# Patient Record
Sex: MALE | Race: WHITE | NOT HISPANIC OR LATINO
[De-identification: names, ages, dates, MRNs, and addresses within clinical notes are randomized per-mention and may not be internally consistent; named-entity substitution may affect disease eponyms.]

---

## 2017-04-04 PROBLEM — Z00.00 ENCOUNTER FOR PREVENTIVE HEALTH EXAMINATION: Status: ACTIVE | Noted: 2017-04-04

## 2017-04-05 ENCOUNTER — APPOINTMENT (OUTPATIENT)
Dept: OTOLARYNGOLOGY | Facility: CLINIC | Age: 62
End: 2017-04-05

## 2017-04-05 VITALS
SYSTOLIC BLOOD PRESSURE: 128 MMHG | BODY MASS INDEX: 24.5 KG/M2 | HEART RATE: 90 BPM | DIASTOLIC BLOOD PRESSURE: 83 MMHG | WEIGHT: 175 LBS | HEIGHT: 71 IN | TEMPERATURE: 99 F

## 2017-04-09 ENCOUNTER — FORM ENCOUNTER (OUTPATIENT)
Age: 62
End: 2017-04-09

## 2017-04-10 ENCOUNTER — APPOINTMENT (OUTPATIENT)
Dept: SURGERY | Facility: CLINIC | Age: 62
End: 2017-04-10

## 2017-04-10 ENCOUNTER — OUTPATIENT (OUTPATIENT)
Dept: OUTPATIENT SERVICES | Facility: HOSPITAL | Age: 62
LOS: 1 days | End: 2017-04-10
Payer: COMMERCIAL

## 2017-04-10 DIAGNOSIS — J38.7 OTHER DISEASES OF LARYNX: ICD-10-CM

## 2017-04-10 DIAGNOSIS — Z98.890 OTHER SPECIFIED POSTPROCEDURAL STATES: Chronic | ICD-10-CM

## 2017-04-10 DIAGNOSIS — Z01.818 ENCOUNTER FOR OTHER PREPROCEDURAL EXAMINATION: ICD-10-CM

## 2017-04-10 LAB
ALBUMIN SERPL ELPH-MCNC: 4.3 G/DL — SIGNIFICANT CHANGE UP (ref 3.4–5)
ALP SERPL-CCNC: 52 U/L — SIGNIFICANT CHANGE UP (ref 40–120)
ALT FLD-CCNC: 36 U/L — SIGNIFICANT CHANGE UP (ref 12–42)
ANION GAP SERPL CALC-SCNC: 7 MMOL/L — LOW (ref 9–16)
APTT BLD: 46.7 SEC — HIGH (ref 27.5–37.4)
AST SERPL-CCNC: 26 U/L — SIGNIFICANT CHANGE UP (ref 15–37)
BILIRUB SERPL-MCNC: 0.6 MG/DL — SIGNIFICANT CHANGE UP (ref 0.2–1.2)
BUN SERPL-MCNC: 19 MG/DL — SIGNIFICANT CHANGE UP (ref 7–23)
CALCIUM SERPL-MCNC: 8.9 MG/DL — SIGNIFICANT CHANGE UP (ref 8.5–10.5)
CHLORIDE SERPL-SCNC: 107 MMOL/L — SIGNIFICANT CHANGE UP (ref 96–108)
CO2 SERPL-SCNC: 28 MMOL/L — SIGNIFICANT CHANGE UP (ref 22–31)
CREAT SERPL-MCNC: 0.74 MG/DL — SIGNIFICANT CHANGE UP (ref 0.5–1.3)
GLUCOSE SERPL-MCNC: 82 MG/DL — SIGNIFICANT CHANGE UP (ref 70–99)
HCT VFR BLD CALC: 41.5 % — SIGNIFICANT CHANGE UP (ref 39–50)
HGB BLD-MCNC: 14.5 G/DL — SIGNIFICANT CHANGE UP (ref 13–17)
INR BLD: 0.94 — SIGNIFICANT CHANGE UP (ref 0.88–1.16)
MCHC RBC-ENTMCNC: 31.9 PG — SIGNIFICANT CHANGE UP (ref 27–34)
MCHC RBC-ENTMCNC: 34.9 G/DL — SIGNIFICANT CHANGE UP (ref 32–36)
MCV RBC AUTO: 91.2 FL — SIGNIFICANT CHANGE UP (ref 80–100)
PLATELET # BLD AUTO: 269 K/UL — SIGNIFICANT CHANGE UP (ref 150–400)
POTASSIUM SERPL-MCNC: 3.8 MMOL/L — SIGNIFICANT CHANGE UP (ref 3.5–5.3)
POTASSIUM SERPL-SCNC: 3.8 MMOL/L — SIGNIFICANT CHANGE UP (ref 3.5–5.3)
PROT SERPL-MCNC: 7.6 G/DL — SIGNIFICANT CHANGE UP (ref 6.4–8.2)
PROTHROM AB SERPL-ACNC: 10.4 SEC — SIGNIFICANT CHANGE UP (ref 9.8–12.7)
RBC # BLD: 4.55 M/UL — SIGNIFICANT CHANGE UP (ref 4.2–5.8)
RBC # FLD: 14.3 % — SIGNIFICANT CHANGE UP (ref 10.3–16.9)
SODIUM SERPL-SCNC: 142 MMOL/L — SIGNIFICANT CHANGE UP (ref 135–145)
WBC # BLD: 7.9 K/UL — SIGNIFICANT CHANGE UP (ref 3.8–10.5)
WBC # FLD AUTO: 7.9 K/UL — SIGNIFICANT CHANGE UP (ref 3.8–10.5)

## 2017-04-10 PROCEDURE — 93010 ELECTROCARDIOGRAM REPORT: CPT | Mod: NC

## 2017-04-10 PROCEDURE — 71020: CPT | Mod: 26

## 2017-05-10 ENCOUNTER — APPOINTMENT (OUTPATIENT)
Dept: OTOLARYNGOLOGY | Facility: CLINIC | Age: 62
End: 2017-05-10

## 2017-05-10 VITALS
BODY MASS INDEX: 23.8 KG/M2 | HEART RATE: 80 BPM | DIASTOLIC BLOOD PRESSURE: 73 MMHG | HEIGHT: 71 IN | WEIGHT: 170 LBS | SYSTOLIC BLOOD PRESSURE: 129 MMHG

## 2017-06-06 ENCOUNTER — APPOINTMENT (OUTPATIENT)
Dept: SURGERY | Facility: CLINIC | Age: 62
End: 2017-06-06

## 2017-06-06 ENCOUNTER — OUTPATIENT (OUTPATIENT)
Dept: OUTPATIENT SERVICES | Facility: HOSPITAL | Age: 62
LOS: 1 days | End: 2017-06-06

## 2017-06-06 VITALS
TEMPERATURE: 99 F | HEART RATE: 80 BPM | RESPIRATION RATE: 16 BRPM | DIASTOLIC BLOOD PRESSURE: 67 MMHG | HEIGHT: 71 IN | SYSTOLIC BLOOD PRESSURE: 113 MMHG | WEIGHT: 160.94 LBS

## 2017-06-06 DIAGNOSIS — J38.7 OTHER DISEASES OF LARYNX: ICD-10-CM

## 2017-06-06 DIAGNOSIS — F41.1 GENERALIZED ANXIETY DISORDER: ICD-10-CM

## 2017-06-06 DIAGNOSIS — Z98.890 OTHER SPECIFIED POSTPROCEDURAL STATES: Chronic | ICD-10-CM

## 2017-06-06 DIAGNOSIS — J30.2 OTHER SEASONAL ALLERGIC RHINITIS: ICD-10-CM

## 2017-06-06 DIAGNOSIS — Z01.818 ENCOUNTER FOR OTHER PREPROCEDURAL EXAMINATION: ICD-10-CM

## 2017-06-06 LAB
ALBUMIN SERPL ELPH-MCNC: 4.5 G/DL — SIGNIFICANT CHANGE UP (ref 3.3–5)
ALP SERPL-CCNC: 50 U/L — SIGNIFICANT CHANGE UP (ref 40–120)
ALT FLD-CCNC: 22 U/L — SIGNIFICANT CHANGE UP (ref 10–45)
ANION GAP SERPL CALC-SCNC: 15 MMOL/L — SIGNIFICANT CHANGE UP (ref 5–17)
APTT BLD: 43.1 SEC — HIGH (ref 27.5–37.4)
AST SERPL-CCNC: 25 U/L — SIGNIFICANT CHANGE UP (ref 10–40)
BILIRUB SERPL-MCNC: 0.2 MG/DL — SIGNIFICANT CHANGE UP (ref 0.2–1.2)
BUN SERPL-MCNC: 16 MG/DL — SIGNIFICANT CHANGE UP (ref 7–23)
CALCIUM SERPL-MCNC: 9.2 MG/DL — SIGNIFICANT CHANGE UP (ref 8.4–10.5)
CHLORIDE SERPL-SCNC: 101 MMOL/L — SIGNIFICANT CHANGE UP (ref 96–108)
CO2 SERPL-SCNC: 25 MMOL/L — SIGNIFICANT CHANGE UP (ref 22–31)
CREAT SERPL-MCNC: 1 MG/DL — SIGNIFICANT CHANGE UP (ref 0.5–1.3)
GLUCOSE SERPL-MCNC: 85 MG/DL — SIGNIFICANT CHANGE UP (ref 70–99)
HCT VFR BLD CALC: 40.5 % — SIGNIFICANT CHANGE UP (ref 39–50)
HGB BLD-MCNC: 14.2 G/DL — SIGNIFICANT CHANGE UP (ref 13–17)
INR BLD: 0.9 — SIGNIFICANT CHANGE UP (ref 0.88–1.16)
MCHC RBC-ENTMCNC: 32.1 PG — SIGNIFICANT CHANGE UP (ref 27–34)
MCHC RBC-ENTMCNC: 35.1 G/DL — SIGNIFICANT CHANGE UP (ref 32–36)
MCV RBC AUTO: 91.4 FL — SIGNIFICANT CHANGE UP (ref 80–100)
PLATELET # BLD AUTO: 264 K/UL — SIGNIFICANT CHANGE UP (ref 150–400)
POTASSIUM SERPL-MCNC: 4.5 MMOL/L — SIGNIFICANT CHANGE UP (ref 3.5–5.3)
POTASSIUM SERPL-SCNC: 4.5 MMOL/L — SIGNIFICANT CHANGE UP (ref 3.5–5.3)
PROT SERPL-MCNC: 7.3 G/DL — SIGNIFICANT CHANGE UP (ref 6–8.3)
PROTHROM AB SERPL-ACNC: 10 SEC — SIGNIFICANT CHANGE UP (ref 9.8–12.7)
RBC # BLD: 4.43 M/UL — SIGNIFICANT CHANGE UP (ref 4.2–5.8)
RBC # FLD: 14 % — SIGNIFICANT CHANGE UP (ref 10.3–16.9)
SODIUM SERPL-SCNC: 141 MMOL/L — SIGNIFICANT CHANGE UP (ref 135–145)
WBC # BLD: 7.7 K/UL — SIGNIFICANT CHANGE UP (ref 3.8–10.5)
WBC # FLD AUTO: 7.7 K/UL — SIGNIFICANT CHANGE UP (ref 3.8–10.5)

## 2017-06-06 NOTE — H&P PST ADULT - REASON FOR ADMISSION
pre op medical evaluation direct laryngoscopy with biopsy laryynx pre op medical evaluation direct laryngoscopy with biopsy larynx

## 2017-06-06 NOTE — H&P PST ADULT - NSANTHOSAYNRD_GEN_A_CORE
No. JAEL screening performed.  STOP BANG Legend: 0-2 = LOW Risk; 3-4 = INTERMEDIATE Risk; 5-8 = HIGH Risk

## 2017-06-06 NOTE — H&P PST ADULT - HISTORY OF PRESENT ILLNESS
61 year old white male with hoarse voice for 8 months.  Patient ex smoker <1ppd for 45 years.  No ETOH since 2000.  Patient with dry cough and had scope in the past and direct laryngoscopy with biopsy of larynx.  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 61 year old white male with hoarse voice for 8 months.  Patient ex smoker <1ppd for 45 years.  No ETOH since 2000.  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  Patient still smokes about 1/2 to 1 ppd.

## 2017-06-19 VITALS
SYSTOLIC BLOOD PRESSURE: 110 MMHG | HEART RATE: 78 BPM | HEIGHT: 71 IN | TEMPERATURE: 99 F | WEIGHT: 153 LBS | RESPIRATION RATE: 16 BRPM | OXYGEN SATURATION: 96 % | DIASTOLIC BLOOD PRESSURE: 70 MMHG

## 2017-06-20 ENCOUNTER — APPOINTMENT (OUTPATIENT)
Dept: OTOLARYNGOLOGY | Facility: HOSPITAL | Age: 62
End: 2017-06-20

## 2017-06-20 ENCOUNTER — RESULT REVIEW (OUTPATIENT)
Age: 62
End: 2017-06-20

## 2017-06-20 ENCOUNTER — OUTPATIENT (OUTPATIENT)
Dept: OUTPATIENT SERVICES | Facility: HOSPITAL | Age: 62
LOS: 1 days | Discharge: HOME | End: 2017-06-20
Payer: COMMERCIAL

## 2017-06-20 VITALS
RESPIRATION RATE: 18 BRPM | DIASTOLIC BLOOD PRESSURE: 68 MMHG | SYSTOLIC BLOOD PRESSURE: 129 MMHG | OXYGEN SATURATION: 96 % | HEART RATE: 66 BPM | TEMPERATURE: 98 F

## 2017-06-20 DIAGNOSIS — Z98.890 OTHER SPECIFIED POSTPROCEDURAL STATES: Chronic | ICD-10-CM

## 2017-06-20 PROCEDURE — 31536 LARYNGOSCOPY W/BX & OP SCOPE: CPT

## 2017-06-20 PROCEDURE — 31541 LARYNSCOP W/TUMR EXC + SCOPE: CPT | Mod: 50

## 2017-06-20 PROCEDURE — 88360 TUMOR IMMUNOHISTOCHEM/MANUAL: CPT

## 2017-06-20 PROCEDURE — 88305 TISSUE EXAM BY PATHOLOGIST: CPT

## 2017-06-20 PROCEDURE — 88341 IMHCHEM/IMCYTCHM EA ADD ANTB: CPT

## 2017-06-20 RX ORDER — MORPHINE SULFATE 50 MG/1
4 CAPSULE, EXTENDED RELEASE ORAL
Qty: 0 | Refills: 0 | Status: DISCONTINUED | OUTPATIENT
Start: 2017-06-20 | End: 2017-06-20

## 2017-06-20 RX ORDER — ACETAMINOPHEN WITH CODEINE 300MG-30MG
1 TABLET ORAL
Qty: 15 | Refills: 0 | OUTPATIENT
Start: 2017-06-20

## 2017-06-20 RX ORDER — SODIUM CHLORIDE 9 MG/ML
1000 INJECTION, SOLUTION INTRAVENOUS
Qty: 0 | Refills: 0 | Status: DISCONTINUED | OUTPATIENT
Start: 2017-06-20 | End: 2017-06-20

## 2017-06-20 RX ORDER — ONDANSETRON 8 MG/1
4 TABLET, FILM COATED ORAL EVERY 6 HOURS
Qty: 0 | Refills: 0 | Status: DISCONTINUED | OUTPATIENT
Start: 2017-06-20 | End: 2017-06-20

## 2017-06-20 NOTE — BRIEF OPERATIVE NOTE - OPERATION/FINDINGS
right anterior vocal fold lesion involving false fold extending to anterior commissure, left vocal fold with irregular appearance as well

## 2017-06-20 NOTE — PACU DISCHARGE NOTE - COMMENTS
Patient no difficulty swallowing, no shortness of breath. Able to tolerate po fluids without difficulty. Left unit per ambulatory with good steady gait, accompanied by Any ( escort ). No distress noted.

## 2017-06-20 NOTE — BRIEF OPERATIVE NOTE - SPECIMENS
right anterior vocal fold, right supraglottis, anterior commissure, left vocal process, right vocal process, right superior supraglottis, left mid cord

## 2017-06-27 LAB — SURGICAL PATHOLOGY STUDY: SIGNIFICANT CHANGE UP

## 2017-06-28 DIAGNOSIS — C32.0 MALIGNANT NEOPLASM OF GLOTTIS: ICD-10-CM

## 2017-07-05 ENCOUNTER — APPOINTMENT (OUTPATIENT)
Dept: OTOLARYNGOLOGY | Facility: CLINIC | Age: 62
End: 2017-07-05

## 2017-07-11 ENCOUNTER — APPOINTMENT (OUTPATIENT)
Dept: RADIATION ONCOLOGY | Facility: CLINIC | Age: 62
End: 2017-07-11

## 2017-07-11 VITALS
WEIGHT: 159.44 LBS | SYSTOLIC BLOOD PRESSURE: 127 MMHG | OXYGEN SATURATION: 97 % | BODY MASS INDEX: 22.24 KG/M2 | DIASTOLIC BLOOD PRESSURE: 81 MMHG | HEART RATE: 75 BPM

## 2017-07-11 VITALS — OXYGEN SATURATION: 98 % | DIASTOLIC BLOOD PRESSURE: 75 MMHG | SYSTOLIC BLOOD PRESSURE: 112 MMHG | HEART RATE: 76 BPM

## 2017-07-13 ENCOUNTER — APPOINTMENT (OUTPATIENT)
Dept: OTOLARYNGOLOGY | Facility: CLINIC | Age: 62
End: 2017-07-13

## 2017-08-23 VITALS — HEIGHT: 71 IN | WEIGHT: 157 LBS | BODY MASS INDEX: 21.98 KG/M2

## 2017-08-28 VITALS
WEIGHT: 156.4 LBS | SYSTOLIC BLOOD PRESSURE: 110 MMHG | RESPIRATION RATE: 18 BRPM | OXYGEN SATURATION: 99 % | BODY MASS INDEX: 21.81 KG/M2 | DIASTOLIC BLOOD PRESSURE: 71 MMHG | HEART RATE: 61 BPM

## 2017-09-06 VITALS
WEIGHT: 156.25 LBS | OXYGEN SATURATION: 97 % | DIASTOLIC BLOOD PRESSURE: 81 MMHG | HEART RATE: 67 BPM | SYSTOLIC BLOOD PRESSURE: 120 MMHG | BODY MASS INDEX: 21.79 KG/M2

## 2017-09-13 VITALS
HEART RATE: 67 BPM | OXYGEN SATURATION: 97 % | WEIGHT: 155.13 LBS | DIASTOLIC BLOOD PRESSURE: 73 MMHG | BODY MASS INDEX: 21.64 KG/M2 | SYSTOLIC BLOOD PRESSURE: 118 MMHG

## 2017-09-15 ENCOUNTER — APPOINTMENT (OUTPATIENT)
Dept: OTOLARYNGOLOGY | Facility: CLINIC | Age: 62
End: 2017-09-15

## 2017-09-18 ENCOUNTER — OUTPATIENT (OUTPATIENT)
Dept: OUTPATIENT SERVICES | Facility: HOSPITAL | Age: 62
LOS: 1 days | End: 2017-09-18
Payer: COMMERCIAL

## 2017-09-18 DIAGNOSIS — C32.9 MALIGNANT NEOPLASM OF LARYNX, UNSPECIFIED: ICD-10-CM

## 2017-09-18 DIAGNOSIS — Z98.890 OTHER SPECIFIED POSTPROCEDURAL STATES: Chronic | ICD-10-CM

## 2017-09-18 PROCEDURE — 74230 X-RAY XM SWLNG FUNCJ C+: CPT | Mod: 26

## 2017-09-18 PROCEDURE — G8996: CPT | Mod: CI

## 2017-09-18 PROCEDURE — G8997: CPT | Mod: CI

## 2017-09-18 PROCEDURE — 92611 MOTION FLUOROSCOPY/SWALLOW: CPT | Mod: GN

## 2017-09-18 PROCEDURE — 74230 X-RAY XM SWLNG FUNCJ C+: CPT

## 2017-09-18 PROCEDURE — G8998: CPT | Mod: CI

## 2017-09-19 VITALS
OXYGEN SATURATION: 98 % | WEIGHT: 156.44 LBS | BODY MASS INDEX: 21.82 KG/M2 | DIASTOLIC BLOOD PRESSURE: 69 MMHG | SYSTOLIC BLOOD PRESSURE: 127 MMHG | HEART RATE: 71 BPM

## 2017-09-26 VITALS — SYSTOLIC BLOOD PRESSURE: 106 MMHG | RESPIRATION RATE: 18 BRPM | HEART RATE: 72 BPM | DIASTOLIC BLOOD PRESSURE: 66 MMHG

## 2017-09-26 VITALS
SYSTOLIC BLOOD PRESSURE: 121 MMHG | OXYGEN SATURATION: 98 % | WEIGHT: 156.1 LBS | RESPIRATION RATE: 18 BRPM | DIASTOLIC BLOOD PRESSURE: 75 MMHG | HEART RATE: 67 BPM | BODY MASS INDEX: 21.77 KG/M2

## 2017-10-03 VITALS
BODY MASS INDEX: 21.52 KG/M2 | HEART RATE: 61 BPM | WEIGHT: 154.31 LBS | SYSTOLIC BLOOD PRESSURE: 113 MMHG | OXYGEN SATURATION: 98 % | DIASTOLIC BLOOD PRESSURE: 71 MMHG

## 2017-10-10 VITALS
HEART RATE: 72 BPM | SYSTOLIC BLOOD PRESSURE: 110 MMHG | WEIGHT: 154.9 LBS | RESPIRATION RATE: 18 BRPM | OXYGEN SATURATION: 97 % | DIASTOLIC BLOOD PRESSURE: 54 MMHG | BODY MASS INDEX: 21.6 KG/M2

## 2017-10-10 VITALS — SYSTOLIC BLOOD PRESSURE: 115 MMHG | DIASTOLIC BLOOD PRESSURE: 80 MMHG

## 2017-11-17 ENCOUNTER — RX RENEWAL (OUTPATIENT)
Age: 62
End: 2017-11-17

## 2017-11-24 ENCOUNTER — APPOINTMENT (OUTPATIENT)
Dept: RADIATION ONCOLOGY | Facility: CLINIC | Age: 62
End: 2017-11-24
Payer: COMMERCIAL

## 2017-11-24 VITALS — HEART RATE: 84 BPM | DIASTOLIC BLOOD PRESSURE: 72 MMHG | SYSTOLIC BLOOD PRESSURE: 111 MMHG

## 2017-11-24 VITALS
RESPIRATION RATE: 18 BRPM | WEIGHT: 153.3 LBS | OXYGEN SATURATION: 98 % | HEART RATE: 74 BPM | DIASTOLIC BLOOD PRESSURE: 74 MMHG | BODY MASS INDEX: 21.38 KG/M2 | SYSTOLIC BLOOD PRESSURE: 113 MMHG

## 2017-11-24 PROCEDURE — 31575 DIAGNOSTIC LARYNGOSCOPY: CPT

## 2017-11-24 PROCEDURE — 99024 POSTOP FOLLOW-UP VISIT: CPT

## 2017-11-24 RX ORDER — ASCORBIC ACID 500 MG
TABLET,CHEWABLE ORAL
Refills: 0 | Status: DISCONTINUED | COMMUNITY
Start: 2017-07-11 | End: 2017-11-24

## 2017-11-24 RX ORDER — LIDOCAINE HYDROCHLORIDE 20 MG/ML
2 SOLUTION OROPHARYNGEAL
Qty: 1 | Refills: 2 | Status: DISCONTINUED | COMMUNITY
Start: 2017-09-06 | End: 2017-11-24

## 2018-01-21 ENCOUNTER — FORM ENCOUNTER (OUTPATIENT)
Age: 63
End: 2018-01-21

## 2018-01-22 ENCOUNTER — OUTPATIENT (OUTPATIENT)
Dept: OUTPATIENT SERVICES | Facility: HOSPITAL | Age: 63
LOS: 1 days | End: 2018-01-22
Payer: COMMERCIAL

## 2018-01-22 DIAGNOSIS — Z98.890 OTHER SPECIFIED POSTPROCEDURAL STATES: Chronic | ICD-10-CM

## 2018-01-22 LAB — GLUCOSE BLDC GLUCOMTR-MCNC: 96 MG/DL — SIGNIFICANT CHANGE UP (ref 70–99)

## 2018-01-22 PROCEDURE — 78815 PET IMAGE W/CT SKULL-THIGH: CPT | Mod: 26

## 2018-01-22 PROCEDURE — 78815 PET IMAGE W/CT SKULL-THIGH: CPT

## 2018-01-22 PROCEDURE — A9552: CPT

## 2018-01-22 PROCEDURE — 82962 GLUCOSE BLOOD TEST: CPT

## 2018-01-23 ENCOUNTER — APPOINTMENT (OUTPATIENT)
Dept: RADIATION ONCOLOGY | Facility: CLINIC | Age: 63
End: 2018-01-23
Payer: COMMERCIAL

## 2018-01-23 VITALS — HEART RATE: 70 BPM | DIASTOLIC BLOOD PRESSURE: 79 MMHG | SYSTOLIC BLOOD PRESSURE: 131 MMHG | OXYGEN SATURATION: 97 %

## 2018-01-23 VITALS
WEIGHT: 152.38 LBS | OXYGEN SATURATION: 98 % | SYSTOLIC BLOOD PRESSURE: 131 MMHG | HEART RATE: 78 BPM | BODY MASS INDEX: 21.25 KG/M2 | DIASTOLIC BLOOD PRESSURE: 79 MMHG

## 2018-01-23 PROCEDURE — 99214 OFFICE O/P EST MOD 30 MIN: CPT | Mod: 25

## 2018-01-23 PROCEDURE — 31575 DIAGNOSTIC LARYNGOSCOPY: CPT

## 2018-01-23 RX ORDER — GABAPENTIN 300 MG/1
300 CAPSULE ORAL
Qty: 120 | Refills: 1 | Status: DISCONTINUED | COMMUNITY
Start: 2017-11-24 | End: 2018-01-23

## 2018-01-25 ENCOUNTER — RESULT REVIEW (OUTPATIENT)
Age: 63
End: 2018-01-25

## 2018-01-29 ENCOUNTER — APPOINTMENT (OUTPATIENT)
Dept: OTOLARYNGOLOGY | Facility: CLINIC | Age: 63
End: 2018-01-29
Payer: COMMERCIAL

## 2018-01-29 VITALS
TEMPERATURE: 98.9 F | HEART RATE: 80 BPM | BODY MASS INDEX: 21.28 KG/M2 | SYSTOLIC BLOOD PRESSURE: 130 MMHG | HEIGHT: 71 IN | DIASTOLIC BLOOD PRESSURE: 82 MMHG | WEIGHT: 152 LBS

## 2018-01-29 PROCEDURE — 99214 OFFICE O/P EST MOD 30 MIN: CPT | Mod: 25

## 2018-01-29 PROCEDURE — 31575 DIAGNOSTIC LARYNGOSCOPY: CPT

## 2018-02-21 ENCOUNTER — APPOINTMENT (OUTPATIENT)
Dept: SURGERY | Facility: CLINIC | Age: 63
End: 2018-02-21

## 2018-02-21 ENCOUNTER — OUTPATIENT (OUTPATIENT)
Dept: OUTPATIENT SERVICES | Facility: HOSPITAL | Age: 63
LOS: 1 days | End: 2018-02-21
Payer: COMMERCIAL

## 2018-02-21 VITALS
SYSTOLIC BLOOD PRESSURE: 131 MMHG | HEART RATE: 80 BPM | DIASTOLIC BLOOD PRESSURE: 79 MMHG | TEMPERATURE: 98 F | HEIGHT: 71 IN | OXYGEN SATURATION: 94 % | WEIGHT: 145.51 LBS

## 2018-02-21 DIAGNOSIS — Z98.890 OTHER SPECIFIED POSTPROCEDURAL STATES: Chronic | ICD-10-CM

## 2018-02-21 DIAGNOSIS — R49.0 DYSPHONIA: ICD-10-CM

## 2018-02-21 DIAGNOSIS — Z01.818 ENCOUNTER FOR OTHER PREPROCEDURAL EXAMINATION: ICD-10-CM

## 2018-02-21 LAB
ALBUMIN SERPL ELPH-MCNC: 4.2 G/DL — SIGNIFICANT CHANGE UP (ref 3.3–5)
ALP SERPL-CCNC: 50 U/L — SIGNIFICANT CHANGE UP (ref 40–120)
ALT FLD-CCNC: 16 U/L — SIGNIFICANT CHANGE UP (ref 10–45)
ANION GAP SERPL CALC-SCNC: 14 MMOL/L — SIGNIFICANT CHANGE UP (ref 5–17)
APTT BLD: 46.3 SEC — HIGH (ref 27.5–37.4)
AST SERPL-CCNC: 23 U/L — SIGNIFICANT CHANGE UP (ref 10–40)
BASOPHILS NFR BLD AUTO: 0.6 % — SIGNIFICANT CHANGE UP (ref 0–2)
BILIRUB SERPL-MCNC: 0.4 MG/DL — SIGNIFICANT CHANGE UP (ref 0.2–1.2)
BUN SERPL-MCNC: 10 MG/DL — SIGNIFICANT CHANGE UP (ref 7–23)
CALCIUM SERPL-MCNC: 9.3 MG/DL — SIGNIFICANT CHANGE UP (ref 8.4–10.5)
CHLORIDE SERPL-SCNC: 96 MMOL/L — SIGNIFICANT CHANGE UP (ref 96–108)
CO2 SERPL-SCNC: 29 MMOL/L — SIGNIFICANT CHANGE UP (ref 22–31)
CREAT SERPL-MCNC: 0.91 MG/DL — SIGNIFICANT CHANGE UP (ref 0.5–1.3)
EOSINOPHIL NFR BLD AUTO: 0.7 % — SIGNIFICANT CHANGE UP (ref 0–6)
GLUCOSE SERPL-MCNC: 78 MG/DL — SIGNIFICANT CHANGE UP (ref 70–99)
HCT VFR BLD CALC: 41 % — SIGNIFICANT CHANGE UP (ref 39–50)
HGB BLD-MCNC: 13.8 G/DL — SIGNIFICANT CHANGE UP (ref 13–17)
INR BLD: 0.99 — SIGNIFICANT CHANGE UP (ref 0.88–1.16)
LYMPHOCYTES # BLD AUTO: 12.5 % — LOW (ref 13–44)
MCHC RBC-ENTMCNC: 30.6 PG — SIGNIFICANT CHANGE UP (ref 27–34)
MCHC RBC-ENTMCNC: 33.7 G/DL — SIGNIFICANT CHANGE UP (ref 32–36)
MCV RBC AUTO: 90.9 FL — SIGNIFICANT CHANGE UP (ref 80–100)
MONOCYTES NFR BLD AUTO: 8.5 % — SIGNIFICANT CHANGE UP (ref 2–14)
NEUTROPHILS NFR BLD AUTO: 77.7 % — HIGH (ref 43–77)
PLATELET # BLD AUTO: 276 K/UL — SIGNIFICANT CHANGE UP (ref 150–400)
POTASSIUM SERPL-MCNC: 4.1 MMOL/L — SIGNIFICANT CHANGE UP (ref 3.5–5.3)
POTASSIUM SERPL-SCNC: 4.1 MMOL/L — SIGNIFICANT CHANGE UP (ref 3.5–5.3)
PROT SERPL-MCNC: 7.2 G/DL — SIGNIFICANT CHANGE UP (ref 6–8.3)
PROTHROM AB SERPL-ACNC: 11 SEC — SIGNIFICANT CHANGE UP (ref 9.8–12.7)
RBC # BLD: 4.51 M/UL — SIGNIFICANT CHANGE UP (ref 4.2–5.8)
RBC # FLD: 13.6 % — SIGNIFICANT CHANGE UP (ref 10.3–16.9)
SODIUM SERPL-SCNC: 139 MMOL/L — SIGNIFICANT CHANGE UP (ref 135–145)
WBC # BLD: 6.9 K/UL — SIGNIFICANT CHANGE UP (ref 3.8–10.5)
WBC # FLD AUTO: 6.9 K/UL — SIGNIFICANT CHANGE UP (ref 3.8–10.5)

## 2018-02-21 PROCEDURE — 93010 ELECTROCARDIOGRAM REPORT: CPT

## 2018-02-21 NOTE — ASU PATIENT PROFILE, ADULT - PMH
Fracture  left ankle  Jaundice  1965  Laryngeal mass Fracture  left ankle  Jaundice  1965  Laryngeal mass  s/ p radiation  SOB (shortness of breath)    Throat cancer

## 2018-02-21 NOTE — ASU PATIENT PROFILE, ADULT - VISION (WITH CORRECTIVE LENSES IF THE PATIENT USUALLY WEARS THEM):
Partially impaired: cannot see medication labels or newsprint, but can see obstacles in path, and the surrounding layout; can count fingers at arm's length/eyeglass for reading and distance

## 2018-02-21 NOTE — H&P PST ADULT - VISION (WITH CORRECTIVE LENSES IF THE PATIENT USUALLY WEARS THEM):
eyeglass for reading and distance/Partially impaired: cannot see medication labels or newsprint, but can see obstacles in path, and the surrounding layout; can count fingers at arm's length

## 2018-02-21 NOTE — H&P PST ADULT - HISTORY OF PRESENT ILLNESS
Pt. reports having had a throat biopsy last summer. Had radiation to larynx thereafter. Complaining of loss of voice.

## 2018-02-21 NOTE — ASU PATIENT PROFILE, ADULT - PSH
Status post surgery  inguinal hernia  Status post surgery  Left ankle surgical repair  x2 (1965)  Surgery, elective  direct laryngoscopy with biopsy- 4/2017

## 2018-02-27 ENCOUNTER — RESULT REVIEW (OUTPATIENT)
Age: 63
End: 2018-02-27

## 2018-02-27 ENCOUNTER — APPOINTMENT (OUTPATIENT)
Dept: OTOLARYNGOLOGY | Facility: HOSPITAL | Age: 63
End: 2018-02-27

## 2018-02-27 ENCOUNTER — INPATIENT (INPATIENT)
Facility: HOSPITAL | Age: 63
LOS: 8 days | Discharge: NURSING, REHAB OR LONG-TERM CARE | DRG: 11 | End: 2018-03-08
Attending: OTOLARYNGOLOGY | Admitting: OTOLARYNGOLOGY
Payer: COMMERCIAL

## 2018-02-27 VITALS
RESPIRATION RATE: 18 BRPM | TEMPERATURE: 98 F | OXYGEN SATURATION: 92 % | DIASTOLIC BLOOD PRESSURE: 82 MMHG | HEIGHT: 71 IN | HEART RATE: 79 BPM | SYSTOLIC BLOOD PRESSURE: 131 MMHG

## 2018-02-27 DIAGNOSIS — Z98.890 OTHER SPECIFIED POSTPROCEDURAL STATES: Chronic | ICD-10-CM

## 2018-02-27 DIAGNOSIS — Z41.9 ENCOUNTER FOR PROCEDURE FOR PURPOSES OTHER THAN REMEDYING HEALTH STATE, UNSPECIFIED: Chronic | ICD-10-CM

## 2018-02-27 LAB
ALBUMIN SERPL ELPH-MCNC: 3.7 G/DL — SIGNIFICANT CHANGE UP (ref 3.3–5)
ALP SERPL-CCNC: 44 U/L — SIGNIFICANT CHANGE UP (ref 40–120)
ALT FLD-CCNC: 20 U/L — SIGNIFICANT CHANGE UP (ref 10–45)
ANION GAP SERPL CALC-SCNC: 7 MMOL/L — SIGNIFICANT CHANGE UP (ref 5–17)
APTT BLD: 33.6 SEC — SIGNIFICANT CHANGE UP (ref 27.5–37.4)
AST SERPL-CCNC: 35 U/L — SIGNIFICANT CHANGE UP (ref 10–40)
BASE EXCESS BLDA CALC-SCNC: 5.4 MMOL/L — HIGH (ref -2–3)
BILIRUB DIRECT SERPL-MCNC: <0.2 MG/DL — SIGNIFICANT CHANGE UP (ref 0–0.2)
BILIRUB INDIRECT FLD-MCNC: >0.2 MG/DL — SIGNIFICANT CHANGE UP (ref 0.2–1)
BILIRUB SERPL-MCNC: 0.4 MG/DL — SIGNIFICANT CHANGE UP (ref 0.2–1.2)
BUN SERPL-MCNC: 11 MG/DL — SIGNIFICANT CHANGE UP (ref 7–23)
CALCIUM SERPL-MCNC: 8.8 MG/DL — SIGNIFICANT CHANGE UP (ref 8.4–10.5)
CHLORIDE SERPL-SCNC: 93 MMOL/L — LOW (ref 96–108)
CO2 SERPL-SCNC: 35 MMOL/L — HIGH (ref 22–31)
CREAT SERPL-MCNC: 1.16 MG/DL — SIGNIFICANT CHANGE UP (ref 0.5–1.3)
GAS PNL BLDA: SIGNIFICANT CHANGE UP
GLUCOSE BLDC GLUCOMTR-MCNC: 92 MG/DL — SIGNIFICANT CHANGE UP (ref 70–99)
GLUCOSE BLDC GLUCOMTR-MCNC: 99 MG/DL — SIGNIFICANT CHANGE UP (ref 70–99)
GLUCOSE SERPL-MCNC: 123 MG/DL — HIGH (ref 70–99)
HCO3 BLDA-SCNC: 32 MMOL/L — HIGH (ref 21–28)
HCT VFR BLD CALC: 37.1 % — LOW (ref 39–50)
HGB BLD-MCNC: 12.7 G/DL — LOW (ref 13–17)
INR BLD: 1.04 — SIGNIFICANT CHANGE UP (ref 0.88–1.16)
MAGNESIUM SERPL-MCNC: 2 MG/DL — SIGNIFICANT CHANGE UP (ref 1.6–2.6)
MCHC RBC-ENTMCNC: 30.6 PG — SIGNIFICANT CHANGE UP (ref 27–34)
MCHC RBC-ENTMCNC: 34.2 G/DL — SIGNIFICANT CHANGE UP (ref 32–36)
MCV RBC AUTO: 89.4 FL — SIGNIFICANT CHANGE UP (ref 80–100)
PCO2 BLDA: 55 MMHG — HIGH (ref 35–48)
PH BLDA: 7.38 — SIGNIFICANT CHANGE UP (ref 7.35–7.45)
PHOSPHATE SERPL-MCNC: 4.9 MG/DL — HIGH (ref 2.5–4.5)
PLATELET # BLD AUTO: 211 K/UL — SIGNIFICANT CHANGE UP (ref 150–400)
PO2 BLDA: 230 MMHG — HIGH (ref 83–108)
POTASSIUM SERPL-MCNC: 4.8 MMOL/L — SIGNIFICANT CHANGE UP (ref 3.5–5.3)
POTASSIUM SERPL-SCNC: 4.8 MMOL/L — SIGNIFICANT CHANGE UP (ref 3.5–5.3)
PROT SERPL-MCNC: 6.3 G/DL — SIGNIFICANT CHANGE UP (ref 6–8.3)
PROTHROM AB SERPL-ACNC: 11.5 SEC — SIGNIFICANT CHANGE UP (ref 9.8–12.7)
RBC # BLD: 4.15 M/UL — LOW (ref 4.2–5.8)
RBC # FLD: 12.8 % — SIGNIFICANT CHANGE UP (ref 10.3–16.9)
SAO2 % BLDA: 100 % — SIGNIFICANT CHANGE UP (ref 95–100)
SODIUM SERPL-SCNC: 135 MMOL/L — SIGNIFICANT CHANGE UP (ref 135–145)
TROPONIN T SERPL-MCNC: <0.01 NG/ML — SIGNIFICANT CHANGE UP (ref 0–0.01)
TROPONIN T SERPL-MCNC: <0.01 NG/ML — SIGNIFICANT CHANGE UP (ref 0–0.01)
WBC # BLD: 12.2 K/UL — HIGH (ref 3.8–10.5)
WBC # FLD AUTO: 12.2 K/UL — HIGH (ref 3.8–10.5)

## 2018-02-27 PROCEDURE — 71045 X-RAY EXAM CHEST 1 VIEW: CPT | Mod: 26,59

## 2018-02-27 PROCEDURE — 31600 PLANNED TRACHEOSTOMY: CPT

## 2018-02-27 PROCEDURE — 93010 ELECTROCARDIOGRAM REPORT: CPT

## 2018-02-27 PROCEDURE — 31536 LARYNGOSCOPY W/BX & OP SCOPE: CPT

## 2018-02-27 PROCEDURE — 99291 CRITICAL CARE FIRST HOUR: CPT

## 2018-02-27 PROCEDURE — 70450 CT HEAD/BRAIN W/O DYE: CPT | Mod: 26

## 2018-02-27 PROCEDURE — 95951: CPT | Mod: 26

## 2018-02-27 RX ORDER — ACETAMINOPHEN 500 MG
1000 TABLET ORAL ONCE
Qty: 0 | Refills: 0 | Status: COMPLETED | OUTPATIENT
Start: 2018-02-27 | End: 2018-03-02

## 2018-02-27 RX ORDER — MORPHINE SULFATE 50 MG/1
2 CAPSULE, EXTENDED RELEASE ORAL
Qty: 0 | Refills: 0 | Status: DISCONTINUED | OUTPATIENT
Start: 2018-02-27 | End: 2018-02-27

## 2018-02-27 RX ORDER — NALOXONE HYDROCHLORIDE 4 MG/.1ML
0.4 SPRAY NASAL ONCE
Qty: 0 | Refills: 0 | Status: COMPLETED | OUTPATIENT
Start: 2018-02-27 | End: 2018-02-27

## 2018-02-27 RX ORDER — INSULIN LISPRO 100/ML
VIAL (ML) SUBCUTANEOUS
Qty: 0 | Refills: 0 | Status: DISCONTINUED | OUTPATIENT
Start: 2018-02-27 | End: 2018-03-04

## 2018-02-27 RX ORDER — MORPHINE SULFATE 50 MG/1
2 CAPSULE, EXTENDED RELEASE ORAL EVERY 4 HOURS
Qty: 0 | Refills: 0 | Status: DISCONTINUED | OUTPATIENT
Start: 2018-02-27 | End: 2018-02-27

## 2018-02-27 RX ORDER — ONDANSETRON 8 MG/1
4 TABLET, FILM COATED ORAL EVERY 6 HOURS
Qty: 0 | Refills: 0 | Status: DISCONTINUED | OUTPATIENT
Start: 2018-02-27 | End: 2018-03-08

## 2018-02-27 RX ORDER — MORPHINE SULFATE 50 MG/1
4 CAPSULE, EXTENDED RELEASE ORAL EVERY 4 HOURS
Qty: 0 | Refills: 0 | Status: DISCONTINUED | OUTPATIENT
Start: 2018-02-27 | End: 2018-02-27

## 2018-02-27 RX ORDER — SODIUM CHLORIDE 9 MG/ML
1000 INJECTION, SOLUTION INTRAVENOUS
Qty: 0 | Refills: 0 | Status: DISCONTINUED | OUTPATIENT
Start: 2018-02-27 | End: 2018-03-01

## 2018-02-27 RX ORDER — ACETAMINOPHEN 500 MG
1000 TABLET ORAL ONCE
Qty: 0 | Refills: 0 | Status: COMPLETED | OUTPATIENT
Start: 2018-02-27 | End: 2018-02-27

## 2018-02-27 RX ORDER — NICOTINE POLACRILEX 2 MG
1 GUM BUCCAL DAILY
Qty: 0 | Refills: 0 | Status: DISCONTINUED | OUTPATIENT
Start: 2018-02-27 | End: 2018-03-08

## 2018-02-27 RX ORDER — FLUMAZENIL 0.1 MG/ML
0.1 VIAL (ML) INTRAVENOUS ONCE
Qty: 0 | Refills: 0 | Status: COMPLETED | OUTPATIENT
Start: 2018-02-27 | End: 2018-02-27

## 2018-02-27 RX ADMIN — Medication 400 MILLIGRAM(S): at 16:23

## 2018-02-27 RX ADMIN — Medication 100 MILLIGRAM(S): at 19:00

## 2018-02-27 RX ADMIN — Medication 1000 MILLIGRAM(S): at 17:02

## 2018-02-27 RX ADMIN — Medication 0.1 MILLIGRAM(S): at 14:49

## 2018-02-27 RX ADMIN — NALOXONE HYDROCHLORIDE 0.4 MILLIGRAM(S): 4 SPRAY NASAL at 14:43

## 2018-02-27 RX ADMIN — SODIUM CHLORIDE 100 MILLILITER(S): 9 INJECTION, SOLUTION INTRAVENOUS at 16:42

## 2018-02-27 RX ADMIN — NALOXONE HYDROCHLORIDE 0.4 MILLIGRAM(S): 4 SPRAY NASAL at 14:36

## 2018-02-27 RX ADMIN — Medication 1 PATCH: at 13:54

## 2018-02-27 NOTE — CONSULT NOTE ADULT - ASSESSMENT
62M current smoker w/ R TVC SCCa s/p XRT with airway obstruction s/p tracheostomy, direct laryngoscopy, biopsy 2/27  Neuro: tylenol prn. f/u CTH  CV: normotensive goals. f/u cardiac panel  Pulm: /40/12/5 via trach. wean to trach collar. suction with red rubber catheter only  FENGI: NPO, bedside SS eval in AM. protonix.   : Voids  Endo: ISS  ID: clinda (2/27--)  Ppx: SCDs, SQH  Lines: PIVs  Wounds: new trach 62M current smoker w/ R TVC SCCa s/p XRT with airway obstruction s/p tracheostomy, direct laryngoscopy, biopsy 2/27, acute respiratory failure, toxic metaboilic encephalopathy  Neuro: tylenol prn. f/u CTH  CV: normotensive goals. f/u cardiac panel  Pulm: /40/12/5 via trach. wean to trach collar. suction with red rubber catheter only  FENGI: NPO, bedside SS eval in AM. protonix.   : Voids  Endo: ISS  ID: clinda (2/27--)  Ppx: SCDs, SQH  Lines: PIVs  Wounds: new trach

## 2018-02-27 NOTE — BRIEF OPERATIVE NOTE - OPERATION/FINDINGS
See dictated operative report for full details. 6.0 cuffed tracheostomy placed with good end tidal C02 and oxygenation. Intraop frozen section left glottic lesion necrotic tissue. Right glottis nondiagnostic. Additional specimen taken and sent for permanent section at pathologist request.

## 2018-02-27 NOTE — CONSULT NOTE ADULT - SUBJECTIVE AND OBJECTIVE BOX
HPI: 62M current every day smoker hx T2N0M0 R TVC SCCa s/p XRT with 4 months of worsening hoarseness. On exam patient noted to have fullness to supraglottic tissues as well as necrotic glottic tissue with significantly narrowed airway. Patient now s/p awake tracheostomy, direct laryngoscopy and biopsy 2/27.     SICU addendum: Direct laryngoscopy and biopsy was complicated by airway obstruction. Unable to pass glidescope for intubation so awake tracheostomy placement was performed. Pt received versed, propofol, and fentanyl during procedure; EBL <20, IVF 2100. At time of exam the patient was unarousable with pinpoint pupils. He was given narcan 0.4mg x 2 and flumazenil without improvement so decision was made to obtain CTH. Upon arrival to CT the pt was more arousable and following commands but sluggish. He denies pain, headache, CP, SOB.     PMH: above  Meds: tylenol prn  All: PCN (unknown reaction)    POD0: Immediately postop, patient noted to be lethargic and somnolent, not following commands. STAT labs showing some C02 retention but otherwise wnl. Trop neg. STAT CXR and EKG performed. NCHCT without intracranial abnormality. After CT performed, patient mental status returned to baseline. Now alert and oriented, following commands and interactive. Pain well controlled. No trach issues, remains on vent.     PHYSICAL EXAM:   NAD, A&Ox3, communicates via writing  following commands in all extremities  NC/AT, face symmetric, TML  6.0 cuffed shiley trach sutured in place, velcro Glenn collar secured around neck  blood tinged secretions, minimal peristomal oozing expected postop  strength 5/5 grossly throughout     Labs:                        12.7   12.2  )-----------( 211      ( 27 Feb 2018 14:00 )             37.1   02-27    135  |  93<L>  |  11  ----------------------------<  123<H>  4.8   |  35<H>  |  1.16    Ca    8.8      27 Feb 2018 14:00  Phos  4.9     02-27  Mg     2.0     02-27    TPro  6.3  /  Alb  3.7  /  TBili  0.4  /  DBili  <0.2  /  AST  35  /  ALT  20  /  AlkPhos  44  02-27      Troponin T, Serum (02.27.18 @ 14:00)    Troponin T, Serum: <0.01: Reference interval for troponin T is </= 0.01 ng/mL which includes the  99th percentile of a healthy population. Troponin T results are not  interchangeable with troponin I results. ng/mL        Assessment/Plan:   62M current smoker w/ T2N0M0 R TVC SCCa s/p XRT with airway obstruction s/p tracheostomy, direct laryngoscopy, biopsy 2/27    -prn pain  -prn nausea  -NPO/IVF overnight  -bedside SLP evaluation in AM (tolerating regular diet prior to OR)  -HOB@30  -Clinda for abx ppx   -wean to TC as tolerated   -routine trach care - spare 6.0 cuffed shiley at bedside, obturator at HOB, suction with red rubber catheters only, HOB@30  -humidified trach collar as tolerated  -repeat trop in 6 hrs  -DVT ppx: SCDs  -OOBTC    Dispo: SICU    seen with chief and d/w fellow and attending (27 Feb 2018 12:16)      PAST MEDICAL & SURGICAL HISTORY:  SOB (shortness of breath)  Throat cancer  Jaundice: 1965  Fracture: left ankle  Laryngeal mass: s/ p radiation  Surgery, elective: direct laryngoscopy with biopsy- 4/2017  Status post surgery: inguinal hernia  Status post surgery: Left ankle surgical repair  x2 (1965)      ROS: See HPI    MEDICATIONS  (STANDING):  clindamycin IVPB 600 milliGRAM(s) IV Intermittent every 8 hours  insulin lispro (HumaLOG) corrective regimen sliding scale   SubCutaneous Before meals and at bedtime  lactated ringers. 1000 milliLiter(s) (100 mL/Hr) IV Continuous <Continuous>  nicotine -   7 mG/24Hr(s) Patch 1 patch Transdermal daily    MEDICATIONS  (PRN):  morphine  - Injectable 2 milliGRAM(s) IV Push every 15 minutes PRN Severe Pain (7 - 10)  morphine  - Injectable 2 milliGRAM(s) IV Push every 4 hours PRN Moderate Pain (4 - 6)  morphine  - Injectable 4 milliGRAM(s) IV Push every 4 hours PRN Severe Pain (7 - 10)  ondansetron Injectable 4 milliGRAM(s) IV Push every 6 hours PRN Nausea and/or Vomiting      Allergies    penicillin (Rash)    Intolerances        SOCIAL HISTORY:  Smoke: Never Smoker  EtOH: occasional    FAMILY HISTORY:  No pertinent family history in first degree relatives      Vital Signs Last 24 Hrs  T(C): 36.7 (27 Feb 2018 16:02), Max: 37.7 (27 Feb 2018 13:24)  T(F): 98 (27 Feb 2018 16:02), Max: 99.8 (27 Feb 2018 13:24)  HR: 74 (27 Feb 2018 16:24) (66 - 96)  BP: 129/66 (27 Feb 2018 16:02) (106/65 - 131/82)  BP(mean): 91 (27 Feb 2018 16:02) (77 - 95)  RR: 12 (27 Feb 2018 16:24) (5 - 24)  SpO2: 100% (27 Feb 2018 16:24) (92% - 100%)    PHYSICAL EXAM    Gen: NAD   CV: RRR  Pulm: CTAB  Abd: Soft, NT/ND  Ext:     LABS:                        12.7   12.2  )-----------( 211      ( 27 Feb 2018 14:00 )             37.1     02-27    135  |  93<L>  |  11  ----------------------------<  123<H>  4.8   |  35<H>  |  1.16    Ca    8.8      27 Feb 2018 14:00  Phos  4.9     02-27  Mg     2.0     02-27    TPro  6.3  /  Alb  3.7  /  TBili  0.4  /  DBili  <0.2  /  AST  35  /  ALT  20  /  AlkPhos  44  02-27    PT/INR - ( 27 Feb 2018 14:00 )   PT: 11.5 sec;   INR: 1.04          PTT - ( 27 Feb 2018 14:00 )  PTT:33.6 sec      RADIOLOGY & ADDITIONAL STUDIES:    Assessment and Plan:  62yMale HPI: 62M current every day smoker hx T2N0M0 R TVC SCCa s/p XRT with 4 months of worsening hoarseness. On exam patient noted to have fullness to supraglottic tissues as well as necrotic glottic tissue with significantly narrowed airway. Patient now s/p awake tracheostomy, direct laryngoscopy and biopsy 2/27.     SICU addendum: Direct laryngoscopy and biopsy was complicated by airway obstruction. Unable to pass glidescope for intubation so awake tracheostomy placement was performed. Pt had transient episode of SVT intra-op that resolved without intervention. During case he received versed, propofol, and fentanyl during procedure; EBL <20, IVF 2100. He was transferred to PACU post-op on ventilator and off sedation. At time of exam the patient was unarousable with pinpoint pupils. He was given narcan 0.4mg x 2 and flumazenil without improvement so decision was made to obtain CTH. Upon arrival to CT the pt was more arousable and following commands but sluggish. He denies pain, headache, CP, SOB.     PMH: above  Meds: tylenol prn  All: PCN (unknown reaction)      PHYSICAL EXAM:   Gen: somnolent, thin appearing male  Neuro: not following commands. not opening eyes, Pupils pinpoint, sluggish, +dolls eyes. reflexive to pain on all extremities.  HEENT: NC/AT, face symmetric, MMM. 6.0 cuffed shiley trach sutured in place, velcro collar around neck. minimal blood around site.  Pulm: breathing comfortably on vent. CTA x 2  CV: RRR, no murmur  Abd: soft, NTND, +BS.   Ext: WWP, no edema  Skin: no rash  Psyc: unable to assess    Labs:                        12.7   12.2  )-----------( 211      ( 27 Feb 2018 14:00 )             37.1   02-27    135  |  93<L>  |  11  ----------------------------<  123<H>  4.8   |  35<H>  |  1.16    Ca    8.8      27 Feb 2018 14:00  Phos  4.9     02-27  Mg     2.0     02-27    TPro  6.3  /  Alb  3.7  /  TBili  0.4  /  DBili  <0.2  /  AST  35  /  ALT  20  /  AlkPhos  44  02-27      Troponin T, Serum (02.27.18 @ 14:00)    Troponin T, Serum: <0.01: Reference interval for troponin T is </= 0.01 ng/mL which includes the  99th percentile of a healthy population. Troponin T results are not  interchangeable with troponin I results. ng/mL        Assessment/Plan:   62M current smoker w/ T2N0M0 R TVC SCCa s/p XRT with airway obstruction s/p tracheostomy, direct laryngoscopy, biopsy 2/27    -prn pain  -prn nausea  -NPO/IVF overnight  -bedside SLP evaluation in AM (tolerating regular diet prior to OR)  -HOB@30  -Clinda for abx ppx   -wean to TC as tolerated   -routine trach care - spare 6.0 cuffed shiley at bedside, obturator at HOB, suction with red rubber catheters only, HOB@30  -humidified trach collar as tolerated  -repeat trop in 6 hrs  -DVT ppx: SCDs  -OOBTC    Dispo: SICU    seen with chief and d/w fellow and attending (27 Feb 2018 12:16)      PAST MEDICAL & SURGICAL HISTORY:  SOB (shortness of breath)  Throat cancer  Jaundice: 1965  Fracture: left ankle  Laryngeal mass: s/ p radiation  Surgery, elective: direct laryngoscopy with biopsy- 4/2017  Status post surgery: inguinal hernia  Status post surgery: Left ankle surgical repair  x2 (1965)      ROS: See HPI    MEDICATIONS  (STANDING):  clindamycin IVPB 600 milliGRAM(s) IV Intermittent every 8 hours  insulin lispro (HumaLOG) corrective regimen sliding scale   SubCutaneous Before meals and at bedtime  lactated ringers. 1000 milliLiter(s) (100 mL/Hr) IV Continuous <Continuous>  nicotine -   7 mG/24Hr(s) Patch 1 patch Transdermal daily    MEDICATIONS  (PRN):  morphine  - Injectable 2 milliGRAM(s) IV Push every 15 minutes PRN Severe Pain (7 - 10)  morphine  - Injectable 2 milliGRAM(s) IV Push every 4 hours PRN Moderate Pain (4 - 6)  morphine  - Injectable 4 milliGRAM(s) IV Push every 4 hours PRN Severe Pain (7 - 10)  ondansetron Injectable 4 milliGRAM(s) IV Push every 6 hours PRN Nausea and/or Vomiting      Allergies    penicillin (Rash)    Intolerances        SOCIAL HISTORY:  Smoke: Never Smoker  EtOH: occasional    FAMILY HISTORY:  No pertinent family history in first degree relatives      Vital Signs Last 24 Hrs  T(C): 36.7 (27 Feb 2018 16:02), Max: 37.7 (27 Feb 2018 13:24)  T(F): 98 (27 Feb 2018 16:02), Max: 99.8 (27 Feb 2018 13:24)  HR: 74 (27 Feb 2018 16:24) (66 - 96)  BP: 129/66 (27 Feb 2018 16:02) (106/65 - 131/82)  BP(mean): 91 (27 Feb 2018 16:02) (77 - 95)  RR: 12 (27 Feb 2018 16:24) (5 - 24)  SpO2: 100% (27 Feb 2018 16:24) (92% - 100%)    PHYSICAL EXAM    Gen: NAD   CV: RRR  Pulm: CTAB  Abd: Soft, NT/ND  Ext:     LABS:                        12.7   12.2  )-----------( 211      ( 27 Feb 2018 14:00 )             37.1     02-27    135  |  93<L>  |  11  ----------------------------<  123<H>  4.8   |  35<H>  |  1.16    Ca    8.8      27 Feb 2018 14:00  Phos  4.9     02-27  Mg     2.0     02-27    TPro  6.3  /  Alb  3.7  /  TBili  0.4  /  DBili  <0.2  /  AST  35  /  ALT  20  /  AlkPhos  44  02-27    PT/INR - ( 27 Feb 2018 14:00 )   PT: 11.5 sec;   INR: 1.04          PTT - ( 27 Feb 2018 14:00 )  PTT:33.6 sec      RADIOLOGY & ADDITIONAL STUDIES:    Assessment and Plan:  62yMale HPI: 62M current every day smoker hx T2N0M0 R TVC SCCa s/p XRT with 4 months of worsening hoarseness. On exam patient noted to have fullness to supraglottic tissues as well as necrotic glottic tissue with significantly narrowed airway. Patient now s/p awake tracheostomy, direct laryngoscopy and biopsy 2/27.     SICU addendum: Direct laryngoscopy and biopsy was complicated by airway obstruction. Unable to pass glidescope for intubation so awake tracheostomy placement was performed. Pt had transient episode of SVT intra-op that resolved without intervention. During case he received versed, propofol, and fentanyl during procedure; EBL <20, IVF 2100. He was transferred to PACU post-op on ventilator and off sedation. At time of exam the patient was unarousable with pinpoint pupils. He was given narcan 0.4mg x 2 and flumazenil without improvement so decision was made to obtain CTH. Upon arrival to CT the pt was more arousable and following commands but sluggish. He denies pain, headache, CP, SOB.     PMH: above  Meds: tylenol prn  All: PCN (unknown reaction)      PHYSICAL EXAM:   Gen: somnolent, thin appearing male  Neuro: not following commands. not opening eyes, Pupils pinpoint, sluggish, +dolls eyes. reflexive to pain on all extremities.  HEENT: NC/AT, face symmetric, MMM. 6.0 cuffed shiley trach sutured in place, velcro collar around neck. minimal blood around site.  Pulm: breathing comfortably on vent. CTA x 2  CV: RRR, no murmur  Abd: soft, NTND, +BS.   Ext: WWP, no edema  Skin: no rash  Psyc: when awake affect appropriate  Vasc: 2+ pulses  Skin: no rash  MSK: no joint swelling    Labs:                        12.7   12.2  )-----------( 211      ( 27 Feb 2018 14:00 )             37.1   02-27    135  |  93<L>  |  11  ----------------------------<  123<H>  4.8   |  35<H>  |  1.16    Ca    8.8      27 Feb 2018 14:00  Phos  4.9     02-27  Mg     2.0     02-27    TPro  6.3  /  Alb  3.7  /  TBili  0.4  /  DBili  <0.2  /  AST  35  /  ALT  20  /  AlkPhos  44  02-27      Troponin T, Serum (02.27.18 @ 14:00)    Troponin T, Serum: <0.01: Reference interval for troponin T is </= 0.01 ng/mL which includes the  99th percentile of a healthy population. Troponin T results are not  interchangeable with troponin I results. ng/mL        Assessment/Plan:   62M current smoker w/ T2N0M0 R TVC SCCa s/p XRT with airway obstruction s/p tracheostomy, direct laryngoscopy, biopsy 2/27 with intermittent AMS possible toxic metabolic encephalopathy vs. seizure after perioperative respiratory failure and SVT    -prn pain  -prn nausea  -NPO/IVF overnight  -bedside SLP evaluation in AM (tolerating regular diet prior to OR)  -HOB@30  -Clinda for abx ppx   -wean to TC as tolerated   -routine trach care - spare 6.0 cuffed shiley at bedside, obturator at HOB, suction with red rubber catheters only, HOB@30  -humidified trach collar as tolerated  -repeat trop in 6 hrs  -DVT ppx: SCDs  -OOBTC    Dispo: SICU    seen with chief and d/w fellow and attending (27 Feb 2018 12:16)      PAST MEDICAL & SURGICAL HISTORY:  SOB (shortness of breath)  Throat cancer  Jaundice: 1965  Fracture: left ankle  Laryngeal mass: s/ p radiation  Surgery, elective: direct laryngoscopy with biopsy- 4/2017  Status post surgery: inguinal hernia  Status post surgery: Left ankle surgical repair  x2 (1965)      ROS: See HPI    MEDICATIONS  (STANDING):  clindamycin IVPB 600 milliGRAM(s) IV Intermittent every 8 hours  insulin lispro (HumaLOG) corrective regimen sliding scale   SubCutaneous Before meals and at bedtime  lactated ringers. 1000 milliLiter(s) (100 mL/Hr) IV Continuous <Continuous>  nicotine -   7 mG/24Hr(s) Patch 1 patch Transdermal daily    MEDICATIONS  (PRN):  morphine  - Injectable 2 milliGRAM(s) IV Push every 15 minutes PRN Severe Pain (7 - 10)  morphine  - Injectable 2 milliGRAM(s) IV Push every 4 hours PRN Moderate Pain (4 - 6)  morphine  - Injectable 4 milliGRAM(s) IV Push every 4 hours PRN Severe Pain (7 - 10)  ondansetron Injectable 4 milliGRAM(s) IV Push every 6 hours PRN Nausea and/or Vomiting      Allergies    penicillin (Rash)    Intolerances        SOCIAL HISTORY:  Smoke: Never Smoker  EtOH: occasional    FAMILY HISTORY:  No pertinent family history in first degree relatives      Vital Signs Last 24 Hrs  T(C): 36.7 (27 Feb 2018 16:02), Max: 37.7 (27 Feb 2018 13:24)  T(F): 98 (27 Feb 2018 16:02), Max: 99.8 (27 Feb 2018 13:24)  HR: 74 (27 Feb 2018 16:24) (66 - 96)  BP: 129/66 (27 Feb 2018 16:02) (106/65 - 131/82)  BP(mean): 91 (27 Feb 2018 16:02) (77 - 95)  RR: 12 (27 Feb 2018 16:24) (5 - 24)  SpO2: 100% (27 Feb 2018 16:24) (92% - 100%)                          12.7   12.2  )-----------( 211      ( 27 Feb 2018 14:00 )             37.1     02-27    135  |  93<L>  |  11  ----------------------------<  123<H>  4.8   |  35<H>  |  1.16    Ca    8.8      27 Feb 2018 14:00  Phos  4.9     02-27  Mg     2.0     02-27    TPro  6.3  /  Alb  3.7  /  TBili  0.4  /  DBili  <0.2  /  AST  35  /  ALT  20  /  AlkPhos  44  02-27    PT/INR - ( 27 Feb 2018 14:00 )   PT: 11.5 sec;   INR: 1.04          PTT - ( 27 Feb 2018 14:00 )  PTT:33.6 sec      RADIOLOGY & ADDITIONAL STUDIES:

## 2018-02-27 NOTE — CONSULT NOTE ADULT - ATTENDING COMMENTS
Patient had another episode of unresponsiveness transiently. Will order EEG, continue MV. CT of head without acute pathology.  Critical care time rendered 75 minutes  Reviewed with SHANA Delacruz acting as my scribe

## 2018-02-27 NOTE — H&P PST ADULT - HISTORY OF PRESENT ILLNESS
[facility] H&P    HPI: 62M current every day smoker hx T2N0M0 R TVC SCCa s/p XRT with 4 months of worsening hoarseness. On exam patient noted to have fullness to supraglottic tissues as well as necrotic glottic tissue with significantly narrowed airway. Patient now s/p awake tracheostomy, direct laryngoscopy and biopsy 2/27.     PMH: above  Meds: tylenol prn  All: PCN (unknown reaction)    POD0:    PHYSICAL EXAM:   NAD  6.0 cuffed shiley trach sutured in place, velcro Glenn collar secured around neck  blood tinged secretions suctioned    Assessment/Plan:   62M current smoker w/ T2N0M0 R TVC SCCa s/p XRT with airway obstruction s/p tracheostomy, direct laryngoscopy, biopsy 2/27    -prn pain  -prn nausea  -NPO/IVF overnight  -bedside SLP evaluation in AM (tolerating regular diet prior to OR)  -HOB@30  -routine trach care - spare 6.0 cuffed shiley at bedside, obturator at HOB, suction with red rubber catheters only, HOB@30  -humidified trach collar as tolerated  -postop CXR and EKG  -postop labs, troponins  -DVT ppx: SCDs  -OOBTC    Dispo: S[facility] H&P    HPI: 62M current every day smoker hx T2N0M0 R TVC SCCa s/p XRT with 4 months of worsening hoarseness. On exam patient noted to have fullness to supraglottic tissues as well as necrotic glottic tissue with significantly narrowed airway. Patient now s/p awake tracheostomy, direct laryngoscopy and biopsy 2/27.     PMH: above  Meds: tylenol prn  All: PCN (unknown reaction)    POD0:    PHYSICAL EXAM:   NAD  6.0 cuffed shiley trach sutured in place, velcro Glenn collar secured around neck  blood tinged secretions suctioned    Assessment/Plan:   62M current smoker w/ T2N0M0 R TVC SCCa s/p XRT with airway obstruction s/p tracheostomy, direct laryngoscopy, biopsy 2/27    -prn pain  -prn nausea  -NPO/IVF overnight  -bedside SLP evaluation in AM (tolerating regular diet prior to OR)  -HOB@30  -Clinda for abx ppx   -routine trach care - spare 6.0 cuffed shiley at bedside, obturator at HOB, suction with red rubber catheters only, HOB@30  -humidified trach collar as tolerated  -postop CXR and EKG  -postop labs, troponins  -DVT ppx: SCDs  -OOBTC    Dispo: SICU [facility]    HPI: 62M current every day smoker hx T2N0M0 R TVC SCCa s/p XRT with 4 months of worsening hoarseness. On exam patient noted to have fullness to supraglottic tissues as well as necrotic glottic tissue with significantly narrowed airway. Patient now s/p awake tracheostomy, direct laryngoscopy and biopsy 2/27.     PMH: above  Meds: tylenol prn  All: PCN (unknown reaction)    POD0: Immediately postop, patient noted to be lethargic and somnolent, not following commands. STAT labs showing some C02 retention but otherwise wnl. Trop neg. STAT CXR and EKG performed. NCHCT without intracranial abnormality. After CT performed, patient mental status returned to baseline. Now alert and oriented, following commands and interactive. Pain well controlled. No trach issues, remains on vent.     PHYSICAL EXAM:   NAD, A&Ox3, communicates via writing  following commands in all extremities  NC/AT, face symmetric, TML  6.0 cuffed shiley trach sutured in place, velcro Glenn collar secured around neck  blood tinged secretions, minimal peristomal oozing expected postop  strength 5/5 grossly throughout     Labs:                        12.7   12.2  )-----------( 211      ( 27 Feb 2018 14:00 )             37.1   02-27    135  |  93<L>  |  11  ----------------------------<  123<H>  4.8   |  35<H>  |  1.16    Ca    8.8      27 Feb 2018 14:00  Phos  4.9     02-27  Mg     2.0     02-27    TPro  6.3  /  Alb  3.7  /  TBili  0.4  /  DBili  <0.2  /  AST  35  /  ALT  20  /  AlkPhos  44  02-27      Troponin T, Serum (02.27.18 @ 14:00)    Troponin T, Serum: <0.01: Reference interval for troponin T is </= 0.01 ng/mL which includes the  99th percentile of a healthy population. Troponin T results are not  interchangeable with troponin I results. ng/mL        Assessment/Plan:   62M current smoker w/ T2N0M0 R TVC SCCa s/p XRT with airway obstruction s/p tracheostomy, direct laryngoscopy, biopsy 2/27    -prn pain  -prn nausea  -NPO/IVF overnight  -bedside SLP evaluation in AM (tolerating regular diet prior to OR)  -HOB@30  -Clinda for abx ppx   -wean to TC as tolerated   -routine trach care - spare 6.0 cuffed shiley at bedside, obturator at HOB, suction with red rubber catheters only, HOB@30  -humidified trach collar as tolerated  -repeat trop in 6 hrs  -DVT ppx: SCDs  -OOBTC    Dispo: SICU    seen with chief and d/w fellow and attending

## 2018-02-27 NOTE — BRIEF OPERATIVE NOTE - PROCEDURE
<<-----Click on this checkbox to enter Procedure Direct laryngoscopy with biopsy  02/27/2018    Active  NIKOS  Tracheostomy  02/27/2018    Active  NIKOS

## 2018-02-28 LAB
ANION GAP SERPL CALC-SCNC: 15 MMOL/L — SIGNIFICANT CHANGE UP (ref 5–17)
BUN SERPL-MCNC: 12 MG/DL — SIGNIFICANT CHANGE UP (ref 7–23)
CALCIUM SERPL-MCNC: 8.8 MG/DL — SIGNIFICANT CHANGE UP (ref 8.4–10.5)
CHLORIDE SERPL-SCNC: 93 MMOL/L — LOW (ref 96–108)
CO2 SERPL-SCNC: 26 MMOL/L — SIGNIFICANT CHANGE UP (ref 22–31)
CREAT SERPL-MCNC: 0.87 MG/DL — SIGNIFICANT CHANGE UP (ref 0.5–1.3)
GLUCOSE BLDC GLUCOMTR-MCNC: 81 MG/DL — SIGNIFICANT CHANGE UP (ref 70–99)
GLUCOSE BLDC GLUCOMTR-MCNC: 87 MG/DL — SIGNIFICANT CHANGE UP (ref 70–99)
GLUCOSE BLDC GLUCOMTR-MCNC: 87 MG/DL — SIGNIFICANT CHANGE UP (ref 70–99)
GLUCOSE BLDC GLUCOMTR-MCNC: 91 MG/DL — SIGNIFICANT CHANGE UP (ref 70–99)
GLUCOSE SERPL-MCNC: 93 MG/DL — SIGNIFICANT CHANGE UP (ref 70–99)
HCT VFR BLD CALC: 37 % — LOW (ref 39–50)
HGB BLD-MCNC: 12.6 G/DL — LOW (ref 13–17)
MAGNESIUM SERPL-MCNC: 1.9 MG/DL — SIGNIFICANT CHANGE UP (ref 1.6–2.6)
MCHC RBC-ENTMCNC: 30.7 PG — SIGNIFICANT CHANGE UP (ref 27–34)
MCHC RBC-ENTMCNC: 34.1 G/DL — SIGNIFICANT CHANGE UP (ref 32–36)
MCV RBC AUTO: 90 FL — SIGNIFICANT CHANGE UP (ref 80–100)
PHOSPHATE SERPL-MCNC: 3.3 MG/DL — SIGNIFICANT CHANGE UP (ref 2.5–4.5)
PLATELET # BLD AUTO: 200 K/UL — SIGNIFICANT CHANGE UP (ref 150–400)
POTASSIUM SERPL-MCNC: 4.1 MMOL/L — SIGNIFICANT CHANGE UP (ref 3.5–5.3)
POTASSIUM SERPL-SCNC: 4.1 MMOL/L — SIGNIFICANT CHANGE UP (ref 3.5–5.3)
RBC # BLD: 4.11 M/UL — LOW (ref 4.2–5.8)
RBC # FLD: 13.4 % — SIGNIFICANT CHANGE UP (ref 10.3–16.9)
SODIUM SERPL-SCNC: 134 MMOL/L — LOW (ref 135–145)
SURGICAL PATHOLOGY STUDY: SIGNIFICANT CHANGE UP
WBC # BLD: 12.9 K/UL — HIGH (ref 3.8–10.5)
WBC # FLD AUTO: 12.9 K/UL — HIGH (ref 3.8–10.5)

## 2018-02-28 PROCEDURE — 71045 X-RAY EXAM CHEST 1 VIEW: CPT | Mod: 26,76

## 2018-02-28 PROCEDURE — 95951: CPT | Mod: 26,52

## 2018-02-28 PROCEDURE — 99221 1ST HOSP IP/OBS SF/LOW 40: CPT

## 2018-02-28 PROCEDURE — 99232 SBSQ HOSP IP/OBS MODERATE 35: CPT | Mod: GC

## 2018-02-28 RX ORDER — HEPARIN SODIUM 5000 [USP'U]/ML
5000 INJECTION INTRAVENOUS; SUBCUTANEOUS EVERY 8 HOURS
Qty: 0 | Refills: 0 | Status: DISCONTINUED | OUTPATIENT
Start: 2018-02-28 | End: 2018-02-28

## 2018-02-28 RX ORDER — INFLUENZA VIRUS VACCINE 15; 15; 15; 15 UG/.5ML; UG/.5ML; UG/.5ML; UG/.5ML
0.5 SUSPENSION INTRAMUSCULAR ONCE
Qty: 0 | Refills: 0 | Status: COMPLETED | OUTPATIENT
Start: 2018-02-28 | End: 2018-02-28

## 2018-02-28 RX ORDER — MAGNESIUM SULFATE 500 MG/ML
1 VIAL (ML) INJECTION ONCE
Qty: 0 | Refills: 0 | Status: COMPLETED | OUTPATIENT
Start: 2018-02-28 | End: 2018-02-28

## 2018-02-28 RX ADMIN — Medication 100 MILLIGRAM(S): at 19:48

## 2018-02-28 RX ADMIN — Medication 100 GRAM(S): at 07:50

## 2018-02-28 RX ADMIN — Medication 1 PATCH: at 12:32

## 2018-02-28 RX ADMIN — Medication 1 PATCH: at 12:37

## 2018-02-28 RX ADMIN — Medication 100 MILLIGRAM(S): at 02:25

## 2018-02-28 RX ADMIN — Medication 100 MILLIGRAM(S): at 12:31

## 2018-02-28 NOTE — PATIENT PROFILE ADULT. - PMH
Fracture  left ankle  Jaundice  1966  Laryngeal mass  s/ p radiation  SOB (shortness of breath)    Throat cancer

## 2018-02-28 NOTE — SWALLOW BEDSIDE ASSESSMENT ADULT - SLP GENERAL OBSERVATIONS
Pt was received awake in bed. #6 Shiley; cuff deflated; on trach collar. Pt mouthed some words during this evaluation to communicate.

## 2018-02-28 NOTE — SWALLOW BEDSIDE ASSESSMENT ADULT - PHARYNGEAL PHASE
As PO trials progressed, pt began to cough w green-tinged secretions spilling from trach hub. No further PO trials were administered. RN at bedside who provided tracheal suctioning via red rubber catheter.

## 2018-02-28 NOTE — PROGRESS NOTE ADULT - SUBJECTIVE AND OBJECTIVE BOX
ENT [facility] DAILY PROGRESS NOTE    Overnight events/Interval HPI:    62M current every day smoker hx T2N0M0 R TVC SCCa s/p XRT with 4 months of worsening hoarseness. On exam patient noted to have fullness to supraglottic tissues as well as necrotic glottic tissue with significantly narrowed airway. Patient now s/p awake tracheostomy, direct laryngoscopy and biopsy 2/27.     POD0: Immediately postop, patient noted to be lethargic and somnolent, not following commands. STAT labs showing some C02 retention but otherwise wnl. Trop neg. STAT CXR and EKG performed. NCHCT without intracranial abnormality. After CT performed, patient mental status returned to baseline. Now alert and oriented, following commands and interactive. Pain well controlled. No trach issues, remains on vent.   2/27: fluctuating mental status, EEG monitoring initiated, cardiac work-up negative, on ventilator overnight        Allergies    penicillin (Rash)    Intolerances      Vital Signs Last 24 Hrs  T(C): 36.9 (28 Feb 2018 06:32), Max: 37.7 (27 Feb 2018 13:24)  T(F): 98.5 (28 Feb 2018 06:32), Max: 99.8 (27 Feb 2018 13:24)  HR: 96 (28 Feb 2018 07:00) (66 - 109)  BP: 151/75 (28 Feb 2018 07:00) (102/67 - 152/84)  BP(mean): 99 (28 Feb 2018 07:00) (76 - 101)  RR: 13 (28 Feb 2018 07:00) (5 - 24)  SpO2: 92% (28 Feb 2018 07:00) (91% - 100%)            PHYSICAL EXAM:  NAD, alert at responsive at time of exam  following commands in all extremities  face symmetric  6.0 cuffed shiley trach secured in place via sutures x4 and velcro soft Glenn collar  blood tinged secretions, minimal peristomal oozing, neck soft and flat, healthy appearing stoma  strength 5/5 grossly throughout     LABS:  CBC-                        12.6   12.9  )-----------( 200      ( 28 Feb 2018 06:34 )             37.0     BMP/CMP-  28 Feb 2018 06:34    134    |  93     |  12     ----------------------------<  93     4.1     |  26     |  0.87     Ca    8.8        28 Feb 2018 06:34  Phos  3.3       28 Feb 2018 06:34  Mg     1.9       28 Feb 2018 06:34    TPro  6.3    /  Alb  3.7    /  TBili  0.4    /  DBili  <0.2   /  AST  35     /  ALT  20     /  AlkPhos  44     27 Feb 2018 14:00    Coagulation Studies-  PT/INR - ( 27 Feb 2018 14:00 )   PT: 11.5 sec;   INR: 1.04          PTT - ( 27 Feb 2018 14:00 )  PTT:33.6 sec  Endocrine Panel-  Calcium, Total Serum: 8.8 mg/dL (02-28 @ 06:34)  Calcium, Total Serum: 8.8 mg/dL (02-27 @ 14:00)      Assessment/Plan:    62M current smoker w/ T2N0M0 R TVC SCCa s/p XRT with airway obstruction s/p tracheostomy, direct laryngoscopy, biopsy 2/27  -prn pain  -prn nausea  -NPO/IVF overnight  - wean to trach collar  - f/u EEG  -bedside SLP evaluation once weaned off vent  -Clinda for abx ppx   -routine trach care - spare 6.0 cuffed shiley at bedside, obturator at HOB, suction with red rubber catheters only, HOB@30  -humidified trach collar as tolerated  -repeat trop in 6 hrs x3  -DVT ppx: SCDs  -OOBTC    Dispo: SICU    seen with chief and d/w fellow and attending

## 2018-02-28 NOTE — SWALLOW BEDSIDE ASSESSMENT ADULT - SWALLOW EVAL: DIAGNOSIS
Pt p/w pharyngeal stage dysphagia characterized by positive green dye return, indicating aspiration. PO diet is premature at this time.

## 2018-02-28 NOTE — PATIENT PROFILE ADULT. - PRO ARRIVE FROM
Patient will attempt to further lower her cholesterol in his diet with LDL goal of 100 less.  Rheumatologic labs will be performed today.  Further follow-up and/or treatment based on the above.   home

## 2018-02-28 NOTE — PROGRESS NOTE ADULT - ASSESSMENT
62M current smoker w/ R TVC SCCa s/p XRT with airway obstruction s/p tracheostomy, direct laryngoscopy, biopsy 2/27    Neuro: Tylenol PRN, Zofran PRN, CTH negative, Absence seizures?- vEEG, Nicotine patch  CV: Normotensive goals. LR@100  Pulm: /40/12/5 via trach. wean to trach collar. suction with red rubber catheter only  FENGI: NPO, LR@100, bedside SS eval in AM, Protonix.   : Voids  Endo: ISS  ID: Clinda [id number] (2/27--)  Ppx: SCDs, hold SQH  Lines: PIVs  Wounds: new trach 62M current smoker w/ R TVC SCCa s/p XRT with airway obstruction s/p tracheostomy, direct laryngoscopy, biopsy 2/27, toxic metabolic encephalopathy    Neuro: Tylenol PRN, Zofran PRN, CTH negative, Absence seizures?- vEEG, Nicotine patch  CV: Normotensive goals. LR@100  Pulm: /40/12/5 via trach. wean to trach collar. suction with red rubber catheter only  FENGI: NPO, LR@100, bedside SS eval in AM, Protonix.   : Voids  Endo: ISS  ID: Clinda [id number] (2/27--)  Ppx: SCDs, hold SQH  Lines: PIVs  Wounds: new trach

## 2018-02-28 NOTE — CONSULT NOTE ADULT - SUBJECTIVE AND OBJECTIVE BOX
62M current every day smoker hx T2N0M0 R TVC SCCa s/p XRT with 4 months of worsening hoarseness. Yesterday pt had laryngoscopy and  biopsy which was complicated by air way obstruction and pt had tracheostomy. Patient now s/p awake tracheostomy, direct laryngoscopy and biopsy yesterday. During the initial laryngoscopy pt was initially given Versed 4mg and then later for the tracheostomy was briefly on Propofol and fentanyl. Pt after the procedure was transferred to PACU and was found to have pinpoint pupils minimally responsive and unarousable to stimuli. Pt was given Narcan x2 and a dose of flumazenil with no response. Also noted labs at the time showed Co2 retention. Neuro team was consulted at around 4pm yesterday. Pt was evaluated on the CT table, on evaluation Pt had started to wake up and was following commands but sluggish. CT head did not show any acute pathology.  As per the nurse who was caring for the pt at around 8pm found that the pt was again unarousable to noxious stimuli. After calling the Neuro attending on call the pt was hooked up to the EEG. As per the nurse, the pt was reevaluated in the night at around 11pm again and this time the pt woke up and was alert and oriented and following commands and since has not had another episode of being unarousable.       PAST MEDICAL & SURGICAL HISTORY:  SOB (shortness of breath)  Throat cancer  Jaundice: 1965  Fracture: left ankle  Laryngeal mass: s/ p radiation  Surgery, elective: direct laryngoscopy with biopsy- 4/2017  Status post surgery: inguinal hernia  Status post surgery: Left ankle surgical repair  x2 (1965)      REVIEW OF SYSTEMS:  As per HPI, otherwise negative for Constitutional, Eyes, Ears/Nose/Mouth/Throat, Neck, Cardiovascular, Respiratory, Gastrointestinal, Genitourinary, Skin, Endocrine, Musculoskeletal, Psychiatric, and Hematologic/Lymphatic.    MEDICATIONS  (STANDING):  acetaminophen  IVPB. 1000 milliGRAM(s) IV Intermittent once  clindamycin IVPB 600 milliGRAM(s) IV Intermittent every 8 hours  influenza   Vaccine 0.5 milliLiter(s) IntraMuscular once  insulin lispro (HumaLOG) corrective regimen sliding scale   SubCutaneous Before meals and at bedtime  lactated ringers. 1000 milliLiter(s) (100 mL/Hr) IV Continuous <Continuous>  nicotine -   7 mG/24Hr(s) Patch 1 patch Transdermal daily    MEDICATIONS  (PRN):  ondansetron Injectable 4 milliGRAM(s) IV Push every 6 hours PRN Nausea and/or Vomiting      Allergies    penicillin (Rash)    Intolerances        FAMILY HISTORY:  No pertinent family history in first degree relatives      VITAL SIGNS:  Vital Signs Last 24 Hrs  T(C): 37.1 (28 Feb 2018 08:55), Max: 37.7 (27 Feb 2018 13:24)  T(F): 98.8 (28 Feb 2018 08:55), Max: 99.8 (27 Feb 2018 13:24)  HR: 80 (28 Feb 2018 08:00) (66 - 109)  BP: 141/72 (28 Feb 2018 08:00) (102/67 - 152/84)  BP(mean): 91 (28 Feb 2018 08:00) (76 - 101)  RR: 13 (28 Feb 2018 08:00) (5 - 24)  SpO2: 97% (28 Feb 2018 08:00) (91% - 100%)    PHYSICAL EXAMINATION:  Constitutional: WDWN; NAD  Eyes: Conjunctiva and sclera clear.  Cardiovascular: Regular rate and rhythm; S1 and S2 Normal; No murmurs, gallops or rubs.  Neurologic:  - Mental Status:  Pt is alert and awake and follows commands. Unable to assess speech 2/2 soreness from trach.   - Cranial Nerves II-XII:    II:  Visual acuity is 20/20 bilaterally; Visual fields are full to confrontation; Pupils are equal, round, and reactive to light.  III, IV, VI:  Extraocular movements are intact without nystagmus.  V:  Facial sensation is intact in the V1-V3 distribution bilaterally.  VII:  Face is symmetric with normal eye closure and smile  VIII:  Hearing is intact to finger rub.  IX, X:  Uvula is midline and soft palate rises symmetrically  XI:  Head turning and shoulder shrug are intact.  XII:  Tongue protrudes in the midline.  - Motor:  Strength is 5/5 throughout.  There is no pronator drift.  Normal muscle bulk and tone throughout.  - Reflexes:  2+ and symmetric at the biceps, triceps, brachioradialis, knees, and ankles.  Plantar responses flexor.  - Sensory:  Intact to light touch, pin prick, vibration, and joint-position sense throughout.  - Coordination:  Finger-nose-finger and heel-knee-shin intact without dysmetria.  Rapid alternating hand movements intact.  - Gait:  Deferred    LABS:                        12.6   12.9  )-----------( 200      ( 28 Feb 2018 06:34 )             37.0     02-28    134<L>  |  93<L>  |  12  ----------------------------<  93  4.1   |  26  |  0.87    Ca    8.8      28 Feb 2018 06:34  Phos  3.3     02-28  Mg     1.9     02-28    TPro  6.3  /  Alb  3.7  /  TBili  0.4  /  DBili  <0.2  /  AST  35  /  ALT  20  /  AlkPhos  44  02-27    PT/INR - ( 27 Feb 2018 14:00 )   PT: 11.5 sec;   INR: 1.04          PTT - ( 27 Feb 2018 14:00 )  PTT:33.6 sec      RADIOLOGY & ADDITIONAL STUDIES:    CT Head No Cont (02.27.18 @ 15:47) >  IMPRESSION:     No acute intracranial abnormality. Specifically, no acute intracranial   hemorrhage, mass effect or recent transcortical or territorial infarction. 62M current every day smoker hx T2N0M0 R TVC SCCa s/p XRT with 4 months of worsening hoarseness. Yesterday pt had laryngoscopy and  biopsy which was complicated by air way obstruction and pt had tracheostomy. Patient now s/p awake tracheostomy, direct laryngoscopy and biopsy yesterday. During the initial laryngoscopy pt was initially given Versed 4mg and then later for the tracheostomy was briefly on Propofol and fentanyl. Pt after the procedure was transferred to PACU and was found to have pinpoint pupils minimally responsive and unarousable to stimuli. Pt was given Narcan x2 and a dose of flumazenil with no response. Also noted labs at the time showed Co2 retention. Neuro team was consulted at around 4pm yesterday. Pt was evaluated on the CT table, on evaluation Pt had started to wake up and was following commands but sluggish. CT head did not show any acute pathology.  As per the nurse who was caring for the pt at around 8pm found that the pt was again unarousable to noxious stimuli. After calling the Neuro attending on call the pt was hooked up to the EEG. As per the nurse, the pt was reevaluated in the night at around 11pm again and this time the pt woke up and was alert and oriented and following commands and since has not had another episode of being unarousable.       PAST MEDICAL & SURGICAL HISTORY:  SOB (shortness of breath)  Throat cancer  Jaundice: 1965  Fracture: left ankle  Laryngeal mass: s/ p radiation  Surgery, elective: direct laryngoscopy with biopsy- 4/2017  Status post surgery: inguinal hernia  Status post surgery: Left ankle surgical repair  x2 (1965)      REVIEW OF SYSTEMS:  As per HPI, otherwise negative for Constitutional, Eyes, Ears/Nose/Mouth/Throat, Neck, Cardiovascular, Respiratory, Gastrointestinal, Genitourinary, Skin, Endocrine, Musculoskeletal, Psychiatric, and Hematologic/Lymphatic.    MEDICATIONS  (STANDING):  acetaminophen  IVPB. 1000 milliGRAM(s) IV Intermittent once  clindamycin IVPB 600 milliGRAM(s) IV Intermittent every 8 hours  influenza   Vaccine 0.5 milliLiter(s) IntraMuscular once  insulin lispro (HumaLOG) corrective regimen sliding scale   SubCutaneous Before meals and at bedtime  lactated ringers. 1000 milliLiter(s) (100 mL/Hr) IV Continuous <Continuous>  nicotine -   7 mG/24Hr(s) Patch 1 patch Transdermal daily    MEDICATIONS  (PRN):  ondansetron Injectable 4 milliGRAM(s) IV Push every 6 hours PRN Nausea and/or Vomiting      Allergies    penicillin (Rash)    Intolerances        FAMILY HISTORY:  No pertinent family history in first degree relatives      VITAL SIGNS:  Vital Signs Last 24 Hrs  T(C): 37.1 (28 Feb 2018 08:55), Max: 37.7 (27 Feb 2018 13:24)  T(F): 98.8 (28 Feb 2018 08:55), Max: 99.8 (27 Feb 2018 13:24)  HR: 80 (28 Feb 2018 08:00) (66 - 109)  BP: 141/72 (28 Feb 2018 08:00) (102/67 - 152/84)  BP(mean): 91 (28 Feb 2018 08:00) (76 - 101)  RR: 13 (28 Feb 2018 08:00) (5 - 24)  SpO2: 97% (28 Feb 2018 08:00) (91% - 100%)    PHYSICAL EXAMINATION:  Constitutional: WDWN; NAD  Eyes: Conjunctiva and sclera clear.  Cardiovascular: Regular rate and rhythm; S1 and S2 Normal; No murmurs, gallops or rubs.  Neurologic:  - Mental Status:  Pt is alert and awake and follows commands. Unable to assess speech 2/2 soreness from trach.   - Cranial Nerves II-XII:    II:  Visual acuity is 20/20 bilaterally; Visual fields are full to confrontation; Pupils are equal, round, and reactive to light.  III, IV, VI:  Extraocular movements are intact without nystagmus.  V:  Facial sensation is intact in the V1-V3 distribution bilaterally.  VII:  Face is symmetric with normal eye closure and smile  VIII:  Hearing is intact to finger rub.  IX, X:  Uvula is midline and soft palate rises symmetrically  XI:  Head turning and shoulder shrug are intact.  XII:  Tongue protrudes in the midline.  - Motor:  Strength is 5/5 throughout.  There is no pronator drift.  Normal muscle bulk and tone throughout.  - Reflexes:  2+ and symmetric at the biceps, triceps, brachioradialis, knees, and ankles.  Plantar responses flexor.  - Sensory:  Intact to light touch, pin prick, vibration, and joint-position sense throughout.  - Coordination:  Finger-nose-finger intact without dysmetria.  Rapid alternating hand movements intact.  - Gait:  Deferred    LABS:                        12.6   12.9  )-----------( 200      ( 28 Feb 2018 06:34 )             37.0     02-28    134<L>  |  93<L>  |  12  ----------------------------<  93  4.1   |  26  |  0.87    Ca    8.8      28 Feb 2018 06:34  Phos  3.3     02-28  Mg     1.9     02-28    TPro  6.3  /  Alb  3.7  /  TBili  0.4  /  DBili  <0.2  /  AST  35  /  ALT  20  /  AlkPhos  44  02-27    PT/INR - ( 27 Feb 2018 14:00 )   PT: 11.5 sec;   INR: 1.04          PTT - ( 27 Feb 2018 14:00 )  PTT:33.6 sec      RADIOLOGY & ADDITIONAL STUDIES:    CT Head No Cont (02.27.18 @ 15:47) >  IMPRESSION:     No acute intracranial abnormality. Specifically, no acute intracranial   hemorrhage, mass effect or recent transcortical or territorial infarction. 62M current every day smoker hx T2N0M0 R TVC SCCa s/p XRT with 4 months of worsening hoarseness. Yesterday pt had laryngoscopy and  biopsy which was complicated by air way obstruction and pt had tracheostomy. Patient now s/p awake tracheostomy, direct laryngoscopy and biopsy yesterday. During the initial laryngoscopy pt was initially given Versed 4mg and then later for the tracheostomy was briefly on Propofol and fentanyl. Pt after the procedure was transferred to PACU and was found to have pinpoint pupils minimally responsive and unarousable to stimuli. Pt was given Narcan x2 and a dose of flumazenil with no response. Also noted labs at the time showed Co2 retention. Neuro team was consulted at around 4pm yesterday. Pt was evaluated on the CT table, on evaluation Pt had started to wake up and was following commands but sluggish. CT head did not show any acute pathology.  As per the nurse who was caring for the pt at around 8pm found that the pt was again unarousable to noxious stimuli. After calling the Neuro attending on call the pt was hooked up to the EEG. As per the nurse, the pt was reevaluated in the night at around 11pm again and this time the pt woke up and was alert and oriented and following commands and since has not had another episode of being unarousable. Pt on interview endorses not being able to sleep for the last 2 weeks.       PAST MEDICAL & SURGICAL HISTORY:  SOB (shortness of breath)  Throat cancer  Jaundice: 1965  Fracture: left ankle  Laryngeal mass: s/ p radiation  Surgery, elective: direct laryngoscopy with biopsy- 4/2017  Status post surgery: inguinal hernia  Status post surgery: Left ankle surgical repair  x2 (1965)      REVIEW OF SYSTEMS:  As per HPI, otherwise negative for Constitutional, Eyes, Ears/Nose/Mouth/Throat, Neck, Cardiovascular, Respiratory, Gastrointestinal, Genitourinary, Skin, Endocrine, Musculoskeletal, Psychiatric, and Hematologic/Lymphatic.    MEDICATIONS  (STANDING):  acetaminophen  IVPB. 1000 milliGRAM(s) IV Intermittent once  clindamycin IVPB 600 milliGRAM(s) IV Intermittent every 8 hours  influenza   Vaccine 0.5 milliLiter(s) IntraMuscular once  insulin lispro (HumaLOG) corrective regimen sliding scale   SubCutaneous Before meals and at bedtime  lactated ringers. 1000 milliLiter(s) (100 mL/Hr) IV Continuous <Continuous>  nicotine -   7 mG/24Hr(s) Patch 1 patch Transdermal daily    MEDICATIONS  (PRN):  ondansetron Injectable 4 milliGRAM(s) IV Push every 6 hours PRN Nausea and/or Vomiting      Allergies    penicillin (Rash)    Intolerances        FAMILY HISTORY:  No pertinent family history in first degree relatives      VITAL SIGNS:  Vital Signs Last 24 Hrs  T(C): 37.1 (28 Feb 2018 08:55), Max: 37.7 (27 Feb 2018 13:24)  T(F): 98.8 (28 Feb 2018 08:55), Max: 99.8 (27 Feb 2018 13:24)  HR: 80 (28 Feb 2018 08:00) (66 - 109)  BP: 141/72 (28 Feb 2018 08:00) (102/67 - 152/84)  BP(mean): 91 (28 Feb 2018 08:00) (76 - 101)  RR: 13 (28 Feb 2018 08:00) (5 - 24)  SpO2: 97% (28 Feb 2018 08:00) (91% - 100%)    PHYSICAL EXAMINATION:  Constitutional: WDWN; NAD  Eyes: Conjunctiva and sclera clear.  Cardiovascular: Regular rate and rhythm; S1 and S2 Normal; No murmurs, gallops or rubs.  Neurologic:  - Mental Status:  Pt is alert and awake and follows commands. Unable to assess speech 2/2 soreness from trach.   - Cranial Nerves II-XII:    II:  Visual acuity is 20/20 bilaterally; Visual fields are full to confrontation; Pupils are equal, round, and reactive to light.  III, IV, VI:  Extraocular movements are intact without nystagmus.  V:  Facial sensation is intact in the V1-V3 distribution bilaterally.  VII:  Face is symmetric with normal eye closure and smile  VIII:  Hearing is intact to finger rub.  IX, X:  Uvula is midline and soft palate rises symmetrically  XI:  Head turning and shoulder shrug are intact.  XII:  Tongue protrudes in the midline.  - Motor:  Strength is 5/5 throughout.  There is no pronator drift.  Normal muscle bulk and tone throughout.  - Reflexes:  2+ and symmetric at the biceps, triceps, brachioradialis, knees, and ankles.  Plantar responses flexor.  - Sensory:  Intact to light touch, pin prick, vibration, and joint-position sense throughout.  - Coordination:  Finger-nose-finger intact without dysmetria.  Rapid alternating hand movements intact.  - Gait:  Deferred    LABS:                        12.6   12.9  )-----------( 200      ( 28 Feb 2018 06:34 )             37.0     02-28    134<L>  |  93<L>  |  12  ----------------------------<  93  4.1   |  26  |  0.87    Ca    8.8      28 Feb 2018 06:34  Phos  3.3     02-28  Mg     1.9     02-28    TPro  6.3  /  Alb  3.7  /  TBili  0.4  /  DBili  <0.2  /  AST  35  /  ALT  20  /  AlkPhos  44  02-27    PT/INR - ( 27 Feb 2018 14:00 )   PT: 11.5 sec;   INR: 1.04          PTT - ( 27 Feb 2018 14:00 )  PTT:33.6 sec      RADIOLOGY & ADDITIONAL STUDIES:    CT Head No Cont (02.27.18 @ 15:47) >  IMPRESSION:     No acute intracranial abnormality. Specifically, no acute intracranial   hemorrhage, mass effect or recent transcortical or territorial infarction.      Impression:  62M current every day smoker hx T2N0M0 R TVC SCCa s/p XRT with 4 months of worsening hoarseness. Yesterday pt had laryngoscopy and  biopsy which was complicated by air way obstruction and pt had tracheostomy. Post procedure, team was unable to arouse the pt even to noxious stimuli. On initial neuro evaluation on the CT table, pt started waking up and was following commands and consult was cancelled. Later yesterday evening with recurring of symptoms. Pt had EEG placed. On today mornings evaluation the pt was awake and did not have any more episodes. Pt remembers events from last night. Of note Co2 on ABG was elevated. It is possible the pt's excessive drowsiness, sleepiness and unresponsiveness  is from Toxic metabolic encephalopathy which has since resolved. 62M current every day smoker hx T2N0M0 R TVC SCCa s/p XRT with 4 months of worsening hoarseness. Yesterday pt had laryngoscopy and  biopsy which was complicated by air way obstruction and pt had tracheostomy. Patient now s/p awake tracheostomy, direct laryngoscopy and biopsy yesterday. During the initial laryngoscopy pt was initially given Versed 4mg and then later for the tracheostomy was briefly on Propofol and fentanyl. Pt after the procedure was transferred to PACU and was found to have pinpoint pupils minimally responsive and unarousable to stimuli. Pt was given Narcan x2 and a dose of flumazenil with no response. Also noted labs at the time showed Co2 retention. Neuro team was consulted at around 4pm yesterday. Pt was evaluated on the CT table, on evaluation Pt had started to wake up and was following commands but sluggish. CT head did not show any acute pathology.  As per the nurse who was caring for the pt at around 8pm found that the pt was again unarousable to noxious stimuli. After calling the Neuro attending on call the pt was hooked up to the EEG. As per the nurse, the pt was reevaluated in the night at around 11pm again and this time the pt woke up and was alert and oriented and following commands and since has not had another episode of being unarousable. Pt on interview endorses not being able to sleep for the last 2 weeks.       PAST MEDICAL & SURGICAL HISTORY:  SOB (shortness of breath)  Throat cancer  Jaundice: 1965  Fracture: left ankle  Laryngeal mass: s/ p radiation  Surgery, elective: direct laryngoscopy with biopsy- 4/2017  Status post surgery: inguinal hernia  Status post surgery: Left ankle surgical repair  x2 (1965)      REVIEW OF SYSTEMS:  As per HPI, otherwise negative for Constitutional, Eyes, Ears/Nose/Mouth/Throat, Neck, Cardiovascular, Respiratory, Gastrointestinal, Genitourinary, Skin, Endocrine, Musculoskeletal, Psychiatric, and Hematologic/Lymphatic.    MEDICATIONS  (STANDING):  acetaminophen  IVPB. 1000 milliGRAM(s) IV Intermittent once  clindamycin IVPB 600 milliGRAM(s) IV Intermittent every 8 hours  influenza   Vaccine 0.5 milliLiter(s) IntraMuscular once  insulin lispro (HumaLOG) corrective regimen sliding scale   SubCutaneous Before meals and at bedtime  lactated ringers. 1000 milliLiter(s) (100 mL/Hr) IV Continuous <Continuous>  nicotine -   7 mG/24Hr(s) Patch 1 patch Transdermal daily    MEDICATIONS  (PRN):  ondansetron Injectable 4 milliGRAM(s) IV Push every 6 hours PRN Nausea and/or Vomiting      Allergies    penicillin (Rash)    Intolerances        FAMILY HISTORY:  No pertinent family history in first degree relatives      VITAL SIGNS:  Vital Signs Last 24 Hrs  T(C): 37.1 (28 Feb 2018 08:55), Max: 37.7 (27 Feb 2018 13:24)  T(F): 98.8 (28 Feb 2018 08:55), Max: 99.8 (27 Feb 2018 13:24)  HR: 80 (28 Feb 2018 08:00) (66 - 109)  BP: 141/72 (28 Feb 2018 08:00) (102/67 - 152/84)  BP(mean): 91 (28 Feb 2018 08:00) (76 - 101)  RR: 13 (28 Feb 2018 08:00) (5 - 24)  SpO2: 97% (28 Feb 2018 08:00) (91% - 100%)    PHYSICAL EXAMINATION:  Constitutional: WDWN; NAD  Eyes: Conjunctiva and sclera clear.  Cardiovascular: Regular rate and rhythm; S1 and S2 Normal; No murmurs, gallops or rubs.  Neurologic:  - Mental Status:  Pt is alert and awake and follows commands. Unable to assess speech 2/2 soreness from trach.   - Cranial Nerves II-XII:    II:  Visual acuity is 20/20 bilaterally; Visual fields are full to confrontation; Pupils are equal, round, and reactive to light.  III, IV, VI:  Extraocular movements are intact without nystagmus.  V:  Facial sensation is intact in the V1-V3 distribution bilaterally.  VII:  Face is symmetric with normal eye closure and smile  VIII:  Hearing is intact to finger rub.  IX, X:  Uvula is midline and soft palate rises symmetrically  XI:  Head turning and shoulder shrug are intact.  XII:  Tongue protrudes in the midline.  - Motor:  Strength is 5/5 throughout.  There is no pronator drift.  Normal muscle bulk and tone throughout.  - Reflexes:  2+ and symmetric at the biceps, triceps, brachioradialis, knees, and ankles.  Plantar responses flexor.  - Sensory:  Intact to light touch, pin prick, vibration, and joint-position sense throughout.  - Coordination:  Finger-nose-finger intact without dysmetria.  Rapid alternating hand movements intact.  - Gait:  Deferred    LABS:                        12.6   12.9  )-----------( 200      ( 28 Feb 2018 06:34 )             37.0     02-28    134<L>  |  93<L>  |  12  ----------------------------<  93  4.1   |  26  |  0.87    Ca    8.8      28 Feb 2018 06:34  Phos  3.3     02-28  Mg     1.9     02-28    TPro  6.3  /  Alb  3.7  /  TBili  0.4  /  DBili  <0.2  /  AST  35  /  ALT  20  /  AlkPhos  44  02-27    PT/INR - ( 27 Feb 2018 14:00 )   PT: 11.5 sec;   INR: 1.04          PTT - ( 27 Feb 2018 14:00 )  PTT:33.6 sec      RADIOLOGY & ADDITIONAL STUDIES:    CT Head No Cont (02.27.18 @ 15:47) >  IMPRESSION:     No acute intracranial abnormality. Specifically, no acute intracranial   hemorrhage, mass effect or recent transcortical or territorial infarction.      Impression:  62M current every day smoker hx T2N0M0 R TVC SCCa s/p XRT with 4 months of worsening hoarseness. Yesterday pt had laryngoscopy and  biopsy which was complicated by air way obstruction and pt had tracheostomy. Post procedure, team was unable to arouse the pt even to noxious stimuli. On initial neuro evaluation on the CT table, pt started waking up and was following commands and consult was cancelled. Later yesterday evening there was recurring of symptoms and Neuro attending was called and advised to have an EEG placed. On today mornings evaluation the pt was awake and did not have any more episodes. Pt remembers events from last night. Of note Co2 on ABG was elevated. It is possible the pt's excessive drowsiness, sleepiness and unresponsiveness  is from Toxic metabolic encephalopathy which has since resolved. NEUROLOGY INITIAL CONSULT NOTE    CHIEF COMPLAINT:  Unresponsive episodes x 2    HPI:  Mr. Alfonso is a 62-year-old left-handed man, current every day smoker hx T2N0M0 R TVC SCCa s/p XRT with 4 months of worsening hoarseness. Yesterday pt had laryngoscopy and  biopsy which was complicated by air way obstruction and pt had tracheostomy. Patient now s/p awake tracheostomy, direct laryngoscopy and biopsy yesterday. During the initial laryngoscopy pt was initially given Versed 4mg and then later for the tracheostomy was briefly on Propofol and fentanyl. Pt after the procedure was transferred to PACU and was found to have pinpoint pupils minimally responsive and unarousable to stimuli. Pt was given Narcan x2 and a dose of flumazenil with no response. Also noted labs at the time showed Co2 retention. Neuro team was consulted at around 4pm yesterday. Pt was evaluated on the CT table, on evaluation Pt had started to wake up and was following commands but sluggish. CT head did not show any acute pathology.    As per the nurse who was caring for the pt at around 8pm found that the pt was again unarousable to noxious stimuli. After calling the Neuro attending on call the pt was hooked up to the EEG. As per the nurse, the pt was reevaluated in the night at around 11pm again and this time the pt woke up and was alert and oriented and following commands and since has not had another episode of being unarousable. Pt on interview endorses not being able to sleep for the last 2 weeks.     REVIEW OF SYSTEMS:  As per HPI, otherwise negative for Constitutional, Eyes, Ears/Nose/Mouth/Throat, Neck, Cardiovascular, Respiratory, Gastrointestinal, Genitourinary, Skin, Endocrine, Musculoskeletal, Psychiatric, and Hematologic/Lymphatic.    PAST MEDICAL & SURGICAL HISTORY:  Throat cancer s/ p radiation, direct laryngoscopy with biopsy- 4/2017  Jaundice 1965  Inguinal hernia repair  Left ankle fracture s/p surgical repair  x2 (1965)    MEDICATIONS  (STANDING):  acetaminophen  IVPB. 1000 milliGRAM(s) IV Intermittent once  clindamycin IVPB 600 milliGRAM(s) IV Intermittent every 8 hours  influenza   Vaccine 0.5 milliLiter(s) IntraMuscular once  insulin lispro (HumaLOG) corrective regimen sliding scale   SubCutaneous Before meals and at bedtime  lactated ringers. 1000 milliLiter(s) (100 mL/Hr) IV Continuous <Continuous>  nicotine -   7 mG/24Hr(s) Patch 1 patch Transdermal daily    MEDICATIONS  (PRN):  ondansetron Injectable 4 milliGRAM(s) IV Push every 6 hours PRN Nausea and/or Vomiting    ALLERGIES:  Penicillin (Rash)    FAMILY HISTORY:  No pertinent family history in first degree relatives.    SOCIAL HISTORY:  Occupation: Works at [employer] in [employer].  Tobacco: Smokes 1 ppd.  Alcohol: Quit in 2000.  Drug use: Denies.    VITAL SIGNS:  Vital Signs Last 24 Hrs  T(C): 36.8 (28 Feb 2018 14:37), Max: 37.3 (27 Feb 2018 22:22)  T(F): 98.2 (28 Feb 2018 14:37), Max: 99.2 (27 Feb 2018 22:22)  HR: 80 (28 Feb 2018 16:00) (68 - 109)  BP: 121/69 (28 Feb 2018 16:00) (102/67 - 152/84)  BP(mean): 87 (28 Feb 2018 16:00) (76 - 109)  RR: 14 (28 Feb 2018 13:00) (10 - 17)  SpO2: 94% (28 Feb 2018 16:00) (91% - 100%)    PHYSICAL EXAMINATION:  Constitutional: WDWN; NAD  Eyes: Conjunctiva and sclera clear.  Cardiovascular: Regular rate and rhythm; S1 and S2 Normal; No murmurs, gallops or rubs.  Neurologic:  - Mental Status:  Pt is alert and awake and follows commands. Unable to assess speak 2/2 soreness from trach.   - Cranial Nerves II-XII:  Pupils are equal, round, and reactive to light.  Extraocular movements are intact without nystagmus.  Facial sensation is intact in the V1-V3 distribution bilaterally.  Face is symmetric with normal eye closure and smile.  Hearing is intact to finger rub.  Uvula is midline.  Tongue protrudes in the midline.  - Motor:  Strength is 5/5 throughout.  There is no pronator drift.  Normal muscle bulk and tone throughout.  - Reflexes:  2+ and symmetric at the biceps, triceps, brachioradialis, knees, and ankles.  Plantar responses flexor.  - Sensory:  Intact to light touch throughout.  - Coordination:  Finger-nose-finger intact without dysmetria.  Rapid alternating hand movements intact.  - Gait:  Deferred.    LABORATORY STUDIES:                        12.6  12.9  )-----------( 200      ( 28 Feb 2018 06:34 )             37.0     134<L>  |  93<L>  |  12  --------------------------------<  93  4.1          |     26     |  0.87    Ca    8.8      28 Feb 2018 06:34  Phos  3.3     02-28  Mg     1.9     02-28    TPro  6.3  /  Alb  3.7  /  TBili  0.4  /  DBili  <0.2  /  AST  35  /  ALT  20  /  AlkPhos  44  02-27    RADIOLOGY & ADDITIONAL STUDIES:      02/27/2018 CT Head without contrast:  No acute intracranial abnormality. Specifically, no acute intracranial hemorrhage, mass effect or recent transcortical or territorial infarction.    02/27 - 02/28/2018 VEEG: prelim normal    IMPRESSION & PLAN:  Mr. Alfonso is a 62-year-old left-handed man current every day smoker with a history of T2N0M0 R TVC SCCa s/p XRT with 4 months of worsening hoarseness.  Yesterday, pt had laryngoscopy and biopsy which was complicated by airway obstruction requiring tracheostomy.  Post-procedure, pt was unarousable (even to noxious stimuli). S/P Flumazenil and Narcan. On initial neuro evaluation, on the CT table, pt started waking up and was following commands and consult was cancelled. Later yesterday evening, pt reportedly had another episode of unresponsiveness lasting a few minutes.  VEEG was placed and overnight read is normal.  Pt is currently awake and did not have any more episodes.  Pt remembers events from last night.  Of note CO2 on ABG was elevated.  Altogether, first episode likely due to excessive sedation and second episode etiology unclear, but pt states he has not slept in 2 weeks and was exhausted.  No need for further neurodiagnostic work-up at this time.  Ok to discontinue VEEG.

## 2018-02-28 NOTE — SWALLOW BEDSIDE ASSESSMENT ADULT - COMMENTS
62M current every day smoker hx T2N0M0 R TVC SCCa s/p XRT with 4 months of worsening hoarseness. On exam patient noted to have fullness to supraglottic tissues as well as necrotic glottic tissue with significantly narrowed airway. Patient now s/p awake tracheostomy, direct laryngoscopy and biopsy 2/27.

## 2018-02-28 NOTE — PROGRESS NOTE ADULT - SUBJECTIVE AND OBJECTIVE BOX
INTERVAL HPI/OVERNIGHT EVENTS: ON: [facility]: Dr Hooker- will attempt to place video EEG tonight trop negative  FYI SQH not ordered  2/27: SICU post-op for new trach care. unresponsive in PACU, no improvement after narcan and flumazenil so taken for CTH. More arousable once in CT. trops negative.     PRESSORS: [ ] YES [X ] NO    Pt seen and examined. c/o chest congestion, nurse at bedside suctioning with red rubber catheter. Pain controlled. Pt denies CP, SOB, nausea, visual changes, headache.      MEDICATIONS  (STANDING):  acetaminophen  IVPB. 1000 milliGRAM(s) IV Intermittent once  clindamycin IVPB 600 milliGRAM(s) IV Intermittent every 8 hours  insulin lispro (HumaLOG) corrective regimen sliding scale   SubCutaneous Before meals and at bedtime  lactated ringers. 1000 milliLiter(s) (100 mL/Hr) IV Continuous <Continuous>  magnesium sulfate  IVPB 1 Gram(s) IV Intermittent once  nicotine -   7 mG/24Hr(s) Patch 1 patch Transdermal daily    MEDICATIONS  (PRN):  ondansetron Injectable 4 milliGRAM(s) IV Push every 6 hours PRN Nausea and/or Vomiting      Drug Dosing Weight  Height (cm): 180.34 (27 Feb 2018 07:16)  Weight (kg): 62.142 (27 Feb 2018 13:45)  BMI (kg/m2): 19.1 (27 Feb 2018 13:45)  BSA (m2): 1.8 (27 Feb 2018 13:45)      PAST MEDICAL & SURGICAL HISTORY:  SOB (shortness of breath)  Throat cancer  Jaundice: 1965  Fracture: left ankle  Laryngeal mass: s/ p radiation  Surgery, elective: direct laryngoscopy with biopsy- 4/2017  Status post surgery: inguinal hernia  Status post surgery: Left ankle surgical repair  x2 (1965)      ICU Vital Signs Last 24 Hrs  T(C): 36.9 (28 Feb 2018 06:32), Max: 37.7 (27 Feb 2018 13:24)  T(F): 98.5 (28 Feb 2018 06:32), Max: 99.8 (27 Feb 2018 13:24)  HR: 96 (28 Feb 2018 07:00) (66 - 109)  BP: 151/75 (28 Feb 2018 07:00) (102/67 - 152/84)  BP(mean): 99 (28 Feb 2018 07:00) (76 - 101)  ABP: --  ABP(mean): --  RR: 13 (28 Feb 2018 07:00) (5 - 24)  SpO2: 92% (28 Feb 2018 07:00) (91% - 100%)      ABG - ( 27 Feb 2018 14:23 )  pH: 7.38  /  pCO2: 55    /  pO2: 230   / HCO3: 32    / Base Excess: 5.4   /  SaO2: 100                   27 Feb 2018 07:01  -  28 Feb 2018 07:00  --------------------------------------------------------  IN:    lactated ringers.: 1700 mL  Total IN: 1700 mL    OUT:    Voided: 1075 mL  Total OUT: 1075 mL    Total NET: 625 mL          Mode: CPAP with PS  PEEP: 5  PS: 10  MAP: 8.1  PIP: 15      PHYSICAL EXAM:  Gen: NAD  Neuro: A&Ox3, no focal deficits  HEENT: MMM, trach in place with minimal dry old blood, as expected.   CV: RRR, no murmur  Pulm: b/l rhonchi, copious upper airway secretions  Abd: soft, NTND, +BS  Ext: WWP, no edema  Vasc: b/l 2+ DP/PT  Psyc: pleasant, appropriate affect    LABS:  CBC Full  -  ( 28 Feb 2018 06:34 )  WBC Count : 12.9 K/uL  Hemoglobin : 12.6 g/dL  Hematocrit : 37.0 %  Platelet Count - Automated : 200 K/uL  Mean Cell Volume : 90.0 fL  Mean Cell Hemoglobin : 30.7 pg  Mean Cell Hemoglobin Concentration : 34.1 g/dL      02-28    134<L>  |  93<L>  |  12  ----------------------------<  93  4.1   |  26  |  0.87    Ca    8.8      28 Feb 2018 06:34  Phos  3.3     02-28  Mg     1.9     02-28    TPro  6.3  /  Alb  3.7  /  TBili  0.4  /  DBili  <0.2  /  AST  35  /  ALT  20  /  AlkPhos  44  02-27    PT/INR - ( 27 Feb 2018 14:00 )   PT: 11.5 sec;   INR: 1.04          PTT - ( 27 Feb 2018 14:00 )  PTT:33.6 sec INTERVAL HPI/OVERNIGHT EVENTS: ON: [facility]: Dr Hooker- will attempt to place video EEG tonight trop negative  FYI SQH not ordered  2/27: SICU post-op for new trach care. unresponsive in PACU, no improvement after narcan and flumazenil so taken for CTH. More arousable once in CT. trops negative.     PRESSORS: [ ] YES [X ] NO    Pt seen and examined. c/o chest congestion, nurse at bedside suctioning with red rubber catheter. Pain controlled. Pt denies CP, SOB, nausea, visual changes, headache.      MEDICATIONS  (STANDING):  acetaminophen  IVPB. 1000 milliGRAM(s) IV Intermittent once  clindamycin IVPB 600 milliGRAM(s) IV Intermittent every 8 hours  insulin lispro (HumaLOG) corrective regimen sliding scale   SubCutaneous Before meals and at bedtime  lactated ringers. 1000 milliLiter(s) (100 mL/Hr) IV Continuous <Continuous>  magnesium sulfate  IVPB 1 Gram(s) IV Intermittent once  nicotine -   7 mG/24Hr(s) Patch 1 patch Transdermal daily    MEDICATIONS  (PRN):  ondansetron Injectable 4 milliGRAM(s) IV Push every 6 hours PRN Nausea and/or Vomiting      Drug Dosing Weight  Height (cm): 180.34 (27 Feb 2018 07:16)  Weight (kg): 62.142 (27 Feb 2018 13:45)  BMI (kg/m2): 19.1 (27 Feb 2018 13:45)  BSA (m2): 1.8 (27 Feb 2018 13:45)      PAST MEDICAL & SURGICAL HISTORY:  SOB (shortness of breath)  Throat cancer  Jaundice: 1965  Fracture: left ankle  Laryngeal mass: s/ p radiation  Surgery, elective: direct laryngoscopy with biopsy- 4/2017  Status post surgery: inguinal hernia  Status post surgery: Left ankle surgical repair  x2 (1965)      ICU Vital Signs Last 24 Hrs  T(C): 36.9 (28 Feb 2018 06:32), Max: 37.7 (27 Feb 2018 13:24)  T(F): 98.5 (28 Feb 2018 06:32), Max: 99.8 (27 Feb 2018 13:24)  HR: 96 (28 Feb 2018 07:00) (66 - 109)  BP: 151/75 (28 Feb 2018 07:00) (102/67 - 152/84)  BP(mean): 99 (28 Feb 2018 07:00) (76 - 101)  ABP: --  ABP(mean): --  RR: 13 (28 Feb 2018 07:00) (5 - 24)  SpO2: 92% (28 Feb 2018 07:00) (91% - 100%)      ABG - ( 27 Feb 2018 14:23 )  pH: 7.38  /  pCO2: 55    /  pO2: 230   / HCO3: 32    / Base Excess: 5.4   /  SaO2: 100                   27 Feb 2018 07:01  -  28 Feb 2018 07:00  --------------------------------------------------------  IN:    lactated ringers.: 1700 mL  Total IN: 1700 mL    OUT:    Voided: 1075 mL  Total OUT: 1075 mL    Total NET: 625 mL          Mode: CPAP with PS  PEEP: 5  PS: 10  MAP: 8.1  PIP: 15      PHYSICAL EXAM:  Gen: NAD  Neuro: A&Ox3, no focal deficits  HEENT: MMM, trach in place with minimal dry old blood, as expected.   CV: RRR, no murmur  Pulm: b/l rhonchi, copious upper airway secretions  Abd: soft, NTND, +BS  : normal male  Ext: WWP, no edema  MSK: no joint swelling  Vasc: b/l 2+ DP/PT  Psyc: pleasant, appropriate affect    LABS:  CBC Full  -  ( 28 Feb 2018 06:34 )  WBC Count : 12.9 K/uL  Hemoglobin : 12.6 g/dL  Hematocrit : 37.0 %  Platelet Count - Automated : 200 K/uL  Mean Cell Volume : 90.0 fL  Mean Cell Hemoglobin : 30.7 pg  Mean Cell Hemoglobin Concentration : 34.1 g/dL      02-28    134<L>  |  93<L>  |  12  ----------------------------<  93  4.1   |  26  |  0.87    Ca    8.8      28 Feb 2018 06:34  Phos  3.3     02-28  Mg     1.9     02-28    TPro  6.3  /  Alb  3.7  /  TBili  0.4  /  DBili  <0.2  /  AST  35  /  ALT  20  /  AlkPhos  44  02-27    PT/INR - ( 27 Feb 2018 14:00 )   PT: 11.5 sec;   INR: 1.04          PTT - ( 27 Feb 2018 14:00 )  PTT:33.6 sec

## 2018-03-01 LAB
ANION GAP SERPL CALC-SCNC: 13 MMOL/L — SIGNIFICANT CHANGE UP (ref 5–17)
BUN SERPL-MCNC: 9 MG/DL — SIGNIFICANT CHANGE UP (ref 7–23)
CALCIUM SERPL-MCNC: 8.7 MG/DL — SIGNIFICANT CHANGE UP (ref 8.4–10.5)
CHLORIDE SERPL-SCNC: 96 MMOL/L — SIGNIFICANT CHANGE UP (ref 96–108)
CO2 SERPL-SCNC: 29 MMOL/L — SIGNIFICANT CHANGE UP (ref 22–31)
CREAT SERPL-MCNC: 0.77 MG/DL — SIGNIFICANT CHANGE UP (ref 0.5–1.3)
GLUCOSE BLDC GLUCOMTR-MCNC: 71 MG/DL — SIGNIFICANT CHANGE UP (ref 70–99)
GLUCOSE BLDC GLUCOMTR-MCNC: 77 MG/DL — SIGNIFICANT CHANGE UP (ref 70–99)
GLUCOSE BLDC GLUCOMTR-MCNC: 78 MG/DL — SIGNIFICANT CHANGE UP (ref 70–99)
GLUCOSE BLDC GLUCOMTR-MCNC: 87 MG/DL — SIGNIFICANT CHANGE UP (ref 70–99)
GLUCOSE SERPL-MCNC: 87 MG/DL — SIGNIFICANT CHANGE UP (ref 70–99)
HCT VFR BLD CALC: 40.2 % — SIGNIFICANT CHANGE UP (ref 39–50)
HGB BLD-MCNC: 13.4 G/DL — SIGNIFICANT CHANGE UP (ref 13–17)
MAGNESIUM SERPL-MCNC: 2.3 MG/DL — SIGNIFICANT CHANGE UP (ref 1.6–2.6)
MCHC RBC-ENTMCNC: 30.7 PG — SIGNIFICANT CHANGE UP (ref 27–34)
MCHC RBC-ENTMCNC: 33.3 G/DL — SIGNIFICANT CHANGE UP (ref 32–36)
MCV RBC AUTO: 92.2 FL — SIGNIFICANT CHANGE UP (ref 80–100)
PHOSPHATE SERPL-MCNC: 3.1 MG/DL — SIGNIFICANT CHANGE UP (ref 2.5–4.5)
PLATELET # BLD AUTO: 207 K/UL — SIGNIFICANT CHANGE UP (ref 150–400)
POTASSIUM SERPL-MCNC: 4 MMOL/L — SIGNIFICANT CHANGE UP (ref 3.5–5.3)
POTASSIUM SERPL-SCNC: 4 MMOL/L — SIGNIFICANT CHANGE UP (ref 3.5–5.3)
RBC # BLD: 4.36 M/UL — SIGNIFICANT CHANGE UP (ref 4.2–5.8)
RBC # FLD: 14 % — SIGNIFICANT CHANGE UP (ref 10.3–16.9)
SODIUM SERPL-SCNC: 138 MMOL/L — SIGNIFICANT CHANGE UP (ref 135–145)
WBC # BLD: 9.8 K/UL — SIGNIFICANT CHANGE UP (ref 3.8–10.5)
WBC # FLD AUTO: 9.8 K/UL — SIGNIFICANT CHANGE UP (ref 3.8–10.5)

## 2018-03-01 PROCEDURE — 99232 SBSQ HOSP IP/OBS MODERATE 35: CPT | Mod: GC

## 2018-03-01 PROCEDURE — 71045 X-RAY EXAM CHEST 1 VIEW: CPT | Mod: 26

## 2018-03-01 RX ORDER — HEPARIN SODIUM 5000 [USP'U]/ML
5000 INJECTION INTRAVENOUS; SUBCUTANEOUS EVERY 8 HOURS
Qty: 0 | Refills: 0 | Status: DISCONTINUED | OUTPATIENT
Start: 2018-03-01 | End: 2018-03-08

## 2018-03-01 RX ADMIN — Medication 100 MILLIGRAM(S): at 19:43

## 2018-03-01 RX ADMIN — Medication 1 PATCH: at 16:00

## 2018-03-01 RX ADMIN — Medication 100 MILLIGRAM(S): at 12:14

## 2018-03-01 RX ADMIN — Medication 100 MILLIGRAM(S): at 02:54

## 2018-03-01 RX ADMIN — HEPARIN SODIUM 5000 UNIT(S): 5000 INJECTION INTRAVENOUS; SUBCUTANEOUS at 15:59

## 2018-03-01 RX ADMIN — Medication 1 PATCH: at 15:30

## 2018-03-01 RX ADMIN — HEPARIN SODIUM 5000 UNIT(S): 5000 INJECTION INTRAVENOUS; SUBCUTANEOUS at 21:24

## 2018-03-01 NOTE — DIETITIAN INITIAL EVALUATION ADULT. - NS AS NUTRI INTERV ENTERAL NUTRITION
Jevity 1.2 at 70 mL x 24 hr via NGT to provide 1680 mL TV, 2016 kcal, 93 g protein (1.2 g/kg IBW protein), & 1356 mL fluid. Start at 30 mL, increase by 10 mL q4 to goal rate. Monitor for signs of intolerance & maintain aspiration precautions.

## 2018-03-01 NOTE — PROGRESS NOTE ADULT - SUBJECTIVE AND OBJECTIVE BOX
ENT [facility] DAILY PROGRESS NOTE    HPI: 62M current every day smoker hx T2N0M0 R TVC SCCa s/p XRT with 4 months of worsening hoarseness. On exam patient noted to have fullness to supraglottic tissues as well as necrotic glottic tissue with significantly narrowed airway. Patient now s/p awake tracheostomy, direct laryngoscopy and biopsy 2/27.     POD0: Immediately postop, patient noted to be lethargic and somnolent, not following commands. STAT labs showing some C02 retention but otherwise wnl. Trop neg. STAT CXR and EKG performed. NCHCT without intracranial abnormality. After CT performed, patient mental status returned to baseline. Now alert and oriented, following commands and interactive. Pain well controlled. No trach issues, remains on vent.   2/27: fluctuating mental status, EEG monitoring initiated, cardiac work-up negative, on ventilator overnight  2/28: JOSÉ MIGUEL. AFVSS. Mental status at baseline. EEG negative and discontinued per neurology. Weaned to TC. Failed bedside SLP evaluation with e/o gross aspiration. NGT placed. Repeat trop neg x3. EKG neg for ST elevation x3  3/1: NGT sitting in mid-esophagus on CXR. Advanced to 60cm and repeat CXR pending. Otherwise JOSÉ MIGUEL. AFVSS. Breathing comfortably on TC. Pain well controlled.     Allergies  penicillin (Rash)  Intolerances    Vital Signs Last 24 Hrs  T(C): 36.4 (01 Mar 2018 05:01), Max: 37.8 (28 Feb 2018 21:59)  T(F): 97.5 (01 Mar 2018 05:01), Max: 100.1 (28 Feb 2018 21:59)  HR: 74 (01 Mar 2018 08:00) (70 - 102)  BP: 131/66 (01 Mar 2018 08:00) (108/68 - 151/79)  BP(mean): 93 (01 Mar 2018 08:00) (84 - 109)  RR: 14 (01 Mar 2018 08:00) (12 - 17)  SpO2: 97% (01 Mar 2018 08:00) (91% - 100%)    I&O's Summary    28 Feb 2018 07:01  -  01 Mar 2018 07:00  --------------------------------------------------------  IN: 2350 mL / OUT: 2950 mL / NET: -600 mL      PHYSICAL EXAM:  NAD, A&Ox3  following commands in all extremities  NC/AT, EOMI  NGT L naris advanced to 60cm and secured to nasal dorsum  face grossly symmetric  6.0 cuffed shiley trach secured in place via sutures x4 and velcro soft Glenn collar, cuff down   nonlabored respirations on TC  moderate clear secretions suctioned, suction catheter passes easily without resistance  no peristomal oozing or surrounding skin breakdown     LABS:                        13.4   9.8   )-----------( 207      ( 01 Mar 2018 05:40 )             40.2     03-01    138  |  96  |  9   ----------------------------<  87  4.0   |  29  |  0.77    Ca    8.7      01 Mar 2018 05:40  Phos  3.1     03-01  Mg     2.3     03-01    TPro  6.3  /  Alb  3.7  /  TBili  0.4  /  DBili  <0.2  /  AST  35  /  ALT  20  /  AlkPhos  44  02-27      Assessment/Plan:    62M current smoker w/ T2N0M0 R TVC SCCa s/p XRT with airway obstruction s/p tracheostomy, direct laryngoscopy, biopsy 2/27    -prn pain  -prn nausea  -nicotine patch daily   -NGT@60cm, repeat CXR to confirm placement  -Can start TF slowly and give meds via NGT once appropriate placement confirmed   -Free water flushes   - plan for repeat SLP evaluation in several days after trach change to cuffless  - Neurology consulted - vEEG negative, transient AMS likely related to metabolic encephalopathy in setting of hypercarbia, now resolved. No further w/u or intervention at this time   -Clinda for abx ppx   -routine trach care - spare 6.0 cuffed shiley at bedside, obturator at HOB, suction with red rubber catheters only, HOB@30  -humidified trach collar as tolerated  -daily labs, replete lytes prn   -DVT ppx: SCDs, start SQH   -OOBTC    Dispo: SICU    seen with chief and d/w fellow and attending

## 2018-03-01 NOTE — PROGRESS NOTE ADULT - SUBJECTIVE AND OBJECTIVE BOX
INTERVAL HPI/OVERNIGHT EVENTS: 2/28: wean to trach collar today. trach cuff deflated. dobhoff placed by primary team and confirmed on xray    Pt seen and examined. No complaints. Denies SOB/CP/headache/dizziness/n      MEDICATIONS  (STANDING):  acetaminophen  IVPB. 1000 milliGRAM(s) IV Intermittent once  clindamycin IVPB 600 milliGRAM(s) IV Intermittent every 8 hours  heparin  Injectable 5000 Unit(s) SubCutaneous every 8 hours  influenza   Vaccine 0.5 milliLiter(s) IntraMuscular once  insulin lispro (HumaLOG) corrective regimen sliding scale   SubCutaneous Before meals and at bedtime  lactated ringers. 1000 milliLiter(s) (100 mL/Hr) IV Continuous <Continuous>  nicotine -   7 mG/24Hr(s) Patch 1 patch Transdermal daily    MEDICATIONS  (PRN):  ondansetron Injectable 4 milliGRAM(s) IV Push every 6 hours PRN Nausea and/or Vomiting      Drug Dosing Weight  Height (cm): 180.34 (27 Feb 2018 07:16)  Weight (kg): 62.142 (27 Feb 2018 13:45)  BMI (kg/m2): 19.1 (27 Feb 2018 13:45)  BSA (m2): 1.8 (27 Feb 2018 13:45)      PAST MEDICAL & SURGICAL HISTORY:  SOB (shortness of breath)  Throat cancer  Jaundice: 1965  Fracture: left ankle  Laryngeal mass: s/ p radiation  Surgery, elective: direct laryngoscopy with biopsy- 4/2017  Status post surgery: inguinal hernia  Status post surgery: Left ankle surgical repair  x2 (1965)      ICU Vital Signs Last 24 Hrs  T(C): 37.3 (01 Mar 2018 14:46), Max: 38 (01 Mar 2018 08:29)  T(F): 99.1 (01 Mar 2018 14:46), Max: 100.4 (01 Mar 2018 08:29)  HR: 80 (01 Mar 2018 15:00) (70 - 102)  BP: 126/69 (01 Mar 2018 15:00) (108/68 - 151/79)  BP(mean): 85 (01 Mar 2018 15:00) (82 - 103)  ABP: --  ABP(mean): --  RR: 16 (01 Mar 2018 15:00) (12 - 20)  SpO2: 99% (01 Mar 2018 15:00) (91% - 100%)            28 Feb 2018 07:01  -  01 Mar 2018 07:00  --------------------------------------------------------  IN:    IV PiggyBack: 50 mL    lactated ringers.: 2300 mL  Total IN: 2350 mL    OUT:    Voided: 2950 mL  Total OUT: 2950 mL    Total NET: -600 mL      01 Mar 2018 07:01  -  01 Mar 2018 15:47  --------------------------------------------------------  IN:    IV PiggyBack: 50 mL    lactated ringers.: 850 mL  Total IN: 900 mL    OUT:    Voided: 500 mL  Total OUT: 500 mL    Total NET: 400 mL          Mode: standby      PHYSICAL EXAM:  Gen: NAD  Neuro: A&Ox3, no focal deficits  HEENT: MMM, trach in place, CDI. dobhoff in place  CV: RRR, no murmur  Pulm: CTA x 2  Abd: soft, NTND, +BS  : normal male  Ext: WWP, no edema  MSK: no joint swelling  Vasc: b/l 2+ DP/PT  Psyc: pleasant, appropriate affect      LABS:  CBC Full  -  ( 01 Mar 2018 05:40 )  WBC Count : 9.8 K/uL  Hemoglobin : 13.4 g/dL  Hematocrit : 40.2 %  Platelet Count - Automated : 207 K/uL  Mean Cell Volume : 92.2 fL  Mean Cell Hemoglobin : 30.7 pg  Mean Cell Hemoglobin Concentration : 33.3 g/dL      03-01    138  |  96  |  9   ----------------------------<  87  4.0   |  29  |  0.77    Ca    8.7      01 Mar 2018 05:40  Phos  3.1     03-01  Mg     2.3     03-01

## 2018-03-01 NOTE — PROGRESS NOTE ADULT - ASSESSMENT
62M current smoker w/ R TVC SCCa s/p XRT with airway obstruction s/p tracheostomy, direct laryngoscopy, biopsy 2/27    Neuro: Tylenol PRN, Zofran PRN, f/u CTH, Absence seizures?- vEEG, Nicotine patch  CV: Normotensive goals  Pulm: trach collar. suction with red rubber catheter only  FENGI: NPO, with Jevity 1.2 trickle feeds. Protonix. SLP eval once switched to cuffless trach  : Voids  Endo: ISS  ID: Clinda [id number] (2/27--)  Ppx: SCDs, SQH  Lines: PIVs  Wounds: new trach  Dispo: SDU

## 2018-03-01 NOTE — PROGRESS NOTE ADULT - ATTENDING COMMENTS
Above reviewed with SHANA Delacruz acting as my scribe.
Reviewed with SHANA Delcaruz acting as my scribe.

## 2018-03-01 NOTE — DIETITIAN INITIAL EVALUATION ADULT. - ENERGY NEEDS
78 kg IBW; 62 kg ABW; BMI 19; 180 cm; 79% of IBW.  IBW used due to pt < 80% of IBW.  Protein needs increased post op

## 2018-03-01 NOTE — DIETITIAN INITIAL EVALUATION ADULT. - OTHER INFO
62M current smoker w/ T2N0M0 R TVC SCCa s/p XRT with airway obstruction s/p tracheostomy, direct laryngoscopy, biopsy 2/27. Pt sleeping x3 attempts to interview.  SLP assessed pt 2/28 & recommends to remain NPO. Pt resting on trach collar with NGT in place.

## 2018-03-02 LAB
ANION GAP SERPL CALC-SCNC: 18 MMOL/L — HIGH (ref 5–17)
BUN SERPL-MCNC: 10 MG/DL — SIGNIFICANT CHANGE UP (ref 7–23)
CALCIUM SERPL-MCNC: 8.6 MG/DL — SIGNIFICANT CHANGE UP (ref 8.4–10.5)
CHLORIDE SERPL-SCNC: 94 MMOL/L — LOW (ref 96–108)
CO2 SERPL-SCNC: 22 MMOL/L — SIGNIFICANT CHANGE UP (ref 22–31)
CREAT SERPL-MCNC: 0.62 MG/DL — SIGNIFICANT CHANGE UP (ref 0.5–1.3)
GLUCOSE BLDC GLUCOMTR-MCNC: 116 MG/DL — HIGH (ref 70–99)
GLUCOSE BLDC GLUCOMTR-MCNC: 118 MG/DL — HIGH (ref 70–99)
GLUCOSE BLDC GLUCOMTR-MCNC: 146 MG/DL — HIGH (ref 70–99)
GLUCOSE BLDC GLUCOMTR-MCNC: 146 MG/DL — HIGH (ref 70–99)
GLUCOSE SERPL-MCNC: 131 MG/DL — HIGH (ref 70–99)
HCT VFR BLD CALC: 39.8 % — SIGNIFICANT CHANGE UP (ref 39–50)
HGB BLD-MCNC: 13.8 G/DL — SIGNIFICANT CHANGE UP (ref 13–17)
MAGNESIUM SERPL-MCNC: 2.4 MG/DL — SIGNIFICANT CHANGE UP (ref 1.6–2.6)
MCHC RBC-ENTMCNC: 31.2 PG — SIGNIFICANT CHANGE UP (ref 27–34)
MCHC RBC-ENTMCNC: 34.7 G/DL — SIGNIFICANT CHANGE UP (ref 32–36)
MCV RBC AUTO: 89.8 FL — SIGNIFICANT CHANGE UP (ref 80–100)
PHOSPHATE SERPL-MCNC: 2.9 MG/DL — SIGNIFICANT CHANGE UP (ref 2.5–4.5)
PLATELET # BLD AUTO: 218 K/UL — SIGNIFICANT CHANGE UP (ref 150–400)
POTASSIUM SERPL-MCNC: 4.5 MMOL/L — SIGNIFICANT CHANGE UP (ref 3.5–5.3)
POTASSIUM SERPL-SCNC: 4.5 MMOL/L — SIGNIFICANT CHANGE UP (ref 3.5–5.3)
RBC # BLD: 4.43 M/UL — SIGNIFICANT CHANGE UP (ref 4.2–5.8)
RBC # FLD: 13.7 % — SIGNIFICANT CHANGE UP (ref 10.3–16.9)
SODIUM SERPL-SCNC: 134 MMOL/L — LOW (ref 135–145)
WBC # BLD: 9.6 K/UL — SIGNIFICANT CHANGE UP (ref 3.8–10.5)
WBC # FLD AUTO: 9.6 K/UL — SIGNIFICANT CHANGE UP (ref 3.8–10.5)

## 2018-03-02 RX ORDER — SODIUM,POTASSIUM PHOSPHATES 278-250MG
2 POWDER IN PACKET (EA) ORAL
Qty: 0 | Refills: 0 | Status: COMPLETED | OUTPATIENT
Start: 2018-03-02 | End: 2018-03-02

## 2018-03-02 RX ORDER — SENNA PLUS 8.6 MG/1
1 TABLET ORAL
Qty: 0 | Refills: 0 | Status: DISCONTINUED | OUTPATIENT
Start: 2018-03-02 | End: 2018-03-06

## 2018-03-02 RX ORDER — OXYCODONE HYDROCHLORIDE 5 MG/1
5 TABLET ORAL EVERY 4 HOURS
Qty: 0 | Refills: 0 | Status: DISCONTINUED | OUTPATIENT
Start: 2018-03-02 | End: 2018-03-04

## 2018-03-02 RX ADMIN — HEPARIN SODIUM 5000 UNIT(S): 5000 INJECTION INTRAVENOUS; SUBCUTANEOUS at 13:45

## 2018-03-02 RX ADMIN — Medication 1000 MILLIGRAM(S): at 20:07

## 2018-03-02 RX ADMIN — Medication 2 TABLET(S): at 12:10

## 2018-03-02 RX ADMIN — Medication 1 PATCH: at 16:24

## 2018-03-02 RX ADMIN — HEPARIN SODIUM 5000 UNIT(S): 5000 INJECTION INTRAVENOUS; SUBCUTANEOUS at 05:29

## 2018-03-02 RX ADMIN — Medication 100 MILLIGRAM(S): at 04:40

## 2018-03-02 RX ADMIN — Medication 400 MILLIGRAM(S): at 19:33

## 2018-03-02 RX ADMIN — Medication 1 PATCH: at 12:10

## 2018-03-02 RX ADMIN — Medication 100 MILLIGRAM(S): at 12:10

## 2018-03-02 RX ADMIN — Medication 2 TABLET(S): at 12:11

## 2018-03-02 RX ADMIN — HEPARIN SODIUM 5000 UNIT(S): 5000 INJECTION INTRAVENOUS; SUBCUTANEOUS at 22:16

## 2018-03-02 NOTE — PROGRESS NOTE ADULT - ASSESSMENT
62M current smoker w/ R TVC SCCa s/p XRT with airway obstruction s/p tracheostomy, direct laryngoscopy, biopsy 2/27    Neuro: Tylenol PRN, Zofran PRN, Nicotine patch  CV: Normotensive goals  Pulm: trach collar. suction with red rubber catheter only  FENGI: NPO, with Jevity 1.2 trickle feeds. Protonix. SLP eval once switched to cuffless trach  : Voids  Endo: ISS  ID: Clinda [id number] (2/27--)  Ppx: SCDs, SQH  Lines: PIVs  Wounds: new trach

## 2018-03-02 NOTE — PROGRESS NOTE ADULT - SUBJECTIVE AND OBJECTIVE BOX
HPI: 62M current every day smoker hx T2N0M0 R TVC SCCa s/p XRT with 4 months of worsening hoarseness. On exam patient noted to have fullness to supraglottic tissues as well as necrotic glottic tissue with significantly narrowed airway. Patient now s/p awake tracheostomy, direct laryngoscopy and biopsy 2/27.     POD0: Immediately postop, patient noted to be lethargic and somnolent, not following commands. STAT labs showing some C02 retention but otherwise wnl. Trop neg. STAT CXR and EKG performed. NCHCT without intracranial abnormality. After CT performed, patient mental status returned to baseline. Now alert and oriented, following commands and interactive. Pain well controlled. No trach issues, remains on vent.   2/27: fluctuating mental status, EEG monitoring initiated, cardiac work-up negative, on ventilator overnight  2/28: JOSÉ MIGUEL. AFVSS. Mental status at baseline. EEG negative and discontinued per neurology. Weaned to TC. Failed bedside SLP evaluation with e/o gross aspiration. NGT placed. Repeat trop neg x3. EKG neg for ST elevation x3  3/1: NGT sitting in mid-esophagus on CXR. Advanced to 60cm and repeat CXR pending. Otherwise JOSÉ MIGUEL. AFVSS. Breathing comfortably on TC. Pain well controlled.   3/2: JOSÉ MIGUEL overnight. tolerating feeds. no issues with breathing.     Vital Signs Last 24 Hrs  T(C): 36.8 (02 Mar 2018 07:32), Max: 38 (01 Mar 2018 08:29)  T(F): 98.2 (02 Mar 2018 07:32), Max: 100.4 (01 Mar 2018 08:29)  HR: 82 (02 Mar 2018 08:00) (68 - 101)  BP: 116/71 (02 Mar 2018 07:00) (112/74 - 146/81)  BP(mean): 84 (02 Mar 2018 07:00) (81 - 101)  RR: 18 (02 Mar 2018 08:00) (9 - 26)  SpO2: 94% (02 Mar 2018 08:00) (88% - 99%)    PHYSICAL EXAM:  NAD, A&Ox3  following commands in all extremities  NC/AT, EOMI  NGT L naris advanced to 60cm and secured to nasal dorsum  face grossly symmetric  6.0 cuffed shiley trach secured in place via sutures x4 and velcro soft Glenn collar, cuff down   nonlabored respirations on TC  moderate clear secretions suctioned, suction catheter passes easily without resistance  no peristomal oozing or surrounding skin breakdown                           13.8   9.6   )-----------( 218      ( 02 Mar 2018 05:47 )             39.8   03-02    134<L>  |  94<L>  |  10  ----------------------------<  131<H>  4.5   |  22  |  0.62    Ca    8.6      02 Mar 2018 05:51  Phos  2.9     03-02  Mg     2.4     03-02    Assessment/Plan:    62M current smoker w/ T2N0M0 R TVC SCCa s/p XRT with airway obstruction s/p tracheostomy, direct laryngoscopy, biopsy 2/27    -prn pain  -prn nausea  -nicotine patch daily   -NGT@60cm, repeat CXR to confirm placement  -Can start TF slowly and give meds via NGT once appropriate placement confirmed   -Free water flushes   - plan for repeat SLP evaluation in several days after trach change to cuffless  - Neurology consulted - vEEG negative, transient AMS likely related to metabolic encephalopathy in setting of hypercarbia, now resolved. No further w/u or intervention at this time   -Clinda for abx ppx   -routine trach care - spare 6.0 cuffed shiley at bedside, obturator at HOB, suction with red rubber catheters only, HOB@30  - please request for Proximal XLT trach for trach change  -humidified trach collar as tolerated  -daily labs, replete lytes prn   -DVT ppx: SCDs, start SQH   -OOBTC    Dispo: SICU    seen with chief and d/w fellow and attending

## 2018-03-02 NOTE — PROGRESS NOTE ADULT - SUBJECTIVE AND OBJECTIVE BOX
SUBJECTIVE: No acute events over night. This morning per nurse, patient required less frequent suctioning. Patient denies CP, SOB, abdominal pain, N/V/D/C, flatus or passing BMs    MEDICATIONS  (STANDING):  acetaminophen  IVPB. 1000 milliGRAM(s) IV Intermittent once  clindamycin IVPB 600 milliGRAM(s) IV Intermittent every 8 hours  heparin  Injectable 5000 Unit(s) SubCutaneous every 8 hours  influenza   Vaccine 0.5 milliLiter(s) IntraMuscular once  insulin lispro (HumaLOG) corrective regimen sliding scale   SubCutaneous Before meals and at bedtime  nicotine -   7 mG/24Hr(s) Patch 1 patch Transdermal daily  potassium acid phosphate/sodium acid phosphate tablet (K-PHOS No. 2) 2 Tablet(s) Oral every 3 hours    MEDICATIONS  (PRN):  ondansetron Injectable 4 milliGRAM(s) IV Push every 6 hours PRN Nausea and/or Vomiting    ICU Vital Signs Last 24 Hrs  T(C): 37.6 (02 Mar 2018 09:09), Max: 37.7 (01 Mar 2018 21:40)  T(F): 99.6 (02 Mar 2018 09:09), Max: 99.8 (01 Mar 2018 21:40)  HR: 72 (02 Mar 2018 11:00) (70 - 101)  BP: 111/72 (02 Mar 2018 11:00) (111/72 - 146/81)  BP(mean): 87 (02 Mar 2018 11:00) (81 - 101)  ABP: --  ABP(mean): --  RR: 16 (02 Mar 2018 11:00) (9 - 26)  SpO2: 96% (02 Mar 2018 11:00) (88% - 99%)      Physical Exam:  General: NAD  HEENT: NC/AT, EOMI, PERRLA, trach secured  Pulmonary: Nonlabored breathing, no respiratory distress, CTA-B  Cardiovascular: NSR, no murmurs  Abdominal: soft, NT/ND, +BS, no organomegaly  Extremities: WWP, 5/5 strength x 4, no clubbing/cyanosis/edema  Neuro: A/O x3, CNs II-XII grossly intact, normal motor/sensation, no focal deficits  Pulses: palpable distal pulses      I&O's Summary    01 Mar 2018 07:01  -  02 Mar 2018 07:00  --------------------------------------------------------  IN: 1810 mL / OUT: 1900 mL / NET: -90 mL    02 Mar 2018 07:01  -  02 Mar 2018 11:16  --------------------------------------------------------  IN: 350 mL / OUT: 0 mL / NET: 350 mL        LABS:                        13.8   9.6   )-----------( 218      ( 02 Mar 2018 05:47 )             39.8     03-02    134<L>  |  94<L>  |  10  ----------------------------<  131<H>  4.5   |  22  |  0.62    Ca    8.6      02 Mar 2018 05:51  Phos  2.9     03-02  Mg     2.4     03-02          CAPILLARY BLOOD GLUCOSE      POCT Blood Glucose.: 116 mg/dL (02 Mar 2018 05:39)  POCT Blood Glucose.: 78 mg/dL (01 Mar 2018 21:22)  POCT Blood Glucose.: 77 mg/dL (01 Mar 2018 17:03)  POCT Blood Glucose.: 71 mg/dL (01 Mar 2018 12:04)

## 2018-03-03 LAB
GLUCOSE BLDC GLUCOMTR-MCNC: 118 MG/DL — HIGH (ref 70–99)
GLUCOSE BLDC GLUCOMTR-MCNC: 122 MG/DL — HIGH (ref 70–99)
GLUCOSE BLDC GLUCOMTR-MCNC: 138 MG/DL — HIGH (ref 70–99)
GLUCOSE BLDC GLUCOMTR-MCNC: 91 MG/DL — SIGNIFICANT CHANGE UP (ref 70–99)

## 2018-03-03 PROCEDURE — 71045 X-RAY EXAM CHEST 1 VIEW: CPT | Mod: 26

## 2018-03-03 RX ORDER — LIDOCAINE 4 G/100G
1 CREAM TOPICAL DAILY
Qty: 0 | Refills: 0 | Status: DISCONTINUED | OUTPATIENT
Start: 2018-03-03 | End: 2018-03-08

## 2018-03-03 RX ADMIN — HEPARIN SODIUM 5000 UNIT(S): 5000 INJECTION INTRAVENOUS; SUBCUTANEOUS at 05:02

## 2018-03-03 RX ADMIN — LIDOCAINE 1 PATCH: 4 CREAM TOPICAL at 22:38

## 2018-03-03 RX ADMIN — HEPARIN SODIUM 5000 UNIT(S): 5000 INJECTION INTRAVENOUS; SUBCUTANEOUS at 15:00

## 2018-03-03 RX ADMIN — Medication 1 PATCH: at 11:37

## 2018-03-03 RX ADMIN — HEPARIN SODIUM 5000 UNIT(S): 5000 INJECTION INTRAVENOUS; SUBCUTANEOUS at 22:17

## 2018-03-04 LAB
ANION GAP SERPL CALC-SCNC: 11 MMOL/L — SIGNIFICANT CHANGE UP (ref 5–17)
BUN SERPL-MCNC: 15 MG/DL — SIGNIFICANT CHANGE UP (ref 7–23)
CALCIUM SERPL-MCNC: 9.8 MG/DL — SIGNIFICANT CHANGE UP (ref 8.4–10.5)
CHLORIDE SERPL-SCNC: 100 MMOL/L — SIGNIFICANT CHANGE UP (ref 96–108)
CO2 SERPL-SCNC: 33 MMOL/L — HIGH (ref 22–31)
CREAT SERPL-MCNC: 0.8 MG/DL — SIGNIFICANT CHANGE UP (ref 0.5–1.3)
GLUCOSE BLDC GLUCOMTR-MCNC: 111 MG/DL — HIGH (ref 70–99)
GLUCOSE BLDC GLUCOMTR-MCNC: 134 MG/DL — HIGH (ref 70–99)
GLUCOSE SERPL-MCNC: 135 MG/DL — HIGH (ref 70–99)
HCT VFR BLD CALC: 43.4 % — SIGNIFICANT CHANGE UP (ref 39–50)
HGB BLD-MCNC: 14.7 G/DL — SIGNIFICANT CHANGE UP (ref 13–17)
MAGNESIUM SERPL-MCNC: 2.5 MG/DL — SIGNIFICANT CHANGE UP (ref 1.6–2.6)
MCHC RBC-ENTMCNC: 31.2 PG — SIGNIFICANT CHANGE UP (ref 27–34)
MCHC RBC-ENTMCNC: 33.9 G/DL — SIGNIFICANT CHANGE UP (ref 32–36)
MCV RBC AUTO: 92.1 FL — SIGNIFICANT CHANGE UP (ref 80–100)
PHOSPHATE SERPL-MCNC: 4.8 MG/DL — HIGH (ref 2.5–4.5)
PLATELET # BLD AUTO: 252 K/UL — SIGNIFICANT CHANGE UP (ref 150–400)
POTASSIUM SERPL-MCNC: 5 MMOL/L — SIGNIFICANT CHANGE UP (ref 3.5–5.3)
POTASSIUM SERPL-SCNC: 5 MMOL/L — SIGNIFICANT CHANGE UP (ref 3.5–5.3)
RBC # BLD: 4.71 M/UL — SIGNIFICANT CHANGE UP (ref 4.2–5.8)
RBC # FLD: 13.9 % — SIGNIFICANT CHANGE UP (ref 10.3–16.9)
SODIUM SERPL-SCNC: 144 MMOL/L — SIGNIFICANT CHANGE UP (ref 135–145)
WBC # BLD: 6.7 K/UL — SIGNIFICANT CHANGE UP (ref 3.8–10.5)
WBC # FLD AUTO: 6.7 K/UL — SIGNIFICANT CHANGE UP (ref 3.8–10.5)

## 2018-03-04 RX ORDER — ACETAMINOPHEN 500 MG
1000 TABLET ORAL ONCE
Qty: 0 | Refills: 0 | Status: COMPLETED | OUTPATIENT
Start: 2018-03-04 | End: 2018-03-05

## 2018-03-04 RX ORDER — SODIUM CHLORIDE 9 MG/ML
1000 INJECTION, SOLUTION INTRAVENOUS
Qty: 0 | Refills: 0 | Status: DISCONTINUED | OUTPATIENT
Start: 2018-03-04 | End: 2018-03-05

## 2018-03-04 RX ORDER — OXYCODONE HYDROCHLORIDE 5 MG/1
5 TABLET ORAL EVERY 4 HOURS
Qty: 0 | Refills: 0 | Status: DISCONTINUED | OUTPATIENT
Start: 2018-03-04 | End: 2018-03-04

## 2018-03-04 RX ORDER — ACETAMINOPHEN 500 MG
650 TABLET ORAL EVERY 6 HOURS
Qty: 0 | Refills: 0 | Status: DISCONTINUED | OUTPATIENT
Start: 2018-03-04 | End: 2018-03-04

## 2018-03-04 RX ORDER — MORPHINE SULFATE 50 MG/1
2 CAPSULE, EXTENDED RELEASE ORAL EVERY 4 HOURS
Qty: 0 | Refills: 0 | Status: DISCONTINUED | OUTPATIENT
Start: 2018-03-04 | End: 2018-03-05

## 2018-03-04 RX ADMIN — LIDOCAINE 1 PATCH: 4 CREAM TOPICAL at 10:23

## 2018-03-04 RX ADMIN — Medication 1 PATCH: at 12:09

## 2018-03-04 RX ADMIN — HEPARIN SODIUM 5000 UNIT(S): 5000 INJECTION INTRAVENOUS; SUBCUTANEOUS at 22:10

## 2018-03-04 RX ADMIN — HEPARIN SODIUM 5000 UNIT(S): 5000 INJECTION INTRAVENOUS; SUBCUTANEOUS at 13:18

## 2018-03-04 RX ADMIN — Medication 1 PATCH: at 11:03

## 2018-03-04 RX ADMIN — HEPARIN SODIUM 5000 UNIT(S): 5000 INJECTION INTRAVENOUS; SUBCUTANEOUS at 06:09

## 2018-03-04 NOTE — PROGRESS NOTE ADULT - SUBJECTIVE AND OBJECTIVE BOX
HPI: 62M current every day smoker hx T2N0M0 R TVC SCCa s/p XRT with 4 months of worsening hoarseness. On exam patient noted to have fullness to supraglottic tissues as well as necrotic glottic tissue with significantly narrowed airway. Patient now s/p awake tracheostomy, direct laryngoscopy and biopsy 2/27.     POD0: Immediately postop, patient noted to be lethargic and somnolent, not following commands. STAT labs showing some C02 retention but otherwise wnl. Trop neg. STAT CXR and EKG performed. NCHCT without intracranial abnormality. After CT performed, patient mental status returned to baseline. Now alert and oriented, following commands and interactive. Pain well controlled. No trach issues, remains on vent.   2/27: fluctuating mental status, EEG monitoring initiated, cardiac work-up negative, on ventilator overnight  2/28: JOSÉ MIGUEL. AFVSS. Mental status at baseline. EEG negative and discontinued per neurology. Weaned to TC. Failed bedside SLP evaluation with e/o gross aspiration. NGT placed. Repeat trop neg x3. EKG neg for ST elevation x3  3/1: NGT sitting in mid-esophagus on CXR. Advanced to 60cm and repeat CXR pending. Otherwise JOSÉ MIGUEL. AFVSS. Breathing comfortably on TC. Pain well controlled.   3/2: JOSÉ MIGUEL overnight. tolerating feeds. no issues with breathing. Transferred to SDU. Clinda d/c'ed.   3/3: Trach changed to 6.0 distal XLT.    3/4: JOSÉ MIGUEL. AFVSS. Tolerating tube feeds at goal. No n/v. Ambulating. Breathing comfortably. Still with high suction requirement.       Vital Signs Last 24 Hrs  T(C): 37 (04 Mar 2018 09:56), Max: 37.6 (03 Mar 2018 18:32)  T(F): 98.6 (04 Mar 2018 09:56), Max: 99.7 (03 Mar 2018 18:32)  HR: 86 (04 Mar 2018 08:34) (78 - 89)  BP: 117/71 (04 Mar 2018 08:34) (112/75 - 118/69)  BP(mean): 90 (04 Mar 2018 08:34) (84 - 90)  RR: 18 (04 Mar 2018 10:45) (18 - 21)  SpO2: 95% (04 Mar 2018 10:45) (93% - 96%)      I&O's Summary    03 Mar 2018 07:01  -  04 Mar 2018 07:00  --------------------------------------------------------  IN: 840 mL / OUT: 650 mL / NET: 190 mL    04 Mar 2018 07:01  -  04 Mar 2018 13:03  --------------------------------------------------------  IN: 0 mL / OUT: 300 mL / NET: -300 mL      PHYSICAL EXAM:  NAD, A&Ox3  NC/AT, face grossly symmetric   NGT L naris secured to nasal dorsum  6.0 distal XLT cuffed shiley trach secured in place with elcro soft Glenn collar, cuff down   nonlabored respirations on TC  moderate clear secretions suctioned, suction catheter passes easily without resistance  no peristomal oozing or surrounding skin breakdown                                      14.7   6.7   )-----------( 252      ( 04 Mar 2018 06:15 )             43.4   03-04    144  |  100  |  15  ----------------------------<  135<H>  5.0   |  33<H>  |  0.80    Ca    9.8      04 Mar 2018 06:17  Phos  4.8     03-04  Mg     2.5     03-04      Assessment/Plan:    62M current smoker w/ T2N0M0 R TVC SCCa s/p XRT with airway obstruction s/p tracheostomy, direct laryngoscopy, biopsy 2/27    -prn pain  -prn nausea  -nicotine patch daily   -NGT@60cm, confirmed in place by CXR   -Continue tube feeds and FWF as tolerated   - plan for repeat SLP evaluation early next week   - Neurology consulted - vEEG negative, transient AMS likely related to metabolic encephalopathy in setting of hypercarbia, now resolved. No further w/u or intervention at this time   -s/p Clinda for abx ppx   -routine trach care - spare 6.0 cuffed shiley at bedside, obturator at HOB, suction with red rubber catheters only, HOB@30  - please request for Proximal XLT trach for trach change  -humidified trach collar as tolerated  -qOD labs while on tube feeds   -DVT ppx: SCDs, SQH   -OOBTC    Dispo: SDU    seen with chief and d/w fellow and attending HPI: 62M current every day smoker hx T2N0M0 R TVC SCCa s/p XRT with 4 months of worsening hoarseness. On exam patient noted to have fullness to supraglottic tissues as well as necrotic glottic tissue with significantly narrowed airway. Patient now s/p awake tracheostomy, direct laryngoscopy and biopsy 2/27.     POD0: Immediately postop, patient noted to be lethargic and somnolent, not following commands. STAT labs showing some C02 retention but otherwise wnl. Trop neg. STAT CXR and EKG performed. NCHCT without intracranial abnormality. After CT performed, patient mental status returned to baseline. Now alert and oriented, following commands and interactive. Pain well controlled. No trach issues, remains on vent.   2/27: fluctuating mental status, EEG monitoring initiated, cardiac work-up negative, on ventilator overnight  2/28: JOSÉ MIGUEL. AFVSS. Mental status at baseline. EEG negative and discontinued per neurology. Weaned to TC. Failed bedside SLP evaluation with e/o gross aspiration. NGT placed. Repeat trop neg x3. EKG neg for ST elevation x3  3/1: NGT sitting in mid-esophagus on CXR. Advanced to 60cm and repeat CXR pending. Otherwise JOSÉ MIGUEL. AFVSS. Breathing comfortably on TC. Pain well controlled.   3/2: JOSÉ MIGUEL overnight. tolerating feeds. no issues with breathing. Transferred to SDU. Clinda d/c'ed.   3/3: Trach changed to7.0 proximal XLT.    3/4: JOSÉ MIGUEL. AFVSS. Tolerating tube feeds at goal. No n/v. Ambulating. Breathing comfortably. Still with high suction requirement.       Vital Signs Last 24 Hrs  T(C): 37 (04 Mar 2018 09:56), Max: 37.6 (03 Mar 2018 18:32)  T(F): 98.6 (04 Mar 2018 09:56), Max: 99.7 (03 Mar 2018 18:32)  HR: 86 (04 Mar 2018 08:34) (78 - 89)  BP: 117/71 (04 Mar 2018 08:34) (112/75 - 118/69)  BP(mean): 90 (04 Mar 2018 08:34) (84 - 90)  RR: 18 (04 Mar 2018 10:45) (18 - 21)  SpO2: 95% (04 Mar 2018 10:45) (93% - 96%)      I&O's Summary    03 Mar 2018 07:01  -  04 Mar 2018 07:00  --------------------------------------------------------  IN: 840 mL / OUT: 650 mL / NET: 190 mL    04 Mar 2018 07:01  -  04 Mar 2018 13:03  --------------------------------------------------------  IN: 0 mL / OUT: 300 mL / NET: -300 mL      PHYSICAL EXAM:  NAD, A&Ox3  NC/AT, face grossly symmetric   NGT L naris secured to nasal dorsum  7.0 proximal XLT cuffed shiley trach secured in place with elcro soft Glenn collar, cuff down   nonlabored respirations on TC  moderate clear secretions suctioned, suction catheter passes easily without resistance  no peristomal oozing or surrounding skin breakdown                                      14.7   6.7   )-----------( 252      ( 04 Mar 2018 06:15 )             43.4   03-04    144  |  100  |  15  ----------------------------<  135<H>  5.0   |  33<H>  |  0.80    Ca    9.8      04 Mar 2018 06:17  Phos  4.8     03-04  Mg     2.5     03-04      Assessment/Plan:    62M current smoker w/ T2N0M0 R TVC SCCa s/p XRT with airway obstruction s/p tracheostomy, direct laryngoscopy, biopsy 2/27    -prn pain  -prn nausea  -nicotine patch daily   -NGT@60cm, confirmed in place by CXR   -Continue tube feeds and FWF as tolerated   - plan for repeat SLP evaluation early next week   - Neurology consulted - vEEG negative, transient AMS likely related to metabolic encephalopathy in setting of hypercarbia, now resolved. No further w/u or intervention at this time   -s/p Clinda for abx ppx   -routine trach care - spare 6.0 cuffed shiley at bedside, obturator at HOB, suction with red rubber catheters only, HOB@30  - please request for Proximal XLT trach for trach change  -humidified trach collar as tolerated  -qOD labs while on tube feeds   -DVT ppx: SCDs, SQH   -OOBTC    Dispo: SDU    seen with chief and d/w fellow and attending

## 2018-03-05 PROCEDURE — 74230 X-RAY XM SWLNG FUNCJ C+: CPT | Mod: 26

## 2018-03-05 RX ORDER — ACETAMINOPHEN 500 MG
650 TABLET ORAL EVERY 6 HOURS
Qty: 0 | Refills: 0 | Status: DISCONTINUED | OUTPATIENT
Start: 2018-03-05 | End: 2018-03-08

## 2018-03-05 RX ORDER — MORPHINE SULFATE 50 MG/1
2 CAPSULE, EXTENDED RELEASE ORAL EVERY 4 HOURS
Qty: 0 | Refills: 0 | Status: DISCONTINUED | OUTPATIENT
Start: 2018-03-05 | End: 2018-03-08

## 2018-03-05 RX ORDER — OXYCODONE AND ACETAMINOPHEN 5; 325 MG/1; MG/1
1 TABLET ORAL EVERY 4 HOURS
Qty: 0 | Refills: 0 | Status: DISCONTINUED | OUTPATIENT
Start: 2018-03-05 | End: 2018-03-08

## 2018-03-05 RX ADMIN — SODIUM CHLORIDE 100 MILLILITER(S): 9 INJECTION, SOLUTION INTRAVENOUS at 00:46

## 2018-03-05 RX ADMIN — HEPARIN SODIUM 5000 UNIT(S): 5000 INJECTION INTRAVENOUS; SUBCUTANEOUS at 05:16

## 2018-03-05 RX ADMIN — Medication 1 PATCH: at 13:40

## 2018-03-05 RX ADMIN — SODIUM CHLORIDE 100 MILLILITER(S): 9 INJECTION, SOLUTION INTRAVENOUS at 13:40

## 2018-03-05 RX ADMIN — Medication 1000 MILLIGRAM(S): at 09:12

## 2018-03-05 RX ADMIN — HEPARIN SODIUM 5000 UNIT(S): 5000 INJECTION INTRAVENOUS; SUBCUTANEOUS at 13:40

## 2018-03-05 RX ADMIN — Medication 1 PATCH: at 13:41

## 2018-03-05 RX ADMIN — HEPARIN SODIUM 5000 UNIT(S): 5000 INJECTION INTRAVENOUS; SUBCUTANEOUS at 21:17

## 2018-03-05 RX ADMIN — Medication 400 MILLIGRAM(S): at 08:57

## 2018-03-05 NOTE — SWALLOW VFSS/MBS ASSESSMENT ADULT - DIAGNOSTIC IMPRESSIONS
Moderate pharyngeal dysphagia as evidenced by silent aspiration of thin liquids and trace-mild pharyngeal residue after the swallow.

## 2018-03-05 NOTE — SWALLOW VFSS/MBS ASSESSMENT ADULT - SLP PERTINENT HISTORY OF CURRENT PROBLEM
Current smoker, +trach, DL and biopsy on 2/27 for fullness of supraglottic tissue in setting of known T2N0M0 R TVC SCCa s/p XRT.

## 2018-03-05 NOTE — PROGRESS NOTE ADULT - SUBJECTIVE AND OBJECTIVE BOX
HPI: 62M current every day smoker hx T2N0M0 R TVC SCCa s/p XRT with 4 months of worsening hoarseness. On exam patient noted to have fullness to supraglottic tissues as well as necrotic glottic tissue with significantly narrowed airway. Patient now s/p awake tracheostomy, direct laryngoscopy and biopsy 2/27.     POD0: Immediately postop, patient noted to be lethargic and somnolent, not following commands. STAT labs showing some C02 retention but otherwise wnl. Trop neg. STAT CXR and EKG performed. NCHCT without intracranial abnormality. After CT performed, patient mental status returned to baseline. Now alert and oriented, following commands and interactive. Pain well controlled. No trach issues, remains on vent.   2/27: fluctuating mental status, EEG monitoring initiated, cardiac work-up negative, on ventilator overnight  2/28: JOSÉ MIGUEL. AFVSS. Mental status at baseline. EEG negative and discontinued per neurology. Weaned to TC. Failed bedside SLP evaluation with e/o gross aspiration. NGT placed. Repeat trop neg x3. EKG neg for ST elevation x3  3/1: NGT sitting in mid-esophagus on CXR. Advanced to 60cm and repeat CXR pending. Otherwise JOSÉ MIGUEL. AFVSS. Breathing comfortably on TC. Pain well controlled.   3/2: JOSÉ MIGUEL overnight. tolerating feeds. no issues with breathing. Transferred to SDU. Clinda d/c'ed.   3/3: Trach changed to7.0 proximal XLT.    3/4: JOSÉ MIGUEL. AFVSS. Tolerating tube feeds at goal. No n/v. Ambulating. Breathing comfortably. Still with high suction requirement.   3/5: JOSÉ MIGUEL overnight. Accidental disldging of NGT overnight. No plans to replace tube for now. Continues to have high suction requirement. secretions clear.     Vital Signs Last 24 Hrs  T(C): 36.4 (05 Mar 2018 05:34), Max: 37.6 (04 Mar 2018 22:09)  T(F): 97.6 (05 Mar 2018 05:34), Max: 99.7 (04 Mar 2018 22:09)  HR: 84 (05 Mar 2018 05:02) (76 - 88)  BP: 104/62 (05 Mar 2018 05:02) (104/62 - 120/73)  BP(mean): 78 (05 Mar 2018 05:02) (78 - 90)  RR: 17 (05 Mar 2018 05:02) (17 - 18)  SpO2: 97% (05 Mar 2018 05:02) (93% - 100%)    PHYSICAL EXAM:  NAD, A&Ox3  NC/AT, face grossly symmetric   NGT L naris secured to nasal dorsum  7.0 proximal XLT cuffed shiley trach secured in place with elcro soft Glenn collar, cuff down   nonlabored respirations on TC  moderate clear secretions suctioned, suction catheter passes easily without resistance  no peristomal oozing or surrounding skin breakdown     Assessment/Plan:    62M current smoker w/ T2N0M0 R TVC SCCa s/p XRT with airway obstruction s/p tracheostomy, direct laryngoscopy, biopsy 2/27    -prn pain  -prn nausea  -nicotine patch daily   - NPOE  - plan for repeat SLP evaluation today  - Neurology consulted - vEEG negative, transient AMS likely related to metabolic encephalopathy in setting of hypercarbia, now resolved. No further w/u or intervention at this time   -s/p Clinda for abx ppx   -please change 7.0 XLT inner cannula daily  -humidified trach collar as tolerated  -qOD labs while on tube feeds   -DVT ppx: SCDs, SQH   -OOBTC    Dispo: SDU    seen with chief and d/w fellow and attending HPI: 62M current every day smoker hx T2N0M0 R TVC SCCa s/p XRT with 4 months of worsening hoarseness. On exam patient noted to have fullness to supraglottic tissues as well as necrotic glottic tissue with significantly narrowed airway. Patient now s/p awake tracheostomy, direct laryngoscopy and biopsy 2/27.     POD0: Immediately postop, patient noted to be lethargic and somnolent, not following commands. STAT labs showing some C02 retention but otherwise wnl. Trop neg. STAT CXR and EKG performed. NCHCT without intracranial abnormality. After CT performed, patient mental status returned to baseline. Now alert and oriented, following commands and interactive. Pain well controlled. No trach issues, remains on vent.   2/27: fluctuating mental status, EEG monitoring initiated, cardiac work-up negative, on ventilator overnight  2/28: JOSÉ MIGUEL. AFVSS. Mental status at baseline. EEG negative and discontinued per neurology. Weaned to TC. Failed bedside SLP evaluation with e/o gross aspiration. NGT placed. Repeat trop neg x3. EKG neg for ST elevation x3  3/1: NGT sitting in mid-esophagus on CXR. Advanced to 60cm and repeat CXR pending. Otherwise JOSÉ MIGUEL. AFVSS. Breathing comfortably on TC. Pain well controlled.   3/2: JOSÉ MIGUEL overnight. tolerating feeds. no issues with breathing. Transferred to SDU. Clinda d/c'ed.   3/3: Trach changed to7.0 proximal XLT.    3/4: JOSÉ MIGUEL. AFVSS. Tolerating tube feeds at goal. No n/v. Ambulating. Breathing comfortably. Still with high suction requirement.   3/5: JOSÉ MIGUEL overnight. Accidental disldging of NGT overnight. No plans to replace tube for now. Continues to have high suction requirement. secretions clear.     Vital Signs Last 24 Hrs  T(C): 36.4 (05 Mar 2018 05:34), Max: 37.6 (04 Mar 2018 22:09)  T(F): 97.6 (05 Mar 2018 05:34), Max: 99.7 (04 Mar 2018 22:09)  HR: 84 (05 Mar 2018 05:02) (76 - 88)  BP: 104/62 (05 Mar 2018 05:02) (104/62 - 120/73)  BP(mean): 78 (05 Mar 2018 05:02) (78 - 90)  RR: 17 (05 Mar 2018 05:02) (17 - 18)  SpO2: 97% (05 Mar 2018 05:02) (93% - 100%)    PHYSICAL EXAM:  NAD, A&Ox3  NC/AT, face grossly symmetric   7.0 proximal XLT cuffed shiley trach secured in place with velcro soft Glenn collar, cuff down   nonlabored respirations on TC  moderate clear secretions suctioned, suction catheter passes easily without resistance  no peristomal oozing or surrounding skin breakdown     Assessment/Plan:    62M current smoker w/ T2N0M0 R TVC SCCa s/p XRT with airway obstruction s/p tracheostomy, direct laryngoscopy, biopsy 2/27    -prn pain  -prn nausea  -nicotine patch daily   - NPOE  - plan for repeat SLP evaluation today  - Neurology consulted - vEEG negative, transient AMS likely related to metabolic encephalopathy in setting of hypercarbia, now resolved. No further w/u or intervention at this time   -s/p Clinda for abx ppx   -please change 7.0 XLT inner cannula daily  -humidified trach collar as tolerated  -qOD labs while on tube feeds   -DVT ppx: SCDs, SQH   -OOBTC    Dispo: SDU    seen with chief and d/w fellow and attending

## 2018-03-05 NOTE — SWALLOW VFSS/MBS ASSESSMENT ADULT - PHARYNGEAL PHASE COMMENTS
Thin liquid bolus spills to the arytenoids before the swallow trigger and decreased hyolaryngeal excursion during the swallow. There is inconsistent penetration during and after the swallow with thin liquids and silent aspiration during the swallow x1 and after the swallow on 3/4 trials. There is trace-mild residue in the valleculae, along the aryepiglottic folds, in the pyriform sinuses and in the laryngeal vestibule after the swallow. A cued secondary swallow reduces but does not eliminate residue. There is no penetration or aspiration with NTL or puree consistency.   Patient was suctioned at the conclusion of the study and a mild amount of barium was retrieved.

## 2018-03-06 ENCOUNTER — TRANSCRIPTION ENCOUNTER (OUTPATIENT)
Age: 63
End: 2018-03-06

## 2018-03-06 LAB
ANION GAP SERPL CALC-SCNC: 9 MMOL/L — SIGNIFICANT CHANGE UP (ref 5–17)
BUN SERPL-MCNC: 17 MG/DL — SIGNIFICANT CHANGE UP (ref 7–23)
CALCIUM SERPL-MCNC: 9 MG/DL — SIGNIFICANT CHANGE UP (ref 8.4–10.5)
CHLORIDE SERPL-SCNC: 95 MMOL/L — LOW (ref 96–108)
CO2 SERPL-SCNC: 30 MMOL/L — SIGNIFICANT CHANGE UP (ref 22–31)
CREAT SERPL-MCNC: 0.74 MG/DL — SIGNIFICANT CHANGE UP (ref 0.5–1.3)
GLUCOSE SERPL-MCNC: 96 MG/DL — SIGNIFICANT CHANGE UP (ref 70–99)
MAGNESIUM SERPL-MCNC: 2.2 MG/DL — SIGNIFICANT CHANGE UP (ref 1.6–2.6)
PHOSPHATE SERPL-MCNC: 3.2 MG/DL — SIGNIFICANT CHANGE UP (ref 2.5–4.5)
POTASSIUM SERPL-MCNC: 4.4 MMOL/L — SIGNIFICANT CHANGE UP (ref 3.5–5.3)
POTASSIUM SERPL-SCNC: 4.4 MMOL/L — SIGNIFICANT CHANGE UP (ref 3.5–5.3)
SODIUM SERPL-SCNC: 134 MMOL/L — LOW (ref 135–145)

## 2018-03-06 RX ORDER — SENNA PLUS 8.6 MG/1
2 TABLET ORAL AT BEDTIME
Qty: 0 | Refills: 0 | Status: DISCONTINUED | OUTPATIENT
Start: 2018-03-06 | End: 2018-03-08

## 2018-03-06 RX ADMIN — HEPARIN SODIUM 5000 UNIT(S): 5000 INJECTION INTRAVENOUS; SUBCUTANEOUS at 15:14

## 2018-03-06 RX ADMIN — SENNA PLUS 2 TABLET(S): 8.6 TABLET ORAL at 22:58

## 2018-03-06 RX ADMIN — Medication 1 PATCH: at 12:01

## 2018-03-06 RX ADMIN — HEPARIN SODIUM 5000 UNIT(S): 5000 INJECTION INTRAVENOUS; SUBCUTANEOUS at 06:21

## 2018-03-06 RX ADMIN — Medication 1 PATCH: at 11:55

## 2018-03-06 RX ADMIN — HEPARIN SODIUM 5000 UNIT(S): 5000 INJECTION INTRAVENOUS; SUBCUTANEOUS at 22:58

## 2018-03-06 NOTE — DISCHARGE NOTE ADULT - PATIENT PORTAL LINK FT
You can access the [facility] Patient Portal, offered by [facility], by registering with the following website: [email or web address]

## 2018-03-06 NOTE — PROGRESS NOTE ADULT - SUBJECTIVE AND OBJECTIVE BOX
HPI: 62M current every day smoker hx T2N0M0 R TVC SCCa s/p XRT with 4 months of worsening hoarseness. On exam patient noted to have fullness to supraglottic tissues as well as necrotic glottic tissue with significantly narrowed airway. Patient now s/p awake tracheostomy, direct laryngoscopy and biopsy 2/27.     POD0: Immediately postop, patient noted to be lethargic and somnolent, not following commands. STAT labs showing some C02 retention but otherwise wnl. Trop neg. STAT CXR and EKG performed. NCHCT without intracranial abnormality. After CT performed, patient mental status returned to baseline. Now alert and oriented, following commands and interactive. Pain well controlled. No trach issues, remains on vent.   2/27: fluctuating mental status, EEG monitoring initiated, cardiac work-up negative, on ventilator overnight  2/28: JOSÉ MIGUEL. AFVSS. Mental status at baseline. EEG negative and discontinued per neurology. Weaned to TC. Failed bedside SLP evaluation with e/o gross aspiration. NGT placed. Repeat trop neg x3. EKG neg for ST elevation x3  3/1: NGT sitting in mid-esophagus on CXR. Advanced to 60cm and repeat CXR pending. Otherwise JOSÉ MIGUEL. AFVSS. Breathing comfortably on TC. Pain well controlled.   3/2: JOSÉ MIGUEL overnight. tolerating feeds. no issues with breathing. Transferred to SDU. Clinda d/c'ed.   3/3: Trach changed to7.0 proximal XLT.    3/4: JOSÉ MIGUEL. AFVSS. Tolerating tube feeds at goal. No n/v. Ambulating. Breathing comfortably. Still with high suction requirement.   3/5: JOSÉ MIGUEL overnight. Accidental disldging of NGT overnight. No plans to replace tube for now. Continues to have high suction requirement. secretions clear.   3/6: tolerating PO diet. secretion clear, suction requirement remains high. Lives at home with his brother. Able to suction and change inner cannula without assistance this am with directions    Vital Signs Last 24 Hrs  T(C): 36.8 (06 Mar 2018 05:33), Max: 37.5 (05 Mar 2018 21:30)  T(F): 98.2 (06 Mar 2018 05:33), Max: 99.5 (05 Mar 2018 21:30)  HR: 70 (06 Mar 2018 05:50) (66 - 86)  BP: 116/59 (06 Mar 2018 04:54) (102/69 - 118/69)  BP(mean): 81 (06 Mar 2018 04:54) (80 - 88)  RR: 19 (06 Mar 2018 05:50) (17 - 21)  SpO2: 95% (06 Mar 2018 05:50) (93% - 98%)      PHYSICAL EXAM:  NAD, A&Ox3  NC/AT, face grossly symmetric   7.0 proximal XLT cuffed shiley trach secured in place with velcro soft Glenn collar, cuff down   nonlabored respirations on TC  moderate clear secretions suctioned, suction catheter passes easily without resistance  no peristomal oozing or surrounding skin breakdown     Assessment/Plan:    62M current smoker w/ T2N0M0 R TVC SCCa s/p XRT with airway obstruction s/p tracheostomy, direct laryngoscopy, biopsy 2/27    -prn pain  -prn nausea  -nicotine patch daily   - PO diet per SLP  - ambulate  - pt to continue suctioning his own trach with assistance and directions  -humidified trach collar as tolerated  -qOD labs while on tube feeds   -DVT ppx: SCDs, SQH   -OOBTC    Dispo: SDU

## 2018-03-06 NOTE — DISCHARGE NOTE ADULT - HOSPITAL COURSE
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 On exam patient noted to have fullness to supraglottic tissues as well as necrotic glottic tissue with significantly narrowed airway. Patient now s/p awake tracheostomy, direct laryngoscopy and biopsy 2/27.     POD0: Immediately postop, patient noted to be lethargic and somnolent, not following commands. STAT labs showing some C02 retention but otherwise wnl. Trop neg. STAT CXR and EKG performed. NCHCT without intracranial abnormality. After CT performed, patient mental status returned to baseline. Now alert and oriented, following commands and interactive. Pain well controlled. No trach issues, remains on vent.   2/27: fluctuating mental status, EEG monitoring initiated, cardiac work-up negative, on ventilator overnight  2/28: JOSÉ MIGUEL. AFVSS. Mental status at baseline. EEG negative and discontinued per neurology. Weaned to TC. Failed bedside SLP evaluation with e/o gross aspiration. NGT placed. Repeat trop neg x3. EKG neg for ST elevation x3  3/1: NGT sitting in mid-esophagus on CXR. Advanced to 60cm and repeat CXR pending. Otherwise JOSÉ MIGUEL. AFVSS. Breathing comfortably on TC. Pain well controlled.   3/2: JOSÉ MIGUEL overnight. tolerating feeds. no issues with breathing. Transferred to SDU. Clinda d/c'ed.   3/3: Trach changed to7.0 proximal XLT.    3/4: JOSÉ MIGUEL. AFVSS. Tolerating tube feeds at goal. No n/v. Ambulating. Breathing comfortably. Still with high suction requirement.   3/5: JOSÉ MIGUEL overnight. Accidental disldging of NGT overnight. No plans to replace tube for now. Continues to have high suction requirement. secretions clear.   3/6: tolerating PO diet. secretion clear, suction requirement remains high. Lives at home with his brother. Able to suction and change inner cannula without assistance this am with directions. Trach changed to 7.0 proximal XLT shiley cuffless.    DISCHARGE EXAM:  NAD, A&Ox3, NC/AT, face grossly symmetric   7.0 proximal XLT cuffless shiley trach secured in place with velcro soft Glenn collar  nonlabored respirations on TC  moderate clear secretions suctioned, suction catheter passes easily without resistance  no peristomal oozing or surrounding skin breakdown      Discharge Instructions:  -Continue mechanical soft/nectar thick liquid diet as tolerated. Can supplement with ensure enlive BID. Do NOT drink thin liquids like water or juice. Ensure that they are thickened.   -Take tylenol and percocet as needed for pain    Tracheostomy care   Patient has a 7.0 proximal XLT cuffless shiley tracheostomy tube in place  -Remove and clean cannula with trach brush at least once a day and as needed. Replace inner cannula and ensure it locks into position.   -Replace the soft trach collar as needed when soiled or stained  -Use the humidifier to keep the air you breathe moisturized  -You may gently clean around your stoma with 1/2 saline 1/2 peroxide solution- DO NOT scrub and DO NOT pick at any scabs/crusts around the site as this may cause bleeding that could impact your stoma/airway. Keep surrounding skin clean with drain sponges.   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 On exam patient noted to have fullness to supraglottic tissues as well as necrotic glottic tissue with significantly narrowed airway. Patient now s/p awake tracheostomy, direct laryngoscopy and biopsy 2/27.     POD0: Immediately postop, patient noted to be lethargic and somnolent, not following commands. STAT labs showing some C02 retention but otherwise wnl. Trop neg. STAT CXR and EKG performed. NCHCT without intracranial abnormality. After CT performed, patient mental status returned to baseline. Now alert and oriented, following commands and interactive. Pain well controlled. No trach issues, remains on vent.   2/27: fluctuating mental status, EEG monitoring initiated, cardiac work-up negative, on ventilator overnight  2/28: JOSÉ MIGUEL. AFVSS. Mental status at baseline. EEG negative and discontinued per neurology. Weaned to TC. Failed bedside SLP evaluation with e/o gross aspiration. NGT placed. Repeat trop neg x3. EKG neg for ST elevation x3  3/1: NGT sitting in mid-esophagus on CXR. Advanced to 60cm and repeat CXR pending. Otherwise JOSÉ MIGUEL. AFVSS. Breathing comfortably on TC. Pain well controlled.   3/2: JOSÉ MIGUEL overnight. tolerating feeds. no issues with breathing. Transferred to SDU. Clinda d/c'ed.   3/3: Trach changed to7.0 proximal XLT.    3/4: JOSÉ MIGUEL. AFVSS. Tolerating tube feeds at goal. No n/v. Ambulating. Breathing comfortably. Still with high suction requirement.   3/5: JOSÉ MIGUEL overnight. Accidental disldging of NGT overnight. No plans to replace tube for now. Continues to have high suction requirement. secretions clear.   3/6: tolerating PO diet. secretion clear, suction requirement remains high. Lives at home with his brother. Able to suction and change inner cannula without assistance this am with directions. Trach changed to 7.0 proximal XLT shiley cuffless.    DISCHARGE EXAM:  NAD, A&Ox3, NC/AT, face grossly symmetric   7.0 proximal XLT cuffless shiley trach secured in place with velcro soft Glenn collar  nonlabored respirations on TC  moderate clear secretions suctioned, suction catheter passes easily without resistance  no peristomal oozing or surrounding skin breakdown      Discharge Instructions:  -Continue mechanical soft/nectar thick liquid diet as tolerated. Can supplement with ensure enlive BID. Do NOT drink thin liquids like water or juice. Ensure that they are thickened.   -Take tylenol and percocet as needed for pain    Tracheostomy care   Patient has a 7.0 proximal XLT cuffless shiley tracheostomy tube in place  -Remove and clean cannula with trach brush at least once a day and as needed. Replace inner cannula and ensure it locks into position.   -Replace the soft trach collar as needed when soiled or stained  -Use the humidifier to keep the air you breathe moisturized  -You may gently clean around your stoma with 1/2 saline 1/2 peroxide solution- DO NOT scrub and DO NOT pick at any scabs/crusts around the site as this may cause bleeding that could impact your stoma/airway. Keep surrounding skin clean with drain sponges.   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 On exam patient noted to have fullness to supraglottic tissues as well as necrotic glottic tissue with significantly narrowed airway. Patient now s/p awake tracheostomy, direct laryngoscopy and biopsy 2/27.     POD0: Immediately postop, patient noted to be lethargic and somnolent, not following commands. STAT labs showing some C02 retention but otherwise wnl. Trop neg. STAT CXR and EKG performed. NCHCT without intracranial abnormality. After CT performed, patient mental status returned to baseline. Now alert and oriented, following commands and interactive. Pain well controlled. No trach issues, remains on vent.   2/27: fluctuating mental status, EEG monitoring initiated, cardiac work-up negative, on ventilator overnight  2/28: JOSÉ MIGUEL. AFVSS. Mental status at baseline. EEG negative and discontinued per neurology. Weaned to TC. Failed bedside SLP evaluation with e/o gross aspiration. NGT placed. Repeat trop neg x3. EKG neg for ST elevation x3  3/1: NGT sitting in mid-esophagus on CXR. Advanced to 60cm and repeat CXR pending. Otherwise JOSÉ MIGUEL. AFVSS. Breathing comfortably on TC. Pain well controlled.   3/2: JOSÉ MIGUEL overnight. tolerating feeds. no issues with breathing. Transferred to SDU. Clinda d/c'ed.   3/3: Trach changed to7.0 proximal XLT.    3/4: JOSÉ MIGUEL. AFVSS. Tolerating tube feeds at goal. No n/v. Ambulating. Breathing comfortably. Still with high suction requirement.   3/5: JOSÉ MIGUEL overnight. Accidental disldging of NGT overnight. No plans to replace tube for now. Continues to have high suction requirement. secretions clear.   3/6: tolerating PO diet. secretion clear, suction requirement remains high. Lives at home with his brother. Able to suction and change inner cannula without assistance this am with directions. Trach changed to 7.0 proximal XLT shiley cuffless.  3/7: JOSÉ MIGUEL. AFVSS. Still with copious secretions and at least q4h suction requirements. Improved phonation with finger occlusion. No new complaints. Advanced to [facility] soft/nectar thick liquid diet per SLP    DISCHARGE EXAM:  NAD, A&Ox3, NC/AT, face grossly symmetric   7.0 proximal XLT cuffless shiley trach secured in place with velcro soft Glenn collar  nonlabored respirations on TC  moderate thick, clear secretions suctioned, suction catheter passes easily without resistance  no peristomal oozing or surrounding skin breakdown      Discharge Instructions:  -Continue mechanical soft/nectar thick liquid diet as tolerated. Can supplement with ensure enlive BID. Do NOT drink thin liquids like water or juice. Ensure that they are thickened.   -Take tylenol and percocet as needed for pain    Tracheostomy care   Patient has a 7.0 proximal XLT cuffless shiley tracheostomy tube in place  -Remove and clean cannula with trach brush at least once a day and as needed. Replace inner cannula and ensure it locks into position.   -Replace the soft trach collar as needed when soiled or stained  -Use the humidifier to keep the air you breathe moisturized  -You may gently clean around your stoma with 1/2 saline 1/2 peroxide solution- DO NOT scrub and DO NOT pick at any scabs/crusts around the site as this may cause bleeding that could impact your stoma/airway. Keep surrounding skin clean with drain sponges.   -Suction yourself if needed only using soft red rubber catheters   -Report any difficulty breathing or change in your stoma such as concerning drainage or bleeding to your doctor’s office immediately and report to your nearest emergency room    Discharge Medications:   -Tylenol 650mg PO q6h prn mild pain  -Percocet 5-325mg PO q4h prn moderate to severe pain  -Nicotine patch 7mg/24hr patch, change daily    Home care: When patient stable for discharge FROM [facility] [place]/SNF will need following supplies delivered to home prior to discharge: spare 7.0 proximal XLT cuffless shiley trach (70XLTUP), disposable inner cannulas, suction machine with 14Fr suction catheter kits, yankauer suctions, 18in/6ft suction tubing, 50PSI compressor (humidifier) with corrugated tubing, aerosol bottles, and aeorosol drain bags and adult trach mask. She should also have a supply of adult large trach holders, trach care kits, adult ambu bag, heat & moisture exchanger (w/out 02 port) sterile water, saline and saline bullets. 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 On exam patient noted to have fullness to supraglottic tissues as well as necrotic glottic tissue with significantly narrowed airway. Patient now s/p awake tracheostomy, direct laryngoscopy and biopsy 2/27.     POD0: Immediately postop, patient noted to be lethargic and somnolent, not following commands. STAT labs showing some C02 retention but otherwise wnl. Trop neg. STAT CXR and EKG performed. NCHCT without intracranial abnormality. After CT performed, patient mental status returned to baseline. Now alert and oriented, following commands and interactive. Pain well controlled. No trach issues, remains on vent.   2/27: fluctuating mental status, EEG monitoring initiated, cardiac work-up negative, on ventilator overnight  2/28: JOSÉ MIGUEL. AFVSS. Mental status at baseline. EEG negative and discontinued per neurology. Weaned to TC. Failed bedside SLP evaluation with e/o gross aspiration. NGT placed. Repeat trop neg x3. EKG neg for ST elevation x3  3/1: NGT sitting in mid-esophagus on CXR. Advanced to 60cm and repeat CXR pending. Otherwise JOSÉ MIGUEL. AFVSS. Breathing comfortably on TC. Pain well controlled.   3/2: JOSÉ MIGUEL overnight. tolerating feeds. no issues with breathing. Transferred to SDU. Clinda d/c'ed.   3/3: Trach changed to7.0 proximal XLT.    3/4: JOSÉ MIGUEL. AFVSS. Tolerating tube feeds at goal. No n/v. Ambulating. Breathing comfortably. Still with high suction requirement.   3/5: JOSÉ MIGUEL overnight. Accidental disldging of NGT overnight. No plans to replace tube for now. Continues to have high suction requirement. secretions clear.   3/6: tolerating PO diet. secretion clear, suction requirement remains high. Lives at home with his brother. Able to suction and change inner cannula without assistance this am with directions. Trach changed to 7.0 proximal XLT shiley cuffless.  3/7: JOSÉ MIGUEL. AFVSS. Still with copious secretions and at least q4h suction requirements. Improved phonation with finger occlusion. No new complaints. Advanced to [facility] soft/nectar thick liquid diet per SLP.   3/8: well overnight. ambulating. secretion and suction requirements reducing. tolerating PO diet    DISCHARGE EXAM:  NAD, A&Ox3, NC/AT, face grossly symmetric   7.0 proximal XLT cuffless shiley trach secured in place with velcro soft Glenn collar  nonlabored respirations on TC  moderate thick, clear secretions suctioned, suction catheter passes easily without resistance  no peristomal oozing or surrounding skin breakdown      Discharge Instructions:  -Continue mechanical soft/nectar thick liquid diet as tolerated. Can supplement with ensure enlive BID. Do NOT drink thin liquids like water or juice. Ensure that they are thickened.   -Take tylenol and percocet as needed for pain  -Call the office to schedule a follow-up with Dr. pabon for 1-2 weeks     Tracheostomy care   Patient has a 7.0 proximal XLT cuffless shiley tracheostomy tube in place  -Remove and clean cannula with trach brush at least once a day and as needed. Replace inner cannula and ensure it locks into position.   -Replace the soft trach collar as needed when soiled or stained  -Use the humidifier to keep the air you breathe moisturized  -You may gently clean around your stoma with 1/2 saline 1/2 peroxide solution- DO NOT scrub and DO NOT pick at any scabs/crusts around the site as this may cause bleeding that could impact your stoma/airway. Keep surrounding skin clean with drain sponges.   -Suction yourself if needed only using soft red rubber catheters   -Report any difficulty breathing or change in your stoma such as concerning drainage or bleeding to your doctor’s office immediately and report to your nearest emergency room    Discharge Medications:   -Tylenol 650mg PO q6h prn mild pain  -Percocet 5-325mg PO q4h prn moderate to severe pain  -Nicotine patch 7mg/24hr patch, change daily    Home care: When patient stable for discharge FROM [facility]/SNF will need following supplies delivered to home prior to discharge: spare 7.0 proximal XLT cuffless shiley trach (70XLTUP), disposable inner cannulas, suction machine with 14Fr suction catheter kits, yankauer suctions, 18in/6ft suction tubing, 50PSI compressor (humidifier) with corrugated tubing, aerosol bottles, and aeorosol drain bags and adult trach mask. She should also have a supply of adult large trach holders, trach care kits, adult ambu bag, heat & moisture exchanger (w/out 02 port) sterile water, saline and saline bullets

## 2018-03-06 NOTE — DISCHARGE NOTE ADULT - CARE PROVIDER_API CALL
Lee Branch), Otolaryngology  [address] [place] [address]  Phone: [phone]  Fax: [phone]

## 2018-03-06 NOTE — DISCHARGE NOTE ADULT - CARE PLAN
Principal Discharge DX:	Laryngeal mass  Goal:	secure the airway  Assessment and plan of treatment:	s/p tracheostomy

## 2018-03-06 NOTE — DISCHARGE NOTE ADULT - ADDITIONAL INSTRUCTIONS
-Continue mechanical soft/nectar thick liquid diet as tolerated. Can supplement with ensure enlive BID. Do NOT drink thin liquids like water or juice. Ensure that they are thickened.   -Take tylenol and percocet as needed for pain  -Call the office to schedule a follow-up with Dr. pabon for 1-2 weeks     Tracheostomy care   Patient has a 7.0 proximal XLT cuffless shiley tracheostomy tube in place  -Remove and clean cannula with trach brush at least once a day and as needed. Replace inner cannula and ensure it locks into position.   -Replace the soft trach collar as needed when soiled or stained  -Use the humidifier to keep the air you breathe moisturized  -You may gently clean around your stoma with 1/2 saline 1/2 peroxide solution- DO NOT scrub and DO NOT pick at any scabs/crusts around the site as this may cause bleeding that could impact your stoma/airway. Keep surrounding skin clean with drain sponges.   -Suction yourself if needed only using soft red rubber catheters   -Report any difficulty breathing or change in your stoma such as concerning drainage or bleeding to your doctor’s office immediately and report to your nearest emergency room

## 2018-03-06 NOTE — CHART NOTE - NSCHARTNOTEFT_GEN_A_CORE
Admitting Diagnosis:   Patient is a 62y old  Male who presents with a chief complaint of s/p tracheostomy (27 Feb 2018 12:16)      PAST MEDICAL & SURGICAL HISTORY:  SOB (shortness of breath)  Throat cancer  Jaundice: 1965  Fracture: left ankle  Laryngeal mass: s/ p radiation  Surgery, elective: direct laryngoscopy with biopsy- 4/2017  Status post surgery: inguinal hernia  Status post surgery: Left ankle surgical repair  x2 (1965)      Current Nutrition Order:  dysphagia 1 pureed diet/nectar thickened liquids    PO Intake: Good (%) [   ]  Fair (50-75%) [X] Poor (<25%) [   ]    GI Issues:   Denies N/V/D/C    Pain:  Denies     Skin Integrity:  trach site     Labs:   03-06    134<L>  |  95<L>  |  17  ----------------------------<  96  4.4   |  30  |  0.74    Ca    9.0      06 Mar 2018 05:40  Phos  3.2     03-06  Mg     2.2     03-06      CAPILLARY BLOOD GLUCOSE          Medications:  MEDICATIONS  (STANDING):  heparin  Injectable 5000 Unit(s) SubCutaneous every 8 hours  influenza   Vaccine 0.5 milliLiter(s) IntraMuscular once  nicotine -   7 mG/24Hr(s) Patch 1 patch Transdermal daily  senna 2 Tablet(s) Oral at bedtime    MEDICATIONS  (PRN):  acetaminophen   Tablet. 650 milliGRAM(s) Oral every 6 hours PRN Mild Pain (1 - 3)  lidocaine   Patch 1 Patch Transdermal daily PRN to affected area  morphine  - Injectable 2 milliGRAM(s) IV Push every 4 hours PRN Severe Pain (7 - 10)  ondansetron Injectable 4 milliGRAM(s) IV Push every 6 hours PRN Nausea and/or Vomiting  oxyCODONE    5 mG/acetaminophen 325 mG 1 Tablet(s) Oral every 4 hours PRN Moderate Pain (4 - 6)      Weight:  No new wt     Weight Change:   Kindly take current to assess  Pt denies wt changes PTA    Estimated energy needs:   IBW 78kg used for EER and adjusted for post-op/catabolic illness  25-30kcal/kg (1950-2340kcal), 1.2-1.4g/kg (94-109g), 30-35ml/kg (2340-2730ml)    Subjective:   62M current smoker w/ T2N0M0 R TVC SCCa s/p XRT with airway obstruction s/p tracheostomy, direct laryngoscopy, biopsy 2/27. NGT dislodged yesterday and swallow performed. Per SLP, pt cleared for pureed diet/nectar thick liquids. Pt seen resting in bed. He reports a fair appetite and intake at present. Reports not liking the pureed food; discussed options on pureed diet and discussed diet advancement pending improvements in swallow function per SLP. Encouraged adequate intake and pt agreeable to taking supplements; recommend ensure enlive/ensure pudding d/w ENT and order entered for verification. Will follow to monitor meal intake and tolerance.     Previous Nutrition Diagnosis:  Less than optimal enteral nutrition composition or modality RT trickle feeds AEB meeting <75% of nutrition needs    Active [   ]  Resolved [X]    If resolved, new PES:   Increased nutrient needs RT increased demand AEB post-op/catabolic state     Goal:  Pt to meet at least 75% of EER     Recommendations:  1. Continue current diet.   2. Add ensure enlive BID and ensure pudding 1x/day.   3. Monitor lytes and replete prn.     Education:   Diet advancement pending improvements in swallow; adequate intake and supplementation    Risk Level: High [X] Moderate [   ] Low [   ]

## 2018-03-07 RX ADMIN — Medication 1 PATCH: at 11:38

## 2018-03-07 RX ADMIN — HEPARIN SODIUM 5000 UNIT(S): 5000 INJECTION INTRAVENOUS; SUBCUTANEOUS at 06:12

## 2018-03-07 RX ADMIN — HEPARIN SODIUM 5000 UNIT(S): 5000 INJECTION INTRAVENOUS; SUBCUTANEOUS at 22:20

## 2018-03-07 RX ADMIN — HEPARIN SODIUM 5000 UNIT(S): 5000 INJECTION INTRAVENOUS; SUBCUTANEOUS at 15:26

## 2018-03-07 NOTE — PROGRESS NOTE ADULT - SUBJECTIVE AND OBJECTIVE BOX
DAVID-HNS DAILY PROGRESS NOTE    HPI/Hospital Course: 62M current every day smoker hx T2N0M0 R TVC SCCa s/p XRT with 4 months of worsening hoarseness. On exam patient noted to have fullness to supraglottic tissues as well as necrotic glottic tissue with significantly narrowed airway. Patient now s/p awake tracheostomy, direct laryngoscopy and biopsy 2/27.     POD0: Immediately postop, patient noted to be lethargic and somnolent, not following commands. STAT labs showing some C02 retention but otherwise wnl. Trop neg. STAT CXR and EKG performed. NCHCT without intracranial abnormality. After CT performed, patient mental status returned to baseline. Now alert and oriented, following commands and interactive. Pain well controlled. No trach issues, remains on vent.   2/27: fluctuating mental status, EEG monitoring initiated, cardiac work-up negative, on ventilator overnight  2/28: JOSÉ MIGUEL. AFVSS. Mental status at baseline. EEG negative and discontinued per neurology. Weaned to TC. Failed bedside SLP evaluation with e/o gross aspiration. NGT placed. Repeat trop neg x3. EKG neg for ST elevation x3  3/1: NGT sitting in mid-esophagus on CXR. Advanced to 60cm and repeat CXR pending. Otherwise JOSÉ MIGUEL. AFVSS. Breathing comfortably on TC. Pain well controlled.   3/2: JOSÉ MIGUEL overnight. tolerating feeds. no issues with breathing. Transferred to SDU. Clinda d/c'ed.   3/3: Trach changed to7.0 proximal XLT.    3/4: JOSÉ MIGUEL. AFVSS. Tolerating tube feeds at goal. No n/v. Ambulating. Breathing comfortably. Still with high suction requirement.   3/5: JOSÉ MIGUEL overnight. Accidental disldging of NGT overnight. No plans to replace tube for now. Continues to have high suction requirement. secretions clear.   3/6: tolerating PO diet. secretion clear, suction requirement remains high. Lives at home with his brother. Able to suction and change inner cannula without assistance this am with directions. Trach changed to 7.0 proximal XLT shiley cuffless.  3/7: JOSÉ MIGUEL. AFVSS. Still with copious secretions and at least q4h suction requirements. Improved phonation with finger occlusion. No new complaints. Advanced to [facility] soft/nectar thick liquid diet per SLP.     Vital Signs Last 24 Hrs  T(C): 37.1 (07 Mar 2018 09:00), Max: 37.9 (06 Mar 2018 21:59)  T(F): 98.7 (07 Mar 2018 09:00), Max: 100.2 (06 Mar 2018 21:59)  HR: 88 (07 Mar 2018 08:41) (66 - 88)  BP: 117/67 (07 Mar 2018 08:30) (104/67 - 129/72)  BP(mean): 86 (07 Mar 2018 08:30) (81 - 90)  RR: 17 (07 Mar 2018 08:41) (16 - 18)  SpO2: 95% (07 Mar 2018 08:41) (95% - 100%)      I&O's Summary    06 Mar 2018 07:01  -  07 Mar 2018 07:00  --------------------------------------------------------  IN: 0 mL / OUT: 300 mL / NET: -300 mL    	  PHYSICAL EXAM:  NAD, A&Ox3, NC/AT, face grossly symmetric   7.0 proximal XLT cuffless shiley trach secured in place with velcro soft Glenn collar  nonlabored respirations on TC  moderate thick, clear secretions around trach and suctioned, suction catheter passes easily without resistance  Inner cannula removed and cleaned   no peristomal oozing or surrounding skin breakdown      Labs: lab holiday    Assessment/Plan:    62M current smoker w/ T2N0M0 R TVC SCCa s/p XRT with airway obstruction s/p tracheostomy, direct laryngoscopy, biopsy 2/27    -prn pain  -prn nausea  -nicotine patch daily   -routine trach care: suction w/ red rubbers only, HOB@30, obturator at HOB, suction qshift and prn, clean inner cannula qshift and prn  -[facility] soft/nectar thick liquid diet per SLP  - ambulate  - pt to continue suctioning his own trach with assistance and directions - nursing suction w/ red rubber catheters prn  -humidified trach collar as tolerated  -qOD labs  -DVT ppx: SCDs, SQH     Dispo: SDU  SNF/FAUSTO given suction requirements and new trach    seen with chief and d/w attending

## 2018-03-08 VITALS — TEMPERATURE: 100 F

## 2018-03-08 PROCEDURE — 95951: CPT

## 2018-03-08 PROCEDURE — 92526 ORAL FUNCTION THERAPY: CPT | Mod: GN

## 2018-03-08 PROCEDURE — 88305 TISSUE EXAM BY PATHOLOGIST: CPT

## 2018-03-08 PROCEDURE — 82962 GLUCOSE BLOOD TEST: CPT

## 2018-03-08 PROCEDURE — 80048 BASIC METABOLIC PNL TOTAL CA: CPT

## 2018-03-08 PROCEDURE — 74230 X-RAY XM SWLNG FUNCJ C+: CPT

## 2018-03-08 PROCEDURE — 84100 ASSAY OF PHOSPHORUS: CPT

## 2018-03-08 PROCEDURE — 36415 COLL VENOUS BLD VENIPUNCTURE: CPT

## 2018-03-08 PROCEDURE — 82803 BLOOD GASES ANY COMBINATION: CPT

## 2018-03-08 PROCEDURE — 93005 ELECTROCARDIOGRAM TRACING: CPT

## 2018-03-08 PROCEDURE — 88331 PATH CONSLTJ SURG 1 BLK 1SPC: CPT

## 2018-03-08 PROCEDURE — 71045 X-RAY EXAM CHEST 1 VIEW: CPT

## 2018-03-08 PROCEDURE — 70450 CT HEAD/BRAIN W/O DYE: CPT

## 2018-03-08 PROCEDURE — 83735 ASSAY OF MAGNESIUM: CPT

## 2018-03-08 PROCEDURE — 85027 COMPLETE CBC AUTOMATED: CPT

## 2018-03-08 PROCEDURE — 85730 THROMBOPLASTIN TIME PARTIAL: CPT

## 2018-03-08 PROCEDURE — 94002 VENT MGMT INPAT INIT DAY: CPT

## 2018-03-08 PROCEDURE — 84484 ASSAY OF TROPONIN QUANT: CPT

## 2018-03-08 PROCEDURE — 80076 HEPATIC FUNCTION PANEL: CPT

## 2018-03-08 PROCEDURE — 85610 PROTHROMBIN TIME: CPT

## 2018-03-08 PROCEDURE — 92611 MOTION FLUOROSCOPY/SWALLOW: CPT | Mod: GN

## 2018-03-08 RX ORDER — NICOTINE POLACRILEX 2 MG
1 GUM BUCCAL
Qty: 0 | Refills: 0 | COMMUNITY
Start: 2018-03-08

## 2018-03-08 RX ADMIN — Medication 1 PATCH: at 11:14

## 2018-03-08 RX ADMIN — HEPARIN SODIUM 5000 UNIT(S): 5000 INJECTION INTRAVENOUS; SUBCUTANEOUS at 06:25

## 2018-03-08 RX ADMIN — HEPARIN SODIUM 5000 UNIT(S): 5000 INJECTION INTRAVENOUS; SUBCUTANEOUS at 13:46

## 2018-03-08 RX ADMIN — Medication 1 PATCH: at 12:35

## 2018-03-08 NOTE — CHART NOTE - NSCHARTNOTEFT_GEN_A_CORE
Admitting Diagnosis:   Patient is a 62y old  Male who presents with a chief complaint of s/p tracheostomy (06 Mar 2018 20:26)      PAST MEDICAL & SURGICAL HISTORY:  SOB (shortness of breath)  Throat cancer  Jaundice: 1965  Fracture: left ankle  Laryngeal mass: s/ p radiation  Surgery, elective: direct laryngoscopy with biopsy- 4/2017  Status post surgery: inguinal hernia  Status post surgery: Left ankle surgical repair  x2 (1965)      Current Nutrition Order:  dysphagia 2 mechanical soft diet/nectar thickened liquids    PO Intake: Good (%) [   ]  Fair (50-75%) [X] Poor (<25%) [   ]    GI Issues:   Denies N/V/D/C  Last BM yesterday    Pain:  Denies     Skin Integrity:  trach site     Labs:   Take CMP per MD    CAPILLARY BLOOD GLUCOSE          Medications:  MEDICATIONS  (STANDING):  heparin  Injectable 5000 Unit(s) SubCutaneous every 8 hours  influenza   Vaccine 0.5 milliLiter(s) IntraMuscular once  nicotine -   7 mG/24Hr(s) Patch 1 patch Transdermal daily  senna 2 Tablet(s) Oral at bedtime    MEDICATIONS  (PRN):  acetaminophen   Tablet. 650 milliGRAM(s) Oral every 6 hours PRN Mild Pain (1 - 3)  lidocaine   Patch 1 Patch Transdermal daily PRN to affected area  morphine  - Injectable 2 milliGRAM(s) IV Push every 4 hours PRN Severe Pain (7 - 10)  ondansetron Injectable 4 milliGRAM(s) IV Push every 6 hours PRN Nausea and/or Vomiting  oxyCODONE    5 mG/acetaminophen 325 mG 1 Tablet(s) Oral every 4 hours PRN Moderate Pain (4 - 6)      Weight:  No new wt     Weight Change:   Kindly take current to assess  Pt denies wt changes PTA    Estimated energy needs:   IBW 78kg used for EER and adjusted for post-op/catabolic illness  25-30kcal/kg (1950-2340kcal), 1.2-1.4g/kg (94-109g), 30-35ml/kg (2340-2730ml)    Subjective:   62M current smoker w/ T2N0M0 R TVC SCCa s/p XRT with airway obstruction s/p tracheostomy, direct laryngoscopy, biopsy 2/27. SLP (3/7) cleared pt for advancement to mechanical soft diet and continue with nectar thick liquids. Pt seen resting in chair. He reports a improvement in intake with diet advancement and taking ONS.     Previous Nutrition Diagnosis:  Increased nutrient needs RT increased demand AEB post-op/catabolic state     Active [X]  Resolved [   ]    If resolved, new PES:     Goal:  Pt to meet at least 75% of EER     Recommendations:  1. Continue current diet + ONS.   2. Monitor lytes and replete prn.     Education:   Diet advancement pending improvements in swallow; adequate intake and supplementation    Risk Level: High [   ] Moderate [X] Low [   ].

## 2018-03-13 DIAGNOSIS — G93.41 METABOLIC ENCEPHALOPATHY: ICD-10-CM

## 2018-03-13 DIAGNOSIS — C32.9 MALIGNANT NEOPLASM OF LARYNX, UNSPECIFIED: ICD-10-CM

## 2018-03-13 DIAGNOSIS — I47.1 SUPRAVENTRICULAR TACHYCARDIA: ICD-10-CM

## 2018-03-13 DIAGNOSIS — J98.8 OTHER SPECIFIED RESPIRATORY DISORDERS: ICD-10-CM

## 2018-03-13 DIAGNOSIS — F17.210 NICOTINE DEPENDENCE, CIGARETTES, UNCOMPLICATED: ICD-10-CM

## 2018-03-13 DIAGNOSIS — J96.02 ACUTE RESPIRATORY FAILURE WITH HYPERCAPNIA: ICD-10-CM

## 2018-03-13 DIAGNOSIS — Z88.0 ALLERGY STATUS TO PENICILLIN: ICD-10-CM

## 2018-03-15 ENCOUNTER — RX RENEWAL (OUTPATIENT)
Age: 63
End: 2018-03-15

## 2018-03-26 ENCOUNTER — APPOINTMENT (OUTPATIENT)
Dept: OTOLARYNGOLOGY | Facility: CLINIC | Age: 63
End: 2018-03-26
Payer: COMMERCIAL

## 2018-03-26 VITALS
WEIGHT: 152 LBS | TEMPERATURE: 98.6 F | SYSTOLIC BLOOD PRESSURE: 126 MMHG | BODY MASS INDEX: 21.28 KG/M2 | HEART RATE: 65 BPM | DIASTOLIC BLOOD PRESSURE: 74 MMHG | HEIGHT: 71 IN

## 2018-03-26 PROCEDURE — 31575 DIAGNOSTIC LARYNGOSCOPY: CPT

## 2018-03-26 PROCEDURE — 99214 OFFICE O/P EST MOD 30 MIN: CPT | Mod: 25

## 2018-04-09 ENCOUNTER — APPOINTMENT (OUTPATIENT)
Dept: OTOLARYNGOLOGY | Facility: CLINIC | Age: 63
End: 2018-04-09
Payer: COMMERCIAL

## 2018-04-09 PROCEDURE — 99213 OFFICE O/P EST LOW 20 MIN: CPT | Mod: 25

## 2018-04-09 PROCEDURE — 31575 DIAGNOSTIC LARYNGOSCOPY: CPT

## 2018-04-23 ENCOUNTER — APPOINTMENT (OUTPATIENT)
Dept: OTOLARYNGOLOGY | Facility: CLINIC | Age: 63
End: 2018-04-23
Payer: COMMERCIAL

## 2018-04-23 PROCEDURE — 99213 OFFICE O/P EST LOW 20 MIN: CPT | Mod: 25

## 2018-04-24 ENCOUNTER — APPOINTMENT (OUTPATIENT)
Dept: RADIATION ONCOLOGY | Facility: CLINIC | Age: 63
End: 2018-04-24
Payer: COMMERCIAL

## 2018-04-24 VITALS
HEART RATE: 82 BPM | WEIGHT: 135.1 LBS | DIASTOLIC BLOOD PRESSURE: 71 MMHG | RESPIRATION RATE: 18 BRPM | OXYGEN SATURATION: 98 % | BODY MASS INDEX: 18.84 KG/M2 | SYSTOLIC BLOOD PRESSURE: 123 MMHG

## 2018-04-24 PROCEDURE — 99214 OFFICE O/P EST MOD 30 MIN: CPT | Mod: 25

## 2018-04-24 PROCEDURE — 31575 DIAGNOSTIC LARYNGOSCOPY: CPT

## 2018-04-24 RX ORDER — ACETAMINOPHEN AND CODEINE 300; 30 MG/1; MG/1
300-30 TABLET ORAL
Qty: 20 | Refills: 0 | Status: DISCONTINUED | COMMUNITY
Start: 2018-04-09 | End: 2018-04-24

## 2018-04-24 RX ORDER — GABAPENTIN 300 MG/1
300 CAPSULE ORAL TWICE DAILY
Qty: 120 | Refills: 1 | Status: DISCONTINUED | COMMUNITY
Start: 2017-08-28 | End: 2018-04-24

## 2018-05-14 ENCOUNTER — APPOINTMENT (OUTPATIENT)
Dept: OTOLARYNGOLOGY | Facility: CLINIC | Age: 63
End: 2018-05-14
Payer: COMMERCIAL

## 2018-05-14 PROCEDURE — 31575 DIAGNOSTIC LARYNGOSCOPY: CPT

## 2018-05-14 PROCEDURE — 99213 OFFICE O/P EST LOW 20 MIN: CPT | Mod: 25

## 2018-05-30 ENCOUNTER — APPOINTMENT (OUTPATIENT)
Dept: OTOLARYNGOLOGY | Facility: CLINIC | Age: 63
End: 2018-05-30
Payer: COMMERCIAL

## 2018-05-30 VITALS
OXYGEN SATURATION: 100 % | HEART RATE: 71 BPM | SYSTOLIC BLOOD PRESSURE: 116 MMHG | TEMPERATURE: 98.8 F | DIASTOLIC BLOOD PRESSURE: 75 MMHG | HEIGHT: 71 IN

## 2018-05-30 VITALS — BODY MASS INDEX: 19.81 KG/M2 | WEIGHT: 142 LBS

## 2018-05-30 PROCEDURE — 99213 OFFICE O/P EST LOW 20 MIN: CPT | Mod: 25

## 2018-05-30 PROCEDURE — 31575 DIAGNOSTIC LARYNGOSCOPY: CPT

## 2018-06-13 ENCOUNTER — INPATIENT (INPATIENT)
Facility: HOSPITAL | Age: 63
LOS: 0 days | Discharge: HOME | End: 2018-06-14
Attending: OTOLARYNGOLOGY | Admitting: OTOLARYNGOLOGY

## 2018-06-13 VITALS — HEART RATE: 74 BPM | DIASTOLIC BLOOD PRESSURE: 72 MMHG | SYSTOLIC BLOOD PRESSURE: 108 MMHG | OXYGEN SATURATION: 95 %

## 2018-06-13 DIAGNOSIS — Z98.890 OTHER SPECIFIED POSTPROCEDURAL STATES: Chronic | ICD-10-CM

## 2018-06-13 DIAGNOSIS — Z41.9 ENCOUNTER FOR PROCEDURE FOR PURPOSES OTHER THAN REMEDYING HEALTH STATE, UNSPECIFIED: Chronic | ICD-10-CM

## 2018-06-13 RX ORDER — IBUPROFEN 200 MG
400 TABLET ORAL EVERY 6 HOURS
Qty: 0 | Refills: 0 | Status: DISCONTINUED | OUTPATIENT
Start: 2018-06-13 | End: 2018-06-14

## 2018-06-13 RX ORDER — ACETAMINOPHEN 500 MG
650 TABLET ORAL EVERY 6 HOURS
Qty: 0 | Refills: 0 | Status: DISCONTINUED | OUTPATIENT
Start: 2018-06-13 | End: 2018-06-14

## 2018-06-13 NOTE — H&P ADULT - ASSESSMENT
62M h/o T2N0 SCC R TVC s/p XRT with subsequent supraglottic edema and airway narrowing s/p awake trach 2/27, now s/p decannulation.   - Observe overnight on tele/continuous pulse ox  - Regular diet  - Tylenol/ibuprofen prn  - SDU

## 2018-06-13 NOTE — H&P ADULT - HISTORY OF PRESENT ILLNESS
62M h/o T2N0 R TVC SCC s/p XRT presented in 2/2018 with progressive hoarseness, on exam was noted to have fullness of the supraglottic tissues as well as necrotic glottic tissue with a significantly narrowed airway, he underwent awake trach, DL, and biopsy in the OR on 2/26/2018. Discharged to [facility] on 3/7 on mechanical soft/nectar thick liquid diet. Has been doing well over the past few months, tolerating a regular diet, returns today for decannulation. Patient states he has had his trach capped since 6/3 and has not had to remove the cap at any point since then. Denies any difficulty breathing.

## 2018-06-14 ENCOUNTER — TRANSCRIPTION ENCOUNTER (OUTPATIENT)
Age: 63
End: 2018-06-14

## 2018-06-14 VITALS
RESPIRATION RATE: 19 BRPM | HEART RATE: 74 BPM | OXYGEN SATURATION: 95 % | SYSTOLIC BLOOD PRESSURE: 130 MMHG | DIASTOLIC BLOOD PRESSURE: 68 MMHG

## 2018-06-14 NOTE — DISCHARGE NOTE ADULT - MEDICATION SUMMARY - MEDICATIONS TO TAKE
I will START or STAY ON the medications listed below when I get home from the hospital:    Tylenol 500 mg oral tablet  -- 2 tab(s) by mouth every 6 hours  -- Indication: For DECANNULATION

## 2018-06-14 NOTE — DISCHARGE NOTE ADULT - CARE PROVIDER_API CALL
Lee Branch), Otolaryngology  [address] [place] [address]  Phone: [phone]  Fax: [phone]

## 2018-06-14 NOTE — DISCHARGE NOTE ADULT - PLAN OF CARE
Return to baseline 62M with history of larynx cancer s/p radiation c/b supraglottic edema and airway narrowing s/p trach in Feb 2017 now improved and decannulated.

## 2018-06-14 NOTE — DISCHARGE NOTE ADULT - CARE PLAN
Principal Discharge DX:	Status post surgery  Goal:	Return to baseline  Assessment and plan of treatment:	62M with history of larynx cancer s/p radiation c/b supraglottic edema and airway narrowing s/p trach in Feb 2017 now improved and decannulated.

## 2018-06-14 NOTE — DISCHARGE NOTE ADULT - PATIENT PORTAL LINK FT
You can access the [facility], offered by [facility], by registering with the following website: [email or web address]

## 2018-06-14 NOTE — DISCHARGE NOTE ADULT - HOSPITAL COURSE
62M h/o T2N0 R TVC SCC s/p XRT presented in 2/2018 with progressive hoarseness, on exam was noted to have fullness of the supraglottic tissues as well as necrotic glottic tissue with a significantly narrowed airway, he underwent awake trach, DL, and biopsy in the OR on 2/26/2018. Discharged to [facility] on 3/7 on mechanical soft/nectar thick liquid diet. Has been doing well over the past few months, tolerating a regular diet, returns for decannulation. Patient states he has had his trach capped since 6/3 and has not had to remove the cap at any point since then. Denies any difficulty breathing.     Decannulated on 6/13. Observed overnight in the stepdown unit and did very well, no issues with breathing, tolerating a regular diet. Stoma appears healthy.

## 2018-06-14 NOTE — DISCHARGE NOTE ADULT - ADDITIONAL INSTRUCTIONS
Diet: regular diet  Activity: regular activity  Wound care: place gauze and tape over the trach stoma daily, hold pressure when talking or coughing for the next week or until there is no longer air escaping through the neck  Bathing: you may shower normally   Follow up: you should see Dr. Branch in the office next week, call to schedule a follow up appointment   Returning to work: You can return to work in 3-4 weeks once your stoma has healed.

## 2018-06-18 DIAGNOSIS — Z92.3 PERSONAL HISTORY OF IRRADIATION: ICD-10-CM

## 2018-06-18 DIAGNOSIS — Z85.21 PERSONAL HISTORY OF MALIGNANT NEOPLASM OF LARYNX: ICD-10-CM

## 2018-06-18 DIAGNOSIS — Z43.0 ENCOUNTER FOR ATTENTION TO TRACHEOSTOMY: ICD-10-CM

## 2018-06-18 DIAGNOSIS — Z88.0 ALLERGY STATUS TO PENICILLIN: ICD-10-CM

## 2018-06-22 ENCOUNTER — APPOINTMENT (OUTPATIENT)
Dept: CT IMAGING | Facility: HOSPITAL | Age: 63
End: 2018-06-22

## 2018-06-25 ENCOUNTER — APPOINTMENT (OUTPATIENT)
Dept: OTOLARYNGOLOGY | Facility: CLINIC | Age: 63
End: 2018-06-25
Payer: MEDICAID

## 2018-06-25 VITALS
HEIGHT: 71 IN | HEART RATE: 65 BPM | DIASTOLIC BLOOD PRESSURE: 80 MMHG | WEIGHT: 150 LBS | BODY MASS INDEX: 21 KG/M2 | SYSTOLIC BLOOD PRESSURE: 119 MMHG | OXYGEN SATURATION: 98 %

## 2018-06-25 PROCEDURE — 31575 DIAGNOSTIC LARYNGOSCOPY: CPT

## 2018-06-25 PROCEDURE — 99213 OFFICE O/P EST LOW 20 MIN: CPT | Mod: 25

## 2018-06-26 ENCOUNTER — APPOINTMENT (OUTPATIENT)
Dept: RADIATION ONCOLOGY | Facility: CLINIC | Age: 63
End: 2018-06-26

## 2018-06-28 ENCOUNTER — FORM ENCOUNTER (OUTPATIENT)
Age: 63
End: 2018-06-28

## 2018-06-29 ENCOUNTER — APPOINTMENT (OUTPATIENT)
Dept: CT IMAGING | Facility: HOSPITAL | Age: 63
End: 2018-06-29
Payer: MEDICAID

## 2018-06-29 ENCOUNTER — OUTPATIENT (OUTPATIENT)
Dept: OUTPATIENT SERVICES | Facility: HOSPITAL | Age: 63
LOS: 1 days | End: 2018-06-29
Payer: MEDICAID

## 2018-06-29 DIAGNOSIS — Z98.890 OTHER SPECIFIED POSTPROCEDURAL STATES: Chronic | ICD-10-CM

## 2018-06-29 DIAGNOSIS — Z41.9 ENCOUNTER FOR PROCEDURE FOR PURPOSES OTHER THAN REMEDYING HEALTH STATE, UNSPECIFIED: Chronic | ICD-10-CM

## 2018-06-29 PROCEDURE — 70491 CT SOFT TISSUE NECK W/DYE: CPT | Mod: 26

## 2018-06-29 PROCEDURE — 70491 CT SOFT TISSUE NECK W/DYE: CPT

## 2018-07-02 ENCOUNTER — APPOINTMENT (OUTPATIENT)
Dept: RADIATION ONCOLOGY | Facility: CLINIC | Age: 63
End: 2018-07-02
Payer: MEDICAID

## 2018-07-02 VITALS
SYSTOLIC BLOOD PRESSURE: 125 MMHG | DIASTOLIC BLOOD PRESSURE: 75 MMHG | BODY MASS INDEX: 21.27 KG/M2 | WEIGHT: 152.5 LBS | HEART RATE: 78 BPM

## 2018-07-02 VITALS
RESPIRATION RATE: 18 BRPM | HEART RATE: 72 BPM | DIASTOLIC BLOOD PRESSURE: 72 MMHG | SYSTOLIC BLOOD PRESSURE: 112 MMHG | OXYGEN SATURATION: 97 %

## 2018-07-02 PROCEDURE — 31575 DIAGNOSTIC LARYNGOSCOPY: CPT

## 2018-07-02 PROCEDURE — 99214 OFFICE O/P EST MOD 30 MIN: CPT | Mod: 25

## 2018-07-02 RX ORDER — IBUPROFEN 400 MG/1
400 TABLET ORAL
Qty: 30 | Refills: 0 | Status: DISCONTINUED | COMMUNITY
Start: 2018-05-30 | End: 2018-07-02

## 2018-07-02 RX ORDER — IBUPROFEN 800 MG/1
800 TABLET, FILM COATED ORAL 3 TIMES DAILY
Qty: 45 | Refills: 0 | Status: DISCONTINUED | COMMUNITY
Start: 2018-04-23 | End: 2018-07-02

## 2018-07-02 RX ORDER — FLUCONAZOLE 100 MG/1
100 TABLET ORAL
Qty: 15 | Refills: 0 | Status: DISCONTINUED | COMMUNITY
Start: 2018-04-24 | End: 2018-07-02

## 2018-08-27 ENCOUNTER — APPOINTMENT (OUTPATIENT)
Dept: OTOLARYNGOLOGY | Facility: CLINIC | Age: 63
End: 2018-08-27
Payer: MEDICAID

## 2018-08-27 VITALS
HEART RATE: 67 BPM | BODY MASS INDEX: 21.28 KG/M2 | WEIGHT: 152 LBS | TEMPERATURE: 98.2 F | OXYGEN SATURATION: 96 % | HEIGHT: 71 IN | DIASTOLIC BLOOD PRESSURE: 76 MMHG | SYSTOLIC BLOOD PRESSURE: 125 MMHG

## 2018-08-27 PROCEDURE — 31575 DIAGNOSTIC LARYNGOSCOPY: CPT

## 2018-08-27 PROCEDURE — 99213 OFFICE O/P EST LOW 20 MIN: CPT | Mod: 25

## 2018-10-04 NOTE — REASON FOR VISIT
[Routine Follow-Up] : routine follow-up visit for [Clinical -- TNM Staging] : TNM Stage: c [T: ___] : T[unfilled] [N: ___] : N[unfilled] [M: ___] : M[unfilled] [AJCC Stage: ____] : AJCC Stage: [unfilled]

## 2018-10-04 NOTE — VITALS
[Maximal Pain Intensity: 0/10] : 0/10 [Least Pain Intensity: 0/10] : 0/10 [90: Able to carry normal activity; minor signs or symptoms of disease.] : 90: Able to carry normal activity; minor signs or symptoms of disease.  [90: Minor restrictions in physically strenous activity.] : 90: Minor restrictions in physically strenuous activity. [ECOG Performance Status: 1 - Restricted in physically strenuous activity but ambulatory and able to carry out work of a light or sedentary nature] : Performance Status: 1 - Restricted in physically strenuous activity but ambulatory and able to carry out work of a light or sedentary nature, e.g., light house work, office work [Date: ____________] : Patient's last distress assessment performed on [unfilled]. [5 - Distress Level] : Distress Level: 5

## 2018-10-04 NOTE — ASSESSMENT
[No evidence of disease] : No evidence of disease [Work-up necessary to assess local, regional or metastatic recurrence] : Work-up necessary to assess local, regional or metastatic recurrence

## 2018-10-05 NOTE — H&P ADULT - PMH
Awake/Alert/Cooperative
Fracture  left ankle  Jaundice  1965  Laryngeal mass  s/ p radiation  SOB (shortness of breath)    Throat cancer

## 2018-10-06 ENCOUNTER — FORM ENCOUNTER (OUTPATIENT)
Age: 63
End: 2018-10-06

## 2018-10-07 ENCOUNTER — APPOINTMENT (OUTPATIENT)
Dept: CT IMAGING | Facility: HOSPITAL | Age: 63
End: 2018-10-07

## 2018-10-07 ENCOUNTER — OUTPATIENT (OUTPATIENT)
Dept: OUTPATIENT SERVICES | Facility: HOSPITAL | Age: 63
LOS: 1 days | End: 2018-10-07
Payer: COMMERCIAL

## 2018-10-07 DIAGNOSIS — Z98.890 OTHER SPECIFIED POSTPROCEDURAL STATES: Chronic | ICD-10-CM

## 2018-10-07 DIAGNOSIS — Z41.9 ENCOUNTER FOR PROCEDURE FOR PURPOSES OTHER THAN REMEDYING HEALTH STATE, UNSPECIFIED: Chronic | ICD-10-CM

## 2018-10-07 LAB — GLUCOSE BLDC GLUCOMTR-MCNC: 85 MG/DL — SIGNIFICANT CHANGE UP (ref 70–99)

## 2018-10-07 PROCEDURE — 78815 PET IMAGE W/CT SKULL-THIGH: CPT

## 2018-10-07 PROCEDURE — 70491 CT SOFT TISSUE NECK W/DYE: CPT

## 2018-10-07 PROCEDURE — 78815 PET IMAGE W/CT SKULL-THIGH: CPT | Mod: 26

## 2018-10-07 PROCEDURE — 70491 CT SOFT TISSUE NECK W/DYE: CPT | Mod: 26

## 2018-10-07 PROCEDURE — 82962 GLUCOSE BLOOD TEST: CPT

## 2018-10-07 PROCEDURE — A9552: CPT

## 2018-10-09 ENCOUNTER — APPOINTMENT (OUTPATIENT)
Dept: RADIATION ONCOLOGY | Facility: CLINIC | Age: 63
End: 2018-10-09
Payer: MEDICAID

## 2018-10-09 VITALS — SYSTOLIC BLOOD PRESSURE: 121 MMHG | DIASTOLIC BLOOD PRESSURE: 84 MMHG | HEART RATE: 66 BPM

## 2018-10-09 VITALS
SYSTOLIC BLOOD PRESSURE: 148 MMHG | OXYGEN SATURATION: 98 % | TEMPERATURE: 97.9 F | RESPIRATION RATE: 15 BRPM | DIASTOLIC BLOOD PRESSURE: 89 MMHG | HEART RATE: 66 BPM | BODY MASS INDEX: 21.8 KG/M2 | WEIGHT: 156.31 LBS

## 2018-10-09 PROCEDURE — 31575 DIAGNOSTIC LARYNGOSCOPY: CPT

## 2018-10-09 PROCEDURE — 99214 OFFICE O/P EST MOD 30 MIN: CPT | Mod: 25

## 2018-10-09 RX ORDER — PANTOPRAZOLE 40 MG/1
40 TABLET, DELAYED RELEASE ORAL DAILY
Qty: 30 | Refills: 0 | Status: DISCONTINUED | COMMUNITY
Start: 2018-04-23 | End: 2018-10-09

## 2018-10-09 NOTE — DATA REVIEWED
[FreeTextEntry1] : I have personally reviewed all relevant imaging studies (PET, CT) and I have discussed the case with the referring physician Dr. Branch.\par

## 2018-10-09 NOTE — PHYSICAL EXAM
[General Appearance - Well Developed] : well developed [General Appearance - Alert] : alert [General Appearance - In No Acute Distress] : in no acute distress [Thin] : thin [PERRL With Normal Accommodation] : pupils were equal in size, round, reactive to light [Cervical Lymph Nodes Enlarged Posterior Bilaterally] : posterior cervical [Cervical Lymph Nodes Enlarged Anterior Bilaterally] : anterior cervical [Supraclavicular Lymph Nodes Enlarged Bilaterally] : supraclavicular [Skin Color & Pigmentation] : normal skin color and pigmentation [Mood] : the mood was normal [Normal] : oriented to person, place and time, the affect was normal, the mood was normal and not anxious [de-identified] : hoarse voice. Patient has only two teeth. throat clear. Oral mucous membranes slightly dry. [de-identified] : No palpable nodes

## 2018-10-09 NOTE — PROCEDURE
[Hoarseness] : hoarseness not clearly evaluated by indirect laryngoscopy [Lesion] : lesion identified by mirror examination needing further evaluation [Dysphagia] : dysphagia not clearly evaluated by indirect laryngoscopy [Topical Lidocaine] : topical lidocaine [Oxymetazoline HCl] : oxymetazoline HCl [Flexible Endoscope] : examined with the flexible endoscope [Photographs Taken] : photographs taken [Normal] : normal [de-identified] : Less edema of bilat arytenoids and WE folds. scar tissue bilat false cords. diminished movement of the Rt TVC and Rt false cord, partially obscured.  Lt TVC and FC normal.

## 2018-10-09 NOTE — REVIEW OF SYSTEMS
[Hoarseness] : hoarseness [Cough] : cough [Negative] : Allergic/Immunologic [Dysphagia: Grade 1 - Symptomatic, able to eat regular diet] : Dysphagia: Grade 1 - Symptomatic, able to eat regular diet [Nausea: Grade 0] : Nausea: Grade 0 [Vomiting: Grade 0] : Vomiting: Grade 0 [Fatigue: Grade 1 - Fatigue relieved by rest] : Fatigue: Grade 1 - Fatigue relieved by rest [Localized Edema: Grade 0] : Localized Edema: Grade 0  [Neck Edema: Grade 0] : Neck Edema: Grade 0 [Mucositis Oral: Grade 0] : Mucositis Oral: Grade 0  [Xerostomia: Grade 1 - Symptomatic (e.g., dry or thick saliva) without significant dietary alteration; unstimulated saliva flow >0.2 ml/min] : Xerostomia: Grade 1 - Symptomatic (e.g., dry or thick saliva) without significant dietary alteration; unstimulated saliva flow >0.2 ml/min [Oral Pain: Grade 0] : Oral Pain: Grade 0 [Salivary duct inflammation: Grade 0] : Salivary duct inflammation: Grade 0 [Dysgeusia: Grade 0] : Dysgeusia: Grade 0 [Skin Induration: Grade 0] : Skin Induration: Grade 0 [Dermatitis Radiation: Grade 0] : Dermatitis Radiation: Grade 0 [Dysphagia] : no dysphagia [Odynophagia] : no odynophagia [Mucosal Pain] : no mucosal pain [FreeTextEntry2] : fatigue improving [FreeTextEntry4] : mouth is dry, but improved. Taste intact [FreeTextEntry6] : cough is stable.

## 2018-10-29 NOTE — BRIEF OPERATIVE NOTE - PROCEDURE
Pavel Field, a 36 y.o. male presents to the ED via EMS with CC weakness, hypotension      Patient identifiers verified verbally with patient and correct for Pavel Field.    LOC/ APPEARANCE: The patient is awake, alert and oriented x 4. Pt is speaking appropriately, no slurred speech. Patient resting comfortably and in no acute distress. Pt is clean and well groomed. No JVD visible. Pt reports pain level of 0. Pt updated on POC. Bed low and locked with side rails up x2, call bell in pt reach.  SKIN: Skin is warm dry and intact, and color is consistent with ethnicity. Capillary refill <3 seconds. No breakdown or brusing visible and mucus membranes moist and acyanotic.  MUSCULOSKELETAL: Full range of motion present in all extremities. Hand  equal and leg strength strong +5 bilaterally.  RESPIRATORY: Airway is open and patent. Respirations-unlabored, regular rate, equal bilaterally on inspiration and expiration. No accessory muscle use noted. Lungs clear to auscultation in all fields bilaterally anterior and posterior.   CARDIAC: Patient has regular heart rate.  No peripheral edema noted, and patient has no c/o chest pain. Peripheral pulses present equal and strong throughout.  ABDOMEN: Soft and non-tender to palpation with no distention noted. Normoactive bowel sounds x4 quadrants. Pt has no complaints of abnormal bowel movements, denies blood in stool. Pt reports normal appetite.   NEUROLOGIC: Eyes open spontaneously and facial expression symmetrical. Pt behavior appropriate to situation, and pt follows commands.  Pt reports sensation present in all extremities when touched with a finger. PERRLA. Visitor reports mild cognitive deficit.   : No complaints of frequency, burning, urgency or blood in the urine. No complaints of incontinence.   Direct laryngoscopy with biopsy  06/20/2017    Active  [facility]

## 2018-12-03 ENCOUNTER — APPOINTMENT (OUTPATIENT)
Dept: OTOLARYNGOLOGY | Facility: CLINIC | Age: 63
End: 2018-12-03
Payer: MEDICAID

## 2018-12-03 VITALS
HEIGHT: 71 IN | BODY MASS INDEX: 21.84 KG/M2 | HEART RATE: 70 BPM | DIASTOLIC BLOOD PRESSURE: 72 MMHG | WEIGHT: 156 LBS | OXYGEN SATURATION: 98 % | SYSTOLIC BLOOD PRESSURE: 121 MMHG

## 2018-12-03 PROCEDURE — 31575 DIAGNOSTIC LARYNGOSCOPY: CPT

## 2018-12-03 PROCEDURE — 99213 OFFICE O/P EST LOW 20 MIN: CPT | Mod: 25

## 2019-02-11 ENCOUNTER — FORM ENCOUNTER (OUTPATIENT)
Age: 64
End: 2019-02-11

## 2019-02-12 ENCOUNTER — OUTPATIENT (OUTPATIENT)
Dept: OUTPATIENT SERVICES | Facility: HOSPITAL | Age: 64
LOS: 1 days | End: 2019-02-12
Payer: MEDICAID

## 2019-02-12 ENCOUNTER — APPOINTMENT (OUTPATIENT)
Dept: CT IMAGING | Facility: HOSPITAL | Age: 64
End: 2019-02-12
Payer: MEDICAID

## 2019-02-12 DIAGNOSIS — Z98.890 OTHER SPECIFIED POSTPROCEDURAL STATES: Chronic | ICD-10-CM

## 2019-02-12 DIAGNOSIS — Z41.9 ENCOUNTER FOR PROCEDURE FOR PURPOSES OTHER THAN REMEDYING HEALTH STATE, UNSPECIFIED: Chronic | ICD-10-CM

## 2019-02-12 PROCEDURE — 70491 CT SOFT TISSUE NECK W/DYE: CPT | Mod: 26

## 2019-02-12 PROCEDURE — 70491 CT SOFT TISSUE NECK W/DYE: CPT

## 2019-02-15 NOTE — VITALS
[Maximal Pain Intensity: 0/10] : 0/10 [Least Pain Intensity: 0/10] : 0/10 [90: Able to carry normal activity; minor signs or symptoms of disease.] : 90: Able to carry normal activity; minor signs or symptoms of disease.  [90: Minor restrictions in physically strenous activity.] : 90: Minor restrictions in physically strenuous activity. [ECOG Performance Status: 1 - Restricted in physically strenuous activity but ambulatory and able to carry out work of a light or sedentary nature] : Performance Status: 1 - Restricted in physically strenuous activity but ambulatory and able to carry out work of a light or sedentary nature, e.g., light house work, office work

## 2019-02-19 ENCOUNTER — APPOINTMENT (OUTPATIENT)
Dept: RADIATION ONCOLOGY | Facility: CLINIC | Age: 64
End: 2019-02-19
Payer: MEDICAID

## 2019-02-19 VITALS
SYSTOLIC BLOOD PRESSURE: 135 MMHG | DIASTOLIC BLOOD PRESSURE: 89 MMHG | OXYGEN SATURATION: 96 % | RESPIRATION RATE: 18 BRPM | HEART RATE: 69 BPM | BODY MASS INDEX: 23.43 KG/M2 | WEIGHT: 168 LBS

## 2019-02-19 PROCEDURE — 31575 DIAGNOSTIC LARYNGOSCOPY: CPT

## 2019-02-19 PROCEDURE — 99214 OFFICE O/P EST MOD 30 MIN: CPT | Mod: 25

## 2019-02-19 RX ORDER — ACETAMINOPHEN 500 MG/1
500 TABLET ORAL
Qty: 30 | Refills: 3 | Status: DISCONTINUED | COMMUNITY
Start: 2018-04-23 | End: 2019-02-19

## 2019-02-19 NOTE — PROCEDURE
[Hoarseness] : hoarseness not clearly evaluated by indirect laryngoscopy [Lesion] : lesion identified by mirror examination needing further evaluation [Topical Lidocaine] : topical lidocaine [Flexible Endoscope] : examined with the flexible endoscope [Photographs Taken] : photographs taken [Normal] : normal base of the tongue [FreeTextEntry3] : s/p adenoidectomy [de-identified] : Right TVC is paretic and diminished in volume. Left moves well. No lesions. Edema persists th/o supraglottis

## 2019-02-19 NOTE — PHYSICAL EXAM
[General Appearance - Well Developed] : well developed [General Appearance - Alert] : alert [General Appearance - In No Acute Distress] : in no acute distress [Thin] : thin [Sclera] : the sclera and conjunctiva were normal [PERRL With Normal Accommodation] : pupils were equal in size, round, reactive to light [Extraocular Movements] : extraocular movements were intact [Cervical Lymph Nodes Enlarged Posterior Bilaterally] : posterior cervical [Cervical Lymph Nodes Enlarged Anterior Bilaterally] : anterior cervical [Supraclavicular Lymph Nodes Enlarged Bilaterally] : supraclavicular [Skin Color & Pigmentation] : normal skin color and pigmentation [Normal] : oriented to person, place and time, the affect was normal, the mood was normal and not anxious [Mood] : the mood was normal [de-identified] : hoarse voice. Patient has only two teeth. throat clear. Oral mucous membranes slightly dry. [de-identified] : No palpable nodes

## 2019-02-19 NOTE — DATA REVIEWED
[FreeTextEntry1] : I have personally reviewed all relevant imaging studies (CT) and I have discussed the case with the referring physician.\par

## 2019-02-19 NOTE — REVIEW OF SYSTEMS
[Hoarseness] : hoarseness [Negative] : Allergic/Immunologic [Nausea: Grade 0] : Nausea: Grade 0 [Vomiting: Grade 0] : Vomiting: Grade 0 [Localized Edema: Grade 0] : Localized Edema: Grade 0  [Neck Edema: Grade 0] : Neck Edema: Grade 0 [Mucositis Oral: Grade 0] : Mucositis Oral: Grade 0  [Oral Pain: Grade 0] : Oral Pain: Grade 0 [Salivary duct inflammation: Grade 0] : Salivary duct inflammation: Grade 0 [Dysgeusia: Grade 0] : Dysgeusia: Grade 0 [Skin Induration: Grade 0] : Skin Induration: Grade 0 [Dermatitis Radiation: Grade 0] : Dermatitis Radiation: Grade 0 [Dysphagia: Grade 0] : Dysphagia: Grade 0 [Fatigue: Grade 0] : Fatigue: Grade 0 [Fatigue] : no fatigue [Dysphagia] : no dysphagia [Odynophagia] : no odynophagia [Mucosal Pain] : no mucosal pain [Cough] : no cough [FreeTextEntry4] : Mouth is dry, but improved. Taste intact.

## 2019-02-19 NOTE — HISTORY OF PRESENT ILLNESS
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 He tolerated treatment well. His post-RT course was complicated by laryngeal edema requiring a tracheostomy in Feb 2018, which since has been removed. \par \par 2/19/19- FOLLOW UP \par When he was last seen on 10/9/18, he was doing well, LILIANA. Plan was to continue follow up with Dr. Branch and do CT neck in 4 months. Patient saw Dr. Branch on 12/3/18. He reported voice and swallowing were stable, no neck masses, no weight loss. Plan was to RTC 4 months (appointment scheduled for April). \par \par CT scan done 2/12/19 revealed the following: \par Since 10/7/18, there is no significant interval change in CT appearance of the treated larynx. Stable fullness/nodularity of the right true vocal cord. No further chondromalacia is evident. \par \par Today he reports he is feeling pretty well. Only complaint is more shortness of breath when he climbs the stairs of his 4-5 floor walk up at home. He says no dyspnea at rest. Denies neck, throat or mouth pain. Denies dysphagia; eating a regular diet and has gained 12 pounds since December. Reports taste is normal. Continues to do neck exercises daily. Denies any other c/o to include fever, chills, CP. He reports that he continues to abstain from smoking or drinking (quit smoking Feb 2018).  Voice is overall improved from last year.\par \par ______________________________\par 10/9/18- Follow up\par At his last visit he was eating and drinking normally. He was advised to f/u with Dr Branch and return in 3 months with imaging PET/CT and CT neck. He f/u with Dr. Branch 8/2018. \par \par PET/CT done 10/7/18- No evidence of residual, recurrent, or metastatic disease identified. Increased uptake in the left vocal cord is in compensation for the fixed right vocal cord. \par \par CT neck done 10/7/18 showed the following: There is again evident nodularity of the right true vocal fold, without appreciable change since the prior CT study. Again evident is absent mineralization of the right arytenoid cartilage, which had been calcified prior to treatment. There is now a greater degree of fragmentation of the superior cricoid lamina. Given absence of suspicious FDG uptake in this region, findings within the laryngeal cartilages are felt more compatible with chondronecrosis than tumor recurrence. There is persistent though diminished edema in the supraglottic larynx compatible with radiation changes.\par *  Lymph Nodes: No cervical lymphadenopathy.\par *  Salivary Glands: Generalized increased density and decreased size, compatible with radiation effects.\par \par Today, he reports he is feeling well. Denies neck pain, mouth pain. Reports sore throat at times. Continues to have hoarseness. Mouth dryness has improved. Taste is normal, able to eat and drink and swallow all textures. He has gained 4 pounds in the past two months. Continues to do neck exercises. Reports breathing is good, denies SOB. Denies smoking or drinking (quit smoking Feb 2018).\par \par 7/2/18- Follow up\par CT neck on 6/29/18 showed now absence of the previously mineralized right arytenoid process, as well as subtle erosion of the superior right lateral cricoid cartilage, present in association with nodularity of the right true vocal fold. Differential includes both recurrent disease and chondronecrosis. Consider repeat PET/CT and/or/follow up neck CT in approximately 3 months. There was also a hypodensity in the right inferior frontal lone, possibly posttraumatic. This was present in 7/2017. Consider brain imaging. \par \par Today Mr. Alfonso is feeling well. He was decannulated in the interim since we saw him last. Breathing is good, walked about 10 blocks today without increased shortness of breath. Had mild SOB yesterday. Trach site closed. Denies pain to neck, denies lumps or bumps to neck. Mouth is sometimes dry, taste is normal. Able to eat and drink all textures. Has gained 17 pounds since april. His only concern is cough/phlegm and a hoarse voice.\par \par 7/2/18- \par Mr. Alfonso presents today for routine follow up. He was decannulated in the interim since we last saw him. \par \par Oncology History:\par Mr. 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 Branch on 4/5/17 after bring referred by Dr. King following investigation for hoarseness showing mass on flexible laryngoscopy.  He presented with 8 months- 1 year of increasing hoarseness on background of ~60 pack year smoking history. He denied pain, discomfort, otalgia, dysphagia, odynophagia, loss of weight, hemoptysis.  He was scheduled for surgery after this, however there were issues with his insurance which resulted in surgery cancellation. \par \par He eventually underwent DL and biopsy on 6/20/17. Pathology shows infiltrating squamous cell carcinoma of the right anterior vocal cord, the right supraglottis, the anterior commissure, and right vocal cord process, and right superior supraglottis. Left vocal cord process shows SCC, predominantly in situ, with focal superficial invasion. \par \par He has not had any imaging studies. His case was reviewed at multidisciplinary head and neck oncology tumor Board on July 10 and the recommendation was for definitive radiation therapy since  surgery would involve a total laryngectomy.\par \par He notes persistent hoarseness at this time. He denies coughing, trouble swallowing, bleeding, or pain. He has lost some weight that he attributes to anxiety. He works about 5-6 hours per day 5 days a week at [employer]. He has not seen a dentist in years. He continues to smoke 1ppd and has never tried quitting. \par \par \par 11/24/17 - Mr. Alfonso presents today for post treatment evaluation. Today reports feeling well - appetite good, swallowing better with soft textured food but able to eat most foods. Plans on resuming work at [employer] in 5 days. \par Reports:\par - intermittent cough for white secretions-smoking half pack/day-smoking cessation advised\par - dysgeusia, dry mouth-advised bicarb mouth rinses, increase fluid intake\par - minimal pain (only when swallowing), fever, night sweats, mucositis - using gabapentin 2 tabs bid\par - not using prevident toothpaste-never did\par \par 1/23/18- Mr. Alfonso presents today for routine follow up. He is feeling well. PO intake is good, weight stable. He is able to eat all textures of food when he is mindful of chewing. He is coughing a lot, and has a frequent tickle in his throat. His throat becomes sore when he talks frequently. He has reduced the amount that he is smoking, and is currently smoking less than a pack per day. he plans to quit over the next 10 days. \par He is doing head and neck exercises daily. He is not using prevident. He underwent PET scan on 1/22/18, report from which is not yet available. I called radiology and radiologist was not available. \par \par 4/24/18- Mr. Alfonso presents today for follow up. Since he saw us last in January he underwent a PET scan which showed LILIANA. \par In February Mr. Alfonso became persistently hoarse with worsening airway. he was taken to OR for awake tracheostomy and biopsy. Biopsy was negative on 2/27/18.  He was discharged to rehab. He saw Dr. Branch on 4/23, and he is planning for possible decannulation in 3 weeks. \par Today pt. is getting over URI.  He notes pain with swallowing. Lost 17 lbs since last visit. No other new complaints. Taking tylenol only.

## 2019-03-22 ENCOUNTER — INPATIENT (INPATIENT)
Facility: HOSPITAL | Age: 64
LOS: 6 days | Discharge: HOME | DRG: 156 | End: 2019-03-29
Attending: OTOLARYNGOLOGY | Admitting: OTOLARYNGOLOGY
Payer: MEDICAID

## 2019-03-22 VITALS
OXYGEN SATURATION: 97 % | RESPIRATION RATE: 18 BRPM | TEMPERATURE: 99 F | HEART RATE: 96 BPM | SYSTOLIC BLOOD PRESSURE: 142 MMHG | WEIGHT: 160.72 LBS | DIASTOLIC BLOOD PRESSURE: 80 MMHG

## 2019-03-22 DIAGNOSIS — Z98.890 OTHER SPECIFIED POSTPROCEDURAL STATES: Chronic | ICD-10-CM

## 2019-03-22 DIAGNOSIS — Z41.9 ENCOUNTER FOR PROCEDURE FOR PURPOSES OTHER THAN REMEDYING HEALTH STATE, UNSPECIFIED: Chronic | ICD-10-CM

## 2019-03-22 LAB
ALBUMIN SERPL ELPH-MCNC: 5.1 G/DL — HIGH (ref 3.3–5)
ALP SERPL-CCNC: 68 U/L — SIGNIFICANT CHANGE UP (ref 40–120)
ALT FLD-CCNC: 15 U/L — SIGNIFICANT CHANGE UP (ref 10–45)
ANION GAP SERPL CALC-SCNC: 12 MMOL/L — SIGNIFICANT CHANGE UP (ref 5–17)
AST SERPL-CCNC: 22 U/L — SIGNIFICANT CHANGE UP (ref 10–40)
BASOPHILS # BLD AUTO: 0.06 K/UL — SIGNIFICANT CHANGE UP (ref 0–0.2)
BASOPHILS NFR BLD AUTO: 0.6 % — SIGNIFICANT CHANGE UP (ref 0–2)
BILIRUB SERPL-MCNC: 0.7 MG/DL — SIGNIFICANT CHANGE UP (ref 0.2–1.2)
BUN SERPL-MCNC: 11 MG/DL — SIGNIFICANT CHANGE UP (ref 7–23)
CALCIUM SERPL-MCNC: 9.8 MG/DL — SIGNIFICANT CHANGE UP (ref 8.4–10.5)
CHLORIDE SERPL-SCNC: 99 MMOL/L — SIGNIFICANT CHANGE UP (ref 96–108)
CO2 SERPL-SCNC: 28 MMOL/L — SIGNIFICANT CHANGE UP (ref 22–31)
CREAT SERPL-MCNC: 0.94 MG/DL — SIGNIFICANT CHANGE UP (ref 0.5–1.3)
EOSINOPHIL # BLD AUTO: 0.02 K/UL — SIGNIFICANT CHANGE UP (ref 0–0.5)
EOSINOPHIL NFR BLD AUTO: 0.2 % — SIGNIFICANT CHANGE UP (ref 0–6)
EXTRA BLUE TOP TUBE: SIGNIFICANT CHANGE UP
FLU A RESULT: SIGNIFICANT CHANGE UP
FLU A RESULT: SIGNIFICANT CHANGE UP
FLUAV AG NPH QL: SIGNIFICANT CHANGE UP
FLUBV AG NPH QL: SIGNIFICANT CHANGE UP
GAS PNL BLDV: SIGNIFICANT CHANGE UP
GLUCOSE BLDC GLUCOMTR-MCNC: 116 MG/DL — HIGH (ref 70–99)
GLUCOSE BLDC GLUCOMTR-MCNC: 119 MG/DL — HIGH (ref 70–99)
GLUCOSE SERPL-MCNC: 119 MG/DL — HIGH (ref 70–99)
HCT VFR BLD CALC: 42.8 % — SIGNIFICANT CHANGE UP (ref 39–50)
HGB BLD-MCNC: 14.1 G/DL — SIGNIFICANT CHANGE UP (ref 13–17)
IMM GRANULOCYTES NFR BLD AUTO: 0.2 % — SIGNIFICANT CHANGE UP (ref 0–1.5)
LYMPHOCYTES # BLD AUTO: 0.76 K/UL — LOW (ref 1–3.3)
LYMPHOCYTES # BLD AUTO: 7.3 % — LOW (ref 13–44)
MCHC RBC-ENTMCNC: 30.2 PG — SIGNIFICANT CHANGE UP (ref 27–34)
MCHC RBC-ENTMCNC: 32.9 GM/DL — SIGNIFICANT CHANGE UP (ref 32–36)
MCV RBC AUTO: 91.6 FL — SIGNIFICANT CHANGE UP (ref 80–100)
MONOCYTES # BLD AUTO: 0.81 K/UL — SIGNIFICANT CHANGE UP (ref 0–0.9)
MONOCYTES NFR BLD AUTO: 7.8 % — SIGNIFICANT CHANGE UP (ref 2–14)
NEUTROPHILS # BLD AUTO: 8.76 K/UL — HIGH (ref 1.8–7.4)
NEUTROPHILS NFR BLD AUTO: 83.9 % — HIGH (ref 43–77)
NRBC # BLD: 0 /100 WBCS — SIGNIFICANT CHANGE UP (ref 0–0)
PLATELET # BLD AUTO: 301 K/UL — SIGNIFICANT CHANGE UP (ref 150–400)
POTASSIUM SERPL-MCNC: 4.2 MMOL/L — SIGNIFICANT CHANGE UP (ref 3.5–5.3)
POTASSIUM SERPL-SCNC: 4.2 MMOL/L — SIGNIFICANT CHANGE UP (ref 3.5–5.3)
PROT SERPL-MCNC: 8.4 G/DL — HIGH (ref 6–8.3)
RBC # BLD: 4.67 M/UL — SIGNIFICANT CHANGE UP (ref 4.2–5.8)
RBC # FLD: 13.2 % — SIGNIFICANT CHANGE UP (ref 10.3–14.5)
RSV RESULT: SIGNIFICANT CHANGE UP
RSV RNA RESP QL NAA+PROBE: SIGNIFICANT CHANGE UP
SODIUM SERPL-SCNC: 139 MMOL/L — SIGNIFICANT CHANGE UP (ref 135–145)
WBC # BLD: 10.43 K/UL — SIGNIFICANT CHANGE UP (ref 3.8–10.5)
WBC # FLD AUTO: 10.43 K/UL — SIGNIFICANT CHANGE UP (ref 3.8–10.5)

## 2019-03-22 PROCEDURE — 70491 CT SOFT TISSUE NECK W/DYE: CPT | Mod: 26

## 2019-03-22 PROCEDURE — 71045 X-RAY EXAM CHEST 1 VIEW: CPT | Mod: 26

## 2019-03-22 PROCEDURE — 99291 CRITICAL CARE FIRST HOUR: CPT

## 2019-03-22 PROCEDURE — 99222 1ST HOSP IP/OBS MODERATE 55: CPT | Mod: GC

## 2019-03-22 RX ORDER — METRONIDAZOLE 500 MG
500 TABLET ORAL EVERY 8 HOURS
Qty: 0 | Refills: 0 | Status: DISCONTINUED | OUTPATIENT
Start: 2019-03-22 | End: 2019-03-28

## 2019-03-22 RX ORDER — ACETAMINOPHEN 500 MG
1000 TABLET ORAL ONCE
Qty: 0 | Refills: 0 | Status: COMPLETED | OUTPATIENT
Start: 2019-03-22 | End: 2019-03-22

## 2019-03-22 RX ORDER — IPRATROPIUM/ALBUTEROL SULFATE 18-103MCG
3 AEROSOL WITH ADAPTER (GRAM) INHALATION ONCE
Qty: 0 | Refills: 0 | Status: COMPLETED | OUTPATIENT
Start: 2019-03-22 | End: 2019-03-22

## 2019-03-22 RX ORDER — INSULIN LISPRO 100/ML
VIAL (ML) SUBCUTANEOUS
Qty: 0 | Refills: 0 | Status: DISCONTINUED | OUTPATIENT
Start: 2019-03-22 | End: 2019-03-29

## 2019-03-22 RX ORDER — DEXAMETHASONE 0.5 MG/5ML
10 ELIXIR ORAL EVERY 8 HOURS
Qty: 0 | Refills: 0 | Status: DISCONTINUED | OUTPATIENT
Start: 2019-03-22 | End: 2019-03-27

## 2019-03-22 RX ORDER — DEXAMETHASONE 0.5 MG/5ML
10 ELIXIR ORAL ONCE
Qty: 0 | Refills: 0 | Status: COMPLETED | OUTPATIENT
Start: 2019-03-22 | End: 2019-03-22

## 2019-03-22 RX ORDER — SODIUM CHLORIDE 9 MG/ML
1000 INJECTION, SOLUTION INTRAVENOUS
Qty: 0 | Refills: 0 | Status: DISCONTINUED | OUTPATIENT
Start: 2019-03-22 | End: 2019-03-24

## 2019-03-22 RX ORDER — VANCOMYCIN HCL 1 G
1000 VIAL (EA) INTRAVENOUS EVERY 12 HOURS
Qty: 0 | Refills: 0 | Status: DISCONTINUED | OUTPATIENT
Start: 2019-03-22 | End: 2019-03-28

## 2019-03-22 RX ADMIN — Medication 600 MILLIGRAM(S): at 15:12

## 2019-03-22 RX ADMIN — Medication 3 MILLILITER(S): at 09:25

## 2019-03-22 RX ADMIN — Medication 100 MILLIGRAM(S): at 14:38

## 2019-03-22 RX ADMIN — Medication 1000 MILLIGRAM(S): at 20:17

## 2019-03-22 RX ADMIN — Medication 102 MILLIGRAM(S): at 09:25

## 2019-03-22 RX ADMIN — SODIUM CHLORIDE 75 MILLILITER(S): 9 INJECTION, SOLUTION INTRAVENOUS at 15:56

## 2019-03-22 RX ADMIN — Medication 10 MILLIGRAM(S): at 09:56

## 2019-03-22 RX ADMIN — Medication 250 MILLIGRAM(S): at 18:42

## 2019-03-22 RX ADMIN — Medication 400 MILLIGRAM(S): at 19:50

## 2019-03-22 RX ADMIN — Medication 102 MILLIGRAM(S): at 22:40

## 2019-03-22 RX ADMIN — Medication 100 MILLIGRAM(S): at 22:37

## 2019-03-22 NOTE — H&P ADULT - ATTENDING COMMENTS
I saw Mr Alfonso, he presents with some inspiratory stridor and some SOB while speaking but otherwise stable, not using accessory respiratory muscle or hypoxic appearing, SPO2 around 92, pt not in distress, we scoped him and there is increased fullness of the left false vocal fold, less movement of the left TVC as compared to prior exams which we compared to prior visit laryngoscopy pictures.  I talked to him about medical treatment  vs the need for tracheotomy which could be permanent and not able to be reversed.  He elects for medical treatment and will plan for antibiotics, steroids and ICU admission for close observation, we are prepared to do tracheostomy if necessary.  I have discussed this with the inpatient resident team and the head and neck on call attending for the weekend.

## 2019-03-22 NOTE — ED ADULT NURSE NOTE - OBJECTIVE STATEMENT
pt reports difficulty breathing, clear/white sputum, difficulty clearing throat, and difficulty sleeping for the past 1 week.  pt reports subjective fevers, did not measure a temperature.  pt denies pain.  pt has a hx of a trach (reversed now) and "throat cancer".   pt denies current medication use other than PRN Tylenol.    pulse ox 92% on RA.  placed on continuous pulse ox and 2 liters o2 via NC, pulse ox improved to 96%.

## 2019-03-22 NOTE — H&P ADULT - HISTORY OF PRESENT ILLNESS
[facility] Admission Note    62M h/o T2N0 R TVC SCC s/p XRT completed 10/2017.   He represented in 2/2018 with progressive hoarseness, on exam was noted to have fullness of the supraglottic tissues as well as necrotic glottic tissue with a significantly narrowed airway, he underwent awake trach, DL, and biopsy in the OR on 2/26/2018, decannulated on 6/13 with no issues.  Was last seen by Dr. Branch in 12/2018 and told to follow up in April. Was seen by Dr. Reddy on 2/19/19. He had some worsening shortness of breath at that visit but his repeat CT was read as stable.  Today (3/22/19) he is presenting with worsening shortness of breath and increased hoarseness from his baseline. No changes in swallowing but reports some coughing with liquids. Has felt hot recently but no measured fevers. No weight loss. Some mild pain in central throat.     In ED, received repeated CT revealing progressive chondronecrosis of the right arytenoid. Although this was seen on prior scans, there is now an associated abscess.    Exam:  Requiring supplemental O2 to maintain sat above 92  Inspiratory stridor, hoarse voice  No significant neck masses, trachea midline  Left nasal cavity open, NP clear, BoT without fullness, supraglottis with diffuse radiation changes and edema, epiglottis open with slight thickening, right false cord with significant swelling stable to slightly increased from prior, right arytenoid/AE fold paretic, left true vocal cord/arytenoid/AE fold mobile but appears tethered to paramedian position, limited view of left true cord with right true vocal cord not visualized, airway of at least 2mm with air passage observed, mild pooling of secretions with some aspiration    CT neck 3/22: Findings are most consistent with progressive right cricoarytenoid   chondronecrosis with likely infection given recent development of diffuse   edema/enhancement, and presence of 1.1 cm abscess/necrotic collection at the   expected location of the cricoarytenoid joint. As result, there is severe   laryngeal airway narrowing and tracheostomy may be imminently necessary.   Tumoral recurrence is felt less likely given such dramatic change since last   month, but clinical inspection with laryngoscopy and possible tissue   sampling is advised.         A/P: 62M h/o T2N0 R TVC SCC s/p XRT c/b supraglottic swelling requiring trach 2/2018 decannulated 6/2018 presenting for worsening SOB and hoarseness, found to have progressive chondronecrosis of the right arytenoid with associated abscess.  - Admit to ICU for airway monitoring  - Supplemental O2 via humidifed face tent as needed. If continued difficulty breathing, place patient on heliox. If sudden decompensation, patient would be intubated most easily via nasal fiberoptic  - decadron 10q8  - Vanc/Flagyl pending antibiotics recommendation  - Preop labs/workup for potential tracheostomy   - NPO until evaluated by SLP  - SCDs, hold [facility]    Discussed with Dr. Branch, who has seen and examined the patient and agrees with above plan

## 2019-03-22 NOTE — CONSULT NOTE ADULT - SUBJECTIVE AND OBJECTIVE BOX
HPI:  [facility] Admission Note    62M h/o T2N0 R TVC SCC s/p XRT completed 10/2017.   He represented in 2/2018 with progressive hoarseness, on exam was noted to have fullness of the supraglottic tissues as well as necrotic glottic tissue with a significantly narrowed airway, he underwent awake trach, DL, and biopsy in the OR on 2/26/2018, decannulated on 6/13 with no issues.  Was last seen by Dr. Branch in 12/2018 and told to follow up in April. Was seen by Dr. Reddy on 2/19/19. He had some worsening shortness of breath at that visit but his repeat CT was read as stable.  Today (3/22/19) he is presenting with worsening shortness of breath and increased hoarseness from his baseline. No changes in swallowing but reports some coughing with liquids. Has felt hot recently but no measured fevers. No weight loss. Some mild pain in central throat.     In ED, received repeated CT revealing progressive chondronecrosis of the right arytenoid. Although this was seen on prior scans, there is now an associated abscess.    Exam:  Requiring supplemental O2 to maintain sat above 92  Inspiratory stridor, hoarse voice  No significant neck masses, trachea midline  Left nasal cavity open, NP clear, BoT without fullness, supraglottis with diffuse radiation changes and edema, epiglottis open with slight thickening, right false cord with significant swelling stable to slightly increased from prior, right arytenoid/AE fold paretic, left true vocal cord/arytenoid/AE fold mobile but appears tethered to paramedian position, limited view of left true cord with right true vocal cord not visualized, airway of at least 2mm with air passage observed, mild pooling of secretions with some aspiration    CT neck 3/22: Findings are most consistent with progressive right cricoarytenoid   chondronecrosis with likely infection given recent development of diffuse   edema/enhancement, and presence of 1.1 cm abscess/necrotic collection at the   expected location of the cricoarytenoid joint. As result, there is severe   laryngeal airway narrowing and tracheostomy may be imminently necessary.   Tumoral recurrence is felt less likely given such dramatic change since last   month, but clinical inspection with laryngoscopy and possible tissue   sampling is advised.     SICU ADDENDUM: Above history noted. Seen and evaluated by SICU team for airway monitoring. Pt currently c/o hoarseness and dysphagia. Denies SOB/CP/N/V.        PAST MEDICAL & SURGICAL HISTORY:  SOB (shortness of breath)  Throat cancer  Jaundice: 1965  Fracture: left ankle  Laryngeal mass: s/ p radiation  Surgery, elective: direct laryngoscopy with biopsy- 4/2017  Status post surgery: inguinal hernia  Status post surgery: Left ankle surgical repair  x2 (1965)      ROS: See HPI    MEDICATIONS  (STANDING):  lactated ringers. 1000 milliLiter(s) (75 mL/Hr) IV Continuous <Continuous>    MEDICATIONS  (PRN):      Allergies    penicillin (Rash)    Intolerances        SOCIAL HISTORY:  Smoke: Never Smoker  EtOH: occasional    FAMILY HISTORY:  No pertinent family history in first degree relatives      Vital Signs Last 24 Hrs  T(C): 37.1 (22 Mar 2019 14:13), Max: 37.6 (22 Mar 2019 09:26)  T(F): 98.7 (22 Mar 2019 14:13), Max: 99.6 (22 Mar 2019 09:26)  HR: 77 (22 Mar 2019 14:13) (77 - 96)  BP: 124/81 (22 Mar 2019 14:13) (124/81 - 150/82)  BP(mean): --  RR: 18 (22 Mar 2019 14:13) (18 - 18)  SpO2: 95% (22 Mar 2019 14:13) (95% - 97%)    PHYSICAL EXAM  Gen: NAD, sitting up in stretcher  Neuro: A&Ox3, no focal deficits  HEENT: MMM  CV: RRR  Pulm: CTAx2, no stridor  Abd: Soft, NT/ND  Ext: no edema, WWP  Skin: no rash    LABS:                        14.1   10.43 )-----------( 301      ( 22 Mar 2019 09:18 )             42.8     03-22    139  |  99  |  11  ----------------------------<  119<H>  4.2   |  28  |  0.94    Ca    9.8      22 Mar 2019 09:18    TPro  8.4<H>  /  Alb  5.1<H>  /  TBili  0.7  /  DBili  x   /  AST  22  /  ALT  15  /  AlkPhos  68  03-22

## 2019-03-22 NOTE — CONSULT NOTE ADULT - SUBJECTIVE AND OBJECTIVE BOX
[facility] Consult Note    62M h/o T2N0 R TVC SCC s/p XRT completed 10/2017.   He represented in 2/2018 with progressive hoarseness, on exam was noted to have fullness of the supraglottic tissues as well as necrotic glottic tissue with a significantly narrowed airway, he underwent awake trach, DL, and biopsy in the OR on 2/26/2018, decannulated on 6/13 with no issues.  Was last seen by Dr. Branch in 12/2018 and told to follow up in April. Was seen by Dr. Reddy on 2/19/19. He had some worsening shortness of breath at that visit but his repeat CT was read as stable.  Today (3/22/19) he is presenting with worsening shortness of breath and increased hoarseness from his baseline. No changes in swallowing but reports some coughing with liquids. Has felt hot recently but no measured fevers. No weight loss. Some mild pain in central throat.    Exam:  Requiring supplemental O2 to maintain sat above 92  Inspiratory stridor, hoarse voice  No significant neck masses, trachea midline  Left nasal cavity open, NP clear, BoT without fullness, supraglottis with diffuse radiation changes and edema, epiglottis open with slight thickening, right false cord with significant swelling stable to slightly increased from prior, right arytenoid/AE fold paretic, left true vocal cord/arytenoid/AE fold mobile but appears tethered to paramedian position, limited view of left true cord with right true vocal cord not visualized, airway of at least 2mm with air passage observed, mild pooling of secretions with some aspiration    A/P: 62M h/o T2N0 R TVC SCC s/p XRT c/b supraglottic swelling requiring trach 2/2018 decannulated 6/2018 presenting for worsening SOB and hoarseness. Baseline poor airway with known right vocal cord paresis. Slight worsening of exam likely 2/2 viral infection but cannot r/o recurrence or new scar tissue formation.  -	CT neck with contrast  -	decadron 10q8  -	nebulizer, humidified air for supplemental O2  -	respiratory workup: viral panel, CBC, CXR, ABG to determine medical management  -	Final recommendations pending results of above studies    Seen with Dr. Hardy and discussed with Dr. Branch

## 2019-03-22 NOTE — ED PROVIDER NOTE - PROGRESS NOTE DETAILS
discussed w ENT, will obtain CT to eval for worsening CA, requesting VBG, pt comfortable on exam, no resp distress, will give clindamycin given penicillin allergy for concern for throat abscess, disc w pharmacy.

## 2019-03-22 NOTE — ED PROVIDER NOTE - OBJECTIVE STATEMENT
63M h/o T2N0 R TVC SCC w complaints of hoarseness of voice and shortness of breath for 2 weeks, cough. sx worse w exertion, s/p decannulation. no chest pain, f/c, abd pain, n/v/d. Pt has not tried anything for symptoms, no other aggravating or relieving factors. 63M h/o T2N0 R TVC SCC w complaints of hoarseness of voice and shortness of breath for 2 weeks, cough. sx worse w exertion, s/p decannulation. no chest pain, f/c, abd pain, n/v/d. Pt has not tried anything for symptoms, no other aggravating or relieving factors. non smoker currently, stopped one year ago.

## 2019-03-22 NOTE — PATIENT PROFILE ADULT - FALL HARM RISK
Phone call returned  Will try to get this scheduled asap , best next week on Thursday or friday   other

## 2019-03-22 NOTE — ED PROVIDER NOTE - CLINICAL SUMMARY MEDICAL DECISION MAKING FREE TEXT BOX
63M h/o T2N0 R TVC SCC w complaints of hoarseness of voice and shortness of breath for 2 weeks, cough. sx worse w exertion, s/p decannulation. no chest pain, f/c, abd pain, n/v/d. Pt has not tried anything for symptoms, no other aggravating or relieving factors. non smoker currently, stopped one year ago. Consider viral/flu, ACS, pna, worsening of throat CA. Labs, XR, decadron, ENT consult

## 2019-03-23 LAB
ANION GAP SERPL CALC-SCNC: 13 MMOL/L — SIGNIFICANT CHANGE UP (ref 5–17)
BUN SERPL-MCNC: 14 MG/DL — SIGNIFICANT CHANGE UP (ref 7–23)
CALCIUM SERPL-MCNC: 9.3 MG/DL — SIGNIFICANT CHANGE UP (ref 8.4–10.5)
CHLORIDE SERPL-SCNC: 101 MMOL/L — SIGNIFICANT CHANGE UP (ref 96–108)
CO2 SERPL-SCNC: 26 MMOL/L — SIGNIFICANT CHANGE UP (ref 22–31)
CREAT SERPL-MCNC: 0.73 MG/DL — SIGNIFICANT CHANGE UP (ref 0.5–1.3)
GLUCOSE BLDC GLUCOMTR-MCNC: 112 MG/DL — HIGH (ref 70–99)
GLUCOSE BLDC GLUCOMTR-MCNC: 114 MG/DL — HIGH (ref 70–99)
GLUCOSE BLDC GLUCOMTR-MCNC: 115 MG/DL — HIGH (ref 70–99)
GLUCOSE BLDC GLUCOMTR-MCNC: 137 MG/DL — HIGH (ref 70–99)
GLUCOSE SERPL-MCNC: 130 MG/DL — HIGH (ref 70–99)
HCT VFR BLD CALC: 39.4 % — SIGNIFICANT CHANGE UP (ref 39–50)
HGB BLD-MCNC: 13 G/DL — SIGNIFICANT CHANGE UP (ref 13–17)
MAGNESIUM SERPL-MCNC: 2.4 MG/DL — SIGNIFICANT CHANGE UP (ref 1.6–2.6)
MCHC RBC-ENTMCNC: 30.2 PG — SIGNIFICANT CHANGE UP (ref 27–34)
MCHC RBC-ENTMCNC: 33 GM/DL — SIGNIFICANT CHANGE UP (ref 32–36)
MCV RBC AUTO: 91.6 FL — SIGNIFICANT CHANGE UP (ref 80–100)
NRBC # BLD: 0 /100 WBCS — SIGNIFICANT CHANGE UP (ref 0–0)
PHOSPHATE SERPL-MCNC: 3.5 MG/DL — SIGNIFICANT CHANGE UP (ref 2.5–4.5)
PLATELET # BLD AUTO: 290 K/UL — SIGNIFICANT CHANGE UP (ref 150–400)
POTASSIUM SERPL-MCNC: 4.6 MMOL/L — SIGNIFICANT CHANGE UP (ref 3.5–5.3)
POTASSIUM SERPL-SCNC: 4.6 MMOL/L — SIGNIFICANT CHANGE UP (ref 3.5–5.3)
RBC # BLD: 4.3 M/UL — SIGNIFICANT CHANGE UP (ref 4.2–5.8)
RBC # FLD: 13.2 % — SIGNIFICANT CHANGE UP (ref 10.3–14.5)
SODIUM SERPL-SCNC: 140 MMOL/L — SIGNIFICANT CHANGE UP (ref 135–145)
WBC # BLD: 9.97 K/UL — SIGNIFICANT CHANGE UP (ref 3.8–10.5)
WBC # FLD AUTO: 9.97 K/UL — SIGNIFICANT CHANGE UP (ref 3.8–10.5)

## 2019-03-23 PROCEDURE — 99232 SBSQ HOSP IP/OBS MODERATE 35: CPT

## 2019-03-23 PROCEDURE — 71045 X-RAY EXAM CHEST 1 VIEW: CPT | Mod: 26

## 2019-03-23 RX ORDER — ACETAMINOPHEN 500 MG
1000 TABLET ORAL ONCE
Qty: 0 | Refills: 0 | Status: COMPLETED | OUTPATIENT
Start: 2019-03-23 | End: 2019-03-23

## 2019-03-23 RX ADMIN — Medication 102 MILLIGRAM(S): at 13:44

## 2019-03-23 RX ADMIN — Medication 250 MILLIGRAM(S): at 18:16

## 2019-03-23 RX ADMIN — Medication 102 MILLIGRAM(S): at 22:43

## 2019-03-23 RX ADMIN — Medication 102 MILLIGRAM(S): at 07:35

## 2019-03-23 RX ADMIN — Medication 100 MILLIGRAM(S): at 14:32

## 2019-03-23 RX ADMIN — Medication 100 MILLIGRAM(S): at 06:44

## 2019-03-23 RX ADMIN — Medication 400 MILLIGRAM(S): at 14:09

## 2019-03-23 RX ADMIN — Medication 250 MILLIGRAM(S): at 05:08

## 2019-03-23 RX ADMIN — Medication 1000 MILLIGRAM(S): at 15:00

## 2019-03-23 RX ADMIN — Medication 100 MILLIGRAM(S): at 23:26

## 2019-03-23 NOTE — PROGRESS NOTE ADULT - SUBJECTIVE AND OBJECTIVE BOX
INTERVAL HPI/OVERNIGHT EVENTS: No issues overnight    Pt seen and examined at beside. He offers no complaints. Asking for something to each or drink. He denies SOB.    MEDICATIONS  (STANDING):  dexamethasone  IVPB 10 milliGRAM(s) IV Intermittent every 8 hours  insulin lispro (HumaLOG) corrective regimen sliding scale   SubCutaneous Before meals and at bedtime  lactated ringers. 1000 milliLiter(s) (75 mL/Hr) IV Continuous <Continuous>  metroNIDAZOLE  IVPB 500 milliGRAM(s) IV Intermittent every 8 hours  vancomycin  IVPB 1000 milliGRAM(s) IV Intermittent every 12 hours    MEDICATIONS  (PRN):      Drug Dosing Weight  Height (cm): 180.34 (22 Mar 2019 17:45)  Weight (kg): 72.9 (22 Mar 2019 08:46)  BMI (kg/m2): 22.4 (22 Mar 2019 17:45)  BSA (m2): 1.92 (22 Mar 2019 17:45)      PAST MEDICAL & SURGICAL HISTORY:  SOB (shortness of breath)  Throat cancer  Jaundice: 1965  Fracture: left ankle  Laryngeal mass: s/ p radiation  Surgery, elective: direct laryngoscopy with biopsy- 4/2017  Status post surgery: inguinal hernia  Status post surgery: Left ankle surgical repair  x2 (1965)      ICU Vital Signs Last 24 Hrs  T(C): 36.2 (23 Mar 2019 06:47), Max: 37.6 (22 Mar 2019 09:26)  T(F): 97.1 (23 Mar 2019 06:47), Max: 99.6 (22 Mar 2019 09:26)  HR: 64 (23 Mar 2019 08:00) (62 - 97)  BP: 104/63 (23 Mar 2019 08:00) (101/52 - 154/66)  BP(mean): 75 (23 Mar 2019 08:00) (67 - 100)  ABP: --  ABP(mean): --  RR: 25 (23 Mar 2019 08:00) (13 - 29)  SpO2: 96% (23 Mar 2019 08:00) (94% - 98%)            22 Mar 2019 07:01  -  23 Mar 2019 07:00  --------------------------------------------------------  IN:    IV PiggyBack: 950 mL    lactated ringers.: 1050 mL  Total IN: 2000 mL    OUT:    Voided: 1000 mL  Total OUT: 1000 mL    Total NET: 1000 mL      23 Mar 2019 07:01  -  23 Mar 2019 09:11  --------------------------------------------------------  IN:    lactated ringers.: 150 mL  Total IN: 150 mL    OUT:    Voided: 300 mL  Total OUT: 300 mL    Total NET: -150 mL      PHYSICAL EXAM:  Gen: NAD, sitting up in stretcher  Neuro: A&Ox3, no focal deficits  HEENT: MMM  CV: RRR  Pulm: CTAx2, no stridor or respiratory distress  Abd: Soft, NT/ND  Ext: no edema, WWP  Skin: no rash        LABS:  CBC Full  -  ( 23 Mar 2019 06:22 )  WBC Count : 9.97 K/uL  Hemoglobin : 13.0 g/dL  Hematocrit : 39.4 %  Platelet Count - Automated : 290 K/uL  Mean Cell Volume : 91.6 fl  Mean Cell Hemoglobin : 30.2 pg  Mean Cell Hemoglobin Concentration : 33.0 gm/dL      03-23    140  |  101  |  14  ----------------------------<  130<H>  4.6   |  26  |  0.73    Ca    9.3      23 Mar 2019 06:22  Phos  3.5     03-23  Mg     2.4     03-23    TPro  8.4<H>  /  Alb  5.1<H>  /  TBili  0.7  /  DBili  x   /  AST  22  /  ALT  15  /  AlkPhos  68  03-22 INTERVAL HPI/OVERNIGHT EVENTS: No issues overnight    Pt seen and examined at beside. He offers no complaints. Asking for something to each or drink. He denies SOB. ROS negative except as noted.    MEDICATIONS  (STANDING):  dexamethasone  IVPB 10 milliGRAM(s) IV Intermittent every 8 hours  insulin lispro (HumaLOG) corrective regimen sliding scale   SubCutaneous Before meals and at bedtime  lactated ringers. 1000 milliLiter(s) (75 mL/Hr) IV Continuous <Continuous>  metroNIDAZOLE  IVPB 500 milliGRAM(s) IV Intermittent every 8 hours  vancomycin  IVPB 1000 milliGRAM(s) IV Intermittent every 12 hours    MEDICATIONS  (PRN):      Drug Dosing Weight  Height (cm): 180.34 (22 Mar 2019 17:45)  Weight (kg): 72.9 (22 Mar 2019 08:46)  BMI (kg/m2): 22.4 (22 Mar 2019 17:45)  BSA (m2): 1.92 (22 Mar 2019 17:45)      PAST MEDICAL & SURGICAL HISTORY:  SOB (shortness of breath)  Throat cancer  Jaundice: 1965  Fracture: left ankle  Laryngeal mass: s/ p radiation  Surgery, elective: direct laryngoscopy with biopsy- 4/2017  Status post surgery: inguinal hernia  Status post surgery: Left ankle surgical repair  x2 (1965)      ICU Vital Signs Last 24 Hrs  T(C): 36.2 (23 Mar 2019 06:47), Max: 37.6 (22 Mar 2019 09:26)  T(F): 97.1 (23 Mar 2019 06:47), Max: 99.6 (22 Mar 2019 09:26)  HR: 64 (23 Mar 2019 08:00) (62 - 97)  BP: 104/63 (23 Mar 2019 08:00) (101/52 - 154/66)  BP(mean): 75 (23 Mar 2019 08:00) (67 - 100)  ABP: --  ABP(mean): --  RR: 25 (23 Mar 2019 08:00) (13 - 29)  SpO2: 96% (23 Mar 2019 08:00) (94% - 98%)            22 Mar 2019 07:01  -  23 Mar 2019 07:00  --------------------------------------------------------  IN:    IV PiggyBack: 950 mL    lactated ringers.: 1050 mL  Total IN: 2000 mL    OUT:    Voided: 1000 mL  Total OUT: 1000 mL    Total NET: 1000 mL      23 Mar 2019 07:01  -  23 Mar 2019 09:11  --------------------------------------------------------  IN:    lactated ringers.: 150 mL  Total IN: 150 mL    OUT:    Voided: 300 mL  Total OUT: 300 mL    Total NET: -150 mL      PHYSICAL EXAM:  Gen: NAD, sitting up in stretcher  Neuro: A&Ox3, no focal deficits  HEENT: MMM  CV: RRR  Pulm: CTAx2, no stridor or respiratory distress  Abd: Soft, NT/ND  Ext: no edema, WWP  Skin: no rash          LABS:  CBC Full  -  ( 23 Mar 2019 06:22 )  WBC Count : 9.97 K/uL  Hemoglobin : 13.0 g/dL  Hematocrit : 39.4 %  Platelet Count - Automated : 290 K/uL  Mean Cell Volume : 91.6 fl  Mean Cell Hemoglobin : 30.2 pg  Mean Cell Hemoglobin Concentration : 33.0 gm/dL      03-23    140  |  101  |  14  ----------------------------<  130<H>  4.6   |  26  |  0.73    Ca    9.3      23 Mar 2019 06:22  Phos  3.5     03-23  Mg     2.4     03-23    TPro  8.4<H>  /  Alb  5.1<H>  /  TBili  0.7  /  DBili  x   /  AST  22  /  ALT  15  /  AlkPhos  68  03-22

## 2019-03-23 NOTE — SWALLOW BEDSIDE ASSESSMENT ADULT - NS SPL SWALLOW CLINIC TRIAL FT
PO trials consisted of ice chip x2, thin liquid x4, NTL x3, tsp puree x2. Pt with an immediate cough/throat clear + wet vocal quality following 1 of 2 ice chips 4/4 thin liquid trials, 2 of 3 NTL trials, and 2 of 2 puree trials suggestive of airway protection deficits. A head turn R did not appear to be effective at eliminating aspiration. Pt able to clear vocal quality upon request. Laryngeal elevation was observed and suspected to be reduced upon palpation. Pt with multiple reflexive swallows (3) suggestive of pharyngeal retention. At the end of the assessment, pt reported, "I don't think I am ready to eat or drink yet."

## 2019-03-23 NOTE — PROGRESS NOTE ADULT - ASSESSMENT
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 If requires intubation will need nasal airway  FENGI: NPO pending SLP  : Voids   Endo: ISS  ID: throat abscess vanc (3/22--), flagyl (3/22--) // s/p clinda x 1 in ED  Ppx: SCDs, hold SQH  Lines: PIVs  Wounds: none

## 2019-03-23 NOTE — SWALLOW BEDSIDE ASSESSMENT ADULT - SLP PERTINENT HISTORY OF CURRENT PROBLEM
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

## 2019-03-23 NOTE — SWALLOW BEDSIDE ASSESSMENT ADULT - MUCOSAL QUALITY
Wet vocal quality at baseline with intermittent throat clearing suggestive of difficulties managing own secretions as noted by ENT; able to clear with throat clear

## 2019-03-23 NOTE — SWALLOW BEDSIDE ASSESSMENT ADULT - COMMENTS
ENT noted pooling and aspiration of own secretions at baseline.   Pt known to this service from prior admission. FEES completed on 3/5/19 with recommendations for puree solids and NTL (silent aspiration of thin liquids). Pt reports he was complying with this diet for ~1 week after discharge but has been eating and drinking regular foods and liquids since. He described increased coughing with liquids/solids over the last couple of days. Reports of xerostomia and mild pain in his throat.

## 2019-03-23 NOTE — PROGRESS NOTE ADULT - SUBJECTIVE AND OBJECTIVE BOX
[facility] PROGRESS NOTE    HPI:62M h/o T2N0 R TVC SCC s/p XRT completed 10/2017. He presented in 2/2018 with progressive hoarseness, on exam was noted to have fullness of the supraglottic tissues as well as necrotic glottic tissue with a significantly narrowed airway, he underwent awake trach, DL, and biopsy in the OR on 2/26/2018, decannulated on 6/13 with no issues.  Was last seen by Dr. Branch in 12/2018 and told to follow up in April. Was seen by Dr. Reddy on 2/19/19. He had some worsening shortness of breath at that visit but his repeat CT was read as stable.  Today (3/22/19) he is presenting with worsening shortness of breath and increased hoarseness from his baseline. No changes in swallowing but reports some coughing with liquids. Has felt hot recently but no measured fevers. No weight loss. Some mild pain in central throat. CT revealing progressive chondronecrosis of the right arytenoid. Although this was seen on prior scans, there is now an associated abscess.    3/23: NAEON. Voice improved but still hoarse. Reports improvement in dyspnea. Remains on IV steroids and abx. Evaluated by SLP and noted to have clinical s/s aspiration so remains NPO.     Vital Signs Last 24 Hrs  T(C): 36.3 (23 Mar 2019 14:13), Max: 36.9 (22 Mar 2019 21:35)  T(F): 97.3 (23 Mar 2019 14:13), Max: 98.4 (22 Mar 2019 21:35)  HR: 72 (23 Mar 2019 15:00) (62 - 97)  BP: 116/59 (23 Mar 2019 15:00) (101/52 - 154/66)  BP(mean): 76 (23 Mar 2019 15:00) (67 - 100)  RR: 20 (23 Mar 2019 15:00) (13 - 29)  SpO2: 97% (23 Mar 2019 15:00) (94% - 100%)    I&O's Summary    22 Mar 2019 07:01  -  23 Mar 2019 07:00  --------------------------------------------------------  IN: 2000 mL / OUT: 1000 mL / NET: 1000 mL    23 Mar 2019 07:01  -  23 Mar 2019 16:06  --------------------------------------------------------  IN: 775 mL / OUT: 700 mL / NET: 75 mL        Exam:  NAD, A&Ox3  Inspiratory stridor, hoarse voice although improved from prior  No significant neck masses, trachea midline  FFL: Left nasal cavity open, NP clear, BoT without fullness, supraglottis with diffuse radiation changes, slightly thickened epiglottis but no edema, vallecula clear, b/l false cord with swelling slightly improved from prior, right arytenoid/AE fold paretic with edema/fullness but improved from prior and less hooding over glottis, left arytneoid edematous improved from prior, left true vocal cord/arytenoid/AE fold mobile but appears tethered to paramedian position, limited view of left true cord with right true vocal cord not visualized, airway of at least 2mm with air passage observed, mild pooling of secretions b/l piriforms with some aspiration    A/P: 62M h/o T2N0 R TVC SCC s/p XRT c/b supraglottic swelling requiring trach 2/2018 decannulated 6/2018 presenting for worsening SOB and hoarseness, found to have progressive chondronecrosis of the right arytenoid with associated abscess.    - Supplemental O2 via humidifed face tent as needed. If continued difficulty breathing, place patient on heliox. If sudden decompensation, patient would be intubated most easily via nasal fiberoptic  - decadron 10q8  - Vanc/Flagyl pending antibiotics recommendation  - Daily labs, replete lytes prn  - SLP eval - clinical aspiration on bedside swallow 3/23 - rec NPO for now  - IVF while NPO  - pain control prn  - anti-emetics prn  - SCDs, SQH  - will trend exam with serial scope exams    Dispo: SICU for airway monitoring     Discussed with Dr. Branch who agrees with plan above

## 2019-03-23 NOTE — SWALLOW BEDSIDE ASSESSMENT ADULT - SWALLOW EVAL: DIAGNOSIS
Suspect pharyngeal dysphagia as evidenced by wet vocal quality, coughing (occasionally productive), and throat clearing across limited trials. Overt s/s of aspiration persisted despite the use of a head turn right. Recommend continued NPO with ongoing reassessment to determine readiness for PO diet/instrumental testing.

## 2019-03-24 LAB
ANION GAP SERPL CALC-SCNC: 7 MMOL/L — SIGNIFICANT CHANGE UP (ref 5–17)
BUN SERPL-MCNC: 16 MG/DL — SIGNIFICANT CHANGE UP (ref 7–23)
CALCIUM SERPL-MCNC: 9.1 MG/DL — SIGNIFICANT CHANGE UP (ref 8.4–10.5)
CHLORIDE SERPL-SCNC: 99 MMOL/L — SIGNIFICANT CHANGE UP (ref 96–108)
CO2 SERPL-SCNC: 30 MMOL/L — SIGNIFICANT CHANGE UP (ref 22–31)
CREAT SERPL-MCNC: 0.74 MG/DL — SIGNIFICANT CHANGE UP (ref 0.5–1.3)
GLUCOSE BLDC GLUCOMTR-MCNC: 102 MG/DL — HIGH (ref 70–99)
GLUCOSE BLDC GLUCOMTR-MCNC: 111 MG/DL — HIGH (ref 70–99)
GLUCOSE BLDC GLUCOMTR-MCNC: 114 MG/DL — HIGH (ref 70–99)
GLUCOSE BLDC GLUCOMTR-MCNC: 118 MG/DL — HIGH (ref 70–99)
GLUCOSE SERPL-MCNC: 133 MG/DL — HIGH (ref 70–99)
HCT VFR BLD CALC: 37.8 % — LOW (ref 39–50)
HCV AB S/CO SERPL IA: 0.06 S/CO — SIGNIFICANT CHANGE UP
HCV AB SERPL-IMP: SIGNIFICANT CHANGE UP
HGB BLD-MCNC: 12.2 G/DL — LOW (ref 13–17)
MAGNESIUM SERPL-MCNC: 2.4 MG/DL — SIGNIFICANT CHANGE UP (ref 1.6–2.6)
MCHC RBC-ENTMCNC: 29.7 PG — SIGNIFICANT CHANGE UP (ref 27–34)
MCHC RBC-ENTMCNC: 32.3 GM/DL — SIGNIFICANT CHANGE UP (ref 32–36)
MCV RBC AUTO: 92 FL — SIGNIFICANT CHANGE UP (ref 80–100)
NRBC # BLD: 0 /100 WBCS — SIGNIFICANT CHANGE UP (ref 0–0)
PHOSPHATE SERPL-MCNC: 3.1 MG/DL — SIGNIFICANT CHANGE UP (ref 2.5–4.5)
PLATELET # BLD AUTO: 293 K/UL — SIGNIFICANT CHANGE UP (ref 150–400)
POTASSIUM SERPL-MCNC: 4.3 MMOL/L — SIGNIFICANT CHANGE UP (ref 3.5–5.3)
POTASSIUM SERPL-SCNC: 4.3 MMOL/L — SIGNIFICANT CHANGE UP (ref 3.5–5.3)
RBC # BLD: 4.11 M/UL — LOW (ref 4.2–5.8)
RBC # FLD: 13.2 % — SIGNIFICANT CHANGE UP (ref 10.3–14.5)
SODIUM SERPL-SCNC: 136 MMOL/L — SIGNIFICANT CHANGE UP (ref 135–145)
VANCOMYCIN TROUGH SERPL-MCNC: 10.4 UG/ML — SIGNIFICANT CHANGE UP (ref 10–20)
WBC # BLD: 15.18 K/UL — HIGH (ref 3.8–10.5)
WBC # FLD AUTO: 15.18 K/UL — HIGH (ref 3.8–10.5)

## 2019-03-24 PROCEDURE — 99232 SBSQ HOSP IP/OBS MODERATE 35: CPT

## 2019-03-24 RX ORDER — SODIUM CHLORIDE 9 MG/ML
500 INJECTION, SOLUTION INTRAVENOUS ONCE
Qty: 0 | Refills: 0 | Status: COMPLETED | OUTPATIENT
Start: 2019-03-24 | End: 2019-03-24

## 2019-03-24 RX ADMIN — Medication 100 MILLIGRAM(S): at 05:48

## 2019-03-24 RX ADMIN — Medication 102 MILLIGRAM(S): at 05:11

## 2019-03-24 RX ADMIN — Medication 100 MILLIGRAM(S): at 22:34

## 2019-03-24 RX ADMIN — Medication 102 MILLIGRAM(S): at 13:54

## 2019-03-24 RX ADMIN — Medication 250 MILLIGRAM(S): at 18:07

## 2019-03-24 RX ADMIN — Medication 250 MILLIGRAM(S): at 05:58

## 2019-03-24 RX ADMIN — Medication 100 MILLIGRAM(S): at 13:40

## 2019-03-24 RX ADMIN — Medication 102 MILLIGRAM(S): at 21:58

## 2019-03-24 RX ADMIN — SODIUM CHLORIDE 1000 MILLILITER(S): 9 INJECTION, SOLUTION INTRAVENOUS at 03:27

## 2019-03-24 NOTE — DIETITIAN INITIAL EVALUATION ADULT. - MD RECOMMEND
consider addition of ensure enlive BID(700kcal and 40gmprotein) with clarification of consistency/po supplement

## 2019-03-24 NOTE — PROGRESS NOTE ADULT - SUBJECTIVE AND OBJECTIVE BOX
[facility] PROGRESS NOTE    HPI:62M h/o T2N0 R TVC SCC s/p XRT completed 10/2017. He presented in 2/2018 with progressive hoarseness, on exam was noted to have fullness of the supraglottic tissues as well as necrotic glottic tissue with a significantly narrowed airway, he underwent awake trach, DL, and biopsy in the OR on 2/26/2018, decannulated on 6/13 with no issues.  Was last seen by Dr. Branch in 12/2018 and told to follow up in April. Was seen by Dr. Reddy on 2/19/19. He had some worsening shortness of breath at that visit but his repeat CT was read as stable.  Today (3/22/19) he is presenting with worsening shortness of breath and increased hoarseness from his baseline. No changes in swallowing but reports some coughing with liquids. Has felt hot recently but no measured fevers. No weight loss. Some mild pain in central throat. CT revealing progressive chondronecrosis of the right arytenoid. Although this was seen on prior scans, there is now an associated abscess.    3/23: NAEON. Voice improved but still hoarse. Reports improvement in dyspnea. Remains on IV steroids and abx. Evaluated by SLP and noted to have clinical s/s aspiration so remains NPO.   3/24: NAEON. AFVSS. Sating well on 2L NC. Evaluated by SLP yesterday and with clinical s/s aspiration so remains NPO/IVF. Exam is improving however still with supraglottic edema and significantly narrowed airway. Denies dyspnea, CP. Reports voice almost at baseline.     Vital Signs Last 24 Hrs  T(C): 36.3 (24 Mar 2019 09:39), Max: 36.4 (23 Mar 2019 18:00)  T(F): 97.4 (24 Mar 2019 09:39), Max: 97.6 (23 Mar 2019 18:00)  HR: 62 (24 Mar 2019 10:00) (60 - 80)  BP: 102/65 (24 Mar 2019 10:00) (96/62 - 132/77)  BP(mean): 78 (24 Mar 2019 10:00) (67 - 93)  RR: 13 (24 Mar 2019 10:00) (12 - 31)  SpO2: 96% (24 Mar 2019 10:00) (93% - 100%)    I&O's Summary    23 Mar 2019 07:01  -  24 Mar 2019 07:00  --------------------------------------------------------  IN: 3200 mL / OUT: 1700 mL / NET: 1500 mL      Exam:  NAD, A&Ox3  Inspiratory stridor, hoarse voice although improved from prior  No significant neck masses, trachea midline  FFL: Left nasal cavity open, NP clear, BoT without fullness, supraglottis with diffuse radiation changes, slightly thickened epiglottis but no edema, vallecula clear, b/l false cord with swelling slightly improved from prior, able to view anterior aspect of L TVF, right arytenoid/AE fold paretic with edema/fullness slightly improve from prior but with some moderate hooding over glottis, left arytneoid edematous improved from prior, left true vocal cord/arytenoid/AE fold minimally mobile but appears tethered to paramedian position, airway of at least 2mm with air passage observed, mild pooling of secretions b/l piriforms with some aspiration    A/P: 62M h/o T2N0 R TVC SCC s/p XRT c/b supraglottic swelling requiring trach 2/2018 decannulated 6/2018 presenting for worsening SOB and hoarseness, found to have progressive chondronecrosis of the right arytenoid with associated abscess.    - Supplemental O2 via humidifed face tent as needed. If continued difficulty breathing, place patient on heliox. If sudden decompensation, patient would be intubated most easily via nasal fiberoptic  - continue decadron 10q8  - Continue Vanc/Flagyl for abscess   - Daily labs, replete bar prn  - SLP eval - clinical aspiration on bedside swallow 3/23 - rec NPO for now, reassess Mon  - IVF while NPO  - pain control prn  - anti-emetics prn  - SCDs, SQH  - will trend exam with serial scope exams     Dispo: SICU for airway monitoring, while clinical exam is slowly improving patient remains with significantly narrowed airway     Discussed with Dr. Branch who agrees with plan above

## 2019-03-24 NOTE — DIETITIAN INITIAL EVALUATION ADULT. - OTHER INFO
64 y/o male admitted with SOB/and hoarseness.Noted history of T2no right TVC SCC S/P RT /trach.Noted SLP consult and pending diet advancement.Patient complaining of hunger.Denied N/V/D or pain.Skin intact.Claims he was eating "regular food" at NH due to staff not providing pureed nectar foods.He has only 3 teeth with which he says he can chew soft foods.

## 2019-03-24 NOTE — PROGRESS NOTE ADULT - SUBJECTIVE AND OBJECTIVE BOX
INTERVAL HPI/OVERNIGHT EVENTS: 3/23: SLP recs c/w NPO    Pt seen and examined on AM rounds. He offers no complaints. States he feels more comfortable with the idea of trying a diet today. Denies SOB.      MEDICATIONS  (STANDING):  dexamethasone  IVPB 10 milliGRAM(s) IV Intermittent every 8 hours  insulin lispro (HumaLOG) corrective regimen sliding scale   SubCutaneous Before meals and at bedtime  metroNIDAZOLE  IVPB 500 milliGRAM(s) IV Intermittent every 8 hours  vancomycin  IVPB 1000 milliGRAM(s) IV Intermittent every 12 hours    MEDICATIONS  (PRN):      Drug Dosing Weight  Height (cm): 180.34 (22 Mar 2019 17:45)  Weight (kg): 72.9 (22 Mar 2019 08:46)  BMI (kg/m2): 22.4 (22 Mar 2019 17:45)  BSA (m2): 1.92 (22 Mar 2019 17:45)      PAST MEDICAL & SURGICAL HISTORY:  SOB (shortness of breath)  Throat cancer  Jaundice: 1965  Fracture: left ankle  Laryngeal mass: s/ p radiation  Surgery, elective: direct laryngoscopy with biopsy- 4/2017  Status post surgery: inguinal hernia  Status post surgery: Left ankle surgical repair  x2 (1965)      ICU Vital Signs Last 24 Hrs  T(C): 36.2 (24 Mar 2019 05:42), Max: 36.4 (23 Mar 2019 18:00)  T(F): 97.1 (24 Mar 2019 05:42), Max: 97.6 (23 Mar 2019 18:00)  HR: 80 (24 Mar 2019 07:00) (60 - 80)  BP: 109/64 (24 Mar 2019 08:00) (96/62 - 132/77)  BP(mean): 76 (24 Mar 2019 08:00) (67 - 93)  ABP: --  ABP(mean): --  RR: 16 (24 Mar 2019 07:00) (12 - 31)  SpO2: 93% (24 Mar 2019 07:00) (93% - 100%)            23 Mar 2019 07:01  -  24 Mar 2019 07:00  --------------------------------------------------------  IN:    IV PiggyBack: 1550 mL    lactated ringers.: 1650 mL  Total IN: 3200 mL    OUT:    Voided: 1700 mL  Total OUT: 1700 mL    Total NET: 1500 mL        PHYSICAL EXAM:  Gen: NAD, sitting up in stretcher  Neuro: A&Ox3, hoarse voice but otherwise no focal deficits  HEENT: MMM, poor dentition  CV: RRR  Pulm: CTAx2, no stridor or respiratory distress  Abd: Soft, NT/ND  Ext: no edema, WWP  Skin: no rash      LABS:  CBC Full  -  ( 24 Mar 2019 03:49 )  WBC Count : 15.18 K/uL  Hemoglobin : 12.2 g/dL  Hematocrit : 37.8 %  Platelet Count - Automated : 293 K/uL  Mean Cell Volume : 92.0 fl  Mean Cell Hemoglobin : 29.7 pg  Mean Cell Hemoglobin Concentration : 32.3 gm/dL  Auto Neutrophil # : x  Auto Lymphocyte # : x  Auto Monocyte # : x  Auto Eosinophil # : x  Auto Basophil # : x  Auto Neutrophil % : x  Auto Lymphocyte % : x  Auto Monocyte % : x  Auto Eosinophil % : x  Auto Basophil % : x    03-24    136  |  99  |  16  ----------------------------<  133<H>  4.3   |  30  |  0.74    Ca    9.1      24 Mar 2019 03:49  Phos  3.1     03-24  Mg     2.4     03-24

## 2019-03-24 NOTE — DIETITIAN INITIAL EVALUATION ADULT. - PHYSICAL APPEARANCE
not malnourised.Weight stable as per patient.Poor dentition evident.NPO pending SLP clearance/debilitated

## 2019-03-25 LAB
ANION GAP SERPL CALC-SCNC: 7 MMOL/L — SIGNIFICANT CHANGE UP (ref 5–17)
BUN SERPL-MCNC: 18 MG/DL — SIGNIFICANT CHANGE UP (ref 7–23)
CALCIUM SERPL-MCNC: 9 MG/DL — SIGNIFICANT CHANGE UP (ref 8.4–10.5)
CHLORIDE SERPL-SCNC: 101 MMOL/L — SIGNIFICANT CHANGE UP (ref 96–108)
CO2 SERPL-SCNC: 32 MMOL/L — HIGH (ref 22–31)
CREAT SERPL-MCNC: 0.81 MG/DL — SIGNIFICANT CHANGE UP (ref 0.5–1.3)
GLUCOSE BLDC GLUCOMTR-MCNC: 111 MG/DL — HIGH (ref 70–99)
GLUCOSE BLDC GLUCOMTR-MCNC: 115 MG/DL — HIGH (ref 70–99)
GLUCOSE BLDC GLUCOMTR-MCNC: 118 MG/DL — HIGH (ref 70–99)
GLUCOSE BLDC GLUCOMTR-MCNC: 124 MG/DL — HIGH (ref 70–99)
GLUCOSE SERPL-MCNC: 128 MG/DL — HIGH (ref 70–99)
HCT VFR BLD CALC: 37.5 % — LOW (ref 39–50)
HGB BLD-MCNC: 12.4 G/DL — LOW (ref 13–17)
MAGNESIUM SERPL-MCNC: 2.5 MG/DL — SIGNIFICANT CHANGE UP (ref 1.6–2.6)
MCHC RBC-ENTMCNC: 30.5 PG — SIGNIFICANT CHANGE UP (ref 27–34)
MCHC RBC-ENTMCNC: 33.1 GM/DL — SIGNIFICANT CHANGE UP (ref 32–36)
MCV RBC AUTO: 92.4 FL — SIGNIFICANT CHANGE UP (ref 80–100)
NRBC # BLD: 0 /100 WBCS — SIGNIFICANT CHANGE UP (ref 0–0)
PHOSPHATE SERPL-MCNC: 3.7 MG/DL — SIGNIFICANT CHANGE UP (ref 2.5–4.5)
PLATELET # BLD AUTO: 308 K/UL — SIGNIFICANT CHANGE UP (ref 150–400)
POTASSIUM SERPL-MCNC: 4.7 MMOL/L — SIGNIFICANT CHANGE UP (ref 3.5–5.3)
POTASSIUM SERPL-SCNC: 4.7 MMOL/L — SIGNIFICANT CHANGE UP (ref 3.5–5.3)
RBC # BLD: 4.06 M/UL — LOW (ref 4.2–5.8)
RBC # FLD: 13.2 % — SIGNIFICANT CHANGE UP (ref 10.3–14.5)
SODIUM SERPL-SCNC: 140 MMOL/L — SIGNIFICANT CHANGE UP (ref 135–145)
WBC # BLD: 13.63 K/UL — HIGH (ref 3.8–10.5)
WBC # FLD AUTO: 13.63 K/UL — HIGH (ref 3.8–10.5)

## 2019-03-25 PROCEDURE — 99233 SBSQ HOSP IP/OBS HIGH 50: CPT | Mod: GC

## 2019-03-25 RX ORDER — SENNA PLUS 8.6 MG/1
2 TABLET ORAL AT BEDTIME
Qty: 0 | Refills: 0 | Status: DISCONTINUED | OUTPATIENT
Start: 2019-03-25 | End: 2019-03-29

## 2019-03-25 RX ORDER — DOCUSATE SODIUM 100 MG
100 CAPSULE ORAL THREE TIMES A DAY
Qty: 0 | Refills: 0 | Status: DISCONTINUED | OUTPATIENT
Start: 2019-03-25 | End: 2019-03-29

## 2019-03-25 RX ADMIN — Medication 100 MILLIGRAM(S): at 05:31

## 2019-03-25 RX ADMIN — Medication 250 MILLIGRAM(S): at 17:44

## 2019-03-25 RX ADMIN — SENNA PLUS 2 TABLET(S): 8.6 TABLET ORAL at 21:01

## 2019-03-25 RX ADMIN — Medication 100 MILLIGRAM(S): at 21:01

## 2019-03-25 RX ADMIN — Medication 100 MILLIGRAM(S): at 16:13

## 2019-03-25 RX ADMIN — Medication 102 MILLIGRAM(S): at 16:13

## 2019-03-25 RX ADMIN — Medication 102 MILLIGRAM(S): at 21:01

## 2019-03-25 RX ADMIN — Medication 250 MILLIGRAM(S): at 06:30

## 2019-03-25 RX ADMIN — Medication 102 MILLIGRAM(S): at 05:30

## 2019-03-25 NOTE — SWALLOW FEES ASSESSMENT ADULT - NS SWALLOW FEES REC ASPIR MON
change of breathing pattern/cough/pneumonia/upper respiratory infection/oral hygiene/fever/position upright (90Y)/gurgly voice/throat clearing

## 2019-03-25 NOTE — PROGRESS NOTE ADULT - SUBJECTIVE AND OBJECTIVE BOX
INTERVAL HPI/OVERNIGHT EVENTS:     3/23: SLP recs c/w NPO. To re-evaluate today    Pt seen and examined on AM rounds. Denies SOB, no complaints.      MEDICATIONS  (STANDING):  dexamethasone  IVPB 10 milliGRAM(s) IV Intermittent every 8 hours  insulin lispro (HumaLOG) corrective regimen sliding scale   SubCutaneous Before meals and at bedtime  metroNIDAZOLE  IVPB 500 milliGRAM(s) IV Intermittent every 8 hours  vancomycin  IVPB 1000 milliGRAM(s) IV Intermittent every 12 hours    MEDICATIONS  (PRN):      Drug Dosing Weight  Height (cm): 180.34 (22 Mar 2019 17:45)  Weight (kg): 72.9 (22 Mar 2019 08:46)  BMI (kg/m2): 22.4 (22 Mar 2019 17:45)  BSA (m2): 1.92 (22 Mar 2019 17:45)      PAST MEDICAL & SURGICAL HISTORY:  SOB (shortness of breath)  Throat cancer  Jaundice: 1965  Fracture: left ankle  Laryngeal mass: s/ p radiation  Surgery, elective: direct laryngoscopy with biopsy- 4/2017  Status post surgery: inguinal hernia  Status post surgery: Left ankle surgical repair  x2 (1965)    ICU Vital Signs Last 24 Hrs  T(C): 36.3 (25 Mar 2019 05:44), Max: 36.6 (24 Mar 2019 17:28)  T(F): 97.4 (25 Mar 2019 05:44), Max: 97.8 (24 Mar 2019 17:28)  HR: 56 (25 Mar 2019 05:00) (56 - 80)  BP: 109/64 (25 Mar 2019 05:00) (96/63 - 129/78)  BP(mean): 78 (25 Mar 2019 05:00) (71 - 95)  ABP: --  ABP(mean): --  RR: 12 (25 Mar 2019 05:00) (11 - 35)  SpO2: 97% (25 Mar 2019 05:00) (91% - 98%)    ICU Vital Signs Last 24 Hrs  T(C): 36.3 (25 Mar 2019 05:44), Max: 36.6 (24 Mar 2019 17:28)  T(F): 97.4 (25 Mar 2019 05:44), Max: 97.8 (24 Mar 2019 17:28)  HR: 56 (25 Mar 2019 05:00) (56 - 80)  BP: 109/64 (25 Mar 2019 05:00) (96/63 - 129/78)  BP(mean): 78 (25 Mar 2019 05:00) (71 - 95)  ABP: --  ABP(mean): --  RR: 12 (25 Mar 2019 05:00) (11 - 35)  SpO2: 97% (25 Mar 2019 05:00) (91% - 98%)  	    PHYSICAL EXAM:  Gen: NAD, sitting up in bed   Neuro: A&Ox3, hoarse voice but otherwise no focal deficits  HEENT: MMM, poor dentition  CV: RRR  Pulm: CTAx2, no stridor or respiratory distress  Abd: Soft, NT/ND  Ext: no edema, WWP  Skin: no rash                          12.4   13.63 )-----------( 308      ( 25 Mar 2019 05:01 )             37.5     03-25    140  |  101  |  18  ----------------------------<  128<H>  4.7   |  32<H>  |  0.81    Ca    9.0      25 Mar 2019 05:01  Phos  3.7     03-25  Mg     2.5     03-25

## 2019-03-25 NOTE — SWALLOW FEES ASSESSMENT ADULT - DIAGNOSTIC IMPRESSIONS
Pt presents w/ mild pharyngeal dysphagia, characterized by inconsistent penetration of thin liquids, puree, and solid consistencies, and mild pharyngeal residue. Penetrated material was effectively cleared w/ subsequent swallows, and mild residue was effectively cleared with use of liquid wash. General aspiration precautions in place. Recs for use of the following swallow safety strategies to minimize/avoid penetration and build-up of residue: small bites/sips, one bite/sip at a time, alternate food w/ drink, eat/drink slowly. Education provided to pt re strategies; pt demonstrated understanding and agreement w/ recs. This SVC will f/u to assess tolerance of diet.

## 2019-03-25 NOTE — SWALLOW FEES ASSESSMENT ADULT - SPECIFY REASON(S)
To assess swallow anatomy and physiology in setting of progressive chondonecrosis of R arytenoid w/ associated abscess.

## 2019-03-25 NOTE — PROGRESS NOTE ADULT - ASSESSMENT
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 If requires intubation will need nasal airway  FENGI: NPO pending SLP today  : Voids   Endo: ISS  ID: throat abscess vanc (3/22--), flagyl (3/22--) // s/p clinda x 1 in ED  Ppx: SCDs, hold SQH  Lines: PIVs  Wounds: none

## 2019-03-25 NOTE — PROGRESS NOTE ADULT - ASSESSMENT
ENT Progress Note    HPI:62M h/o T2N0 R TVC SCC s/p XRT completed 10/2017. He presented in 2/2018 with progressive hoarseness, on exam was noted to have fullness of the supraglottic tissues as well as necrotic glottic tissue with a significantly narrowed airway, he underwent awake trach, DL, and biopsy in the OR on 2/26/2018, decannulated on 6/13 with no issues.  Was last seen by Dr. Branch in 12/2018 and told to follow up in April. Was seen by Dr. Reddy on 2/19/19. He had some worsening shortness of breath at that visit but his repeat CT was read as stable.  Today (3/22/19) he is presenting with worsening shortness of breath and increased hoarseness from his baseline. No changes in swallowing but reports some coughing with liquids. Has felt hot recently but no measured fevers. No weight loss. Some mild pain in central throat. CT revealing progressive chondronecrosis of the right arytenoid. Although this was seen on prior scans, there is now an associated abscess.    3/23: PAUL. Voice improved but still hoarse. Reports improvement in dyspnea. Remains on IV steroids and abx. Evaluated by SLP and noted to have clinical s/s aspiration so remains NPO.   3/24: PAUL. LUHS. Sating well on 2L NC. Evaluated by SLP yesterday and with clinical s/s aspiration so remains NPO/IVF. Exam is improving however still with supraglottic edema and significantly narrowed airway. Denies dyspnea, CP. Reports voice almost at baseline.   3/25: MARCO MILLER, patient continuing to improve. S/p FEES today, cleared for [facility] soft and thins. Will continue to monitor in ICU    PAST MEDICAL & SURGICAL HISTORY:  SOB (shortness of breath)  Throat cancer  Jaundice: 1965  Fracture: left ankle  Laryngeal mass: s/ p radiation  Surgery, elective: direct laryngoscopy with biopsy- 4/2017  Status post surgery: inguinal hernia  Status post surgery: Left ankle surgical repair  x2 (1965)    Allergies    penicillin (Rash)    Intolerances      MEDICATIONS  (STANDING):  dexamethasone  IVPB 10 milliGRAM(s) IV Intermittent every 8 hours  insulin lispro (HumaLOG) corrective regimen sliding scale   SubCutaneous Before meals and at bedtime  metroNIDAZOLE  IVPB 500 milliGRAM(s) IV Intermittent every 8 hours  vancomycin  IVPB 1000 milliGRAM(s) IV Intermittent every 12 hours    MEDICATIONS  (PRN):        Vital Signs Last 24 Hrs  T(C): 36.5 (25 Mar 2019 08:51), Max: 36.6 (24 Mar 2019 17:28)  T(F): 97.7 (25 Mar 2019 08:51), Max: 97.8 (24 Mar 2019 17:28)  HR: 60 (25 Mar 2019 09:00) (56 - 70)  BP: 109/68 (25 Mar 2019 09:00) (96/63 - 129/78)  BP(mean): 85 (25 Mar 2019 09:00) (71 - 95)  RR: 12 (25 Mar 2019 09:00) (10 - 42)  SpO2: 97% (25 Mar 2019 09:00) (91% - 98%)    PE:  NAD, A&Ox3  Inspiratory stridor, hoarse voice although improved from prior  No significant neck masses, trachea midline  FFL: Left nasal cavity open, NP clear, BoT without fullness, supraglottis with diffuse radiation changes, slightly thickened epiglottis but no edema, vallecula clear, b/l false cord with swelling slightly improved from prior, able to view anterior aspect of L TVF, right arytenoid/AE fold paretic with edema/fullness slightly improve from prior but with some moderate hooding over glottis, left arytneoid edematous improved from prior, left true vocal cord/arytenoid/AE fold minimally mobile but appears tethered to paramedian position, airway of at least 2mm with air passage observed, mild pooling of secretions b/l piriforms with some aspiration      03-24-19 @ 07:01  -  03-25-19 @ 07:00  --------------------------------------------------------  IN: 400 mL / OUT: 1585 mL / NET: -1185 mL                              12.4   13.63 )-----------( 308      ( 25 Mar 2019 05:01 )             37.5    03-25    140  |  101  |  18  ----------------------------<  128<H>  4.7   |  32<H>  |  0.81    Ca    9.0      25 Mar 2019 05:01  Phos  3.7     03-25  Mg     2.5     03-25           A/P: 62M h/o T2N0 R TVC SCC s/p XRT c/b supraglottic swelling requiring trach 2/2018 decannulated 6/2018 presenting for worsening SOB and hoarseness, found to have progressive chondronecrosis of the right arytenoid with associated abscess.    - Supplemental O2 via humidifed face tent as needed. If continued difficulty breathing, place patient on heliox. If sudden decompensation, patient would be intubated most easily via nasal fiberoptic  - continue decadron 10q8  - Continue Vanc/Flagyl for abscess   - Daily labs, replete lytes prn  - SLP eval - mech soft/thins, will monitor  - IVF while NPO  - pain control prn  - anti-emetics prn  - SCDs, SQH  - will trend exam with serial scope exams

## 2019-03-25 NOTE — SWALLOW FEES ASSESSMENT ADULT - SLP GENERAL OBSERVATIONS
Pt received awake and alert. Education provided to pt re purpose and benefits of FEES. Pt consented to the study and tolerated the passing of the scope.

## 2019-03-25 NOTE — SWALLOW FEES ASSESSMENT ADULT - PHARYNGEAL PHASE COMMENTS
Mild penetration w/ thin liquids x1 noted after the swallow due to spillage of residue from the pyriforms, over the arytenoids. This was noted only once, during the first PO trial, and was likely due to the presence of pooled secretions in the pyriforms at rest. Inconsistent penetration of puree solids observed to laryngeal vestibule, effectively cleared with automatic subsequent swallow. Mild diffuse residue noted with puree and solid consistencies d/t reduced pharyngeal squeeze, at the valleculae, posterior pharyngeal wall, lateral channels, and pyriforms. Residue was effectively cleared w/ subsequent swallow and liquid wash.

## 2019-03-25 NOTE — SWALLOW FEES ASSESSMENT ADULT - COMMENTS
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    Exam:  Requiring supplemental O2 to maintain sat above 92  Inspiratory stridor, hoarse voice  No significant neck masses, trachea midline  Left nasal cavity open, NP clear, BoT without fullness, supraglottis with diffuse radiation changes and edema, epiglottis open with slight thickening, right false cord with significant swelling stable to slightly increased from prior, right arytenoid/AE fold paretic, left true vocal cord/arytenoid/AE fold mobile but appears tethered to paramedian position, limited view of left true cord with right true vocal cord not visualized, airway of at least 2mm with air passage observed, mild pooling of secretions with some aspiration.    CT neck 3/22: Findings are most consistent with progressive right cricoarytenoid chondronecrosis with likely infection given recent development of diffuse edema/enhancement, and presence of 1.1 cm abscess/necrotic collection at the expected location of the cricoarytenoid joint. As result, there is severe laryngeal airway narrowing and tracheostomy may be imminently necessary. Tumoral recurrence is felt less likely given such dramatic change since last month, but clinical inspection with laryngoscopy and possible tissue sampling is advised.     Last FEES conducted 05/2018 at [facility], w/ recs for [facility] soft diet and nectar thick liquids. Reduced airway patency and glottic opening d/t VFs in paramedian position, w/ R vocal fold immobility and L VF w/ reduced mobility for abduction. VFs were erythematous. Significant swelling of b/l arytenoids, R > L and false VFs. Pharyngeal squeeze mildly reduced. Pooling of secretions noted in b/l pyriforms at rest.

## 2019-03-25 NOTE — SWALLOW FEES ASSESSMENT ADULT - RECOMMENDED FEEDING/EATING TECHNIQUES
oral hygiene/allow for swallow between intakes/maintain upright posture during/after eating for 30 mins/no straws/position upright (90 degrees)/alternate food with liquid/small sips/bites

## 2019-03-26 LAB
ANION GAP SERPL CALC-SCNC: 9 MMOL/L — SIGNIFICANT CHANGE UP (ref 5–17)
BUN SERPL-MCNC: 18 MG/DL — SIGNIFICANT CHANGE UP (ref 7–23)
CALCIUM SERPL-MCNC: 8.7 MG/DL — SIGNIFICANT CHANGE UP (ref 8.4–10.5)
CHLORIDE SERPL-SCNC: 100 MMOL/L — SIGNIFICANT CHANGE UP (ref 96–108)
CO2 SERPL-SCNC: 30 MMOL/L — SIGNIFICANT CHANGE UP (ref 22–31)
CREAT SERPL-MCNC: 0.79 MG/DL — SIGNIFICANT CHANGE UP (ref 0.5–1.3)
GLUCOSE BLDC GLUCOMTR-MCNC: 104 MG/DL — HIGH (ref 70–99)
GLUCOSE BLDC GLUCOMTR-MCNC: 119 MG/DL — HIGH (ref 70–99)
GLUCOSE BLDC GLUCOMTR-MCNC: 124 MG/DL — HIGH (ref 70–99)
GLUCOSE BLDC GLUCOMTR-MCNC: 125 MG/DL — HIGH (ref 70–99)
GLUCOSE SERPL-MCNC: 142 MG/DL — HIGH (ref 70–99)
HCT VFR BLD CALC: 38.1 % — LOW (ref 39–50)
HGB BLD-MCNC: 12.9 G/DL — LOW (ref 13–17)
MAGNESIUM SERPL-MCNC: 2.5 MG/DL — SIGNIFICANT CHANGE UP (ref 1.6–2.6)
MCHC RBC-ENTMCNC: 30.7 PG — SIGNIFICANT CHANGE UP (ref 27–34)
MCHC RBC-ENTMCNC: 33.9 GM/DL — SIGNIFICANT CHANGE UP (ref 32–36)
MCV RBC AUTO: 90.7 FL — SIGNIFICANT CHANGE UP (ref 80–100)
NRBC # BLD: 0 /100 WBCS — SIGNIFICANT CHANGE UP (ref 0–0)
PHOSPHATE SERPL-MCNC: 3.6 MG/DL — SIGNIFICANT CHANGE UP (ref 2.5–4.5)
PLATELET # BLD AUTO: 310 K/UL — SIGNIFICANT CHANGE UP (ref 150–400)
POTASSIUM SERPL-MCNC: 4.4 MMOL/L — SIGNIFICANT CHANGE UP (ref 3.5–5.3)
POTASSIUM SERPL-SCNC: 4.4 MMOL/L — SIGNIFICANT CHANGE UP (ref 3.5–5.3)
RBC # BLD: 4.2 M/UL — SIGNIFICANT CHANGE UP (ref 4.2–5.8)
RBC # FLD: 13.1 % — SIGNIFICANT CHANGE UP (ref 10.3–14.5)
SODIUM SERPL-SCNC: 139 MMOL/L — SIGNIFICANT CHANGE UP (ref 135–145)
WBC # BLD: 10.18 K/UL — SIGNIFICANT CHANGE UP (ref 3.8–10.5)
WBC # FLD AUTO: 10.18 K/UL — SIGNIFICANT CHANGE UP (ref 3.8–10.5)

## 2019-03-26 PROCEDURE — 70491 CT SOFT TISSUE NECK W/DYE: CPT | Mod: 26

## 2019-03-26 PROCEDURE — 99232 SBSQ HOSP IP/OBS MODERATE 35: CPT | Mod: GC

## 2019-03-26 RX ORDER — HEPARIN SODIUM 5000 [USP'U]/ML
5000 INJECTION INTRAVENOUS; SUBCUTANEOUS EVERY 8 HOURS
Qty: 0 | Refills: 0 | Status: DISCONTINUED | OUTPATIENT
Start: 2019-03-26 | End: 2019-03-29

## 2019-03-26 RX ORDER — PANTOPRAZOLE SODIUM 20 MG/1
40 TABLET, DELAYED RELEASE ORAL DAILY
Qty: 0 | Refills: 0 | Status: DISCONTINUED | OUTPATIENT
Start: 2019-03-26 | End: 2019-03-29

## 2019-03-26 RX ADMIN — HEPARIN SODIUM 5000 UNIT(S): 5000 INJECTION INTRAVENOUS; SUBCUTANEOUS at 22:19

## 2019-03-26 RX ADMIN — Medication 102 MILLIGRAM(S): at 15:05

## 2019-03-26 RX ADMIN — PANTOPRAZOLE SODIUM 40 MILLIGRAM(S): 20 TABLET, DELAYED RELEASE ORAL at 13:04

## 2019-03-26 RX ADMIN — Medication 250 MILLIGRAM(S): at 07:00

## 2019-03-26 RX ADMIN — Medication 100 MILLIGRAM(S): at 05:50

## 2019-03-26 RX ADMIN — HEPARIN SODIUM 5000 UNIT(S): 5000 INJECTION INTRAVENOUS; SUBCUTANEOUS at 13:04

## 2019-03-26 RX ADMIN — Medication 100 MILLIGRAM(S): at 22:49

## 2019-03-26 RX ADMIN — Medication 102 MILLIGRAM(S): at 05:49

## 2019-03-26 RX ADMIN — Medication 100 MILLIGRAM(S): at 13:04

## 2019-03-26 RX ADMIN — Medication 100 MILLIGRAM(S): at 22:18

## 2019-03-26 RX ADMIN — Medication 250 MILLIGRAM(S): at 17:24

## 2019-03-26 RX ADMIN — Medication 102 MILLIGRAM(S): at 22:18

## 2019-03-26 RX ADMIN — SENNA PLUS 2 TABLET(S): 8.6 TABLET ORAL at 22:18

## 2019-03-26 NOTE — PHYSICAL THERAPY INITIAL EVALUATION ADULT - ADDITIONAL COMMENTS
Pt lives with his brother in a 5th floor walkup. Ambulates independently at baseline with no DME. Pt works at [employer] and walks 10 blocks to and from work every day.

## 2019-03-26 NOTE — PROGRESS NOTE ADULT - ASSESSMENT
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 If requires intubation will need nasal airway  FENGI: mechanical soft with thin liquids  : Voids   Endo: ISS  ID: throat abscess vanc (3/22--), flagyl (3/22--) // s/p clinda x 1 in ED  Ppx: SCDs, hold SQH  Lines: PIVs 62M h/o R TVC SCC s/p XRT c/b supraglottic swelling requiring trach 2/2018 decannulated 6/2018 presenting for worsening SOB found to have cricoarytenoid chondronecrosis with abscess in SICU for airway monitoring   Neuro: tylenol prn  HEENT: decadron 10q8  CV: HD stable, MAP > 65  Pulm: RA. If requires intubation will need nasal airway  FENGI: mechanical soft with thin liquids  : Voids   Endo: ISS  ID: throat abscess vanc (3/22--), flagyl (3/22--) // s/p clinda x 1 in ED  Ppx: SCDs, hold SQH  Lines: PIVs

## 2019-03-26 NOTE — PROGRESS NOTE ADULT - SUBJECTIVE AND OBJECTIVE BOX
INTERVAL HPI/OVERNIGHT EVENTS:     FEES yesterday, claered for soft mechanical diet wit liquids. For stepdown today    Pt seen and examined on AM rounds. Denies SOB, no complaints.      MEDICATIONS  (STANDING):  dexamethasone  IVPB 10 milliGRAM(s) IV Intermittent every 8 hours  insulin lispro (HumaLOG) corrective regimen sliding scale   SubCutaneous Before meals and at bedtime  metroNIDAZOLE  IVPB 500 milliGRAM(s) IV Intermittent every 8 hours  vancomycin  IVPB 1000 milliGRAM(s) IV Intermittent every 12 hours    MEDICATIONS  (PRN):      Drug Dosing Weight  Height (cm): 180.34 (22 Mar 2019 17:45)  Weight (kg): 72.9 (22 Mar 2019 08:46)  BMI (kg/m2): 22.4 (22 Mar 2019 17:45)  BSA (m2): 1.92 (22 Mar 2019 17:45)      PAST MEDICAL & SURGICAL HISTORY:  SOB (shortness of breath)  Throat cancer  Jaundice: 1965  Fracture: left ankle  Laryngeal mass: s/ p radiation  Surgery, elective: direct laryngoscopy with biopsy- 4/2017  Status post surgery: inguinal hernia  Status post surgery: Left ankle surgical repair  x2 (1965)    ICU Vital Signs Last 24 Hrs  T(C): 36.2 (26 Mar 2019 01:47), Max: 36.7 (25 Mar 2019 14:46)  T(F): 97.2 (26 Mar 2019 01:47), Max: 98 (25 Mar 2019 14:46)  HR: 58 (26 Mar 2019 06:00) (54 - 82)  BP: 100/84 (26 Mar 2019 06:00) (90/57 - 127/79)  BP(mean): 90 (26 Mar 2019 06:00) (67 - 96)  ABP: --  ABP(mean): --  RR: 16 (26 Mar 2019 06:00) (10 - 42)  SpO2: 93% (26 Mar 2019 06:00) (93% - 97%)    	    PHYSICAL EXAM:  Gen: NAD, sitting up in bed   Neuro: A&Ox3, hoarse voice but otherwise no focal deficits  HEENT: MMM, poor dentition  CV: RRR  Pulm: CTAx2, no stridor or respiratory distress  Abd: Soft, NT/ND  Ext: no edema, WWP  Skin: no rash                               12.9   10.18 )-----------( 310      ( 26 Mar 2019 06:03 )             38.1     03-25    140  |  101  |  18  ----------------------------<  128<H>  4.7   |  32<H>  |  0.81    Ca    9.0      25 Mar 2019 05:01  Phos  3.7     03-25  Mg     2.5     03-25 INTERVAL HPI/OVERNIGHT EVENTS:     FEES yesterday, claered for soft mechanical diet wit liquids. For stepdown today    Pt seen and examined on AM rounds. Denies SOB,CP no complaints.      MEDICATIONS  (STANDING):  dexamethasone  IVPB 10 milliGRAM(s) IV Intermittent every 8 hours  insulin lispro (HumaLOG) corrective regimen sliding scale   SubCutaneous Before meals and at bedtime  metroNIDAZOLE  IVPB 500 milliGRAM(s) IV Intermittent every 8 hours  vancomycin  IVPB 1000 milliGRAM(s) IV Intermittent every 12 hours    MEDICATIONS  (PRN):      Drug Dosing Weight  Height (cm): 180.34 (22 Mar 2019 17:45)  Weight (kg): 72.9 (22 Mar 2019 08:46)  BMI (kg/m2): 22.4 (22 Mar 2019 17:45)  BSA (m2): 1.92 (22 Mar 2019 17:45)      PAST MEDICAL & SURGICAL HISTORY:  SOB (shortness of breath)  Throat cancer  Jaundice: 1965  Fracture: left ankle  Laryngeal mass: s/ p radiation  Surgery, elective: direct laryngoscopy with biopsy- 4/2017  Status post surgery: inguinal hernia  Status post surgery: Left ankle surgical repair  x2 (1965)    ICU Vital Signs Last 24 Hrs  T(C): 36.2 (26 Mar 2019 01:47), Max: 36.7 (25 Mar 2019 14:46)  T(F): 97.2 (26 Mar 2019 01:47), Max: 98 (25 Mar 2019 14:46)  HR: 58 (26 Mar 2019 06:00) (54 - 82)  BP: 100/84 (26 Mar 2019 06:00) (90/57 - 127/79)  BP(mean): 90 (26 Mar 2019 06:00) (67 - 96)  ABP: --  ABP(mean): --  RR: 16 (26 Mar 2019 06:00) (10 - 42)  SpO2: 93% (26 Mar 2019 06:00) (93% - 97%)    	    PHYSICAL EXAM:  Gen: NAD, sitting up in bed   Neuro: A&Ox3, hoarse voice but otherwise no focal deficits  HEENT: PERRL, EOMI, MMM, poor dentition  CV: RRR  Pulm: CTAx2, no stridor or respiratory distress  Abd: Soft, NT/ND  Ext: no edema, WWP  Vasc: 2+ peripheral pulses  Skin: no rash                               12.9   10.18 )-----------( 310      ( 26 Mar 2019 06:03 )             38.1     03-25    140  |  101  |  18  ----------------------------<  128<H>  4.7   |  32<H>  |  0.81    Ca    9.0      25 Mar 2019 05:01  Phos  3.7     03-25  Mg     2.5     03-25

## 2019-03-26 NOTE — PHYSICAL THERAPY INITIAL EVALUATION ADULT - PERTINENT HX OF CURRENT PROBLEM, REHAB EVAL
62M h/o T2N0 R TVC SCC s/p XRT completed 10/2017. He presented in 2/2018, on exam was noted to have fullness of the supraglottic tissues as well as necrotic glottic tissue with a significantly narrowed airway, he underwent awake trach, DL, and biopsy in the OR on 2/26/2018, decannulated on 6/13/18 with no issues. 3/22/19 p/w SOB and and increased hoarseness. CT revealing progressive chondronecrosis of the right arytenoid. Although this was seen on prior scans, there is now an associated abscess.

## 2019-03-26 NOTE — PROGRESS NOTE ADULT - ASSESSMENT
ENT Progress Note    ENT Progress Note    HPI:62M h/o T2N0 R TVC SCC s/p XRT completed 10/2017. He presented in 2/2018 with progressive hoarseness, on exam was noted to have fullness of the supraglottic tissues as well as necrotic glottic tissue with a significantly narrowed airway, he underwent awake trach, DL, and biopsy in the OR on 2/26/2018, decannulated on 6/13 with no issues.  Was last seen by Dr. Branch in 12/2018 and told to follow up in April. Was seen by Dr. Reddy on 2/19/19. He had some worsening shortness of breath at that visit but his repeat CT was read as stable.  Today (3/22/19) he is presenting with worsening shortness of breath and increased hoarseness from his baseline. No changes in swallowing but reports some coughing with liquids. Has felt hot recently but no measured fevers. No weight loss. Some mild pain in central throat. CT revealing progressive chondronecrosis of the right arytenoid. Although this was seen on prior scans, there is now an associated abscess.    3/23: PAUL. Voice improved but still hoarse. Reports improvement in dyspnea. Remains on IV steroids and abx. Evaluated by SLP and noted to have clinical s/s aspiration so remains NPO.   3/24: PAUL. BRADYVSS. Sating well on 2L NC. Evaluated by SLP yesterday and with clinical s/s aspiration so remains NPO/IVF. Exam is improving however still with supraglottic edema and significantly narrowed airway. Denies dyspnea, CP. Reports voice almost at baseline.   3/25: MARCO MILLER, patient continuing to improve. S/p FEES today, cleared for [facility] soft and thins. Will continue to monitor in ICU  3/26: MARCO MILLER. Patient did well on [facility] soft/thins, downgraded to SDU yesterday. CT neck with IV con today      PAST MEDICAL & SURGICAL HISTORY:  SOB (shortness of breath)  Throat cancer  Jaundice: 1965  Fracture: left ankle  Laryngeal mass: s/ p radiation  Surgery, elective: direct laryngoscopy with biopsy- 4/2017  Status post surgery: inguinal hernia  Status post surgery: Left ankle surgical repair  x2 (1965)    Allergies    penicillin (Rash)    Intolerances      MEDICATIONS  (STANDING):  dexamethasone  IVPB 10 milliGRAM(s) IV Intermittent every 8 hours  docusate sodium 100 milliGRAM(s) Oral three times a day  insulin lispro (HumaLOG) corrective regimen sliding scale   SubCutaneous Before meals and at bedtime  metroNIDAZOLE  IVPB 500 milliGRAM(s) IV Intermittent every 8 hours  senna 2 Tablet(s) Oral at bedtime  vancomycin  IVPB 1000 milliGRAM(s) IV Intermittent every 12 hours    MEDICATIONS  (PRN):        Vital Signs Last 24 Hrs  T(C): 36.3 (26 Mar 2019 06:34), Max: 36.7 (25 Mar 2019 14:46)  T(F): 97.3 (26 Mar 2019 06:34), Max: 98 (25 Mar 2019 14:46)  HR: 56 (26 Mar 2019 07:00) (54 - 82)  BP: 113/67 (26 Mar 2019 07:00) (90/57 - 127/79)  BP(mean): 79 (26 Mar 2019 07:00) (67 - 96)  RR: 17 (26 Mar 2019 07:00) (10 - 41)  SpO2: 96% (26 Mar 2019 07:00) (93% - 97%)    PE:  NAD, A&Ox3  No remaining stridor, hoarse voice although improved from prior  No significant neck masses, trachea midline  FFL 3/24: Left nasal cavity open, NP clear, BoT without fullness, supraglottis with diffuse radiation changes, slightly thickened epiglottis but no edema, vallecula clear, b/l false cord with swelling slightly improved from prior, able to view anterior aspect of L TVF, right arytenoid/AE fold paretic with edema/fullness slightly improve from prior but with some moderate hooding over glottis, left arytneoid edematous improved from prior, left true vocal cord/arytenoid/AE fold minimally mobile but appears tethered to paramedian position, airway of at least 2mm with air passage observed, mild pooling of secretions b/l piriforms with some aspiration    03-25-19 @ 07:01  -  03-26-19 @ 07:00  --------------------------------------------------------  IN: 300 mL / OUT: 1925 mL / NET: -1625 mL                              12.9   10.18 )-----------( 310      ( 26 Mar 2019 06:03 )             38.1    03-26    139  |  100  |  18  ----------------------------<  142<H>  4.4   |  30  |  0.79    Ca    8.7      26 Mar 2019 06:03  Phos  3.6     03-26  Mg     2.5     03-26           A/P: 62M h/o T2N0 R TVC SCC s/p XRT c/b supraglottic swelling requiring trach 2/2018 decannulated 6/2018 presenting for worsening SOB and hoarseness, found to have progressive chondronecrosis of the right arytenoid with associated abscess.    - Supplemental O2 via humidifed face tent as needed. If continued difficulty breathing, place patient on heliox. If sudden decompensation, patient would be intubated most easily via nasal fiberoptic  - continue decadron 10q8  - Continue Vanc/Flagyl for abscess   - Daily labs, replete lytes prn  - SLP eval - mech soft/thins, will monitor  - pain control prn  - anti-emetics prn  - SCDs, SQH

## 2019-03-27 LAB
ANION GAP SERPL CALC-SCNC: 10 MMOL/L — SIGNIFICANT CHANGE UP (ref 5–17)
BUN SERPL-MCNC: 19 MG/DL — SIGNIFICANT CHANGE UP (ref 7–23)
CALCIUM SERPL-MCNC: 8.5 MG/DL — SIGNIFICANT CHANGE UP (ref 8.4–10.5)
CHLORIDE SERPL-SCNC: 100 MMOL/L — SIGNIFICANT CHANGE UP (ref 96–108)
CO2 SERPL-SCNC: 28 MMOL/L — SIGNIFICANT CHANGE UP (ref 22–31)
CREAT SERPL-MCNC: 0.72 MG/DL — SIGNIFICANT CHANGE UP (ref 0.5–1.3)
GLUCOSE BLDC GLUCOMTR-MCNC: 108 MG/DL — HIGH (ref 70–99)
GLUCOSE BLDC GLUCOMTR-MCNC: 140 MG/DL — HIGH (ref 70–99)
GLUCOSE BLDC GLUCOMTR-MCNC: 80 MG/DL — SIGNIFICANT CHANGE UP (ref 70–99)
GLUCOSE BLDC GLUCOMTR-MCNC: 95 MG/DL — SIGNIFICANT CHANGE UP (ref 70–99)
GLUCOSE SERPL-MCNC: 132 MG/DL — HIGH (ref 70–99)
HCT VFR BLD CALC: 38.4 % — LOW (ref 39–50)
HGB BLD-MCNC: 13 G/DL — SIGNIFICANT CHANGE UP (ref 13–17)
MAGNESIUM SERPL-MCNC: 2.5 MG/DL — SIGNIFICANT CHANGE UP (ref 1.6–2.6)
MCHC RBC-ENTMCNC: 30.7 PG — SIGNIFICANT CHANGE UP (ref 27–34)
MCHC RBC-ENTMCNC: 33.9 GM/DL — SIGNIFICANT CHANGE UP (ref 32–36)
MCV RBC AUTO: 90.8 FL — SIGNIFICANT CHANGE UP (ref 80–100)
NRBC # BLD: 0 /100 WBCS — SIGNIFICANT CHANGE UP (ref 0–0)
PHOSPHATE SERPL-MCNC: 3.6 MG/DL — SIGNIFICANT CHANGE UP (ref 2.5–4.5)
PLATELET # BLD AUTO: 297 K/UL — SIGNIFICANT CHANGE UP (ref 150–400)
POTASSIUM SERPL-MCNC: 4.1 MMOL/L — SIGNIFICANT CHANGE UP (ref 3.5–5.3)
POTASSIUM SERPL-SCNC: 4.1 MMOL/L — SIGNIFICANT CHANGE UP (ref 3.5–5.3)
RBC # BLD: 4.23 M/UL — SIGNIFICANT CHANGE UP (ref 4.2–5.8)
RBC # FLD: 12.9 % — SIGNIFICANT CHANGE UP (ref 10.3–14.5)
SODIUM SERPL-SCNC: 138 MMOL/L — SIGNIFICANT CHANGE UP (ref 135–145)
WBC # BLD: 11.44 K/UL — HIGH (ref 3.8–10.5)
WBC # FLD AUTO: 11.44 K/UL — HIGH (ref 3.8–10.5)

## 2019-03-27 RX ORDER — DEXAMETHASONE 0.5 MG/5ML
4 ELIXIR ORAL EVERY 8 HOURS
Qty: 0 | Refills: 0 | Status: COMPLETED | OUTPATIENT
Start: 2019-03-28 | End: 2019-03-28

## 2019-03-27 RX ORDER — DEXAMETHASONE 0.5 MG/5ML
6 ELIXIR ORAL EVERY 8 HOURS
Qty: 0 | Refills: 0 | Status: COMPLETED | OUTPATIENT
Start: 2019-03-27 | End: 2019-03-27

## 2019-03-27 RX ORDER — DEXAMETHASONE 0.5 MG/5ML
8 ELIXIR ORAL EVERY 8 HOURS
Qty: 0 | Refills: 0 | Status: COMPLETED | OUTPATIENT
Start: 2019-03-27 | End: 2019-03-27

## 2019-03-27 RX ORDER — DEXAMETHASONE 0.5 MG/5ML
2 ELIXIR ORAL EVERY 8 HOURS
Qty: 0 | Refills: 0 | Status: COMPLETED | OUTPATIENT
Start: 2019-03-28 | End: 2019-03-28

## 2019-03-27 RX ORDER — DEXAMETHASONE 0.5 MG/5ML
ELIXIR ORAL
Qty: 0 | Refills: 0 | Status: COMPLETED | OUTPATIENT
Start: 2019-03-27 | End: 2019-03-28

## 2019-03-27 RX ADMIN — HEPARIN SODIUM 5000 UNIT(S): 5000 INJECTION INTRAVENOUS; SUBCUTANEOUS at 22:27

## 2019-03-27 RX ADMIN — Medication 102 MILLIGRAM(S): at 05:25

## 2019-03-27 RX ADMIN — Medication 100 MILLIGRAM(S): at 05:25

## 2019-03-27 RX ADMIN — Medication 100 MILLIGRAM(S): at 14:37

## 2019-03-27 RX ADMIN — Medication 100 MILLIGRAM(S): at 05:51

## 2019-03-27 RX ADMIN — Medication 100 MILLIGRAM(S): at 22:27

## 2019-03-27 RX ADMIN — Medication 250 MILLIGRAM(S): at 19:16

## 2019-03-27 RX ADMIN — SENNA PLUS 2 TABLET(S): 8.6 TABLET ORAL at 22:27

## 2019-03-27 RX ADMIN — Medication 101.2 MILLIGRAM(S): at 21:51

## 2019-03-27 RX ADMIN — HEPARIN SODIUM 5000 UNIT(S): 5000 INJECTION INTRAVENOUS; SUBCUTANEOUS at 14:37

## 2019-03-27 RX ADMIN — PANTOPRAZOLE SODIUM 40 MILLIGRAM(S): 20 TABLET, DELAYED RELEASE ORAL at 12:40

## 2019-03-27 RX ADMIN — Medication 250 MILLIGRAM(S): at 07:04

## 2019-03-27 RX ADMIN — HEPARIN SODIUM 5000 UNIT(S): 5000 INJECTION INTRAVENOUS; SUBCUTANEOUS at 05:26

## 2019-03-27 RX ADMIN — Medication 101.6 MILLIGRAM(S): at 14:39

## 2019-03-27 RX ADMIN — Medication 100 MILLIGRAM(S): at 14:39

## 2019-03-27 NOTE — PROGRESS NOTE ADULT - ATTENDING COMMENTS
Mr Alfonso continues to do well, he has no stridor, no SOB, not using accessory muscles of respiration.  His CT shows decreased laryngeal edema but continued chondronecrosis.  I went to radiology and reviewed the imaging with Aldair Goodman and Juancho to characterize the necrosis and discuss risk that this could also represent recurrent disease though it seems less likely based upon clinical picture.  We agreed that a PET/CT would be best management once infection clears up, will plan to do this during outpatient followup
Walt is speaking and breathing with more ease today, laryngoscopic exam has improved, tolerated mechanical soft diet without choking or aspirating, ok to t/f to SDU, plan to repeat CT in 1-2 days
Crycoarytenoid condronecrosis.  Encourage PO intake; passed bedside S&S. Stable overnight.    30 minutes critical time spent.
Patient seen and examined with house-staff during bedside rounds.  Resident note read, including vitals, physical findings, laboratory data, and radiological reports.   Revisions included below.  Direct personal management at bed side and extensive interpretation of the data.  Plan was outlined and discussed in details with the housestaff.  Decision making of high complexity  Action taken for acute disease activity to reflect the level of care provided:  - medication reconciliation  - review laboratory data  No airway disorder  hemodynamically stable  Passed swallow eval   OOB in chair  will discuss with ENT regarding transfer
Crycoarytenoid condronecrosis. Stable overnight.    30 minutes critical time spent.

## 2019-03-27 NOTE — PROGRESS NOTE ADULT - ASSESSMENT
ENT Progress Note    HPI:62M h/o T2N0 R TVC SCC s/p XRT completed 10/2017. He presented in 2/2018 with progressive hoarseness, on exam was noted to have fullness of the supraglottic tissues as well as necrotic glottic tissue with a significantly narrowed airway, he underwent awake trach, DL, and biopsy in the OR on 2/26/2018, decannulated on 6/13 with no issues.  Was last seen by Dr. Branch in 12/2018 and told to follow up in April. Was seen by Dr. Reddy on 2/19/19. He had some worsening shortness of breath at that visit but his repeat CT was read as stable.  Today (3/22/19) he is presenting with worsening shortness of breath and increased hoarseness from his baseline. No changes in swallowing but reports some coughing with liquids. Has felt hot recently but no measured fevers. No weight loss. Some mild pain in central throat. CT revealing progressive chondronecrosis of the right arytenoid. Although this was seen on prior scans, there is now an associated abscess.    3/23: PAUL. Voice improved but still hoarse. Reports improvement in dyspnea. Remains on IV steroids and abx. Evaluated by SLP and noted to have clinical s/s aspiration so remains NPO.   3/24: PAUL. AFVSS. Sating well on 2L NC. Evaluated by SLP yesterday and with clinical s/s aspiration so remains NPO/IVF. Exam is improving however still with supraglottic edema and significantly narrowed airway. Denies dyspnea, CP. Reports voice almost at baseline.   3/25: MARCO MILLER, patient continuing to improve. S/p FEES today, cleared for [facility] soft and thins. Will continue to monitor in ICU  3/26: MARCO MILLER. Patient did well on [facility] soft/thins, downgraded to SDU yesterday. CT neck with IV con today  3/27: MARCO MILLER. Beginning to taper steroids. CT neck reviewed, airway more patent, but arytenoid collection remains.      PAST MEDICAL & SURGICAL HISTORY:  SOB (shortness of breath)  Throat cancer  Jaundice: 1965  Fracture: left ankle  Laryngeal mass: s/ p radiation  Surgery, elective: direct laryngoscopy with biopsy- 4/2017  Status post surgery: inguinal hernia  Status post surgery: Left ankle surgical repair  x2 (1965)    Allergies    penicillin (Rash)    Intolerances      MEDICATIONS  (STANDING):  dexamethasone  IVPB   IV Intermittent   dexamethasone  IVPB 8 milliGRAM(s) IV Intermittent every 8 hours  dexamethasone  IVPB 6 milliGRAM(s) IV Intermittent every 8 hours  docusate sodium 100 milliGRAM(s) Oral three times a day  heparin  Injectable 5000 Unit(s) SubCutaneous every 8 hours  insulin lispro (HumaLOG) corrective regimen sliding scale   SubCutaneous Before meals and at bedtime  metroNIDAZOLE  IVPB 500 milliGRAM(s) IV Intermittent every 8 hours  pantoprazole  Injectable 40 milliGRAM(s) IV Push daily  senna 2 Tablet(s) Oral at bedtime  vancomycin  IVPB 1000 milliGRAM(s) IV Intermittent every 12 hours    MEDICATIONS  (PRN):        Vital Signs Last 24 Hrs  T(C): 36.1 (27 Mar 2019 04:59), Max: 36.4 (26 Mar 2019 09:22)  T(F): 97 (27 Mar 2019 04:59), Max: 97.5 (26 Mar 2019 09:22)  HR: 58 (27 Mar 2019 04:05) (56 - 76)  BP: 123/74 (27 Mar 2019 04:05) (91/53 - 130/75)  BP(mean): 92 (27 Mar 2019 04:05) (63 - 102)  RR: 16 (27 Mar 2019 04:05) (9 - 28)  SpO2: 98% (27 Mar 2019 04:05) (92% - 99%)    NAD, A&Ox3  No remaining stridor, hoarse voice although improved from prior  No significant neck masses, trachea midline  FFL 3/24: Left nasal cavity open, NP clear, BoT without fullness, supraglottis with diffuse radiation changes, slightly thickened epiglottis but no edema, vallecula clear, b/l false cord with swelling slightly improved from prior, able to view anterior aspect of L TVF, right arytenoid/AE fold paretic with edema/fullness slightly improve from prior but with some moderate hooding over glottis, left arytneoid edematous improved from prior, left true vocal cord/arytenoid/AE fold minimally mobile but appears tethered to paramedian position, airway of at least 2mm with air passage observed, mild pooling of secretions b/l piriforms with some aspiration      03-26-19 @ 07:01  -  03-27-19 @ 07:00  --------------------------------------------------------  IN: 1550 mL / OUT: 1700 mL / NET: -150 mL                              13.0   11.44 )-----------( 297      ( 27 Mar 2019 06:20 )             38.4    03-27    138  |  100  |  19  ----------------------------<  132<H>  4.1   |  28  |  0.72    Ca    8.5      27 Mar 2019 06:20  Phos  3.6     03-27  Mg     2.5     03-27           A/P: 62M h/o T2N0 R TVC SCC s/p XRT c/b supraglottic swelling requiring trach 2/2018 decannulated 6/2018 presenting for worsening SOB and hoarseness, found to have progressive chondronecrosis of the right arytenoid with associated abscess.    - Supplemental O2 via humidifed face tent as needed. If continued difficulty breathing, place patient on heliox. If sudden decompensation, patient would be intubated most easily via nasal fiberoptic  - taper decadron  - Continue Vanc/Flagyl for abscess   - Daily labs, replete lytes prn  - SLP eval - mech soft/thins, will monitor  - pain control prn  - anti-emetics prn  - SCDs, SQH

## 2019-03-28 LAB
GLUCOSE BLDC GLUCOMTR-MCNC: 81 MG/DL — SIGNIFICANT CHANGE UP (ref 70–99)
GLUCOSE BLDC GLUCOMTR-MCNC: 84 MG/DL — SIGNIFICANT CHANGE UP (ref 70–99)
GLUCOSE BLDC GLUCOMTR-MCNC: 92 MG/DL — SIGNIFICANT CHANGE UP (ref 70–99)
GLUCOSE BLDC GLUCOMTR-MCNC: 94 MG/DL — SIGNIFICANT CHANGE UP (ref 70–99)

## 2019-03-28 PROCEDURE — 99222 1ST HOSP IP/OBS MODERATE 55: CPT

## 2019-03-28 RX ORDER — CEFPODOXIME PROXETIL 100 MG
200 TABLET ORAL EVERY 12 HOURS
Qty: 0 | Refills: 0 | Status: DISCONTINUED | OUTPATIENT
Start: 2019-03-28 | End: 2019-03-29

## 2019-03-28 RX ORDER — METRONIDAZOLE 500 MG
500 TABLET ORAL EVERY 8 HOURS
Qty: 0 | Refills: 0 | Status: DISCONTINUED | OUTPATIENT
Start: 2019-03-28 | End: 2019-03-29

## 2019-03-28 RX ADMIN — Medication 100 MILLIGRAM(S): at 21:17

## 2019-03-28 RX ADMIN — Medication 500 MILLIGRAM(S): at 21:18

## 2019-03-28 RX ADMIN — HEPARIN SODIUM 5000 UNIT(S): 5000 INJECTION INTRAVENOUS; SUBCUTANEOUS at 22:00

## 2019-03-28 RX ADMIN — Medication 250 MILLIGRAM(S): at 06:46

## 2019-03-28 RX ADMIN — Medication 100 MILLIGRAM(S): at 14:40

## 2019-03-28 RX ADMIN — Medication 200 MILLIGRAM(S): at 17:51

## 2019-03-28 RX ADMIN — SENNA PLUS 2 TABLET(S): 8.6 TABLET ORAL at 21:19

## 2019-03-28 RX ADMIN — Medication 100 MILLIGRAM(S): at 05:42

## 2019-03-28 RX ADMIN — HEPARIN SODIUM 5000 UNIT(S): 5000 INJECTION INTRAVENOUS; SUBCUTANEOUS at 14:40

## 2019-03-28 RX ADMIN — Medication 102 MILLIGRAM(S): at 04:46

## 2019-03-28 RX ADMIN — HEPARIN SODIUM 5000 UNIT(S): 5000 INJECTION INTRAVENOUS; SUBCUTANEOUS at 05:42

## 2019-03-28 RX ADMIN — PANTOPRAZOLE SODIUM 40 MILLIGRAM(S): 20 TABLET, DELAYED RELEASE ORAL at 11:26

## 2019-03-28 RX ADMIN — Medication 101 MILLIGRAM(S): at 14:40

## 2019-03-28 NOTE — CONSULT NOTE ADULT - SUBJECTIVE AND OBJECTIVE BOX
INTERVAL HPI/OVERNIGHT EVENTS:    ANTIBIOTICS/RELEVANT:  metroNIDAZOLE  IVPB 500 milliGRAM(s) IV Intermittent every 8 hours  vancomycin  IVPB 1000 milliGRAM(s) IV Intermittent every 12 hours    Vital Signs Last 24 Hrs  T(C): 36.4 (28 Mar 2019 08:51), Max: 36.8 (27 Mar 2019 14:00)  T(F): 97.5 (28 Mar 2019 08:51), Max: 98.2 (27 Mar 2019 14:00)  HR: 68 (28 Mar 2019 08:12) (54 - 68)  BP: 126/76 (28 Mar 2019 08:12) (117/69 - 138/81)  BP(mean): 94 (28 Mar 2019 08:12) (87 - 103)  RR: 16 (28 Mar 2019 08:12) (16 - 18)  SpO2: 95% (28 Mar 2019 08:12) (90% - 96%)    PHYSICAL EXAM: Not yet evaluated.      LABS:                        13.0   11.44 )-----------( 297      ( 27 Mar 2019 06:20 )             38.4     03-27    138  |  100  |  19  ----------------------------<  132<H>  4.1   |  28  |  0.72    Ca    8.5      27 Mar 2019 06:20  Phos  3.6     03-27  Mg     2.5     03-27    MICROBIOLOGY:      RADIOLOGY & ADDITIONAL STUDIES:  CT Neck Soft Tissue w/ IV Cont (03.26.19 @ 13:38) >  IMPRESSION:    Since 3/22/2019, there is decreased diffuse swelling and improved patency   of the laryngeal airway. However, there is mild enlargement of apparent   abscess or necrotic collection that replaces the right arytenoid   cartilage and cricoarytenoid joint. Again, there are chronic and   progressive findings of chondronecrosis which now involves the majority   of the right polly-cricoid.    Persistent enhancement and nodularity of the right true vocal cord for   which direct visualization is advised. Once inflammation subsides, PET-CT   may be able to help discriminate between chondronecrosis and potential   recurrent tumor. Patient is a 62 year old male with past medical history of SCC (diagnosed in 2017) with 7 week course of radiation (10/2017),     ANTIBIOTICS/RELEVANT:  metroNIDAZOLE  IVPB 500 milliGRAM(s) IV Intermittent every 8 hours  vancomycin  IVPB 1000 milliGRAM(s) IV Intermittent every 12 hours    Vital Signs Last 24 Hrs  T(C): 36.4 (28 Mar 2019 08:51), Max: 36.8 (27 Mar 2019 14:00)  T(F): 97.5 (28 Mar 2019 08:51), Max: 98.2 (27 Mar 2019 14:00)  HR: 68 (28 Mar 2019 08:12) (54 - 68)  BP: 126/76 (28 Mar 2019 08:12) (117/69 - 138/81)  BP(mean): 94 (28 Mar 2019 08:12) (87 - 103)  RR: 16 (28 Mar 2019 08:12) (16 - 18)  SpO2: 95% (28 Mar 2019 08:12) (90% - 96%)    PHYSICAL EXAM: Not yet evaluated.      LABS:                        13.0   11.44 )-----------( 297      ( 27 Mar 2019 06:20 )             38.4     03-27    138  |  100  |  19  ----------------------------<  132<H>  4.1   |  28  |  0.72    Ca    8.5      27 Mar 2019 06:20  Phos  3.6     03-27  Mg     2.5     03-27    MICROBIOLOGY:      RADIOLOGY & ADDITIONAL STUDIES:  CT Neck Soft Tissue w/ IV Cont (03.26.19 @ 13:38) >  IMPRESSION:    Since 3/22/2019, there is decreased diffuse swelling and improved patency   of the laryngeal airway. However, there is mild enlargement of apparent   abscess or necrotic collection that replaces the right arytenoid   cartilage and cricoarytenoid joint. Again, there are chronic and   progressive findings of chondronecrosis which now involves the majority   of the right polly-cricoid.    Persistent enhancement and nodularity of the right true vocal cord for   which direct visualization is advised. Once inflammation subsides, PET-CT   may be able to help discriminate between chondronecrosis and potential   recurrent tumor. Patient is a 62 year old male with past medical history of SCC (diagnosed in 2017) with 7 week course of radiation (10/2017), trach with decannulation in 2018 who presented on 3/22 with shortness of breath and difficulty swallowing fluids. CT on admission showing right cricoarytenoid chondronecrosis with diffuse edema concerning for infection in setting of a 1.1cm collection c/f abscess v tumor. During hospital course, swelling improved with course of steroids; flagyl and vancomycin now day 7 with repeat CT scan showing mild increase in collection size. Intention to obtain PET scan in outpatient setting when inflammation resolved and, depending on results, to obtain biopsy of collection. Patient afebrile during hospital stay with leukocytosis reflective of steroid use. ID consulted to recommend outpatient oral regimen for infected cricoid tissue.        ANTIBIOTICS/RELEVANT:  metroNIDAZOLE  IVPB 500 milliGRAM(s) IV Intermittent every 8 hours  vancomycin  IVPB 1000 milliGRAM(s) IV Intermittent every 12 hours    Vital Signs Last 24 Hrs  T(C): 36.4 (28 Mar 2019 08:51), Max: 36.8 (27 Mar 2019 14:00)  T(F): 97.5 (28 Mar 2019 08:51), Max: 98.2 (27 Mar 2019 14:00)  HR: 68 (28 Mar 2019 08:12) (54 - 68)  BP: 126/76 (28 Mar 2019 08:12) (117/69 - 138/81)  BP(mean): 94 (28 Mar 2019 08:12) (87 - 103)  RR: 16 (28 Mar 2019 08:12) (16 - 18)  SpO2: 95% (28 Mar 2019 08:12) (90% - 96%)    PHYSICAL EXAM:   Constitutional: WDWN sitting in chair, eating soft diet without difficulty  Eyes: PERRL, EOMI  ENT: glossy buccal mucosa without signs of bleeding, noninflamed tonsils with normal swallow  Neck: radiation changes to skin with puckering just above the cricoid, no enlarged sublingual or cervical lymph nodes appreciated, no stridor   Respiratory: mouth breathing but no accessory muscle, CTAB  Cardiac: +S1/S2; RRR; no M/R/G  Gastrointestinal: soft, NT/ND; no rebound or guarding; +BSx4 hypoactive  Extremities: WWP, no clubbing or cyanosis; no peripheral edema  Vascular: 2+ radial, femoral, DP/PT pulses B/L  Neurologic: AAOx3; CNII-XII grossly intact; no focal deficits  Psychiatric: affect and characteristics of appearance, verbalizations, behaviors are appropriate    LABS:                        13.0   11.44 )-----------( 297      ( 27 Mar 2019 06:20 )             38.4     03-27    138  |  100  |  19  ----------------------------<  132<H>  4.1   |  28  |  0.72    Ca    8.5      27 Mar 2019 06:20  Phos  3.6     03-27  Mg     2.5     03-27    MICROBIOLOGY: None.    RADIOLOGY & ADDITIONAL STUDIES:  CT Neck Soft Tissue w/ IV Cont (03.26.19 @ 13:38) >  IMPRESSION:    Since 3/22/2019, there is decreased diffuse swelling and improved patency   of the laryngeal airway. However, there is mild enlargement of apparent   abscess or necrotic collection that replaces the right arytenoid   cartilage and cricoarytenoid joint. Again, there are chronic and   progressive findings of chondronecrosis which now involves the majority   of the right polly-cricoid.    Persistent enhancement and nodularity of the right true vocal cord for   which direct visualization is advised. Once inflammation subsides, PET-CT   may be able to help discriminate between chondronecrosis and potential   recurrent tumor.    FFL 3/24: Left nasal cavity open, NP clear, BoT without fullness, supraglottis with diffuse radiation changes, slightly thickened epiglottis but no edema, vallecula clear, b/l false cord with swelling slightly improved from prior, able to view anterior aspect of L TVF, right arytenoid/AE fold paretic with edema/fullness slightly improve from prior but with some moderate hooding over glottis, left arytneoid edematous improved from prior, left true vocal cord/arytenoid/AE fold minimally mobile but appears tethered to paramedian position, airway of at least 2mm with air passage observed, mild pooling of secretions b/l piriforms with some aspiration

## 2019-03-28 NOTE — PROGRESS NOTE ADULT - ASSESSMENT
ENT Progress Note    HPI:62M h/o T2N0 R TVC SCC s/p XRT completed 10/2017. He presented in 2/2018 with progressive hoarseness, on exam was noted to have fullness of the supraglottic tissues as well as necrotic glottic tissue with a significantly narrowed airway, he underwent awake trach, DL, and biopsy in the OR on 2/26/2018, decannulated on 6/13 with no issues.  Was last seen by Dr. Branch in 12/2018 and told to follow up in April. Was seen by Dr. Reddy on 2/19/19. He had some worsening shortness of breath at that visit but his repeat CT was read as stable.  Today (3/22/19) he is presenting with worsening shortness of breath and increased hoarseness from his baseline. No changes in swallowing but reports some coughing with liquids. Has felt hot recently but no measured fevers. No weight loss. Some mild pain in central throat. CT revealing progressive chondronecrosis of the right arytenoid. Although this was seen on prior scans, there is now an associated abscess.    3/23: PAUL. Voice improved but still hoarse. Reports improvement in dyspnea. Remains on IV steroids and abx. Evaluated by SLP and noted to have clinical s/s aspiration so remains NPO.   3/24: PAUL. BRADYVSS. Sating well on 2L NC. Evaluated by SLP yesterday and with clinical s/s aspiration so remains NPO/IVF. Exam is improving however still with supraglottic edema and significantly narrowed airway. Denies dyspnea, CP. Reports voice almost at baseline.   3/25: MARCO MILLER, patient continuing to improve. S/p FEES today, cleared for [facility] soft and thins. Will continue to monitor in ICU  3/26: MARCO MILLER. Patient did well on [facility] soft/thins, downgraded to SDU yesterday. CT neck with IV con today  3/27: MARCO MILLER. Beginning to taper steroids. CT neck reviewed, airway more patent, but arytenoid collection remains.  3/28: MARCO MILLER. Steroid taper complete, ID recs appreciated for PO abx        PAST MEDICAL & SURGICAL HISTORY:  SOB (shortness of breath)  Throat cancer  Jaundice: 1965  Fracture: left ankle  Laryngeal mass: s/ p radiation  Surgery, elective: direct laryngoscopy with biopsy- 4/2017  Status post surgery: inguinal hernia  Status post surgery: Left ankle surgical repair  x2 (1965)    Allergies    penicillin (Rash)    Intolerances      MEDICATIONS  (STANDING):  cefpodoxime 200 milliGRAM(s) Oral every 12 hours  dexamethasone  IVPB   IV Intermittent   dexamethasone  IVPB 2 milliGRAM(s) IV Intermittent every 8 hours  docusate sodium 100 milliGRAM(s) Oral three times a day  heparin  Injectable 5000 Unit(s) SubCutaneous every 8 hours  insulin lispro (HumaLOG) corrective regimen sliding scale   SubCutaneous Before meals and at bedtime  metroNIDAZOLE    Tablet 500 milliGRAM(s) Oral every 8 hours  pantoprazole  Injectable 40 milliGRAM(s) IV Push daily  senna 2 Tablet(s) Oral at bedtime    MEDICATIONS  (PRN):        Vital Signs Last 24 Hrs  T(C): 36.4 (28 Mar 2019 08:51), Max: 36.8 (27 Mar 2019 17:32)  T(F): 97.5 (28 Mar 2019 08:51), Max: 98.2 (27 Mar 2019 17:32)  HR: 72 (28 Mar 2019 11:57) (54 - 72)  BP: 135/83 (28 Mar 2019 11:57) (117/69 - 138/81)  BP(mean): 105 (28 Mar 2019 11:57) (87 - 105)  RR: 16 (28 Mar 2019 11:57) (16 - 18)  SpO2: 95% (28 Mar 2019 11:57) (94% - 96%)    Physical Exam:  NAD, A&Ox3  No remaining stridor, hoarse voice although improved from prior  No significant neck masses, trachea midline  FFL 3/24: Left nasal cavity open, NP clear, BoT without fullness, supraglottis with diffuse radiation changes, slightly thickened epiglottis but no edema, vallecula clear, b/l false cord with swelling slightly improved from prior, able to view anterior aspect of L TVF, right arytenoid/AE fold paretic with edema/fullness slightly improve from prior but with some moderate hooding over glottis, left arytneoid edematous improved from prior, left true vocal cord/arytenoid/AE fold minimally mobile but appears tethered to paramedian position, airway of at least 2mm with air passage observed, mild pooling of secretions b/l piriforms with some aspiration      03-27-19 @ 07:01  -  03-28-19 @ 07:00  --------------------------------------------------------  IN: 2310 mL / OUT: 3500 mL / NET: -1190 mL    03-28-19 @ 07:01  -  03-28-19 @ 14:49  --------------------------------------------------------  IN: 480 mL / OUT: 700 mL / NET: -220 mL                              13.0   11.44 )-----------( 297      ( 27 Mar 2019 06:20 )             38.4    03-27    138  |  100  |  19  ----------------------------<  132<H>  4.1   |  28  |  0.72    Ca    8.5      27 Mar 2019 06:20  Phos  3.6     03-27  Mg     2.5     03-27           A/P: 62M h/o T2N0 R TVC SCC s/p XRT c/b supraglottic swelling requiring trach 2/2018 decannulated 6/2018 presenting for worsening SOB and hoarseness, found to have progressive chondronecrosis of the right arytenoid with associated abscess.    - Supplemental O2 via humidifed face tent as needed. If continued difficulty breathing, place patient on heliox. If sudden decompensation, patient would be intubated most easily via nasal fiberoptic  - transitioned to PO abx per ID recs, will observe for 24 hours given possible PCN allergy  - Daily labs, replete lytes prn  - SLP eval - mech soft/thins, will monitor  - pain control prn  - anti-emetics prn  - SCDs, SQH

## 2019-03-28 NOTE — CONSULT NOTE ADULT - ATTENDING COMMENTS
Agree with above.  Patient does not have a penicillin allergy.  He reports his cousin had one as a child so he always told people he had one without ever taking a penicillin that he knows of.  It is reasonable to start him on beta-lactams.  Would recommend Cefpodoxime and metronidazole for oral vikki coverage, like bacteria have come from oral cavity.

## 2019-03-28 NOTE — CONSULT NOTE ADULT - ASSESSMENT
62M h/o R TVC SCC s/p XRT c/b supraglottic swelling requiring trach 2/2018 decannulated 6/2018 presenting for worsening SOB found to have cricoarytenoid chondronecrosis in SICU for airway monitoring   Neuro: tylenol prn  HEENT: decadron 10q8  CV: HD stable, MAP>65  Pulm: RA. If requires intubation will need nasal airway  FENGI: NPO pending SLP  : Voids  Endo: ISS  ID: throat abscess vanc (3/22--), flagyl (3/22--) // s/p clinda x 1 in ED  Ppx: SCDs, hold SQH  Lines: PIVs  Wounds: none
Patient is a 62 year old male with past medical history of SCC (diagnosed in 2017) with 7 week course of radiation (10/2017), trach with decannulation in 2018 who presented on 3/22 with CT c/f infection of chronic chondronecrosis with possible abscess.     1. Cefpodoxime 200mg q12 for 10 day course to cover strep and other oral bacteria (2/29-4/8)  2. Flagyl 500mg q8 hours for 10 day course to cover oral anaerobes (2/29-4/8)    Thank you for the opportunity to participate in this patient's care. Please reconsult with any additional questions.

## 2019-03-29 ENCOUNTER — TRANSCRIPTION ENCOUNTER (OUTPATIENT)
Age: 64
End: 2019-03-29

## 2019-03-29 VITALS — TEMPERATURE: 97 F

## 2019-03-29 LAB — GLUCOSE BLDC GLUCOMTR-MCNC: 77 MG/DL — SIGNIFICANT CHANGE UP (ref 70–99)

## 2019-03-29 PROCEDURE — 84484 ASSAY OF TROPONIN QUANT: CPT

## 2019-03-29 PROCEDURE — 96365 THER/PROPH/DIAG IV INF INIT: CPT | Mod: XU

## 2019-03-29 PROCEDURE — 85025 COMPLETE CBC W/AUTO DIFF WBC: CPT

## 2019-03-29 PROCEDURE — 82962 GLUCOSE BLOOD TEST: CPT

## 2019-03-29 PROCEDURE — 80202 ASSAY OF VANCOMYCIN: CPT

## 2019-03-29 PROCEDURE — 82550 ASSAY OF CK (CPK): CPT

## 2019-03-29 PROCEDURE — 99285 EMERGENCY DEPT VISIT HI MDM: CPT | Mod: 25

## 2019-03-29 PROCEDURE — 71045 X-RAY EXAM CHEST 1 VIEW: CPT

## 2019-03-29 PROCEDURE — 92612 ENDOSCOPY SWALLOW (FEES) VID: CPT

## 2019-03-29 PROCEDURE — 82553 CREATINE MB FRACTION: CPT

## 2019-03-29 PROCEDURE — 82803 BLOOD GASES ANY COMBINATION: CPT

## 2019-03-29 PROCEDURE — 83735 ASSAY OF MAGNESIUM: CPT

## 2019-03-29 PROCEDURE — 87631 RESP VIRUS 3-5 TARGETS: CPT

## 2019-03-29 PROCEDURE — 36415 COLL VENOUS BLD VENIPUNCTURE: CPT

## 2019-03-29 PROCEDURE — 85027 COMPLETE CBC AUTOMATED: CPT

## 2019-03-29 PROCEDURE — 86803 HEPATITIS C AB TEST: CPT

## 2019-03-29 PROCEDURE — 96375 TX/PRO/DX INJ NEW DRUG ADDON: CPT | Mod: XU

## 2019-03-29 PROCEDURE — 97161 PT EVAL LOW COMPLEX 20 MIN: CPT

## 2019-03-29 PROCEDURE — 70491 CT SOFT TISSUE NECK W/DYE: CPT

## 2019-03-29 PROCEDURE — 84132 ASSAY OF SERUM POTASSIUM: CPT

## 2019-03-29 PROCEDURE — 94640 AIRWAY INHALATION TREATMENT: CPT

## 2019-03-29 PROCEDURE — 84295 ASSAY OF SERUM SODIUM: CPT

## 2019-03-29 PROCEDURE — 92526 ORAL FUNCTION THERAPY: CPT

## 2019-03-29 PROCEDURE — 92610 EVALUATE SWALLOWING FUNCTION: CPT

## 2019-03-29 PROCEDURE — 82330 ASSAY OF CALCIUM: CPT

## 2019-03-29 PROCEDURE — 80048 BASIC METABOLIC PNL TOTAL CA: CPT

## 2019-03-29 PROCEDURE — 84100 ASSAY OF PHOSPHORUS: CPT

## 2019-03-29 PROCEDURE — 80053 COMPREHEN METABOLIC PANEL: CPT

## 2019-03-29 RX ORDER — CEFPODOXIME PROXETIL 100 MG
1 TABLET ORAL
Qty: 20 | Refills: 0
Start: 2019-03-29 | End: 2019-04-07

## 2019-03-29 RX ORDER — ACETAMINOPHEN 500 MG
650 TABLET ORAL ONCE
Qty: 0 | Refills: 0 | Status: COMPLETED | OUTPATIENT
Start: 2019-03-29 | End: 2019-03-29

## 2019-03-29 RX ORDER — METRONIDAZOLE 500 MG
1 TABLET ORAL
Qty: 30 | Refills: 0
Start: 2019-03-29 | End: 2019-04-07

## 2019-03-29 RX ADMIN — Medication 650 MILLIGRAM(S): at 11:45

## 2019-03-29 RX ADMIN — HEPARIN SODIUM 5000 UNIT(S): 5000 INJECTION INTRAVENOUS; SUBCUTANEOUS at 06:00

## 2019-03-29 RX ADMIN — Medication 200 MILLIGRAM(S): at 05:30

## 2019-03-29 RX ADMIN — Medication 500 MILLIGRAM(S): at 05:30

## 2019-03-29 NOTE — DISCHARGE NOTE PROVIDER - CARE PROVIDER_API CALL
Lee Branch)  Otolaryngology  [address] [place] [address]  Phone: [phone]  Fax: [phone]  Follow Up Time:

## 2019-03-29 NOTE — DISCHARGE NOTE PROVIDER - HOSPITAL COURSE
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 He presented in 2/2018 with progressive hoarseness, on exam was noted to have fullness of the supraglottic tissues as well as necrotic glottic tissue with a significantly narrowed airway, he underwent awake trach, DL, and biopsy in the OR on 2/26/2018, decannulated on 6/13 with no issues.    Was last seen by Dr. Branch in 12/2018 and told to follow up in April. Was seen by Dr. Reddy on 2/19/19. He had some worsening shortness of breath at that visit but his repeat CT was read as stable.    Today (3/22/19) he is presenting with worsening shortness of breath and increased hoarseness from his baseline. No changes in swallowing but reports some coughing with liquids. Has felt hot recently but no measured fevers. No weight loss. Some mild pain in central throat. CT revealing progressive chondronecrosis of the right arytenoid. Although this was seen on prior scans, there is now an associated abscess.        3/23: PAUL. Voice improved but still hoarse. Reports improvement in dyspnea. Remains on IV steroids and abx. Evaluated by SLP and noted to have clinical s/s aspiration so remains NPO.     3/24: PAUL. BRADYVSS. Sating well on 2L NC. Evaluated by SLP yesterday and with clinical s/s aspiration so remains NPO/IVF. Exam is improving however still with supraglottic edema and significantly narrowed airway. Denies dyspnea, CP. Reports voice almost at baseline.     3/25: MARCO MILLER, patient continuing to improve. S/p FEES today, cleared for [facility] soft and thins. Will continue to monitor in ICU    3/26: MARCO MILLER. Patient did well on [facility] soft/thins, downgraded to SDU yesterday. CT neck with IV con today    3/27: MARCO MILLER. Beginning to taper steroids. CT neck reviewed, airway more patent, but arytenoid collection remains.    3/28: MARCO MILLER. Steroid taper complete, ID recs appreciated for PO abx    3/29: MARCO MILLER, doing well, home on PO abx per ID

## 2019-03-29 NOTE — DISCHARGE NOTE PROVIDER - NSDCCPCAREPLAN_GEN_ALL_CORE_FT
PRINCIPAL DISCHARGE DIAGNOSIS  Diagnosis: Laryngeal chondronecrosis  Assessment and Plan of Treatment:       SECONDARY DISCHARGE DIAGNOSES  Diagnosis: Throat cancer  Assessment and Plan of Treatment:

## 2019-03-29 NOTE — DISCHARGE NOTE NURSING/CASE MANAGEMENT/SOCIAL WORK - NSDCDPATPORTLINK_GEN_ALL_CORE
You can access the [facility] Portal, offered by [facility], by registering with the following website: [email or web address]

## 2019-03-29 NOTE — DISCHARGE NOTE PROVIDER - INSTRUCTIONS
Soft/mechanical soft diet. No sharp foods. No extensive chewing of steaks, hard meats, etc. You can resume a normal diet once your throat feels back to normal.

## 2019-04-01 DIAGNOSIS — R06.02 SHORTNESS OF BREATH: ICD-10-CM

## 2019-04-01 DIAGNOSIS — Z88.0 ALLERGY STATUS TO PENICILLIN: ICD-10-CM

## 2019-04-01 DIAGNOSIS — J38.4 EDEMA OF LARYNX: ICD-10-CM

## 2019-04-01 DIAGNOSIS — C32.0 MALIGNANT NEOPLASM OF GLOTTIS: ICD-10-CM

## 2019-04-01 DIAGNOSIS — R06.1 STRIDOR: ICD-10-CM

## 2019-04-01 DIAGNOSIS — J38.7 OTHER DISEASES OF LARYNX: ICD-10-CM

## 2019-04-01 DIAGNOSIS — Z98.890 OTHER SPECIFIED POSTPROCEDURAL STATES: ICD-10-CM

## 2019-04-01 DIAGNOSIS — Z87.891 PERSONAL HISTORY OF NICOTINE DEPENDENCE: ICD-10-CM

## 2019-04-01 DIAGNOSIS — J38.3 OTHER DISEASES OF VOCAL CORDS: ICD-10-CM

## 2019-04-01 DIAGNOSIS — Z87.81 PERSONAL HISTORY OF (HEALED) TRAUMATIC FRACTURE: ICD-10-CM

## 2019-04-08 ENCOUNTER — APPOINTMENT (OUTPATIENT)
Dept: OTOLARYNGOLOGY | Facility: CLINIC | Age: 64
End: 2019-04-08
Payer: MEDICAID

## 2019-04-08 VITALS
HEIGHT: 71 IN | SYSTOLIC BLOOD PRESSURE: 124 MMHG | BODY MASS INDEX: 23.52 KG/M2 | WEIGHT: 168 LBS | HEART RATE: 80 BPM | DIASTOLIC BLOOD PRESSURE: 93 MMHG

## 2019-04-08 DIAGNOSIS — J38.7 OTHER DISEASES OF LARYNX: ICD-10-CM

## 2019-04-08 PROCEDURE — 99213 OFFICE O/P EST LOW 20 MIN: CPT | Mod: 25

## 2019-04-08 PROCEDURE — 31575 DIAGNOSTIC LARYNGOSCOPY: CPT

## 2019-04-10 NOTE — HISTORY OF PRESENT ILLNESS
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 Patient became persistently hoarse with worsening airway, Larynx appearance concerning for cancer. Taken to the OR for awake tracheostomy and biopsy, negative for cancer. \par \par Interval History: Saw Dr. Reddy 10/9/2018, reviewed CT Neck which showed subtle erosion of lateral cricoid cartilage which could be chondronecrosis, less likely concern for recurrence/persistence of disease and a 4mo interval CT Neck was recommended, will see Dr. Reddy again in February and will have the scan done at that time.\par  [FreeTextEntry1] : 64 yo M with recent exacerbation of chondronecrosis and superimposed infection which caused left false vocal fold edema and decreased mobility of left TVC, inspiratory stridor and concern for airway embarrassment now s/p course of inpatient care with IV antbx/steroids and ICU admission.  He finished course of cefpodoxime and flagyl last night.  HE feels as though he is almost to his baseline, is eating well with no restriction, can walk on flat surface indefinitely, is mildly limited on stairs, would have to rest at or about 1 flight of stairs to catch breath.

## 2019-04-10 NOTE — DATA REVIEWED
[de-identified] : CT scans from inpatient stay [de-identified] : case d/w Dr Reddy and was presented at tumor board last week with consensus for PET/CT at 2-3 mo post-discharge to ensure no residule/recurrent SCCa as part of clinical picture

## 2019-04-10 NOTE — REASON FOR VISIT
[Subsequent Evaluation] : a subsequent evaluation for [FreeTextEntry2] : recent exacerbation of chondronecrosis and superimposed infection

## 2019-04-10 NOTE — PHYSICAL EXAM
[Midline] : trachea located in midline position [Laryngoscopy Performed] : laryngoscopy was performed, see procedure section for findings [Normal] : no rashes [FreeTextEntry1] : w [de-identified] : neck is soft, there is prior neck incision scar, well healed, mild subcutaneous fibrosis, prior trach site well healed, no fistula no masses or LAD [de-identified] : right RLN paresis

## 2019-04-10 NOTE — ASSESSMENT
[FreeTextEntry1] : The  left false vocal fold edema is dramatically decreased and there is much greater glottic opening and movement of the left TVC as compared to his exam in ED prior to antbx/steroids\par -plan for PET/CT in 2 mo to help r/o out possible recurrence in setting of edema, chondronecrosis etc, though index of suspicion is low\par -d/w pt results of CT, TB discussion and findings on laryngoscopy today, plan for PET/CT in 2 mo once inflammation and infection resolve\par -pt told to RTO or come to the ED at [facility] if there are any acute issues

## 2019-04-10 NOTE — REVIEW OF SYSTEMS
[As Noted in HPI] : as noted in HPI [SOB on Exertion] : shortness of breath during exertion [Negative] : Psychiatric [Fever] : no fever [Chills] : no chills [Feeling Poorly] : not feeling poorly [Feeling Tired] : not feeling tired [Shortness Of Breath] : no shortness of breath [Chest Pain] : no chest pain [Wheezing] : no wheezing [Cough] : no cough [Confused] : no confusion [Convulsions] : no convulsions [Swollen Glands In The Neck] : no swollen glands in the neck

## 2019-04-10 NOTE — PROCEDURE
[Lesion] : lesion identified by mirror examination needing further evaluation [Complicated Symptoms] : complicated symptoms requiring more thorough examination than provided by mirror [None] : none [Flexible Endoscope] : examined with the flexible endoscope [de-identified] : flexible fiberoptic laryngoscope advanced orally, no oropharyngeal lesions identified, larynx visualized no ulcerating or fungating masses present, the left false vocal fold had minimal to no edema, the left TVC is now mobile, glottic opening is 6-7mm, right TVC till with minimal to no movemetn\par \par  no leukoplakic or erythroplakic lesions or nodules true vocal cords [de-identified] : recent inspiratory stridor

## 2019-06-03 ENCOUNTER — FORM ENCOUNTER (OUTPATIENT)
Age: 64
End: 2019-06-03

## 2019-06-04 ENCOUNTER — APPOINTMENT (OUTPATIENT)
Dept: CT IMAGING | Facility: HOSPITAL | Age: 64
End: 2019-06-04
Payer: MEDICAID

## 2019-06-04 ENCOUNTER — OUTPATIENT (OUTPATIENT)
Dept: OUTPATIENT SERVICES | Facility: HOSPITAL | Age: 64
LOS: 1 days | End: 2019-06-04
Payer: MEDICAID

## 2019-06-04 DIAGNOSIS — Z41.9 ENCOUNTER FOR PROCEDURE FOR PURPOSES OTHER THAN REMEDYING HEALTH STATE, UNSPECIFIED: Chronic | ICD-10-CM

## 2019-06-04 DIAGNOSIS — Z98.890 OTHER SPECIFIED POSTPROCEDURAL STATES: Chronic | ICD-10-CM

## 2019-06-04 PROCEDURE — 71250 CT THORAX DX C-: CPT | Mod: 26

## 2019-06-04 PROCEDURE — 70491 CT SOFT TISSUE NECK W/DYE: CPT | Mod: 26

## 2019-06-04 PROCEDURE — 70491 CT SOFT TISSUE NECK W/DYE: CPT

## 2019-06-04 PROCEDURE — 71250 CT THORAX DX C-: CPT

## 2019-06-04 NOTE — H&P ADULT - NSHPPHYSICALEXAM_GEN_ALL_CORE
No NAD, A&Ox3  Breathing comfortably on room air  Voice hoarse and raspy (baseline)  4-0 cuffless Shiley with  in place   Trach removed, stoma healthy appearing, covered with gauze dressing  Patient breathing comfortably, O2 sat 99%

## 2019-06-05 ENCOUNTER — APPOINTMENT (OUTPATIENT)
Dept: OTOLARYNGOLOGY | Facility: CLINIC | Age: 64
End: 2019-06-05
Payer: MEDICAID

## 2019-06-05 VITALS
BODY MASS INDEX: 23.1 KG/M2 | DIASTOLIC BLOOD PRESSURE: 75 MMHG | SYSTOLIC BLOOD PRESSURE: 110 MMHG | OXYGEN SATURATION: 96 % | HEIGHT: 71 IN | HEART RATE: 82 BPM | WEIGHT: 165 LBS

## 2019-06-05 PROCEDURE — 99213 OFFICE O/P EST LOW 20 MIN: CPT | Mod: 25

## 2019-06-05 PROCEDURE — 31575 DIAGNOSTIC LARYNGOSCOPY: CPT

## 2019-06-06 ENCOUNTER — FORM ENCOUNTER (OUTPATIENT)
Age: 64
End: 2019-06-06

## 2019-06-06 NOTE — VITALS
[90: Able to carry normal activity; minor signs or symptoms of disease.] : 90: Able to carry normal activity; minor signs or symptoms of disease.  [90: Minor restrictions in physically strenous activity.] : 90: Minor restrictions in physically strenuous activity. [ECOG Performance Status: 1 - Restricted in physically strenuous activity but ambulatory and able to carry out work of a light or sedentary nature] : Performance Status: 1 - Restricted in physically strenuous activity but ambulatory and able to carry out work of a light or sedentary nature, e.g., light house work, office work

## 2019-06-07 ENCOUNTER — OUTPATIENT (OUTPATIENT)
Dept: OUTPATIENT SERVICES | Facility: HOSPITAL | Age: 64
LOS: 1 days | End: 2019-06-07
Payer: MEDICAID

## 2019-06-07 DIAGNOSIS — J43.2 CENTRILOBULAR EMPHYSEMA: ICD-10-CM

## 2019-06-07 DIAGNOSIS — J38.6 STENOSIS OF LARYNX: ICD-10-CM

## 2019-06-07 DIAGNOSIS — Z41.9 ENCOUNTER FOR PROCEDURE FOR PURPOSES OTHER THAN REMEDYING HEALTH STATE, UNSPECIFIED: Chronic | ICD-10-CM

## 2019-06-07 DIAGNOSIS — Z85.21 PERSONAL HISTORY OF MALIGNANT NEOPLASM OF LARYNX: ICD-10-CM

## 2019-06-07 DIAGNOSIS — R91.1 SOLITARY PULMONARY NODULE: ICD-10-CM

## 2019-06-07 DIAGNOSIS — Z98.890 OTHER SPECIFIED POSTPROCEDURAL STATES: Chronic | ICD-10-CM

## 2019-06-07 LAB — GLUCOSE BLDC GLUCOMTR-MCNC: 93 MG/DL — SIGNIFICANT CHANGE UP (ref 70–99)

## 2019-06-07 PROCEDURE — 78815 PET IMAGE W/CT SKULL-THIGH: CPT

## 2019-06-07 PROCEDURE — A9552: CPT

## 2019-06-07 PROCEDURE — 78815 PET IMAGE W/CT SKULL-THIGH: CPT | Mod: 26

## 2019-06-07 PROCEDURE — 82962 GLUCOSE BLOOD TEST: CPT

## 2019-06-07 NOTE — HISTORY OF PRESENT ILLNESS
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 Patient became persistently hoarse with worsening airway, Larynx appearance concerning for cancer. Taken to the OR for awake tracheostomy and biopsy, negative for cancer. \par  Saw Dr. Reddy 10/9/2018, reviewed CT Neck which showed subtle erosion of lateral cricoid cartilage which could be chondronecrosis, less likely concern for recurrence/persistence of disease and a 4mo interval CT Neck was recommended, will see Dr. Reddy again in February and will have the scan done at that time.\par  [FreeTextEntry1] : Mr Alfonso was recently admitted and placed on steroids and antibiotics for glottic edema and stridor with resolution of stridor from medical therapy.  HE reports having transient relief from the therapy but now has become more stridorous and had recent CT neck for surveillance which showed glottic edema and c/f recurrent disease vs worsening chondronecrosis

## 2019-06-07 NOTE — REVIEW OF SYSTEMS
[As Noted in HPI] : as noted in HPI [SOB on Exertion] : shortness of breath during exertion [Negative] : Psychiatric [Swelling Neck] : no neck swelling [Swelling Face] : no face swelling [Swollen Glands In The Neck] : no swollen glands in the neck [Chest Pain] : no chest pain

## 2019-06-07 NOTE — ASSESSMENT
[FreeTextEntry1] : 62 yo M with concern for recurrent disease vs chondronecrosis with superimposed infection in setting of right TVC paralysis and left sided glottic episdoic edema of false vocal fold causing stridor and partial respiratory obstruction\par -plan for PET/CT as ordered on 6/7/2019, final treatment plan pending scan review

## 2019-06-07 NOTE — PROCEDURE
[Image(s) Captured] : image(s) captured and filed [None] : none [Flexible Endoscope] : examined with the flexible endoscope [de-identified] : flexible fiberoptic endoscope passed transorally, the larynx is visualized, there is again demonstrated fullness of the left false vocal cord with paralysis of the right TVC and mild abduction and adduction of the left TVC with a small glottic opening, there are no overt fungating or ulcerative lesions, there is generalized radiation changes [de-identified] : stridor

## 2019-06-07 NOTE — PHYSICAL EXAM
[Laryngoscopy Performed] : laryngoscopy was performed, see procedure section for findings [Normal] : orientation to person, place, and time: normal [de-identified] : there is generalized fibrosis of cervical skin, no jeffery cervical masses or LAD

## 2019-06-10 ENCOUNTER — APPOINTMENT (OUTPATIENT)
Dept: RADIATION ONCOLOGY | Facility: CLINIC | Age: 64
End: 2019-06-10
Payer: MEDICAID

## 2019-06-10 ENCOUNTER — APPOINTMENT (OUTPATIENT)
Dept: OTOLARYNGOLOGY | Facility: CLINIC | Age: 64
End: 2019-06-10
Payer: MEDICAID

## 2019-06-10 VITALS
BODY MASS INDEX: 19.96 KG/M2 | RESPIRATION RATE: 16 BRPM | SYSTOLIC BLOOD PRESSURE: 122 MMHG | DIASTOLIC BLOOD PRESSURE: 80 MMHG | WEIGHT: 143.1 LBS | OXYGEN SATURATION: 94 % | HEART RATE: 78 BPM

## 2019-06-10 PROCEDURE — 99213 OFFICE O/P EST LOW 20 MIN: CPT

## 2019-06-10 PROCEDURE — 99214 OFFICE O/P EST MOD 30 MIN: CPT

## 2019-06-10 NOTE — PROCEDURE
[Lesion] : lesion identified by mirror examination needing further evaluation [Hoarseness] : hoarseness not clearly evaluated by indirect laryngoscopy [Topical Lidocaine] : topical lidocaine [Flexible Endoscope] : examined with the flexible endoscope [Photographs Taken] : photographs taken [Normal] : normal base of the tongue [de-identified] : Right TVC is paretic and diminished in volume. Left moves well. No lesions. Edema persists th/o supraglottis [FreeTextEntry3] : s/p adenoidectomy

## 2019-06-10 NOTE — DATA REVIEWED
[FreeTextEntry1] : I have personally reviewed all relevant imaging studies (CT and PET) and I have discussed the case with the referring physician Dr Branch.\par

## 2019-06-10 NOTE — REVIEW OF SYSTEMS
Last appt:  6/14/17 (DIXIE mcwilliams)    Per last office note:  Given the time course of Pt's acute onset of symptoms with history of recent URI illness, this could be consistent with PANS, since Pt had a negative strep test at the time.    Routed to provider for feedback   [Dysphagia: Grade 0] : Dysphagia: Grade 0 [Nausea: Grade 0] : Nausea: Grade 0 [Vomiting: Grade 0] : Vomiting: Grade 0 [Fatigue: Grade 0] : Fatigue: Grade 0 [Localized Edema: Grade 0] : Localized Edema: Grade 0  [Neck Edema: Grade 0] : Neck Edema: Grade 0 [Mucositis Oral: Grade 0] : Mucositis Oral: Grade 0  [Oral Pain: Grade 0] : Oral Pain: Grade 0 [Salivary duct inflammation: Grade 0] : Salivary duct inflammation: Grade 0 [Dysgeusia: Grade 0] : Dysgeusia: Grade 0 [Skin Induration: Grade 0] : Skin Induration: Grade 0 [Dermatitis Radiation: Grade 0] : Dermatitis Radiation: Grade 0 [Negative] : Psychiatric [FreeTextEntry2] : >20lb unintentional weight loss over the past 4 months [FreeTextEntry4] : see HPI [FreeTextEntry6] : see HPI

## 2019-06-10 NOTE — HISTORY OF PRESENT ILLNESS
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 He tolerated treatment well. His post-RT course was complicated by laryngeal edema requiring a tracheostomy in Feb 2018, which since has been removed. \par \par 6/10/19 - Follow-up\par Mr. Alfonso returns today for routine follow-up.  He was last seen here on 2/19/19.  Plan at that time was for CT neck with contrast and CT chest without contrast in June and follow-up with PCP, dental and Dr. Branch.  \par \par CT neck with contrast 6/4/19 - IMPRESSION: There are new sites of enhancing soft tissue, most notably along the anterior tracheal wall, suspicious for recurrence of laryngeal carcinoma. Consider correlation with PET/CT for further characterization. Airway narrowing is present at the subglottic larynx and proximal trachea.\par \par CT chest without contrast 6/4/19 - IMPRESSION: 2 mm left upper lobe pulmonary nodule. 1.4 cm left adrenal nodule, not clearly seen on the prior study. Follow-up CT recommended. \par \par He was recently admitted to the hospital at the end of March 2019, for dyspnea and placed on steroids and antibiotics for glottic edema and stridor with resolution of stridor from medical therapy.  He states dyspnea initially improved following that admission, but then worsened again. He was discharged with cefpodoxime and flagyl. He saw Dr. Branch 4/8/19 at which time he was improved. He saw Dr. Branch again on 6/5, at which time he was having worsening stridor.  Plan was for PET/CT to further evaluate for recurrence; discussed possible laryngectomy given respiratory distress from paralyzed right tVC, decreased glottic opening, left glottic edema.  \par \par PET/CT 6/7/19 - There is abnormal FDG activity (SUV 14.6) of the glottic and subglottic regions with extension inferiorly along the anterior tracheal wall.  The superiormost extent of activity is in the supraglottic larynx at the level of the superior most portion of the thyroid cartilages.  Small additional focus of activity posterolateral to the left arytenoid cartilage.  No FDG avid lymphadenopathy.  \par Impression: Abnormal FDG activity extending from the posterior aspect of the supraglottic larynx to the subglottic region and along the anterior wall of the trachea suspicious for tumor recurrence.  \par \par He has not seen the dentist since prior to RT.  He does not currently have a PMD.  Today, he reports continued dyspnea, unchanged over the past month.  He states it is mostly with exertion, but sometimes at rest.  He notes productive cough of white sputum, mild sore throat, and mild xerostomia at night.  He denies dysphagia, odynophagia.  Reports he is eating a regular diet, but has decreased appetite lately.  \par \par 2/19/19- FOLLOW UP \par When he was last seen on 10/9/18, he was doing well, LILIANA. Plan was to continue follow up with Dr. Branch and do CT neck in 4 months. Patient saw Dr. Branch on 12/3/18. He reported voice and swallowing were stable, no neck masses, no weight loss. Plan was to RTC 4 months (appointment scheduled for April). \par \par CT scan done 2/12/19 revealed the following: \par Since 10/7/18, there is no significant interval change in CT appearance of the treated larynx. Stable fullness/nodularity of the right true vocal cord. No further chondromalacia is evident. \par \par Today he reports he is feeling pretty well. Only complaint is more shortness of breath when he climbs the stairs of his 4-5 floor walk up at home. He says no dyspnea at rest. Denies neck, throat or mouth pain. Denies dysphagia; eating a regular diet and has gained 12 pounds since December. Reports taste is normal. Continues to do neck exercises daily. Denies any other c/o to include fever, chills, CP. He reports that he continues to abstain from smoking or drinking (quit smoking Feb 2018).  Voice is overall improved from last year.\par \par ______________________________\par 10/9/18- Follow up\par At his last visit he was eating and drinking normally. He was advised to f/u with Dr Branch and return in 3 months with imaging PET/CT and CT neck. He f/u with Dr. Branch 8/2018. \par \par PET/CT done 10/7/18- No evidence of residual, recurrent, or metastatic disease identified. Increased uptake in the left vocal cord is in compensation for the fixed right vocal cord. \par \par CT neck done 10/7/18 showed the following: There is again evident nodularity of the right true vocal fold, without appreciable change since the prior CT study. Again evident is absent mineralization of the right arytenoid cartilage, which had been calcified prior to treatment. There is now a greater degree of fragmentation of the superior cricoid lamina. Given absence of suspicious FDG uptake in this region, findings within the laryngeal cartilages are felt more compatible with chondronecrosis than tumor recurrence. There is persistent though diminished edema in the supraglottic larynx compatible with radiation changes.\par *  Lymph Nodes: No cervical lymphadenopathy.\par *  Salivary Glands: Generalized increased density and decreased size, compatible with radiation effects.\par \par Today, he reports he is feeling well. Denies neck pain, mouth pain. Reports sore throat at times. Continues to have hoarseness. Mouth dryness has improved. Taste is normal, able to eat and drink and swallow all textures. He has gained 4 pounds in the past two months. Continues to do neck exercises. Reports breathing is good, denies SOB. Denies smoking or drinking (quit smoking Feb 2018).\par \par 7/2/18- Follow up\par CT neck on 6/29/18 showed now absence of the previously mineralized right arytenoid process, as well as subtle erosion of the superior right lateral cricoid cartilage, present in association with nodularity of the right true vocal fold. Differential includes both recurrent disease and chondronecrosis. Consider repeat PET/CT and/or/follow up neck CT in approximately 3 months. There was also a hypodensity in the right inferior frontal lone, possibly posttraumatic. This was present in 7/2017. Consider brain imaging. \par \par Today Mr. Alfonso is feeling well. He was decannulated in the interim since we saw him last. Breathing is good, walked about 10 blocks today without increased shortness of breath. Had mild SOB yesterday. Trach site closed. Denies pain to neck, denies lumps or bumps to neck. Mouth is sometimes dry, taste is normal. Able to eat and drink all textures. Has gained 17 pounds since april. His only concern is cough/phlegm and a hoarse voice.\par \par 7/2/18- \par Mr. Alfonso presents today for routine follow up. He was decannulated in the interim since we last saw him. \par \par Oncology History:\par Mr. 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 Branch on 4/5/17 after bring referred by Dr. King following investigation for hoarseness showing mass on flexible laryngoscopy.  He presented with 8 months- 1 year of increasing hoarseness on background of ~60 pack year smoking history. He denied pain, discomfort, otalgia, dysphagia, odynophagia, loss of weight, hemoptysis.  He was scheduled for surgery after this, however there were issues with his insurance which resulted in surgery cancellation. \par \par He eventually underwent DL and biopsy on 6/20/17. Pathology shows infiltrating squamous cell carcinoma of the right anterior vocal cord, the right supraglottis, the anterior commissure, and right vocal cord process, and right superior supraglottis. Left vocal cord process shows SCC, predominantly in situ, with focal superficial invasion. \par \par He has not had any imaging studies. His case was reviewed at multidisciplinary head and neck oncology tumor Board on July 10 and the recommendation was for definitive radiation therapy since  surgery would involve a total laryngectomy.\par \par He notes persistent hoarseness at this time. He denies coughing, trouble swallowing, bleeding, or pain. He has lost some weight that he attributes to anxiety. He works about 5-6 hours per day 5 days a week at [employer]. He has not seen a dentist in years. He continues to smoke 1ppd and has never tried quitting. \par \par \par 11/24/17 - Mr. Alfonso presents today for post treatment evaluation. Today reports feeling well - appetite good, swallowing better with soft textured food but able to eat most foods. Plans on resuming work at [employer] in 5 days. \par Reports:\par - intermittent cough for white secretions-smoking half pack/day-smoking cessation advised\par - dysgeusia, dry mouth-advised bicarb mouth rinses, increase fluid intake\par - minimal pain (only when swallowing), fever, night sweats, mucositis - using gabapentin 2 tabs bid\par - not using prevident toothpaste-never did\par \par 1/23/18- Mr. Alfonso presents today for routine follow up. He is feeling well. PO intake is good, weight stable. He is able to eat all textures of food when he is mindful of chewing. He is coughing a lot, and has a frequent tickle in his throat. His throat becomes sore when he talks frequently. He has reduced the amount that he is smoking, and is currently smoking less than a pack per day. he plans to quit over the next 10 days. \par He is doing head and neck exercises daily. He is not using prevident. He underwent PET scan on 1/22/18, report from which is not yet available. I called radiology and radiologist was not available. \par \par 4/24/18- Mr. Alfonso presents today for follow up. Since he saw us last in January he underwent a PET scan which showed LILIANA. \par In February Mr. Alfonso became persistently hoarse with worsening airway. he was taken to OR for awake tracheostomy and biopsy. Biopsy was negative on 2/27/18.  He was discharged to rehab. He saw Dr. Branch on 4/23, and he is planning for possible decannulation in 3 weeks. \par Today pt. is getting over URI.  He notes pain with swallowing. Lost 17 lbs since last visit. No other new complaints. Taking tylenol only.

## 2019-06-10 NOTE — PHYSICAL EXAM
[General Appearance - In No Acute Distress] : in no acute distress [General Appearance - Alert] : alert [General Appearance - Well Developed] : well developed [Sclera] : the sclera and conjunctiva were normal [Thin] : thin [PERRL With Normal Accommodation] : pupils were equal in size, round, reactive to light [Extraocular Movements] : extraocular movements were intact [Cervical Lymph Nodes Enlarged Posterior Bilaterally] : posterior cervical [Cervical Lymph Nodes Enlarged Anterior Bilaterally] : anterior cervical [Supraclavicular Lymph Nodes Enlarged Bilaterally] : supraclavicular [Mood] : the mood was normal [Skin Color & Pigmentation] : normal skin color and pigmentation [] : no respiratory distress [Normal] : no focal deficits [de-identified] : Hoarse voice. Patient has only two teeth.  [de-identified] : No palpable nodes. there is nodularity palpated just between the clavicular heads. ?recurrent disease [de-identified] : stridor

## 2019-06-12 NOTE — HISTORY OF PRESENT ILLNESS
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 Patient became persistently hoarse with worsening airway, Larynx appearance concerning for cancer. Taken to the OR for awake tracheostomy and biopsy, negative for cancer. \par  Saw Dr. Reddy 10/9/2018, reviewed CT Neck which showed subtle erosion of lateral cricoid cartilage which could be chondronecrosis, less likely concern for recurrence/persistence of disease and a 4mo interval CT Neck was recommended, will see Dr. Reddy again in February and will have the scan done at that time. [FreeTextEntry1] : Mr Alfonso saw us last week after CT Neck was concerning for recurrence of glottic cancer and then we arranged an urgent PET/CT for him which was completed on Friday, now he is here after seeing Dr Reddy this morning. \par \par PET/CT 6/7/19 - There is abnormal FDG activity (SUV 14.6) of the glottic and subglottic regions with extension inferiorly along the anterior tracheal wall. The superiormost extent of activity is in the supraglottic larynx at the level of the superior most portion of the thyroid cartilages. Small additional focus of activity posterolateral to the left arytenoid cartilage. No FDG avid lymphadenopathy. \par Impression: Abnormal FDG activity extending from the posterior aspect of the supraglottic larynx to the subglottic region and along the anterior wall of the trachea suspicious for tumor recurrence.

## 2019-06-12 NOTE — DATA REVIEWED
[de-identified] : PET/CT 6/7/19 - There is abnormal FDG activity (SUV 14.6) of the glottic and subglottic regions with extension inferiorly along the anterior tracheal wall. The superiormost extent of activity is in the supraglottic larynx at the level of the superior most portion of the thyroid cartilages. Small additional focus of activity posterolateral to the left arytenoid cartilage. No FDG avid lymphadenopathy. \par Impression: Abnormal FDG activity extending from the posterior aspect of the supraglottic larynx to the subglottic region and along the anterior wall of the trachea suspicious for tumor recurrence.

## 2019-06-12 NOTE — ASSESSMENT
[FreeTextEntry1] : 62 yo M with concern for recurrent disease vs chondronecrosis with superimposed infection in setting of right TVC paralysis and left sided glottic episodic edema of false vocal fold causing stridor and partial respiratory obstruction\par -pt will think about DL/biopsy of glottic region + immediate laryngectomy assuming positive frozen sections +reconstruction as recommended, Dr Branch will talk with his health care proxy also regarding the recommended treatment and that if the patient elects for staged biopsy and then laryngectomy in setting of positive frozen section he risks losing his airway and need for revision tracheostomy and possibly death

## 2019-06-12 NOTE — PHYSICAL EXAM
[Normal] : orientation to person, place, and time: normal [FreeTextEntry1] : not in acute distress, there is audible inspiratory stridor [de-identified] : well healed tracheostomy scar, no masses, there is fibrosis of the cervical skin [de-identified] : not tachypneic

## 2019-06-12 NOTE — REVIEW OF SYSTEMS
[Hoarseness] : hoarseness [Negative] : Psychiatric [Swelling Neck] : no neck swelling [Throat Pain] : no throat pain [Swelling Face] : no face swelling [Fever] : no fever [Chills] : no chills [Chest Pain] : no chest pain [Swollen Glands In The Neck] : no swollen glands in the neck [de-identified] : has some upper airway tightening [FreeTextEntry6] : he feels sob and reports airway tightness and points to larynx region

## 2019-06-18 ENCOUNTER — APPOINTMENT (OUTPATIENT)
Dept: INTERNAL MEDICINE | Facility: CLINIC | Age: 64
End: 2019-06-18
Payer: MEDICAID

## 2019-06-18 VITALS
BODY MASS INDEX: 21.37 KG/M2 | HEART RATE: 80 BPM | WEIGHT: 141 LBS | HEIGHT: 68 IN | SYSTOLIC BLOOD PRESSURE: 111 MMHG | OXYGEN SATURATION: 97 % | TEMPERATURE: 97.8 F | DIASTOLIC BLOOD PRESSURE: 76 MMHG

## 2019-06-18 PROCEDURE — 93000 ELECTROCARDIOGRAM COMPLETE: CPT

## 2019-06-18 PROCEDURE — 36415 COLL VENOUS BLD VENIPUNCTURE: CPT

## 2019-06-18 PROCEDURE — 99214 OFFICE O/P EST MOD 30 MIN: CPT | Mod: GC,25

## 2019-06-24 VITALS
TEMPERATURE: 96 F | HEART RATE: 80 BPM | SYSTOLIC BLOOD PRESSURE: 118 MMHG | OXYGEN SATURATION: 96 % | RESPIRATION RATE: 16 BRPM | HEIGHT: 68 IN | WEIGHT: 141.1 LBS | DIASTOLIC BLOOD PRESSURE: 65 MMHG

## 2019-06-25 ENCOUNTER — APPOINTMENT (OUTPATIENT)
Dept: OTOLARYNGOLOGY | Facility: HOSPITAL | Age: 64
End: 2019-06-25

## 2019-06-25 ENCOUNTER — INPATIENT (INPATIENT)
Facility: HOSPITAL | Age: 64
LOS: 27 days | Discharge: NURSING, REHAB OR LONG-TERM CARE | DRG: 12 | End: 2019-07-23
Attending: OTOLARYNGOLOGY | Admitting: OTOLARYNGOLOGY
Payer: COMMERCIAL

## 2019-06-25 ENCOUNTER — RESULT REVIEW (OUTPATIENT)
Age: 64
End: 2019-06-25

## 2019-06-25 DIAGNOSIS — Z41.9 ENCOUNTER FOR PROCEDURE FOR PURPOSES OTHER THAN REMEDYING HEALTH STATE, UNSPECIFIED: Chronic | ICD-10-CM

## 2019-06-25 DIAGNOSIS — Z98.890 OTHER SPECIFIED POSTPROCEDURAL STATES: Chronic | ICD-10-CM

## 2019-06-25 LAB
ALBUMIN SERPL ELPH-MCNC: 4.5 G/DL
ALP BLD-CCNC: 63 U/L
ALT SERPL-CCNC: 14 U/L
ANION GAP SERPL CALC-SCNC: 10 MMOL/L — SIGNIFICANT CHANGE UP (ref 5–17)
ANION GAP SERPL CALC-SCNC: 11 MMOL/L
ANION GAP SERPL CALC-SCNC: 15 MMOL/L — SIGNIFICANT CHANGE UP (ref 5–17)
AST SERPL-CCNC: 18 U/L
BASOPHILS # BLD AUTO: 0.07 K/UL
BASOPHILS NFR BLD AUTO: 1.1 %
BILIRUB SERPL-MCNC: 0.4 MG/DL
BUN SERPL-MCNC: 10 MG/DL
BUN SERPL-MCNC: 7 MG/DL — SIGNIFICANT CHANGE UP (ref 7–23)
BUN SERPL-MCNC: 9 MG/DL — SIGNIFICANT CHANGE UP (ref 7–23)
CALCIUM SERPL-MCNC: 7.8 MG/DL — LOW (ref 8.4–10.5)
CALCIUM SERPL-MCNC: 8.7 MG/DL — SIGNIFICANT CHANGE UP (ref 8.4–10.5)
CALCIUM SERPL-MCNC: 9.3 MG/DL
CHLORIDE SERPL-SCNC: 101 MMOL/L — SIGNIFICANT CHANGE UP (ref 96–108)
CHLORIDE SERPL-SCNC: 96 MMOL/L — SIGNIFICANT CHANGE UP (ref 96–108)
CHLORIDE SERPL-SCNC: 98 MMOL/L
CO2 SERPL-SCNC: 20 MMOL/L — LOW (ref 22–31)
CO2 SERPL-SCNC: 31 MMOL/L
CO2 SERPL-SCNC: 31 MMOL/L — SIGNIFICANT CHANGE UP (ref 22–31)
CREAT SERPL-MCNC: 0.52 MG/DL — SIGNIFICANT CHANGE UP (ref 0.5–1.3)
CREAT SERPL-MCNC: 0.81 MG/DL — SIGNIFICANT CHANGE UP (ref 0.5–1.3)
CREAT SERPL-MCNC: 0.96 MG/DL
EOSINOPHIL # BLD AUTO: 0.05 K/UL
EOSINOPHIL NFR BLD AUTO: 0.8 %
GLUCOSE SERPL-MCNC: 175 MG/DL — HIGH (ref 70–99)
GLUCOSE SERPL-MCNC: 85 MG/DL
GLUCOSE SERPL-MCNC: 86 MG/DL — SIGNIFICANT CHANGE UP (ref 70–99)
HCT VFR BLD CALC: 30 % — LOW (ref 39–50)
HCT VFR BLD CALC: 41.8 %
HGB BLD-MCNC: 13.3 G/DL
HGB BLD-MCNC: 9.6 G/DL — LOW (ref 13–17)
IMM GRANULOCYTES NFR BLD AUTO: 0.3 %
INR PPP: 1 RATIO
LYMPHOCYTES # BLD AUTO: 0.71 K/UL
LYMPHOCYTES NFR BLD AUTO: 11.6 %
MAGNESIUM SERPL-MCNC: 1.1 MG/DL — LOW (ref 1.6–2.6)
MAGNESIUM SERPL-MCNC: 2.5 MG/DL — SIGNIFICANT CHANGE UP (ref 1.6–2.6)
MAN DIFF?: NORMAL
MCHC RBC-ENTMCNC: 29.8 PG
MCHC RBC-ENTMCNC: 30.3 PG — SIGNIFICANT CHANGE UP (ref 27–34)
MCHC RBC-ENTMCNC: 31.8 GM/DL
MCHC RBC-ENTMCNC: 32 GM/DL — SIGNIFICANT CHANGE UP (ref 32–36)
MCV RBC AUTO: 93.7 FL
MCV RBC AUTO: 94.6 FL — SIGNIFICANT CHANGE UP (ref 80–100)
MONOCYTES # BLD AUTO: 0.6 K/UL
MONOCYTES NFR BLD AUTO: 9.8 %
NEUTROPHILS # BLD AUTO: 4.68 K/UL
NEUTROPHILS NFR BLD AUTO: 76.4 %
NRBC # BLD: 0 /100 WBCS — SIGNIFICANT CHANGE UP (ref 0–0)
PHOSPHATE SERPL-MCNC: 1.3 MG/DL — LOW (ref 2.5–4.5)
PHOSPHATE SERPL-MCNC: 4.5 MG/DL — SIGNIFICANT CHANGE UP (ref 2.5–4.5)
PLATELET # BLD AUTO: 231 K/UL — SIGNIFICANT CHANGE UP (ref 150–400)
PLATELET # BLD AUTO: 357 K/UL
POTASSIUM SERPL-MCNC: 4.3 MMOL/L — SIGNIFICANT CHANGE UP (ref 3.5–5.3)
POTASSIUM SERPL-MCNC: 4.5 MMOL/L — SIGNIFICANT CHANGE UP (ref 3.5–5.3)
POTASSIUM SERPL-SCNC: 4.3 MMOL/L — SIGNIFICANT CHANGE UP (ref 3.5–5.3)
POTASSIUM SERPL-SCNC: 4.5 MMOL/L — SIGNIFICANT CHANGE UP (ref 3.5–5.3)
POTASSIUM SERPL-SCNC: 5 MMOL/L
PROT SERPL-MCNC: 6.8 G/DL
PT BLD: 11.3 SEC
RBC # BLD: 3.17 M/UL — LOW (ref 4.2–5.8)
RBC # BLD: 4.46 M/UL
RBC # FLD: 13.1 % — SIGNIFICANT CHANGE UP (ref 10.3–14.5)
RBC # FLD: 13.4 %
SODIUM SERPL-SCNC: 136 MMOL/L — SIGNIFICANT CHANGE UP (ref 135–145)
SODIUM SERPL-SCNC: 137 MMOL/L — SIGNIFICANT CHANGE UP (ref 135–145)
SODIUM SERPL-SCNC: 140 MMOL/L
WBC # BLD: 4.75 K/UL — SIGNIFICANT CHANGE UP (ref 3.8–10.5)
WBC # FLD AUTO: 4.75 K/UL — SIGNIFICANT CHANGE UP (ref 3.8–10.5)
WBC # FLD AUTO: 6.13 K/UL

## 2019-06-25 PROCEDURE — 31536 LARYNGOSCOPY W/BX & OP SCOPE: CPT

## 2019-06-25 PROCEDURE — 31600 PLANNED TRACHEOSTOMY: CPT

## 2019-06-25 PROCEDURE — 71045 X-RAY EXAM CHEST 1 VIEW: CPT | Mod: 26

## 2019-06-25 PROCEDURE — 99291 CRITICAL CARE FIRST HOUR: CPT

## 2019-06-25 RX ORDER — HYDROMORPHONE HYDROCHLORIDE 2 MG/ML
0.25 INJECTION INTRAMUSCULAR; INTRAVENOUS; SUBCUTANEOUS
Refills: 0 | Status: DISCONTINUED | OUTPATIENT
Start: 2019-06-25 | End: 2019-07-01

## 2019-06-25 RX ORDER — MAGNESIUM SULFATE 500 MG/ML
2 VIAL (ML) INJECTION ONCE
Refills: 0 | Status: COMPLETED | OUTPATIENT
Start: 2019-06-25 | End: 2019-06-25

## 2019-06-25 RX ORDER — PANTOPRAZOLE SODIUM 20 MG/1
40 TABLET, DELAYED RELEASE ORAL DAILY
Refills: 0 | Status: DISCONTINUED | OUTPATIENT
Start: 2019-06-25 | End: 2019-07-06

## 2019-06-25 RX ORDER — HEPARIN SODIUM 5000 [USP'U]/ML
5000 INJECTION INTRAVENOUS; SUBCUTANEOUS EVERY 8 HOURS
Refills: 0 | Status: DISCONTINUED | OUTPATIENT
Start: 2019-06-25 | End: 2019-06-26

## 2019-06-25 RX ORDER — CHLORHEXIDINE GLUCONATE 213 G/1000ML
1 SOLUTION TOPICAL
Refills: 0 | Status: DISCONTINUED | OUTPATIENT
Start: 2019-06-25 | End: 2019-07-15

## 2019-06-25 RX ORDER — HYDROMORPHONE HYDROCHLORIDE 2 MG/ML
0.5 INJECTION INTRAMUSCULAR; INTRAVENOUS; SUBCUTANEOUS
Refills: 0 | Status: DISCONTINUED | OUTPATIENT
Start: 2019-06-25 | End: 2019-07-01

## 2019-06-25 RX ORDER — CEFAZOLIN SODIUM 1 G
1000 VIAL (EA) INJECTION EVERY 8 HOURS
Refills: 0 | Status: COMPLETED | OUTPATIENT
Start: 2019-06-25 | End: 2019-06-26

## 2019-06-25 RX ORDER — SODIUM CHLORIDE 9 MG/ML
1000 INJECTION, SOLUTION INTRAVENOUS
Refills: 0 | Status: DISCONTINUED | OUTPATIENT
Start: 2019-06-25 | End: 2019-06-26

## 2019-06-25 RX ADMIN — HEPARIN SODIUM 5000 UNIT(S): 5000 INJECTION INTRAVENOUS; SUBCUTANEOUS at 13:45

## 2019-06-25 RX ADMIN — Medication 85 MILLIMOLE(S): at 13:45

## 2019-06-25 RX ADMIN — Medication 50 GRAM(S): at 12:17

## 2019-06-25 RX ADMIN — PANTOPRAZOLE SODIUM 40 MILLIGRAM(S): 20 TABLET, DELAYED RELEASE ORAL at 12:18

## 2019-06-25 RX ADMIN — Medication 100 MILLIGRAM(S): at 13:45

## 2019-06-25 RX ADMIN — HEPARIN SODIUM 5000 UNIT(S): 5000 INJECTION INTRAVENOUS; SUBCUTANEOUS at 22:01

## 2019-06-25 RX ADMIN — HYDROMORPHONE HYDROCHLORIDE 0.5 MILLIGRAM(S): 2 INJECTION INTRAMUSCULAR; INTRAVENOUS; SUBCUTANEOUS at 12:18

## 2019-06-25 RX ADMIN — HYDROMORPHONE HYDROCHLORIDE 0.5 MILLIGRAM(S): 2 INJECTION INTRAMUSCULAR; INTRAVENOUS; SUBCUTANEOUS at 13:42

## 2019-06-25 RX ADMIN — Medication 100 MILLIGRAM(S): at 22:02

## 2019-06-25 NOTE — H&P PST ADULT - HISTORY OF PRESENT ILLNESS
Patient history of prior laryngeal CA s/p definitive RT and history of prior trach s/p prior decannulation now is admitted following direct laryngoscopy biopsy of laryngeal lesion (frozen showing malig.) and planned revision tracheostomy due to tenuous airway

## 2019-06-25 NOTE — CONSULT NOTE ADULT - ATTENDING COMMENTS
Pt tolerated CPAP and placed on trach collar, dong well. Cuff deflation per ENT. Pt tolerated CPAP and placed on trach collar, doing well. Cuff deflation per ENT.

## 2019-06-25 NOTE — H&P PST ADULT - ADDITIONAL PE
Cachetic male  Neck RT woody changed  6 cuffed trach in place inflated, sutured and ties   Neck soft flat

## 2019-06-25 NOTE — H&P PST ADULT - ASSESSMENT
63M former smoker ho 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 today admitted s/p DL biopsy for recurrence of laryngeal CA (found on frozen) and planned revision tracheostomy due to tenuous airway  - admitted to SICU for monitoring  - 6 cuffed trach in place, sutured and trach ties in place. keep cuff on for tonight  - wean off vent as per SICU  - diet- advance as tolerated  - ancef 3 doses (pt with ?allergy to PCN but received ancef in OR without issues)  - hsq   - sicu care appreciated

## 2019-06-25 NOTE — CONSULT NOTE ADULT - SUBJECTIVE AND OBJECTIVE BOX
62 yo M w/ hx prior laryngeal CA s/p definitive RT and history of prior trach s/p prior decannulation, now with recurrence s/p biopsy of subglottis and open tracheostomy. Patient transferred to SICU for airway monitoring.     SICU CONSULT NOTE    62 yo M w/ hx prior laryngeal CA s/p definitive RT and history of prior trach s/p prior decannulation, now with recurrence s/p biopsy of subglottis and open tracheostomy. Patient transferred to SICU for airway monitoring.     PAST MEDICAL & SURGICAL HISTORY:  SOB (shortness of breath)  Throat cancer  Jaundice: 1965  Fracture: left ankle  Laryngeal mass: s/ p radiation  Surgery, elective: direct laryngoscopy with biopsy- 4/2017  Status post surgery: inguinal hernia  Status post surgery: Left ankle surgical repair  x2 (1965)      PAST SURGICAL HISTORY:     REVIEW OF SYSTEMS:   Pertinent positives/negatives noted in HPI.     HOME MEDICATIONS:    ALLERGIES:  Allergies    penicillin (Rash) - however no reaction to ancef earlier    Intolerances        SOCIAL HISTORY:    FAMILY HISTORY:  No pertinent family history in first degree relatives      Vital Signs Last 24 Hrs  T(C): 34.8 (25 Jun 2019 10:20), Max: 35.8 (24 Jun 2019 16:00)  T(F): 94.7 (25 Jun 2019 10:20), Max: 96.4 (24 Jun 2019 16:00)  HR: 76 (25 Jun 2019 10:56) (72 - 80)  BP: 148/82 (25 Jun 2019 10:56) (118/65 - 152/83)  BP(mean): 109 (25 Jun 2019 10:56) (109 - 115)  RR: 15 (25 Jun 2019 10:56) (15 - 16)  SpO2: 95% (25 Jun 2019 10:56) (95% - 100%)    PHYSICAL EXAM:  General: NAD  Neuro: AAOx3, cranial nerves intact  Neck: trach in place, scant dried blood surrounding  Pulm: normal resp effort and excursion, CAB, no wheezes rales or rhonchi  Heart: RRR normal S1S2 no MGR  Abd: soft, NT/ND  : voids  Ext: WWP, 2+ DP pulses  Psych: appropriate affect

## 2019-06-25 NOTE — CONSULT NOTE ADULT - ASSESSMENT
64 yo M w/ hx prior laryngeal CA s/p definitive RT and history of prior trach s/p prior decannulation, now with recurrence s/p biopsy of subglottis and open tracheostomy. Patient transferred to SICU for airway monitoring.     Neuro: pain/nausea control  Pulm: trach collar, cuff inflated, humidified O2  CV: monitor  GI: adv as tolerated  : voids  ID: ancef x3 doses (6/25 - )  PPx: SCDs/SQH

## 2019-06-26 LAB
ANION GAP SERPL CALC-SCNC: 9 MMOL/L — SIGNIFICANT CHANGE UP (ref 5–17)
BUN SERPL-MCNC: 8 MG/DL — SIGNIFICANT CHANGE UP (ref 7–23)
CALCIUM SERPL-MCNC: 9 MG/DL — SIGNIFICANT CHANGE UP (ref 8.4–10.5)
CHLORIDE SERPL-SCNC: 99 MMOL/L — SIGNIFICANT CHANGE UP (ref 96–108)
CO2 SERPL-SCNC: 30 MMOL/L — SIGNIFICANT CHANGE UP (ref 22–31)
CREAT SERPL-MCNC: 0.75 MG/DL — SIGNIFICANT CHANGE UP (ref 0.5–1.3)
GLUCOSE SERPL-MCNC: 100 MG/DL — HIGH (ref 70–99)
HCT VFR BLD CALC: 38 % — LOW (ref 39–50)
HGB BLD-MCNC: 12.2 G/DL — LOW (ref 13–17)
MAGNESIUM SERPL-MCNC: 2.2 MG/DL — SIGNIFICANT CHANGE UP (ref 1.6–2.6)
MCHC RBC-ENTMCNC: 29.3 PG — SIGNIFICANT CHANGE UP (ref 27–34)
MCHC RBC-ENTMCNC: 32.1 GM/DL — SIGNIFICANT CHANGE UP (ref 32–36)
MCV RBC AUTO: 91.3 FL — SIGNIFICANT CHANGE UP (ref 80–100)
NRBC # BLD: 0 /100 WBCS — SIGNIFICANT CHANGE UP (ref 0–0)
PHOSPHATE SERPL-MCNC: 3.5 MG/DL — SIGNIFICANT CHANGE UP (ref 2.5–4.5)
PLATELET # BLD AUTO: 284 K/UL — SIGNIFICANT CHANGE UP (ref 150–400)
POTASSIUM SERPL-MCNC: 5.2 MMOL/L — SIGNIFICANT CHANGE UP (ref 3.5–5.3)
POTASSIUM SERPL-SCNC: 5.2 MMOL/L — SIGNIFICANT CHANGE UP (ref 3.5–5.3)
RBC # BLD: 4.16 M/UL — LOW (ref 4.2–5.8)
RBC # FLD: 13.1 % — SIGNIFICANT CHANGE UP (ref 10.3–14.5)
SODIUM SERPL-SCNC: 138 MMOL/L — SIGNIFICANT CHANGE UP (ref 135–145)
WBC # BLD: 12.56 K/UL — HIGH (ref 3.8–10.5)
WBC # FLD AUTO: 12.56 K/UL — HIGH (ref 3.8–10.5)

## 2019-06-26 PROCEDURE — 71045 X-RAY EXAM CHEST 1 VIEW: CPT | Mod: 26

## 2019-06-26 PROCEDURE — 71045 X-RAY EXAM CHEST 1 VIEW: CPT | Mod: 26,77

## 2019-06-26 PROCEDURE — 99233 SBSQ HOSP IP/OBS HIGH 50: CPT | Mod: GC

## 2019-06-26 RX ORDER — SODIUM CHLORIDE 9 MG/ML
1000 INJECTION, SOLUTION INTRAVENOUS
Refills: 0 | Status: DISCONTINUED | OUTPATIENT
Start: 2019-06-26 | End: 2019-06-27

## 2019-06-26 RX ORDER — HYDROMORPHONE HYDROCHLORIDE 2 MG/ML
0.25 INJECTION INTRAMUSCULAR; INTRAVENOUS; SUBCUTANEOUS ONCE
Refills: 0 | Status: DISCONTINUED | OUTPATIENT
Start: 2019-06-26 | End: 2019-06-26

## 2019-06-26 RX ORDER — HEPARIN SODIUM 5000 [USP'U]/ML
5000 INJECTION INTRAVENOUS; SUBCUTANEOUS EVERY 8 HOURS
Refills: 0 | Status: DISCONTINUED | OUTPATIENT
Start: 2019-06-26 | End: 2019-07-01

## 2019-06-26 RX ADMIN — HYDROMORPHONE HYDROCHLORIDE 0.25 MILLIGRAM(S): 2 INJECTION INTRAMUSCULAR; INTRAVENOUS; SUBCUTANEOUS at 13:45

## 2019-06-26 RX ADMIN — HYDROMORPHONE HYDROCHLORIDE 0.25 MILLIGRAM(S): 2 INJECTION INTRAMUSCULAR; INTRAVENOUS; SUBCUTANEOUS at 21:32

## 2019-06-26 RX ADMIN — HEPARIN SODIUM 5000 UNIT(S): 5000 INJECTION INTRAVENOUS; SUBCUTANEOUS at 22:36

## 2019-06-26 RX ADMIN — Medication 100 MILLIGRAM(S): at 07:03

## 2019-06-26 RX ADMIN — HEPARIN SODIUM 5000 UNIT(S): 5000 INJECTION INTRAVENOUS; SUBCUTANEOUS at 07:02

## 2019-06-26 RX ADMIN — SODIUM CHLORIDE 100 MILLILITER(S): 9 INJECTION, SOLUTION INTRAVENOUS at 07:03

## 2019-06-26 RX ADMIN — CHLORHEXIDINE GLUCONATE 1 APPLICATION(S): 213 SOLUTION TOPICAL at 07:52

## 2019-06-26 RX ADMIN — SODIUM CHLORIDE 80 MILLILITER(S): 9 INJECTION, SOLUTION INTRAVENOUS at 11:07

## 2019-06-26 RX ADMIN — HEPARIN SODIUM 5000 UNIT(S): 5000 INJECTION INTRAVENOUS; SUBCUTANEOUS at 13:18

## 2019-06-26 RX ADMIN — HYDROMORPHONE HYDROCHLORIDE 0.25 MILLIGRAM(S): 2 INJECTION INTRAMUSCULAR; INTRAVENOUS; SUBCUTANEOUS at 13:22

## 2019-06-26 RX ADMIN — PANTOPRAZOLE SODIUM 40 MILLIGRAM(S): 20 TABLET, DELAYED RELEASE ORAL at 11:07

## 2019-06-26 RX ADMIN — HYDROMORPHONE HYDROCHLORIDE 0.25 MILLIGRAM(S): 2 INJECTION INTRAMUSCULAR; INTRAVENOUS; SUBCUTANEOUS at 20:56

## 2019-06-26 NOTE — PROGRESS NOTE ADULT - ATTENDING COMMENTS
I saw Mr Alfonso around 4:30 today in the ICU and discussed the results of the frozen section biopsy, and that he would need to have laryngectomy as orignally recommended if frozen section was positive.  I told him that I would speak with his cousin as well who serves to aid in decision making.

## 2019-06-26 NOTE — PHYSICAL THERAPY INITIAL EVALUATION ADULT - PLANNED THERAPY INTERVENTIONS, PT EVAL
balance training/strengthening/gait training/transfer training/bed mobility training/postural re-education

## 2019-06-26 NOTE — PHYSICAL THERAPY INITIAL EVALUATION ADULT - IMPAIRMENTS FOUND, PT EVAL
ventilation and respiration/gas exchange/gross motor/aerobic capacity/endurance/fine motor/gait, locomotion, and balance/integumentary integrity/joint integrity and mobility/muscle strength/posture

## 2019-06-26 NOTE — SWALLOW BEDSIDE ASSESSMENT ADULT - SLP PERTINENT HISTORY OF CURRENT PROBLEM
had h/o dysphagia, on restricted diet, eventually improved with return to regular diet with thin liquids

## 2019-06-26 NOTE — DIETITIAN INITIAL EVALUATION ADULT. - ENERGY NEEDS
Ht 172.72cm; Wt 64Kg  IBW 70Kg; %IBW 91%  BMI 21.5    Utilized ABW to calculate needs, pt falls within % of IBW. Adjusted for age/catabolic state/repletion.

## 2019-06-26 NOTE — SWALLOW BEDSIDE ASSESSMENT ADULT - SLP GENERAL OBSERVATIONS
Pt received awake and alert, pleasant, sitting up in chair.  (+) Shiley XLT trach in place, O2 95%, RR 19 at baseline.  RN reported pt requires frequent suctioning, every 15 min 2/2 copious secretions.

## 2019-06-26 NOTE — PHYSICAL THERAPY INITIAL EVALUATION ADULT - GAIT DEVIATIONS NOTED, PT EVAL
decreased weight-shifting ability/increased time in double stance/decreased step length/decreased sonido

## 2019-06-26 NOTE — PHYSICAL THERAPY INITIAL EVALUATION ADULT - PERTINENT HX OF CURRENT PROBLEM, REHAB EVAL
63M ho 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, s/p DL biopsy for recurrence of laryngeal CA (found on frozen) and planned revision tracheostomy due to tenuous airway. C/b return to OR last night for exchange to 6 distal XLT due to tracheomalacia and poor ventilatory status after coughing episode.

## 2019-06-26 NOTE — SWALLOW BEDSIDE ASSESSMENT ADULT - SPECIFY REASON(S)
Pt with h/o laryngeal Ca s/p RT, prior trach s/p decannulation, now with SOB/stridor, found to have recurrence extending to subglottis, s/p biopsy & open trach 6/25.

## 2019-06-26 NOTE — SWALLOW BEDSIDE ASSESSMENT ADULT - SWALLOW EVAL: ORAL MUSCULATURE
generally intact/With finger occlusion of trach hub, pt achieved voicing and was able to communicate functionally, however voice was moderately hoarse and wet-gurgly.

## 2019-06-26 NOTE — PROGRESS NOTE ADULT - SUBJECTIVE AND OBJECTIVE BOX
24 hr events: 6/26: cuff deflated, increased secretions therefore sputum cx sent. cant step down until suctioning requirements lessened. ivf changed to 1/2 NS @80  OVN: JOSÉ MIGUEL.    Subjective: Easier with breathing, Able to cough. Requiring suction  No CP or SOB    MEDICATIONS  (STANDING):  chlorhexidine 4% Liquid 1 Application(s) Topical <User Schedule>  heparin SubCutaneous Injection - Peds 5000 Unit(s) SubCutaneous every 8 hours  pantoprazole  Injectable 40 milliGRAM(s) IV Push daily  sodium chloride 0.45%. 1000 milliLiter(s) (80 mL/Hr) IV Continuous <Continuous>    MEDICATIONS  (PRN):  HYDROmorphone  Injectable 0.5 milliGRAM(s) IV Push every 3 hours PRN Severe Pain (7 - 10)  HYDROmorphone  Injectable 0.25 milliGRAM(s) IV Push every 3 hours PRN Moderate Pain (4 - 6)      ICU Vital Signs Last 24 Hrs  T(C): 37.2 (26 Jun 2019 09:36), Max: 37.2 (26 Jun 2019 09:36)  T(F): 98.9 (26 Jun 2019 09:36), Max: 98.9 (26 Jun 2019 09:36)  HR: 74 (26 Jun 2019 10:00) (58 - 100)  BP: 142/73 (26 Jun 2019 10:00) (114/72 - 156/86)  BP(mean): 95 (26 Jun 2019 10:00) (89 - 115)  ABP: --  ABP(mean): --  RR: 13 (26 Jun 2019 10:00) (6 - 32)  SpO2: 99% (26 Jun 2019 10:00) (93% - 100%)      Physical Exam:  General: NAD  HEENT: XLT trach in place, with minimal serosanguinous output. Thick secretions requiring frequent suctioning  Pulmonary: On Trach collar, normal respiratory effort  Cardiovascular: NSR, no murmurs  Abdominal: soft, NT/ND, +BS, no organomegaly  Extremities: WWP, 5/5 strength x 4, no clubbing/cyanosis/edema  Neuro: A/O x3, CNs II-XII grossly intact, normal motor/sensation, no focal deficits  Pulses: palpable distal pulses      I&O's Summary    25 Jun 2019 07:01  -  26 Jun 2019 07:00  --------------------------------------------------------  IN: 2300 mL / OUT: 2725 mL / NET: -425 mL    26 Jun 2019 07:01  -  26 Jun 2019 11:08  --------------------------------------------------------  IN: 300 mL / OUT: 750 mL / NET: -450 mL        LABS:                        12.2   12.56 )-----------( 284      ( 26 Jun 2019 05:49 )             38.0     06-26    138  |  99  |  8   ----------------------------<  100<H>  5.2   |  30  |  0.75    Ca    9.0      26 Jun 2019 05:49  Phos  3.5     06-26  Mg     2.2     06-26          CAPILLARY BLOOD GLUCOSE

## 2019-06-26 NOTE — SWALLOW BEDSIDE ASSESSMENT ADULT - SWALLOW EVAL: SECRETION MANAGEMENT
significant amount of secretions coughed out/suctioned from trach, also moderate secretions via deep Yankauer suction orally./wet upper airway/breath sounds

## 2019-06-26 NOTE — PHYSICAL THERAPY INITIAL EVALUATION ADULT - CRITERIA FOR SKILLED THERAPEUTIC INTERVENTIONS
functional limitations in following categories/risk reduction/prevention/therapy frequency/rehab potential/anticipated discharge recommendation/impairments found

## 2019-06-26 NOTE — DIETITIAN INITIAL EVALUATION ADULT. - OTHER INFO
62y/o M w/ hx prior laryngeal CA s/p definitive RT and history of prior trach s/p prior decannulation, now with recurrence s/p biopsy of subglottis and open tracheostomy. Patient transferred to SICU for airway monitoring. Pt seen resting in chair on humidified, trach collar. SLP assessed pt this morning and not cleared for po; NPO to continue. Per team, NGT to be placed for EN initiation. Discussed EN with pt and meeting EER at goal rate. Pt reports understanding. PTA he reports a decline in intake 2/2 difficulty breathing/lethargy. # which indicates ~20# loss; pt unable to identify time frame but thinks it was over months. Pt with noticeable loss of LBM. Per NFPE wasting to bitemporal region, buccal region, and clavicular region noted. Suspect moderate-severe PCM 2/2 wt loss, decreased intake, and NFPE; please see chart note. Pt denies N/V/D/C and pain at present. RD to follow per policy.

## 2019-06-26 NOTE — PROGRESS NOTE ADULT - ATTENDING COMMENTS
Pt. comfortable with 6 XLT with cuff deflated. requiring frequent suctioning and sputum culture sent. Pt able to phonate with cuff deflated and no PMV to be placed in setting of tracheomalacia. Failed speech and swallow and will need Dobhoff and possibly PEG.

## 2019-06-26 NOTE — DIETITIAN INITIAL EVALUATION ADULT. - ENTERAL
Jevity 1.2 via NGT (after placed/confirmed) with goal rate of 68ml/hr x 24hr (1632ml TV, 1958kcal, 90g, 1317ml water, 163% RDIs). Additional fluids per team. Start at 30ml/hr and advance as tolerated by 10ml q4h. Monitor for s/s of intolerance and maintain aspiration precautions.

## 2019-06-26 NOTE — SWALLOW BEDSIDE ASSESSMENT ADULT - PHARYNGEAL PHASE
Suspect timely swallow initiation.  Limited laryngeal elevation noted on swallows per palpation.  Pt suctioned for moderate amount of green-colored secretions via trach after ice chip trials.  No further po trials provided.

## 2019-06-26 NOTE — SWALLOW BEDSIDE ASSESSMENT ADULT - SWALLOW EVAL: DIAGNOSIS
Pt presents with pharyngeal dysphagia with overt signs & symptoms of airway protection deficits on limited ice chip trials.  Pt not safe for po at this time and would benefit from enteral means of nutrition ( NGT) at least temporarily.

## 2019-06-26 NOTE — PHYSICAL THERAPY INITIAL EVALUATION ADULT - ADDITIONAL COMMENTS
pt lives on 4th floor of walk up apartment building w/ family. States that he owns a straight cane but does not normally use it. Denies hx of recent falls, states that he was independent in ADLs and walking to work up until admission.

## 2019-06-26 NOTE — PROGRESS NOTE ADULT - ASSESSMENT
64 yo M w/ hx prior laryngeal CA s/p definitive RT and history of prior trach s/p prior decannulation, now with recurrence s/p biopsy of subglottis and open tracheostomy. Patient transferred to SICU for airway monitoring.     Neuro: pain/nausea control  Pulm: trach collar, cuff deflated, humidified O2 pt can not have passymuir valve  CV: HD stable 1/2 NS @80  GI: NPO poss advance in afternoon  : voids  Endo: ISS   ID: ancef x3 doses (6/25 - )  PPx: SCDs/SQH  Lines: PIV   PT: Ordered.

## 2019-06-26 NOTE — PROGRESS NOTE ADULT - SUBJECTIVE AND OBJECTIVE BOX
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 C/b return to OR last night for exchange to 6 distal XLT due to tracheomalacia and poor ventilatory status after coughing episode.    6/26: Now doing well in SICU overnight, no additional respiratory issues, cuff taken down thsi AM. Failed SLP evaluation with evidence of gross aspiration on ice chip trial    PAST MEDICAL & SURGICAL HISTORY:  SOB (shortness of breath)  Throat cancer  Jaundice: 1965  Fracture: left ankle  Laryngeal mass: s/ p radiation  Surgery, elective: direct laryngoscopy with biopsy- 4/2017  Status post surgery: inguinal hernia  Status post surgery: Left ankle surgical repair  x2 (1965)      PE:  VSS  NAD  Neck: 6 distal XLT in place, gauze supporting trach to keep in appropriate position, sutures in place  Tolerates finger occlusion and able to voice    06-26    138  |  99  |  8   ----------------------------<  100<H>  5.2   |  30  |  0.75    Ca    9.0      26 Jun 2019 05:49  Phos  3.5     06-26  Mg     2.2     06-26                          12.2   12.56 )-----------( 284      ( 26 Jun 2019 05:49 )             38.0     a/P:   63M ho 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, s/p DL biopsy for recurrence of laryngeal CA (found on frozen) and planned revision tracheostomy due to tenuous airway. C/b return to OR last night for exchange to 6 distal XLT due to tracheomalacia and poor ventilatory status after coughing episode.  -NGT for feeds given failed SLP eval  -Routine trach case (q1-2 hr suctioning, inner cannula cleaning q shift)  -Please keep gauze under inferior flange to elevate trach appropriately in airway  -Monitor for any respiratory difficulty  -Please page ENT with questions or concerns  D/w attending

## 2019-06-27 LAB
ANION GAP SERPL CALC-SCNC: 9 MMOL/L — SIGNIFICANT CHANGE UP (ref 5–17)
BUN SERPL-MCNC: 8 MG/DL — SIGNIFICANT CHANGE UP (ref 7–23)
CALCIUM SERPL-MCNC: 9 MG/DL — SIGNIFICANT CHANGE UP (ref 8.4–10.5)
CHLORIDE SERPL-SCNC: 97 MMOL/L — SIGNIFICANT CHANGE UP (ref 96–108)
CO2 SERPL-SCNC: 34 MMOL/L — HIGH (ref 22–31)
CREAT SERPL-MCNC: 0.72 MG/DL — SIGNIFICANT CHANGE UP (ref 0.5–1.3)
CULTURE RESULTS: SIGNIFICANT CHANGE UP
GAS PNL BLDV: SIGNIFICANT CHANGE UP
GLUCOSE SERPL-MCNC: 99 MG/DL — SIGNIFICANT CHANGE UP (ref 70–99)
GRAM STN FLD: SIGNIFICANT CHANGE UP
HCT VFR BLD CALC: 39.4 % — SIGNIFICANT CHANGE UP (ref 39–50)
HGB BLD-MCNC: 12.5 G/DL — LOW (ref 13–17)
MAGNESIUM SERPL-MCNC: 2.1 MG/DL — SIGNIFICANT CHANGE UP (ref 1.6–2.6)
MCHC RBC-ENTMCNC: 29 PG — SIGNIFICANT CHANGE UP (ref 27–34)
MCHC RBC-ENTMCNC: 31.7 GM/DL — LOW (ref 32–36)
MCV RBC AUTO: 91.4 FL — SIGNIFICANT CHANGE UP (ref 80–100)
NRBC # BLD: 0 /100 WBCS — SIGNIFICANT CHANGE UP (ref 0–0)
PHOSPHATE SERPL-MCNC: 3 MG/DL — SIGNIFICANT CHANGE UP (ref 2.5–4.5)
PLATELET # BLD AUTO: 314 K/UL — SIGNIFICANT CHANGE UP (ref 150–400)
POTASSIUM SERPL-MCNC: 4.2 MMOL/L — SIGNIFICANT CHANGE UP (ref 3.5–5.3)
POTASSIUM SERPL-SCNC: 4.2 MMOL/L — SIGNIFICANT CHANGE UP (ref 3.5–5.3)
RBC # BLD: 4.31 M/UL — SIGNIFICANT CHANGE UP (ref 4.2–5.8)
RBC # FLD: 13 % — SIGNIFICANT CHANGE UP (ref 10.3–14.5)
SODIUM SERPL-SCNC: 140 MMOL/L — SIGNIFICANT CHANGE UP (ref 135–145)
SPECIMEN SOURCE: SIGNIFICANT CHANGE UP
SPECIMEN SOURCE: SIGNIFICANT CHANGE UP
SURGICAL PATHOLOGY STUDY: SIGNIFICANT CHANGE UP
WBC # BLD: 9.81 K/UL — SIGNIFICANT CHANGE UP (ref 3.8–10.5)
WBC # FLD AUTO: 9.81 K/UL — SIGNIFICANT CHANGE UP (ref 3.8–10.5)

## 2019-06-27 PROCEDURE — 71045 X-RAY EXAM CHEST 1 VIEW: CPT | Mod: 26

## 2019-06-27 PROCEDURE — 99233 SBSQ HOSP IP/OBS HIGH 50: CPT | Mod: GC

## 2019-06-27 RX ORDER — CEFTRIAXONE 500 MG/1
1000 INJECTION, POWDER, FOR SOLUTION INTRAMUSCULAR; INTRAVENOUS EVERY 24 HOURS
Refills: 0 | Status: COMPLETED | OUTPATIENT
Start: 2019-06-27 | End: 2019-07-01

## 2019-06-27 RX ORDER — ACETAMINOPHEN 500 MG
1000 TABLET ORAL ONCE
Refills: 0 | Status: COMPLETED | OUTPATIENT
Start: 2019-06-27 | End: 2019-06-27

## 2019-06-27 RX ADMIN — HEPARIN SODIUM 5000 UNIT(S): 5000 INJECTION INTRAVENOUS; SUBCUTANEOUS at 13:43

## 2019-06-27 RX ADMIN — CEFTRIAXONE 100 MILLIGRAM(S): 500 INJECTION, POWDER, FOR SOLUTION INTRAMUSCULAR; INTRAVENOUS at 12:04

## 2019-06-27 RX ADMIN — HEPARIN SODIUM 5000 UNIT(S): 5000 INJECTION INTRAVENOUS; SUBCUTANEOUS at 22:07

## 2019-06-27 RX ADMIN — Medication 400 MILLIGRAM(S): at 17:38

## 2019-06-27 RX ADMIN — PANTOPRAZOLE SODIUM 40 MILLIGRAM(S): 20 TABLET, DELAYED RELEASE ORAL at 12:04

## 2019-06-27 RX ADMIN — Medication 1000 MILLIGRAM(S): at 18:34

## 2019-06-27 RX ADMIN — CHLORHEXIDINE GLUCONATE 1 APPLICATION(S): 213 SOLUTION TOPICAL at 06:30

## 2019-06-27 RX ADMIN — HEPARIN SODIUM 5000 UNIT(S): 5000 INJECTION INTRAVENOUS; SUBCUTANEOUS at 06:32

## 2019-06-27 NOTE — PROGRESS NOTE ADULT - SUBJECTIVE AND OBJECTIVE BOX
24 hr events: OVN: JOSÉ MIGUEL  6/26: cuff deflated, increased secretions therefore sputum cx sent. cant step down until suctioning requirements lessened. ivf changed to 1/2 NS @80, failed bedside  s&s eval, dobhoff placed    SUBJECTIVE: Feels better. No SOB or CP. Strong cough thick secretions.     MEDICATIONS  (STANDING):  chlorhexidine 4% Liquid 1 Application(s) Topical <User Schedule>  heparin  Injectable 5000 Unit(s) SubCutaneous every 8 hours  pantoprazole  Injectable 40 milliGRAM(s) IV Push daily    MEDICATIONS  (PRN):  HYDROmorphone  Injectable 0.5 milliGRAM(s) IV Push every 3 hours PRN Severe Pain (7 - 10)  HYDROmorphone  Injectable 0.25 milliGRAM(s) IV Push every 3 hours PRN Moderate Pain (4 - 6)        ICU Vital Signs Last 24 Hrs  T(C): 37.6 (27 Jun 2019 05:21), Max: 38 (26 Jun 2019 22:04)  T(F): 99.7 (27 Jun 2019 05:21), Max: 100.4 (26 Jun 2019 22:04)  HR: 88 (27 Jun 2019 09:00) (68 - 96)  BP: 129/65 (27 Jun 2019 09:00) (110/69 - 147/80)  BP(mean): 86 (27 Jun 2019 09:00) (77 - 111)  ABP: --  ABP(mean): --  RR: 22 (27 Jun 2019 09:00) (10 - 29)  SpO2: 93% (27 Jun 2019 09:00) (90% - 100%)      Physical Exam:  General: NAD  HEENT:Trach on TC, with minimal serosanguinous output. Thick secretions.   Pulmonary: Trach collar. Nonlabored breathing, Bilateral ronchi  Cardiovascular: NSR, no murmurs  Abdominal: soft, NT/ND, +BS, no organomegaly  Extremities: WWP, 5/5 strength x 4, no clubbing/cyanosis/edema  Neuro: A/O x3, CNs II-XII grossly intact, normal motor/sensation, no focal deficits  Pulses: palpable distal pulses      I&O's Summary    26 Jun 2019 07:01  -  27 Jun 2019 07:00  --------------------------------------------------------  IN: 2360 mL / OUT: 2175 mL / NET: 185 mL    27 Jun 2019 07:01  -  27 Jun 2019 10:04  --------------------------------------------------------  IN: 120 mL / OUT: 500 mL / NET: -380 mL        LABS:                        12.5   9.81  )-----------( 314      ( 27 Jun 2019 05:54 )             39.4     06-27    140  |  97  |  8   ----------------------------<  99  4.2   |  34<H>  |  0.72    Ca    9.0      27 Jun 2019 05:54  Phos  3.0     06-27  Mg     2.1     06-27          CAPILLARY BLOOD GLUCOSE            Cultures:Culture Results:   No growth to date. (06-26 @ 18:06)      Drips:    RADIOLOGY & ADDITIONAL STUDIES:

## 2019-06-27 NOTE — PROGRESS NOTE ADULT - SUBJECTIVE AND OBJECTIVE BOX
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 C/b return to OR last night for exchange to 6 distal XLT due to tracheomalacia and poor ventilatory status after coughing episode.    6/26: Now doing well in SICU overnight, no additional respiratory issues, cuff taken down thsi AM. Failed SLP evaluation with evidence of gross aspiration on ice chip trial  6/27: has thick purulent secretions, SICU will keep for one more night. Low grade temps to 100.4. Will need PEG    PAST MEDICAL & SURGICAL HISTORY:  SOB (shortness of breath)  Throat cancer  Jaundice: 1965  Fracture: left ankle  Laryngeal mass: s/ p radiation  Surgery, elective: direct laryngoscopy with biopsy- 4/2017  Status post surgery: inguinal hernia  Status post surgery: Left ankle surgical repair  x2 (1965)      PE:  VSS  NAD  Neck: 6 distal XLT in place, gauze supporting trach to keep in appropriate position, sutures in place  Tolerates finger occlusion and able to voice                          12.5   9.81  )-----------( 314      ( 27 Jun 2019 05:54 )             39.4   06-27    140  |  97  |  8   ----------------------------<  99  4.2   |  34<H>  |  0.72    Ca    9.0      27 Jun 2019 05:54  Phos  3.0     06-27  Mg     2.1     06-27        a/P:   63M ho 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, s/p DL biopsy for recurrence of laryngeal CA (found on frozen) and planned revision tracheostomy due to tenuous airway. C/b return to OR last night for exchange to 6 distal XLT due to tracheomalacia and poor ventilatory status after coughing episode.  -NGT for feeds given failed SLP eval- will likely need PEG   -Routine trach case (q1-2 hr suctioning, inner cannula cleaning q shift)  -Please keep gauze under inferior flange to elevate trach appropriately in airway  -Monitor for any respiratory difficulty  -Please page ENT with questions or concerns  D/w attending

## 2019-06-27 NOTE — PROGRESS NOTE ADULT - ASSESSMENT
62 yo M w/ hx prior laryngeal CA s/p definitive RT and history of prior trach s/p prior decannulation, now with recurrence s/p biopsy of subglottis and open tracheostomy. Patient transferred to SICU for airway monitoring.     Neuro: pain/nausea control  Pulm: trach collar, cuff deflated, humidified O2 pt can not have passymuir valve  CV: HD stable 1/2 NS @80  GI: NPO  with dobhoff failed Sp& sw on 6/26.   : voids  Endo: ISS   ID: ancef x3 doses (6/25 - 6/26) Ceftriaxone (6/27 - )  Heme//Onc: pathology shows recurrence of cancer  PPx: SCDs/SQH  Lines: PIV   PT: Ordered.

## 2019-06-27 NOTE — PROGRESS NOTE ADULT - ATTENDING COMMENTS
Pt continues to require frequent suctioning and sputum with Mod WBC and gram neg and Gram pos organisms. Ceftriaxone started for possibe tracheobronchitis. No infiltrate on CXR and temps increased to 100.4. comfortable on trach collar with cuff deflated  Tolerating enteral feeds.

## 2019-06-28 LAB
-  AMPICILLIN/SULBACTAM: SIGNIFICANT CHANGE UP
-  AMPICILLIN: SIGNIFICANT CHANGE UP
-  CEFAZOLIN: SIGNIFICANT CHANGE UP
-  CEFTRIAXONE: SIGNIFICANT CHANGE UP
-  GENTAMICIN: SIGNIFICANT CHANGE UP
-  PIPERACILLIN/TAZOBACTAM: SIGNIFICANT CHANGE UP
-  TOBRAMYCIN: SIGNIFICANT CHANGE UP
-  TRIMETHOPRIM/SULFAMETHOXAZOLE: SIGNIFICANT CHANGE UP
ANION GAP SERPL CALC-SCNC: 9 MMOL/L — SIGNIFICANT CHANGE UP (ref 5–17)
APTT BLD: 43.4 SEC — HIGH (ref 27.5–36.3)
BUN SERPL-MCNC: 10 MG/DL — SIGNIFICANT CHANGE UP (ref 7–23)
CALCIUM SERPL-MCNC: 9.1 MG/DL — SIGNIFICANT CHANGE UP (ref 8.4–10.5)
CHLORIDE SERPL-SCNC: 95 MMOL/L — LOW (ref 96–108)
CO2 SERPL-SCNC: 31 MMOL/L — SIGNIFICANT CHANGE UP (ref 22–31)
CREAT SERPL-MCNC: 0.64 MG/DL — SIGNIFICANT CHANGE UP (ref 0.5–1.3)
CULTURE RESULTS: SIGNIFICANT CHANGE UP
CULTURE RESULTS: SIGNIFICANT CHANGE UP
GLUCOSE SERPL-MCNC: 119 MG/DL — HIGH (ref 70–99)
HCT VFR BLD CALC: 35.9 % — LOW (ref 39–50)
HGB BLD-MCNC: 11.8 G/DL — LOW (ref 13–17)
INR BLD: 1.18 — HIGH (ref 0.88–1.16)
MAGNESIUM SERPL-MCNC: 2.2 MG/DL — SIGNIFICANT CHANGE UP (ref 1.6–2.6)
MCHC RBC-ENTMCNC: 29.9 PG — SIGNIFICANT CHANGE UP (ref 27–34)
MCHC RBC-ENTMCNC: 32.9 GM/DL — SIGNIFICANT CHANGE UP (ref 32–36)
MCV RBC AUTO: 90.9 FL — SIGNIFICANT CHANGE UP (ref 80–100)
METHOD TYPE: SIGNIFICANT CHANGE UP
NRBC # BLD: 0 /100 WBCS — SIGNIFICANT CHANGE UP (ref 0–0)
ORGANISM # SPEC MICROSCOPIC CNT: SIGNIFICANT CHANGE UP
ORGANISM # SPEC MICROSCOPIC CNT: SIGNIFICANT CHANGE UP
PHOSPHATE SERPL-MCNC: 3.2 MG/DL — SIGNIFICANT CHANGE UP (ref 2.5–4.5)
PLATELET # BLD AUTO: 269 K/UL — SIGNIFICANT CHANGE UP (ref 150–400)
POTASSIUM SERPL-MCNC: 3.9 MMOL/L — SIGNIFICANT CHANGE UP (ref 3.5–5.3)
POTASSIUM SERPL-SCNC: 3.9 MMOL/L — SIGNIFICANT CHANGE UP (ref 3.5–5.3)
PROTHROM AB SERPL-ACNC: 13.4 SEC — HIGH (ref 10–12.9)
RBC # BLD: 3.95 M/UL — LOW (ref 4.2–5.8)
RBC # FLD: 13.2 % — SIGNIFICANT CHANGE UP (ref 10.3–14.5)
SODIUM SERPL-SCNC: 135 MMOL/L — SIGNIFICANT CHANGE UP (ref 135–145)
SPECIMEN SOURCE: SIGNIFICANT CHANGE UP
WBC # BLD: 8.02 K/UL — SIGNIFICANT CHANGE UP (ref 3.8–10.5)
WBC # FLD AUTO: 8.02 K/UL — SIGNIFICANT CHANGE UP (ref 3.8–10.5)

## 2019-06-28 PROCEDURE — 99233 SBSQ HOSP IP/OBS HIGH 50: CPT | Mod: GC

## 2019-06-28 PROCEDURE — 43246 EGD PLACE GASTROSTOMY TUBE: CPT | Mod: GC

## 2019-06-28 PROCEDURE — 71045 X-RAY EXAM CHEST 1 VIEW: CPT | Mod: 26

## 2019-06-28 RX ORDER — SODIUM CHLORIDE 9 MG/ML
1000 INJECTION INTRAMUSCULAR; INTRAVENOUS; SUBCUTANEOUS
Refills: 0 | Status: DISCONTINUED | OUTPATIENT
Start: 2019-06-28 | End: 2019-06-29

## 2019-06-28 RX ORDER — MIDAZOLAM HYDROCHLORIDE 1 MG/ML
4 INJECTION, SOLUTION INTRAMUSCULAR; INTRAVENOUS ONCE
Refills: 0 | Status: DISCONTINUED | OUTPATIENT
Start: 2019-06-28 | End: 2019-06-28

## 2019-06-28 RX ORDER — FENTANYL CITRATE 50 UG/ML
100 INJECTION INTRAVENOUS ONCE
Refills: 0 | Status: DISCONTINUED | OUTPATIENT
Start: 2019-06-28 | End: 2019-06-28

## 2019-06-28 RX ORDER — SODIUM CHLORIDE 9 MG/ML
1000 INJECTION, SOLUTION INTRAVENOUS
Refills: 0 | Status: DISCONTINUED | OUTPATIENT
Start: 2019-06-28 | End: 2019-06-28

## 2019-06-28 RX ORDER — ACETAMINOPHEN 500 MG
1000 TABLET ORAL ONCE
Refills: 0 | Status: COMPLETED | OUTPATIENT
Start: 2019-06-28 | End: 2019-06-28

## 2019-06-28 RX ADMIN — FENTANYL CITRATE 100 MICROGRAM(S): 50 INJECTION INTRAVENOUS at 10:30

## 2019-06-28 RX ADMIN — CHLORHEXIDINE GLUCONATE 1 APPLICATION(S): 213 SOLUTION TOPICAL at 06:25

## 2019-06-28 RX ADMIN — MIDAZOLAM HYDROCHLORIDE 4 MILLIGRAM(S): 1 INJECTION, SOLUTION INTRAMUSCULAR; INTRAVENOUS at 09:45

## 2019-06-28 RX ADMIN — HYDROMORPHONE HYDROCHLORIDE 0.5 MILLIGRAM(S): 2 INJECTION INTRAMUSCULAR; INTRAVENOUS; SUBCUTANEOUS at 19:51

## 2019-06-28 RX ADMIN — CEFTRIAXONE 100 MILLIGRAM(S): 500 INJECTION, POWDER, FOR SOLUTION INTRAMUSCULAR; INTRAVENOUS at 11:59

## 2019-06-28 RX ADMIN — HEPARIN SODIUM 5000 UNIT(S): 5000 INJECTION INTRAVENOUS; SUBCUTANEOUS at 14:03

## 2019-06-28 RX ADMIN — HYDROMORPHONE HYDROCHLORIDE 0.5 MILLIGRAM(S): 2 INJECTION INTRAMUSCULAR; INTRAVENOUS; SUBCUTANEOUS at 20:15

## 2019-06-28 RX ADMIN — HEPARIN SODIUM 5000 UNIT(S): 5000 INJECTION INTRAVENOUS; SUBCUTANEOUS at 23:28

## 2019-06-28 RX ADMIN — HYDROMORPHONE HYDROCHLORIDE 0.5 MILLIGRAM(S): 2 INJECTION INTRAMUSCULAR; INTRAVENOUS; SUBCUTANEOUS at 14:03

## 2019-06-28 RX ADMIN — FENTANYL CITRATE 100 MICROGRAM(S): 50 INJECTION INTRAVENOUS at 09:45

## 2019-06-28 RX ADMIN — PANTOPRAZOLE SODIUM 40 MILLIGRAM(S): 20 TABLET, DELAYED RELEASE ORAL at 11:59

## 2019-06-28 RX ADMIN — HEPARIN SODIUM 5000 UNIT(S): 5000 INJECTION INTRAVENOUS; SUBCUTANEOUS at 06:25

## 2019-06-28 RX ADMIN — HYDROMORPHONE HYDROCHLORIDE 0.5 MILLIGRAM(S): 2 INJECTION INTRAMUSCULAR; INTRAVENOUS; SUBCUTANEOUS at 14:30

## 2019-06-28 RX ADMIN — Medication 400 MILLIGRAM(S): at 04:13

## 2019-06-28 RX ADMIN — Medication 1000 MILLIGRAM(S): at 04:45

## 2019-06-28 NOTE — PROGRESS NOTE ADULT - ASSESSMENT
64 yo M w/ hx prior laryngeal CA s/p definitive RT and history of prior trach s/p prior decannulation, now with recurrence s/p biopsy of subglottis and open tracheostomy. Patient transferred to SICU for airway monitoring.     Neuro: pain/nausea control  Pulm: trach collar, 6XL cuff deflated, humidified O2 pt can not have passymuir valve  CV: HD stable 1/2 NS @80  GI: NPO, failed Sp& sw on 6/26. PEG placed 6/28  : voids  Endo: ISS   ID:  Bronchitis: Proteus: ceftriaxone (6/27 - ) /// s/p Ancef x3 doses (6/25 -6/26 )  Heme//Onc: pathology shows recurrence of cancer  PPx: SCDs/SQH  Lines: PIV   PT: Ordered.

## 2019-06-28 NOTE — PROGRESS NOTE ADULT - SUBJECTIVE AND OBJECTIVE BOX
INTERVAL HPI/OVERNIGHT EVENTS:    - PEG placed, Dobhoff removed  - continuing to required frequent suctioning    PRESSORS: [ ] YES [x] NO      MEDICATIONS  (STANDING):  cefTRIAXone   IVPB 1000 milliGRAM(s) IV Intermittent every 24 hours  chlorhexidine 4% Liquid 1 Application(s) Topical <User Schedule>  heparin  Injectable 5000 Unit(s) SubCutaneous every 8 hours  pantoprazole  Injectable 40 milliGRAM(s) IV Push daily  sodium chloride 0.9%. 1000 milliLiter(s) (80 mL/Hr) IV Continuous <Continuous>    MEDICATIONS  (PRN):  HYDROmorphone  Injectable 0.5 milliGRAM(s) IV Push every 3 hours PRN Severe Pain (7 - 10)  HYDROmorphone  Injectable 0.25 milliGRAM(s) IV Push every 3 hours PRN Moderate Pain (4 - 6)      Drug Dosing Weight  Height (cm): 172.72 (24 Jun 2019 16:00)  Weight (kg): 64 (24 Jun 2019 16:00)  BMI (kg/m2): 21.5 (24 Jun 2019 16:00)  BSA (m2): 1.76 (24 Jun 2019 16:00)      PAST MEDICAL & SURGICAL HISTORY:  SOB (shortness of breath)  Throat cancer  Jaundice: 1965  Fracture: left ankle  Laryngeal mass: s/ p radiation  Surgery, elective: direct laryngoscopy with biopsy- 4/2017  Status post surgery: inguinal hernia  Status post surgery: Left ankle surgical repair  x2 (1965)      ICU Vital Signs Last 24 Hrs  T(C): 36.8 (28 Jun 2019 06:06), Max: 38.2 (27 Jun 2019 17:35)  T(F): 98.3 (28 Jun 2019 06:06), Max: 100.7 (27 Jun 2019 17:35)  HR: 68 (28 Jun 2019 13:00) (68 - 104)  BP: 110/66 (28 Jun 2019 13:00) (87/59 - 117/76)  BP(mean): 80 (28 Jun 2019 13:00) (67 - 91)  ABP: --  ABP(mean): --  RR: 14 (28 Jun 2019 13:00) (9 - 24)  SpO2: 96% (28 Jun 2019 13:00) (90% - 100%)      Physical Exam:  General: NAD  HEENT: Trach on TC, with minimal serosanguinous output. Thick secretions.   Pulmonary: Trach collar. Nonlabored breathing, Bilateral ronchi  Cardiovascular: NSR, no murmurs  Abdominal: soft, NT/ND, +BS, no organomegaly, PEG in place site CDI  Extremities: WWP, 5/5 strength x 4, no clubbing/cyanosis/edema  Neuro: A/O x3, CNs II-XII grossly intact, normal motor/sensation, no focal deficits  Pulses: palpable distal pulses      27 Jun 2019 07:01  -  28 Jun 2019 07:00  --------------------------------------------------------  IN:    IV PiggyBack: 250 mL    ns in tub fed  [id number]: 988 mL  Total IN: 1238 mL    OUT:    Voided: 1600 mL  Total OUT: 1600 mL    Total NET: -362 mL      28 Jun 2019 07:01  -  28 Jun 2019 13:26  --------------------------------------------------------  IN:    sodium chloride 0.9%.: 160 mL  Total IN: 160 mL    OUT:    Voided: 200 mL  Total OUT: 200 mL    Total NET: -40 mL                      LABS:  CBC Full  -  ( 28 Jun 2019 06:55 )  WBC Count : 8.02 K/uL  RBC Count : 3.95 M/uL  Hemoglobin : 11.8 g/dL  Hematocrit : 35.9 %  Platelet Count - Automated : 269 K/uL  Mean Cell Volume : 90.9 fl  Mean Cell Hemoglobin : 29.9 pg  Mean Cell Hemoglobin Concentration : 32.9 gm/dL  Auto Neutrophil # : x  Auto Lymphocyte # : x  Auto Monocyte # : x  Auto Eosinophil # : x  Auto Basophil # : x  Auto Neutrophil % : x  Auto Lymphocyte % : x  Auto Monocyte % : x  Auto Eosinophil % : x  Auto Basophil % : x    06-28    135  |  95<L>  |  10  ----------------------------<  119<H>  3.9   |  31  |  0.64    Ca    9.1      28 Jun 2019 06:55  Phos  3.2     06-28  Mg     2.2     06-28      PT/INR - ( 28 Jun 2019 06:55 )   PT: 13.4 sec;   INR: 1.18          PTT - ( 28 Jun 2019 06:55 )  PTT:43.4 sec

## 2019-06-28 NOTE — PROGRESS NOTE ADULT - ATTENDING COMMENTS
Secretions remain copious, will consider glycopyrrolate. Post PEG today. complete 5 days Ceftriaxone for possible proteus tracheobronchitis.

## 2019-06-28 NOTE — PROGRESS NOTE ADULT - SUBJECTIVE AND OBJECTIVE BOX
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 C/b return to OR last night for exchange to 6 distal XLT due to tracheomalacia and poor ventilatory status after coughing episode.    6/26: Now doing well in SICU overnight, no additional respiratory issues, cuff taken down thsi AM. Failed SLP evaluation with evidence of gross aspiration on ice chip trial  6/27: has thick purulent secretions, SICU will keep for one more night. Low grade temps to 100.4. Will need PEG  6/28: NPO for PEG today. To SDU, when secretions are better managed.     PAST MEDICAL & SURGICAL HISTORY:  SOB (shortness of breath)  Throat cancer  Jaundice: 1965  Fracture: left ankle  Laryngeal mass: s/ p radiation  Surgery, elective: direct laryngoscopy with biopsy- 4/2017  Status post surgery: inguinal hernia  Status post surgery: Left ankle surgical repair  x2 (1965)      PE:  VSS  NAD  Neck: 6 distal XLT in place, gauze supporting trach to keep in appropriate position, sutures in place  Tolerates finger occlusion and able to voice                            11.8   8.02  )-----------( 269      ( 28 Jun 2019 06:55 )             35.9              135  |  95<L>  |  10  ----------------------------<  119<H>  3.9   |  31  |  0.64    Ca    9.1      28 Jun 2019 06:55  Phos  3.2     06-28  Mg     2.2     06-28             A/P:   63M ho 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, s/p DL biopsy for recurrence of laryngeal CA   (found on frozen) and planned revision tracheostomy due to tenuous airway. C/b return to OR last night for exchange to 6 distal XLT due to tracheomalacia and poor ventilatory status after coughing episode.  -PEG today   -Routine trach case (q1-2 hr suctioning, inner cannula cleaning q shift)  -Please keep gauze under inferior flange to elevate trach appropriately in airway  -Monitor for any respiratory difficulty  -Please page ENT with questions or concerns  D/w attending ENT Progress Note     63M former smoker ho 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 today admitted s/p DL biopsy for recurrence of laryngeal CA (found on frozen) and planned revision tracheostomy due to tenuous airway. C/b return to OR last night for exchange to 6 distal XLT due to tracheomalacia and poor ventilatory status after coughing episode.    6/26: Now doing well in SICU overnight, no additional respiratory issues, cuff taken down thsi AM. Failed SLP evaluation with evidence of gross aspiration on ice chip trial  6/27: has thick purulent secretions, SICU will keep for one more night. Low grade temps to 100.4. Will need PEG  6/28: NPO for PEG today. To SDU, when secretions are better managed. Received PEG for inability to swallow.    PAST MEDICAL & SURGICAL HISTORY:  SOB (shortness of breath)  Throat cancer  Jaundice: 1965  Fracture: left ankle  Laryngeal mass: s/ p radiation  Surgery, elective: direct laryngoscopy with biopsy- 4/2017  Status post surgery: inguinal hernia  Status post surgery: Left ankle surgical repair  x2 (1965)      PE:  VSS  NAD  Neck: 6 distal XLT in place, gauze supporting trach to keep in appropriate position, sutures in place  Tolerates finger occlusion and able to voice                            11.8   8.02  )-----------( 269      ( 28 Jun 2019 06:55 )             35.9              135  |  95<L>  |  10  ----------------------------<  119<H>  3.9   |  31  |  0.64    Ca    9.1      28 Jun 2019 06:55  Phos  3.2     06-28  Mg     2.2     06-28             A/P:   63M ho 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, s/p DL biopsy for recurrence of laryngeal CA   (found on frozen) and planned revision tracheostomy due to tenuous airway. C/b return to OR last night for exchange to 6 distal XLT due to tracheomalacia and poor ventilatory status after coughing episode.  -PEG today   -Routine trach case (q1-2 hr suctioning, inner cannula cleaning q shift)  -Please keep gauze under inferior flange to elevate trach appropriately in airway  -Monitor for any respiratory difficulty  -Please page ENT with questions or concerns  D/w attending

## 2019-06-28 NOTE — PROCEDURE NOTE - ADDITIONAL PROCEDURE DETAILS
PEG placed under endoscopic visualization, placement confirmed with direct visualization, manual pressure and transillumination. EGD then performed, no gross abnormalities noted.

## 2019-06-29 LAB
ANION GAP SERPL CALC-SCNC: 12 MMOL/L — SIGNIFICANT CHANGE UP (ref 5–17)
BUN SERPL-MCNC: 12 MG/DL — SIGNIFICANT CHANGE UP (ref 7–23)
CALCIUM SERPL-MCNC: 9.1 MG/DL — SIGNIFICANT CHANGE UP (ref 8.4–10.5)
CHLORIDE SERPL-SCNC: 100 MMOL/L — SIGNIFICANT CHANGE UP (ref 96–108)
CO2 SERPL-SCNC: 27 MMOL/L — SIGNIFICANT CHANGE UP (ref 22–31)
CREAT SERPL-MCNC: 0.61 MG/DL — SIGNIFICANT CHANGE UP (ref 0.5–1.3)
GLUCOSE SERPL-MCNC: 72 MG/DL — SIGNIFICANT CHANGE UP (ref 70–99)
HCT VFR BLD CALC: 38.9 % — LOW (ref 39–50)
HGB BLD-MCNC: 12.3 G/DL — LOW (ref 13–17)
MAGNESIUM SERPL-MCNC: 2.1 MG/DL — SIGNIFICANT CHANGE UP (ref 1.6–2.6)
MCHC RBC-ENTMCNC: 29.7 PG — SIGNIFICANT CHANGE UP (ref 27–34)
MCHC RBC-ENTMCNC: 31.6 GM/DL — LOW (ref 32–36)
MCV RBC AUTO: 94 FL — SIGNIFICANT CHANGE UP (ref 80–100)
NRBC # BLD: 0 /100 WBCS — SIGNIFICANT CHANGE UP (ref 0–0)
PHOSPHATE SERPL-MCNC: 3.2 MG/DL — SIGNIFICANT CHANGE UP (ref 2.5–4.5)
PLATELET # BLD AUTO: 272 K/UL — SIGNIFICANT CHANGE UP (ref 150–400)
POTASSIUM SERPL-MCNC: 4.5 MMOL/L — SIGNIFICANT CHANGE UP (ref 3.5–5.3)
POTASSIUM SERPL-SCNC: 4.5 MMOL/L — SIGNIFICANT CHANGE UP (ref 3.5–5.3)
RBC # BLD: 4.14 M/UL — LOW (ref 4.2–5.8)
RBC # FLD: 13.2 % — SIGNIFICANT CHANGE UP (ref 10.3–14.5)
SODIUM SERPL-SCNC: 139 MMOL/L — SIGNIFICANT CHANGE UP (ref 135–145)
WBC # BLD: 6.09 K/UL — SIGNIFICANT CHANGE UP (ref 3.8–10.5)
WBC # FLD AUTO: 6.09 K/UL — SIGNIFICANT CHANGE UP (ref 3.8–10.5)

## 2019-06-29 PROCEDURE — 99233 SBSQ HOSP IP/OBS HIGH 50: CPT | Mod: GC

## 2019-06-29 PROCEDURE — 71045 X-RAY EXAM CHEST 1 VIEW: CPT | Mod: 26

## 2019-06-29 RX ORDER — ROBINUL 0.2 MG/ML
1 INJECTION INTRAMUSCULAR; INTRAVENOUS EVERY 12 HOURS
Refills: 0 | Status: DISCONTINUED | OUTPATIENT
Start: 2019-06-29 | End: 2019-06-30

## 2019-06-29 RX ADMIN — CEFTRIAXONE 100 MILLIGRAM(S): 500 INJECTION, POWDER, FOR SOLUTION INTRAMUSCULAR; INTRAVENOUS at 13:14

## 2019-06-29 RX ADMIN — HEPARIN SODIUM 5000 UNIT(S): 5000 INJECTION INTRAVENOUS; SUBCUTANEOUS at 21:26

## 2019-06-29 RX ADMIN — HYDROMORPHONE HYDROCHLORIDE 0.5 MILLIGRAM(S): 2 INJECTION INTRAMUSCULAR; INTRAVENOUS; SUBCUTANEOUS at 01:20

## 2019-06-29 RX ADMIN — HYDROMORPHONE HYDROCHLORIDE 0.5 MILLIGRAM(S): 2 INJECTION INTRAMUSCULAR; INTRAVENOUS; SUBCUTANEOUS at 01:50

## 2019-06-29 RX ADMIN — PANTOPRAZOLE SODIUM 40 MILLIGRAM(S): 20 TABLET, DELAYED RELEASE ORAL at 13:14

## 2019-06-29 RX ADMIN — HEPARIN SODIUM 5000 UNIT(S): 5000 INJECTION INTRAVENOUS; SUBCUTANEOUS at 07:07

## 2019-06-29 RX ADMIN — HYDROMORPHONE HYDROCHLORIDE 0.25 MILLIGRAM(S): 2 INJECTION INTRAMUSCULAR; INTRAVENOUS; SUBCUTANEOUS at 22:00

## 2019-06-29 RX ADMIN — CHLORHEXIDINE GLUCONATE 1 APPLICATION(S): 213 SOLUTION TOPICAL at 07:07

## 2019-06-29 RX ADMIN — ROBINUL 1 MILLIGRAM(S): 0.2 INJECTION INTRAMUSCULAR; INTRAVENOUS at 09:42

## 2019-06-29 RX ADMIN — ROBINUL 1 MILLIGRAM(S): 0.2 INJECTION INTRAMUSCULAR; INTRAVENOUS at 21:26

## 2019-06-29 RX ADMIN — HEPARIN SODIUM 5000 UNIT(S): 5000 INJECTION INTRAVENOUS; SUBCUTANEOUS at 13:14

## 2019-06-29 RX ADMIN — HYDROMORPHONE HYDROCHLORIDE 0.25 MILLIGRAM(S): 2 INJECTION INTRAMUSCULAR; INTRAVENOUS; SUBCUTANEOUS at 21:30

## 2019-06-29 NOTE — PROGRESS NOTE ADULT - ATTENDING COMMENTS
No acut change.  AAOx3.  Tolerating TF via PED at 10 cc/h.  Abdomen is soft, NT, ND.  Surgical site is intact.  Will follow.  S/P PEG placement, POD 1.

## 2019-06-29 NOTE — PROGRESS NOTE ADULT - SUBJECTIVE AND OBJECTIVE BOX
DAILY PROGRESS NOTE:    S: tube feeds started, tolerating tube feeds    O:    Vital Signs Last 24 Hrs  T(C): 37.5 (29 Jun 2019 21:15), Max: 37.5 (29 Jun 2019 21:15)  T(F): 99.5 (29 Jun 2019 21:15), Max: 99.5 (29 Jun 2019 21:15)  HR: 76 (29 Jun 2019 20:00) (68 - 102)  BP: 117/68 (29 Jun 2019 20:00) (96/61 - 128/65)  BP(mean): 81 (29 Jun 2019 20:00) (71 - 90)  RR: 18 (29 Jun 2019 20:00) (11 - 47)  SpO2: 94% (29 Jun 2019 20:00) (93% - 97%)    I&O's Detail    28 Jun 2019 07:01  -  29 Jun 2019 07:00  --------------------------------------------------------  IN:    IV PiggyBack: 50 mL    sodium chloride 0.9%: 1520 mL  Total IN: 1570 mL    OUT:    Voided: 850 mL  Total OUT: 850 mL    Total NET: 720 mL      29 Jun 2019 07:01  -  29 Jun 2019 21:23  --------------------------------------------------------  IN:    Enteral Tube Flush: 30 mL    ns in tub fed  [id number]: 260 mL    sodium chloride 0.9%: 80 mL  Total IN: 370 mL    OUT:    Voided: 300 mL  Total OUT: 300 mL    Total NET: 70 mL        PHYSICAL EXAM:  General: NAD  Neuro: A&O x3, no focal deficits  HEENT: Trach on TC, with minimal serosanguinous output. copious thick secretions.   Pulm: Trach collar. Nonlabored breathing, Bilateral ronchi  CV: NSR, no murmurs  Abd: soft, NT/ND, +BS, PEG in place site CDI, abd binder in place    LABS:                        12.3   6.09  )-----------( 272      ( 29 Jun 2019 05:54 )             38.9     06-29    139  |  100  |  12  ----------------------------<  72  4.5   |  27  |  0.61    Ca    9.1      29 Jun 2019 05:54  Phos  3.2     06-29  Mg     2.1     06-29      PT/INR - ( 28 Jun 2019 06:55 )   PT: 13.4 sec;   INR: 1.18          PTT - ( 28 Jun 2019 06:55 )  PTT:43.4 sec      RADIOLOGY & ADDITIONAL STUDIES:

## 2019-06-29 NOTE — PROGRESS NOTE ADULT - ASSESSMENT
64 yo M w/ hx prior laryngeal CA s/p definitive RT and history of prior trach s/p prior decannulation, now with recurrence s/p biopsy of subglottis and open tracheostomy. Patient transferred to SICU for airway monitoring.     Neuro: pain/nausea control  Pulm: trach collar, 6XL cuff deflated, humidified O2 pt can not have passymuir valve. will give glycopyrrolate.  CV: HD stable   GI: NPO.  TF Jevity 1.2@64 via PEG placed 6/28  : voids  Endo: ISS   ID:  Bronchitis: Proteus: ceftriaxone (6/27 - ) /// s/p Ancef x3 doses (6/25 -6/26 )  Heme//Onc: pathology shows recurrence of cancer  PPx: SCDs/SQH  Lines: PIV   PT: Ordered.

## 2019-06-29 NOTE — PROGRESS NOTE ADULT - SUBJECTIVE AND OBJECTIVE BOX
INTERVAL HPI/OVERNIGHT EVENTS:       MEDICATIONS  (STANDING):  cefTRIAXone   IVPB 1000 milliGRAM(s) IV Intermittent every 24 hours  chlorhexidine 4% Liquid 1 Application(s) Topical <User Schedule>  glycopyrrolate 1 milliGRAM(s) Oral every 12 hours  heparin  Injectable 5000 Unit(s) SubCutaneous every 8 hours  pantoprazole  Injectable 40 milliGRAM(s) IV Push daily    MEDICATIONS  (PRN):  HYDROmorphone  Injectable 0.5 milliGRAM(s) IV Push every 3 hours PRN Severe Pain (7 - 10)  HYDROmorphone  Injectable 0.25 milliGRAM(s) IV Push every 3 hours PRN Moderate Pain (4 - 6)      Drug Dosing Weight  Height (cm): 172.72 (24 Jun 2019 16:00)  Weight (kg): 64 (24 Jun 2019 16:00)  BMI (kg/m2): 21.5 (24 Jun 2019 16:00)  BSA (m2): 1.76 (24 Jun 2019 16:00)      PAST MEDICAL & SURGICAL HISTORY:  SOB (shortness of breath)  Throat cancer  Jaundice: 1965  Fracture: left ankle  Laryngeal mass: s/ p radiation  Surgery, elective: direct laryngoscopy with biopsy- 4/2017  Status post surgery: inguinal hernia  Status post surgery: Left ankle surgical repair  x2 (1965)      ICU Vital Signs Last 24 Hrs  T(C): 36.5 (29 Jun 2019 06:56), Max: 37.3 (28 Jun 2019 21:56)  T(F): 97.7 (29 Jun 2019 06:56), Max: 99.2 (28 Jun 2019 21:56)  HR: 70 (29 Jun 2019 07:00) (68 - 100)  BP: 112/68 (29 Jun 2019 07:00) (87/59 - 128/65)  BP(mean): 81 (29 Jun 2019 07:00) (67 - 102)  ABP: --  ABP(mean): --  RR: 11 (29 Jun 2019 07:00) (9 - 47)  SpO2: 95% (29 Jun 2019 07:00) (90% - 100%)            28 Jun 2019 07:01  -  29 Jun 2019 07:00  --------------------------------------------------------  IN:    IV PiggyBack: 50 mL    sodium chloride 0.9%: 1520 mL  Total IN: 1570 mL    OUT:    Voided: 850 mL  Total OUT: 850 mL    Total NET: 720 mL      29 Jun 2019 07:01  -  29 Jun 2019 09:12  --------------------------------------------------------  IN:    sodium chloride 0.9%: 80 mL  Total IN: 80 mL    OUT:  Total OUT: 0 mL    Total NET: 80 mL        PHYSICAL EXAM:  General: NAD  Neuro: A&O x3, no focal deficits  HEENT: Trach on TC, with minimal serosanguinous output. copious thick secretions.   Pulm: Trach collar. Nonlabored breathing, Bilateral ronchi  CV: NSR, no murmurs  Abd: soft, NT/ND, +BS, PEG in place site CDI  Ext: WWP, 5/5 strength x 4, no clubbing/cyanosis/edema  Pulses: palpable distal pulses        LABS:  CBC Full  -  ( 29 Jun 2019 05:54 )  WBC Count : 6.09 K/uL  RBC Count : 4.14 M/uL  Hemoglobin : 12.3 g/dL  Hematocrit : 38.9 %  Platelet Count - Automated : 272 K/uL  Mean Cell Volume : 94.0 fl  Mean Cell Hemoglobin : 29.7 pg  Mean Cell Hemoglobin Concentration : 31.6 gm/dL      06-29    139  |  100  |  12  ----------------------------<  72  4.5   |  27  |  0.61    Ca    9.1      29 Jun 2019 05:54  Phos  3.2     06-29  Mg     2.1     06-29      PT/INR - ( 28 Jun 2019 06:55 )   PT: 13.4 sec;   INR: 1.18          PTT - ( 28 Jun 2019 06:55 )  PTT:43.4 sec

## 2019-06-29 NOTE — PROGRESS NOTE ADULT - SUBJECTIVE AND OBJECTIVE BOX
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 C/b return to OR last night for exchange to 6 distal XLT due to tracheomalacia and poor ventilatory status after coughing episode.    6/26: Now doing well in SICU overnight, no additional respiratory issues, cuff taken down thsi AM. Failed SLP evaluation with evidence of gross aspiration on ice chip trial  6/27: has thick purulent secretions, SICU will keep for one more night. Low grade temps to 100.4. Will need PEG  6/28: NPO for PEG today. To SDU, when secretions are better managed. Received PEG for inability to swallow.  6/29: still requiring suctioning q30 minutes, small amount of glycopyrrolate given by SICU, received PEG tomorrow, will start trickle feeds    PAST MEDICAL & SURGICAL HISTORY:  SOB (shortness of breath)  Throat cancer  Jaundice: 1965  Fracture: left ankle  Laryngeal mass: s/ p radiation  Surgery, elective: direct laryngoscopy with biopsy- 4/2017  Status post surgery: inguinal hernia  Status post surgery: Left ankle surgical repair  x2 (1965)      PE:  VSS  NAD  Neck: 6 distal XLT in place, gauze supporting trach to keep in appropriate position, sutures in place  Tolerates finger occlusion and able to voice                            11.8   8.02  )-----------( 269      ( 28 Jun 2019 06:55 )             35.9              135  |  95<L>  |  10  ----------------------------<  119<H>  3.9   |  31  |  0.64    Ca    9.1      28 Jun 2019 06:55  Phos  3.2     06-28  Mg     2.2     06-28             A/P:   63M ho 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, s/p DL biopsy for recurrence of laryngeal CA   (found on frozen) and planned revision tracheostomy due to tenuous airway. C/b return to OR last night for exchange to 6 distal XLT due to tracheomalacia and poor ventilatory status after coughing episode.  -PEG today   -Routine trach case (q1-2 hr suctioning, inner cannula cleaning q shift)  -Please keep gauze under inferior flange to elevate trach appropriately in airway  -Monitor for any respiratory difficulty  -Please page ENT with questions or concerns  D/w attending

## 2019-06-29 NOTE — PROGRESS NOTE ADULT - ASSESSMENT
64 yo M w/ hx prior laryngeal CA s/p definitive RT and history of prior trach s/p prior decannulation, now with recurrence s/p biopsy of subglottis and open tracheostomy, now s/p PEG placement 6/28.    - continue tube feeds  - surgery 4c will follow

## 2019-06-30 ENCOUNTER — MOBILE ON CALL (OUTPATIENT)
Age: 64
End: 2019-06-30

## 2019-06-30 LAB
ANION GAP SERPL CALC-SCNC: 10 MMOL/L — SIGNIFICANT CHANGE UP (ref 5–17)
BUN SERPL-MCNC: 12 MG/DL — SIGNIFICANT CHANGE UP (ref 7–23)
CALCIUM SERPL-MCNC: 9 MG/DL — SIGNIFICANT CHANGE UP (ref 8.4–10.5)
CHLORIDE SERPL-SCNC: 100 MMOL/L — SIGNIFICANT CHANGE UP (ref 96–108)
CO2 SERPL-SCNC: 31 MMOL/L — SIGNIFICANT CHANGE UP (ref 22–31)
CREAT SERPL-MCNC: 0.65 MG/DL — SIGNIFICANT CHANGE UP (ref 0.5–1.3)
GLUCOSE SERPL-MCNC: 131 MG/DL — HIGH (ref 70–99)
HCT VFR BLD CALC: 34.8 % — LOW (ref 39–50)
HGB BLD-MCNC: 11.2 G/DL — LOW (ref 13–17)
MAGNESIUM SERPL-MCNC: 2.1 MG/DL — SIGNIFICANT CHANGE UP (ref 1.6–2.6)
MCHC RBC-ENTMCNC: 29.8 PG — SIGNIFICANT CHANGE UP (ref 27–34)
MCHC RBC-ENTMCNC: 32.2 GM/DL — SIGNIFICANT CHANGE UP (ref 32–36)
MCV RBC AUTO: 92.6 FL — SIGNIFICANT CHANGE UP (ref 80–100)
NRBC # BLD: 0 /100 WBCS — SIGNIFICANT CHANGE UP (ref 0–0)
PHOSPHATE SERPL-MCNC: 3.1 MG/DL — SIGNIFICANT CHANGE UP (ref 2.5–4.5)
PLATELET # BLD AUTO: 293 K/UL — SIGNIFICANT CHANGE UP (ref 150–400)
POTASSIUM SERPL-MCNC: 3.9 MMOL/L — SIGNIFICANT CHANGE UP (ref 3.5–5.3)
POTASSIUM SERPL-SCNC: 3.9 MMOL/L — SIGNIFICANT CHANGE UP (ref 3.5–5.3)
RBC # BLD: 3.76 M/UL — LOW (ref 4.2–5.8)
RBC # FLD: 13 % — SIGNIFICANT CHANGE UP (ref 10.3–14.5)
SODIUM SERPL-SCNC: 141 MMOL/L — SIGNIFICANT CHANGE UP (ref 135–145)
WBC # BLD: 5.91 K/UL — SIGNIFICANT CHANGE UP (ref 3.8–10.5)
WBC # FLD AUTO: 5.91 K/UL — SIGNIFICANT CHANGE UP (ref 3.8–10.5)

## 2019-06-30 PROCEDURE — 71045 X-RAY EXAM CHEST 1 VIEW: CPT | Mod: 26

## 2019-06-30 PROCEDURE — 99233 SBSQ HOSP IP/OBS HIGH 50: CPT | Mod: GC

## 2019-06-30 RX ORDER — SODIUM CHLORIDE 9 MG/ML
500 INJECTION INTRAMUSCULAR; INTRAVENOUS; SUBCUTANEOUS ONCE
Refills: 0 | Status: DISCONTINUED | OUTPATIENT
Start: 2019-06-30 | End: 2019-06-30

## 2019-06-30 RX ORDER — ROBINUL 0.2 MG/ML
1 INJECTION INTRAMUSCULAR; INTRAVENOUS EVERY 12 HOURS
Refills: 0 | Status: DISCONTINUED | OUTPATIENT
Start: 2019-06-30 | End: 2019-06-30

## 2019-06-30 RX ADMIN — HYDROMORPHONE HYDROCHLORIDE 0.25 MILLIGRAM(S): 2 INJECTION INTRAMUSCULAR; INTRAVENOUS; SUBCUTANEOUS at 22:12

## 2019-06-30 RX ADMIN — CEFTRIAXONE 100 MILLIGRAM(S): 500 INJECTION, POWDER, FOR SOLUTION INTRAMUSCULAR; INTRAVENOUS at 12:45

## 2019-06-30 RX ADMIN — HYDROMORPHONE HYDROCHLORIDE 0.25 MILLIGRAM(S): 2 INJECTION INTRAMUSCULAR; INTRAVENOUS; SUBCUTANEOUS at 22:36

## 2019-06-30 RX ADMIN — HEPARIN SODIUM 5000 UNIT(S): 5000 INJECTION INTRAVENOUS; SUBCUTANEOUS at 06:21

## 2019-06-30 RX ADMIN — HEPARIN SODIUM 5000 UNIT(S): 5000 INJECTION INTRAVENOUS; SUBCUTANEOUS at 21:01

## 2019-06-30 RX ADMIN — HYDROMORPHONE HYDROCHLORIDE 0.25 MILLIGRAM(S): 2 INJECTION INTRAMUSCULAR; INTRAVENOUS; SUBCUTANEOUS at 16:42

## 2019-06-30 RX ADMIN — HEPARIN SODIUM 5000 UNIT(S): 5000 INJECTION INTRAVENOUS; SUBCUTANEOUS at 13:01

## 2019-06-30 RX ADMIN — PANTOPRAZOLE SODIUM 40 MILLIGRAM(S): 20 TABLET, DELAYED RELEASE ORAL at 12:45

## 2019-06-30 RX ADMIN — HYDROMORPHONE HYDROCHLORIDE 0.25 MILLIGRAM(S): 2 INJECTION INTRAMUSCULAR; INTRAVENOUS; SUBCUTANEOUS at 17:23

## 2019-06-30 RX ADMIN — CHLORHEXIDINE GLUCONATE 1 APPLICATION(S): 213 SOLUTION TOPICAL at 12:44

## 2019-06-30 NOTE — PROGRESS NOTE ADULT - SUBJECTIVE AND OBJECTIVE BOX
INTERVAL HPI/OVERNIGHT EVENTS: 6/29: HL IVF, start TF via PEG. +glycopyrrolate for copious secretions.6/29: HL IVF, start TF via PEG. +glycopyrrolate for copious secretions.    Pt seen and examined at bedside. No complaints. secretions unchanged. Denies CP SOB      MEDICATIONS  (STANDING):  cefTRIAXone   IVPB 1000 milliGRAM(s) IV Intermittent every 24 hours  chlorhexidine 4% Liquid 1 Application(s) Topical <User Schedule>  heparin  Injectable 5000 Unit(s) SubCutaneous every 8 hours  pantoprazole  Injectable 40 milliGRAM(s) IV Push daily    MEDICATIONS  (PRN):  HYDROmorphone  Injectable 0.5 milliGRAM(s) IV Push every 3 hours PRN Severe Pain (7 - 10)  HYDROmorphone  Injectable 0.25 milliGRAM(s) IV Push every 3 hours PRN Moderate Pain (4 - 6)      Drug Dosing Weight  Height (cm): 172.72 (24 Jun 2019 16:00)  Weight (kg): 64 (24 Jun 2019 16:00)  BMI (kg/m2): 21.5 (24 Jun 2019 16:00)  BSA (m2): 1.76 (24 Jun 2019 16:00)      PAST MEDICAL & SURGICAL HISTORY:  SOB (shortness of breath)  Throat cancer  Jaundice: 1965  Fracture: left ankle  Laryngeal mass: s/ p radiation  Surgery, elective: direct laryngoscopy with biopsy- 4/2017  Status post surgery: inguinal hernia  Status post surgery: Left ankle surgical repair  x2 (1965)      ICU Vital Signs Last 24 Hrs  T(C): 35.8 (30 Jun 2019 09:01), Max: 37.5 (29 Jun 2019 21:15)  T(F): 96.5 (30 Jun 2019 09:01), Max: 99.5 (29 Jun 2019 21:15)  HR: 80 (30 Jun 2019 09:00) (62 - 102)  BP: 96/74 (30 Jun 2019 08:00) (96/74 - 131/70)  BP(mean): 86 (30 Jun 2019 08:00) (75 - 93)  ABP: --  ABP(mean): --  RR: 16 (30 Jun 2019 09:00) (11 - 113)  SpO2: 96% (30 Jun 2019 09:00) (93% - 99%)            29 Jun 2019 07:01  -  30 Jun 2019 07:00  --------------------------------------------------------  IN:    Enteral Tube Flush: 80 mL    ns in tub fed  [id number]: 1018 mL    sodium chloride 0.9%: 80 mL  Total IN: 1178 mL    OUT:    Voided: 500 mL  Total OUT: 500 mL    Total NET: 678 mL          PHYSICAL EXAM:  General: NAD  Neuro: A&O x3, no focal deficits  HEENT: Trach on TC, with minimal serosanguinous output. copious thick secretions.   Pulm: Trach collar. Nonlabored breathing, Bilateral ronchi  CV: NSR, no murmurs  Abd: soft, NT/ND, +BS, PEG in place site CDI  Ext: WWP, 5/5 strength x 4, no clubbing/cyanosis/edema        LABS:  CBC Full  -  ( 30 Jun 2019 06:02 )  WBC Count : 5.91 K/uL  RBC Count : 3.76 M/uL  Hemoglobin : 11.2 g/dL  Hematocrit : 34.8 %  Platelet Count - Automated : 293 K/uL  Mean Cell Volume : 92.6 fl  Mean Cell Hemoglobin : 29.8 pg  Mean Cell Hemoglobin Concentration : 32.2 gm/dL  Auto Neutrophil # : x  Auto Lymphocyte # : x  Auto Monocyte # : x  Auto Eosinophil # : x  Auto Basophil # : x  Auto Neutrophil % : x  Auto Lymphocyte % : x  Auto Monocyte % : x  Auto Eosinophil % : x  Auto Basophil % : x    06-30    141  |  100  |  12  ----------------------------<  131<H>  3.9   |  31  |  0.65    Ca    9.0      30 Jun 2019 06:02  Phos  3.1     06-30  Mg     2.1     06-30

## 2019-06-30 NOTE — PROGRESS NOTE ADULT - ATTENDING COMMENTS
SHANA Robison has acted as my scribe. Still with moderate secretions but good cough. completing 5 days Ceftriaxone with no real improvement. Glycopyrrolate not really improving secretions and higher dose associated with significant side effects.

## 2019-06-30 NOTE — PROGRESS NOTE ADULT - SUBJECTIVE AND OBJECTIVE BOX
ID: 62 yo M w/ hx prior laryngeal CA s/p definitive RT and history of prior trach s/p prior decannulation, now with recurrence s/p biopsy of subglottis and open tracheostomy, now s/p PEG placement 6/28.    SUBJECTIVE: Tolerating tube feeds at goal of 64cc/hr. +F/-BM      OBJECTIVE:    Vital Signs:  Vital Signs Last 24 Hrs  T(C): 35.8 (30 Jun 2019 09:01), Max: 37.5 (29 Jun 2019 21:15)  T(F): 96.5 (30 Jun 2019 09:01), Max: 99.5 (29 Jun 2019 21:15)  HR: 70 (30 Jun 2019 11:00) (62 - 98)  BP: 111/62 (30 Jun 2019 11:00) (96/74 - 131/70)  BP(mean): 78 (30 Jun 2019 11:00) (75 - 93)  RR: 15 (30 Jun 2019 11:00) (11 - 113)  SpO2: 96% (30 Jun 2019 11:00) (93% - 99%)    Physical Exam:  General: NAD  Pulmonary: Tracheostomy, nonlabored breathing, no respiratory distress  Cardiovascular: No heaves/thrills  Abdominal: Soft, nontender, slightly distended, PEG tube in place w/o surrounding erythema/edema/drainage at insertion site    Lines/Drains/Tubes:    Ins and Outs:  I&O's Summary    29 Jun 2019 07:01  -  30 Jun 2019 07:00  --------------------------------------------------------  IN: 1178 mL / OUT: 500 mL / NET: 678 mL    30 Jun 2019 07:01  -  30 Jun 2019 12:49  --------------------------------------------------------  IN: 442 mL / OUT: 0 mL / NET: 442 mL        Labs:                        11.2   5.91  )-----------( 293      ( 30 Jun 2019 06:02 )             34.8     06-30    141  |  100  |  12  ----------------------------<  131<H>  3.9   |  31  |  0.65    Ca    9.0      30 Jun 2019 06:02  Phos  3.1     06-30  Mg     2.1     06-30          CAPILLARY BLOOD GLUCOSE            Radiology & Additional Studies:

## 2019-06-30 NOTE — PROGRESS NOTE ADULT - ASSESSMENT
A/P: 64 yo M w/ hx prior laryngeal CA s/p definitive RT and history of prior trach s/p prior decannulation, now with recurrence s/p biopsy of subglottis and open tracheostomy, now s/p PEG placement 6/28.  Patient is tolerating tube feeds at goal.    Recommendations:  - Continue tube feeds at goal  - Continue care per primary team  - No general surgery intervention at this time  - General Surgery Team 4C will sign off

## 2019-06-30 NOTE — PROGRESS NOTE ADULT - SUBJECTIVE AND OBJECTIVE BOX
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 C/b return to OR last night for exchange to 6 distal XLT due to tracheomalacia and poor ventilatory status after coughing episode.    6/26: Now doing well in SICU overnight, no additional respiratory issues, cuff taken down thsi AM. Failed SLP evaluation with evidence of gross aspiration on ice chip trial  6/27: has thick purulent secretions, SICU will keep for one more night. Low grade temps to 100.4. Will need PEG  6/28: NPO for PEG today. To SDU, when secretions are better managed. Received PEG for inability to swallow.  6/29: still requiring suctioning q30 minutes, small amount of glycopyrrolate given by SICU, received PEG tomorrow, will start trickle feeds  6/30: NAEON, feeds up to goal, still requiring frequent suctioning, will stop glycopyrrolate today    PAST MEDICAL & SURGICAL HISTORY:  SOB (shortness of breath)  Throat cancer  Jaundice: 1965  Fracture: left ankle  Laryngeal mass: s/ p radiation  Surgery, elective: direct laryngoscopy with biopsy- 4/2017  Status post surgery: inguinal hernia  Status post surgery: Left ankle surgical repair  x2 (1965)      PE:  VSS  NAD  Neck: 6 distal XLT in place, gauze supporting trach to keep in appropriate position, sutures in place  Tolerates finger occlusion and able to voice                            11.2   5.91  )-----------( 293      ( 30 Jun 2019 06:02 )             34.8   06-30    141  |  100  |  12  ----------------------------<  131<H>  3.9   |  31  |  0.65    Ca    9.0      30 Jun 2019 06:02  Phos  3.1     06-30  Mg     2.1     06-30      A/P:   63M ho 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, s/p DL biopsy for recurrence of laryngeal CA   (found on frozen) and planned revision tracheostomy due to tenuous airway. C/b return to OR last night for exchange to 6 distal XLT due to tracheomalacia and poor ventilatory status after coughing episode.  -PEG today   -Routine trach case (q1-2 hr suctioning, inner cannula cleaning q shift)  -Please keep gauze under inferior flange to elevate trach appropriately in airway  -Monitor for any respiratory difficulty  -Please page ENT with questions or concerns  D/w attending

## 2019-06-30 NOTE — PROGRESS NOTE ADULT - ASSESSMENT
62 yo M w/ hx prior laryngeal CA s/p definitive RT and history of prior trach s/p prior decannulation, now with recurrence s/p biopsy of subglottis and open tracheostomy. Patient transferred to SICU for airway monitoring.     Neuro: pain/nausea control  Pulm: trach collar, 6XL cuff deflated, humidified O2 pt can not have passymuir valve. will stop glycopyrrolate.  CV: HD stable   GI: NPO.  TF Jevity 1.2@64 via PEG placed 6/28. free water 250q8  : voids  Endo: ISS   ID:  Bronchitis: Proteus: ceftriaxone (6/27 - ) /// s/p Ancef x3 doses (6/25 -6/26 )  Heme//Onc: pathology shows recurrence of cancer  PPx: SCDs/SQH  Lines: PIV   PT: Ordered.

## 2019-07-01 LAB
ANION GAP SERPL CALC-SCNC: 7 MMOL/L — SIGNIFICANT CHANGE UP (ref 5–17)
BUN SERPL-MCNC: 11 MG/DL — SIGNIFICANT CHANGE UP (ref 7–23)
CALCIUM SERPL-MCNC: 9.1 MG/DL — SIGNIFICANT CHANGE UP (ref 8.4–10.5)
CHLORIDE SERPL-SCNC: 97 MMOL/L — SIGNIFICANT CHANGE UP (ref 96–108)
CO2 SERPL-SCNC: 34 MMOL/L — HIGH (ref 22–31)
CREAT SERPL-MCNC: 0.66 MG/DL — SIGNIFICANT CHANGE UP (ref 0.5–1.3)
GLUCOSE SERPL-MCNC: 138 MG/DL — HIGH (ref 70–99)
HCT VFR BLD CALC: 35.3 % — LOW (ref 39–50)
HGB BLD-MCNC: 11.5 G/DL — LOW (ref 13–17)
MAGNESIUM SERPL-MCNC: 2.2 MG/DL — SIGNIFICANT CHANGE UP (ref 1.6–2.6)
MCHC RBC-ENTMCNC: 30.1 PG — SIGNIFICANT CHANGE UP (ref 27–34)
MCHC RBC-ENTMCNC: 32.6 GM/DL — SIGNIFICANT CHANGE UP (ref 32–36)
MCV RBC AUTO: 92.4 FL — SIGNIFICANT CHANGE UP (ref 80–100)
NRBC # BLD: 0 /100 WBCS — SIGNIFICANT CHANGE UP (ref 0–0)
PHOSPHATE SERPL-MCNC: 3.9 MG/DL — SIGNIFICANT CHANGE UP (ref 2.5–4.5)
PLATELET # BLD AUTO: 294 K/UL — SIGNIFICANT CHANGE UP (ref 150–400)
POTASSIUM SERPL-MCNC: 3.8 MMOL/L — SIGNIFICANT CHANGE UP (ref 3.5–5.3)
POTASSIUM SERPL-SCNC: 3.8 MMOL/L — SIGNIFICANT CHANGE UP (ref 3.5–5.3)
PREALB SERPL-MCNC: 9 MG/DL — LOW (ref 20–40)
RBC # BLD: 3.82 M/UL — LOW (ref 4.2–5.8)
RBC # FLD: 12.9 % — SIGNIFICANT CHANGE UP (ref 10.3–14.5)
SODIUM SERPL-SCNC: 138 MMOL/L — SIGNIFICANT CHANGE UP (ref 135–145)
WBC # BLD: 5.68 K/UL — SIGNIFICANT CHANGE UP (ref 3.8–10.5)
WBC # FLD AUTO: 5.68 K/UL — SIGNIFICANT CHANGE UP (ref 3.8–10.5)

## 2019-07-01 PROCEDURE — 99233 SBSQ HOSP IP/OBS HIGH 50: CPT | Mod: GC

## 2019-07-01 RX ORDER — DOCUSATE SODIUM 100 MG
100 CAPSULE ORAL
Refills: 0 | Status: DISCONTINUED | OUTPATIENT
Start: 2019-07-01 | End: 2019-07-23

## 2019-07-01 RX ORDER — POTASSIUM CHLORIDE 20 MEQ
20 PACKET (EA) ORAL ONCE
Refills: 0 | Status: COMPLETED | OUTPATIENT
Start: 2019-07-01 | End: 2019-07-01

## 2019-07-01 RX ORDER — ACETAMINOPHEN 500 MG
650 TABLET ORAL EVERY 6 HOURS
Refills: 0 | Status: DISCONTINUED | OUTPATIENT
Start: 2019-07-01 | End: 2019-07-05

## 2019-07-01 RX ORDER — SODIUM CHLORIDE 9 MG/ML
250 INJECTION INTRAMUSCULAR; INTRAVENOUS; SUBCUTANEOUS EVERY 6 HOURS
Refills: 0 | Status: DISCONTINUED | OUTPATIENT
Start: 2019-07-01 | End: 2019-07-01

## 2019-07-01 RX ORDER — SODIUM CHLORIDE 9 MG/ML
250 INJECTION INTRAMUSCULAR; INTRAVENOUS; SUBCUTANEOUS EVERY 6 HOURS
Refills: 0 | Status: DISCONTINUED | OUTPATIENT
Start: 2019-07-01 | End: 2019-07-02

## 2019-07-01 RX ORDER — ENOXAPARIN SODIUM 100 MG/ML
40 INJECTION SUBCUTANEOUS ONCE
Refills: 0 | Status: COMPLETED | OUTPATIENT
Start: 2019-07-01 | End: 2019-07-01

## 2019-07-01 RX ORDER — ENOXAPARIN SODIUM 100 MG/ML
40 INJECTION SUBCUTANEOUS EVERY 24 HOURS
Refills: 0 | Status: DISCONTINUED | OUTPATIENT
Start: 2019-07-02 | End: 2019-07-23

## 2019-07-01 RX ORDER — HYDROMORPHONE HYDROCHLORIDE 2 MG/ML
0.25 INJECTION INTRAMUSCULAR; INTRAVENOUS; SUBCUTANEOUS EVERY 4 HOURS
Refills: 0 | Status: DISCONTINUED | OUTPATIENT
Start: 2019-07-01 | End: 2019-07-02

## 2019-07-01 RX ORDER — SODIUM CHLORIDE 9 MG/ML
1000 INJECTION INTRAMUSCULAR; INTRAVENOUS; SUBCUTANEOUS
Refills: 0 | Status: DISCONTINUED | OUTPATIENT
Start: 2019-07-01 | End: 2019-07-01

## 2019-07-01 RX ORDER — SENNA PLUS 8.6 MG/1
10 TABLET ORAL AT BEDTIME
Refills: 0 | Status: DISCONTINUED | OUTPATIENT
Start: 2019-07-01 | End: 2019-07-23

## 2019-07-01 RX ADMIN — PANTOPRAZOLE SODIUM 40 MILLIGRAM(S): 20 TABLET, DELAYED RELEASE ORAL at 12:31

## 2019-07-01 RX ADMIN — Medication 100 MILLIGRAM(S): at 19:26

## 2019-07-01 RX ADMIN — SODIUM CHLORIDE 250 MILLILITER(S): 9 INJECTION INTRAMUSCULAR; INTRAVENOUS; SUBCUTANEOUS at 19:26

## 2019-07-01 RX ADMIN — SENNA PLUS 10 MILLILITER(S): 8.6 TABLET ORAL at 22:40

## 2019-07-01 RX ADMIN — CHLORHEXIDINE GLUCONATE 1 APPLICATION(S): 213 SOLUTION TOPICAL at 05:47

## 2019-07-01 RX ADMIN — SODIUM CHLORIDE 250 MILLILITER(S): 9 INJECTION INTRAMUSCULAR; INTRAVENOUS; SUBCUTANEOUS at 13:00

## 2019-07-01 RX ADMIN — CEFTRIAXONE 100 MILLIGRAM(S): 500 INJECTION, POWDER, FOR SOLUTION INTRAMUSCULAR; INTRAVENOUS at 12:31

## 2019-07-01 RX ADMIN — HEPARIN SODIUM 5000 UNIT(S): 5000 INJECTION INTRAVENOUS; SUBCUTANEOUS at 05:47

## 2019-07-01 RX ADMIN — HYDROMORPHONE HYDROCHLORIDE 0.25 MILLIGRAM(S): 2 INJECTION INTRAMUSCULAR; INTRAVENOUS; SUBCUTANEOUS at 02:02

## 2019-07-01 RX ADMIN — Medication 20 MILLIEQUIVALENT(S): at 07:16

## 2019-07-01 RX ADMIN — HYDROMORPHONE HYDROCHLORIDE 0.25 MILLIGRAM(S): 2 INJECTION INTRAMUSCULAR; INTRAVENOUS; SUBCUTANEOUS at 01:53

## 2019-07-01 RX ADMIN — ENOXAPARIN SODIUM 40 MILLIGRAM(S): 100 INJECTION SUBCUTANEOUS at 13:47

## 2019-07-01 RX ADMIN — HYDROMORPHONE HYDROCHLORIDE 0.25 MILLIGRAM(S): 2 INJECTION INTRAMUSCULAR; INTRAVENOUS; SUBCUTANEOUS at 21:30

## 2019-07-01 RX ADMIN — HYDROMORPHONE HYDROCHLORIDE 0.25 MILLIGRAM(S): 2 INJECTION INTRAMUSCULAR; INTRAVENOUS; SUBCUTANEOUS at 20:59

## 2019-07-01 NOTE — PROVIDER CONTACT NOTE (OTHER) - BACKGROUND
Hx laryngeal CA s/p direct laryngeal Bx and tracheostomy, SICU for airway management of copious secretions

## 2019-07-01 NOTE — PROGRESS NOTE ADULT - SUBJECTIVE AND OBJECTIVE BOX
Interval Events:  Continued increased suctioning requirements overnight however pt able to cough up most of his secretions  Patient seen and examined at bedside.      Allergies    penicillin (Rash)    Intolerances        Vital Signs Last 24 Hrs  T(C): 36.8 (01 Jul 2019 05:50), Max: 37.7 (30 Jun 2019 17:42)  T(F): 98.3 (01 Jul 2019 05:50), Max: 99.9 (30 Jun 2019 17:42)  HR: 66 (01 Jul 2019 07:00) (62 - 88)  BP: 101/60 (01 Jul 2019 07:00) (93/64 - 119/70)  BP(mean): 73 (01 Jul 2019 07:00) (73 - 89)  RR: 13 (01 Jul 2019 07:00) (10 - 113)  SpO2: 98% (01 Jul 2019 07:00) (92% - 98%)    06-30 @ 07:01 - 07-01 @ 07:00  --------------------------------------------------------  IN: 2272 mL / OUT: 800 mL / NET: 1472 mL      06-30 @ 07:01 - 07-01 @ 07:00  --------------------------------------------------------  IN: 2272 mL / OUT: 800 mL / NET: 1472 mL        Physical Exam:     General: NAD  Neuro: A&O x3, no focal deficits  HEENT: Trach on TC, with minimal serosanguinous output. copious thick secretions.   Pulm: Trach collar. Nonlabored breathing, Bilateral ronchi cleared upon coughing  CV: NSR, no murmurs  Abd: soft, NT/ND, +BS, PEG in place site CDI  Ext: WWP, 5/5 strength x 4, no clubbing/cyanosis/edema  Vasc: + DP b/l   Skin: no rashes noted  MSK: No joint swelling  Psych: No signs of anxiety or depression  LABS:      CBC Full  -  ( 01 Jul 2019 05:45 )  WBC Count : 5.68 K/uL  RBC Count : 3.82 M/uL  Hemoglobin : 11.5 g/dL  Hematocrit : 35.3 %  Platelet Count - Automated : 294 K/uL  Mean Cell Volume : 92.4 fl  Mean Cell Hemoglobin : 30.1 pg  Mean Cell Hemoglobin Concentration : 32.6 gm/dL  Auto Neutrophil # : x  Auto Lymphocyte # : x  Auto Monocyte # : x  Auto Eosinophil # : x  Auto Basophil # : x  Auto Neutrophil % : x  Auto Lymphocyte % : x  Auto Monocyte % : x  Auto Eosinophil % : x  Auto Basophil % : x    07-01    138  |  97  |  11  ----------------------------<  138<H>  3.8   |  34<H>  |  0.66    Ca    9.1      01 Jul 2019 05:45  Phos  3.9     07-01  Mg     2.2     07-01                      RADIOLOGY & ADDITIONAL STUDIES (The following images were personally reviewed):          62 yo M w/ hx prior laryngeal CA s/p definitive RT and history of prior trach s/p prior decannulation, now with recurrence s/p biopsy of subglottis and open tracheostomy. Patient transferred to SICU for airway monitoring.     Neuro: pain/nausea control  Pulm: trach collar, 6XL cuff deflated, humidified O2 pt can not have passymuir valve. will give glycopyrrolate.  CV: HD stable   GI: NPO.  TF Jevity 1.2@64 via PEG placed 6/28  : voids  Endo: ISS   ID:  Bronchitis: Proteus: ceftriaxone (6/27 - ) /// s/p Ancef x3 doses (6/25 -6/26 )  Heme//Onc: pathology shows recurrence of cancer  PPx: SCDs/SQH  Lines: PIV   PT: Ordered. Interval Events:  Continued increased suctioning requirements overnight however pt able to cough up most of his secretions  Patient seen and examined at bedside.      Allergies    penicillin (Rash)    Intolerances        Vital Signs Last 24 Hrs  T(C): 36.8 (01 Jul 2019 05:50), Max: 37.7 (30 Jun 2019 17:42)  T(F): 98.3 (01 Jul 2019 05:50), Max: 99.9 (30 Jun 2019 17:42)  HR: 66 (01 Jul 2019 07:00) (62 - 88)  BP: 101/60 (01 Jul 2019 07:00) (93/64 - 119/70)  BP(mean): 73 (01 Jul 2019 07:00) (73 - 89)  RR: 13 (01 Jul 2019 07:00) (10 - 113)  SpO2: 98% (01 Jul 2019 07:00) (92% - 98%)    06-30 @ 07:01 - 07-01 @ 07:00  --------------------------------------------------------  IN: 2272 mL / OUT: 800 mL / NET: 1472 mL      06-30 @ 07:01 - 07-01 @ 07:00  --------------------------------------------------------  IN: 2272 mL / OUT: 800 mL / NET: 1472 mL        Physical Exam:     General: NAD  Neuro: A&O x3, no focal deficits  HEENT: Trach on TC, with minimal serosanguinous output. copious thick secretions.   Pulm: Trach collar. Nonlabored breathing, Bilateral ronchi cleared upon coughing  CV: NSR, no murmurs  Abd: soft, NT/ND, +BS, PEG in place site CDI  Ext: WWP, 5/5 strength x 4, no clubbing/cyanosis/edema  Vasc: + DP b/l   Skin: no rashes noted  MSK: No joint swelling  Psych: No signs of anxiety or depression    LABS:      CBC Full  -  ( 01 Jul 2019 05:45 )  WBC Count : 5.68 K/uL  RBC Count : 3.82 M/uL  Hemoglobin : 11.5 g/dL  Hematocrit : 35.3 %  Platelet Count - Automated : 294 K/uL  Mean Cell Volume : 92.4 fl  Mean Cell Hemoglobin : 30.1 pg  Mean Cell Hemoglobin Concentration : 32.6 gm/dL  Auto Neutrophil # : x  Auto Lymphocyte # : x  Auto Monocyte # : x  Auto Eosinophil # : x  Auto Basophil # : x  Auto Neutrophil % : x  Auto Lymphocyte % : x  Auto Monocyte % : x  Auto Eosinophil % : x  Auto Basophil % : x    07-01    138  |  97  |  11  ----------------------------<  138<H>  3.8   |  34<H>  |  0.66    Ca    9.1      01 Jul 2019 05:45  Phos  3.9     07-01  Mg     2.2     07-01                      RADIOLOGY & ADDITIONAL STUDIES (The following images were personally reviewed):          64 yo M w/ hx prior laryngeal CA s/p definitive RT and history of prior trach s/p prior decannulation, now with recurrence s/p biopsy of subglottis and open tracheostomy. Patient transferred to SICU for airway monitoring.     Neuro: Pain regimen changed to tylenol prn and dilaudid for severe   Pulm: trach collar, 6XL cuff deflated, humidified O2 pt can not have passymuir valve.   CV: HD stable   GI: NPO.  TF Jevity 1.2@64 via PEG placed 6/28 Constipation - started colace senna and dulcolax  on Protonix   : voids  Endo: ISS   ID:  Bronchitis: Proteus: ceftriaxone (6/27 -7/2  ) /// s/p Ancef x3 doses (6/25 -6/26 )  Heme//Onc: pathology shows recurrence of cancer  PPx: SCDs/SQH  Lines: PIV   PT: Ordered. Interval Events:  Continued increased suctioning requirements overnight however pt able to cough up most of his secretions  Patient seen and examined at bedside.      Allergies    penicillin (Rash)    Intolerances        Vital Signs Last 24 Hrs  T(C): 36.8 (01 Jul 2019 05:50), Max: 37.7 (30 Jun 2019 17:42)  T(F): 98.3 (01 Jul 2019 05:50), Max: 99.9 (30 Jun 2019 17:42)  HR: 66 (01 Jul 2019 07:00) (62 - 88)  BP: 101/60 (01 Jul 2019 07:00) (93/64 - 119/70)  BP(mean): 73 (01 Jul 2019 07:00) (73 - 89)  RR: 13 (01 Jul 2019 07:00) (10 - 113)  SpO2: 98% (01 Jul 2019 07:00) (92% - 98%)    06-30 @ 07:01 - 07-01 @ 07:00  --------------------------------------------------------  IN: 2272 mL / OUT: 800 mL / NET: 1472 mL      06-30 @ 07:01 - 07-01 @ 07:00  --------------------------------------------------------  IN: 2272 mL / OUT: 800 mL / NET: 1472 mL        Physical Exam:     General: NAD  Neuro: A&O x3, no focal deficits  HEENT: Trach on TC, with minimal serosanguinous output. copious thick secretions.   Pulm: Trach collar. Nonlabored breathing, Bilateral ronchi cleared upon coughing  CV: NSR, no murmurs  Abd: soft, NT/ND, +BS, PEG in place site CDI  Ext: WWP, 5/5 strength x 4, no clubbing/cyanosis/edema  Vasc: + DP b/l   Skin: no rashes noted  MSK: No joint swelling  Psych: No signs of anxiety or depression    LABS:      CBC Full  -  ( 01 Jul 2019 05:45 )  WBC Count : 5.68 K/uL  RBC Count : 3.82 M/uL  Hemoglobin : 11.5 g/dL  Hematocrit : 35.3 %  Platelet Count - Automated : 294 K/uL  Mean Cell Volume : 92.4 fl  Mean Cell Hemoglobin : 30.1 pg  Mean Cell Hemoglobin Concentration : 32.6 gm/dL  Auto Neutrophil # : x  Auto Lymphocyte # : x  Auto Monocyte # : x  Auto Eosinophil # : x  Auto Basophil # : x  Auto Neutrophil % : x  Auto Lymphocyte % : x  Auto Monocyte % : x  Auto Eosinophil % : x  Auto Basophil % : x    07-01    138  |  97  |  11  ----------------------------<  138<H>  3.8   |  34<H>  |  0.66    Ca    9.1      01 Jul 2019 05:45  Phos  3.9     07-01  Mg     2.2     07-01                      RADIOLOGY & ADDITIONAL STUDIES (The following images were personally reviewed):          62 yo M w/ hx prior laryngeal CA s/p definitive RT and history of prior trach s/p prior decannulation, now with recurrence s/p biopsy of subglottis and open tracheostomy. Patient transferred to SICU for airway monitoring.     Neuro: Pain regimen changed to tylenol prn and dilaudid for severe   HEENT: Recurrence of CA may need Laryngectomy.   Pulm: trach collar, 6XL cuff deflated, humidified O2 pt can not have passymuir valve. Increased secretions will continue ICU care.   CV: HD stable   GI: NPO.  TF Jevity 1.2@64 via PEG placed 6/28 Constipation - started colace senna and dulcolax  on Protonix   : voids  Endo: ISS   ID:  Bronchitis: Proteus: ceftriaxone (6/27 -7/2  ) /// s/p Ancef x3 doses (6/25 -6/26 )  Heme//Onc: pathology shows recurrence of cancer  PPx: SCDs/SQH  Lines: PIV   PT: Ordered.

## 2019-07-01 NOTE — PROGRESS NOTE ADULT - SUBJECTIVE AND OBJECTIVE BOX
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 C/b return to OR last night for exchange to 6 distal XLT due to tracheomalacia and poor ventilatory status after coughing episode.    6/26: Now doing well in SICU overnight, no additional respiratory issues, cuff taken down thsi AM. Failed SLP evaluation with evidence of gross aspiration on ice chip trial  6/27: has thick purulent secretions, SICU will keep for one more night. Low grade temps to 100.4. Will need PEG  6/28: NPO for PEG today. To SDU, when secretions are better managed. Received PEG for inability to swallow.  6/29: still requiring suctioning q30 minutes, small amount of glycopyrrolate given by SICU, received PEG tomorrow, will start trickle feeds  6/30: NAEON, feeds up to goal, still requiring frequent suctioning, will stop glycopyrrolate today  7/01: NAEON, tube feeds at goal, still requiring q1hr suctioning of copious thin nonpurulent secretions, dc'd glycopyrrolate    PAST MEDICAL & SURGICAL HISTORY:  SOB (shortness of breath)  Throat cancer  Jaundice: 1965  Fracture: left ankle  Laryngeal mass: s/ p radiation  Surgery, elective: direct laryngoscopy with biopsy- 4/2017  Status post surgery: inguinal hernia  Status post surgery: Left ankle surgical repair  x2 (1965)      PE:  VSS  NAD  Neck: 6 distal XLT in place, gauze supporting trach to keep in appropriate position, sutures in place  Stoma c/d/i, duoderm in place infrastomal  Tolerates finger occlusion and able to voice  Transtracheal aspiration of thin copious non-purulent secretions                                       11.5   5.68  )-----------( 294      ( 01 Jul 2019 05:45 )             35.3         A/P:   63M hx 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, s/p DL biopsy for recurrence of laryngeal CA (found on frozen) and planned revision tracheostomy 6/25 due to tenuous airway 2/2 recurrence laryngeal ca. C/b return to OR for exchange to 6 distal XLT due to tracheomalacia and poor ventilatory status after coughing episode, breathing comfortably with 6.0 dXLT though remains consistently with q1hr suctioning requirements. Sp PEG, tube feeds uptitrating, well tolerated.  -tube feeds to goal, PEG care  -Routine trach case (q1-2 hr suctioning, inner cannula cleaning q shift)  -Trach is positional, please keep gauze under inferior flange to elevate trach appropriately in airway and wick peristomal secretions  - appreciate SICU care  -Monitor for any respiratory difficulty  -Please page ENT with questions or concerns  D/w attending

## 2019-07-01 NOTE — PROVIDER CONTACT NOTE (OTHER) - ACTION/TREATMENT ORDERED:
No further intervention at this time per MD Gonzalez; AM labs to be drawn shortly, RN will continue to monitor patient

## 2019-07-01 NOTE — CHART NOTE - NSCHARTNOTEFT_GEN_A_CORE
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 radiation  Surgery, elective: direct laryngoscopy with biopsy- 4/2017  Status post surgery: inguinal hernia  Status post surgery: Left ankle surgical repair  x2 (1965)    Current Nutrition Order: Jevity 1.2 @ 64 mL/hr x24h, Prostat SF 1x/day (1943 kcal, 101 gm protein, 1239 mL free water, 153% RDI vitamin/mineral)     PO Intake: Good (%) [   ]  Fair (50-75%) [   ] Poor (<25%) [   ]- N/A as pt NPO on TF    GI Issues: abdomen soft/nontender, denies N/V, no stool (pt started on bowel regimen per RN)    Pain: Pain controlled at this time    Skin Integrity: Tomi score 18     Labs:   07-01    138  |  97  |  11  ----------------------------<  138<H>  3.8   |  34<H>  |  0.66    Ca    9.1      01 Jul 2019 05:45  Phos  3.9     07-01  Mg     2.2     07-01    CAPILLARY BLOOD GLUCOSE    Medications:  MEDICATIONS  (STANDING):  cefTRIAXone   IVPB 1000 milliGRAM(s) IV Intermittent every 24 hours  chlorhexidine 4% Liquid 1 Application(s) Topical <User Schedule>  docusate sodium Liquid 100 milliGRAM(s) Oral two times a day  enoxaparin Injectable 40 milliGRAM(s) SubCutaneous once  pantoprazole  Injectable 40 milliGRAM(s) IV Push daily  senna Syrup 10 milliLiter(s) Oral at bedtime    MEDICATIONS  (PRN):  acetaminophen    Suspension .. 650 milliGRAM(s) Oral every 6 hours PRN Moderate Pain (4 - 6)  bisacodyl Suppository 10 milliGRAM(s) Rectal daily PRN Constipation  HYDROmorphone  Injectable 0.25 milliGRAM(s) IV Push every 4 hours PRN Severe Pain (7 - 10)    Weight:  No updated weights    Weight Change:   Please obtain current weight and continue to trend weekly weights for further assessment    Nutrition Focused Physical Exam: Completed [ X on 6/26  ]  Not Pertinent [   ]  Muscle Wasting- Temporal [   ]  Clavicle/Pectoral [   ]  Shoulder/Deltoid [   ]  Scapula [   ]  Interosseous [   ]  Quadriceps [   ]  Gastrocnemius [   ]  Fat Wasting- Orbital [   ]  Buccal [   ]  Triceps [   ]  Rib [   ]  Suspect [PCM] 2/2 to physical assessment, [poor intake], and [wt loss]; please see malnutrition chart note.    Estimated energy needs:   Ht 172.72cm; Wt 64Kg  IBW 70Kg; %IBW 91%  BMI 21.5    Utilized ABW to calculate needs, pt falls within % of IBW. Adjusted for age/catabolic state/repletion.    4238-6937 kcal (30-35 kcal/kg), 77-90 gm (1.2-1.4 gm/kg), 6241-2078 mL (30-35 mL/kg)    Subjective: 62y/o M w/ hx prior laryngeal CA s/p definitive RT and history of prior trach s/p prior decannulation, now with recurrence s/p biopsy of subglottis and open tracheostomy. Patient transferred to SICU for airway monitoring. Pt seen resting in chair on humidified, trach collar. SLP assessed pt this morning and not cleared for po; NPO to continue. Per team, NGT to be placed for EN initiation. Discussed EN with pt and meeting EER at goal rate. Pt reports understanding. PTA he reports a decline in intake 2/2 difficulty breathing/lethargy. # which indicates ~20# loss; pt unable to identify time frame but thinks it was over months. Pt with noticeable loss of LBM. Per NFPE wasting to bitemporal region, buccal region, and clavicular region noted. Suspect moderate-severe PCM 2/2 wt loss, decreased intake, and NFPE; please see chart note. Pt denies N/V/D/C and pain at present. RD to follow per policy.  underweight    Previous Nutrition Diagnosis:    Active [   ]  Resolved [   ]    If resolved, new PES:     Goal:    Recommendations:    Education:     Risk Level: High [   ] Moderate [   ] Low [   ] 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 radiation  Surgery, elective: direct laryngoscopy with biopsy- 4/2017  Status post surgery: inguinal hernia  Status post surgery: Left ankle surgical repair  x2 (1965)    Current Nutrition Order: Jevity 1.2 @ 64 mL/hr x24h, Prostat SF 1x/day (1943 kcal, 101 gm protein, 1239 mL free water, 153% RDI vitamin/mineral)     PO Intake: Good (%) [   ]  Fair (50-75%) [   ] Poor (<25%) [   ]- N/A as pt NPO on TF    GI Issues: abdomen soft/nontender, denies N/V, no stool (pt started on bowel regimen per RN)    Pain: Pain controlled at this time    Skin Integrity: Tomi score 18     Labs:   07-01    138  |  97  |  11  ----------------------------<  138<H>  3.8   |  34<H>  |  0.66    Ca    9.1      01 Jul 2019 05:45  Phos  3.9     07-01  Mg     2.2     07-01    CAPILLARY BLOOD GLUCOSE    Medications:  MEDICATIONS  (STANDING):  cefTRIAXone   IVPB 1000 milliGRAM(s) IV Intermittent every 24 hours  chlorhexidine 4% Liquid 1 Application(s) Topical <User Schedule>  docusate sodium Liquid 100 milliGRAM(s) Oral two times a day  enoxaparin Injectable 40 milliGRAM(s) SubCutaneous once  pantoprazole  Injectable 40 milliGRAM(s) IV Push daily  senna Syrup 10 milliLiter(s) Oral at bedtime    MEDICATIONS  (PRN):  acetaminophen    Suspension .. 650 milliGRAM(s) Oral every 6 hours PRN Moderate Pain (4 - 6)  bisacodyl Suppository 10 milliGRAM(s) Rectal daily PRN Constipation  HYDROmorphone  Injectable 0.25 milliGRAM(s) IV Push every 4 hours PRN Severe Pain (7 - 10)    Weight:  No updated weights    Weight Change:   Please obtain current weight and continue to trend weekly weights for further assessment    Nutrition Focused Physical Exam: Completed [ X on 6/26  ]  Not Pertinent [   ]  Muscle Wasting- Temporal [   ]  Clavicle/Pectoral [ X  ]  Shoulder/Deltoid [   ]  Scapula [   ]  Interosseous [   ]  Quadriceps [   ]  Gastrocnemius [   ]  Fat Wasting- Orbital [   ]  Buccal [  X ]  Triceps [   ]  Rib [   ]  Suspect moderate-severe PCM 2/2 wt loss, decreased intake, and NFPE    Estimated energy needs:   Ht 172.72cm; Wt 64Kg  IBW 70Kg; %IBW 91%  BMI 21.5    Utilized ABW to calculate needs, pt falls within % of IBW. Adjusted for age/catabolic state/repletion.    5238-4752 kcal (30-35 kcal/kg), 77-90 gm (1.2-1.4 gm/kg), 1871-5114 mL (30-35 mL/kg)    Subjective: 62y/o M w/ hx prior laryngeal CA s/p definitive RT and history of prior trach s/p prior decannulation, now with recurrence s/p biopsy of subglottis and open tracheostomy on 5/25. PEG placement on 6/28 2/2 failed SLP eval/difficulty swallowing. Pt tolerating TF at 64 mL/hr x24h with Prostat SF 1x/day per RN. Please see below for full nutritional recs- d/w team. RD to monitor and follow per policy.    Previous Nutrition Diagnosis: Inadequate Energy Intake RT NPO status AEB pt meeting 0% of EER.     Active [   ]  Resolved [ X ]- started on EN feeds    If resolved, new PES: Increased nutrient needs RT increased demand for nutrients (kcal/protein) AEB catabolic state    Goal: Will meet >75% EER via EN    Recommendations:  1. Recommend change TF regimen to Jevity 1.2 via PEG with goal rate of 68ml/hr x 24hr to best meet pt's est needs at this time (1632ml TV, 1958kcal, 90g, 1317ml water, 163% RDIs). Additional fluids per team. Order for volume based feeding protocol and monitor for s/s of intolerance/maintain aspiration precautions.  2. Monitor labs, replete electrolytes prn.  3. Obtain weekly weights.    Education: Need for EN    Risk Level: High [ X  ] Moderate [   ] Low [   ] 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 radiation  Surgery, elective: direct laryngoscopy with biopsy- 4/2017  Status post surgery: inguinal hernia  Status post surgery: Left ankle surgical repair  x2 (1965)    Current Nutrition Order: Jevity 1.2 @ 64 mL/hr x24h, Prostat SF 1x/day (1943 kcal, 101 gm protein, 1239 mL free water, 153% RDI vitamin/mineral)     PO Intake: Good (%) [   ]  Fair (50-75%) [   ] Poor (<25%) [   ]- N/A as pt NPO on TF    GI Issues: abdomen soft/nontender, denies N/V, no stool (pt started on bowel regimen per RN)    Pain: Pain controlled at this time    Skin Integrity: Tomi score 18     Labs:   07-01    138  |  97  |  11  ----------------------------<  138<H>  3.8   |  34<H>  |  0.66    Ca    9.1      01 Jul 2019 05:45  Phos  3.9     07-01  Mg     2.2     07-01    CAPILLARY BLOOD GLUCOSE    Medications:  MEDICATIONS  (STANDING):  cefTRIAXone   IVPB 1000 milliGRAM(s) IV Intermittent every 24 hours  chlorhexidine 4% Liquid 1 Application(s) Topical <User Schedule>  docusate sodium Liquid 100 milliGRAM(s) Oral two times a day  enoxaparin Injectable 40 milliGRAM(s) SubCutaneous once  pantoprazole  Injectable 40 milliGRAM(s) IV Push daily  senna Syrup 10 milliLiter(s) Oral at bedtime    MEDICATIONS  (PRN):  acetaminophen    Suspension .. 650 milliGRAM(s) Oral every 6 hours PRN Moderate Pain (4 - 6)  bisacodyl Suppository 10 milliGRAM(s) Rectal daily PRN Constipation  HYDROmorphone  Injectable 0.25 milliGRAM(s) IV Push every 4 hours PRN Severe Pain (7 - 10)    Weight:  No updated weights    Weight Change:   Please obtain current weight and continue to trend weekly weights for further assessment    Nutrition Focused Physical Exam: Completed [ X on 6/26  ]  Not Pertinent [   ]  Muscle Wasting- Temporal [   ]  Clavicle/Pectoral [ X  ]  Shoulder/Deltoid [   ]  Scapula [   ]  Interosseous [   ]  Quadriceps [   ]  Gastrocnemius [   ]  Fat Wasting- Orbital [   ]  Buccal [  X ]  Triceps [   ]  Rib [   ]  Suspect moderate-severe PCM 2/2 wt loss, decreased intake, and NFPE    Estimated energy needs:   Ht 172.72cm; Wt 64Kg  IBW 70Kg; %IBW 91%  BMI 21.5    Utilized ABW to calculate needs, pt falls within % of IBW. Adjusted for age/catabolic state/repletion.    1188-2916 kcal (30-35 kcal/kg), 77-90 gm (1.2-1.4 gm/kg), 5468-5395 mL (30-35 mL/kg)    Subjective: 64y/o M w/ hx prior laryngeal CA s/p definitive RT and history of prior trach s/p prior decannulation, now with recurrence s/p biopsy of subglottis and open tracheostomy on 5/25. PEG placement on 6/28 2/2 failed SLP eval/difficulty swallowing. Pt tolerating TF at 64 mL/hr x24h with Prostat SF 1x/day per RN. Please obtain CRP for further evaluation of prealbumin level. Please see below for full nutritional recs- d/w team. RD to monitor and follow per policy.    Previous Nutrition Diagnosis: Inadequate Energy Intake RT NPO status AEB pt meeting 0% of EER.     Active [   ]  Resolved [ X ]- started on EN feeds    If resolved, new PES: Increased nutrient needs RT increased demand for nutrients (kcal/protein) AEB catabolic state    Goal: Will meet >75% EER via EN    Recommendations:  1. Recommend change TF regimen to Jevity 1.2 via PEG with goal rate of 68ml/hr x 24hr to best meet pt's est needs at this time (1632ml TV, 1958kcal, 90g, 1317ml water, 163% RDIs). Additional fluids per team. Order for volume based feeding protocol and monitor for s/s of intolerance/maintain aspiration precautions.  2. Monitor labs, replete electrolytes prn. Obtain CRP for further assessment.  3. Obtain weekly weights.    Education: Need for EN    Risk Level: High [ X  ] Moderate [   ] Low [   ]

## 2019-07-02 ENCOUNTER — RESULT REVIEW (OUTPATIENT)
Age: 64
End: 2019-07-02

## 2019-07-02 ENCOUNTER — APPOINTMENT (OUTPATIENT)
Dept: OTOLARYNGOLOGY | Facility: HOSPITAL | Age: 64
End: 2019-07-02

## 2019-07-02 LAB
ANION GAP SERPL CALC-SCNC: 10 MMOL/L — SIGNIFICANT CHANGE UP (ref 5–17)
ANION GAP SERPL CALC-SCNC: 9 MMOL/L — SIGNIFICANT CHANGE UP (ref 5–17)
APTT BLD: 42.7 SEC — HIGH (ref 27.5–36.3)
APTT BLD: 49.8 SEC — HIGH (ref 27.5–36.3)
BLD GP AB SCN SERPL QL: NEGATIVE — SIGNIFICANT CHANGE UP
BUN SERPL-MCNC: 12 MG/DL — SIGNIFICANT CHANGE UP (ref 7–23)
BUN SERPL-MCNC: 9 MG/DL — SIGNIFICANT CHANGE UP (ref 7–23)
CALCIUM SERPL-MCNC: 8.7 MG/DL — SIGNIFICANT CHANGE UP (ref 8.4–10.5)
CALCIUM SERPL-MCNC: 9.3 MG/DL — SIGNIFICANT CHANGE UP (ref 8.4–10.5)
CHLORIDE SERPL-SCNC: 98 MMOL/L — SIGNIFICANT CHANGE UP (ref 96–108)
CHLORIDE SERPL-SCNC: 99 MMOL/L — SIGNIFICANT CHANGE UP (ref 96–108)
CO2 SERPL-SCNC: 28 MMOL/L — SIGNIFICANT CHANGE UP (ref 22–31)
CO2 SERPL-SCNC: 31 MMOL/L — SIGNIFICANT CHANGE UP (ref 22–31)
CREAT SERPL-MCNC: 0.64 MG/DL — SIGNIFICANT CHANGE UP (ref 0.5–1.3)
CREAT SERPL-MCNC: 0.68 MG/DL — SIGNIFICANT CHANGE UP (ref 0.5–1.3)
GAS PNL BLDA: SIGNIFICANT CHANGE UP
GLUCOSE BLDC GLUCOMTR-MCNC: 128 MG/DL — HIGH (ref 70–99)
GLUCOSE BLDC GLUCOMTR-MCNC: 136 MG/DL — HIGH (ref 70–99)
GLUCOSE SERPL-MCNC: 108 MG/DL — HIGH (ref 70–99)
GLUCOSE SERPL-MCNC: 170 MG/DL — HIGH (ref 70–99)
HCT VFR BLD CALC: 34.1 % — LOW (ref 39–50)
HCT VFR BLD CALC: 38.5 % — LOW (ref 39–50)
HGB BLD-MCNC: 11.2 G/DL — LOW (ref 13–17)
HGB BLD-MCNC: 12.3 G/DL — LOW (ref 13–17)
INR BLD: 1.09 — SIGNIFICANT CHANGE UP (ref 0.88–1.16)
INR BLD: 1.19 — HIGH (ref 0.88–1.16)
MAGNESIUM SERPL-MCNC: 1.8 MG/DL — SIGNIFICANT CHANGE UP (ref 1.6–2.6)
MAGNESIUM SERPL-MCNC: 2.2 MG/DL — SIGNIFICANT CHANGE UP (ref 1.6–2.6)
MCHC RBC-ENTMCNC: 29.4 PG — SIGNIFICANT CHANGE UP (ref 27–34)
MCHC RBC-ENTMCNC: 29.9 PG — SIGNIFICANT CHANGE UP (ref 27–34)
MCHC RBC-ENTMCNC: 31.9 GM/DL — LOW (ref 32–36)
MCHC RBC-ENTMCNC: 32.8 GM/DL — SIGNIFICANT CHANGE UP (ref 32–36)
MCV RBC AUTO: 91.2 FL — SIGNIFICANT CHANGE UP (ref 80–100)
MCV RBC AUTO: 92.1 FL — SIGNIFICANT CHANGE UP (ref 80–100)
NRBC # BLD: 0 /100 WBCS — SIGNIFICANT CHANGE UP (ref 0–0)
NRBC # BLD: 0 /100 WBCS — SIGNIFICANT CHANGE UP (ref 0–0)
PHOSPHATE SERPL-MCNC: 3.4 MG/DL — SIGNIFICANT CHANGE UP (ref 2.5–4.5)
PHOSPHATE SERPL-MCNC: 4.7 MG/DL — HIGH (ref 2.5–4.5)
PLATELET # BLD AUTO: 314 K/UL — SIGNIFICANT CHANGE UP (ref 150–400)
PLATELET # BLD AUTO: 326 K/UL — SIGNIFICANT CHANGE UP (ref 150–400)
POTASSIUM SERPL-MCNC: 4.6 MMOL/L — SIGNIFICANT CHANGE UP (ref 3.5–5.3)
POTASSIUM SERPL-MCNC: 5.2 MMOL/L — SIGNIFICANT CHANGE UP (ref 3.5–5.3)
POTASSIUM SERPL-SCNC: 4.6 MMOL/L — SIGNIFICANT CHANGE UP (ref 3.5–5.3)
POTASSIUM SERPL-SCNC: 5.2 MMOL/L — SIGNIFICANT CHANGE UP (ref 3.5–5.3)
PROTHROM AB SERPL-ACNC: 12.4 SEC — SIGNIFICANT CHANGE UP (ref 10–12.9)
PROTHROM AB SERPL-ACNC: 13.5 SEC — HIGH (ref 10–12.9)
PTH-INTACT IO % DIF SERPL: <4 PG/ML — LOW (ref 8.5–72.5)
RBC # BLD: 3.74 M/UL — LOW (ref 4.2–5.8)
RBC # BLD: 4.18 M/UL — LOW (ref 4.2–5.8)
RBC # FLD: 12.9 % — SIGNIFICANT CHANGE UP (ref 10.3–14.5)
RBC # FLD: 12.9 % — SIGNIFICANT CHANGE UP (ref 10.3–14.5)
RH IG SCN BLD-IMP: POSITIVE — SIGNIFICANT CHANGE UP
SODIUM SERPL-SCNC: 137 MMOL/L — SIGNIFICANT CHANGE UP (ref 135–145)
SODIUM SERPL-SCNC: 138 MMOL/L — SIGNIFICANT CHANGE UP (ref 135–145)
WBC # BLD: 11.33 K/UL — HIGH (ref 3.8–10.5)
WBC # BLD: 6.43 K/UL — SIGNIFICANT CHANGE UP (ref 3.8–10.5)
WBC # FLD AUTO: 11.33 K/UL — HIGH (ref 3.8–10.5)
WBC # FLD AUTO: 6.43 K/UL — SIGNIFICANT CHANGE UP (ref 3.8–10.5)

## 2019-07-02 PROCEDURE — 99233 SBSQ HOSP IP/OBS HIGH 50: CPT | Mod: GC

## 2019-07-02 PROCEDURE — 60252 REMOVAL OF THYROID: CPT

## 2019-07-02 PROCEDURE — 31360 REMOVAL OF LARYNX: CPT

## 2019-07-02 RX ORDER — CALCIUM GLUCONATE 100 MG/ML
2 VIAL (ML) INTRAVENOUS ONCE
Refills: 0 | Status: COMPLETED | OUTPATIENT
Start: 2019-07-02 | End: 2019-07-02

## 2019-07-02 RX ORDER — DEXTROSE 50 % IN WATER 50 %
15 SYRINGE (ML) INTRAVENOUS ONCE
Refills: 0 | Status: DISCONTINUED | OUTPATIENT
Start: 2019-07-02 | End: 2019-07-22

## 2019-07-02 RX ORDER — METRONIDAZOLE 500 MG
500 TABLET ORAL EVERY 8 HOURS
Refills: 0 | Status: DISCONTINUED | OUTPATIENT
Start: 2019-07-02 | End: 2019-07-11

## 2019-07-02 RX ORDER — HYDROMORPHONE HYDROCHLORIDE 2 MG/ML
1 INJECTION INTRAMUSCULAR; INTRAVENOUS; SUBCUTANEOUS EVERY 4 HOURS
Refills: 0 | Status: DISCONTINUED | OUTPATIENT
Start: 2019-07-02 | End: 2019-07-05

## 2019-07-02 RX ORDER — GLUCAGON INJECTION, SOLUTION 0.5 MG/.1ML
1 INJECTION, SOLUTION SUBCUTANEOUS ONCE
Refills: 0 | Status: DISCONTINUED | OUTPATIENT
Start: 2019-07-02 | End: 2019-07-23

## 2019-07-02 RX ORDER — HYDROMORPHONE HYDROCHLORIDE 2 MG/ML
0.5 INJECTION INTRAMUSCULAR; INTRAVENOUS; SUBCUTANEOUS EVERY 4 HOURS
Refills: 0 | Status: DISCONTINUED | OUTPATIENT
Start: 2019-07-02 | End: 2019-07-05

## 2019-07-02 RX ORDER — DEXTROSE 50 % IN WATER 50 %
25 SYRINGE (ML) INTRAVENOUS ONCE
Refills: 0 | Status: DISCONTINUED | OUTPATIENT
Start: 2019-07-02 | End: 2019-07-22

## 2019-07-02 RX ORDER — SODIUM CHLORIDE 9 MG/ML
1000 INJECTION, SOLUTION INTRAVENOUS
Refills: 0 | Status: DISCONTINUED | OUTPATIENT
Start: 2019-07-02 | End: 2019-07-12

## 2019-07-02 RX ORDER — INSULIN LISPRO 100/ML
VIAL (ML) SUBCUTANEOUS
Refills: 0 | Status: DISCONTINUED | OUTPATIENT
Start: 2019-07-02 | End: 2019-07-22

## 2019-07-02 RX ORDER — CALCITRIOL 0.5 UG/1
0.25 CAPSULE ORAL
Refills: 0 | Status: DISCONTINUED | OUTPATIENT
Start: 2019-07-02 | End: 2019-07-05

## 2019-07-02 RX ORDER — CEFAZOLIN SODIUM 1 G
1000 VIAL (EA) INJECTION EVERY 8 HOURS
Refills: 0 | Status: DISCONTINUED | OUTPATIENT
Start: 2019-07-02 | End: 2019-07-11

## 2019-07-02 RX ORDER — SODIUM CHLORIDE 9 MG/ML
1000 INJECTION INTRAMUSCULAR; INTRAVENOUS; SUBCUTANEOUS
Refills: 0 | Status: DISCONTINUED | OUTPATIENT
Start: 2019-07-02 | End: 2019-07-04

## 2019-07-02 RX ORDER — DEXTROSE 50 % IN WATER 50 %
12.5 SYRINGE (ML) INTRAVENOUS ONCE
Refills: 0 | Status: DISCONTINUED | OUTPATIENT
Start: 2019-07-02 | End: 2019-07-22

## 2019-07-02 RX ORDER — CALCIUM GLUCONATE 100 MG/ML
1 VIAL (ML) INTRAVENOUS
Refills: 0 | Status: DISCONTINUED | OUTPATIENT
Start: 2019-07-03 | End: 2019-07-03

## 2019-07-02 RX ORDER — CALCITRIOL 0.5 UG/1
0.25 CAPSULE ORAL
Refills: 0 | Status: DISCONTINUED | OUTPATIENT
Start: 2019-07-02 | End: 2019-07-02

## 2019-07-02 RX ADMIN — Medication 100 MILLIGRAM(S): at 21:20

## 2019-07-02 RX ADMIN — HYDROMORPHONE HYDROCHLORIDE 0.25 MILLIGRAM(S): 2 INJECTION INTRAMUSCULAR; INTRAVENOUS; SUBCUTANEOUS at 19:30

## 2019-07-02 RX ADMIN — HYDROMORPHONE HYDROCHLORIDE 0.5 MILLIGRAM(S): 2 INJECTION INTRAMUSCULAR; INTRAVENOUS; SUBCUTANEOUS at 21:05

## 2019-07-02 RX ADMIN — Medication 100 MILLIGRAM(S): at 22:08

## 2019-07-02 RX ADMIN — HYDROMORPHONE HYDROCHLORIDE 1 MILLIGRAM(S): 2 INJECTION INTRAMUSCULAR; INTRAVENOUS; SUBCUTANEOUS at 23:15

## 2019-07-02 RX ADMIN — CHLORHEXIDINE GLUCONATE 1 APPLICATION(S): 213 SOLUTION TOPICAL at 07:34

## 2019-07-02 RX ADMIN — Medication 200 GRAM(S): at 21:51

## 2019-07-02 RX ADMIN — SODIUM CHLORIDE 250 MILLILITER(S): 9 INJECTION INTRAMUSCULAR; INTRAVENOUS; SUBCUTANEOUS at 00:04

## 2019-07-02 RX ADMIN — HYDROMORPHONE HYDROCHLORIDE 0.25 MILLIGRAM(S): 2 INJECTION INTRAMUSCULAR; INTRAVENOUS; SUBCUTANEOUS at 19:12

## 2019-07-02 RX ADMIN — HYDROMORPHONE HYDROCHLORIDE 0.5 MILLIGRAM(S): 2 INJECTION INTRAMUSCULAR; INTRAVENOUS; SUBCUTANEOUS at 21:30

## 2019-07-02 RX ADMIN — CALCITRIOL 0.25 MICROGRAM(S): 0.5 CAPSULE ORAL at 22:29

## 2019-07-02 NOTE — BRIEF OPERATIVE NOTE - NSICDXBRIEFPROCEDURE_GEN_ALL_CORE_FT
PROCEDURES:  Planned tracheostomy 25-Jun-2019 09:43:48  Johnie Stanton  Direct laryngoscopy with biopsy 25-Jun-2019 09:43:35  Johnie Stanton
PROCEDURES:  Creation, flap, myocutaneous, pectoralis major 02-Jul-2019 16:19:42  Ruddy Hightower  Radical laryngectomy 02-Jul-2019 16:19:28  Ruddy Hightower

## 2019-07-02 NOTE — BRIEF OPERATIVE NOTE - NSICDXBRIEFPREOP_GEN_ALL_CORE_FT
PRE-OP DIAGNOSIS:  Laryngeal carcinoma 25-Jun-2019 09:43:58  Johnie Stanton
PRE-OP DIAGNOSIS:  Laryngeal carcinoma 25-Jun-2019 09:43:58  Johnie Stanton

## 2019-07-02 NOTE — BRIEF OPERATIVE NOTE - OPERATION/FINDINGS
see operative report. margins negative
lesions noted in subglottis, biopsied, frozen showing malignancy  open tracheostomy performed

## 2019-07-02 NOTE — PROGRESS NOTE ADULT - ASSESSMENT
64 yo M w/ hx prior laryngeal CA s/p definitive RT and history of prior trach s/p prior decannulation, now with recurrence s/p biopsy of subglottis and open tracheostomy. Patient transferred to SICU for airway monitoring.     Neuro: Pain regimen changed to tylenol prn and dilaudid for severe   HEENT: Recurrence of CA Will go to OR for Laryngectomy   Pulm: trach collar, 6XL cuff deflated, humidified O2 pt can not have passymuir valve. Increased secretions will continue ICU care with suctioning as needed  CV: HD stable   GI: NPO.  PEG placed 6/28 TF Jevity 1.2@64 via  Constipation - started colace senna and dulcolax  on Protonix, TF currently on hold for OR  : voids  Endo: ISS   ID:  Bronchitis: Proteus: ceftriaxone (6/27 -7/2  ) /// s/p Ancef x3 doses (6/25 -6/26 )  Heme//Onc: pathology shows recurrence of cancer  PPx: SCDs/SQH  Lines: PIV   PT: Ordered.

## 2019-07-02 NOTE — BRIEF OPERATIVE NOTE - NSICDXBRIEFPOSTOP_GEN_ALL_CORE_FT
POST-OP DIAGNOSIS:  Laryngeal carcinoma 25-Jun-2019 09:44:17  Johnie Stanton
POST-OP DIAGNOSIS:  Laryngeal carcinoma 25-Jun-2019 09:44:17  Johnie Stanton

## 2019-07-02 NOTE — PROGRESS NOTE ADULT - ATTENDING COMMENTS
Patient seen and examined with house-staff during bedside rounds.  Resident note read, including vitals, physical findings, laboratory data, and radiological reports.   Revisions included below.  Direct personal management at bed side and extensive interpretation of the data.  Plan was outlined and discussed in details with the housestaff.  Decision making of high complexity  Action taken for acute disease activity to reflect the level of care provided:  - medication reconciliation  - review laboratory data  Patient was evaluated preoperatively. He was cleared for surgery. Patient was seen postoperatively and he was disconnected from mechanical ventilation. Patient still requiring pulmonary toilet.

## 2019-07-02 NOTE — PROGRESS NOTE ADULT - SUBJECTIVE AND OBJECTIVE BOX
INTERVAL HPI/OVERNIGHT EVENTS:  ** INCOMPLETE    MEDICATIONS  (STANDING):  chlorhexidine 4% Liquid 1 Application(s) Topical <User Schedule>  docusate sodium Liquid 100 milliGRAM(s) Oral two times a day  enoxaparin Injectable 40 milliGRAM(s) SubCutaneous every 24 hours  pantoprazole  Injectable 40 milliGRAM(s) IV Push daily  senna Syrup 10 milliLiter(s) Oral at bedtime  sodium chloride Solution 250 milliLiter(s) Oral every 6 hours    MEDICATIONS  (PRN):  acetaminophen    Suspension .. 650 milliGRAM(s) Oral every 6 hours PRN Moderate Pain (4 - 6)  bisacodyl Suppository 10 milliGRAM(s) Rectal daily PRN Constipation  HYDROmorphone  Injectable 0.25 milliGRAM(s) IV Push every 4 hours PRN Severe Pain (7 - 10)      Drug Dosing Weight  Height (cm): 172.72 (24 Jun 2019 16:00)  Weight (kg): 64 (24 Jun 2019 16:00)  BMI (kg/m2): 21.5 (24 Jun 2019 16:00)  BSA (m2): 1.76 (24 Jun 2019 16:00)    PAST MEDICAL & SURGICAL HISTORY:  SOB (shortness of breath)  Throat cancer  Jaundice: 1965  Fracture: left ankle  Laryngeal mass: s/ p radiation  Surgery, elective: direct laryngoscopy with biopsy- 4/2017  Status post surgery: inguinal hernia  Status post surgery: Left ankle surgical repair  x2 (1965)      ICU Vital Signs Last 24 Hrs  T(C): 36.8 (02 Jul 2019 05:11), Max: 37.6 (01 Jul 2019 17:55)  T(F): 98.3 (02 Jul 2019 05:11), Max: 99.7 (01 Jul 2019 17:55)  HR: 66 (02 Jul 2019 06:00) (64 - 93)  BP: 106/63 (02 Jul 2019 06:00) (101/60 - 123/70)  BP(mean): 78 (02 Jul 2019 06:00) (73 - 91)  ABP: --  ABP(mean): --  RR: 16 (02 Jul 2019 06:00) (12 - 26)  SpO2: 95% (02 Jul 2019 06:00) (93% - 98%)            30 Jun 2019 07:01  -  01 Jul 2019 07:00  --------------------------------------------------------  IN:    Enteral Tube Flush: 750 mL    IV PiggyBack: 50 mL    ns in tub fed  [id number]: 1472 mL  Total IN: 2272 mL    OUT:    Voided: 800 mL  Total OUT: 800 mL    Total NET: 1472 mL      01 Jul 2019 07:01  -  02 Jul 2019 06:42  --------------------------------------------------------  IN:    Enteral Tube Flush: 500 mL    IV PiggyBack: 50 mL    ns in tub fed  [id number]: 1088 mL  Total IN: 1638 mL    OUT:    Voided: 1600 mL  Total OUT: 1600 mL    Total NET: 38 mL              PHYSICAL EXAM:    General: NAD, resting comfortably in bed. Well developed, well groomed  NEURO: AAOx3, CN II-XII grossly intact, EOMI, follows commands  HEENT: PERRL, MMM  CV: RRR, no MRG  PULM: trach.  ABD: soft, NT/ND. No rebound, no guarding, no tympany. PEG (TF currently being held)   : voids, normal genitalia   EXTREM: WWP, no edema, no calf tenderness  VASC: No cyanosis, pallor. Palpable radial and PT bilaterally  SKIN: No rashes noted  PSYCH: Appropriate affect, answers questions appropriately      LABS:  CBC Full  -  ( 02 Jul 2019 05:23 )  WBC Count : 6.43 K/uL  RBC Count : 4.18 M/uL  Hemoglobin : 12.3 g/dL  Hematocrit : 38.5 %  Platelet Count - Automated : 314 K/uL  Mean Cell Volume : 92.1 fl  Mean Cell Hemoglobin : 29.4 pg  Mean Cell Hemoglobin Concentration : 31.9 gm/dL  Auto Neutrophil # : x  Auto Lymphocyte # : x  Auto Monocyte # : x  Auto Eosinophil # : x  Auto Basophil # : x  Auto Neutrophil % : x  Auto Lymphocyte % : x  Auto Monocyte % : x  Auto Eosinophil % : x  Auto Basophil % : x    07-02    138  |  98  |  9   ----------------------------<  108<H>  4.6   |  31  |  0.64    Ca    9.3      02 Jul 2019 05:23  Phos  3.4     07-02  Mg     2.2     07-02      PT/INR - ( 02 Jul 2019 05:23 )   PT: 12.4 sec;   INR: 1.09          PTT - ( 02 Jul 2019 05:23 )  PTT:49.8 sec      RADIOLOGY & ADDITIONAL STUDIES: INTERVAL HPI/OVERNIGHT EVENTS:  Patient has no complaints. Patient denies pain and shortness of breath. Patient states that he feels he is better able to clear his secretions. Patient denies nausea, vomiting, and chest pain.    MEDICATIONS  (STANDING):  chlorhexidine 4% Liquid 1 Application(s) Topical <User Schedule>  docusate sodium Liquid 100 milliGRAM(s) Oral two times a day  enoxaparin Injectable 40 milliGRAM(s) SubCutaneous every 24 hours  pantoprazole  Injectable 40 milliGRAM(s) IV Push daily  senna Syrup 10 milliLiter(s) Oral at bedtime  sodium chloride Solution 250 milliLiter(s) Oral every 6 hours    MEDICATIONS  (PRN):  acetaminophen    Suspension .. 650 milliGRAM(s) Oral every 6 hours PRN Moderate Pain (4 - 6)  bisacodyl Suppository 10 milliGRAM(s) Rectal daily PRN Constipation  HYDROmorphone  Injectable 0.25 milliGRAM(s) IV Push every 4 hours PRN Severe Pain (7 - 10)      Drug Dosing Weight  Height (cm): 172.72 (24 Jun 2019 16:00)  Weight (kg): 64 (24 Jun 2019 16:00)  BMI (kg/m2): 21.5 (24 Jun 2019 16:00)  BSA (m2): 1.76 (24 Jun 2019 16:00)    PAST MEDICAL & SURGICAL HISTORY:  SOB (shortness of breath)  Throat cancer  Jaundice: 1965  Fracture: left ankle  Laryngeal mass: s/ p radiation  Surgery, elective: direct laryngoscopy with biopsy- 4/2017  Status post surgery: inguinal hernia  Status post surgery: Left ankle surgical repair  x2 (1965)      ICU Vital Signs Last 24 Hrs  T(C): 36.8 (02 Jul 2019 05:11), Max: 37.6 (01 Jul 2019 17:55)  T(F): 98.3 (02 Jul 2019 05:11), Max: 99.7 (01 Jul 2019 17:55)  HR: 66 (02 Jul 2019 06:00) (64 - 93)  BP: 106/63 (02 Jul 2019 06:00) (101/60 - 123/70)  BP(mean): 78 (02 Jul 2019 06:00) (73 - 91)  ABP: --  ABP(mean): --  RR: 16 (02 Jul 2019 06:00) (12 - 26)  SpO2: 95% (02 Jul 2019 06:00) (93% - 98%)            30 Jun 2019 07:01  -  01 Jul 2019 07:00  --------------------------------------------------------  IN:    Enteral Tube Flush: 750 mL    IV PiggyBack: 50 mL    ns in tub fed  [id number]: 1472 mL  Total IN: 2272 mL    OUT:    Voided: 800 mL  Total OUT: 800 mL    Total NET: 1472 mL      01 Jul 2019 07:01  -  02 Jul 2019 06:42  --------------------------------------------------------  IN:    Enteral Tube Flush: 500 mL    IV PiggyBack: 50 mL    ns in tub fed  [id number]: 1088 mL  Total IN: 1638 mL    OUT:    Voided: 1600 mL  Total OUT: 1600 mL    Total NET: 38 mL              PHYSICAL EXAM:    General: NAD, resting comfortably in bed. Well developed, well groomed  NEURO: AAOx3, CN II-XII grossly intact, EOMI, follows commands  HEENT: PERRL, MMM  CV: RRR, no MRG  PULM: trach.  ABD: soft, NT/ND. No rebound, no guarding, no tympany. PEG (TF currently being held)   : voids, normal genitalia   EXTREM: WWP, no edema, no calf tenderness  VASC: No cyanosis, pallor. Palpable radial and PT bilaterally  SKIN: No rashes noted  PSYCH: Appropriate affect, answers questions appropriately      LABS:  CBC Full  -  ( 02 Jul 2019 05:23 )  WBC Count : 6.43 K/uL  RBC Count : 4.18 M/uL  Hemoglobin : 12.3 g/dL  Hematocrit : 38.5 %  Platelet Count - Automated : 314 K/uL  Mean Cell Volume : 92.1 fl  Mean Cell Hemoglobin : 29.4 pg  Mean Cell Hemoglobin Concentration : 31.9 gm/dL  Auto Neutrophil # : x  Auto Lymphocyte # : x  Auto Monocyte # : x  Auto Eosinophil # : x  Auto Basophil # : x  Auto Neutrophil % : x  Auto Lymphocyte % : x  Auto Monocyte % : x  Auto Eosinophil % : x  Auto Basophil % : x    07-02    138  |  98  |  9   ----------------------------<  108<H>  4.6   |  31  |  0.64    Ca    9.3      02 Jul 2019 05:23  Phos  3.4     07-02  Mg     2.2     07-02      PT/INR - ( 02 Jul 2019 05:23 )   PT: 12.4 sec;   INR: 1.09          PTT - ( 02 Jul 2019 05:23 )  PTT:49.8 sec      RADIOLOGY & ADDITIONAL STUDIES:

## 2019-07-03 LAB
ALBUMIN SERPL ELPH-MCNC: 3.1 G/DL — LOW (ref 3.3–5)
ANION GAP SERPL CALC-SCNC: 10 MMOL/L — SIGNIFICANT CHANGE UP (ref 5–17)
BASE EXCESS BLDA CALC-SCNC: 5.9 MMOL/L — HIGH (ref -2–3)
BUN SERPL-MCNC: 9 MG/DL — SIGNIFICANT CHANGE UP (ref 7–23)
CA-I BLD-SCNC: 1.16 MMOL/L — SIGNIFICANT CHANGE UP (ref 1.12–1.3)
CA-I BLD-SCNC: 1.21 MMOL/L — SIGNIFICANT CHANGE UP (ref 1.12–1.3)
CA-I BLDA-SCNC: 0.91 MMOL/L — LOW (ref 1.12–1.3)
CA-I BLDA-SCNC: 1.1 MMOL/L — LOW (ref 1.12–1.3)
CA-I BLDA-SCNC: 1.11 MMOL/L — LOW (ref 1.12–1.3)
CALCIUM SERPL-MCNC: 8.2 MG/DL — LOW (ref 8.4–10.5)
CALCIUM SERPL-MCNC: 8.5 MG/DL — SIGNIFICANT CHANGE UP (ref 8.4–10.5)
CHLORIDE SERPL-SCNC: 98 MMOL/L — SIGNIFICANT CHANGE UP (ref 96–108)
CO2 SERPL-SCNC: 29 MMOL/L — SIGNIFICANT CHANGE UP (ref 22–31)
CREAT SERPL-MCNC: 0.63 MG/DL — SIGNIFICANT CHANGE UP (ref 0.5–1.3)
GAS PNL BLDA: SIGNIFICANT CHANGE UP
GLUCOSE BLDC GLUCOMTR-MCNC: 83 MG/DL — SIGNIFICANT CHANGE UP (ref 70–99)
GLUCOSE BLDC GLUCOMTR-MCNC: 89 MG/DL — SIGNIFICANT CHANGE UP (ref 70–99)
GLUCOSE BLDC GLUCOMTR-MCNC: 95 MG/DL — SIGNIFICANT CHANGE UP (ref 70–99)
GLUCOSE BLDC GLUCOMTR-MCNC: 97 MG/DL — SIGNIFICANT CHANGE UP (ref 70–99)
GLUCOSE SERPL-MCNC: 117 MG/DL — HIGH (ref 70–99)
HBA1C BLD-MCNC: 5.7 % — HIGH (ref 4–5.6)
HCO3 BLDA-SCNC: 32 MMOL/L — HIGH (ref 21–28)
HCT VFR BLD CALC: 31.7 % — LOW (ref 39–50)
HGB BLD-MCNC: 10.5 G/DL — LOW (ref 13–17)
MAGNESIUM SERPL-MCNC: 1.8 MG/DL — SIGNIFICANT CHANGE UP (ref 1.6–2.6)
MCHC RBC-ENTMCNC: 30.1 PG — SIGNIFICANT CHANGE UP (ref 27–34)
MCHC RBC-ENTMCNC: 33.1 GM/DL — SIGNIFICANT CHANGE UP (ref 32–36)
MCV RBC AUTO: 90.8 FL — SIGNIFICANT CHANGE UP (ref 80–100)
NRBC # BLD: 0 /100 WBCS — SIGNIFICANT CHANGE UP (ref 0–0)
PCO2 BLDA: 52 MMHG — HIGH (ref 35–48)
PH BLDA: 7.4 — SIGNIFICANT CHANGE UP (ref 7.35–7.45)
PHOSPHATE SERPL-MCNC: 5.1 MG/DL — HIGH (ref 2.5–4.5)
PLATELET # BLD AUTO: 311 K/UL — SIGNIFICANT CHANGE UP (ref 150–400)
PO2 BLDA: 72 MMHG — LOW (ref 83–108)
POTASSIUM SERPL-MCNC: 5.2 MMOL/L — SIGNIFICANT CHANGE UP (ref 3.5–5.3)
POTASSIUM SERPL-SCNC: 5.2 MMOL/L — SIGNIFICANT CHANGE UP (ref 3.5–5.3)
PTH-INTACT IO % DIF SERPL: <4 PG/ML — LOW (ref 8.5–72.5)
PTH-INTACT IO % DIF SERPL: <4 PG/ML — LOW (ref 8.5–72.5)
RBC # BLD: 3.49 M/UL — LOW (ref 4.2–5.8)
RBC # FLD: 13.1 % — SIGNIFICANT CHANGE UP (ref 10.3–14.5)
SAO2 % BLDA: 93 % — LOW (ref 95–100)
SODIUM SERPL-SCNC: 137 MMOL/L — SIGNIFICANT CHANGE UP (ref 135–145)
T4 FREE SERPL-MCNC: 0.78 NG/DL — SIGNIFICANT CHANGE UP (ref 0.7–1.48)
TSH SERPL-MCNC: 1.48 UIU/ML — SIGNIFICANT CHANGE UP (ref 0.35–4.94)
WBC # BLD: 12.35 K/UL — HIGH (ref 3.8–10.5)
WBC # FLD AUTO: 12.35 K/UL — HIGH (ref 3.8–10.5)

## 2019-07-03 PROCEDURE — 99233 SBSQ HOSP IP/OBS HIGH 50: CPT | Mod: GC

## 2019-07-03 PROCEDURE — 71045 X-RAY EXAM CHEST 1 VIEW: CPT | Mod: 26

## 2019-07-03 RX ORDER — MAGNESIUM SULFATE 500 MG/ML
1 VIAL (ML) INJECTION ONCE
Refills: 0 | Status: COMPLETED | OUTPATIENT
Start: 2019-07-03 | End: 2019-07-03

## 2019-07-03 RX ORDER — CALCIUM GLUCONATE 100 MG/ML
1 VIAL (ML) INTRAVENOUS ONCE
Refills: 0 | Status: COMPLETED | OUTPATIENT
Start: 2019-07-03 | End: 2019-07-03

## 2019-07-03 RX ORDER — CALCIUM GLUCONATE 100 MG/ML
1 VIAL (ML) INTRAVENOUS
Refills: 0 | Status: DISCONTINUED | OUTPATIENT
Start: 2019-07-03 | End: 2019-07-04

## 2019-07-03 RX ORDER — LEVOTHYROXINE SODIUM 125 MCG
15 TABLET ORAL AT BEDTIME
Refills: 0 | Status: DISCONTINUED | OUTPATIENT
Start: 2019-07-03 | End: 2019-07-04

## 2019-07-03 RX ADMIN — Medication 100 MILLIGRAM(S): at 22:02

## 2019-07-03 RX ADMIN — CALCITRIOL 0.25 MICROGRAM(S): 0.5 CAPSULE ORAL at 22:02

## 2019-07-03 RX ADMIN — Medication 200 GRAM(S): at 13:53

## 2019-07-03 RX ADMIN — Medication 200 GRAM(S): at 07:27

## 2019-07-03 RX ADMIN — HYDROMORPHONE HYDROCHLORIDE 1 MILLIGRAM(S): 2 INJECTION INTRAMUSCULAR; INTRAVENOUS; SUBCUTANEOUS at 03:07

## 2019-07-03 RX ADMIN — HYDROMORPHONE HYDROCHLORIDE 1 MILLIGRAM(S): 2 INJECTION INTRAMUSCULAR; INTRAVENOUS; SUBCUTANEOUS at 14:02

## 2019-07-03 RX ADMIN — HYDROMORPHONE HYDROCHLORIDE 0.5 MILLIGRAM(S): 2 INJECTION INTRAMUSCULAR; INTRAVENOUS; SUBCUTANEOUS at 09:57

## 2019-07-03 RX ADMIN — Medication 100 MILLIGRAM(S): at 06:43

## 2019-07-03 RX ADMIN — ENOXAPARIN SODIUM 40 MILLIGRAM(S): 100 INJECTION SUBCUTANEOUS at 12:00

## 2019-07-03 RX ADMIN — HYDROMORPHONE HYDROCHLORIDE 1 MILLIGRAM(S): 2 INJECTION INTRAMUSCULAR; INTRAVENOUS; SUBCUTANEOUS at 07:02

## 2019-07-03 RX ADMIN — HYDROMORPHONE HYDROCHLORIDE 1 MILLIGRAM(S): 2 INJECTION INTRAMUSCULAR; INTRAVENOUS; SUBCUTANEOUS at 15:39

## 2019-07-03 RX ADMIN — Medication 15 MICROGRAM(S): at 22:02

## 2019-07-03 RX ADMIN — HYDROMORPHONE HYDROCHLORIDE 1 MILLIGRAM(S): 2 INJECTION INTRAMUSCULAR; INTRAVENOUS; SUBCUTANEOUS at 03:30

## 2019-07-03 RX ADMIN — PANTOPRAZOLE SODIUM 40 MILLIGRAM(S): 20 TABLET, DELAYED RELEASE ORAL at 12:01

## 2019-07-03 RX ADMIN — HYDROMORPHONE HYDROCHLORIDE 1 MILLIGRAM(S): 2 INJECTION INTRAMUSCULAR; INTRAVENOUS; SUBCUTANEOUS at 18:40

## 2019-07-03 RX ADMIN — HYDROMORPHONE HYDROCHLORIDE 1 MILLIGRAM(S): 2 INJECTION INTRAMUSCULAR; INTRAVENOUS; SUBCUTANEOUS at 22:45

## 2019-07-03 RX ADMIN — CHLORHEXIDINE GLUCONATE 1 APPLICATION(S): 213 SOLUTION TOPICAL at 06:45

## 2019-07-03 RX ADMIN — Medication 100 MILLIGRAM(S): at 22:01

## 2019-07-03 RX ADMIN — HYDROMORPHONE HYDROCHLORIDE 0.5 MILLIGRAM(S): 2 INJECTION INTRAMUSCULAR; INTRAVENOUS; SUBCUTANEOUS at 09:01

## 2019-07-03 RX ADMIN — SENNA PLUS 10 MILLILITER(S): 8.6 TABLET ORAL at 22:02

## 2019-07-03 RX ADMIN — HYDROMORPHONE HYDROCHLORIDE 1 MILLIGRAM(S): 2 INJECTION INTRAMUSCULAR; INTRAVENOUS; SUBCUTANEOUS at 00:00

## 2019-07-03 RX ADMIN — Medication 100 MILLIGRAM(S): at 15:28

## 2019-07-03 RX ADMIN — Medication 100 MILLIGRAM(S): at 05:34

## 2019-07-03 RX ADMIN — HYDROMORPHONE HYDROCHLORIDE 1 MILLIGRAM(S): 2 INJECTION INTRAMUSCULAR; INTRAVENOUS; SUBCUTANEOUS at 19:14

## 2019-07-03 RX ADMIN — Medication 25 GRAM(S): at 22:02

## 2019-07-03 RX ADMIN — Medication 200 GRAM(S): at 02:24

## 2019-07-03 RX ADMIN — Medication 100 GRAM(S): at 11:15

## 2019-07-03 RX ADMIN — CALCITRIOL 0.25 MICROGRAM(S): 0.5 CAPSULE ORAL at 10:26

## 2019-07-03 RX ADMIN — Medication 100 MILLIGRAM(S): at 15:29

## 2019-07-03 RX ADMIN — Medication 200 GRAM(S): at 05:34

## 2019-07-03 NOTE — PROGRESS NOTE ADULT - ATTENDING COMMENTS
Patient seen and examined with house-staff during bedside rounds.  Resident note read, including vitals, physical findings, laboratory data, and radiological reports.   Revisions included below.  Direct personal management at bed side and extensive interpretation of the data.  Plan was outlined and discussed in details with the housestaff.  Decision making of high complexity  Action taken for acute disease activity to reflect the level of care provided:  - medication reconciliation  - review laboratory data  hypocalcemia, laryngeal cancer, COPD, s/p tracheobronchitis, chronic anemia , adrenal nodule  he required calcium infusion  - follow with ionized calcium levels  - secretions decreased  - send sputum culture tomorrow  - on antibiotics postop  - restart tube feed  - OOB in chair  - DC worrell  - OOB in chair  - no bleeding from surgery site

## 2019-07-03 NOTE — PROGRESS NOTE ADULT - SUBJECTIVE AND OBJECTIVE BOX
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 C/b return to OR last night for exchange to 6 distal XLT due to tracheomalacia and poor ventilatory status after coughing episode.    6/26: Now doing well in SICU overnight, no additional respiratory issues, cuff taken down thsi AM. Failed SLP evaluation with evidence of gross aspiration on ice chip trial  6/27: has thick purulent secretions, SICU will keep for one more night. Low grade temps to 100.4. Will need PEG  6/28: NPO for PEG today. To SDU, when secretions are better managed. Received PEG for inability to swallow.  6/29: still requiring suctioning q30 minutes, small amount of glycopyrrolate given by SICU, received PEG tomorrow, will start trickle feeds  6/30: NAEON, feeds up to goal, still requiring frequent suctioning, will stop glycopyrrolate today  7/01: NAEON, tube feeds at goal, still requiring q1hr suctioning of copious thin nonpurulent secretions, dc'd glycopyrrolate  7/03: pt underwent TL, uncomplicated, tolerated; post-op PTH, iCa low so started on Rocaltrol, Ca IV; no     PAST MEDICAL & SURGICAL HISTORY:  SOB (shortness of breath)  Throat cancer  Jaundice: 1965  Fracture: left ankle  Laryngeal mass: s/ p radiation  Surgery, elective: direct laryngoscopy with biopsy- 4/2017  Status post surgery: inguinal hernia  Status post surgery: Left ankle surgical repair  x2 (1965)      PE:  VSS  NAD  Neck: 6 distal XLT in place, gauze supporting trach to keep in appropriate position, sutures in place  Stoma c/d/i, duoderm in place infrastomal  Tolerates finger occlusion and able to voice  Transtracheal aspiration of thin copious non-purulent secretions                                       11.5   5.68  )-----------( 294      ( 01 Jul 2019 05:45 )             35.3         A/P:   63M hx 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, s/p DL biopsy for recurrence of laryngeal CA (found on frozen) and planned revision tracheostomy 6/25 due to tenuous airway 2/2 recurrence laryngeal ca. C/b return to OR for exchange to 6 distal XLT due to tracheomalacia and poor ventilatory status after coughing episode, breathing comfortably with 6.0 dXLT though remains consistently with q1hr suctioning requirements. Sp PEG, tube feeds uptitrating, well tolerated.  -tube feeds to goal, PEG care  -Routine trach case (q1-2 hr suctioning, inner cannula cleaning q shift)  -Trach is positional, please keep gauze under inferior flange to elevate trach appropriately in airway and wick peristomal secretions  - appreciate SICU care  -Monitor for any respiratory difficulty  -Please page ENT with questions or concerns  D/w attending ENT Progress Note     63M former smoker ho 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 today admitted s/p DL biopsy for recurrence of laryngeal CA (found on frozen) and planned revision tracheostomy due to tenuous airway. C/b return to OR last night for exchange to 6 distal XLT due to tracheomalacia and poor ventilatory status after coughing episode.    6/26: Now doing well in SICU overnight, no additional respiratory issues, cuff taken down thsi AM. Failed SLP evaluation with evidence of gross aspiration on ice chip trial  6/27: has thick purulent secretions, SICU will keep for one more night. Low grade temps to 100.4. Will need PEG  6/28: NPO for PEG today. To SDU, when secretions are better managed. Received PEG for inability to swallow.  6/29: still requiring suctioning q30 minutes, small amount of glycopyrrolate given by SICU, received PEG tomorrow, will start trickle feeds  6/30: NAEON, feeds up to goal, still requiring frequent suctioning, will stop glycopyrrolate today  7/01: NAEON, tube feeds at goal, still requiring q1hr suctioning of copious thin nonpurulent secretions, dc'd glycopyrrolate  7/03: pt underwent TL, uncomplicated, tolerated; post-op PTH, iCa low so started on Rocaltrol, Ca IV; neck ABDON x1, left chest ABDON x2, salivary bypass stents in place    PAST MEDICAL & SURGICAL HISTORY:  SOB (shortness of breath)  Throat cancer  Jaundice: 1965  Fracture: left ankle  Laryngeal mass: s/ p radiation  Surgery, elective: direct laryngoscopy with biopsy- 4/2017  Status post surgery: inguinal hernia  Status post surgery: Left ankle surgical repair  x2 (1965)      PE:  AFVSS  NAD  Neck: bebeto tube in place, stoma and neck incision site c/d/i; neck soft, flat; no skin breakdown or wound dehiscence pec flap recon with strong doppler signal; neck ABDON holding suction  Chest: incision site c/d/i; 2 left chest JPs holding suction  L/T/D: bebeto tube, JPx3, salivary bypass stents, River, a-line, PEG    Outputs:  Neck ABDON  L. Chest ABDON 1  L. Chest ABDON 2  UOP:     Labs/Imaging:             A/P:   63M hx 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, s/p DL biopsy for recurrence of laryngeal CA (found on frozen) and planned revision tracheostomy 6/25 due to tenuous airway 2/2 recurrence laryngeal ca. C/b return to OR for exchange to 6 distal XLT due to tracheomalacia and poor ventilatory status after coughing episode. Now POD#1 s/p total laryngectomy with left pec flap recon, total thyroidectomy; procedure uncomplicated.  - Recommend Endocrine consult re: Synthroid, Ca2+ recs; continue to monitor Ca levels  - Can resume pt's feeds via PEG at trickle rate  - Monitor pt for salivary fistula formation, wound dehiscence  - Remainder of management per SICU team   - Please page ENT with questions or concerns  D/w attending ENT Progress Note     63M former smoker ho 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 today admitted s/p DL biopsy for recurrence of laryngeal CA (found on frozen) and planned revision tracheostomy due to tenuous airway. C/b return to OR last night for exchange to 6 distal XLT due to tracheomalacia and poor ventilatory status after coughing episode.    6/26: Now doing well in SICU overnight, no additional respiratory issues, cuff taken down thsi AM. Failed SLP evaluation with evidence of gross aspiration on ice chip trial  6/27: has thick purulent secretions, SICU will keep for one more night. Low grade temps to 100.4. Will need PEG  6/28: NPO for PEG today. To SDU, when secretions are better managed. Received PEG for inability to swallow.  6/29: still requiring suctioning q30 minutes, small amount of glycopyrrolate given by SICU, received PEG tomorrow, will start trickle feeds  6/30: NAEON, feeds up to goal, still requiring frequent suctioning, will stop glycopyrrolate today  7/01: NAEON, tube feeds at goal, still requiring q1hr suctioning of copious thin nonpurulent secretions, dc'd glycopyrrolate  7/03: pt underwent TL, uncomplicated, tolerated; post-op PTH, iCa low so started on Rocaltrol, Ca IV; neck ABDON x1, left chest ABDON x2, salivary bypass stents in place    PMH/PSH:  SOB (shortness of breath)  Throat cancer  Jaundice: 1965  Fracture: left ankle  Laryngeal mass: s/ p radiation  Surgery, elective: direct laryngoscopy with biopsy- 4/2017  Status post surgery: inguinal hernia  Status post surgery: Left ankle surgical repair  x2 (1965)    PE:  AFVSS  NAD  Neck: bebeto tube in place, stoma and neck incision site c/d/i; neck soft, flat; no skin breakdown or wound dehiscence pec flap recon with strong doppler signal; neck ABDON holding suction  Chest: incision site c/d/i; 2 left chest JPs holding suction  Abd; soft, PEG in place, TFs held   L/T/D: bebeto tube, JPx3, salivary bypass stents, River, a-line, PEG    Outputs:  Neck ABDON: 60 cc since OR  L. Chest ABDON 1: 30 cc since OR  L. Chest ABDON 2: 20 cc since OR  UOP:     Labs/Imaging:    PTH/iCa (7/02): <4, 1.08  PTH/iCa (7/03): <4, 1.16    CBC:             10.5   12.35 )-----------( 311      ( 03 Jul 2019 04:51 )             31.7     Chem:  07-03    137  |  98  |  9   ----------------------------<  117<H>  5.2   |  29  |  0.63    Ca    8.5      03 Jul 2019 04:51  Phos  5.1     07-03  Mg     1.8     07-03             A/P:   63M hx 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, s/p DL biopsy for recurrence of laryngeal CA (found on frozen) and planned revision tracheostomy 6/25 due to tenuous airway 2/2 recurrence laryngeal ca. C/b return to OR for exchange to 6 distal XLT due to tracheomalacia and poor ventilatory status after coughing episode. Now POD#1 s/p total laryngectomy with left pec flap recon, total thyroidectomy; procedure uncomplicated.  - Recommend Endocrine consult re: Synthroid, Ca2+ recs; continue to monitor Ca levels  - Can resume pt's feeds via PEG at trickle rate  - OOBTC, PT this AM  - River can be removed this AM  - Continue IVF until tolerating adequate TFs  - Monitor lytes, replace PRN  - Monitor pt for salivary fistula formation, wound dehiscence  - Monitor ABDON outputs  - Flap checks q6h with doppler  - If pt requires ventilatory support, remove bebeto tube and replace with ETT inserted just until balloon is no longer visible  - Remainder of management per SICU team   - Please page ENT with questions or concerns  D/w attending

## 2019-07-03 NOTE — CONSULT NOTE ADULT - ATTENDING COMMENTS
The patient was seen and examined at the bedside with VLAD Flaherty. He is S/P Total Laryngectomy, total thyroidectomy and possibly parathyroid tissue removal. Pathology is pending. He earlier had partial laryngeal surgery and radiotherapy. Serum calcium was low at 7.8mg% with low serum magnesium as well. The patient is receiving peg feedings. He should also receive his IV calcium by slow infusions over and every four hours.with Rocaltrol 0.25 mg twice daily but the dose may need to be increased.I

## 2019-07-03 NOTE — CONSULT NOTE ADULT - SUBJECTIVE AND OBJECTIVE BOX
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 Patient with worsening stridor, s/p CT neck on 6/4 which showed new sites of enhancing soft tissue, suspicious for recurrence of laryngeal carcinoma. He saw Dr. Branch on 6/5, he was sent for PET/CT on 6/7 which showed abnormal FDG activity (SUV 14.6) of the glottic and subglottic regions concerning for recurrence. CT chest also showed 2mm SHAKIR pulmonary nodule and 1.4 cm adrenal nodule. Admitted on 6/25 for planned open tracheostomy with biopsy with lesions noted in subglottis, biopsied, frozen showing malignancy.  PEG placement on 6/28 2/2 failed SLP eval/difficulty swallowing. Now s/p radical laryngectomy on 7/2 with hypocalcemia.                   FH:  DM:  Thyroid:  Autoimmune:  Other:    SH:  Smoking  Etoh:  Recreational Drugs:  Social Life:    PMH & Surgical Hx:LARYNX CA  No pertinent family history in first degree relatives  Handoff  SOB (shortness of breath)  Throat cancer  Jaundice  Fracture  Laryngeal mass  No pertinent past medical history  Laryngeal carcinoma  Laryngeal carcinoma  Creation, flap, myocutaneous, pectoralis major  Radical laryngectomy  EGD, with PEG  Planned tracheostomy  Direct laryngoscopy with biopsy  Surgery, elective  Status post surgery  Status post surgery  No significant past surgical history          Current Meds:  acetaminophen    Suspension .. 650 milliGRAM(s) Oral every 6 hours PRN  bisacodyl Suppository 10 milliGRAM(s) Rectal daily PRN  calcitriol  Solution 0.25 MICROGram(s) Enteral Tube <User Schedule>  calcium gluconate IVPB 1 Gram(s) IV Intermittent <User Schedule>  ceFAZolin   IVPB 1000 milliGRAM(s) IV Intermittent every 8 hours  chlorhexidine 4% Liquid 1 Application(s) Topical <User Schedule>  dextrose 40% Gel 15 Gram(s) Oral once PRN  dextrose 5%. 1000 milliLiter(s) IV Continuous <Continuous>  dextrose 50% Injectable 12.5 Gram(s) IV Push once  dextrose 50% Injectable 25 Gram(s) IV Push once  dextrose 50% Injectable 25 Gram(s) IV Push once  docusate sodium Liquid 100 milliGRAM(s) Oral two times a day  enoxaparin Injectable 40 milliGRAM(s) SubCutaneous every 24 hours  glucagon  Injectable 1 milliGRAM(s) IntraMuscular once PRN  HYDROmorphone  Injectable 0.5 milliGRAM(s) IV Push every 4 hours PRN  HYDROmorphone  Injectable 1 milliGRAM(s) IV Push every 4 hours PRN  HYDROmorphone  Injectable 0.5 milliGRAM(s) IV Push every 4 hours PRN  insulin lispro (HumaLOG) corrective regimen sliding scale   SubCutaneous Before meals and at bedtime  metroNIDAZOLE  IVPB 500 milliGRAM(s) IV Intermittent every 8 hours  pantoprazole  Injectable 40 milliGRAM(s) IV Push daily  senna Syrup 10 milliLiter(s) Oral at bedtime  sodium chloride 0.9%. 1000 milliLiter(s) IV Continuous <Continuous>      Allergies:  penicillin (Rash)      ROS:  Denies the following except as indicated.    General: weight loss/weight gain, decreased appetite, fatigue  Eyes: Blurry vision, double vision, visual changes  ENT: Throat pain, changes in voice,   CV: palpitations, SOB, CP, cough  GI: NVD, difficulty swallowing, abdominal pain  : polyuria, dysuria  Endo: abnormal menses, temperature intolerance, decreased libido  MSK: weakness, joint pain  Skin: rash, dryness, diaphoresis  Heme: Easy bruising,bleeding  Neuro: HA, dizziness, lightheadedness, numbness/ tingling  Psych: Anxiety, Depression    Vital Signs Last 24 Hrs  T(C): 36.8 (03 Jul 2019 10:31), Max: 37.3 (03 Jul 2019 01:00)  T(F): 98.2 (03 Jul 2019 10:31), Max: 99.1 (03 Jul 2019 01:00)  HR: 68 (03 Jul 2019 10:00) (66 - 82)  BP: 140/72 (03 Jul 2019 09:00) (109/64 - 154/83)  BP(mean): 104 (03 Jul 2019 09:00) (80 - 116)  RR: 16 (03 Jul 2019 10:00) (7 - 20)  SpO2: 92% (03 Jul 2019 10:00) (92% - 100%)  Height (cm): 172.72 (07-02 @ 08:48)  Weight (kg): 64 (07-02 @ 08:48)  BMI (kg/m2): 21.5 (07-02 @ 08:48)      Constitutional: wn/wd in NAD.   Neck: 6 distal XLT in place, gauze supporting trach to keep in appropriate position, sutures in place  Stoma c/d/i, duoderm in place infrastomal  HEENT: NCAT, MMM, OP clear, EOMI, , no proptosis or lid retraction  Neck: no thyromegaly or palpable thyroid nodules   Respiratory: lungs CTAB.  Cardiovascular: regular rhythm, normal S1 and S2, no audible murmurs, no peripheral edema  GI: soft, NT/ND, no masses/HSM appreciated. + PEG tube  Neurology: no tremors, DTR 2+  Skin: no visible rashes/lesions  Psychiatric: AAO x 3, normal affect/mood.  Ext: radial pulses intact, DP pulses intact, extremities warm, no cyanosis, clubbing or edema.         LABS:                        10.5   12.35 )-----------( 311      ( 03 Jul 2019 04:51 )             31.7     07-03    137  |  98  |  9   ----------------------------<  117<H>  5.2   |  29  |  0.63    Ca    8.5      03 Jul 2019 04:51  Phos  5.1     07-03  Mg     1.8     07-03      PT/INR - ( 02 Jul 2019 17:29 )   PT: 13.5 sec;   INR: 1.19          PTT - ( 02 Jul 2019 17:29 )  PTT:42.7 sec          RADIOLOGY & ADDITIONAL STUDIES:  CAPILLARY BLOOD GLUCOSE      POCT Blood Glucose.: 89 mg/dL (03 Jul 2019 06:21)  POCT Blood Glucose.: 136 mg/dL (02 Jul 2019 22:31)  POCT Blood Glucose.: 128 mg/dL (02 Jul 2019 17:42)      Will Discuss in Rounds  A/P: 63M hx COPD, 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, s/p DL biopsy for recurrence of laryngeal CA (found on frozen) and planned revision tracheostomy 6/25 due to tenuous airway 2/2 recurrence laryngeal ca and radical laryngectomy on 7/2 now being treated for post op hypocalcemia.    1.  Hypocalcemia    2.  Hypothyroid    Will continue to monitor     For discharge, pt can continue    Pt can follow up at discharge with [facility] by calling  to make an appointment.   Will discuss case with Dr. 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 Patient with worsening stridor, s/p CT neck on 6/4 which showed new sites of enhancing soft tissue, suspicious for recurrence of laryngeal carcinoma. He saw Dr. Branch on 6/5, he was sent for PET/CT on 6/7 which showed abnormal FDG activity (SUV 14.6) of the glottic and subglottic regions concerning for recurrence. CT chest also showed 2mm SHAKIR pulmonary nodule and 1.4 cm adrenal nodule. Admitted on 6/25 for planned open tracheostomy with biopsy with lesions noted in subglottis, biopsied, frozen showing malignancy.  PEG placement on 6/28 2/2 failed SLP eval/difficulty swallowing. Now s/p radical laryngectomy with total thyroidectomy and suspected parathyroidectomy on 7/2 hypocalcemia. Pt admitted with Ca of 7.8, Mg 1.1, and phos 1.3. Electrolytes repleted and Ca ranged from 9.0-9.3 until 7/2 intraop Ca of 8.7 and intraop PTH <4. Calcitriol 0.25mcg every 12 hours started at 10pm last night, in addition he received Calcium gluconate 2g. He received an addition 1 gram of Ca gluconate at 2AM, 5AM, and 7AM and in now on standing q8h. At 5AM Ca 8.5 with phos 5.1 and prealbumin of 9. TSH 1.48 and FT4 0.78 today. Pt denies any perioral/feet/hand numbness or tingling. He denies muscle twitching, cramping, or weakness. He has had significant weight loss most likely from malignancy and difficulty swallowing. He complains of pain to surgical site. He is able to clear secretions. He was started back on TF Jevity 1.2 at 10ml/hr. A1C 5.7% on TORI before meals and at dinner.  .                   FH:  DM: denies  Thyroid: denies  Autoimmune: denies  Other: malignancy    SH:  Smoking: former  Etoh: former  Recreational Drugs: denies      PMH & Surgical Hx:LARYNX CA  No pertinent family history in first degree relatives  Handoff  SOB (shortness of breath)  Throat cancer  Jaundice  Fracture  Laryngeal mass  No pertinent past medical history  Laryngeal carcinoma  Laryngeal carcinoma  Creation, flap, myocutaneous, pectoralis major  Radical laryngectomy  EGD, with PEG  Planned tracheostomy  Direct laryngoscopy with biopsy  Surgery, elective  Status post surgery  Status post surgery  No significant past surgical history          Current Meds:  acetaminophen    Suspension .. 650 milliGRAM(s) Oral every 6 hours PRN  bisacodyl Suppository 10 milliGRAM(s) Rectal daily PRN  calcitriol  Solution 0.25 MICROGram(s) Enteral Tube <User Schedule>  calcium gluconate IVPB 1 Gram(s) IV Intermittent <User Schedule>  ceFAZolin   IVPB 1000 milliGRAM(s) IV Intermittent every 8 hours  chlorhexidine 4% Liquid 1 Application(s) Topical <User Schedule>  dextrose 40% Gel 15 Gram(s) Oral once PRN  dextrose 5%. 1000 milliLiter(s) IV Continuous <Continuous>  dextrose 50% Injectable 12.5 Gram(s) IV Push once  dextrose 50% Injectable 25 Gram(s) IV Push once  dextrose 50% Injectable 25 Gram(s) IV Push once  docusate sodium Liquid 100 milliGRAM(s) Oral two times a day  enoxaparin Injectable 40 milliGRAM(s) SubCutaneous every 24 hours  glucagon  Injectable 1 milliGRAM(s) IntraMuscular once PRN  HYDROmorphone  Injectable 0.5 milliGRAM(s) IV Push every 4 hours PRN  HYDROmorphone  Injectable 1 milliGRAM(s) IV Push every 4 hours PRN  HYDROmorphone  Injectable 0.5 milliGRAM(s) IV Push every 4 hours PRN  insulin lispro (HumaLOG) corrective regimen sliding scale   SubCutaneous Before meals and at bedtime  metroNIDAZOLE  IVPB 500 milliGRAM(s) IV Intermittent every 8 hours  pantoprazole  Injectable 40 milliGRAM(s) IV Push daily  senna Syrup 10 milliLiter(s) Oral at bedtime  sodium chloride 0.9%. 1000 milliLiter(s) IV Continuous <Continuous>      Allergies:  penicillin (Rash)      ROS:  Denies the following except as indicated.    General: weight gain, decreased appetite  Eyes: Blurry vision, double vision, visual changes  ENT: Throat pain, changes in voice,   CV: palpitations, SOB, CP, cough  GI: NVD, difficulty swallowing, abdominal pain  : polyuria, dysuria  Endo:  temperature intolerance, decreased libido  MSK: weakness, joint pain  Skin: rash, dryness, diaphoresis  Heme: Easy bruising, bleeding  Neuro: HA, dizziness, lightheadedness, numbness/ tingling  Psych: Anxiety, Depression    Vital Signs Last 24 Hrs  T(C): 36.8 (03 Jul 2019 10:31), Max: 37.3 (03 Jul 2019 01:00)  T(F): 98.2 (03 Jul 2019 10:31), Max: 99.1 (03 Jul 2019 01:00)  HR: 68 (03 Jul 2019 10:00) (66 - 82)  BP: 140/72 (03 Jul 2019 09:00) (109/64 - 154/83)  BP(mean): 104 (03 Jul 2019 09:00) (80 - 116)  RR: 16 (03 Jul 2019 10:00) (7 - 20)  SpO2: 92% (03 Jul 2019 10:00) (92% - 100%)  Height (cm): 172.72 (07-02 @ 08:48)  Weight (kg): 64 (07-02 @ 08:48)  BMI (kg/m2): 21.5 (07-02 @ 08:48)      Constitutional: wn/wd in NAD.   Neck: 6 distal XLT in place, gauze supporting trach to keep in appropriate position, sutures in place  Stoma c/d/i, duoderm in place infrastomal  HEENT: NCAT, MMM, OP clear, EOMI, , no proptosis or lid retraction  Neck: no thyromegaly or palpable thyroid nodules   Respiratory: lungs CTAB.  Cardiovascular: regular rhythm, normal S1 and S2, no audible murmurs, no peripheral edema  GI: soft, NT/ND, no masses/HSM appreciated. + PEG tube  Neurology: no tremors, DTR 2+  Skin: no visible rashes/lesions  Psychiatric: AAO x 3, normal affect/mood.  Ext: radial pulses intact, DP pulses intact, extremities warm, no cyanosis, clubbing or edema.         LABS:                        10.5   12.35 )-----------( 311      ( 03 Jul 2019 04:51 )             31.7     07-03    137  |  98  |  9   ----------------------------<  117<H>  5.2   |  29  |  0.63    Ca    8.5      03 Jul 2019 04:51  Phos  5.1     07-03  Mg     1.8     07-03      PT/INR - ( 02 Jul 2019 17:29 )   PT: 13.5 sec;   INR: 1.19          PTT - ( 02 Jul 2019 17:29 )  PTT:42.7 sec          RADIOLOGY & ADDITIONAL STUDIES:  CAPILLARY BLOOD GLUCOSE      POCT Blood Glucose.: 89 mg/dL (03 Jul 2019 06:21)  POCT Blood Glucose.: 136 mg/dL (02 Jul 2019 22:31)  POCT Blood Glucose.: 128 mg/dL (02 Jul 2019 17:42)      Will Discuss in Rounds  A/P: 63M hx COPD, 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, s/p DL biopsy for recurrence of laryngeal CA (found on frozen) and planned revision tracheostomy 6/25 due to tenuous airway 2/2 recurrence laryngeal ca and radical laryngectomy on 7/2 now being treated for post op hypocalcemia.    1.  Hypocalcemia    2.  Hypothyroid    Will continue to monitor     For discharge, pt can continue    Pt can follow up at discharge with [facility] by calling  to make an appointment.   Will discuss case with Dr. 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 Patient with worsening stridor, s/p CT neck on 6/4 which showed new sites of enhancing soft tissue, suspicious for recurrence of laryngeal carcinoma. He saw Dr. Branch on 6/5, he was sent for PET/CT on 6/7 which showed abnormal FDG activity (SUV 14.6) of the glottic and subglottic regions concerning for recurrence. CT chest also showed 2mm SHAKIR pulmonary nodule and 1.4 cm adrenal nodule. Admitted on 6/25 for planned open tracheostomy with biopsy with lesions noted in subglottis, biopsied, frozen showing malignancy.  PEG placement on 6/28 2/2 failed SLP eval/difficulty swallowing. Now s/p radical laryngectomy with total thyroidectomy and suspected parathyroidectomy on 7/2 hypocalcemia. Pt admitted with Ca of 7.8, Mg 1.1, and phos 1.3. Electrolytes repleted and Ca ranged from 9.0-9.3 until 7/2 intraop Ca of 8.7 and intraop PTH <4. Calcitriol 0.25mcg every 12 hours started at 10pm last night, in addition he received Calcium gluconate 2g. He received an addition 1 gram of Ca gluconate at 2AM, 5AM, and 7AM and in now on standing q8h. At 5AM Ca 8.5 with phos 5.1 and prealbumin of 9. TSH 1.48 and FT4 0.78 today. Pt denies any perioral/feet/hand numbness or tingling. He denies muscle twitching, cramping, or weakness. He has had significant weight loss most likely from malignancy and difficulty swallowing. He complains of pain to surgical site. He is able to clear secretions. He was started back on TF Jevity 1.2 at 10ml/hr. A1C 5.7% on TORI before meals and at dinner.  .     FH:  DM: denies  Thyroid: denies  Autoimmune: denies  Other: malignancy    SH:  Smoking: former  Etoh: former  Recreational Drugs: denies      PMH & Surgical Hx:LARYNX CA  No pertinent family history in first degree relatives  Handoff  SOB (shortness of breath)  Throat cancer  Jaundice  Fracture  Laryngeal mass  No pertinent past medical history  Laryngeal carcinoma  Laryngeal carcinoma  Creation, flap, myocutaneous, pectoralis major  Radical laryngectomy  EGD, with PEG  Planned tracheostomy  Direct laryngoscopy with biopsy  Surgery, elective  Status post surgery  Status post surgery  No significant past surgical history          Current Meds:  acetaminophen    Suspension .. 650 milliGRAM(s) Oral every 6 hours PRN  bisacodyl Suppository 10 milliGRAM(s) Rectal daily PRN  calcitriol  Solution 0.25 MICROGram(s) Enteral Tube <User Schedule>  calcium gluconate IVPB 1 Gram(s) IV Intermittent <User Schedule>  ceFAZolin   IVPB 1000 milliGRAM(s) IV Intermittent every 8 hours  chlorhexidine 4% Liquid 1 Application(s) Topical <User Schedule>  dextrose 40% Gel 15 Gram(s) Oral once PRN  dextrose 5%. 1000 milliLiter(s) IV Continuous <Continuous>  dextrose 50% Injectable 12.5 Gram(s) IV Push once  dextrose 50% Injectable 25 Gram(s) IV Push once  dextrose 50% Injectable 25 Gram(s) IV Push once  docusate sodium Liquid 100 milliGRAM(s) Oral two times a day  enoxaparin Injectable 40 milliGRAM(s) SubCutaneous every 24 hours  glucagon  Injectable 1 milliGRAM(s) IntraMuscular once PRN  HYDROmorphone  Injectable 0.5 milliGRAM(s) IV Push every 4 hours PRN  HYDROmorphone  Injectable 1 milliGRAM(s) IV Push every 4 hours PRN  HYDROmorphone  Injectable 0.5 milliGRAM(s) IV Push every 4 hours PRN  insulin lispro (HumaLOG) corrective regimen sliding scale   SubCutaneous Before meals and at bedtime  metroNIDAZOLE  IVPB 500 milliGRAM(s) IV Intermittent every 8 hours  pantoprazole  Injectable 40 milliGRAM(s) IV Push daily  senna Syrup 10 milliLiter(s) Oral at bedtime  sodium chloride 0.9%. 1000 milliLiter(s) IV Continuous <Continuous>      Allergies:  penicillin (Rash)      ROS:  Denies the following except as indicated.    General: weight gain, decreased appetite  Eyes: Blurry vision, double vision, visual changes  ENT: Throat pain, changes in voice,   CV: palpitations, SOB, CP, cough  GI: NVD, difficulty swallowing, abdominal pain  : polyuria, dysuria  Endo:  temperature intolerance, decreased libido  MSK: weakness, joint pain  Skin: rash, dryness, diaphoresis  Heme: Easy bruising, bleeding  Neuro: HA, dizziness, lightheadedness, numbness/ tingling  Psych: Anxiety, Depression    Vital Signs Last 24 Hrs  T(C): 36.8 (03 Jul 2019 10:31), Max: 37.3 (03 Jul 2019 01:00)  T(F): 98.2 (03 Jul 2019 10:31), Max: 99.1 (03 Jul 2019 01:00)  HR: 68 (03 Jul 2019 10:00) (66 - 82)  BP: 140/72 (03 Jul 2019 09:00) (109/64 - 154/83)  BP(mean): 104 (03 Jul 2019 09:00) (80 - 116)  RR: 16 (03 Jul 2019 10:00) (7 - 20)  SpO2: 92% (03 Jul 2019 10:00) (92% - 100%)  Height (cm): 172.72 (07-02 @ 08:48)  Weight (kg): 64 (07-02 @ 08:48)  BMI (kg/m2): 21.5 (07-02 @ 08:48)      Constitutional: wn/wd in NAD.   Neck: 6 distal XLT in place, gauze supporting trach to keep in appropriate position, sutures in place  Stoma c/d/i, duoderm in place infrastomal  HEENT: NCAT, MMM, OP clear, EOMI, , no proptosis or lid retraction  Neck: no thyromegaly or palpable thyroid nodules   Respiratory: lungs CTAB.  Cardiovascular: regular rhythm, normal S1 and S2, no audible murmurs, no peripheral edema  GI: soft, NT/ND, no masses/HSM appreciated. + PEG tube  Neurology: no tremors, DTR 2+  Skin: no visible rashes/lesions  Psychiatric: AAO x 3, normal affect/mood.  Ext: radial pulses intact, DP pulses intact, extremities warm, no cyanosis, clubbing or edema.         LABS:                        10.5   12.35 )-----------( 311      ( 03 Jul 2019 04:51 )             31.7     07-03    137  |  98  |  9   ----------------------------<  117<H>  5.2   |  29  |  0.63    Ca    8.5      03 Jul 2019 04:51  Phos  5.1     07-03  Mg     1.8     07-03      PT/INR - ( 02 Jul 2019 17:29 )   PT: 13.5 sec;   INR: 1.19          PTT - ( 02 Jul 2019 17:29 )  PTT:42.7 sec          RADIOLOGY & ADDITIONAL STUDIES:  CAPILLARY BLOOD GLUCOSE      POCT Blood Glucose.: 89 mg/dL (03 Jul 2019 06:21)  POCT Blood Glucose.: 136 mg/dL (02 Jul 2019 22:31)  POCT Blood Glucose.: 128 mg/dL (02 Jul 2019 17:42)      A/P: 63M hx COPD, 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, s/p DL biopsy for recurrence of laryngeal CA (found on frozen) and planned revision tracheostomy 6/25 due to tenuous airway 2/2 recurrence laryngeal ca and radical laryngectomy on 7/2 now being treated for post op hypocalcemia.    1.  Hypocalcemia - Continue calcitriol 0.25mcg every 12 hours. Continue calcium gluconate 1 gram every 8 hours, but infuse over 4 hours. Check serum Ca every 12 hours. F/U albumin.    2.  Post surgical hypothyroidism - Start Levothyroxine 50mcg IV daily.    Will continue to monitor     For discharge, pt can continue TBD by clinical course    Pt can follow up at discharge with [facility] by calling  to make an appointment.   Will discuss case with Dr. Morgan and update primary team

## 2019-07-03 NOTE — PROGRESS NOTE ADULT - SUBJECTIVE AND OBJECTIVE BOX
INTERVAL HPI/OVERNIGHT EVENTS:  *****INCOMPLETE********      MEDICATIONS  (STANDING):  calcitriol  Solution 0.25 MICROGram(s) Enteral Tube <User Schedule>  calcium gluconate IVPB 1 Gram(s) IV Intermittent <User Schedule>  ceFAZolin   IVPB 1000 milliGRAM(s) IV Intermittent every 8 hours  chlorhexidine 4% Liquid 1 Application(s) Topical <User Schedule>  dextrose 5%. 1000 milliLiter(s) (50 mL/Hr) IV Continuous <Continuous>  dextrose 50% Injectable 12.5 Gram(s) IV Push once  dextrose 50% Injectable 25 Gram(s) IV Push once  dextrose 50% Injectable 25 Gram(s) IV Push once  docusate sodium Liquid 100 milliGRAM(s) Oral two times a day  enoxaparin Injectable 40 milliGRAM(s) SubCutaneous every 24 hours  insulin lispro (HumaLOG) corrective regimen sliding scale   SubCutaneous Before meals and at bedtime  metroNIDAZOLE  IVPB 500 milliGRAM(s) IV Intermittent every 8 hours  pantoprazole  Injectable 40 milliGRAM(s) IV Push daily  senna Syrup 10 milliLiter(s) Oral at bedtime  sodium chloride 0.9%. 1000 milliLiter(s) (100 mL/Hr) IV Continuous <Continuous>    MEDICATIONS  (PRN):  acetaminophen    Suspension .. 650 milliGRAM(s) Oral every 6 hours PRN Moderate Pain (4 - 6)  bisacodyl Suppository 10 milliGRAM(s) Rectal daily PRN Constipation  dextrose 40% Gel 15 Gram(s) Oral once PRN Blood Glucose LESS THAN 70 milliGRAM(s)/deciliter  glucagon  Injectable 1 milliGRAM(s) IntraMuscular once PRN Glucose LESS THAN 70 milligrams/deciliter  HYDROmorphone  Injectable 0.5 milliGRAM(s) IV Push every 4 hours PRN Moderate Pain (4 - 6)  HYDROmorphone  Injectable 1 milliGRAM(s) IV Push every 4 hours PRN Severe Pain (7 - 10)  HYDROmorphone  Injectable 0.5 milliGRAM(s) IV Push every 4 hours PRN breakthrough pain      Drug Dosing Weight  Height (cm): 172.72 (02 Jul 2019 08:48)  Weight (kg): 64 (02 Jul 2019 08:48)  BMI (kg/m2): 21.5 (02 Jul 2019 08:48)  BSA (m2): 1.76 (02 Jul 2019 08:48)    PAST MEDICAL & SURGICAL HISTORY:  SOB (shortness of breath)  Throat cancer  Jaundice: 1965  Fracture: left ankle  Laryngeal mass: s/ p radiation  Surgery, elective: direct laryngoscopy with biopsy- 4/2017  Status post surgery: inguinal hernia  Status post surgery: Left ankle surgical repair  x2 (1965)      ICU Vital Signs Last 24 Hrs  T(C): 37 (03 Jul 2019 05:29), Max: 37.3 (03 Jul 2019 01:00)  T(F): 98.6 (03 Jul 2019 05:29), Max: 99.1 (03 Jul 2019 01:00)  HR: 74 (03 Jul 2019 07:00) (66 - 82)  BP: 142/74 (03 Jul 2019 07:00) (109/64 - 154/83)  BP(mean): 103 (03 Jul 2019 07:00) (80 - 116)  ABP: 152/66 (03 Jul 2019 07:00) (124/54 - 174/82)  ABP(mean): 94 (03 Jul 2019 07:00) (78 - 114)  RR: 17 (03 Jul 2019 07:00) (7 - 20)  SpO2: 94% (03 Jul 2019 07:00) (93% - 100%)      ABG - ( 03 Jul 2019 07:16 )  pH, Arterial: 7.40  pH, Blood: x     /  pCO2: 52    /  pO2: 72    / HCO3: 32    / Base Excess: 5.9   /  SaO2: 93                    02 Jul 2019 07:01  -  03 Jul 2019 07:00  --------------------------------------------------------  IN:    IV PiggyBack: 600 mL    sodium chloride 0.9%.: 1150 mL  Total IN: 1750 mL    OUT:    Bulb: 30 mL    Bulb: 60 mL    Bulb: 20 mL    Indwelling Catheter - Urethral: 1815 mL    Voided: 350 mL  Total OUT: 2275 mL    Total NET: -525 mL      03 Jul 2019 07:01  -  03 Jul 2019 07:43  --------------------------------------------------------  IN:    IV PiggyBack: 100 mL  Total IN: 100 mL    OUT:  Total OUT: 0 mL    Total NET: 100 mL              PHYSICAL EXAM:      Constitutional:    Eyes:    ENMT:    Neck:    Breasts:    Back:    Respiratory:    Cardiovascular:    Gastrointestinal:    Genitourinary:    Rectal:    Extremities:    Vascular:    Neurological:    Skin:    Lymph Nodes:    Musculoskeletal:    Psychiatric:        LABS:  CBC Full  -  ( 03 Jul 2019 04:51 )  WBC Count : 12.35 K/uL  RBC Count : 3.49 M/uL  Hemoglobin : 10.5 g/dL  Hematocrit : 31.7 %  Platelet Count - Automated : 311 K/uL  Mean Cell Volume : 90.8 fl  Mean Cell Hemoglobin : 30.1 pg  Mean Cell Hemoglobin Concentration : 33.1 gm/dL  Auto Neutrophil # : x  Auto Lymphocyte # : x  Auto Monocyte # : x  Auto Eosinophil # : x  Auto Basophil # : x  Auto Neutrophil % : x  Auto Lymphocyte % : x  Auto Monocyte % : x  Auto Eosinophil % : x  Auto Basophil % : x    07-03    137  |  98  |  9   ----------------------------<  117<H>  5.2   |  29  |  0.63    Ca    8.5      03 Jul 2019 04:51  Phos  5.1     07-03  Mg     1.8     07-03      PT/INR - ( 02 Jul 2019 17:29 )   PT: 13.5 sec;   INR: 1.19          PTT - ( 02 Jul 2019 17:29 )  PTT:42.7 sec      RADIOLOGY & ADDITIONAL STUDIES: INTERVAL HPI/OVERNIGHT EVENTS:  Patient reports some mild pain overnight. Patients feels that his cough has improved. Patient reports that he has been able to get up some of his sputum but is still requiring suctioning. Patient denies nausea, numbness, tingling, chest pain, shortness of breath, fever, and chills.      MEDICATIONS  (STANDING):  calcitriol  Solution 0.25 MICROGram(s) Enteral Tube <User Schedule>  calcium gluconate IVPB 1 Gram(s) IV Intermittent <User Schedule>  ceFAZolin   IVPB 1000 milliGRAM(s) IV Intermittent every 8 hours  chlorhexidine 4% Liquid 1 Application(s) Topical <User Schedule>  dextrose 5%. 1000 milliLiter(s) (50 mL/Hr) IV Continuous <Continuous>  dextrose 50% Injectable 12.5 Gram(s) IV Push once  dextrose 50% Injectable 25 Gram(s) IV Push once  dextrose 50% Injectable 25 Gram(s) IV Push once  docusate sodium Liquid 100 milliGRAM(s) Oral two times a day  enoxaparin Injectable 40 milliGRAM(s) SubCutaneous every 24 hours  insulin lispro (HumaLOG) corrective regimen sliding scale   SubCutaneous Before meals and at bedtime  metroNIDAZOLE  IVPB 500 milliGRAM(s) IV Intermittent every 8 hours  pantoprazole  Injectable 40 milliGRAM(s) IV Push daily  senna Syrup 10 milliLiter(s) Oral at bedtime  sodium chloride 0.9%. 1000 milliLiter(s) (100 mL/Hr) IV Continuous <Continuous>    MEDICATIONS  (PRN):  acetaminophen    Suspension .. 650 milliGRAM(s) Oral every 6 hours PRN Moderate Pain (4 - 6)  bisacodyl Suppository 10 milliGRAM(s) Rectal daily PRN Constipation  dextrose 40% Gel 15 Gram(s) Oral once PRN Blood Glucose LESS THAN 70 milliGRAM(s)/deciliter  glucagon  Injectable 1 milliGRAM(s) IntraMuscular once PRN Glucose LESS THAN 70 milligrams/deciliter  HYDROmorphone  Injectable 0.5 milliGRAM(s) IV Push every 4 hours PRN Moderate Pain (4 - 6)  HYDROmorphone  Injectable 1 milliGRAM(s) IV Push every 4 hours PRN Severe Pain (7 - 10)  HYDROmorphone  Injectable 0.5 milliGRAM(s) IV Push every 4 hours PRN breakthrough pain      Drug Dosing Weight  Height (cm): 172.72 (02 Jul 2019 08:48)  Weight (kg): 64 (02 Jul 2019 08:48)  BMI (kg/m2): 21.5 (02 Jul 2019 08:48)  BSA (m2): 1.76 (02 Jul 2019 08:48)    PAST MEDICAL & SURGICAL HISTORY:  SOB (shortness of breath)  Throat cancer  Jaundice: 1965  Fracture: left ankle  Laryngeal mass: s/ p radiation  Surgery, elective: direct laryngoscopy with biopsy- 4/2017  Status post surgery: inguinal hernia  Status post surgery: Left ankle surgical repair  x2 (1965)      ICU Vital Signs Last 24 Hrs  T(C): 37 (03 Jul 2019 05:29), Max: 37.3 (03 Jul 2019 01:00)  T(F): 98.6 (03 Jul 2019 05:29), Max: 99.1 (03 Jul 2019 01:00)  HR: 74 (03 Jul 2019 07:00) (66 - 82)  BP: 142/74 (03 Jul 2019 07:00) (109/64 - 154/83)  BP(mean): 103 (03 Jul 2019 07:00) (80 - 116)  ABP: 152/66 (03 Jul 2019 07:00) (124/54 - 174/82)  ABP(mean): 94 (03 Jul 2019 07:00) (78 - 114)  RR: 17 (03 Jul 2019 07:00) (7 - 20)  SpO2: 94% (03 Jul 2019 07:00) (93% - 100%)      ABG - ( 03 Jul 2019 07:16 )  pH, Arterial: 7.40  pH, Blood: x     /  pCO2: 52    /  pO2: 72    / HCO3: 32    / Base Excess: 5.9   /  SaO2: 93          02 Jul 2019 07:01  -  03 Jul 2019 07:00  --------------------------------------------------------  IN:    IV PiggyBack: 600 mL    sodium chloride 0.9%.: 1150 mL  Total IN: 1750 mL    OUT:    Bulb: 30 mL    Bulb: 60 mL    Bulb: 20 mL    Indwelling Catheter - Urethral: 1815 mL    Voided: 350 mL  Total OUT: 2275 mL    Total NET: -525 mL      03 Jul 2019 07:01  -  03 Jul 2019 07:43  --------------------------------------------------------  IN:    IV PiggyBack: 100 mL  Total IN: 100 mL    OUT:  Total OUT: 0 mL    Total NET: 100 mL    PHYSICAL EXAM:  General: NAD, resting comfortably in bed. Well developed, well groomed  NEURO: AAOx3, CN II-XII grossly intact, EOMI, follows commands  HEENT: PERRL, MMM. laryngectomy tube in place. surgical incisions clean, dry, and intact. ABDON x 3 serosanguinous. salivary bypass tubes in place.   CV: RRR, no MRG  PULM: CTAB, nonlabored breathing, no respiratory distress  ABD: soft, NT/ND. No rebound, no guarding, no tympany. Incisions CDI. PEG tube in place  : worrell to be removed today  EXTREM: WWP, no edema, no calf tenderness  VASC: No cyanosis, pallor. Palpable radial and PT bilaterally  SKIN: No rashes noted  PSYCH: Appropriate affect, answers questions appropriately          LABS:  CBC Full  -  ( 03 Jul 2019 04:51 )  WBC Count : 12.35 K/uL  RBC Count : 3.49 M/uL  Hemoglobin : 10.5 g/dL  Hematocrit : 31.7 %  Platelet Count - Automated : 311 K/uL  Mean Cell Volume : 90.8 fl  Mean Cell Hemoglobin : 30.1 pg  Mean Cell Hemoglobin Concentration : 33.1 gm/dL  Auto Neutrophil # : x  Auto Lymphocyte # : x  Auto Monocyte # : x  Auto Eosinophil # : x  Auto Basophil # : x  Auto Neutrophil % : x  Auto Lymphocyte % : x  Auto Monocyte % : x  Auto Eosinophil % : x  Auto Basophil % : x    07-03    137  |  98  |  9   ----------------------------<  117<H>  5.2   |  29  |  0.63    Ca    8.5      03 Jul 2019 04:51  Phos  5.1     07-03  Mg     1.8     07-03      PT/INR - ( 02 Jul 2019 17:29 )   PT: 13.5 sec;   INR: 1.19          PTT - ( 02 Jul 2019 17:29 )  PTT:42.7 sec      RADIOLOGY & ADDITIONAL STUDIES:

## 2019-07-03 NOTE — CHART NOTE - NSCHARTNOTEFT_GEN_A_CORE
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 radiation  Surgery, elective: direct laryngoscopy with biopsy- 4/2017  Status post surgery: inguinal hernia  Status post surgery: Left ankle surgical repair  x2 (1965)    Current Nutrition Order:   Diet, NPO (07-02-19 @ 17:24)    PO Intake: Good (%) [   ]  Fair (50-75%) [   ] Poor (<25%) [   ]- N/A as pt NPO    GI Issues: last BM 6/24,    Pain:    Skin Integrity: Tomi score 17    Labs:   07-03    137  |  98  |  9   ----------------------------<  117<H>  5.2   |  29  |  0.63    Ca    8.5      03 Jul 2019 04:51  Phos  5.1     07-03  Mg     1.8     07-03    CAPILLARY BLOOD GLUCOSE    POCT Blood Glucose.: 89 mg/dL (03 Jul 2019 06:21)  POCT Blood Glucose.: 136 mg/dL (02 Jul 2019 22:31)  POCT Blood Glucose.: 128 mg/dL (02 Jul 2019 17:42)    Medications:  MEDICATIONS  (STANDING):  calcitriol  Solution 0.25 MICROGram(s) Enteral Tube <User Schedule>  calcium gluconate IVPB 1 Gram(s) IV Intermittent <User Schedule>  ceFAZolin   IVPB 1000 milliGRAM(s) IV Intermittent every 8 hours  chlorhexidine 4% Liquid 1 Application(s) Topical <User Schedule>  dextrose 5%. 1000 milliLiter(s) (50 mL/Hr) IV Continuous <Continuous>  dextrose 50% Injectable 12.5 Gram(s) IV Push once  dextrose 50% Injectable 25 Gram(s) IV Push once  dextrose 50% Injectable 25 Gram(s) IV Push once  docusate sodium Liquid 100 milliGRAM(s) Oral two times a day  enoxaparin Injectable 40 milliGRAM(s) SubCutaneous every 24 hours  insulin lispro (HumaLOG) corrective regimen sliding scale   SubCutaneous Before meals and at bedtime  metroNIDAZOLE  IVPB 500 milliGRAM(s) IV Intermittent every 8 hours  pantoprazole  Injectable 40 milliGRAM(s) IV Push daily  senna Syrup 10 milliLiter(s) Oral at bedtime  sodium chloride 0.9%. 1000 milliLiter(s) (100 mL/Hr) IV Continuous <Continuous>    MEDICATIONS  (PRN):  acetaminophen    Suspension .. 650 milliGRAM(s) Oral every 6 hours PRN Moderate Pain (4 - 6)  bisacodyl Suppository 10 milliGRAM(s) Rectal daily PRN Constipation  dextrose 40% Gel 15 Gram(s) Oral once PRN Blood Glucose LESS THAN 70 milliGRAM(s)/deciliter  glucagon  Injectable 1 milliGRAM(s) IntraMuscular once PRN Glucose LESS THAN 70 milligrams/deciliter  HYDROmorphone  Injectable 0.5 milliGRAM(s) IV Push every 4 hours PRN Moderate Pain (4 - 6)  HYDROmorphone  Injectable 1 milliGRAM(s) IV Push every 4 hours PRN Severe Pain (7 - 10)  HYDROmorphone  Injectable 0.5 milliGRAM(s) IV Push every 4 hours PRN breakthrough pain    Weight:  No updated weights    Weight Change:   Please obtain current weight and continue to trend weekly weights for further assessment    Nutrition Focused Physical Exam: Completed [ X on 6/26  ]  Not Pertinent [   ]  Muscle Wasting- Temporal [   ]  Clavicle/Pectoral [ X  ]  Shoulder/Deltoid [   ]  Scapula [   ]  Interosseous [   ]  Quadriceps [   ]  Gastrocnemius [   ]  Fat Wasting- Orbital [   ]  Buccal [  X ]  Triceps [   ]  Rib [   ]  Suspect moderate-severe PCM 2/2 wt loss, decreased intake, and NFPE    Estimated energy needs:   Ht 172.72cm; Wt 64Kg  IBW 70Kg; %IBW 91%  BMI 21.5    Utilized ABW to calculate needs, pt falls within % of IBW. Adjusted for age/catabolic state/repletion.    8767-9662 kcal (30-35 kcal/kg), 77-90 gm (1.2-1.4 gm/kg), 8245-3032 mL (30-35 mL/kg)    Subjective: 64y/o M w/ hx prior laryngeal CA s/p definitive RT and history of prior trach s/p prior decannulation, now with recurrence s/p biopsy of subglottis and open tracheostomy on 5/25. PEG placement on 6/28 2/2 failed SLP eval/difficulty swallowing. Plan to go to OR today for Laryngectomy. Please see below for full nutritional recs- d/w team. RD to monitor and follow per policy.    Previous Nutrition Diagnosis: Inadequate Energy Intake RT NPO status AEB pt meeting 0% of EER.     Active [   ]  Resolved [ X ]- started on EN feeds    If resolved, new PES: Increased nutrient needs RT increased demand for nutrients (kcal/protein) AEB catabolic state    Active [ X  ]  Resolved [   ]    Goal: Will meet >75% EER via EN    Recommendations:  1. When ok to resume TF, recommend Jevity 1.2 via PEG with goal rate of 68ml/hr x 24hr to best meet pt's est needs at this time (1632ml TV, 1958kcal, 90g, 1317ml water, 163% RDIs). Additional fluids per team. Order for volume based feeding protocol and monitor for s/s of intolerance/maintain aspiration precautions.  2. PO diet as medically appropriate per SLP  3. Monitor labs, replete electrolytes prn. Obtain CRP for further assessment.  4. Obtain current weight and trend weekly weights.    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 radiation  Surgery, elective: direct laryngoscopy with biopsy- 4/2017  Status post surgery: inguinal hernia  Status post surgery: Left ankle surgical repair  x2 (1965)    Current Nutrition Order:   Diet, NPO (07-02-19 @ 17:24)    PO Intake: Good (%) [   ]  Fair (50-75%) [   ] Poor (<25%) [   ]- N/A as pt NPO    GI Issues: last BM 6/24, denies N/V    Pain: Pain controlled at this time     Skin Integrity: Tomi score 17    Labs:   07-03    137  |  98  |  9   ----------------------------<  117<H>  5.2   |  29  |  0.63    Ca    8.5      03 Jul 2019 04:51  Phos  5.1     07-03  Mg     1.8     07-03    CAPILLARY BLOOD GLUCOSE    POCT Blood Glucose.: 89 mg/dL (03 Jul 2019 06:21)  POCT Blood Glucose.: 136 mg/dL (02 Jul 2019 22:31)  POCT Blood Glucose.: 128 mg/dL (02 Jul 2019 17:42)    Medications:  MEDICATIONS  (STANDING):  calcitriol  Solution 0.25 MICROGram(s) Enteral Tube <User Schedule>  calcium gluconate IVPB 1 Gram(s) IV Intermittent <User Schedule>  ceFAZolin   IVPB 1000 milliGRAM(s) IV Intermittent every 8 hours  chlorhexidine 4% Liquid 1 Application(s) Topical <User Schedule>  dextrose 5%. 1000 milliLiter(s) (50 mL/Hr) IV Continuous <Continuous>  dextrose 50% Injectable 12.5 Gram(s) IV Push once  dextrose 50% Injectable 25 Gram(s) IV Push once  dextrose 50% Injectable 25 Gram(s) IV Push once  docusate sodium Liquid 100 milliGRAM(s) Oral two times a day  enoxaparin Injectable 40 milliGRAM(s) SubCutaneous every 24 hours  insulin lispro (HumaLOG) corrective regimen sliding scale   SubCutaneous Before meals and at bedtime  metroNIDAZOLE  IVPB 500 milliGRAM(s) IV Intermittent every 8 hours  pantoprazole  Injectable 40 milliGRAM(s) IV Push daily  senna Syrup 10 milliLiter(s) Oral at bedtime  sodium chloride 0.9%. 1000 milliLiter(s) (100 mL/Hr) IV Continuous <Continuous>    MEDICATIONS  (PRN):  acetaminophen    Suspension .. 650 milliGRAM(s) Oral every 6 hours PRN Moderate Pain (4 - 6)  bisacodyl Suppository 10 milliGRAM(s) Rectal daily PRN Constipation  dextrose 40% Gel 15 Gram(s) Oral once PRN Blood Glucose LESS THAN 70 milliGRAM(s)/deciliter  glucagon  Injectable 1 milliGRAM(s) IntraMuscular once PRN Glucose LESS THAN 70 milligrams/deciliter  HYDROmorphone  Injectable 0.5 milliGRAM(s) IV Push every 4 hours PRN Moderate Pain (4 - 6)  HYDROmorphone  Injectable 1 milliGRAM(s) IV Push every 4 hours PRN Severe Pain (7 - 10)  HYDROmorphone  Injectable 0.5 milliGRAM(s) IV Push every 4 hours PRN breakthrough pain    Weight:  No updated weights    Weight Change:   Please obtain current weight and continue to trend weekly weights for further assessment    Nutrition Focused Physical Exam: Completed [ X on 6/26  ]  Not Pertinent [   ]  Muscle Wasting- Temporal [   ]  Clavicle/Pectoral [ X  ]  Shoulder/Deltoid [   ]  Scapula [   ]  Interosseous [   ]  Quadriceps [   ]  Gastrocnemius [   ]  Fat Wasting- Orbital [   ]  Buccal [  X ]  Triceps [   ]  Rib [   ]  Suspect moderate-severe PCM 2/2 wt loss, decreased intake, and NFPE    Estimated energy needs:   Ht 172.72cm; Wt 64Kg  IBW 70Kg; %IBW 91%  BMI 21.5    Utilized ABW to calculate needs, pt falls within % of IBW. Adjusted for age/catabolic state/repletion.    3650-6201 kcal (30-35 kcal/kg), 77-90 gm (1.2-1.4 gm/kg), 0194-0388 mL (30-35 mL/kg)    Subjective: 64y/o M w/ hx prior laryngeal CA s/p definitive RT and history of prior trach s/p prior decannulation, now with recurrence s/p biopsy of subglottis and open tracheostomy on 5/25. PEG placement on 6/28 2/2 failed SLP eval/difficulty swallowing. Plan to go to OR today for Laryngectomy. Please see below for full nutritional recs- d/w team. RD to monitor and follow per policy.    Previous Nutrition Diagnosis: Inadequate Energy Intake RT NPO status AEB pt meeting 0% of EER.     Active [ X  ]  Resolved [  ]    If resolved, new PES: Increased nutrient needs RT increased demand for nutrients (kcal/protein) AEB catabolic state    Active [ X  ]  Resolved [   ]    Goal: Will meet >75% EER via EN    Recommendations:  1. Recommend start trickle feeds @ 20 mL/hr of Jevity 1.2 via PEG, advance 10-20 mL q4h to goal rate of 68ml/hr x 24hr to best meet pt's est needs at this time (1632ml TV, 1958kcal, 90g, 1317ml water, 163% RDIs). Additional fluids per team. Order for volume based feeding protocol and monitor for s/s of intolerance/maintain aspiration precautions.  2. PO diet as medically appropriate per SLP  3. Monitor labs, replete electrolytes prn. Obtain CRP for further assessment.  4. Obtain current weight and trend weekly weights.    Education: pt previously educated     Risk Level: High [ X  ] Moderate [   ] Low [   ]

## 2019-07-03 NOTE — PROGRESS NOTE ADULT - ASSESSMENT
64 yo M w/ hx of COPD, prior laryngeal CA s/p definitive RT and history of prior trach s/p prior decannulation, now with recurrence s/p biopsy of subglottis and open tracheostomy. Patient transferred to SICU for airway monitoring.     Neuro: dilaudid PRN  HEENT: s/p laryngectomy (7/2), thyroidectomy, rotator flap, flap checks q4-6. Salivary bypass tubes in place b/l. JPX1 neck SS  Pulm: laryngectomy tube, cannot ventilate from the tube, must place size 6 ET tube in order to ventilate, JPX2 chest  CV: HD stable   GI: NPO, restart trickle tube feeds in am,  PEG placed 6/28.   : worrell in place, can dc tomorrow  Endo: ISS, s/p thyroidectomy, possible parathyroidectomy, calcium 1g q8h, rocaltrol 0.25 BID  ID: [email or web address]: Ancef/Flagyl ( 7/2- )Bronchitis: Proteus: ceftriaxone (6/27 -7/2 ) /// s/p Ancef x3 doses (6/25 -6/26 )  Heme//Onc: pathology shows recurrence of cancer  PPx: SCDs/restart Lovenox in am  Lines: PIV   PT: Ordered. 62 yo M w/ hx of COPD, prior laryngeal CA s/p definitive RT and history of prior trach s/p prior decannulation, now with recurrence s/p biopsy of subglottis and open tracheostomy. Patient transferred to SICU for airway monitoring now s/p total laryngectomy     Neuro: dilaudid PRN  HEENT: s/p laryngectomy (7/2), thyroidectomy, rotator flap, flap checks q4-6. Salivary bypass tubes in place b/l. JPX1 neck SS  Pulm: laryngectomy tube, cannot ventilate from the tube, must place size 6 ET tube in order to ventilate, JPX2 chest  CV: HD stable   GI: NPO w/ TF @ 10cc/hr,  PEG placed 6/28.   : TOV  Endo: ISS, s/p thyroidectomy, possible parathyroidectomy, calcium 1g q8h, rocaltrol 0.25 BID  ID: [email or web address]: Ancef/Flagyl ( 7/2- )Bronchitis: Proteus: ceftriaxone (6/27 -7/2 ) /// s/p Ancef x3 doses (6/25 -6/26 )  Heme//Onc: pathology shows recurrence of cancer  PPx: SCDs/restart Lovenox   Lines: PIV   PT: Ordered.

## 2019-07-04 LAB
ALBUMIN SERPL ELPH-MCNC: 2.5 G/DL — LOW (ref 3.3–5)
ANION GAP SERPL CALC-SCNC: 11 MMOL/L — SIGNIFICANT CHANGE UP (ref 5–17)
BUN SERPL-MCNC: 8 MG/DL — SIGNIFICANT CHANGE UP (ref 7–23)
CA-I BLD-SCNC: 0.94 MMOL/L — LOW (ref 1.12–1.3)
CA-I BLD-SCNC: 1.06 MMOL/L — LOW (ref 1.12–1.3)
CALCIUM SERPL-MCNC: 7.2 MG/DL — LOW (ref 8.4–10.5)
CALCIUM SERPL-MCNC: 7.6 MG/DL — LOW (ref 8.4–10.5)
CHLORIDE SERPL-SCNC: 100 MMOL/L — SIGNIFICANT CHANGE UP (ref 96–108)
CO2 SERPL-SCNC: 28 MMOL/L — SIGNIFICANT CHANGE UP (ref 22–31)
CREAT SERPL-MCNC: 0.67 MG/DL — SIGNIFICANT CHANGE UP (ref 0.5–1.3)
GLUCOSE BLDC GLUCOMTR-MCNC: 81 MG/DL — SIGNIFICANT CHANGE UP (ref 70–99)
GLUCOSE BLDC GLUCOMTR-MCNC: 81 MG/DL — SIGNIFICANT CHANGE UP (ref 70–99)
GLUCOSE BLDC GLUCOMTR-MCNC: 83 MG/DL — SIGNIFICANT CHANGE UP (ref 70–99)
GLUCOSE BLDC GLUCOMTR-MCNC: 90 MG/DL — SIGNIFICANT CHANGE UP (ref 70–99)
GLUCOSE SERPL-MCNC: 107 MG/DL — HIGH (ref 70–99)
HCT VFR BLD CALC: 30.3 % — LOW (ref 39–50)
HGB BLD-MCNC: 9.7 G/DL — LOW (ref 13–17)
MAGNESIUM SERPL-MCNC: 1.8 MG/DL — SIGNIFICANT CHANGE UP (ref 1.6–2.6)
MCHC RBC-ENTMCNC: 29.9 PG — SIGNIFICANT CHANGE UP (ref 27–34)
MCHC RBC-ENTMCNC: 32 GM/DL — SIGNIFICANT CHANGE UP (ref 32–36)
MCV RBC AUTO: 93.5 FL — SIGNIFICANT CHANGE UP (ref 80–100)
NRBC # BLD: 0 /100 WBCS — SIGNIFICANT CHANGE UP (ref 0–0)
PHOSPHATE SERPL-MCNC: 4.2 MG/DL — SIGNIFICANT CHANGE UP (ref 2.5–4.5)
PLATELET # BLD AUTO: 307 K/UL — SIGNIFICANT CHANGE UP (ref 150–400)
POTASSIUM SERPL-MCNC: 4.1 MMOL/L — SIGNIFICANT CHANGE UP (ref 3.5–5.3)
POTASSIUM SERPL-SCNC: 4.1 MMOL/L — SIGNIFICANT CHANGE UP (ref 3.5–5.3)
RBC # BLD: 3.24 M/UL — LOW (ref 4.2–5.8)
RBC # FLD: 13.2 % — SIGNIFICANT CHANGE UP (ref 10.3–14.5)
SODIUM SERPL-SCNC: 139 MMOL/L — SIGNIFICANT CHANGE UP (ref 135–145)
WBC # BLD: 10.11 K/UL — SIGNIFICANT CHANGE UP (ref 3.8–10.5)
WBC # FLD AUTO: 10.11 K/UL — SIGNIFICANT CHANGE UP (ref 3.8–10.5)

## 2019-07-04 PROCEDURE — 71045 X-RAY EXAM CHEST 1 VIEW: CPT | Mod: 26

## 2019-07-04 PROCEDURE — 99232 SBSQ HOSP IP/OBS MODERATE 35: CPT | Mod: GC

## 2019-07-04 PROCEDURE — 99232 SBSQ HOSP IP/OBS MODERATE 35: CPT

## 2019-07-04 RX ORDER — SODIUM CHLORIDE 9 MG/ML
1000 INJECTION, SOLUTION INTRAVENOUS
Refills: 0 | Status: DISCONTINUED | OUTPATIENT
Start: 2019-07-04 | End: 2019-07-04

## 2019-07-04 RX ORDER — CALCIUM CARBONATE 500(1250)
1250 TABLET ORAL THREE TIMES A DAY
Refills: 0 | Status: DISCONTINUED | OUTPATIENT
Start: 2019-07-04 | End: 2019-07-05

## 2019-07-04 RX ORDER — SODIUM CHLORIDE 9 MG/ML
1000 INJECTION INTRAMUSCULAR; INTRAVENOUS; SUBCUTANEOUS
Refills: 0 | Status: DISCONTINUED | OUTPATIENT
Start: 2019-07-04 | End: 2019-07-04

## 2019-07-04 RX ORDER — LEVOTHYROXINE SODIUM 125 MCG
50 TABLET ORAL
Refills: 0 | Status: DISCONTINUED | OUTPATIENT
Start: 2019-07-04 | End: 2019-07-15

## 2019-07-04 RX ORDER — SODIUM CHLORIDE 9 MG/ML
1000 INJECTION, SOLUTION INTRAVENOUS
Refills: 0 | Status: DISCONTINUED | OUTPATIENT
Start: 2019-07-04 | End: 2019-07-06

## 2019-07-04 RX ADMIN — HYDROMORPHONE HYDROCHLORIDE 1 MILLIGRAM(S): 2 INJECTION INTRAMUSCULAR; INTRAVENOUS; SUBCUTANEOUS at 07:32

## 2019-07-04 RX ADMIN — HYDROMORPHONE HYDROCHLORIDE 1 MILLIGRAM(S): 2 INJECTION INTRAMUSCULAR; INTRAVENOUS; SUBCUTANEOUS at 15:40

## 2019-07-04 RX ADMIN — HYDROMORPHONE HYDROCHLORIDE 1 MILLIGRAM(S): 2 INJECTION INTRAMUSCULAR; INTRAVENOUS; SUBCUTANEOUS at 20:03

## 2019-07-04 RX ADMIN — HYDROMORPHONE HYDROCHLORIDE 0.5 MILLIGRAM(S): 2 INJECTION INTRAMUSCULAR; INTRAVENOUS; SUBCUTANEOUS at 18:04

## 2019-07-04 RX ADMIN — Medication 100 MILLIGRAM(S): at 05:58

## 2019-07-04 RX ADMIN — SODIUM CHLORIDE 41.6 MILLILITER(S): 9 INJECTION, SOLUTION INTRAVENOUS at 19:34

## 2019-07-04 RX ADMIN — Medication 100 MILLIGRAM(S): at 13:49

## 2019-07-04 RX ADMIN — HYDROMORPHONE HYDROCHLORIDE 0.5 MILLIGRAM(S): 2 INJECTION INTRAMUSCULAR; INTRAVENOUS; SUBCUTANEOUS at 13:56

## 2019-07-04 RX ADMIN — HYDROMORPHONE HYDROCHLORIDE 1 MILLIGRAM(S): 2 INJECTION INTRAMUSCULAR; INTRAVENOUS; SUBCUTANEOUS at 00:00

## 2019-07-04 RX ADMIN — HYDROMORPHONE HYDROCHLORIDE 1 MILLIGRAM(S): 2 INJECTION INTRAMUSCULAR; INTRAVENOUS; SUBCUTANEOUS at 11:39

## 2019-07-04 RX ADMIN — CALCITRIOL 0.25 MICROGRAM(S): 0.5 CAPSULE ORAL at 21:36

## 2019-07-04 RX ADMIN — HYDROMORPHONE HYDROCHLORIDE 1 MILLIGRAM(S): 2 INJECTION INTRAMUSCULAR; INTRAVENOUS; SUBCUTANEOUS at 23:53

## 2019-07-04 RX ADMIN — Medication 25 GRAM(S): at 15:25

## 2019-07-04 RX ADMIN — HYDROMORPHONE HYDROCHLORIDE 1 MILLIGRAM(S): 2 INJECTION INTRAMUSCULAR; INTRAVENOUS; SUBCUTANEOUS at 11:55

## 2019-07-04 RX ADMIN — HYDROMORPHONE HYDROCHLORIDE 0.5 MILLIGRAM(S): 2 INJECTION INTRAMUSCULAR; INTRAVENOUS; SUBCUTANEOUS at 09:54

## 2019-07-04 RX ADMIN — HYDROMORPHONE HYDROCHLORIDE 1 MILLIGRAM(S): 2 INJECTION INTRAMUSCULAR; INTRAVENOUS; SUBCUTANEOUS at 07:49

## 2019-07-04 RX ADMIN — SENNA PLUS 10 MILLILITER(S): 8.6 TABLET ORAL at 21:36

## 2019-07-04 RX ADMIN — HYDROMORPHONE HYDROCHLORIDE 1 MILLIGRAM(S): 2 INJECTION INTRAMUSCULAR; INTRAVENOUS; SUBCUTANEOUS at 03:23

## 2019-07-04 RX ADMIN — HYDROMORPHONE HYDROCHLORIDE 1 MILLIGRAM(S): 2 INJECTION INTRAMUSCULAR; INTRAVENOUS; SUBCUTANEOUS at 19:50

## 2019-07-04 RX ADMIN — Medication 25 GRAM(S): at 05:59

## 2019-07-04 RX ADMIN — HYDROMORPHONE HYDROCHLORIDE 0.5 MILLIGRAM(S): 2 INJECTION INTRAMUSCULAR; INTRAVENOUS; SUBCUTANEOUS at 14:11

## 2019-07-04 RX ADMIN — CHLORHEXIDINE GLUCONATE 1 APPLICATION(S): 213 SOLUTION TOPICAL at 07:09

## 2019-07-04 RX ADMIN — HYDROMORPHONE HYDROCHLORIDE 1 MILLIGRAM(S): 2 INJECTION INTRAMUSCULAR; INTRAVENOUS; SUBCUTANEOUS at 07:30

## 2019-07-04 RX ADMIN — ENOXAPARIN SODIUM 40 MILLIGRAM(S): 100 INJECTION SUBCUTANEOUS at 11:39

## 2019-07-04 RX ADMIN — Medication 100 MILLIGRAM(S): at 21:36

## 2019-07-04 RX ADMIN — HYDROMORPHONE HYDROCHLORIDE 0.5 MILLIGRAM(S): 2 INJECTION INTRAMUSCULAR; INTRAVENOUS; SUBCUTANEOUS at 18:19

## 2019-07-04 RX ADMIN — HYDROMORPHONE HYDROCHLORIDE 1 MILLIGRAM(S): 2 INJECTION INTRAMUSCULAR; INTRAVENOUS; SUBCUTANEOUS at 08:17

## 2019-07-04 RX ADMIN — SODIUM CHLORIDE 100 MILLILITER(S): 9 INJECTION INTRAMUSCULAR; INTRAVENOUS; SUBCUTANEOUS at 05:38

## 2019-07-04 RX ADMIN — Medication 1250 MILLIGRAM(S): at 21:36

## 2019-07-04 RX ADMIN — HYDROMORPHONE HYDROCHLORIDE 1 MILLIGRAM(S): 2 INJECTION INTRAMUSCULAR; INTRAVENOUS; SUBCUTANEOUS at 15:55

## 2019-07-04 RX ADMIN — CALCITRIOL 0.25 MICROGRAM(S): 0.5 CAPSULE ORAL at 09:54

## 2019-07-04 RX ADMIN — PANTOPRAZOLE SODIUM 40 MILLIGRAM(S): 20 TABLET, DELAYED RELEASE ORAL at 11:39

## 2019-07-04 RX ADMIN — Medication 50 MICROGRAM(S): at 12:39

## 2019-07-04 RX ADMIN — Medication 100 MILLIGRAM(S): at 21:35

## 2019-07-04 RX ADMIN — HYDROMORPHONE HYDROCHLORIDE 0.5 MILLIGRAM(S): 2 INJECTION INTRAMUSCULAR; INTRAVENOUS; SUBCUTANEOUS at 10:10

## 2019-07-04 RX ADMIN — Medication 1250 MILLIGRAM(S): at 15:18

## 2019-07-04 RX ADMIN — Medication 100 MILLIGRAM(S): at 18:04

## 2019-07-04 NOTE — PROGRESS NOTE ADULT - ATTENDING COMMENTS
Chart reviewed, I personally seen, examined and participate in the care of this patient Larynx ca underwent radical neck surgery included thyroid and parathyroid.  Patient asymptomatic, with tube feeding  Reviewed ENT note, unclear parathyroid was autotransplanted.  Continue thyroid supplementation, adjust iv calcium infusion, monitor ca levels.  Increase po dose for ca and calcitriol  Seen plan outlined by Dr. Ibarra

## 2019-07-04 NOTE — PROGRESS NOTE ADULT - SUBJECTIVE AND OBJECTIVE BOX
ENT [facility] DAILY PROGRESS NOTE    Overnight events/Interval: No acute events overnight. Calcium stable. Denies perioral/fingertip tingling. Tolerating trickle feeds. No new complaints.           Allergies    penicillin (Rash)    Intolerances        MEDICATIONS:  Antiinfectives:   ceFAZolin   IVPB 1000 milliGRAM(s) IV Intermittent every 8 hours  metroNIDAZOLE  IVPB 500 milliGRAM(s) IV Intermittent every 8 hours    IV fluids:  calcitriol  Solution 0.25 MICROGram(s) Enteral Tube <User Schedule>  calcium gluconate IVPB 1 Gram(s) IV Intermittent <User Schedule>  dextrose 5%. 1000 milliLiter(s) IV Continuous <Continuous>    Hematologic/Anticoagulation:  enoxaparin Injectable 40 milliGRAM(s) SubCutaneous every 24 hours    Pain medications/Neuro:  acetaminophen    Suspension .. 650 milliGRAM(s) Oral every 6 hours PRN  HYDROmorphone  Injectable 0.5 milliGRAM(s) IV Push every 4 hours PRN  HYDROmorphone  Injectable 1 milliGRAM(s) IV Push every 4 hours PRN  HYDROmorphone  Injectable 0.5 milliGRAM(s) IV Push every 4 hours PRN    Endocrine Medications:   dextrose 40% Gel 15 Gram(s) Oral once PRN  dextrose 50% Injectable 12.5 Gram(s) IV Push once  dextrose 50% Injectable 25 Gram(s) IV Push once  dextrose 50% Injectable 25 Gram(s) IV Push once  glucagon  Injectable 1 milliGRAM(s) IntraMuscular once PRN  insulin lispro (HumaLOG) corrective regimen sliding scale   SubCutaneous Before meals and at bedtime    All other standing medications:   chlorhexidine 4% Liquid 1 Application(s) Topical <User Schedule>  docusate sodium Liquid 100 milliGRAM(s) Oral two times a day  pantoprazole  Injectable 40 milliGRAM(s) IV Push daily  senna Syrup 10 milliLiter(s) Oral at bedtime    All other PRN medications:  bisacodyl Suppository 10 milliGRAM(s) Rectal daily PRN      Vital Signs Last 24 Hrs  T(C): 37.6 (04 Jul 2019 10:00), Max: 37.6 (04 Jul 2019 10:00)  T(F): 99.6 (04 Jul 2019 10:00), Max: 99.6 (04 Jul 2019 10:00)  HR: 66 (04 Jul 2019 10:00) (66 - 86)  BP: 116/68 (04 Jul 2019 10:00) (99/65 - 131/71)  BP(mean): 90 (04 Jul 2019 10:00) (72 - 105)  RR: 9 (04 Jul 2019 10:00) (9 - 27)  SpO2: 97% (04 Jul 2019 10:00) (91% - 97%)      07-03 @ 07:01  -  07-04 @ 07:00  --------------------------------------------------------  IN:    Enteral Tube Flush: 50 mL    IV PiggyBack: 700 mL    ns in tub fed  [id number]: 210 mL    sodium chloride 0.9%: 2500 mL  Total IN: 3460 mL    OUT:    Bulb: 15 mL    Bulb: 50 mL    Bulb: 20 mL    Indwelling Catheter - Urethral: 135 mL    Voided: 1150 mL  Total OUT: 1370 mL    Total NET: 2090 mL      07-04 @ 07:01  -  07-04 @ 11:07  --------------------------------------------------------  IN:    Enteral Tube Flush: 25 mL    IV PiggyBack: 50 mL    ns in tub fed  [id number]: 30 mL    sodium chloride 0.9%: 300 mL  Total IN: 405 mL    OUT:    Voided: 450 mL  Total OUT: 450 mL    Total NET: -45 mL            PHYSICAL EXAM:    ENT EXAM-   Alert, NAD  neck incisions c/d/i, soft, flat, ABDON holding suction  Chest incision c/d/i, soft, flat, JPx2 holding suction, draining SS  Laryngectomy tube sutured in place, moderate secretions suctioned  Nonlabored respirations TC    LABS:  CBC-                        9.7    10.11 )-----------( 307      ( 04 Jul 2019 06:01 )             30.3     BMP/CMP-  04 Jul 2019 06:01    139    |  100    |  8      ----------------------------<  107    4.1     |  28     |  0.67     Ca    7.2        04 Jul 2019 06:01  Phos  4.2       04 Jul 2019 06:01  Mg     1.8       04 Jul 2019 06:01    TPro  x      /  Alb  2.5    /  TBili  x      /  DBili  x      /  AST  x      /  ALT  x      /  AlkPhos  x      04 Jul 2019 06:01    Coagulation Studies-  PT/INR - ( 02 Jul 2019 17:29 )   PT: 13.5 sec;   INR: 1.19          PTT - ( 02 Jul 2019 17:29 )  PTT:42.7 sec  Endocrine Panel-  Calcium, Total Serum: 7.2 mg/dL (07-04 @ 06:01)  Calcium, Total Serum: 8.2 mg/dL (07-03 @ 18:37)    PTH Intact, Intraoperative.: <4.0 pg/mL <L> (07-03 @ 11:17)            RADIOLOGY & ADDITIONAL STUDIES:      Assessment/Plan:  63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  - PATIENT IS NOT INTUBATEABLE FROM ABOVE  - Appreciate endocrinology recs; synthroid, rocaltrol and calcium per endo  - F/u Calcium  - F/u JPs  - Advance TF to goal  - Pain control  - Suction frequently w/red rubber catheters only, humidified air at all times  - SCDs, Lovenox, OOB/ambulate      - d/w attending MD whom agrees with the above plan

## 2019-07-04 NOTE — PROGRESS NOTE ADULT - ASSESSMENT
64 yo M w/ hx of COPD, prior laryngeal CA s/p definitive RT and history of prior trach s/p prior decannulation, now with recurrence s/p biopsy of subglottis and open tracheostomy. Patient transferred to SICU for airway monitoring now s/p total laryngectomy     Neuro: dilaudid PRN  HEENT: s/p laryngectomy (7/2), thyroidectomy, rotator flap, flap checks q4-6. Salivary bypass tubes in place b/l. JPX1 neck SS  Pulm: laryngectomy tube, cannot ventilate from the tube, must place size 6 ET tube in order to ventilate, JPX2 chest SS  CV: HD stable   GI: NPO, resume Jevity TF @68,  PEG placed 6/28.   : voiding appropriately  Endo: ISS, s/p thyroidectomy, possible parathyroidectomy, per endocrine recs-plan to check serum calcium level every 6hrs,  continue Calcitriol 0.25 mcg q12 hrs and start 900 mg elemental Calcium carbonate infusion w/ 1 L NS, as well as Calcium carbonate 1200 mg PO TID. Will check albumin and phosphate daily and placn to recheck PTH daily. Continue Levothyroxine IV 50 daily  ID: ppx: Ancef/Flagyl ( 7/2- )Bronchitis: Proteus: ceftriaxone (6/27 -7/2 ) /// s/p Ancef x3 doses (6/25 -6/26 )  Heme//Onc: pathology shows recurrence of cancer  PPx: SCDs/ Lovenox   Lines: PIV   PT: Ordered.

## 2019-07-04 NOTE — PROGRESS NOTE ADULT - SUBJECTIVE AND OBJECTIVE BOX
INTERVAL HPI/OVERNIGHT EVENTS:    Patient is a 63y old  Male who presents with a chief complaint of subglottis biopsy, tracheostomy (25 Jun 2019 10:51)  Patient seen and examined at bedside. He denies any perioral numbness or paresthesias of the hand. No muscle pain or spasm. He is tolerating tube feeds at 10 cc/hr. PTH is still undetectable. Calcium continues to trend down. Yesterday it was 82 and down to 7.2 today. However, corrected with albumin is 8.4. He is receiving calcitriol 0.25mcg every 12 hours, calcium gluconate 1 gram every 8 hours.       Pt reports the following symptoms:    CONSTITUTIONAL:  Negative fever or chills, feels well, good appetite  EYES:  Negative  blurry vision or double vision  CARDIOVASCULAR:  Negative for chest pain or palpitations  RESPIRATORY:  Negative for cough, wheezing, or SOB   GASTROINTESTINAL:  Negative for nausea, vomiting, diarrhea, constipation, or abdominal pain  GENITOURINARY:  Negative frequency, urgency or dysuria  NEUROLOGIC:  No headache, confusion, dizziness, lightheadedness    MEDICATIONS  (STANDING):  calcitriol  Solution 0.25 MICROGram(s) Enteral Tube <User Schedule>  calcium carbonate   Suspension 1250 milliGRAM(s) Oral three times a day  calcium gluconate IVPB 1 Gram(s) IV Intermittent <User Schedule>  ceFAZolin   IVPB 1000 milliGRAM(s) IV Intermittent every 8 hours  chlorhexidine 4% Liquid 1 Application(s) Topical <User Schedule>  dextrose 5%. 1000 milliLiter(s) (50 mL/Hr) IV Continuous <Continuous>  dextrose 50% Injectable 12.5 Gram(s) IV Push once  dextrose 50% Injectable 25 Gram(s) IV Push once  dextrose 50% Injectable 25 Gram(s) IV Push once  docusate sodium Liquid 100 milliGRAM(s) Oral two times a day  enoxaparin Injectable 40 milliGRAM(s) SubCutaneous every 24 hours  insulin lispro (HumaLOG) corrective regimen sliding scale   SubCutaneous Before meals and at bedtime  levothyroxine Injectable 50 MICROGram(s) IV Push <User Schedule>  metroNIDAZOLE  IVPB 500 milliGRAM(s) IV Intermittent every 8 hours  pantoprazole  Injectable 40 milliGRAM(s) IV Push daily  senna Syrup 10 milliLiter(s) Oral at bedtime  sodium chloride 0.9% 1000 milliLiter(s) (41.6 mL/Hr) IV Continuous <Continuous>    MEDICATIONS  (PRN):  acetaminophen    Suspension .. 650 milliGRAM(s) Oral every 6 hours PRN Moderate Pain (4 - 6)  bisacodyl Suppository 10 milliGRAM(s) Rectal daily PRN Constipation  dextrose 40% Gel 15 Gram(s) Oral once PRN Blood Glucose LESS THAN 70 milliGRAM(s)/deciliter  glucagon  Injectable 1 milliGRAM(s) IntraMuscular once PRN Glucose LESS THAN 70 milligrams/deciliter  HYDROmorphone  Injectable 0.5 milliGRAM(s) IV Push every 4 hours PRN Moderate Pain (4 - 6)  HYDROmorphone  Injectable 1 milliGRAM(s) IV Push every 4 hours PRN Severe Pain (7 - 10)  HYDROmorphone  Injectable 0.5 milliGRAM(s) IV Push every 4 hours PRN breakthrough pain      PHYSICAL EXAM  Vital Signs Last 24 Hrs  T(C): 36.4 (04 Jul 2019 14:00), Max: 37.6 (04 Jul 2019 10:00)  T(F): 97.6 (04 Jul 2019 14:00), Max: 99.6 (04 Jul 2019 10:00)  HR: 66 (04 Jul 2019 15:00) (66 - 86)  BP: 97/56 (04 Jul 2019 15:00) (96/59 - 131/71)  BP(mean): 71 (04 Jul 2019 15:00) (71 - 105)  RR: 17 (04 Jul 2019 15:00) (9 - 27)  SpO2: 95% (04 Jul 2019 15:00) (91% - 98%)    Constitutional: wn/wd in NAD.   HEENT: trach in place. suture wound surrounding neck  Neck: no thyromegaly or palpable thyroid nodules   Respiratory: lungs CTAB.  Cardiovascular: regular rhythm, normal S1 and S2, no audible murmurs, no peripheral edema  GI: soft, NT/ND, no masses/HSM appreciated, tube feed  Neurology: no tremors, DTR 2+  Skin: no visible rashes/lesions  Psychiatric: AAO x 3, normal affect/mood.    LABS:                        9.7    10.11 )-----------( 307      ( 04 Jul 2019 06:01 )             30.3     07-04    139  |  100  |  8   ----------------------------<  107<H>  4.1   |  28  |  0.67    Ca    7.2<L>      04 Jul 2019 06:01  Phos  4.2     07-04  Mg     1.8     07-04    TPro  x   /  Alb  2.5<L>  /  TBili  x   /  DBili  x   /  AST  x   /  ALT  x   /  AlkPhos  x   07-04    PT/INR - ( 02 Jul 2019 17:29 )   PT: 13.5 sec;   INR: 1.19          PTT - ( 02 Jul 2019 17:29 )  PTT:42.7 sec    Thyroid Stimulating Hormone, Serum: 1.483 uIU/mL (07-03 @ 04:51)      HbA1C: 5.7 % (07-03 @ 11:17)    CAPILLARY BLOOD GLUCOSE      POCT Blood Glucose.: 83 mg/dL (04 Jul 2019 10:46)  POCT Blood Glucose.: 81 mg/dL (04 Jul 2019 06:16)  POCT Blood Glucose.: 83 mg/dL (03 Jul 2019 21:53)  POCT Blood Glucose.: 95 mg/dL (03 Jul 2019 16:25)    A/P: 63M hx COPD, 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, s/p DL biopsy for recurrence of laryngeal CA (found on frozen) and planned revision tracheostomy 6/25 due to tenuous airway 2/2 recurrence laryngeal ca and radical laryngectomy on 7/2 now being treated for post op hypocalcemia.    1.  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 HE needs to be on a continuous calcium infusion and continuous calcium supplementation orally until we get him on a steady state level. Continue calcitriol 0.25mcg every 12 hours. Continue calcium gluconate 1 gram every 8 hours until 5 pm and then switch to Calcium carbonate infusion with a 1 L NS bag containing at least 900-1000 mg of elemental calcium to be given over 24 hours. Also start Calcium carbonate 1200 mg PO TID. Check serum Ca every 6 hours. Check albumin every other day. check phos daily    2.  Post surgical hypothyroidism - Cont Levothyroxine 50mcg IV daily.    Will continue to monitor     For discharge, pt can continue TBD by clinical course    Pt can follow up at discharge with [facility] by calling  to make an appointment.   Will discuss case with Dr. Kessler and update primary team

## 2019-07-04 NOTE — PROGRESS NOTE ADULT - SUBJECTIVE AND OBJECTIVE BOX
Patient is a 63y old  Male who presents with a chief complaint of subglottis biopsy, tracheostomy (25 Jun 2019 10:51)      INTERVAL HPI/OVERNIGHT EVENTS: Afebrile, VSS, complaining of mild incisional pain, improves with pain medication. Denies numbness, weakness. Tolerating trickle tube feeds. Endocrine consulted for postop hypocalcemia. Making appropriate UO.      MEDICATIONS  (STANDING):  calcitriol  Solution 0.25 MICROGram(s) Enteral Tube <User Schedule>  calcium carbonate   Suspension 1250 milliGRAM(s) Oral three times a day  calcium gluconate IVPB 1 Gram(s) IV Intermittent <User Schedule>  ceFAZolin   IVPB 1000 milliGRAM(s) IV Intermittent every 8 hours  chlorhexidine 4% Liquid 1 Application(s) Topical <User Schedule>  dextrose 5%. 1000 milliLiter(s) (50 mL/Hr) IV Continuous <Continuous>  dextrose 50% Injectable 12.5 Gram(s) IV Push once  dextrose 50% Injectable 25 Gram(s) IV Push once  dextrose 50% Injectable 25 Gram(s) IV Push once  docusate sodium Liquid 100 milliGRAM(s) Oral two times a day  enoxaparin Injectable 40 milliGRAM(s) SubCutaneous every 24 hours  insulin lispro (HumaLOG) corrective regimen sliding scale   SubCutaneous Before meals and at bedtime  levothyroxine Injectable 50 MICROGram(s) IV Push <User Schedule>  metroNIDAZOLE  IVPB 500 milliGRAM(s) IV Intermittent every 8 hours  pantoprazole  Injectable 40 milliGRAM(s) IV Push daily  senna Syrup 10 milliLiter(s) Oral at bedtime  sodium chloride 0.9% 1000 milliLiter(s) (41.6 mL/Hr) IV Continuous <Continuous>    MEDICATIONS  (PRN):  acetaminophen    Suspension .. 650 milliGRAM(s) Oral every 6 hours PRN Moderate Pain (4 - 6)  bisacodyl Suppository 10 milliGRAM(s) Rectal daily PRN Constipation  dextrose 40% Gel 15 Gram(s) Oral once PRN Blood Glucose LESS THAN 70 milliGRAM(s)/deciliter  glucagon  Injectable 1 milliGRAM(s) IntraMuscular once PRN Glucose LESS THAN 70 milligrams/deciliter  HYDROmorphone  Injectable 0.5 milliGRAM(s) IV Push every 4 hours PRN Moderate Pain (4 - 6)  HYDROmorphone  Injectable 1 milliGRAM(s) IV Push every 4 hours PRN Severe Pain (7 - 10)  HYDROmorphone  Injectable 0.5 milliGRAM(s) IV Push every 4 hours PRN breakthrough pain      PHYSICAL EXAM:    ICU Vital Signs Last 24 Hrs  T(C): 36.4 (04 Jul 2019 14:00), Max: 37.6 (04 Jul 2019 10:00)  T(F): 97.6 (04 Jul 2019 14:00), Max: 99.6 (04 Jul 2019 10:00)  HR: 78 (04 Jul 2019 16:00) (66 - 86)  BP: 93/56 (04 Jul 2019 16:00) (93/56 - 129/77)  BP(mean): 66 (04 Jul 2019 16:00) (66 - 105)  ABP: --  ABP(mean): --  RR: 15 (04 Jul 2019 16:00) (9 - 27)  SpO2: 95% (04 Jul 2019 16:00) (91% - 98%)    I&O's Summary    03 Jul 2019 07:01  -  04 Jul 2019 07:00  --------------------------------------------------------  IN: 3460 mL / OUT: 1370 mL / NET: 2090 mL    04 Jul 2019 07:01  -  04 Jul 2019 16:26  --------------------------------------------------------  IN: 665 mL / OUT: 660 mL / NET: 5 mL    PHYSICAL EXAM:  General: NAD, resting comfortably in bed. Well developed, well groomed  NEURO: AAOx3, CN II-XII grossly intact, EOMI, follows commands  HEENT: RAJWINDER MCCARTHY. laryngectomy tube in place. surgical incisions c/d/i ABDON x 3 in place, draining SS. B/l salivary bypass tubes in place.   CV: S1/S2 normal, no murmurs appreciated  PULM: CTAB, nonlabored breathing, no respiratory distress  ABD: soft, NT/ND. No rebound, no guarding, no tympany. Incisions CDI. PEG tube in place  : voiding  EXTREM: WWP, no edema, no calf tenderness  VASC: No cyanosis, pallor. Palpable radial and PT bilaterally  SKIN: No rashes noted      LABS:                        9.7    10.11 )-----------( 307      ( 04 Jul 2019 06:01 )             30.3     07-04    139  |  100  |  8   ----------------------------<  107<H>  4.1   |  28  |  0.67    Ca    7.2<L>      04 Jul 2019 06:01  Phos  4.2     07-04  Mg     1.8     07-04    TPro  x   /  Alb  2.5<L>  /  TBili  x   /  DBili  x   /  AST  x   /  ALT  x   /  AlkPhos  x   07-04    PT/INR - ( 02 Jul 2019 17:29 )   PT: 13.5 sec;   INR: 1.19          PTT - ( 02 Jul 2019 17:29 )  PTT:42.7 sec    CAPILLARY BLOOD GLUCOSE      POCT Blood Glucose.: 83 mg/dL (04 Jul 2019 10:46)  POCT Blood Glucose.: 81 mg/dL (04 Jul 2019 06:16)  POCT Blood Glucose.: 83 mg/dL (03 Jul 2019 21:53)    ABG - ( 03 Jul 2019 07:16 )  pH, Arterial: 7.40  pH, Blood: x     /  pCO2: 52    /  pO2: 72    / HCO3: 32    / Base Excess: 5.9   /  SaO2: 93                  RADIOLOGY & ADDITIONAL TESTS:    Consultant(s) Notes Reviewed:  [x ] YES  [ ] NO    Care Discussed with Consultants/Other Providers [ x] YES  [ ] NO

## 2019-07-05 DIAGNOSIS — J44.9 CHRONIC OBSTRUCTIVE PULMONARY DISEASE, UNSPECIFIED: ICD-10-CM

## 2019-07-05 DIAGNOSIS — E03.9 HYPOTHYROIDISM, UNSPECIFIED: ICD-10-CM

## 2019-07-05 DIAGNOSIS — J38.7 OTHER DISEASES OF LARYNX: ICD-10-CM

## 2019-07-05 DIAGNOSIS — E83.51 HYPOCALCEMIA: ICD-10-CM

## 2019-07-05 LAB
ALBUMIN SERPL ELPH-MCNC: 3 G/DL — LOW (ref 3.3–5)
ANION GAP SERPL CALC-SCNC: 11 MMOL/L — SIGNIFICANT CHANGE UP (ref 5–17)
BUN SERPL-MCNC: 8 MG/DL — SIGNIFICANT CHANGE UP (ref 7–23)
CA-I BLD-SCNC: 1 MMOL/L — LOW (ref 1.12–1.3)
CA-I BLD-SCNC: 1.04 MMOL/L — LOW (ref 1.12–1.3)
CA-I BLD-SCNC: 1.07 MMOL/L — LOW (ref 1.12–1.3)
CALCIUM SERPL-MCNC: 8 MG/DL — LOW (ref 8.4–10.5)
CALCIUM SERPL-MCNC: 8.3 MG/DL — LOW (ref 8.4–10.5)
CALCIUM SERPL-MCNC: 8.3 MG/DL — LOW (ref 8.4–10.5)
CHLORIDE SERPL-SCNC: 96 MMOL/L — SIGNIFICANT CHANGE UP (ref 96–108)
CO2 SERPL-SCNC: 31 MMOL/L — SIGNIFICANT CHANGE UP (ref 22–31)
CREAT SERPL-MCNC: 0.64 MG/DL — SIGNIFICANT CHANGE UP (ref 0.5–1.3)
GLUCOSE BLDC GLUCOMTR-MCNC: 103 MG/DL — HIGH (ref 70–99)
GLUCOSE BLDC GLUCOMTR-MCNC: 118 MG/DL — HIGH (ref 70–99)
GLUCOSE BLDC GLUCOMTR-MCNC: 83 MG/DL — SIGNIFICANT CHANGE UP (ref 70–99)
GLUCOSE BLDC GLUCOMTR-MCNC: 91 MG/DL — SIGNIFICANT CHANGE UP (ref 70–99)
GLUCOSE SERPL-MCNC: 111 MG/DL — HIGH (ref 70–99)
GRAM STN FLD: SIGNIFICANT CHANGE UP
HCT VFR BLD CALC: 32.3 % — LOW (ref 39–50)
HGB BLD-MCNC: 10.2 G/DL — LOW (ref 13–17)
MAGNESIUM SERPL-MCNC: 1.7 MG/DL — SIGNIFICANT CHANGE UP (ref 1.6–2.6)
MCHC RBC-ENTMCNC: 29.5 PG — SIGNIFICANT CHANGE UP (ref 27–34)
MCHC RBC-ENTMCNC: 31.6 GM/DL — LOW (ref 32–36)
MCV RBC AUTO: 93.4 FL — SIGNIFICANT CHANGE UP (ref 80–100)
NRBC # BLD: 0 /100 WBCS — SIGNIFICANT CHANGE UP (ref 0–0)
PHOSPHATE SERPL-MCNC: 4.6 MG/DL — HIGH (ref 2.5–4.5)
PLATELET # BLD AUTO: 386 K/UL — SIGNIFICANT CHANGE UP (ref 150–400)
POTASSIUM SERPL-MCNC: 3.5 MMOL/L — SIGNIFICANT CHANGE UP (ref 3.5–5.3)
POTASSIUM SERPL-SCNC: 3.5 MMOL/L — SIGNIFICANT CHANGE UP (ref 3.5–5.3)
PTH-INTACT IO % DIF SERPL: <4 PG/ML — LOW (ref 8.5–72.5)
RBC # BLD: 3.46 M/UL — LOW (ref 4.2–5.8)
RBC # FLD: 13.2 % — SIGNIFICANT CHANGE UP (ref 10.3–14.5)
SODIUM SERPL-SCNC: 138 MMOL/L — SIGNIFICANT CHANGE UP (ref 135–145)
SPECIMEN SOURCE: SIGNIFICANT CHANGE UP
WBC # BLD: 8.24 K/UL — SIGNIFICANT CHANGE UP (ref 3.8–10.5)
WBC # FLD AUTO: 8.24 K/UL — SIGNIFICANT CHANGE UP (ref 3.8–10.5)

## 2019-07-05 PROCEDURE — 99233 SBSQ HOSP IP/OBS HIGH 50: CPT | Mod: GC

## 2019-07-05 RX ORDER — OXYCODONE HYDROCHLORIDE 5 MG/1
5 TABLET ORAL EVERY 4 HOURS
Refills: 0 | Status: DISCONTINUED | OUTPATIENT
Start: 2019-07-05 | End: 2019-07-12

## 2019-07-05 RX ORDER — CALCITRIOL 0.5 UG/1
0.5 CAPSULE ORAL
Refills: 0 | Status: DISCONTINUED | OUTPATIENT
Start: 2019-07-05 | End: 2019-07-18

## 2019-07-05 RX ORDER — HYDROMORPHONE HYDROCHLORIDE 2 MG/ML
1 INJECTION INTRAMUSCULAR; INTRAVENOUS; SUBCUTANEOUS EVERY 4 HOURS
Refills: 0 | Status: DISCONTINUED | OUTPATIENT
Start: 2019-07-05 | End: 2019-07-05

## 2019-07-05 RX ORDER — MAGNESIUM SULFATE 500 MG/ML
1 VIAL (ML) INJECTION ONCE
Refills: 0 | Status: COMPLETED | OUTPATIENT
Start: 2019-07-05 | End: 2019-07-05

## 2019-07-05 RX ORDER — CALCIUM CARBONATE 500(1250)
2500 TABLET ORAL THREE TIMES A DAY
Refills: 0 | Status: DISCONTINUED | OUTPATIENT
Start: 2019-07-05 | End: 2019-07-17

## 2019-07-05 RX ORDER — POLYETHYLENE GLYCOL 3350 17 G/17G
17 POWDER, FOR SOLUTION ORAL DAILY
Refills: 0 | Status: DISCONTINUED | OUTPATIENT
Start: 2019-07-05 | End: 2019-07-23

## 2019-07-05 RX ORDER — OXYCODONE HYDROCHLORIDE 5 MG/1
10 TABLET ORAL EVERY 4 HOURS
Refills: 0 | Status: DISCONTINUED | OUTPATIENT
Start: 2019-07-05 | End: 2019-07-12

## 2019-07-05 RX ORDER — POTASSIUM CHLORIDE 20 MEQ
40 PACKET (EA) ORAL ONCE
Refills: 0 | Status: COMPLETED | OUTPATIENT
Start: 2019-07-05 | End: 2019-07-05

## 2019-07-05 RX ADMIN — PANTOPRAZOLE SODIUM 40 MILLIGRAM(S): 20 TABLET, DELAYED RELEASE ORAL at 11:02

## 2019-07-05 RX ADMIN — Medication 100 GRAM(S): at 07:37

## 2019-07-05 RX ADMIN — CALCITRIOL 0.5 MICROGRAM(S): 0.5 CAPSULE ORAL at 23:15

## 2019-07-05 RX ADMIN — HYDROMORPHONE HYDROCHLORIDE 1 MILLIGRAM(S): 2 INJECTION INTRAMUSCULAR; INTRAVENOUS; SUBCUTANEOUS at 09:09

## 2019-07-05 RX ADMIN — SENNA PLUS 10 MILLILITER(S): 8.6 TABLET ORAL at 23:07

## 2019-07-05 RX ADMIN — POLYETHYLENE GLYCOL 3350 17 GRAM(S): 17 POWDER, FOR SOLUTION ORAL at 12:01

## 2019-07-05 RX ADMIN — OXYCODONE HYDROCHLORIDE 10 MILLIGRAM(S): 5 TABLET ORAL at 11:45

## 2019-07-05 RX ADMIN — HYDROMORPHONE HYDROCHLORIDE 0.5 MILLIGRAM(S): 2 INJECTION INTRAMUSCULAR; INTRAVENOUS; SUBCUTANEOUS at 07:01

## 2019-07-05 RX ADMIN — Medication 1250 MILLIGRAM(S): at 05:16

## 2019-07-05 RX ADMIN — ENOXAPARIN SODIUM 40 MILLIGRAM(S): 100 INJECTION SUBCUTANEOUS at 11:03

## 2019-07-05 RX ADMIN — OXYCODONE HYDROCHLORIDE 10 MILLIGRAM(S): 5 TABLET ORAL at 18:53

## 2019-07-05 RX ADMIN — Medication 2500 MILLIGRAM(S): at 23:17

## 2019-07-05 RX ADMIN — HYDROMORPHONE HYDROCHLORIDE 1 MILLIGRAM(S): 2 INJECTION INTRAMUSCULAR; INTRAVENOUS; SUBCUTANEOUS at 05:17

## 2019-07-05 RX ADMIN — OXYCODONE HYDROCHLORIDE 5 MILLIGRAM(S): 5 TABLET ORAL at 15:15

## 2019-07-05 RX ADMIN — Medication 1250 MILLIGRAM(S): at 14:30

## 2019-07-05 RX ADMIN — CHLORHEXIDINE GLUCONATE 1 APPLICATION(S): 213 SOLUTION TOPICAL at 07:41

## 2019-07-05 RX ADMIN — OXYCODONE HYDROCHLORIDE 10 MILLIGRAM(S): 5 TABLET ORAL at 11:02

## 2019-07-05 RX ADMIN — HYDROMORPHONE HYDROCHLORIDE 1 MILLIGRAM(S): 2 INJECTION INTRAMUSCULAR; INTRAVENOUS; SUBCUTANEOUS at 04:30

## 2019-07-05 RX ADMIN — Medication 100 MILLIGRAM(S): at 05:17

## 2019-07-05 RX ADMIN — Medication 40 MILLIEQUIVALENT(S): at 07:37

## 2019-07-05 RX ADMIN — HYDROMORPHONE HYDROCHLORIDE 0.5 MILLIGRAM(S): 2 INJECTION INTRAMUSCULAR; INTRAVENOUS; SUBCUTANEOUS at 06:48

## 2019-07-05 RX ADMIN — SODIUM CHLORIDE 41.6 MILLILITER(S): 9 INJECTION, SOLUTION INTRAVENOUS at 14:30

## 2019-07-05 RX ADMIN — CALCITRIOL 0.25 MICROGRAM(S): 0.5 CAPSULE ORAL at 11:02

## 2019-07-05 RX ADMIN — HYDROMORPHONE HYDROCHLORIDE 1 MILLIGRAM(S): 2 INJECTION INTRAMUSCULAR; INTRAVENOUS; SUBCUTANEOUS at 09:30

## 2019-07-05 RX ADMIN — Medication 50 MICROGRAM(S): at 12:01

## 2019-07-05 RX ADMIN — OXYCODONE HYDROCHLORIDE 5 MILLIGRAM(S): 5 TABLET ORAL at 14:30

## 2019-07-05 RX ADMIN — OXYCODONE HYDROCHLORIDE 10 MILLIGRAM(S): 5 TABLET ORAL at 22:58

## 2019-07-05 RX ADMIN — Medication 100 MILLIGRAM(S): at 14:30

## 2019-07-05 RX ADMIN — OXYCODONE HYDROCHLORIDE 10 MILLIGRAM(S): 5 TABLET ORAL at 23:17

## 2019-07-05 RX ADMIN — Medication 100 MILLIGRAM(S): at 18:53

## 2019-07-05 RX ADMIN — Medication 100 MILLIGRAM(S): at 23:16

## 2019-07-05 RX ADMIN — Medication 100 MILLIGRAM(S): at 05:16

## 2019-07-05 RX ADMIN — HYDROMORPHONE HYDROCHLORIDE 1 MILLIGRAM(S): 2 INJECTION INTRAMUSCULAR; INTRAVENOUS; SUBCUTANEOUS at 00:00

## 2019-07-05 RX ADMIN — Medication 100 MILLIGRAM(S): at 15:55

## 2019-07-05 NOTE — PROGRESS NOTE ADULT - ATTENDING COMMENTS
This is a 64 yo M with medical history significant for former smoker , COPD, laryngeal CA s/p definitive RT now with recurrence now s/p total laryngectomy.   - pain well controlled.   - S/p laryngectomy (7/2), stable respiratory status.  - Care of wound per ENT, c/w abx. follow sputum culture.  - hypocalcemia 2/2 possible parathyroidectomy with thyroidectomy. Not symptomatic.   - Endo on board, on IV and po calcium supplementation.   -Will check albumin and phosphate daily and plan to recheck PTH daily.  - Continue Levothyroxine.  - on TF from PEG.   - Possible SD pending endo recs on hypocalcemia. Patient seen and examined with house-staff during bedside rounds.  Resident note read, including vitals, physical findings, laboratory data, and radiological reports.   Revisions included below.  Direct personal management at bed side and extensive interpretation of the data.  Plan was outlined and discussed in details with the housestaff.  Decision making of high complexity  Action taken for acute disease activity to reflect the level of care provided:  - medication reconciliation  - review laboratory data

## 2019-07-05 NOTE — PROGRESS NOTE ADULT - PROBLEM SELECTOR PLAN 1
laryngeal CA s/p definitive RT now with recurrence now s/p total laryngectomy.  - S/p laryngectomy (7/2), stable respiratory status.  - Care of wound per ENT, c/w abx. follow sputum culture.

## 2019-07-05 NOTE — PROGRESS NOTE ADULT - SUBJECTIVE AND OBJECTIVE BOX
INTERVAL HPI/OVERNIGHT EVENTS:      Patient seen and examined at bedside. He denies any perioral numbness or paresthesias of the hand. No muscle pain or spasm. He is tolerating tube feeds at 68 cc/hr. Denies N/V. PTH is still undetectable. Calcium trending up. 8.3 from 8.0 yesterday corrected for albumin of 3.0 = Ca 9.1. However, corrected with albumin is 8.4. He is receiving calcitriol 0.25mcg every 12 hours, calcium gluconate 46.5 mEq infusion over 24 hours.      Pt reports the following symptoms:    CONSTITUTIONAL:  Negative fever or chills, feels well, good appetite  EYES:  Negative  blurry vision or double vision  CARDIOVASCULAR:  Negative for chest pain or palpitations  RESPIRATORY:  Negative for cough, wheezing, or SOB   GASTROINTESTINAL:  Negative for nausea, vomiting, diarrhea, constipation, or abdominal pain  GENITOURINARY:  Negative frequency, urgency or dysuria  NEUROLOGIC:  No headache, confusion, dizziness, lightheadedness    MEDICATIONS  (STANDING):  calcitriol  Solution 0.25 MICROGram(s) Enteral Tube <User Schedule>  calcium carbonate   Suspension 1250 milliGRAM(s) Oral three times a day  ceFAZolin   IVPB 1000 milliGRAM(s) IV Intermittent every 8 hours  chlorhexidine 4% Liquid 1 Application(s) Topical <User Schedule>  dextrose 5%. 1000 milliLiter(s) (50 mL/Hr) IV Continuous <Continuous>  dextrose 50% Injectable 12.5 Gram(s) IV Push once  dextrose 50% Injectable 25 Gram(s) IV Push once  dextrose 50% Injectable 25 Gram(s) IV Push once  docusate sodium Liquid 100 milliGRAM(s) Oral two times a day  enoxaparin Injectable 40 milliGRAM(s) SubCutaneous every 24 hours  insulin lispro (HumaLOG) corrective regimen sliding scale   SubCutaneous Before meals and at bedtime  levothyroxine Injectable 50 MICROGram(s) IV Push <User Schedule>  metroNIDAZOLE  IVPB 500 milliGRAM(s) IV Intermittent every 8 hours  pantoprazole  Injectable 40 milliGRAM(s) IV Push daily  senna Syrup 10 milliLiter(s) Oral at bedtime  sodium chloride 0.9% 1000 milliLiter(s) (41.6 mL/Hr) IV Continuous <Continuous>    MEDICATIONS  (PRN):  bisacodyl Suppository 10 milliGRAM(s) Rectal daily PRN Constipation  dextrose 40% Gel 15 Gram(s) Oral once PRN Blood Glucose LESS THAN 70 milliGRAM(s)/deciliter  glucagon  Injectable 1 milliGRAM(s) IntraMuscular once PRN Glucose LESS THAN 70 milligrams/deciliter  HYDROmorphone  Injectable 1 milliGRAM(s) IV Push every 4 hours PRN breakthrough  oxyCODONE    IR 5 milliGRAM(s) Oral every 4 hours PRN Moderate Pain (4 - 6)  oxyCODONE    IR 10 milliGRAM(s) Oral every 4 hours PRN Severe Pain (7 - 10)  polyethylene glycol 3350 17 Gram(s) Oral daily PRN Constipation      PHYSICAL EXAM  Vital Signs Last 24 Hrs  T(C): 36.9 (05 Jul 2019 09:40), Max: 37.2 (04 Jul 2019 21:40)  T(F): 98.5 (05 Jul 2019 09:40), Max: 99 (04 Jul 2019 21:40)  HR: 74 (05 Jul 2019 11:05) (58 - 78)  BP: 101/62 (05 Jul 2019 11:05) (78/64 - 110/68)  BP(mean): 78 (05 Jul 2019 11:05) (66 - 84)  RR: 17 (05 Jul 2019 11:05) (10 - 21)  SpO2: 95% (05 Jul 2019 11:05) (92% - 98%)      Constitutional: wn/wd in NAD.   HEENT: trach in place. suture wound surrounding neck  Neck: no thyromegaly or palpable thyroid nodules   Respiratory: lungs CTAB.  Cardiovascular: regular rhythm, normal S1 and S2, no audible murmurs, no peripheral edema  GI: soft, NT/ND, no masses/HSM appreciated, tube feed  Neurology: no tremors, DTR 2+  Skin: no visible rashes/lesions  Psychiatric: AAO x 3, normal affect/mood.    LABS:                        10.2   8.24  )-----------( 386      ( 05 Jul 2019 05:08 )             32.3     07-05    138  |  96  |  8   ----------------------------<  111<H>  3.5   |  31  |  0.64    Ca    8.3<L>      05 Jul 2019 05:08  Phos  4.6     07-05  Mg     1.7     07-05    TPro  x   /  Alb  3.0<L>  /  TBili  x   /  DBili  x   /  AST  x   /  ALT  x   /  AlkPhos  x   07-05        Thyroid Stimulating Hormone, Serum: 1.483 uIU/mL (07-03 @ 04:51)      HbA1C: 5.7 % (07-03 @ 11:17)    CAPILLARY BLOOD GLUCOSE      POCT Blood Glucose.: 103 mg/dL (05 Jul 2019 11:11)  POCT Blood Glucose.: 91 mg/dL (05 Jul 2019 05:47)  POCT Blood Glucose.: 90 mg/dL (04 Jul 2019 21:31)  POCT Blood Glucose.: 81 mg/dL (04 Jul 2019 16:28)    Will discuss in rounds  A/P: 63M hx COPD, 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    1.  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 HE needs to be on a continuous calcium infusion and continuous calcium supplementation orally until we get him on a steady state level. Continue calcitriol 0.25mcg every 12 hours. Continue calcium gluconate 1 gram every 8 hours until 5 pm and then switch to Calcium carbonate infusion with a 1 L NS bag containing at least 900-1000 mg of elemental calcium to be given over 24 hours. Also start Calcium carbonate 1200 mg PO TID. Check serum Ca every 6 hours. Check albumin every other day. check phos daily    2.  Post surgical hypothyroidism - Cont Levothyroxine 50mcg IV daily.    Will continue to monitor     For discharge, pt can continue TBD by clinical course    Pt can follow up at discharge with [facility] by calling  to make an appointment.     Will discuss case with Dr. Kessler and update primary team INTERVAL HPI/OVERNIGHT EVENTS:      Patient seen and examined at bedside. He denies any perioral numbness or paresthesias of the hand. No muscle pain or spasm. He is tolerating tube feeds at 68 cc/hr. Denies N/V. PTH is still undetectable. Calcium trending up. 8.3 from 8.0 yesterday corrected for albumin of 3.0 = Ca 9.1. However, corrected with albumin is 8.4. He is receiving calcitriol 0.25mcg every 12 hours, calcium gluconate 46.5 mEq infusion over 24 hours.      Pt reports the following symptoms:    CONSTITUTIONAL:  Negative fever or chills, feels well, good appetite  EYES:  Negative  blurry vision or double vision  CARDIOVASCULAR:  Negative for chest pain or palpitations  RESPIRATORY:  Negative for cough, wheezing, or SOB   GASTROINTESTINAL:  Negative for nausea, vomiting, diarrhea, constipation, or abdominal pain  GENITOURINARY:  Negative frequency, urgency or dysuria  NEUROLOGIC:  No headache, confusion, dizziness, lightheadedness    MEDICATIONS  (STANDING):  calcitriol  Solution 0.25 MICROGram(s) Enteral Tube <User Schedule>  calcium carbonate   Suspension 1250 milliGRAM(s) Oral three times a day  ceFAZolin   IVPB 1000 milliGRAM(s) IV Intermittent every 8 hours  chlorhexidine 4% Liquid 1 Application(s) Topical <User Schedule>  dextrose 5%. 1000 milliLiter(s) (50 mL/Hr) IV Continuous <Continuous>  dextrose 50% Injectable 12.5 Gram(s) IV Push once  dextrose 50% Injectable 25 Gram(s) IV Push once  dextrose 50% Injectable 25 Gram(s) IV Push once  docusate sodium Liquid 100 milliGRAM(s) Oral two times a day  enoxaparin Injectable 40 milliGRAM(s) SubCutaneous every 24 hours  insulin lispro (HumaLOG) corrective regimen sliding scale   SubCutaneous Before meals and at bedtime  levothyroxine Injectable 50 MICROGram(s) IV Push <User Schedule>  metroNIDAZOLE  IVPB 500 milliGRAM(s) IV Intermittent every 8 hours  pantoprazole  Injectable 40 milliGRAM(s) IV Push daily  senna Syrup 10 milliLiter(s) Oral at bedtime  sodium chloride 0.9% 1000 milliLiter(s) (41.6 mL/Hr) IV Continuous <Continuous>    MEDICATIONS  (PRN):  bisacodyl Suppository 10 milliGRAM(s) Rectal daily PRN Constipation  dextrose 40% Gel 15 Gram(s) Oral once PRN Blood Glucose LESS THAN 70 milliGRAM(s)/deciliter  glucagon  Injectable 1 milliGRAM(s) IntraMuscular once PRN Glucose LESS THAN 70 milligrams/deciliter  HYDROmorphone  Injectable 1 milliGRAM(s) IV Push every 4 hours PRN breakthrough  oxyCODONE    IR 5 milliGRAM(s) Oral every 4 hours PRN Moderate Pain (4 - 6)  oxyCODONE    IR 10 milliGRAM(s) Oral every 4 hours PRN Severe Pain (7 - 10)  polyethylene glycol 3350 17 Gram(s) Oral daily PRN Constipation      PHYSICAL EXAM  Vital Signs Last 24 Hrs  T(C): 36.9 (05 Jul 2019 09:40), Max: 37.2 (04 Jul 2019 21:40)  T(F): 98.5 (05 Jul 2019 09:40), Max: 99 (04 Jul 2019 21:40)  HR: 74 (05 Jul 2019 11:05) (58 - 78)  BP: 101/62 (05 Jul 2019 11:05) (78/64 - 110/68)  BP(mean): 78 (05 Jul 2019 11:05) (66 - 84)  RR: 17 (05 Jul 2019 11:05) (10 - 21)  SpO2: 95% (05 Jul 2019 11:05) (92% - 98%)      Constitutional: wn/wd in NAD.   HEENT: trach in place. suture wound surrounding neck  Neck: no thyromegaly or palpable thyroid nodules   Respiratory: lungs CTAB.  Cardiovascular: regular rhythm, normal S1 and S2, no audible murmurs, no peripheral edema  GI: soft, NT/ND, no masses/HSM appreciated, tube feed  Neurology: no tremors, DTR 2+  Skin: no visible rashes/lesions  Psychiatric: AAO x 3, normal affect/mood.    LABS:                        10.2   8.24  )-----------( 386      ( 05 Jul 2019 05:08 )             32.3     07-05    138  |  96  |  8   ----------------------------<  111<H>  3.5   |  31  |  0.64    Ca    8.3<L>      05 Jul 2019 05:08  Phos  4.6     07-05  Mg     1.7     07-05    TPro  x   /  Alb  3.0<L>  /  TBili  x   /  DBili  x   /  AST  x   /  ALT  x   /  AlkPhos  x   07-05        Thyroid Stimulating Hormone, Serum: 1.483 uIU/mL (07-03 @ 04:51)      HbA1C: 5.7 % (07-03 @ 11:17)    CAPILLARY BLOOD GLUCOSE      POCT Blood Glucose.: 103 mg/dL (05 Jul 2019 11:11)  POCT Blood Glucose.: 91 mg/dL (05 Jul 2019 05:47)  POCT Blood Glucose.: 90 mg/dL (04 Jul 2019 21:31)  POCT Blood Glucose.: 81 mg/dL (04 Jul 2019 16:28)      A/P: 63M hx COPD, 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, s/p DL biopsy for recurrence of laryngeal CA (found on frozen) and planned revision tracheostomy 6/25 due to tenuous airway 2/2                                                                                                                                                                                                                                                                                                                                                                                                                                                                                                                                                                                                                                                                                                                                                                                                                                                                                                                                                                                                                                                                                                                                                                                                                                                                                                                                                                                                                                                                                                                                                                                                                                                                                                                                                                                                                                                                                                                                                                                                                                                                                                                                                                                                                                                                                                                                                                                                                                                                                                                                                                                                                                                                                                                                                                                                                                                                                                                                                                                                                                                                                                                                                                                                                                                  recurrence laryngeal ca and radical laryngectomy with thyroidectomy and suspected parathyroidectomy on 7/2 now being treated for post op hypocalcemia.    1.  Hypocalcemia 2/2 hypoparathyroidism - Patients calcium is trending up. Calcium gluconate can be discontinued after the current bag's completion. Increase calcitriol to 0.5 mcg every 12 hours. Also increase Calcium carbonate 2500 mg PO TID. Check serum Ca every 6 hours. Check albumin every other day. check phos, ionized ca daily. Check PTH tomorrow.    2.  Post surgical hypothyroidism - Cont Levothyroxine 50mcg IV daily.    Will continue to monitor     For discharge, pt can continue TBD by clinical course    Pt can follow up at discharge with [facility] by calling  to make an appointment.     Will discuss case with Dr. Kessler and update primary team

## 2019-07-05 NOTE — PROGRESS NOTE ADULT - SUBJECTIVE AND OBJECTIVE BOX
ENT [facility] DAILY PROGRESS NOTE    Overnight events/Interval: No acute events overnight. Calcium stable. Denies perioral/fingertip tingling. Tolerating trickle feeds. No new complaints.     7/05: NAEON. Pt complaining of increased pain. Otherwise, no new complaints. Calcium levels continue to be stable. Denies perioral/fingertip tingling. Tolerating TFs at goal.    ALLERGIES:  penicillin (Rash)    MEDICATIONS:  Antiinfectives:   ceFAZolin   IVPB 1000 milliGRAM(s) IV Intermittent every 8 hours  metroNIDAZOLE  IVPB 500 milliGRAM(s) IV Intermittent every 8 hours    IV fluids:  calcitriol  Solution 0.25 MICROGram(s) Enteral Tube <User Schedule>  calcium gluconate IVPB 1 Gram(s) IV Intermittent <User Schedule>  dextrose 5%. 1000 milliLiter(s) IV Continuous <Continuous>    Hematologic/Anticoagulation:  enoxaparin Injectable 40 milliGRAM(s) SubCutaneous every 24 hours    Pain medications/Neuro:  acetaminophen    Suspension .. 650 milliGRAM(s) Oral every 6 hours PRN  HYDROmorphone  Injectable 0.5 milliGRAM(s) IV Push every 4 hours PRN  HYDROmorphone  Injectable 1 milliGRAM(s) IV Push every 4 hours PRN  HYDROmorphone  Injectable 0.5 milliGRAM(s) IV Push every 4 hours PRN    Endocrine Medications:   dextrose 40% Gel 15 Gram(s) Oral once PRN  dextrose 50% Injectable 12.5 Gram(s) IV Push once  dextrose 50% Injectable 25 Gram(s) IV Push once  dextrose 50% Injectable 25 Gram(s) IV Push once  glucagon  Injectable 1 milliGRAM(s) IntraMuscular once PRN  insulin lispro (HumaLOG) corrective regimen sliding scale   SubCutaneous Before meals and at bedtime    All other standing medications:   chlorhexidine 4% Liquid 1 Application(s) Topical <User Schedule>  docusate sodium Liquid 100 milliGRAM(s) Oral two times a day  pantoprazole  Injectable 40 milliGRAM(s) IV Push daily  senna Syrup 10 milliLiter(s) Oral at bedtime    All other PRN medications:  bisacodyl Suppository 10 milliGRAM(s) Rectal daily PRN    PHYSICAL EXAM:  Gen: NAD, A&Ox3, tolerating TFs at goal, breathing comfortably on RA  Face: no asymmetry  Neck: incisions c/d/i; neck soft, flat; ABDON holding suction; size 10 bebeto tube sutured in place, moderate secretions suctioned, inner cannula cleaned  Chest: incisions c/d/i; chest soft, flat; ABDON x 2 holding suction with SS output    R. neck ABDON: 7/4 PM shift 5 cc (15cc/24h)  L. chest ABDON 1: 7/4 PM shift 15 cc (55cc/24h)  L. 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  - PATIENT IS NOT ABLE TO BE NASALLY OR ORALLY INTUBATED  - Appreciate endocrinology recs; synthroid, rocaltrol and calcium per endo  - Monitor Ca  - Monitor ABDON output  - Begin transitioning to bolus TFs if okay from SICU standpoint  - Pain control  - Suction frequently w/red rubber catheters only, humidified air at all times  - SCDs, Lovenox, OOB/ambulate  - Remainder of medical management per SICU  - d/w attending MD whom agrees with the above plan

## 2019-07-05 NOTE — PROGRESS NOTE ADULT - SUBJECTIVE AND OBJECTIVE BOX
Interval Events:  Patient seen and examined at bedside.      Allergies    penicillin (Rash)    Intolerances        Vital Signs Last 24 Hrs  T(C): 36.7 (05 Jul 2019 05:34), Max: 37.6 (04 Jul 2019 10:00)  T(F): 98.1 (05 Jul 2019 05:34), Max: 99.6 (04 Jul 2019 10:00)  HR: 62 (05 Jul 2019 05:00) (62 - 78)  BP: 96/57 (05 Jul 2019 05:00) (78/64 - 116/68)  BP(mean): 72 (05 Jul 2019 05:00) (66 - 96)  RR: 13 (05 Jul 2019 05:00) (9 - 21)  SpO2: 96% (05 Jul 2019 05:00) (92% - 98%)    07-03 @ 07:01  -  07-04 @ 07:00  --------------------------------------------------------  IN: 3460 mL / OUT: 1370 mL / NET: 2090 mL    07-04 @ 07:01  -  07-05 @ 06:45  --------------------------------------------------------  IN: 1821 mL / OUT: 1785 mL / NET: 36 mL      07-03 @ 07:01  -  07-04 @ 07:00  --------------------------------------------------------  IN: 3460 mL / OUT: 1370 mL / NET: 2090 mL    07-04 @ 07:01  -  07-05 @ 06:45  --------------------------------------------------------  IN: 1821 mL / OUT: 1785 mL / NET: 36 mL        Physical Exam:     PHYSICAL EXAM:  General: NAD, resting comfortably in bed. Well developed, well groomed  NEURO: AAOx3, CN II-XII grossly intact, EOMI, follows commands  HEENT: PERRLA, MMM. laryngectomy tube in place. surgical incisions c/d/i ABDON x 3 in place, draining SS. B/l salivary bypass tubes in place.   CV: S1/S2 normal, no murmurs appreciated  PULM: CTAB, nonlabored breathing, no respiratory distress  ABD: soft, NT/ND. No rebound, no guarding, no tympany. Incisions CDI. PEG tube in place  : voiding  EXTREM: WWP, no edema, no calf tenderness  VASC: No cyanosis, pallor. Palpable radial and PT bilaterally  SKIN: No rashes noted    LABS:  ABG - ( 03 Jul 2019 07:16 )  pH, Arterial: 7.40  pH, Blood: x     /  pCO2: 52    /  pO2: 72    / HCO3: 32    / Base Excess: 5.9   /  SaO2: 93                  CBC Full  -  ( 05 Jul 2019 05:08 )  WBC Count : 8.24 K/uL  RBC Count : 3.46 M/uL  Hemoglobin : 10.2 g/dL  Hematocrit : 32.3 %  Platelet Count - Automated : 386 K/uL  Mean Cell Volume : 93.4 fl  Mean Cell Hemoglobin : 29.5 pg  Mean Cell Hemoglobin Concentration : 31.6 gm/dL  Auto Neutrophil # : x  Auto Lymphocyte # : x  Auto Monocyte # : x  Auto Eosinophil # : x  Auto Basophil # : x  Auto Neutrophil % : x  Auto Lymphocyte % : x  Auto Monocyte % : x  Auto Eosinophil % : x  Auto Basophil % : x    07-05    138  |  96  |  8   ----------------------------<  111<H>  3.5   |  31  |  0.64    Ca    8.3<L>      05 Jul 2019 05:08  Phos  4.6     07-05  Mg     1.7     07-05    TPro  x   /  Alb  3.0<L>  /  TBili  x   /  DBili  x   /  AST  x   /  ALT  x   /  AlkPhos  x   07-05                    RADIOLOGY & ADDITIONAL STUDIES (The following images were personally reviewed):          A/p: 64 yo M w/ hx of COPD, prior laryngeal CA s/p definitive RT and history of prior trach s/p prior decannulation, now with recurrence s/p biopsy of subglottis and open tracheostomy. Patient transferred to SICU for airway monitoring now s/p total laryngectomy     Neuro: dilaudid PRN  HEENT: s/p laryngectomy (7/2), thyroidectomy, rotator flap, flap checks q4-6. Salivary bypass tubes in place b/l. JPX1 neck SS  Pulm: laryngectomy tube, cannot ventilate from the tube, must place size 6 ET tube in order to ventilate, JPX2 chest SS  CV: HD stable   GI: NPO, resume Jevity TF @68,  PEG placed 6/28.   : voiding appropriately  Endo: ISS, s/p thyroidectomy, possible parathyroidectomy, per endocrine recs-plan to check serum calcium level every 6hrs,  continue Calcitriol 0.25 mcg q12 hrs and start 900 mg elemental Calcium carbonate infusion w/ 1 L NS, as well as Calcium carbonate 1200 mg PO TID. Will check albumin and phosphate daily and plan to recheck PTH daily. Continue Levothyroxine IV 50 daily  ID: ppx: Ancef/Flagyl ( 7/2- )Bronchitis: Proteus: ceftriaxone (6/27 -7/2 ) /// s/p Ancef x3 doses (6/25 -6/26 )  Heme//Onc: pathology shows recurrence of cancer  PPx: SCDs/ Lovenox   Lines: PIV   PT: Ordered.

## 2019-07-05 NOTE — PROGRESS NOTE ADULT - PROBLEM SELECTOR PLAN 3
- hypocalcemia 2/2 possible parathyroidectomy with. Not symptomatic.   - Endo on board, on IV and po calcium supplementation.

## 2019-07-06 LAB
ANION GAP SERPL CALC-SCNC: 10 MMOL/L — SIGNIFICANT CHANGE UP (ref 5–17)
BUN SERPL-MCNC: 8 MG/DL — SIGNIFICANT CHANGE UP (ref 7–23)
CA-I BLD-SCNC: 1.12 MMOL/L — SIGNIFICANT CHANGE UP (ref 1.12–1.3)
CA-I BLD-SCNC: 1.18 MMOL/L — SIGNIFICANT CHANGE UP (ref 1.12–1.3)
CA-I BLDA-SCNC: 1.03 MMOL/L — LOW (ref 1.12–1.3)
CALCIUM SERPL-MCNC: 8.2 MG/DL — LOW (ref 8.4–10.5)
CHLORIDE SERPL-SCNC: 98 MMOL/L — SIGNIFICANT CHANGE UP (ref 96–108)
CO2 SERPL-SCNC: 33 MMOL/L — HIGH (ref 22–31)
CREAT SERPL-MCNC: 0.63 MG/DL — SIGNIFICANT CHANGE UP (ref 0.5–1.3)
CULTURE RESULTS: SIGNIFICANT CHANGE UP
GLUCOSE BLDC GLUCOMTR-MCNC: 106 MG/DL — HIGH (ref 70–99)
GLUCOSE BLDC GLUCOMTR-MCNC: 122 MG/DL — HIGH (ref 70–99)
GLUCOSE BLDC GLUCOMTR-MCNC: 133 MG/DL — HIGH (ref 70–99)
GLUCOSE BLDC GLUCOMTR-MCNC: 146 MG/DL — HIGH (ref 70–99)
GLUCOSE SERPL-MCNC: 139 MG/DL — HIGH (ref 70–99)
HCT VFR BLD CALC: 28.7 % — LOW (ref 39–50)
HGB BLD-MCNC: 9.4 G/DL — LOW (ref 13–17)
MAGNESIUM SERPL-MCNC: 1.6 MG/DL — SIGNIFICANT CHANGE UP (ref 1.6–2.6)
MCHC RBC-ENTMCNC: 30.1 PG — SIGNIFICANT CHANGE UP (ref 27–34)
MCHC RBC-ENTMCNC: 32.8 GM/DL — SIGNIFICANT CHANGE UP (ref 32–36)
MCV RBC AUTO: 92 FL — SIGNIFICANT CHANGE UP (ref 80–100)
NRBC # BLD: 0 /100 WBCS — SIGNIFICANT CHANGE UP (ref 0–0)
PHOSPHATE SERPL-MCNC: 5.4 MG/DL — HIGH (ref 2.5–4.5)
PLATELET # BLD AUTO: 355 K/UL — SIGNIFICANT CHANGE UP (ref 150–400)
POTASSIUM SERPL-MCNC: 3.7 MMOL/L — SIGNIFICANT CHANGE UP (ref 3.5–5.3)
POTASSIUM SERPL-SCNC: 3.7 MMOL/L — SIGNIFICANT CHANGE UP (ref 3.5–5.3)
PTH-INTACT IO % DIF SERPL: <4 PG/ML — LOW (ref 8.5–72.5)
PTH-INTACT IO % DIF SERPL: <4 PG/ML — LOW (ref 8.5–72.5)
RBC # BLD: 3.12 M/UL — LOW (ref 4.2–5.8)
RBC # FLD: 13.3 % — SIGNIFICANT CHANGE UP (ref 10.3–14.5)
SODIUM SERPL-SCNC: 141 MMOL/L — SIGNIFICANT CHANGE UP (ref 135–145)
SPECIMEN SOURCE: SIGNIFICANT CHANGE UP
WBC # BLD: 6.36 K/UL — SIGNIFICANT CHANGE UP (ref 3.8–10.5)
WBC # FLD AUTO: 6.36 K/UL — SIGNIFICANT CHANGE UP (ref 3.8–10.5)

## 2019-07-06 PROCEDURE — 99232 SBSQ HOSP IP/OBS MODERATE 35: CPT | Mod: GC

## 2019-07-06 PROCEDURE — 71045 X-RAY EXAM CHEST 1 VIEW: CPT | Mod: 26

## 2019-07-06 RX ORDER — POTASSIUM CHLORIDE 20 MEQ
40 PACKET (EA) ORAL ONCE
Refills: 0 | Status: COMPLETED | OUTPATIENT
Start: 2019-07-06 | End: 2019-07-06

## 2019-07-06 RX ORDER — MAGNESIUM SULFATE 500 MG/ML
2 VIAL (ML) INJECTION ONCE
Refills: 0 | Status: COMPLETED | OUTPATIENT
Start: 2019-07-06 | End: 2019-07-06

## 2019-07-06 RX ORDER — MAGNESIUM SULFATE 500 MG/ML
1 VIAL (ML) INJECTION ONCE
Refills: 0 | Status: COMPLETED | OUTPATIENT
Start: 2019-07-06 | End: 2019-07-06

## 2019-07-06 RX ADMIN — Medication 100 MILLIGRAM(S): at 09:26

## 2019-07-06 RX ADMIN — Medication 100 MILLIGRAM(S): at 07:17

## 2019-07-06 RX ADMIN — OXYCODONE HYDROCHLORIDE 10 MILLIGRAM(S): 5 TABLET ORAL at 21:40

## 2019-07-06 RX ADMIN — ENOXAPARIN SODIUM 40 MILLIGRAM(S): 100 INJECTION SUBCUTANEOUS at 11:24

## 2019-07-06 RX ADMIN — Medication 100 MILLIGRAM(S): at 06:12

## 2019-07-06 RX ADMIN — Medication 100 MILLIGRAM(S): at 18:26

## 2019-07-06 RX ADMIN — Medication 100 MILLIGRAM(S): at 18:32

## 2019-07-06 RX ADMIN — OXYCODONE HYDROCHLORIDE 10 MILLIGRAM(S): 5 TABLET ORAL at 04:07

## 2019-07-06 RX ADMIN — OXYCODONE HYDROCHLORIDE 10 MILLIGRAM(S): 5 TABLET ORAL at 15:39

## 2019-07-06 RX ADMIN — CALCITRIOL 0.5 MICROGRAM(S): 0.5 CAPSULE ORAL at 09:26

## 2019-07-06 RX ADMIN — Medication 40 MILLIEQUIVALENT(S): at 07:17

## 2019-07-06 RX ADMIN — Medication 100 GRAM(S): at 07:17

## 2019-07-06 RX ADMIN — CHLORHEXIDINE GLUCONATE 1 APPLICATION(S): 213 SOLUTION TOPICAL at 07:19

## 2019-07-06 RX ADMIN — Medication 100 MILLIGRAM(S): at 00:15

## 2019-07-06 RX ADMIN — Medication 2500 MILLIGRAM(S): at 14:40

## 2019-07-06 RX ADMIN — OXYCODONE HYDROCHLORIDE 10 MILLIGRAM(S): 5 TABLET ORAL at 20:56

## 2019-07-06 RX ADMIN — OXYCODONE HYDROCHLORIDE 10 MILLIGRAM(S): 5 TABLET ORAL at 08:06

## 2019-07-06 RX ADMIN — Medication 100 MILLIGRAM(S): at 13:59

## 2019-07-06 RX ADMIN — Medication 100 MILLIGRAM(S): at 21:02

## 2019-07-06 RX ADMIN — OXYCODONE HYDROCHLORIDE 10 MILLIGRAM(S): 5 TABLET ORAL at 12:15

## 2019-07-06 RX ADMIN — OXYCODONE HYDROCHLORIDE 10 MILLIGRAM(S): 5 TABLET ORAL at 07:21

## 2019-07-06 RX ADMIN — Medication 2500 MILLIGRAM(S): at 07:18

## 2019-07-06 RX ADMIN — SENNA PLUS 10 MILLILITER(S): 8.6 TABLET ORAL at 21:03

## 2019-07-06 RX ADMIN — Medication 2500 MILLIGRAM(S): at 21:03

## 2019-07-06 RX ADMIN — OXYCODONE HYDROCHLORIDE 10 MILLIGRAM(S): 5 TABLET ORAL at 03:32

## 2019-07-06 RX ADMIN — Medication 50 MICROGRAM(S): at 12:37

## 2019-07-06 RX ADMIN — CALCITRIOL 0.5 MICROGRAM(S): 0.5 CAPSULE ORAL at 21:03

## 2019-07-06 RX ADMIN — Medication 50 GRAM(S): at 09:25

## 2019-07-06 RX ADMIN — OXYCODONE HYDROCHLORIDE 10 MILLIGRAM(S): 5 TABLET ORAL at 11:24

## 2019-07-06 RX ADMIN — OXYCODONE HYDROCHLORIDE 10 MILLIGRAM(S): 5 TABLET ORAL at 16:30

## 2019-07-06 NOTE — PROGRESS NOTE ADULT - SUBJECTIVE AND OBJECTIVE BOX
ENT [facility] DAILY PROGRESS NOTE    Overnight events/Interval HPI: 63y Male  7/4: No acute events overnight. Calcium stable. Denies perioral/fingertip tingling. Tolerating trickle feeds. No new complaints.   7/05: NAEON. Pt complaining of increased pain. Otherwise, no new complaints. Calcium levels continue to be stable. Denies perioral/fingertip tingling. Tolerating TFs at goal.  7/6: No acute events, doing well, OOBTC. Tolerating TF at goal, will transition to bolus. Calcium changed yesterday to 2500mg q8h. Chest ABDON x1 removed today. Stable for transfer to SDU.         Allergies    penicillin (Rash)    Intolerances        MEDICATIONS:  Antiinfectives:   ceFAZolin   IVPB 1000 milliGRAM(s) IV Intermittent every 8 hours  metroNIDAZOLE  IVPB 500 milliGRAM(s) IV Intermittent every 8 hours    IV fluids:  calcitriol  Solution 0.5 MICROGram(s) Enteral Tube <User Schedule>  calcium carbonate   Suspension 2500 milliGRAM(s) Oral three times a day  dextrose 5%. 1000 milliLiter(s) IV Continuous <Continuous>    Hematologic/Anticoagulation:  enoxaparin Injectable 40 milliGRAM(s) SubCutaneous every 24 hours    Pain medications/Neuro:  oxyCODONE    IR 5 milliGRAM(s) Oral every 4 hours PRN  oxyCODONE    IR 10 milliGRAM(s) Oral every 4 hours PRN    Endocrine Medications:   dextrose 40% Gel 15 Gram(s) Oral once PRN  dextrose 50% Injectable 12.5 Gram(s) IV Push once  dextrose 50% Injectable 25 Gram(s) IV Push once  dextrose 50% Injectable 25 Gram(s) IV Push once  glucagon  Injectable 1 milliGRAM(s) IntraMuscular once PRN  insulin lispro (HumaLOG) corrective regimen sliding scale   SubCutaneous Before meals and at bedtime  levothyroxine Injectable 50 MICROGram(s) IV Push <User Schedule>    All other standing medications:   chlorhexidine 4% Liquid 1 Application(s) Topical <User Schedule>  docusate sodium Liquid 100 milliGRAM(s) Oral two times a day  senna Syrup 10 milliLiter(s) Oral at bedtime    All other PRN medications:  bisacodyl Suppository 10 milliGRAM(s) Rectal daily PRN  polyethylene glycol 3350 17 Gram(s) Oral daily PRN      Vital Signs Last 24 Hrs  T(C): 36.8 (06 Jul 2019 10:00), Max: 36.8 (05 Jul 2019 14:40)  T(F): 98.3 (06 Jul 2019 10:00), Max: 98.3 (05 Jul 2019 14:40)  HR: 68 (06 Jul 2019 11:00) (60 - 84)  BP: 95/54 (06 Jul 2019 11:00) (87/59 - 117/62)  BP(mean): 66 (06 Jul 2019 11:00) (66 - 94)  RR: 9 (06 Jul 2019 11:00) (9 - 29)  SpO2: 97% (06 Jul 2019 11:00) (89% - 100%)      07-05 @ 07:01  -  07-06 @ 07:00  --------------------------------------------------------  IN:    Enteral Tube Flush: 255 mL    IV PiggyBack: 450 mL    ns in tub fed  [id number]: 1632 mL    sodium chloride 0.9%: 998.4 mL  Total IN: 3335.4 mL    OUT:    Bulb: 16 mL    Bulb: 16 mL    Bulb: 16 mL    Voided: 1950 mL  Total OUT: 1998 mL    Total NET: 1337.4 mL      07-06 @ 07:01  -  07-06 @ 11:42  --------------------------------------------------------  IN:    IV PiggyBack: 200 mL    ns in tub fed  [id number]: 272 mL    sodium chloride 0.9%: 83.2 mL  Total IN: 555.2 mL    OUT:    Voided: 1050 mL  Total OUT: 1050 mL    Total NET: -494.8 mL            PHYSICAL EXAM:    Gen: NAD, A&Ox3, tolerating TFs at goal, breathing comfortably on RA  Face: no asymmetry  Neck: incisions c/d/i; neck soft, flat; ABDON holding suction; size 10 bebeto tube sutured in place, moderate secretions suctioned, inner cannula cleaned  Chest: incisions c/d/i; chest soft, flat; ABDON x 2 holding suction with SS output      LABS:  CBC-                        9.4    6.36  )-----------( 355      ( 06 Jul 2019 05:40 )             28.7     BMP/CMP-  06 Jul 2019 05:40    141    |  98     |  8      ----------------------------<  139    3.7     |  33     |  0.63     Ca    8.2        06 Jul 2019 05:40  Phos  5.4       06 Jul 2019 05:40  Mg     1.6       06 Jul 2019 05:40    TPro  x      /  Alb  3.0    /  TBili  x      /  DBili  x      /  AST  x      /  ALT  x      /  AlkPhos  x      05 Jul 2019 05:08    Coagulation Studies-    Endocrine Panel-  Calcium, Total Serum: 8.2 mg/dL (07-06 @ 05:40)  Calcium, Total Serum: 8.3 mg/dL (07-05 @ 21:35)    PTH Intact, Intraoperative.: <4.0 pg/mL <L> (07-05 @ 18:06)  PTH Intact, Intraoperative.: <4.0 pg/mL <L> (07-05 @ 12:22)            RADIOLOGY & ADDITIONAL STUDIES:      Assessment/Plan:  63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  - PATIENT IS NOT ABLE TO BE NASALLY OR ORALLY INTUBATED  - Appreciate endocrinology recs; synthroid, rocaltrol and calcium per endo  - Monitor Ca  - Monitor ABDON output  - Begin transitioning to bolus TFs  - Pain control  - Suction frequently w/red rubber catheters only, humidified air at all times  - SCDs, Lovenox, OOB/ambulate  - Bowel regimen  - d/w attending MD whom agrees with the above plan

## 2019-07-06 NOTE — PROGRESS NOTE ADULT - SUBJECTIVE AND OBJECTIVE BOX
Interval Events:  Patient seen and examined at bedside.      Allergies    penicillin (Rash)    Intolerances        Vital Signs Last 24 Hrs  T(C): 36.2 (06 Jul 2019 05:40), Max: 36.9 (05 Jul 2019 09:40)  T(F): 97.1 (06 Jul 2019 05:40), Max: 98.5 (05 Jul 2019 09:40)  HR: 62 (06 Jul 2019 09:00) (60 - 84)  BP: 99/60 (06 Jul 2019 09:00) (87/59 - 117/62)  BP(mean): 71 (06 Jul 2019 09:00) (68 - 98)  RR: 12 (06 Jul 2019 09:00) (10 - 29)  SpO2: 90% (06 Jul 2019 09:00) (89% - 100%)    07-05 @ 07:01  -  07-06 @ 07:00  --------------------------------------------------------  IN: 3335.4 mL / OUT: 1998 mL / NET: 1337.4 mL    07-06 @ 07:01  - 07-06 @ 09:23  --------------------------------------------------------  IN: 109.6 mL / OUT: 0 mL / NET: 109.6 mL      07-05 @ 07:01  -  07-06 @ 07:00  --------------------------------------------------------  IN: 3335.4 mL / OUT: 1998 mL / NET: 1337.4 mL    07-06 @ 07:01  - 07-06 @ 09:23  --------------------------------------------------------  IN: 109.6 mL / OUT: 0 mL / NET: 109.6 mL        Physical Exam:   General: NAD, resting comfortably in bed.   NEURO: AAOx3, CN II-XII grossly intact, EOMI, follows commands  HEENT: PERRLA, MMM. laryngectomy tube in place. surgical incisions c/d/i ABDON x 3 in place, draining SS.   CV: S1/S2 normal, no murmurs appreciated  PULM: CTAB, nonlabored breathing, no respiratory distress  ABD: soft, NT/ND. No rebound, no guarding, no tympany. Incisions CDI. PEG tube in place  : voiding  EXTREM: WWP, no edema, no calf tenderness  VASC: No cyanosis, pallor. Palpable radial and PT bilaterally  SKIN: No rashes noted      LABS:      CBC Full  -  ( 06 Jul 2019 05:40 )  WBC Count : 6.36 K/uL  RBC Count : 3.12 M/uL  Hemoglobin : 9.4 g/dL  Hematocrit : 28.7 %  Platelet Count - Automated : 355 K/uL  Mean Cell Volume : 92.0 fl  Mean Cell Hemoglobin : 30.1 pg  Mean Cell Hemoglobin Concentration : 32.8 gm/dL  Auto Neutrophil # : x  Auto Lymphocyte # : x  Auto Monocyte # : x  Auto Eosinophil # : x  Auto Basophil # : x  Auto Neutrophil % : x  Auto Lymphocyte % : x  Auto Monocyte % : x  Auto Eosinophil % : x  Auto Basophil % : x    07-06    141  |  98  |  8   ----------------------------<  139<H>  3.7   |  33<H>  |  0.63    Ca    8.2<L>      06 Jul 2019 05:40  Phos  5.4     07-06  Mg     1.6     07-06    TPro  x   /  Alb  3.0<L>  /  TBili  x   /  DBili  x   /  AST  x   /  ALT  x   /  AlkPhos  x   07-05                    RADIOLOGY & ADDITIONAL STUDIES (The following images were personally reviewed):          A/p: 64 yo M w/ hx of COPD, prior laryngeal CA s/p definitive RT and history of prior trach s/p prior decannulation, now with recurrence s/p biopsy of subglottis and open tracheostomy. Patient transferred to SICU for airway monitoring now s/p total laryngectomy     Neuro: Oxycodone prn   HEENT: s/p laryngectomy (7/2), thyroidectomy, rotator flap, flap checks q4-6. Salivary bypass tubes in place b/l. JPX1 neck SS  Pulm: laryngectomy tube, cannot ventilate from the tube, must place size 6 ET tube in order to ventilate, JPX2 chest SS  CV: HD stable   GI: NPO, resume Jevity TF @68, PEG placed 6/28. Constipation- BM on 7/5 on colace, senna, dulcolax, and miralax.  : voidis  Endo: ISS, s/p thyroidectomy, and parathyroidectomy, per endocrine recs-f/u iCa every 6hrs,  ca gluc to 2500 tid, 0.5 calcitriol. Will check albumin, phosphate and PTH daily. Continue Levothyroxine IV 50 daily  ID: ppx: Ancef/Flagyl (7/2- )Bronchitis: Proteus: ceftriaxone (6/27 -7/2 ) /// s/p Ancef x3 doses (6/25 -6/26 )  Heme//Onc: pathology shows recurrence of cancer  PPx: SCDs/ Lovenox   Lines: PIV   PT: Ordered. Interval Events:  Patient seen and examined at bedside.  On ROS pain is well controlled; no SOB      Allergies    penicillin (Rash)    Intolerances        Vital Signs Last 24 Hrs  T(C): 36.2 (06 Jul 2019 05:40), Max: 36.9 (05 Jul 2019 09:40)  T(F): 97.1 (06 Jul 2019 05:40), Max: 98.5 (05 Jul 2019 09:40)  HR: 62 (06 Jul 2019 09:00) (60 - 84)  BP: 99/60 (06 Jul 2019 09:00) (87/59 - 117/62)  BP(mean): 71 (06 Jul 2019 09:00) (68 - 98)  RR: 12 (06 Jul 2019 09:00) (10 - 29)  SpO2: 90% (06 Jul 2019 09:00) (89% - 100%)    07-05 @ 07:01  -  07-06 @ 07:00  --------------------------------------------------------  IN: 3335.4 mL / OUT: 1998 mL / NET: 1337.4 mL    07-06 @ 07:01  -  07-06 @ 09:23  --------------------------------------------------------  IN: 109.6 mL / OUT: 0 mL / NET: 109.6 mL      07-05 @ 07:01  -  07-06 @ 07:00  --------------------------------------------------------  IN: 3335.4 mL / OUT: 1998 mL / NET: 1337.4 mL    07-06 @ 07:01  -  07-06 @ 09:23  --------------------------------------------------------  IN: 109.6 mL / OUT: 0 mL / NET: 109.6 mL        Physical Exam:   General: NAD, resting comfortably in bed.   NEURO: AAOx3, CN II-XII grossly intact, EOMI, follows commands  HEENT: PERRLA, MMM. laryngectomy tube in place. surgical incisions c/d/i ABDON x 3 in place, draining SS.   CV: S1/S2 normal, no murmurs appreciated  PULM: CTAB, nonlabored breathing, no respiratory distress  ABD: soft, NT/ND. No rebound, no guarding, no tympany. Incisions CDI. PEG tube in place  : voiding  EXTREM: WWP, no edema, no calf tenderness  VASC: No cyanosis, pallor. Palpable radial and PT bilaterally  SKIN: No rashes noted      LABS:      CBC Full  -  ( 06 Jul 2019 05:40 )  WBC Count : 6.36 K/uL  RBC Count : 3.12 M/uL  Hemoglobin : 9.4 g/dL  Hematocrit : 28.7 %  Platelet Count - Automated : 355 K/uL  Mean Cell Volume : 92.0 fl  Mean Cell Hemoglobin : 30.1 pg  Mean Cell Hemoglobin Concentration : 32.8 gm/dL  Auto Neutrophil # : x  Auto Lymphocyte # : x  Auto Monocyte # : x  Auto Eosinophil # : x  Auto Basophil # : x  Auto Neutrophil % : x  Auto Lymphocyte % : x  Auto Monocyte % : x  Auto Eosinophil % : x  Auto Basophil % : x    07-06    141  |  98  |  8   ----------------------------<  139<H>  3.7   |  33<H>  |  0.63    Ca    8.2<L>      06 Jul 2019 05:40  Phos  5.4     07-06  Mg     1.6     07-06    TPro  x   /  Alb  3.0<L>  /  TBili  x   /  DBili  x   /  AST  x   /  ALT  x   /  AlkPhos  x   07-05                    RADIOLOGY & ADDITIONAL STUDIES (The following images were personally reviewed):          A/p: 62 yo M w/ hx of COPD, prior laryngeal CA s/p definitive RT and history of prior trach s/p prior decannulation, now with recurrence s/p biopsy of subglottis and open tracheostomy. Patient transferred to SICU for airway monitoring now s/p total laryngectomy with hypocalcemia from parathyroidectomy    Neuro: Oxycodone prn   HEENT: s/p laryngectomy (7/2), thyroidectomy, rotator flap, flap checks q4-6. Salivary bypass tubes in place b/l. JPX1 neck SS  Pulm: laryngectomy tube, cannot ventilate from the tube, must place size 6 ET tube in order to ventilate, JPX2 chest SS  CV: HD stable   GI: NPO, resume Jevity TF @68, PEG placed 6/28. Constipation- BM on 7/5 on colace, senna, dulcolax, and miralax.  : voidis  Endo: ISS, s/p thyroidectomy, and parathyroidectomy, per endocrine recs-f/u iCa every 6hrs,  ca gluc to 2500 tid, 0.5 calcitriol. Will check albumin, phosphate and PTH daily. Continue Levothyroxine IV 50 daily  ID: ppx: Ancef/Flagyl (7/2- )Bronchitis: Proteus: ceftriaxone (6/27 -7/2 ) /// s/p Ancef x3 doses (6/25 -6/26 )  Heme//Onc: pathology shows recurrence of cancer  PPx: SCDs/ Lovenox   Lines: PIV   PT: Ordered.

## 2019-07-07 LAB
ALBUMIN SERPL ELPH-MCNC: 3.1 G/DL — LOW (ref 3.3–5)
ALBUMIN SERPL ELPH-MCNC: 3.2 G/DL — LOW (ref 3.3–5)
ANION GAP SERPL CALC-SCNC: 8 MMOL/L — SIGNIFICANT CHANGE UP (ref 5–17)
BUN SERPL-MCNC: 10 MG/DL — SIGNIFICANT CHANGE UP (ref 7–23)
CA-I BLD-SCNC: 0.95 MMOL/L — LOW (ref 1.12–1.3)
CA-I BLD-SCNC: 0.97 MMOL/L — LOW (ref 1.12–1.3)
CA-I BLD-SCNC: 1.01 MMOL/L — LOW (ref 1.12–1.3)
CA-I BLD-SCNC: 1.05 MMOL/L — LOW (ref 1.12–1.3)
CALCIUM SERPL-MCNC: 7.2 MG/DL — LOW (ref 8.4–10.5)
CALCIUM SERPL-MCNC: 7.5 MG/DL — LOW (ref 8.4–10.5)
CALCIUM SERPL-MCNC: 7.6 MG/DL — LOW (ref 8.4–10.5)
CALCIUM SERPL-MCNC: 7.6 MG/DL — LOW (ref 8.4–10.5)
CHLORIDE SERPL-SCNC: 97 MMOL/L — SIGNIFICANT CHANGE UP (ref 96–108)
CO2 SERPL-SCNC: 35 MMOL/L — HIGH (ref 22–31)
CREAT SERPL-MCNC: 0.63 MG/DL — SIGNIFICANT CHANGE UP (ref 0.5–1.3)
GLUCOSE BLDC GLUCOMTR-MCNC: 120 MG/DL — HIGH (ref 70–99)
GLUCOSE BLDC GLUCOMTR-MCNC: 134 MG/DL — HIGH (ref 70–99)
GLUCOSE BLDC GLUCOMTR-MCNC: 62 MG/DL — LOW (ref 70–99)
GLUCOSE BLDC GLUCOMTR-MCNC: 79 MG/DL — SIGNIFICANT CHANGE UP (ref 70–99)
GLUCOSE BLDC GLUCOMTR-MCNC: 81 MG/DL — SIGNIFICANT CHANGE UP (ref 70–99)
GLUCOSE SERPL-MCNC: 91 MG/DL — SIGNIFICANT CHANGE UP (ref 70–99)
HCT VFR BLD CALC: 30.6 % — LOW (ref 39–50)
HGB BLD-MCNC: 9.7 G/DL — LOW (ref 13–17)
MAGNESIUM SERPL-MCNC: 2 MG/DL — SIGNIFICANT CHANGE UP (ref 1.6–2.6)
MCHC RBC-ENTMCNC: 29.7 PG — SIGNIFICANT CHANGE UP (ref 27–34)
MCHC RBC-ENTMCNC: 31.7 GM/DL — LOW (ref 32–36)
MCV RBC AUTO: 93.6 FL — SIGNIFICANT CHANGE UP (ref 80–100)
NRBC # BLD: 0 /100 WBCS — SIGNIFICANT CHANGE UP (ref 0–0)
PHOSPHATE SERPL-MCNC: 5.3 MG/DL — HIGH (ref 2.5–4.5)
PLATELET # BLD AUTO: 407 K/UL — HIGH (ref 150–400)
POTASSIUM SERPL-MCNC: 4.9 MMOL/L — SIGNIFICANT CHANGE UP (ref 3.5–5.3)
POTASSIUM SERPL-SCNC: 4.9 MMOL/L — SIGNIFICANT CHANGE UP (ref 3.5–5.3)
PTH-INTACT IO % DIF SERPL: <4 PG/ML — LOW (ref 8.5–72.5)
RBC # BLD: 3.27 M/UL — LOW (ref 4.2–5.8)
RBC # FLD: 13.6 % — SIGNIFICANT CHANGE UP (ref 10.3–14.5)
SODIUM SERPL-SCNC: 140 MMOL/L — SIGNIFICANT CHANGE UP (ref 135–145)
WBC # BLD: 6.59 K/UL — SIGNIFICANT CHANGE UP (ref 3.8–10.5)
WBC # FLD AUTO: 6.59 K/UL — SIGNIFICANT CHANGE UP (ref 3.8–10.5)

## 2019-07-07 PROCEDURE — 71045 X-RAY EXAM CHEST 1 VIEW: CPT | Mod: 26

## 2019-07-07 RX ADMIN — Medication 100 MILLIGRAM(S): at 13:30

## 2019-07-07 RX ADMIN — Medication 100 MILLIGRAM(S): at 23:08

## 2019-07-07 RX ADMIN — Medication 50 MICROGRAM(S): at 11:46

## 2019-07-07 RX ADMIN — OXYCODONE HYDROCHLORIDE 10 MILLIGRAM(S): 5 TABLET ORAL at 13:37

## 2019-07-07 RX ADMIN — OXYCODONE HYDROCHLORIDE 10 MILLIGRAM(S): 5 TABLET ORAL at 06:46

## 2019-07-07 RX ADMIN — Medication 100 MILLIGRAM(S): at 05:57

## 2019-07-07 RX ADMIN — OXYCODONE HYDROCHLORIDE 10 MILLIGRAM(S): 5 TABLET ORAL at 05:58

## 2019-07-07 RX ADMIN — OXYCODONE HYDROCHLORIDE 10 MILLIGRAM(S): 5 TABLET ORAL at 02:08

## 2019-07-07 RX ADMIN — CALCITRIOL 0.5 MICROGRAM(S): 0.5 CAPSULE ORAL at 09:10

## 2019-07-07 RX ADMIN — OXYCODONE HYDROCHLORIDE 5 MILLIGRAM(S): 5 TABLET ORAL at 09:10

## 2019-07-07 RX ADMIN — Medication 100 MILLIGRAM(S): at 01:18

## 2019-07-07 RX ADMIN — Medication 2500 MILLIGRAM(S): at 15:40

## 2019-07-07 RX ADMIN — SENNA PLUS 10 MILLILITER(S): 8.6 TABLET ORAL at 23:09

## 2019-07-07 RX ADMIN — CALCITRIOL 0.5 MICROGRAM(S): 0.5 CAPSULE ORAL at 23:08

## 2019-07-07 RX ADMIN — Medication 100 MILLIGRAM(S): at 19:32

## 2019-07-07 RX ADMIN — Medication 2500 MILLIGRAM(S): at 05:57

## 2019-07-07 RX ADMIN — ENOXAPARIN SODIUM 40 MILLIGRAM(S): 100 INJECTION SUBCUTANEOUS at 09:40

## 2019-07-07 RX ADMIN — OXYCODONE HYDROCHLORIDE 10 MILLIGRAM(S): 5 TABLET ORAL at 21:25

## 2019-07-07 RX ADMIN — OXYCODONE HYDROCHLORIDE 10 MILLIGRAM(S): 5 TABLET ORAL at 14:00

## 2019-07-07 RX ADMIN — OXYCODONE HYDROCHLORIDE 10 MILLIGRAM(S): 5 TABLET ORAL at 01:11

## 2019-07-07 RX ADMIN — Medication 100 MILLIGRAM(S): at 09:10

## 2019-07-07 RX ADMIN — OXYCODONE HYDROCHLORIDE 10 MILLIGRAM(S): 5 TABLET ORAL at 20:55

## 2019-07-07 RX ADMIN — Medication 2500 MILLIGRAM(S): at 23:10

## 2019-07-07 RX ADMIN — OXYCODONE HYDROCHLORIDE 5 MILLIGRAM(S): 5 TABLET ORAL at 09:40

## 2019-07-07 NOTE — PROGRESS NOTE ADULT - SUBJECTIVE AND OBJECTIVE BOX
INTERVAL HPI/OVERNIGHT EVENTS:    Patient seen and examined at bedside. He denies any perioral numbness or paresthesias of the hand. No muscle pain or spasm. He is tolerating Jevity tube feeds at 68 cc/hr. Denies N/V. PTH is still undetectable. Calcium trending slightly down today, 7.2, corrected for albumin of 3.0 = Ca 8.0. However, corrected with albumin is 8.4. He is receiving calcitriol 0.50mcg every 12 hours, calcium carbonate 1250mg TID.     Pt reports the following symptoms:  CONSTITUTIONAL:  Negative fever or chills, feels well  EYES:  Negative  blurry vision or double vision  CARDIOVASCULAR:  Negative for chest pain or palpitations  RESPIRATORY:  Negative for cough, wheezing, or SOB   GASTROINTESTINAL:  Negative for nausea, vomiting, diarrhea, constipation, or abdominal pain  GENITOURINARY:  Negative frequency, urgency or dysuria  NEUROLOGIC:  No headache, confusion, dizziness, lightheadedness    MEDICATIONS  (STANDING):  calcitriol  Solution 0.5 MICROGram(s) Enteral Tube <User Schedule>  calcium carbonate   Suspension 2500 milliGRAM(s) Oral three times a day  ceFAZolin   IVPB 1000 milliGRAM(s) IV Intermittent every 8 hours  chlorhexidine 4% Liquid 1 Application(s) Topical <User Schedule>  dextrose 5%. 1000 milliLiter(s) (50 mL/Hr) IV Continuous <Continuous>  dextrose 50% Injectable 12.5 Gram(s) IV Push once  dextrose 50% Injectable 25 Gram(s) IV Push once  dextrose 50% Injectable 25 Gram(s) IV Push once  docusate sodium Liquid 100 milliGRAM(s) Oral two times a day  enoxaparin Injectable 40 milliGRAM(s) SubCutaneous every 24 hours  insulin lispro (HumaLOG) corrective regimen sliding scale   SubCutaneous Before meals and at bedtime  levothyroxine Injectable 50 MICROGram(s) IV Push <User Schedule>  metroNIDAZOLE  IVPB 500 milliGRAM(s) IV Intermittent every 8 hours  senna Syrup 10 milliLiter(s) Oral at bedtime    MEDICATIONS  (PRN):  bisacodyl Suppository 10 milliGRAM(s) Rectal daily PRN Constipation  dextrose 40% Gel 15 Gram(s) Oral once PRN Blood Glucose LESS THAN 70 milliGRAM(s)/deciliter  glucagon  Injectable 1 milliGRAM(s) IntraMuscular once PRN Glucose LESS THAN 70 milligrams/deciliter  oxyCODONE    IR 5 milliGRAM(s) Oral every 4 hours PRN Moderate Pain (4 - 6)  oxyCODONE    IR 10 milliGRAM(s) Oral every 4 hours PRN Severe Pain (7 - 10)  polyethylene glycol 3350 17 Gram(s) Oral daily PRN Constipation      PHYSICAL EXAM  Vital Signs Last 24 Hrs  T(C): 36.8 (07 Jul 2019 09:06), Max: 37.2 (06 Jul 2019 22:35)  T(F): 98.2 (07 Jul 2019 09:06), Max: 99 (06 Jul 2019 22:35)  HR: 68 (07 Jul 2019 12:00) (64 - 78)  BP: 114/68 (07 Jul 2019 12:00) (93/59 - 117/64)  BP(mean): 85 (07 Jul 2019 12:00) (69 - 89)  RR: 16 (07 Jul 2019 12:00) (10 - 19)  SpO2: 95% (07 Jul 2019 12:00) (92% - 97%)    Constitutional: wn/wd in NAD.   HEENT: trach in place. suture wound surrounding neck  Neck: no thyromegaly or palpable thyroid nodules   Respiratory: lungs CTAB.  Cardiovascular: regular rhythm, normal S1 and S2, no audible murmurs, no peripheral edema  GI: soft, NT/ND, no masses/HSM appreciated, tube feed  Neurology: no tremors, DTR 2+  Skin: no visible rashes/lesions  Psychiatric: AAO x 3, normal affect/mood.    LABS:                        9.7    6.59  )-----------( 407      ( 07 Jul 2019 06:50 )             30.6     07-07    140  |  97  |  10  ----------------------------<  91  4.9   |  35<H>  |  0.63    Ca    7.2<L>      07 Jul 2019 06:50  Phos  5.3     07-07  Mg     2.0     07-07    TPro  x   /  Alb  3.1<L>  /  TBili  x   /  DBili  x   /  AST  x   /  ALT  x   /  AlkPhos  x   07-07    Thyroid Stimulating Hormone, Serum: 1.483 uIU/mL (07-03 @ 04:51)    HbA1C: 5.7 % (07-03 @ 11:17)    CAPILLARY BLOOD GLUCOSE      POCT Blood Glucose.: 81 mg/dL (07 Jul 2019 11:56)  POCT Blood Glucose.: 62 mg/dL (07 Jul 2019 11:28)  POCT Blood Glucose.: 79 mg/dL (07 Jul 2019 06:32)  POCT Blood Glucose.: 146 mg/dL (06 Jul 2019 21:44)  POCT Blood Glucose.: 106 mg/dL (06 Jul 2019 15:59)    A/P: 63M hx COPD, 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, s/p DL biopsy for recurrence of laryngeal CA (found on frozen) and planned revision tracheostomy 6/25 due to tenuous airway 2/2                                                                                                                                                                                                                                                                                                                                                                                                                                                                                                                                                                                                                                                                                                                                                                                                                                                                                                                                                                                                                                                                                                                                                                                                                                                                                                                                                                                                                                                                                                                                                                                                                                                                                                                                                                                                                                                                                                                                                                                                                                                                                                                                                                                                                                                                                                                                                                                                                                                                                                                                                                                                                                                                                                                                                                                                                                                                                                                                                                                                                                                                                                                                                                                                                                            recurrence laryngeal ca and radical laryngectomy with thyroidectomy and suspected parathyroidectomy on 7/2 now being treated for post op hypocalcemia.    1.  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 Continue calcitriol to 0.5 mcg every 12 hours. Also continue Calcium carbonate 2500 mg PO TID. Check serum Ca every 8 hours. Check albumin every other day. check phos, ionized ca daily. Check PTH daily.   If calcium level less then 6.9 mg/dl, please give calcium carbonate infusion with a 1 L NS bag containing at least 900-1000 mg of elemental calcium to be given over 24 hours.    2.  Post surgical hypothyroidism - Cont Levothyroxine 50mcg IV daily.    Will continue to monitor     For discharge, pt can continue TBD by clinical course    Pt can follow up at discharge with [facility] by calling  to make an appointment.   Will discuss case with Dr. Kessler and update primary team

## 2019-07-07 NOTE — PROGRESS NOTE ADULT - SUBJECTIVE AND OBJECTIVE BOX
ENT 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 C/b return to OR last night for exchange to 6 distal XLT due to tracheomalacia and poor ventilatory status after coughing episode.    6/26: Now doing well in SICU overnight, no additional respiratory issues, cuff taken down thsi AM. Failed SLP evaluation with evidence of gross aspiration on ice chip trial  6/27: has thick purulent secretions, SICU will keep for one more night. Low grade temps to 100.4. Will need PEG  6/28: NPO for PEG today. To SDU, when secretions are better managed. Received PEG for inability to swallow.  6/29: still requiring suctioning q30 minutes, small amount of glycopyrrolate given by SICU, received PEG tomorrow, will start trickle feeds  6/30: NAEON, feeds up to goal, still requiring frequent suctioning, will stop glycopyrrolate today  7/01: NAEON, tube feeds at goal, still requiring q1hr suctioning of copious thin nonpurulent secretions, dc'd glycopyrrolate  7/03: pt underwent TL, uncomplicated, tolerated; post-op PTH, iCa low so started on Rocaltrol, Ca IV; neck ABDON x1, left chest ABDON x2, salivary bypass stents in place  7/4: No acute events overnight. Calcium stable. Denies perioral/fingertip tingling. Tolerating trickle feeds. No new complaints.   7/05: NAEON. Pt complaining of increased pain. Otherwise, no new complaints. Calcium levels continue to be stable. Denies perioral/fingertip tingling. Tolerating TFs at goal.  7/6: No acute events, doing well, OOBTC. Tolerating TF at goal, will transition to bolus. Calcium changed yesterday to 2500mg q8h. Chest ABDON x1 removed today. Stable for transfer to SDU.   7/7: No acute events. Transferred to SDU yesterday. TF transitioned to bolus, tolerating well. Corrected Ca stable. Will decrease Ca checks to q12h per endocrinology.         Allergies    penicillin (Rash)    Intolerances        MEDICATIONS:  Antiinfectives:   ceFAZolin   IVPB 1000 milliGRAM(s) IV Intermittent every 8 hours  metroNIDAZOLE  IVPB 500 milliGRAM(s) IV Intermittent every 8 hours    IV fluids:  calcitriol  Solution 0.5 MICROGram(s) Enteral Tube <User Schedule>  calcium carbonate   Suspension 2500 milliGRAM(s) Oral three times a day  dextrose 5%. 1000 milliLiter(s) IV Continuous <Continuous>    Hematologic/Anticoagulation:  enoxaparin Injectable 40 milliGRAM(s) SubCutaneous every 24 hours    Pain medications/Neuro:  oxyCODONE    IR 5 milliGRAM(s) Oral every 4 hours PRN  oxyCODONE    IR 10 milliGRAM(s) Oral every 4 hours PRN    Endocrine Medications:   dextrose 40% Gel 15 Gram(s) Oral once PRN  dextrose 50% Injectable 12.5 Gram(s) IV Push once  dextrose 50% Injectable 25 Gram(s) IV Push once  dextrose 50% Injectable 25 Gram(s) IV Push once  glucagon  Injectable 1 milliGRAM(s) IntraMuscular once PRN  insulin lispro (HumaLOG) corrective regimen sliding scale   SubCutaneous Before meals and at bedtime  levothyroxine Injectable 50 MICROGram(s) IV Push <User Schedule>    All other standing medications:   chlorhexidine 4% Liquid 1 Application(s) Topical <User Schedule>  docusate sodium Liquid 100 milliGRAM(s) Oral two times a day  senna Syrup 10 milliLiter(s) Oral at bedtime    All other PRN medications:  bisacodyl Suppository 10 milliGRAM(s) Rectal daily PRN  polyethylene glycol 3350 17 Gram(s) Oral daily PRN      Vital Signs Last 24 Hrs  T(C): 36.8 (07 Jul 2019 09:06), Max: 37.2 (06 Jul 2019 22:35)  T(F): 98.2 (07 Jul 2019 09:06), Max: 99 (06 Jul 2019 22:35)  HR: 64 (07 Jul 2019 04:28) (64 - 78)  BP: 115/62 (07 Jul 2019 04:28) (93/59 - 117/64)  BP(mean): 83 (07 Jul 2019 04:28) (66 - 89)  RR: 18 (07 Jul 2019 04:28) (9 - 19)  SpO2: 96% (07 Jul 2019 04:28) (92% - 97%)      07-06 @ 07:01  -  07-07 @ 07:00  --------------------------------------------------------  IN:    Enteral Tube Flush: 260 mL    IV PiggyBack: 500 mL    ns in tub fed  [id number]: 1608 mL    sodium chloride 0.9%: 83.2 mL  Total IN: 2451.2 mL    OUT:    Bulb: 10 mL    Bulb: 15 mL    Bulb: 20 mL    Voided: 1650 mL  Total OUT: 1695 mL    Total NET: 756.2 mL            PHYSICAL EXAM:  NAD, A&Ox3, tolerating TFs at goal, breathing comfortably on RA  Face: no asymmetry  Neck: incisions c/d/i; neck soft, flat; ABDON holding suction; size 10 bebeto tube sutured in place, minimal secretions suctioned, inner cannula cleaned  Chest: incisions c/d/i; chest soft, flat; ABDON holding suction with SS output      LABS:  CBC-                        9.7    6.59  )-----------( 407      ( 07 Jul 2019 06:50 )             30.6     BMP/CMP-  07 Jul 2019 06:50    140    |  97     |  10     ----------------------------<  91     4.9     |  35     |  0.63     Ca    7.2        07 Jul 2019 06:50  Phos  5.3       07 Jul 2019 06:50  Mg     2.0       07 Jul 2019 06:50    TPro  x      /  Alb  3.1    /  TBili  x      /  DBili  x      /  AST  x      /  ALT  x      /  AlkPhos  x      07 Jul 2019 06:50    Coagulation Studies-    Endocrine Panel-  Calcium, Total Serum: 7.2 mg/dL (07-07 @ 06:50)  Calcium, Total Serum: 7.6 mg/dL (07-07 @ 00:13)    PTH Intact, Intraoperative.: <4.0 pg/mL <L> (07-07 @ 06:50)  PTH Intact, Intraoperative.: <4.0 pg/mL <L> (07-06 @ 19:17)  PTH Intact, Intraoperative.: <4.0 pg/mL <L> (07-06 @ 19:16)            RADIOLOGY & ADDITIONAL STUDIES:      Assessment/Plan:  63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  - PATIENT IS NOT ABLE TO BE NASALLY OR ORALLY INTUBATED  - Appreciate endocrinology recs; synthroid, rocaltrol and calcium per endo  - Monitor Ca q12h  - Monitor ABDON output  - Continue bolus TFs  - Pain control  - Suction frequently w/red rubber catheters only, humidified air at all times  - SCDs, Lovenox, OOB/ambulate  - Bowel regimen  - d/w attending MD whom agrees with the above plan

## 2019-07-08 LAB
CALCIUM SERPL-MCNC: 7.5 MG/DL — LOW (ref 8.4–10.5)
CALCIUM SERPL-MCNC: 7.5 MG/DL — LOW (ref 8.4–10.5)
CALCIUM SERPL-MCNC: 7.6 MG/DL — LOW (ref 8.4–10.5)
GLUCOSE BLDC GLUCOMTR-MCNC: 156 MG/DL — HIGH (ref 70–99)
GLUCOSE BLDC GLUCOMTR-MCNC: 160 MG/DL — HIGH (ref 70–99)
GLUCOSE BLDC GLUCOMTR-MCNC: 83 MG/DL — SIGNIFICANT CHANGE UP (ref 70–99)
GLUCOSE BLDC GLUCOMTR-MCNC: 88 MG/DL — SIGNIFICANT CHANGE UP (ref 70–99)
PHOSPHATE SERPL-MCNC: 4.7 MG/DL — HIGH (ref 2.5–4.5)

## 2019-07-08 PROCEDURE — 71045 X-RAY EXAM CHEST 1 VIEW: CPT | Mod: 26

## 2019-07-08 PROCEDURE — 99232 SBSQ HOSP IP/OBS MODERATE 35: CPT

## 2019-07-08 RX ORDER — ACETAMINOPHEN 500 MG
1 TABLET ORAL
Qty: 10 | Refills: 0
Start: 2019-07-08

## 2019-07-08 RX ORDER — OXYCODONE HYDROCHLORIDE 5 MG/1
5 TABLET ORAL
Qty: 20 | Refills: 0
Start: 2019-07-08

## 2019-07-08 RX ADMIN — Medication 100 MILLIGRAM(S): at 10:20

## 2019-07-08 RX ADMIN — OXYCODONE HYDROCHLORIDE 5 MILLIGRAM(S): 5 TABLET ORAL at 02:23

## 2019-07-08 RX ADMIN — Medication 1: at 17:42

## 2019-07-08 RX ADMIN — Medication 100 MILLIGRAM(S): at 06:23

## 2019-07-08 RX ADMIN — CALCITRIOL 0.5 MICROGRAM(S): 0.5 CAPSULE ORAL at 22:13

## 2019-07-08 RX ADMIN — Medication 1: at 11:42

## 2019-07-08 RX ADMIN — OXYCODONE HYDROCHLORIDE 10 MILLIGRAM(S): 5 TABLET ORAL at 17:57

## 2019-07-08 RX ADMIN — Medication 50 MICROGRAM(S): at 11:30

## 2019-07-08 RX ADMIN — Medication 100 MILLIGRAM(S): at 22:12

## 2019-07-08 RX ADMIN — Medication 100 MILLIGRAM(S): at 17:42

## 2019-07-08 RX ADMIN — Medication 100 MILLIGRAM(S): at 00:35

## 2019-07-08 RX ADMIN — CHLORHEXIDINE GLUCONATE 1 APPLICATION(S): 213 SOLUTION TOPICAL at 06:30

## 2019-07-08 RX ADMIN — Medication 2500 MILLIGRAM(S): at 15:12

## 2019-07-08 RX ADMIN — ENOXAPARIN SODIUM 40 MILLIGRAM(S): 100 INJECTION SUBCUTANEOUS at 11:30

## 2019-07-08 RX ADMIN — Medication 2500 MILLIGRAM(S): at 08:05

## 2019-07-08 RX ADMIN — SENNA PLUS 10 MILLILITER(S): 8.6 TABLET ORAL at 22:12

## 2019-07-08 RX ADMIN — Medication 2500 MILLIGRAM(S): at 22:12

## 2019-07-08 RX ADMIN — Medication 100 MILLIGRAM(S): at 17:46

## 2019-07-08 RX ADMIN — OXYCODONE HYDROCHLORIDE 10 MILLIGRAM(S): 5 TABLET ORAL at 18:00

## 2019-07-08 RX ADMIN — OXYCODONE HYDROCHLORIDE 5 MILLIGRAM(S): 5 TABLET ORAL at 01:24

## 2019-07-08 RX ADMIN — CALCITRIOL 0.5 MICROGRAM(S): 0.5 CAPSULE ORAL at 10:22

## 2019-07-08 RX ADMIN — Medication 100 MILLIGRAM(S): at 15:12

## 2019-07-08 RX ADMIN — OXYCODONE HYDROCHLORIDE 10 MILLIGRAM(S): 5 TABLET ORAL at 07:51

## 2019-07-08 RX ADMIN — OXYCODONE HYDROCHLORIDE 10 MILLIGRAM(S): 5 TABLET ORAL at 22:18

## 2019-07-08 RX ADMIN — OXYCODONE HYDROCHLORIDE 10 MILLIGRAM(S): 5 TABLET ORAL at 23:00

## 2019-07-08 NOTE — CHART NOTE - NSCHARTNOTEFT_GEN_A_CORE
Admitting Diagnosis:   Patient is a 63y old  Male who presents with a chief complaint of Recurrent laryngeal CA (06 Jul 2019 09:22)      PAST MEDICAL & SURGICAL HISTORY:  SOB (shortness of breath)  Throat cancer  Jaundice: 1965  Fracture: left ankle  Laryngeal mass: s/ p radiation  Surgery, elective: direct laryngoscopy with biopsy- 4/2017  Status post surgery: inguinal hernia  Status post surgery: Left ankle surgical repair  x2 (1965)      Current Nutrition Order:  NPO with Tubefeeds  Jevity 1.2 @ 408ml/hr x2 hours Q6H via PEG (1632ml TV, 1958kcal, 90g pro, 1317ml FW)    PO Intake: Good (%) [   ]  Fair (50-75%) [   ] Poor (<25%) [   ]- N/A     GI Issues:  Denies N/V,  Reports tolerating feeds well  PEG in place  last BM 7/5 per flowsheet.     Pain:  No pain reported    Skin Integrity:   Tomi score 17  Surgical incisions      Labs:   07-07    140  |  97  |  10  ----------------------------<  91  4.9   |  35<H>  |  0.63    Ca    7.5<L>      08 Jul 2019 11:02  Phos  4.7     07-08  Mg     2.0     07-07    TPro  x   /  Alb  3.2<L>  /  TBili  x   /  DBili  x   /  AST  x   /  ALT  x   /  AlkPhos  x   07-07    CAPILLARY BLOOD GLUCOSE      POCT Blood Glucose.: 156 mg/dL (08 Jul 2019 11:37)  POCT Blood Glucose.: 88 mg/dL (08 Jul 2019 06:36)  POCT Blood Glucose.: 134 mg/dL (07 Jul 2019 21:43)  POCT Blood Glucose.: 120 mg/dL (07 Jul 2019 16:31)      Medications:  MEDICATIONS  (STANDING):  calcitriol  Solution 0.5 MICROGram(s) Enteral Tube <User Schedule>  calcium carbonate   Suspension 2500 milliGRAM(s) Oral three times a day  ceFAZolin   IVPB 1000 milliGRAM(s) IV Intermittent every 8 hours  chlorhexidine 4% Liquid 1 Application(s) Topical <User Schedule>  dextrose 5%. 1000 milliLiter(s) (50 mL/Hr) IV Continuous <Continuous>  dextrose 50% Injectable 12.5 Gram(s) IV Push once  dextrose 50% Injectable 25 Gram(s) IV Push once  dextrose 50% Injectable 25 Gram(s) IV Push once  docusate sodium Liquid 100 milliGRAM(s) Oral two times a day  enoxaparin Injectable 40 milliGRAM(s) SubCutaneous every 24 hours  insulin lispro (HumaLOG) corrective regimen sliding scale   SubCutaneous Before meals and at bedtime  levothyroxine Injectable 50 MICROGram(s) IV Push <User Schedule>  metroNIDAZOLE  IVPB 500 milliGRAM(s) IV Intermittent every 8 hours  senna Syrup 10 milliLiter(s) Oral at bedtime    MEDICATIONS  (PRN):  bisacodyl Suppository 10 milliGRAM(s) Rectal daily PRN Constipation  dextrose 40% Gel 15 Gram(s) Oral once PRN Blood Glucose LESS THAN 70 milliGRAM(s)/deciliter  glucagon  Injectable 1 milliGRAM(s) IntraMuscular once PRN Glucose LESS THAN 70 milligrams/deciliter  oxyCODONE    IR 5 milliGRAM(s) Oral every 4 hours PRN Moderate Pain (4 - 6)  oxyCODONE    IR 10 milliGRAM(s) Oral every 4 hours PRN Severe Pain (7 - 10)  polyethylene glycol 3350 17 Gram(s) Oral daily PRN Constipation      Weight: 64Kg  Ht 172.72cm; IBW 70Kg; %IBW 91%  BMI 21.5    Weight Change:   Please obtain current weight and continue to trend weekly weights for further assessment  Bedscale weight reading 66kg this am (7/8). States UBW PTA was 155lbs. This indicates a 14lb unintentional wt loss.     Nutrition Focused Physical Exam: Completed [ X on 6/26  ]  Not Pertinent [   ]  Muscle Wasting- Temporal [   ]  Clavicle/Pectoral [ X  ]  Shoulder/Deltoid [   ]  Scapula [   ]  Interosseous [   ]  Quadriceps [   ]  Gastrocnemius [   ]  Fat Wasting- Orbital [   ]  Buccal [  X ]  Triceps [   ]  Rib [   ]  Suspect moderate-severe PCM 2/2 wt loss, decreased intake, and NFPE    Estimated energy needs:   ABW used for calculations as pt between % of IBW.   Needs adjusted for age, suspected PCM, and post-op healing  1920-2240kcal/day (30-35 kcal/kg)  77-90g pro/day (1.2-1.4 g pro /kg)  0895-8078 mL (30-35 mL/kg)    Subjective:   63M w/ recurrent laryngeal SCC s/p revision of tracheostomy (6/25) d/t tenuous airways 2/2 recurrence laryngeal ca and radical laryngectomy with thyroidectomy and suspected parathyroidectomy on 7/2 now being treated for post op hypocalcemia w/ endocrinology following closely. Currently NPO w/ Tubefeeds via PEG placement (placed 6/28). Pt seen in room, resting in bed. Feeds running at 200ml/hr at x2 hours. Discussed w/ RN doing 272ml/hr x1hr Q4H for more time off the pump. Tolerated well, no GI distress, denies N/V. Obtained bedscale weight 66kg indicating mild weight fluctuation. Pt understanding of meeting needs via EN for time being. Had no questions at time of assessment. Please see recs below. RD to follow.     Previous Nutrition Diagnosis:   Increased nutrient needs RT increased demand for nutrients (kcal/protein) AEB catabolic state  Active [ X  ]  Resolved [  ]    If resolved, new PES:    Goal: Pt to consistently meet % of estimated needs via most appropriate route    Recommendations:  1. Recommend intermittent feeds of Jevity 1.2 @272ml/hr Q4H or 6x/day via PEG (1632ml TV, 1958kcal, 90g pro, 1317ml FW).   >>Consider transitioning to bolus feeds (6 cans/day: 2 at breakfast, 2 at lunch, 2 at dinner).   2. PO diet advancement per SLP  4. Trend weights to assess adequacy of feeds    Education:   pt understanding of meeting needs via EN for time being.     Risk Level: High [ X  ] Moderate [   ] Low [   ].

## 2019-07-08 NOTE — PROGRESS NOTE ADULT - SUBJECTIVE AND OBJECTIVE BOX
INTERVAL HPI/OVERNIGHT EVENTS:    Patient is a 63y old  Male who presents with a chief complaint of Recurrent laryngeal CA (06 Jul 2019 09:22)      Pt reports the following symptoms:    CONSTITUTIONAL:  Negative fever or chills, feels well, good appetite  EYES:  Negative  blurry vision or double vision  CARDIOVASCULAR:  Negative for chest pain or palpitations  RESPIRATORY:  Negative for cough, wheezing, or SOB   GASTROINTESTINAL:  Negative for nausea, vomiting, diarrhea, constipation, or abdominal pain  GENITOURINARY:  Negative frequency, urgency or dysuria  NEUROLOGIC:  No headache, confusion, dizziness, lightheadedness    MEDICATIONS  (STANDING):  calcitriol  Solution 0.5 MICROGram(s) Enteral Tube <User Schedule>  calcium carbonate   Suspension 2500 milliGRAM(s) Oral three times a day  ceFAZolin   IVPB 1000 milliGRAM(s) IV Intermittent every 8 hours  chlorhexidine 4% Liquid 1 Application(s) Topical <User Schedule>  dextrose 5%. 1000 milliLiter(s) (50 mL/Hr) IV Continuous <Continuous>  dextrose 50% Injectable 12.5 Gram(s) IV Push once  dextrose 50% Injectable 25 Gram(s) IV Push once  dextrose 50% Injectable 25 Gram(s) IV Push once  docusate sodium Liquid 100 milliGRAM(s) Oral two times a day  enoxaparin Injectable 40 milliGRAM(s) SubCutaneous every 24 hours  insulin lispro (HumaLOG) corrective regimen sliding scale   SubCutaneous Before meals and at bedtime  levothyroxine Injectable 50 MICROGram(s) IV Push <User Schedule>  metroNIDAZOLE  IVPB 500 milliGRAM(s) IV Intermittent every 8 hours  senna Syrup 10 milliLiter(s) Oral at bedtime    MEDICATIONS  (PRN):  bisacodyl Suppository 10 milliGRAM(s) Rectal daily PRN Constipation  dextrose 40% Gel 15 Gram(s) Oral once PRN Blood Glucose LESS THAN 70 milliGRAM(s)/deciliter  glucagon  Injectable 1 milliGRAM(s) IntraMuscular once PRN Glucose LESS THAN 70 milligrams/deciliter  oxyCODONE    IR 5 milliGRAM(s) Oral every 4 hours PRN Moderate Pain (4 - 6)  oxyCODONE    IR 10 milliGRAM(s) Oral every 4 hours PRN Severe Pain (7 - 10)  polyethylene glycol 3350 17 Gram(s) Oral daily PRN Constipation      PHYSICAL EXAM  Vital Signs Last 24 Hrs  T(C): 37.2 (08 Jul 2019 14:00), Max: 37.2 (08 Jul 2019 14:00)  T(F): 99 (08 Jul 2019 14:00), Max: 99 (08 Jul 2019 14:00)  HR: 67 (08 Jul 2019 09:02) (62 - 80)  BP: 105/65 (08 Jul 2019 08:50) (91/53 - 110/58)  BP(mean): 80 (08 Jul 2019 08:50) (65 - 80)  RR: 18 (08 Jul 2019 09:02) (16 - 24)  SpO2: 98% (08 Jul 2019 09:02) (93% - 98%)    Constitutional: wn/wd in NAD.   HEENT: NCAT, MMM, OP clear, EOMI, no proptosis or lid retraction  Neck: no thyromegaly or palpable thyroid nodules   Respiratory: lungs CTAB.  Cardiovascular: regular rhythm, normal S1 and S2, no audible murmurs, no peripheral edema  GI: soft, NT/ND, no masses/HSM appreciated.  Neurology: no tremors, DTR 2+  Skin: no visible rashes/lesions  Psychiatric: AAO x 3, normal affect/mood.    LABS:                        9.7    6.59  )-----------( 407      ( 07 Jul 2019 06:50 )             30.6     07-07    140  |  97  |  10  ----------------------------<  91  4.9   |  35<H>  |  0.63    Ca    7.5<L>      08 Jul 2019 11:02  Phos  4.7     07-08  Mg     2.0     07-07    TPro  x   /  Alb  3.2<L>  /  TBili  x   /  DBili  x   /  AST  x   /  ALT  x   /  AlkPhos  x   07-07        Thyroid Stimulating Hormone, Serum: 1.483 uIU/mL (07-03 @ 04:51)      HbA1C: 5.7 % (07-03 @ 11:17)    CAPILLARY BLOOD GLUCOSE      POCT Blood Glucose.: 156 mg/dL (08 Jul 2019 11:37)  POCT Blood Glucose.: 88 mg/dL (08 Jul 2019 06:36)  POCT Blood Glucose.: 134 mg/dL (07 Jul 2019 21:43)  POCT Blood Glucose.: 120 mg/dL (07 Jul 2019 16:31)      Insulin Sliding Scale requirements X 24 Hours:    RADIOLOGY & ADDITIONAL TESTS:    A/P: 63y Male with history of DM type II presenting for       1.  DM -     Please continue           units lantus at bedtime  / in the morning and        units lispro with meals and lispro moderate / low dose sliding scale 4 times daily with meals and at bedtime.  Please continue consistent carbohydrate diet.      Goal FSG is   Will continue to monitor   For discharge, pt can continue    Pt can follow up at discharge with [facility] by calling  to make an appointment.   Will discuss case with     and update primary team INTERVAL HPI/OVERNIGHT EVENTS:    Patient is a 63y old  Male who presents with a chief complaint of Recurrent laryngeal CA (06 Jul 2019 09:22)      Pt reports the following symptoms:    CONSTITUTIONAL:  Negative fever or chills, feels well, good appetite  EYES:  Negative  blurry vision or double vision  CARDIOVASCULAR:  Negative for chest pain or palpitations  RESPIRATORY:  Negative for cough, wheezing, or SOB   GASTROINTESTINAL:  Negative for nausea, vomiting, diarrhea, constipation, or abdominal pain  GENITOURINARY:  Negative frequency, urgency or dysuria  NEUROLOGIC:  No headache, confusion, dizziness, lightheadedness    MEDICATIONS  (STANDING):  calcitriol  Solution 0.5 MICROGram(s) Enteral Tube <User Schedule>  calcium carbonate   Suspension 2500 milliGRAM(s) Oral three times a day  ceFAZolin   IVPB 1000 milliGRAM(s) IV Intermittent every 8 hours  chlorhexidine 4% Liquid 1 Application(s) Topical <User Schedule>  dextrose 5%. 1000 milliLiter(s) (50 mL/Hr) IV Continuous <Continuous>  dextrose 50% Injectable 12.5 Gram(s) IV Push once  dextrose 50% Injectable 25 Gram(s) IV Push once  dextrose 50% Injectable 25 Gram(s) IV Push once  docusate sodium Liquid 100 milliGRAM(s) Oral two times a day  enoxaparin Injectable 40 milliGRAM(s) SubCutaneous every 24 hours  insulin lispro (HumaLOG) corrective regimen sliding scale   SubCutaneous Before meals and at bedtime  levothyroxine Injectable 50 MICROGram(s) IV Push <User Schedule>  metroNIDAZOLE  IVPB 500 milliGRAM(s) IV Intermittent every 8 hours  senna Syrup 10 milliLiter(s) Oral at bedtime    MEDICATIONS  (PRN):  bisacodyl Suppository 10 milliGRAM(s) Rectal daily PRN Constipation  dextrose 40% Gel 15 Gram(s) Oral once PRN Blood Glucose LESS THAN 70 milliGRAM(s)/deciliter  glucagon  Injectable 1 milliGRAM(s) IntraMuscular once PRN Glucose LESS THAN 70 milligrams/deciliter  oxyCODONE    IR 5 milliGRAM(s) Oral every 4 hours PRN Moderate Pain (4 - 6)  oxyCODONE    IR 10 milliGRAM(s) Oral every 4 hours PRN Severe Pain (7 - 10)  polyethylene glycol 3350 17 Gram(s) Oral daily PRN Constipation      PHYSICAL EXAM  Vital Signs Last 24 Hrs  T(C): 37.2 (08 Jul 2019 14:00), Max: 37.2 (08 Jul 2019 14:00)  T(F): 99 (08 Jul 2019 14:00), Max: 99 (08 Jul 2019 14:00)  HR: 67 (08 Jul 2019 09:02) (62 - 80)  BP: 105/65 (08 Jul 2019 08:50) (91/53 - 110/58)  BP(mean): 80 (08 Jul 2019 08:50) (65 - 80)  RR: 18 (08 Jul 2019 09:02) (16 - 24)  SpO2: 98% (08 Jul 2019 09:02) (93% - 98%)    Constitutional: wn/wd in NAD.   HEENT: NCAT, MMM, OP clear, EOMI, no proptosis or lid retraction  Neck: no thyromegaly or palpable thyroid nodules   Respiratory: lungs CTAB.  Cardiovascular: regular rhythm, normal S1 and S2, no audible murmurs, no peripheral edema  GI: soft, NT/ND, no masses/HSM appreciated.  Neurology: no tremors, DTR 2+  Skin: no visible rashes/lesions  Psychiatric: AAO x 3, normal affect/mood.    LABS:                        9.7    6.59  )-----------( 407      ( 07 Jul 2019 06:50 )             30.6     07-07    140  |  97  |  10  ----------------------------<  91  4.9   |  35<H>  |  0.63    Ca    7.5<L>      08 Jul 2019 11:02  Phos  4.7     07-08  Mg     2.0     07-07    TPro  x   /  Alb  3.2<L>  /  TBili  x   /  DBili  x   /  AST  x   /  ALT  x   /  AlkPhos  x   07-07        Thyroid Stimulating Hormone, Serum: 1.483 uIU/mL (07-03 @ 04:51)      HbA1C: 5.7 % (07-03 @ 11:17)    CAPILLARY BLOOD GLUCOSE      POCT Blood Glucose.: 156 mg/dL (08 Jul 2019 11:37)  POCT Blood Glucose.: 88 mg/dL (08 Jul 2019 06:36)  POCT Blood Glucose.: 134 mg/dL (07 Jul 2019 21:43)  POCT Blood Glucose.: 120 mg/dL (07 Jul 2019 16:31)      Insulin Sliding Scale requirements X 24 Hours:    RADIOLOGY & ADDITIONAL TESTS:    A/P: 63M hx COPD, 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, s/p DL biopsy for recurrence of laryngeal CA (found on frozen) and planned revision tracheostomy 6/25 due to tenuous airway 2/2                                                                                                                                                                                                                                                                                                                                                                                                                                                                                                                                                                                                                                                                                                                                                                                                                                                                                                                                                                                                                                                                                                                                                                                                                                                                                                                                                                                                                                                                                                                                                                                                                                                                                                                                                                                                                                                                                                                                                                                                                                                                                                                                                                                                                                                                                                                                                                                                                                                                                                                                                                                                                                                                                                                                                                                                                                                                                                                                                                                                                                                                                                                                                                                                                                            recurrence laryngeal ca and radical laryngectomy with thyroidectomy and suspected parathyroidectomy on 7/2 now being treated for post op hypocalcemia.    1.  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 Continue calcitriol to 0.5 mcg every 12 hours. Also continue Calcium carbonate 2500 mg PO TID. Check serum Ca every 8 hours. Check albumin every other day. check phos, ionized ca daily. Check PTH daily.   If calcium level less then 6.9 mg/dl, please give calcium carbonate infusion with a 1 L NS bag containing at least 900-1000 mg of elemental calcium to be given over 24 hours.    2.  Post surgical hypothyroidism - Cont Levothyroxine 50mcg IV daily.    Will continue to monitor     For discharge, pt can continue TBD by clinical course    Pt can follow up at discharge with [facility] by calling  to make an appointment.   Will discuss case with Dr. Morgan and update primary team INTERVAL HPI/OVERNIGHT EVENTS:    Patient seen and examined at bedside. He denies any perioral numbness or paresthesias of the hand. No muscle pain or spasm. He is tolerating Jevity tube feeds now at bolus Q6H. Denies N/V but has been having some constipation - ;ast BM was 3 days ago.  PTH is still undetectable. Calcium trending slightly down for 2 days, 7.5, Albumin 3.2 with corrected Ca 8.6.   He is currently receiving calcitriol 0.50mcg daily, calcium carbonate 2500mg TID.     Pt reports the following symptoms:    CONSTITUTIONAL:  Negative fever or chills, feels well, good appetite  EYES:  Negative  blurry vision or double vision  CARDIOVASCULAR:  Negative for chest pain or palpitations  RESPIRATORY:  Negative for cough, wheezing, or SOB   GASTROINTESTINAL:  Negative for nausea, vomiting, diarrhea, constipation, or abdominal pain  GENITOURINARY:  Negative frequency, urgency or dysuria  NEUROLOGIC:  No headache, confusion, dizziness, lightheadedness    MEDICATIONS  (STANDING):  calcitriol  Solution 0.5 MICROGram(s) Enteral Tube <User Schedule>  calcium carbonate   Suspension 2500 milliGRAM(s) Oral three times a day  ceFAZolin   IVPB 1000 milliGRAM(s) IV Intermittent every 8 hours  chlorhexidine 4% Liquid 1 Application(s) Topical <User Schedule>  dextrose 5%. 1000 milliLiter(s) (50 mL/Hr) IV Continuous <Continuous>  dextrose 50% Injectable 12.5 Gram(s) IV Push once  dextrose 50% Injectable 25 Gram(s) IV Push once  dextrose 50% Injectable 25 Gram(s) IV Push once  docusate sodium Liquid 100 milliGRAM(s) Oral two times a day  enoxaparin Injectable 40 milliGRAM(s) SubCutaneous every 24 hours  insulin lispro (HumaLOG) corrective regimen sliding scale   SubCutaneous Before meals and at bedtime  levothyroxine Injectable 50 MICROGram(s) IV Push <User Schedule>  metroNIDAZOLE  IVPB 500 milliGRAM(s) IV Intermittent every 8 hours  senna Syrup 10 milliLiter(s) Oral at bedtime    MEDICATIONS  (PRN):  bisacodyl Suppository 10 milliGRAM(s) Rectal daily PRN Constipation  dextrose 40% Gel 15 Gram(s) Oral once PRN Blood Glucose LESS THAN 70 milliGRAM(s)/deciliter  glucagon  Injectable 1 milliGRAM(s) IntraMuscular once PRN Glucose LESS THAN 70 milligrams/deciliter  oxyCODONE    IR 5 milliGRAM(s) Oral every 4 hours PRN Moderate Pain (4 - 6)  oxyCODONE    IR 10 milliGRAM(s) Oral every 4 hours PRN Severe Pain (7 - 10)  polyethylene glycol 3350 17 Gram(s) Oral daily PRN Constipation      PHYSICAL EXAM  Vital Signs Last 24 Hrs  T(C): 37.2 (08 Jul 2019 14:00), Max: 37.2 (08 Jul 2019 14:00)  T(F): 99 (08 Jul 2019 14:00), Max: 99 (08 Jul 2019 14:00)  HR: 67 (08 Jul 2019 09:02) (62 - 80)  BP: 105/65 (08 Jul 2019 08:50) (91/53 - 110/58)  BP(mean): 80 (08 Jul 2019 08:50) (65 - 80)  RR: 18 (08 Jul 2019 09:02) (16 - 24)  SpO2: 98% (08 Jul 2019 09:02) (93% - 98%)    Constitutional: wn/wd in NAD.   HEENT: NCAT, MMM, no proptosis or lid retraction  Neck: surgical scar and tracheostomy site - looks wnl.  Respiratory: lungs CTAB.  Cardiovascular: regular rhythm, normal S1 and S2, no audible murmurs, no peripheral edema  GI: soft, NT/ND, no masses. PEG tube in place  Neurology: no tremors, DTR 2+. no muscular spasm  Skin: no visible rashes/lesions  Psychiatric: Awake and alert, normal affect/mood.    LABS:                        9.7    6.59  )-----------( 407      ( 07 Jul 2019 06:50 )             30.6     07-07    140  |  97  |  10  ----------------------------<  91  4.9   |  35<H>  |  0.63    Ca    7.5<L>      08 Jul 2019 11:02  Phos  4.7     07-08  Mg     2.0     07-07    TPro  x   /  Alb  3.2<L>  /  TBili  x   /  DBili  x   /  AST  x   /  ALT  x   /  AlkPhos  x   07-07        Thyroid Stimulating Hormone, Serum: 1.483 uIU/mL (07-03 @ 04:51)      HbA1C: 5.7 % (07-03 @ 11:17)    CAPILLARY BLOOD GLUCOSE      POCT Blood Glucose.: 156 mg/dL (08 Jul 2019 11:37)  POCT Blood Glucose.: 88 mg/dL (08 Jul 2019 06:36)  POCT Blood Glucose.: 134 mg/dL (07 Jul 2019 21:43)  POCT Blood Glucose.: 120 mg/dL (07 Jul 2019 16:31)      Insulin Sliding Scale requirements X 24 Hours:    RADIOLOGY & ADDITIONAL TESTS:    A/P: 63M hx COPD, 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, s/p DL biopsy for recurrence of laryngeal CA (found on frozen) and planned revision tracheostomy 6/25 due to tenuous airway 2/2                                                                                                                                                                                                                                                                                                                                                                                                                                                                                                                                                                                                                                                                                                                                                                                                                                                                                                                                                                                                                                                                                                                                                                                                                                                                                                                                                                                                                                                                                                                                                                                                                                                                                                                                                                                                                                                                                                                                                                                                                                                                                                                                                                                                                                                                                                                                                                                                                                                                                                                                                                                                                                                                                                                                                                                                                                                                                                                                                                                                                                                                                                                                                                                              recurrence laryngeal ca and radical laryngectomy with thyroidectomy and suspected parathyroidectomy on 7/2 now being treated for post op hypocalcemia.    1.  Hypocalcemia 2/2 hypoparathyroidism - Patients calcium is trending down over 2 days. Continue calcitriol 0.5 mcg daily. Also continue Calcium carbonate 2500 mg PO TID. Check serum Ca every 8 hours. Check albumin every other day. check phos, ionized ca daily. Check PTH daily.   If calcium level less then 6.9 mg/dl, please give calcium carbonate infusion with a 1 L NS bag containing at least 900-1000 mg of elemental calcium to be given over 24 hours.    2.  Post surgical hypothyroidism - Cont Levothyroxine 50mcg IV daily.    3. Adrenal nodule - CT scan showed 1.4cm adrenal nodule. Can be followed up as an OP.    Will continue to monitor     For discharge, pt can continue TBD by clinical course    Pt can follow up at discharge with [facility] by calling  to make an appointment.   Will discuss case with Dr. Morgan and update primary team INTERVAL HPI/OVERNIGHT EVENTS:    Patient seen and examined at bedside. He denies any perioral numbness or paresthesias of the hand. No muscle pain or spasm. He is tolerating Jevity tube feeds now at bolus Q6H. Denies N/V but has been having some constipation - ;ast BM was 3 days ago.  PTH is still undetectable. Calcium trending slightly down for 2 days, 7.5, Albumin 3.2 with corrected Ca 8.6.   He is currently receiving calcitriol 0.50mcg Q12H, calcium carbonate 2500mg TID.     Pt reports the following symptoms:    CONSTITUTIONAL:  Negative fever or chills, feels well, good appetite  EYES:  Negative  blurry vision or double vision  CARDIOVASCULAR:  Negative for chest pain or palpitations  RESPIRATORY:  Negative for cough, wheezing, or SOB   GASTROINTESTINAL:  Negative for nausea, vomiting, diarrhea, constipation, or abdominal pain  GENITOURINARY:  Negative frequency, urgency or dysuria  NEUROLOGIC:  No headache, confusion, dizziness, lightheadedness    MEDICATIONS  (STANDING):  calcitriol  Solution 0.5 MICROGram(s) Enteral Tube <User Schedule>  calcium carbonate   Suspension 2500 milliGRAM(s) Oral three times a day  ceFAZolin   IVPB 1000 milliGRAM(s) IV Intermittent every 8 hours  chlorhexidine 4% Liquid 1 Application(s) Topical <User Schedule>  dextrose 5%. 1000 milliLiter(s) (50 mL/Hr) IV Continuous <Continuous>  dextrose 50% Injectable 12.5 Gram(s) IV Push once  dextrose 50% Injectable 25 Gram(s) IV Push once  dextrose 50% Injectable 25 Gram(s) IV Push once  docusate sodium Liquid 100 milliGRAM(s) Oral two times a day  enoxaparin Injectable 40 milliGRAM(s) SubCutaneous every 24 hours  insulin lispro (HumaLOG) corrective regimen sliding scale   SubCutaneous Before meals and at bedtime  levothyroxine Injectable 50 MICROGram(s) IV Push <User Schedule>  metroNIDAZOLE  IVPB 500 milliGRAM(s) IV Intermittent every 8 hours  senna Syrup 10 milliLiter(s) Oral at bedtime    MEDICATIONS  (PRN):  bisacodyl Suppository 10 milliGRAM(s) Rectal daily PRN Constipation  dextrose 40% Gel 15 Gram(s) Oral once PRN Blood Glucose LESS THAN 70 milliGRAM(s)/deciliter  glucagon  Injectable 1 milliGRAM(s) IntraMuscular once PRN Glucose LESS THAN 70 milligrams/deciliter  oxyCODONE    IR 5 milliGRAM(s) Oral every 4 hours PRN Moderate Pain (4 - 6)  oxyCODONE    IR 10 milliGRAM(s) Oral every 4 hours PRN Severe Pain (7 - 10)  polyethylene glycol 3350 17 Gram(s) Oral daily PRN Constipation      PHYSICAL EXAM  Vital Signs Last 24 Hrs  T(C): 37.2 (08 Jul 2019 14:00), Max: 37.2 (08 Jul 2019 14:00)  T(F): 99 (08 Jul 2019 14:00), Max: 99 (08 Jul 2019 14:00)  HR: 67 (08 Jul 2019 09:02) (62 - 80)  BP: 105/65 (08 Jul 2019 08:50) (91/53 - 110/58)  BP(mean): 80 (08 Jul 2019 08:50) (65 - 80)  RR: 18 (08 Jul 2019 09:02) (16 - 24)  SpO2: 98% (08 Jul 2019 09:02) (93% - 98%)    Constitutional: wn/wd in NAD.   HEENT: NCAT, MMM, no proptosis or lid retraction  Neck: surgical scar and tracheostomy site - looks wnl.  Respiratory: lungs CTAB.  Cardiovascular: regular rhythm, normal S1 and S2, no audible murmurs, no peripheral edema  GI: soft, NT/ND, no masses. PEG tube in place  Neurology: no tremors, DTR 2+. no muscular spasm  Skin: no visible rashes/lesions  Psychiatric: Awake and alert, normal affect/mood.    LABS:                        9.7    6.59  )-----------( 407      ( 07 Jul 2019 06:50 )             30.6     07-07    140  |  97  |  10  ----------------------------<  91  4.9   |  35<H>  |  0.63    Ca    7.5<L>      08 Jul 2019 11:02  Phos  4.7     07-08  Mg     2.0     07-07    TPro  x   /  Alb  3.2<L>  /  TBili  x   /  DBili  x   /  AST  x   /  ALT  x   /  AlkPhos  x   07-07        Thyroid Stimulating Hormone, Serum: 1.483 uIU/mL (07-03 @ 04:51)      HbA1C: 5.7 % (07-03 @ 11:17)    CAPILLARY BLOOD GLUCOSE      POCT Blood Glucose.: 156 mg/dL (08 Jul 2019 11:37)  POCT Blood Glucose.: 88 mg/dL (08 Jul 2019 06:36)  POCT Blood Glucose.: 134 mg/dL (07 Jul 2019 21:43)  POCT Blood Glucose.: 120 mg/dL (07 Jul 2019 16:31)      Insulin Sliding Scale requirements X 24 Hours:    RADIOLOGY & ADDITIONAL TESTS:    A/P: 63M hx COPD, 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, s/p DL biopsy for recurrence of laryngeal CA (found on frozen) and planned revision tracheostomy 6/25 due to tenuous airway 2/2                                                                                                                                                                                                                                                                                                                                                                                                                                                                                                                                                                                                                                                                                                                                                                                                                                                                                                                                                                                                                                                                                                                                                                                                                                                                                                                                                                                                                                                                                                                                                                                                                                                                                                                                                                                                                                                                                                                                                                                                                                                                                                                                                                                                                                                                                                                                                                                                                                                                                                                                                                                                                                                                                                                                                                                                                                                                                                                                                                                                                                                                                                                                                                                              recurrence laryngeal ca and radical laryngectomy with thyroidectomy and suspected parathyroidectomy on 7/2 now being treated for post op hypocalcemia.    1.  Hypocalcemia 2/2 hypoparathyroidism - Patients calcium is trending down over 2 days. Continue calcitriol 0.5 mcg Q12H. Also continue Calcium carbonate 2500 mg PO TID. Check serum Ca every 8 hours. Check albumin every other day. check phos, ionized ca daily. Check PTH daily.   If calcium level less then 6.9 mg/dl, please give calcium carbonate infusion with a 1 L NS bag containing at least 900-1000 mg of elemental calcium to be given over 24 hours.    2.  Post surgical hypothyroidism - Cont Levothyroxine 50mcg IV daily.    3. Adrenal nodule - CT scan showed 1.4cm adrenal nodule. Can be followed up as an OP.    Will continue to monitor     For discharge, pt can continue TBD by clinical course    Pt can follow up at discharge with [facility] by calling  to make an appointment.   Will discuss case with Dr. Morgan and update primary team INTERVAL HPI/OVERNIGHT EVENTS:    Patient seen and examined at bedside. He denies any perioral numbness or paresthesias of the hand. No muscle pain or spasm. He is tolerating Jevity tube feeds now at bolus Q6H. Denies N/V but has been having some constipation - last BM was 3 days ago.  PTH is still undetectable. Calcium trending slightly down for 2 days, 7.5, Albumin 3.2 with corrected Ca 8.6.   He is currently receiving calcitriol 0.50mcg Q12H, calcium carbonate 2500mg TID.     Pt reports the following symptoms:    CONSTITUTIONAL:  Negative fever or chills, feels well, good appetite  EYES:  Negative  blurry vision or double vision  CARDIOVASCULAR:  Negative for chest pain or palpitations  RESPIRATORY:  Negative for cough, wheezing, or SOB   GASTROINTESTINAL:  Negative for nausea, vomiting, diarrhea, constipation, or abdominal pain  GENITOURINARY:  Negative frequency, urgency or dysuria  NEUROLOGIC:  No headache, confusion, dizziness, lightheadedness    MEDICATIONS  (STANDING):  calcitriol  Solution 0.5 MICROGram(s) Enteral Tube <User Schedule>  calcium carbonate   Suspension 2500 milliGRAM(s) Oral three times a day  ceFAZolin   IVPB 1000 milliGRAM(s) IV Intermittent every 8 hours  chlorhexidine 4% Liquid 1 Application(s) Topical <User Schedule>  dextrose 5%. 1000 milliLiter(s) (50 mL/Hr) IV Continuous <Continuous>  dextrose 50% Injectable 12.5 Gram(s) IV Push once  dextrose 50% Injectable 25 Gram(s) IV Push once  dextrose 50% Injectable 25 Gram(s) IV Push once  docusate sodium Liquid 100 milliGRAM(s) Oral two times a day  enoxaparin Injectable 40 milliGRAM(s) SubCutaneous every 24 hours  insulin lispro (HumaLOG) corrective regimen sliding scale   SubCutaneous Before meals and at bedtime  levothyroxine Injectable 50 MICROGram(s) IV Push <User Schedule>  metroNIDAZOLE  IVPB 500 milliGRAM(s) IV Intermittent every 8 hours  senna Syrup 10 milliLiter(s) Oral at bedtime    MEDICATIONS  (PRN):  bisacodyl Suppository 10 milliGRAM(s) Rectal daily PRN Constipation  dextrose 40% Gel 15 Gram(s) Oral once PRN Blood Glucose LESS THAN 70 milliGRAM(s)/deciliter  glucagon  Injectable 1 milliGRAM(s) IntraMuscular once PRN Glucose LESS THAN 70 milligrams/deciliter  oxyCODONE    IR 5 milliGRAM(s) Oral every 4 hours PRN Moderate Pain (4 - 6)  oxyCODONE    IR 10 milliGRAM(s) Oral every 4 hours PRN Severe Pain (7 - 10)  polyethylene glycol 3350 17 Gram(s) Oral daily PRN Constipation      PHYSICAL EXAM  Vital Signs Last 24 Hrs  T(C): 37.2 (08 Jul 2019 14:00), Max: 37.2 (08 Jul 2019 14:00)  T(F): 99 (08 Jul 2019 14:00), Max: 99 (08 Jul 2019 14:00)  HR: 67 (08 Jul 2019 09:02) (62 - 80)  BP: 105/65 (08 Jul 2019 08:50) (91/53 - 110/58)  BP(mean): 80 (08 Jul 2019 08:50) (65 - 80)  RR: 18 (08 Jul 2019 09:02) (16 - 24)  SpO2: 98% (08 Jul 2019 09:02) (93% - 98%)    Constitutional: wn/wd in NAD.   HEENT: NCAT, MMM, no proptosis or lid retraction  Neck: surgical scar and tracheostomy site - looks wnl.  Respiratory: lungs CTAB.  Cardiovascular: regular rhythm, normal S1 and S2, no audible murmurs, no peripheral edema  GI: soft, NT/ND, no masses. PEG tube in place  Neurology: no tremors, DTR 2+. no muscular spasm  Skin: no visible rashes/lesions  Psychiatric: Awake and alert, normal affect/mood.    LABS:                        9.7    6.59  )-----------( 407      ( 07 Jul 2019 06:50 )             30.6     07-07    140  |  97  |  10  ----------------------------<  91  4.9   |  35<H>  |  0.63    Ca    7.5<L>      08 Jul 2019 11:02  Phos  4.7     07-08  Mg     2.0     07-07    TPro  x   /  Alb  3.2<L>  /  TBili  x   /  DBili  x   /  AST  x   /  ALT  x   /  AlkPhos  x   07-07        Thyroid Stimulating Hormone, Serum: 1.483 uIU/mL (07-03 @ 04:51)      HbA1C: 5.7 % (07-03 @ 11:17)    CAPILLARY BLOOD GLUCOSE      POCT Blood Glucose.: 156 mg/dL (08 Jul 2019 11:37)  POCT Blood Glucose.: 88 mg/dL (08 Jul 2019 06:36)  POCT Blood Glucose.: 134 mg/dL (07 Jul 2019 21:43)  POCT Blood Glucose.: 120 mg/dL (07 Jul 2019 16:31)      Insulin Sliding Scale requirements X 24 Hours:    RADIOLOGY & ADDITIONAL TESTS:    A/P: 63M hx COPD, 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, s/p DL biopsy for recurrence of laryngeal CA (found on frozen) and planned revision tracheostomy 6/25 due to tenuous airway 2/2                                                                                                                                                                                                                                                                                                                                                                                                                                                                                                                                                                                                                                                                                                                                                                                                                                                                                                                                                                                                                                                                                                                                                                                                                                                                                                                                                                                                                                                                                                                                                                                                                                                                                                                                                                                                                                                                                                                                                                                                                                                                                                                                                                                                                                                                                                                                                                                                                                                                                                                                                                                                                                                                                                                                                                                                                                                                                                                                                                                                                                                                                                                                                                                              recurrence laryngeal ca and radical laryngectomy with thyroidectomy and suspected parathyroidectomy on 7/2 now being treated for post op hypocalcemia.    1.  Hypocalcemia 2/2 hypoparathyroidism - Patients calcium is trending down over 2 days. Continue calcitriol 0.5 mcg Q12H. Also continue Calcium carbonate 2500 mg PO TID. Check serum Ca every 8 hours. Check albumin every other day. check phos, ionized ca daily. Check PTH daily. Vit D level is pending (if Vit D level is low, will consider starting on Vit D3 2000IU daily)  If calcium level less then 6.9 mg/dl, please give calcium carbonate infusion with a 1 L NS bag containing at least 900-1000 mg of elemental calcium to be given over 24 hours.    2.  Post surgical hypothyroidism - Cont Levothyroxine 50mcg IV daily.    3. Adrenal nodule - CT scan showed 1.4cm adrenal nodule. Can be followed up as an OP.    Will continue to monitor     For discharge, pt can continue TBD by clinical course    Pt can follow up at discharge with [facility] by calling  to make an appointment.   Will discuss case with Dr. Morgan and update primary team

## 2019-07-08 NOTE — PROGRESS NOTE ADULT - SUBJECTIVE AND OBJECTIVE BOX
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 C/b return to OR  for exchange to 6 distal XLT due to tracheomalacia and poor ventilatory status after coughing episode, now sp salvage TL, L myocutaneous pectoralis major flap.    6/26: Now doing well in SICU overnight, no additional respiratory issues, cuff taken down thsi AM. Failed SLP evaluation with evidence of gross aspiration on ice chip trial  6/27: has thick purulent secretions, SICU will keep for one more night. Low grade temps to 100.4. Will need PEG  6/28: NPO for PEG today. To SDU, when secretions are better managed. Received PEG for inability to swallow.  6/29: still requiring suctioning q30 minutes, small amount of glycopyrrolate given by SICU, received PEG tomorrow, will start trickle feeds  6/30: NAEON, feeds up to goal, still requiring frequent suctioning, will stop glycopyrrolate today  7/01: NAEON, tube feeds at goal, still requiring q1hr suctioning of copious thin nonpurulent secretions, dc'd glycopyrrolate  7/03: pt underwent TL, uncomplicated, tolerated; post-op PTH, iCa low so started on Rocaltrol, Ca IV; neck ABDON x1, left chest ABDON x2, salivary bypass stents in place  7/4: No acute events overnight. Calcium stable. Denies perioral/fingertip tingling. Tolerating trickle feeds. No new complaints.   7/05: NAEON. Pt complaining of increased pain. Otherwise, no new complaints. Calcium levels continue to be stable. Denies perioral/fingertip tingling. Tolerating TFs at goal.  7/6: No acute events, doing well, OOBTC. Tolerating TF at goal, will transition to bolus. Calcium changed yesterday to 2500mg q8h. Chest ABDON x1 removed today. Stable for transfer to SDU.   7/7: No acute events. Transferred to SDU yesterday. TF transitioned to bolus, tolerating well. Corrected Ca stable. Will decrease Ca checks to q12h per endocrinology.   7/8: No acute events, breathing comfortably with Bebeto tube in place. TFs well tolerated at bolus without subjective S&S reflux. Corrected Ca stable. Following Ca checks q12, Endocrinology following.      Allergies    penicillin (Rash)    Intolerances        MEDICATIONS:  Antiinfectives:   ceFAZolin   IVPB 1000 milliGRAM(s) IV Intermittent every 8 hours  metroNIDAZOLE  IVPB 500 milliGRAM(s) IV Intermittent every 8 hours    IV fluids:  calcitriol  Solution 0.5 MICROGram(s) Enteral Tube <User Schedule>  calcium carbonate   Suspension 2500 milliGRAM(s) Oral three times a day  dextrose 5%. 1000 milliLiter(s) IV Continuous <Continuous>    Hematologic/Anticoagulation:  enoxaparin Injectable 40 milliGRAM(s) SubCutaneous every 24 hours    Pain medications/Neuro:  oxyCODONE    IR 5 milliGRAM(s) Oral every 4 hours PRN  oxyCODONE    IR 10 milliGRAM(s) Oral every 4 hours PRN    Endocrine Medications:   dextrose 40% Gel 15 Gram(s) Oral once PRN  dextrose 50% Injectable 12.5 Gram(s) IV Push once  dextrose 50% Injectable 25 Gram(s) IV Push once  dextrose 50% Injectable 25 Gram(s) IV Push once  glucagon  Injectable 1 milliGRAM(s) IntraMuscular once PRN  insulin lispro (HumaLOG) corrective regimen sliding scale   SubCutaneous Before meals and at bedtime  levothyroxine Injectable 50 MICROGram(s) IV Push <User Schedule>    All other standing medications:   chlorhexidine 4% Liquid 1 Application(s) Topical <User Schedule>  docusate sodium Liquid 100 milliGRAM(s) Oral two times a day  senna Syrup 10 milliLiter(s) Oral at bedtime    All other PRN medications:  bisacodyl Suppository 10 milliGRAM(s) Rectal daily PRN  polyethylene glycol 3350 17 Gram(s) Oral daily PRN      Vital Signs Last 24 Hrs  T(C): 36.8 (08 Jul 2019 04:58), Max: 37.1 (07 Jul 2019 21:12)  T(F): 98.2 (08 Jul 2019 04:58), Max: 98.7 (07 Jul 2019 21:12)  HR: 64 (08 Jul 2019 06:47) (62 - 80)  BP: 110/58 (08 Jul 2019 03:25) (91/53 - 114/68)  BP(mean): 79 (08 Jul 2019 03:25) (65 - 85)  RR: 24 (08 Jul 2019 06:47) (14 - 24)  SpO2: 96% (08 Jul 2019 06:47) (93% - 98%)        PHYSICAL EXAM:  NAD, A&Ox3, tolerating TFs at goal, breathing comfortably on RA  Face: no asymmetry  Neck: incisions c/d/i; neck soft, flat; ABDON holding suction; size 10 bebeto tube sutured in place, minimal secretions suctioned, inner cannula cleaned  Chest: incisions c/d/i; chest soft, flat; ABDON sites c/d/i well healing      LABS:                        9.7    6.59  )-----------( 407      ( 07 Jul 2019 06:50 )             30.6     07-07    140  |  97  |  10  ----------------------------<  91  4.9   |  35<H>  |  0.63    Ca    7.6<L>      07 Jul 2019 22:18  Phos  4.7     07-08  Mg     2.0     07-07    TPro  x   /  Alb  3.2<L>  /  TBili  x   /  DBili  x   /  AST  x   /  ALT  x   /  AlkPhos  x   07-07          RADIOLOGY & ADDITIONAL STUDIES:      Assessment/Plan:    63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 Chest JPs removed, neck stable no e/o hematoma or seroma.  - PATIENT IS NOT ABLE TO BE NASALLY OR ORALLY INTUBATED  - Ca stable, q12 checks, pending am Ca  - Appreciate endocrinology recs; synthroid, rocaltrol and calcium per endo  - Monitor Ca q12h  - Monitor ABDON output, 2 chest ABDON dc'd  - Continue bolus TFs  - Pain control  - Suction frequently w/red rubber catheters only, humidified air at all times  - SCDs, Lovenox, OOB/ambulate  - Bowel regimen  - d/w attending MD whom agrees with the above plan

## 2019-07-08 NOTE — PROGRESS NOTE ADULT - ATTENDING COMMENTS
Patient seen and evaluated at the bedside with Endocrine Fellow Taisha .He remains in good spirits. He is getting Gevity feedings.He continues on 0.5 mg Rocaltrol daily and 05185 mg Calcium carbonate. Corrected Calcium today is 8.4 mg%. Chvostoek remains negative and he has no numbness or hand cramps.

## 2019-07-08 NOTE — PROGRESS NOTE ADULT - SUBJECTIVE AND OBJECTIVE BOX
ENT, Head and Neck Surgery Event Note      Patient seen and examined at bedside. Patient breathing comfortably on 10 Shiley Nasrin tube, 4 pt sutured. Sutures removed and 10 Shiley Nasrin tube removed. Stoma c/d/i, no e/o dehiscence. 10-55 Nasrin tube replaced in the stoma, no resistance. Suctioned passed with aspiration of minimal mucoid secretions. Soft collar put in place, loose.   - patient has a formalized stoma, cannot be intubated from above  - 10-55 Nasrin tube is a soft silicone non-disposible tube WITHOUT an inner cannula  - if the patient is having difficulty breathing, please suction; if you cannot pass suction or are having difficulty suctioning, you can remove the Nasrin tube for easier suctioning  - Nasrin tube can be cleaned with soap and water, to be cleaned bid by ENT team or prn by nursing  - soft collar should be kept loose  - patient can receive humidification from trach collar humidification or via HME valves which can be placed onto the Nasrin tube      Page ENT with questions, [phone]

## 2019-07-09 LAB
ALBUMIN SERPL ELPH-MCNC: 3.4 G/DL — SIGNIFICANT CHANGE UP (ref 3.3–5)
CALCIUM SERPL-MCNC: 8.2 MG/DL — LOW (ref 8.4–10.5)
CALCIUM SERPL-MCNC: 8.4 MG/DL — SIGNIFICANT CHANGE UP (ref 8.4–10.5)
GLUCOSE BLDC GLUCOMTR-MCNC: 109 MG/DL — HIGH (ref 70–99)
GLUCOSE BLDC GLUCOMTR-MCNC: 114 MG/DL — HIGH (ref 70–99)
GLUCOSE BLDC GLUCOMTR-MCNC: 138 MG/DL — HIGH (ref 70–99)
GLUCOSE BLDC GLUCOMTR-MCNC: 159 MG/DL — HIGH (ref 70–99)
GLUCOSE BLDC GLUCOMTR-MCNC: 89 MG/DL — SIGNIFICANT CHANGE UP (ref 70–99)
PHOSPHATE SERPL-MCNC: 5.1 MG/DL — HIGH (ref 2.5–4.5)

## 2019-07-09 RX ADMIN — Medication 50 MICROGRAM(S): at 11:26

## 2019-07-09 RX ADMIN — OXYCODONE HYDROCHLORIDE 10 MILLIGRAM(S): 5 TABLET ORAL at 10:04

## 2019-07-09 RX ADMIN — Medication 100 MILLIGRAM(S): at 17:12

## 2019-07-09 RX ADMIN — Medication 2500 MILLIGRAM(S): at 14:14

## 2019-07-09 RX ADMIN — Medication 2500 MILLIGRAM(S): at 21:45

## 2019-07-09 RX ADMIN — OXYCODONE HYDROCHLORIDE 10 MILLIGRAM(S): 5 TABLET ORAL at 17:11

## 2019-07-09 RX ADMIN — OXYCODONE HYDROCHLORIDE 10 MILLIGRAM(S): 5 TABLET ORAL at 18:00

## 2019-07-09 RX ADMIN — ENOXAPARIN SODIUM 40 MILLIGRAM(S): 100 INJECTION SUBCUTANEOUS at 11:26

## 2019-07-09 RX ADMIN — Medication 2500 MILLIGRAM(S): at 07:16

## 2019-07-09 RX ADMIN — CALCITRIOL 0.5 MICROGRAM(S): 0.5 CAPSULE ORAL at 21:45

## 2019-07-09 RX ADMIN — Medication 100 MILLIGRAM(S): at 17:11

## 2019-07-09 RX ADMIN — CHLORHEXIDINE GLUCONATE 1 APPLICATION(S): 213 SOLUTION TOPICAL at 07:17

## 2019-07-09 RX ADMIN — OXYCODONE HYDROCHLORIDE 10 MILLIGRAM(S): 5 TABLET ORAL at 11:00

## 2019-07-09 RX ADMIN — Medication 100 MILLIGRAM(S): at 07:16

## 2019-07-09 RX ADMIN — Medication 100 MILLIGRAM(S): at 01:12

## 2019-07-09 RX ADMIN — Medication 100 MILLIGRAM(S): at 14:14

## 2019-07-09 RX ADMIN — SENNA PLUS 10 MILLILITER(S): 8.6 TABLET ORAL at 21:45

## 2019-07-09 RX ADMIN — Medication 100 MILLIGRAM(S): at 09:59

## 2019-07-09 RX ADMIN — Medication 100 MILLIGRAM(S): at 21:45

## 2019-07-09 RX ADMIN — CALCITRIOL 0.5 MICROGRAM(S): 0.5 CAPSULE ORAL at 09:59

## 2019-07-09 NOTE — PROGRESS NOTE ADULT - SUBJECTIVE AND OBJECTIVE BOX
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 C/b return to OR  for exchange to 6 distal XLT due to tracheomalacia and poor ventilatory status after coughing episode, now sp salvage TL, L myocutaneous pectoralis major flap.    6/26: Now doing well in SICU overnight, no additional respiratory issues, cuff taken down thsi AM. Failed SLP evaluation with evidence of gross aspiration on ice chip trial  6/27: has thick purulent secretions, SICU will keep for one more night. Low grade temps to 100.4. Will need PEG  6/28: NPO for PEG today. To SDU, when secretions are better managed. Received PEG for inability to swallow.  6/29: still requiring suctioning q30 minutes, small amount of glycopyrrolate given by SICU, received PEG tomorrow, will start trickle feeds  6/30: NAEON, feeds up to goal, still requiring frequent suctioning, will stop glycopyrrolate today  7/01: NAEON, tube feeds at goal, still requiring q1hr suctioning of copious thin nonpurulent secretions, dc'd glycopyrrolate  7/03: pt underwent TL, uncomplicated, tolerated; post-op PTH, iCa low so started on Rocaltrol, Ca IV; neck ABDON x1, left chest ABDON x2, salivary bypass stents in place  7/4: No acute events overnight. Calcium stable. Denies perioral/fingertip tingling. Tolerating trickle feeds. No new complaints.   7/05: NAEON. Pt complaining of increased pain. Otherwise, no new complaints. Calcium levels continue to be stable. Denies perioral/fingertip tingling. Tolerating TFs at goal.  7/6: No acute events, doing well, OOBTC. Tolerating TF at goal, will transition to bolus. Calcium changed yesterday to 2500mg q8h. Chest ABDON x1 removed today. Stable for transfer to SDU.   7/7: No acute events. Transferred to SDU yesterday. TF transitioned to bolus, tolerating well. Corrected Ca stable. Will decrease Ca checks to q12h per endocrinology.   7/8: No acute events, breathing comfortably with Shiley sutured Bebeto tube in place. TFs well tolerated at bolus without subjective S&S reflux. Corrected Ca stable. Following Ca checks q12, Endocrinology following.  7/9: No acute events. Yesterday evening 10 Shiley Bebeto tube removed and replaced with Provox Bebeto tube with soft collar, well tolerated breathing comfortably. Minimal suctioning requirements per nursing. Corrected Ca stable, Endocrinology following. Denies pain, has been OOBTC. Has not had a BM in a few days, feels constipated.      Allergies    penicillin (Rash)    Intolerances        MEDICATIONS  (STANDING):  calcitriol  Solution 0.5 MICROGram(s) Enteral Tube <User Schedule>  calcium carbonate   Suspension 2500 milliGRAM(s) Oral three times a day  ceFAZolin   IVPB 1000 milliGRAM(s) IV Intermittent every 8 hours  chlorhexidine 4% Liquid 1 Application(s) Topical <User Schedule>  dextrose 5%. 1000 milliLiter(s) (50 mL/Hr) IV Continuous <Continuous>  dextrose 50% Injectable 12.5 Gram(s) IV Push once  dextrose 50% Injectable 25 Gram(s) IV Push once  dextrose 50% Injectable 25 Gram(s) IV Push once  docusate sodium Liquid 100 milliGRAM(s) Oral two times a day  enoxaparin Injectable 40 milliGRAM(s) SubCutaneous every 24 hours  insulin lispro (HumaLOG) corrective regimen sliding scale   SubCutaneous Before meals and at bedtime  levothyroxine Injectable 50 MICROGram(s) IV Push <User Schedule>  metroNIDAZOLE  IVPB 500 milliGRAM(s) IV Intermittent every 8 hours  senna Syrup 10 milliLiter(s) Oral at bedtime    MEDICATIONS  (PRN):  bisacodyl Suppository 10 milliGRAM(s) Rectal daily PRN Constipation  dextrose 40% Gel 15 Gram(s) Oral once PRN Blood Glucose LESS THAN 70 milliGRAM(s)/deciliter  glucagon  Injectable 1 milliGRAM(s) IntraMuscular once PRN Glucose LESS THAN 70 milligrams/deciliter  oxyCODONE    IR 5 milliGRAM(s) Oral every 4 hours PRN Moderate Pain (4 - 6)  oxyCODONE    IR 10 milliGRAM(s) Oral every 4 hours PRN Severe Pain (7 - 10)  polyethylene glycol 3350 17 Gram(s) Oral daily PRN Constipation        Vital Signs Last 24 Hrs  T(C): 36.4 (09 Jul 2019 04:51), Max: 37.3 (08 Jul 2019 16:46)  T(F): 97.6 (09 Jul 2019 04:51), Max: 99.2 (08 Jul 2019 16:46)  HR: 58 (09 Jul 2019 05:00) (58 - 78)  BP: 112/71 (09 Jul 2019 05:00) (95/55 - 114/66)  BP(mean): 86 (09 Jul 2019 05:00) (69 - 86)  RR: 16 (09 Jul 2019 01:00) (16 - 19)  SpO2: 97% (09 Jul 2019 05:00) (96% - 98%)        PHYSICAL EXAM:  NAD, A&Ox3, tolerating TFs at goal, breathing comfortably on RA  Face: no asymmetry  Neck: incisions c/d/i; neck soft, flat; R neck ABDON holding suction  Size 10-55 provox bebeto tube in place on soft collar, stoma c/d/i  Minimal secretions suctioned, Beebto tube cleaned  Chest: incisions c/d/i; chest soft, flat; ABDON sites c/d/i well healing      LABS:               Ca    7.6<L>      08 Jul 2019 22:54  Phos  4.7     07-08    TPro  x   /  Alb  3.2<L>  /  TBili  x   /  DBili  x   /  AST  x   /  ALT  x   /  AlkPhos  x   07-07        RADIOLOGY & ADDITIONAL STUDIES:      Assessment/Plan:    63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 Chest JPs removed, neck stable no e/o hematoma or seroma. Well tolerating provox Bebeto tube.  - PATIENT IS NOT ABLE TO BE NASALLY OR ORALLY INTUBATED - patient has a formalized stoma  - Trach care  - 10-55 Bebeto tube is a soft silicone non-disposible tube WITHOUT an inner cannula  - if the patient is having difficulty breathing, please suction; if you cannot pass suction or are having difficulty suctioning, you can remove the Bebeto tube for easier suctioning  - Bebeto tube can be cleaned with soap and water, to be cleaned bid by ENT team or prn by nursing  - soft collar should be kept loose to avoid flap compression  - patient can receive humidification via HME valves which can be placed onto the Bebeto tube  - Ca stable, q12 checks, pending am Ca, Phos, albumin  - Appreciate endocrinology recs;   - Synthroid, rocaltrol and calcium per endo  - Monitor R neck ABDON output, 2 chest ABDON dc'd  - Continue bolus TFs vPEG  - Pain control  - SCDs, Lovenox, OOB/ambulate  - Bowel regimen    D/w attending MD whom agrees with the above plan

## 2019-07-09 NOTE — PROGRESS NOTE ADULT - ATTENDING COMMENTS
Patient seen at bedside with Endocrine Fellow Dr. Sumner. He is affable and tolerating his situation well. He continues on Rocaltrol 0.5 mg twice daily and Calcium carbonate therapy. He has not had numbness or hand cramps. His calcium is 8.2 mg% with P 5.1.

## 2019-07-09 NOTE — PROGRESS NOTE ADULT - SUBJECTIVE AND OBJECTIVE BOX
INTERVAL HPI/OVERNIGHT EVENTS:      Patient seen and examined at bedside. He denies any perioral numbness or paresthesias of the hand. No muscle pain or spasm. He is tolerating Jevity tube feeds now at bolus Q6H for a total of 7 cans daily. Denies N/V but has been having some constipation.  PTH is still undetectable. Calcium trending slightly trending up. Ca 8.2, Albumin 3.4 with corrected Ca 8.7, Phos 5.1.   He is currently receiving calcitriol 0.50mcg Q12H, calcium carbonate 2500mg TID.       Pt reports the following symptoms:    CONSTITUTIONAL:  Negative fever or chills, feels well, good appetite  EYES:  Negative  blurry vision or double vision  CARDIOVASCULAR:  Negative for chest pain or palpitations  RESPIRATORY:  Negative for cough, wheezing, or SOB   GASTROINTESTINAL:  Negative for nausea, vomiting, diarrhea, constipation, or abdominal pain  GENITOURINARY:  Negative frequency, urgency or dysuria  NEUROLOGIC:  No headache, confusion, dizziness, lightheadedness    MEDICATIONS  (STANDING):  calcitriol  Solution 0.5 MICROGram(s) Enteral Tube <User Schedule>  calcium carbonate   Suspension 2500 milliGRAM(s) Oral three times a day  ceFAZolin   IVPB 1000 milliGRAM(s) IV Intermittent every 8 hours  chlorhexidine 4% Liquid 1 Application(s) Topical <User Schedule>  dextrose 5%. 1000 milliLiter(s) (50 mL/Hr) IV Continuous <Continuous>  dextrose 50% Injectable 12.5 Gram(s) IV Push once  dextrose 50% Injectable 25 Gram(s) IV Push once  dextrose 50% Injectable 25 Gram(s) IV Push once  docusate sodium Liquid 100 milliGRAM(s) Oral two times a day  enoxaparin Injectable 40 milliGRAM(s) SubCutaneous every 24 hours  insulin lispro (HumaLOG) corrective regimen sliding scale   SubCutaneous Before meals and at bedtime  levothyroxine Injectable 50 MICROGram(s) IV Push <User Schedule>  metroNIDAZOLE  IVPB 500 milliGRAM(s) IV Intermittent every 8 hours  senna Syrup 10 milliLiter(s) Oral at bedtime    MEDICATIONS  (PRN):  bisacodyl Suppository 10 milliGRAM(s) Rectal daily PRN Constipation  dextrose 40% Gel 15 Gram(s) Oral once PRN Blood Glucose LESS THAN 70 milliGRAM(s)/deciliter  glucagon  Injectable 1 milliGRAM(s) IntraMuscular once PRN Glucose LESS THAN 70 milligrams/deciliter  oxyCODONE    IR 5 milliGRAM(s) Oral every 4 hours PRN Moderate Pain (4 - 6)  oxyCODONE    IR 10 milliGRAM(s) Oral every 4 hours PRN Severe Pain (7 - 10)  polyethylene glycol 3350 17 Gram(s) Oral daily PRN Constipation      PHYSICAL EXAM  Vital Signs Last 24 Hrs  T(C): 37.2 (09 Jul 2019 09:00), Max: 37.3 (08 Jul 2019 16:46)  T(F): 98.9 (09 Jul 2019 09:00), Max: 99.2 (08 Jul 2019 16:46)  HR: 68 (09 Jul 2019 08:18) (58 - 78)  BP: 104/65 (09 Jul 2019 08:18) (95/55 - 114/66)  BP(mean): 80 (09 Jul 2019 08:18) (69 - 86)  RR: 18 (09 Jul 2019 08:18) (16 - 18)  SpO2: 94% (09 Jul 2019 08:18) (94% - 98%)      Constitutional: wn/wd in NAD.   HEENT: NCAT, MMM, no proptosis or lid retraction  Neck: surgical scar and tracheostomy site - looks wnl.  Respiratory: lungs CTAB.  Cardiovascular: regular rhythm, normal S1 and S2, no audible murmurs, no peripheral edema  GI: soft, NT/ND, no masses. PEG tube in place  Neurology: no tremors, DTR 2+. no muscular spasm  Skin: no visible rashes/lesions  Psychiatric: Awake and alert, normal affect/mood.        LABS:      Ca    8.2<L>      09 Jul 2019 10:38  Phos  5.1     07-09    TPro  x   /  Alb  3.4  /  TBili  x   /  DBili  x   /  AST  x   /  ALT  x   /  AlkPhos  x   07-09        Thyroid Stimulating Hormone, Serum: 1.483 uIU/mL (07-03 @ 04:51)      HbA1C: 5.7 % (07-03 @ 11:17)    CAPILLARY BLOOD GLUCOSE      POCT Blood Glucose.: 109 mg/dL (09 Jul 2019 11:25)  POCT Blood Glucose.: 138 mg/dL (09 Jul 2019 07:38)  POCT Blood Glucose.: 159 mg/dL (09 Jul 2019 06:07)  POCT Blood Glucose.: 83 mg/dL (08 Jul 2019 22:20)  POCT Blood Glucose.: 160 mg/dL (08 Jul 2019 16:53)      WILL DISCUSS IN ROUNDS  A/P: 63M hx COPD, 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, s/p DL biopsy for recurrence of laryngeal CA (found on frozen) and planned revision tracheostomy 6/25 due to tenuous airway 2/2                                                                                                                                                                                                                                                                                                                                                                                                                                                                                                                                                                                                                                                                                                                                                                                                                                                                                                                                                                                                                                                                                                                                                                                                                                                                                                                                                                                                                                                                                                                                                                                                                                                                                                                                                                                                                                                                                                                                                                                                                                                                                                                                                                                                                                                                                                                                                                                                                                                                                                                                                                                                                                                                                                                                                                                                                                                                                                                                                                                                                                                                                                                                                                                              recurrence laryngeal ca and radical laryngectomy with thyroidectomy and suspected parathyroidectomy on 7/2 now being treated for post op hypocalcemia.    1.  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 Continue calcitriol 0.5 mcg Q12H. Also continue Calcium carbonate 2500 mg PO TID. Check serum Ca every 8 hours. Check albumin every other day. Check phos, ionized ca daily. Check PTH daily. Vit D level is pending (if Vit D level is low, will consider starting on Vit D3 2000IU daily)  If calcium level less then 6.9 mg/dl, please give calcium carbonate infusion with a 1 L NS bag containing at least 900-1000 mg of elemental calcium to be given over 24 hours.    2.  Post surgical hypothyroidism - Cont Levothyroxine 50mcg IV daily.    3. Adrenal nodule - CT scan showed 1.4cm adrenal nodule. Can be followed up as an OP.    Will continue to monitor     For discharge, pt can continue TBD by clinical course    Pt can follow up at discharge with [facility] by calling  to make an appointment.   Will discuss case with Dr. Morgan and update primary team INTERVAL HPI/OVERNIGHT EVENTS:      Patient seen and examined at bedside. He denies any perioral numbness or paresthesias of the hand. No muscle pain or spasm. He is tolerating Jevity tube feeds now at bolus Q6H for a total of 7 cans daily. Denies N/V but has been having some constipation.  PTH is still undetectable. Calcium trending slightly trending up. Ca 8.2, Albumin 3.4 with corrected Ca 8.7, Phos 5.1.   He is currently receiving calcitriol 0.50mcg Q12H, calcium carbonate 2500mg TID.       Pt reports the following symptoms:    CONSTITUTIONAL:  Negative fever or chills, feels well, good appetite  EYES:  Negative  blurry vision or double vision  CARDIOVASCULAR:  Negative for chest pain or palpitations  RESPIRATORY:  Negative for cough, wheezing, or SOB   GASTROINTESTINAL:  Negative for nausea, vomiting, diarrhea, constipation, or abdominal pain  GENITOURINARY:  Negative frequency, urgency or dysuria  NEUROLOGIC:  No headache, confusion, dizziness, lightheadedness    MEDICATIONS  (STANDING):  calcitriol  Solution 0.5 MICROGram(s) Enteral Tube <User Schedule>  calcium carbonate   Suspension 2500 milliGRAM(s) Oral three times a day  ceFAZolin   IVPB 1000 milliGRAM(s) IV Intermittent every 8 hours  chlorhexidine 4% Liquid 1 Application(s) Topical <User Schedule>  dextrose 5%. 1000 milliLiter(s) (50 mL/Hr) IV Continuous <Continuous>  dextrose 50% Injectable 12.5 Gram(s) IV Push once  dextrose 50% Injectable 25 Gram(s) IV Push once  dextrose 50% Injectable 25 Gram(s) IV Push once  docusate sodium Liquid 100 milliGRAM(s) Oral two times a day  enoxaparin Injectable 40 milliGRAM(s) SubCutaneous every 24 hours  insulin lispro (HumaLOG) corrective regimen sliding scale   SubCutaneous Before meals and at bedtime  levothyroxine Injectable 50 MICROGram(s) IV Push <User Schedule>  metroNIDAZOLE  IVPB 500 milliGRAM(s) IV Intermittent every 8 hours  senna Syrup 10 milliLiter(s) Oral at bedtime    MEDICATIONS  (PRN):  bisacodyl Suppository 10 milliGRAM(s) Rectal daily PRN Constipation  dextrose 40% Gel 15 Gram(s) Oral once PRN Blood Glucose LESS THAN 70 milliGRAM(s)/deciliter  glucagon  Injectable 1 milliGRAM(s) IntraMuscular once PRN Glucose LESS THAN 70 milligrams/deciliter  oxyCODONE    IR 5 milliGRAM(s) Oral every 4 hours PRN Moderate Pain (4 - 6)  oxyCODONE    IR 10 milliGRAM(s) Oral every 4 hours PRN Severe Pain (7 - 10)  polyethylene glycol 3350 17 Gram(s) Oral daily PRN Constipation      PHYSICAL EXAM  Vital Signs Last 24 Hrs  T(C): 37.2 (09 Jul 2019 09:00), Max: 37.3 (08 Jul 2019 16:46)  T(F): 98.9 (09 Jul 2019 09:00), Max: 99.2 (08 Jul 2019 16:46)  HR: 68 (09 Jul 2019 08:18) (58 - 78)  BP: 104/65 (09 Jul 2019 08:18) (95/55 - 114/66)  BP(mean): 80 (09 Jul 2019 08:18) (69 - 86)  RR: 18 (09 Jul 2019 08:18) (16 - 18)  SpO2: 94% (09 Jul 2019 08:18) (94% - 98%)      Constitutional: wn/wd in NAD.   HEENT: NCAT, MMM, no proptosis or lid retraction  Neck: surgical scar and tracheostomy site - looks wnl.  Respiratory: lungs CTAB.  Cardiovascular: regular rhythm, normal S1 and S2, no audible murmurs, no peripheral edema  GI: soft, NT/ND, no masses. PEG tube in place  Neurology: no tremors, DTR 2+. no muscular spasm  Skin: no visible rashes/lesions  Psychiatric: Awake and alert, normal affect/mood.        LABS:      Ca    8.2<L>      09 Jul 2019 10:38  Phos  5.1     07-09    TPro  x   /  Alb  3.4  /  TBili  x   /  DBili  x   /  AST  x   /  ALT  x   /  AlkPhos  x   07-09        Thyroid Stimulating Hormone, Serum: 1.483 uIU/mL (07-03 @ 04:51)      HbA1C: 5.7 % (07-03 @ 11:17)    CAPILLARY BLOOD GLUCOSE      POCT Blood Glucose.: 109 mg/dL (09 Jul 2019 11:25)  POCT Blood Glucose.: 138 mg/dL (09 Jul 2019 07:38)  POCT Blood Glucose.: 159 mg/dL (09 Jul 2019 06:07)  POCT Blood Glucose.: 83 mg/dL (08 Jul 2019 22:20)  POCT Blood Glucose.: 160 mg/dL (08 Jul 2019 16:53)    A/P: 63M hx COPD, 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, s/p DL biopsy for recurrence of laryngeal CA (found on frozen) and planned revision tracheostomy 6/25 due to tenuous airway 2/2                                                                                                                                                                                                                                                                                                                                                                                                                                                                                                                                                                                                                                                                                                                                                                                                                                                                                                                                                                                                                                                                                                                                                                                                                                                                                                                                                                                                                                                                                                                                                                                                                                                                                                                                                                                                                                                                                                                                                                                                                                                                                                                                                                                                                                                                                                                                                                                                                                                                                                                                                                                                                                                                                                                                                                                                                                                                                                                                                                                                                                                                                                                                                                                              recurrence laryngeal ca and radical laryngectomy with thyroidectomy and suspected parathyroidectomy on 7/2 now being treated for post op hypocalcemia.    1.  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 Continue calcitriol 0.5 mcg Q12H. Also continue Calcium carbonate 2500 mg PO TID. Check serum Ca every 8 hours. Check albumin every other day. Check phos, ionized ca daily. Check PTH daily. Vit D level is pending (if Vit D level is low, will consider starting on Vit D3 2000IU daily)  If calcium level less then 6.9 mg/dl, please give calcium carbonate infusion with a 1 L NS bag containing at least 900-1000 mg of elemental calcium to be given over 24 hours.    2.  Post surgical hypothyroidism - Cont Levothyroxine 50mcg IV daily.    3. Adrenal nodule - CT scan showed 1.4cm adrenal nodule. Can be followed up as an OP.    Will continue to monitor     For discharge, pt can continue TBD by clinical course    Pt can follow up at discharge with [facility] by calling  to make an appointment.   Will discuss case with Dr. Morgan and update primary team

## 2019-07-10 LAB
24R-OH-CALCIDIOL SERPL-MCNC: 10.5 NG/ML — LOW (ref 30–80)
ANION GAP SERPL CALC-SCNC: 12 MMOL/L — SIGNIFICANT CHANGE UP (ref 5–17)
BUN SERPL-MCNC: 12 MG/DL — SIGNIFICANT CHANGE UP (ref 7–23)
CALCIUM SERPL-MCNC: 8.4 MG/DL — SIGNIFICANT CHANGE UP (ref 8.4–10.5)
CHLORIDE SERPL-SCNC: 98 MMOL/L — SIGNIFICANT CHANGE UP (ref 96–108)
CO2 SERPL-SCNC: 30 MMOL/L — SIGNIFICANT CHANGE UP (ref 22–31)
CREAT SERPL-MCNC: 0.63 MG/DL — SIGNIFICANT CHANGE UP (ref 0.5–1.3)
GLUCOSE BLDC GLUCOMTR-MCNC: 71 MG/DL — SIGNIFICANT CHANGE UP (ref 70–99)
GLUCOSE BLDC GLUCOMTR-MCNC: 78 MG/DL — SIGNIFICANT CHANGE UP (ref 70–99)
GLUCOSE BLDC GLUCOMTR-MCNC: 80 MG/DL — SIGNIFICANT CHANGE UP (ref 70–99)
GLUCOSE BLDC GLUCOMTR-MCNC: 92 MG/DL — SIGNIFICANT CHANGE UP (ref 70–99)
GLUCOSE SERPL-MCNC: 94 MG/DL — SIGNIFICANT CHANGE UP (ref 70–99)
POTASSIUM SERPL-MCNC: 4.9 MMOL/L — SIGNIFICANT CHANGE UP (ref 3.5–5.3)
POTASSIUM SERPL-SCNC: 4.9 MMOL/L — SIGNIFICANT CHANGE UP (ref 3.5–5.3)
SODIUM SERPL-SCNC: 140 MMOL/L — SIGNIFICANT CHANGE UP (ref 135–145)
SURGICAL PATHOLOGY STUDY: SIGNIFICANT CHANGE UP

## 2019-07-10 RX ORDER — CHOLECALCIFEROL (VITAMIN D3) 125 MCG
2000 CAPSULE ORAL DAILY
Refills: 0 | Status: DISCONTINUED | OUTPATIENT
Start: 2019-07-10 | End: 2019-07-10

## 2019-07-10 RX ORDER — CHOLECALCIFEROL (VITAMIN D3) 125 MCG
4000 CAPSULE ORAL DAILY
Refills: 0 | Status: DISCONTINUED | OUTPATIENT
Start: 2019-07-10 | End: 2019-07-23

## 2019-07-10 RX ADMIN — Medication 50 MICROGRAM(S): at 11:56

## 2019-07-10 RX ADMIN — Medication 2000 UNIT(S): at 11:56

## 2019-07-10 RX ADMIN — Medication 100 MILLIGRAM(S): at 21:49

## 2019-07-10 RX ADMIN — CALCITRIOL 0.5 MICROGRAM(S): 0.5 CAPSULE ORAL at 23:30

## 2019-07-10 RX ADMIN — Medication 100 MILLIGRAM(S): at 17:28

## 2019-07-10 RX ADMIN — OXYCODONE HYDROCHLORIDE 10 MILLIGRAM(S): 5 TABLET ORAL at 23:33

## 2019-07-10 RX ADMIN — ENOXAPARIN SODIUM 40 MILLIGRAM(S): 100 INJECTION SUBCUTANEOUS at 11:57

## 2019-07-10 RX ADMIN — Medication 100 MILLIGRAM(S): at 14:26

## 2019-07-10 RX ADMIN — Medication 2500 MILLIGRAM(S): at 14:26

## 2019-07-10 RX ADMIN — Medication 4000 UNIT(S): at 21:49

## 2019-07-10 RX ADMIN — Medication 100 MILLIGRAM(S): at 05:45

## 2019-07-10 RX ADMIN — CALCITRIOL 0.5 MICROGRAM(S): 0.5 CAPSULE ORAL at 10:47

## 2019-07-10 RX ADMIN — SENNA PLUS 10 MILLILITER(S): 8.6 TABLET ORAL at 21:49

## 2019-07-10 RX ADMIN — Medication 2500 MILLIGRAM(S): at 05:45

## 2019-07-10 RX ADMIN — Medication 2500 MILLIGRAM(S): at 21:49

## 2019-07-10 RX ADMIN — Medication 100 MILLIGRAM(S): at 09:46

## 2019-07-10 RX ADMIN — OXYCODONE HYDROCHLORIDE 10 MILLIGRAM(S): 5 TABLET ORAL at 04:00

## 2019-07-10 RX ADMIN — OXYCODONE HYDROCHLORIDE 10 MILLIGRAM(S): 5 TABLET ORAL at 22:01

## 2019-07-10 RX ADMIN — OXYCODONE HYDROCHLORIDE 10 MILLIGRAM(S): 5 TABLET ORAL at 02:34

## 2019-07-10 RX ADMIN — OXYCODONE HYDROCHLORIDE 10 MILLIGRAM(S): 5 TABLET ORAL at 14:26

## 2019-07-10 RX ADMIN — Medication 100 MILLIGRAM(S): at 01:00

## 2019-07-10 NOTE — PROGRESS NOTE ADULT - SUBJECTIVE AND OBJECTIVE BOX
INTERVAL HPI/OVERNIGHT EVENTS:      Patient seen and examined at bedside. He denies any perioral numbness or paresthesias of the hand. No muscle pain or spasm. He is tolerating Jevity tube feeds now at bolus Q6H for a total of 7 cans daily. Denies N/V but has been having some constipation.  PTH is still undetectable. Calcium trending trending up. Ca , Albumin 3.4 with corrected Ca , Phos 5.1. Vitamin D 25 is 10.5.  He is currently receiving calcitriol 0.50mcg Q12H, calcium carbonate 2500mg TID.       Pt reports the following symptoms:    CONSTITUTIONAL:  Negative fever or chills, feels well, good appetite  EYES:  Negative  blurry vision or double vision  CARDIOVASCULAR:  Negative for chest pain or palpitations  RESPIRATORY:  Negative for cough, wheezing, or SOB   GASTROINTESTINAL:  Negative for nausea, vomiting, diarrhea, constipation, or abdominal pain  GENITOURINARY:  Negative frequency, urgency or dysuria  NEUROLOGIC:  No headache, confusion, dizziness, lightheadedness    MEDICATIONS  (STANDING):  calcitriol  Solution 0.5 MICROGram(s) Enteral Tube <User Schedule>  calcium carbonate   Suspension 2500 milliGRAM(s) Oral three times a day  ceFAZolin   IVPB 1000 milliGRAM(s) IV Intermittent every 8 hours  chlorhexidine 4% Liquid 1 Application(s) Topical <User Schedule>  cholecalciferol 2000 Unit(s) Oral daily  dextrose 5%. 1000 milliLiter(s) (50 mL/Hr) IV Continuous <Continuous>  dextrose 50% Injectable 12.5 Gram(s) IV Push once  dextrose 50% Injectable 25 Gram(s) IV Push once  dextrose 50% Injectable 25 Gram(s) IV Push once  docusate sodium Liquid 100 milliGRAM(s) Oral two times a day  enoxaparin Injectable 40 milliGRAM(s) SubCutaneous every 24 hours  insulin lispro (HumaLOG) corrective regimen sliding scale   SubCutaneous Before meals and at bedtime  levothyroxine Injectable 50 MICROGram(s) IV Push <User Schedule>  metroNIDAZOLE  IVPB 500 milliGRAM(s) IV Intermittent every 8 hours  senna Syrup 10 milliLiter(s) Oral at bedtime    MEDICATIONS  (PRN):  bisacodyl Suppository 10 milliGRAM(s) Rectal daily PRN Constipation  dextrose 40% Gel 15 Gram(s) Oral once PRN Blood Glucose LESS THAN 70 milliGRAM(s)/deciliter  glucagon  Injectable 1 milliGRAM(s) IntraMuscular once PRN Glucose LESS THAN 70 milligrams/deciliter  oxyCODONE    IR 5 milliGRAM(s) Oral every 4 hours PRN Moderate Pain (4 - 6)  oxyCODONE    IR 10 milliGRAM(s) Oral every 4 hours PRN Severe Pain (7 - 10)  polyethylene glycol 3350 17 Gram(s) Oral daily PRN Constipation      PHYSICAL EXAM  Vital Signs Last 24 Hrs  T(C): 36.7 (10 Jul 2019 09:00), Max: 37.3 (09 Jul 2019 18:01)  T(F): 98.1 (10 Jul 2019 09:00), Max: 99.2 (09 Jul 2019 18:01)  HR: 66 (10 Jul 2019 12:20) (64 - 86)  BP: 109/61 (10 Jul 2019 12:20) (94/50 - 116/55)  BP(mean): 79 (10 Jul 2019 12:20) (64 - 83)  RR: 16 (10 Jul 2019 12:20) (16 - 18)  SpO2: 94% (10 Jul 2019 12:20) (91% - 95%)    Constitutional: wn/wd in NAD.   HEENT: NCAT, MMM, no proptosis or lid retraction  Neck: surgical scar and tracheostomy site - looks wnl.  Respiratory: lungs CTAB.  Cardiovascular: regular rhythm, normal S1 and S2, no audible murmurs, no peripheral edema  GI: soft, NT/ND, no masses. PEG tube in place  Neurology: no tremors, DTR 2+. no muscular spasm  Skin: no visible rashes/lesions  Psychiatric: Awake and alert, normal affect/mood.    LABS:    07-10    140  |  98  |  12  ----------------------------<  94  4.9   |  30  |  0.63    Ca    8.4      10 Jul 2019 12:41  Phos  5.1     07-09    TPro  x   /  Alb  3.4  /  TBili  x   /  DBili  x   /  AST  x   /  ALT  x   /  AlkPhos  x   07-09        Thyroid Stimulating Hormone, Serum: 1.483 uIU/mL (07-03 @ 04:51)      HbA1C: 5.7 % (07-03 @ 11:17)    CAPILLARY BLOOD GLUCOSE      POCT Blood Glucose.: 71 mg/dL (10 Jul 2019 11:14)  POCT Blood Glucose.: 92 mg/dL (10 Jul 2019 06:28)  POCT Blood Glucose.: 114 mg/dL (09 Jul 2019 22:33)  POCT Blood Glucose.: 89 mg/dL (09 Jul 2019 16:35)    Will Discuss in Rounds  A/P: 63M hx COPD, 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, s/p DL biopsy for recurrence of laryngeal CA (found on frozen) and planned revision tracheostomy 6/25 due to tenuous airway 2/2                                                                                                                                                                                                                                                                                                                                                                                                                                                                                                                                                                                                                                                                                                                                                                                                                                                                                                                                                                                                                                                                                                                                                                                                                                                                                                                                                                                                                                                                                                                                                                                                                                                                                                                                                                                                                                                                                                                                                                                                                                                                                                                                                                                                                                                                                                                                                                                                                                                                                                                                                                                                                                                                                                                                                                                                                                                                                                                                                                                                                                                                                                                                                                                              recurrence laryngeal ca and radical laryngectomy with thyroidectomy and suspected parathyroidectomy on 7/2 now being treated for post op hypocalcemia.    1.  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 Continue calcitriol 0.5 mcg Q12H. Also continue Calcium carbonate 2500 mg PO TID. Check serum Ca every 8 hours. Check albumin every other day. Check phos, ionized ca daily. Check PTH daily. Vit D level low. Please start Vit D3 2000IU daily.    (If calcium level less then 6.9 mg/dl, please give calcium carbonate infusion with a 1 L NS bag containing at least 900-1000 mg of elemental calcium to be given over 24 hours.)    2.  Post surgical hypothyroidism - Cont Levothyroxine 50mcg IV daily.    3. Adrenal nodule - CT scan showed 1.4cm adrenal nodule. Can be followed up as an OP.    Will continue to monitor     For discharge, pt can continue TBD by clinical course    Pt can follow up at discharge with [facility] by calling  to make an appointment.   Will discuss case with Dr. Morgan and update primary team INTERVAL HPI/OVERNIGHT EVENTS:      Patient seen and examined at bedside. He denies any perioral numbness or paresthesias of the hand. No muscle pain or spasm. He is tolerating Jevity tube feeds now at bolus Q6H for a total of 7 cans daily. Denies N/V but has been having some constipation.  PTH is still undetectable. Calcium trending trending up. Ca , Albumin 3.4 with corrected Ca , Phos 5.1. Vitamin D 25 is 10.5.  He is currently receiving calcitriol 0.50mcg Q12H, calcium carbonate 2500mg TID.       Pt reports the following symptoms:    CONSTITUTIONAL:  Negative fever or chills, feels well, good appetite  EYES:  Negative  blurry vision or double vision  CARDIOVASCULAR:  Negative for chest pain or palpitations  RESPIRATORY:  Negative for cough, wheezing, or SOB   GASTROINTESTINAL:  Negative for nausea, vomiting, diarrhea, constipation, or abdominal pain  GENITOURINARY:  Negative frequency, urgency or dysuria  NEUROLOGIC:  No headache, confusion, dizziness, lightheadedness    MEDICATIONS  (STANDING):  calcitriol  Solution 0.5 MICROGram(s) Enteral Tube <User Schedule>  calcium carbonate   Suspension 2500 milliGRAM(s) Oral three times a day  ceFAZolin   IVPB 1000 milliGRAM(s) IV Intermittent every 8 hours  chlorhexidine 4% Liquid 1 Application(s) Topical <User Schedule>  cholecalciferol 2000 Unit(s) Oral daily  dextrose 5%. 1000 milliLiter(s) (50 mL/Hr) IV Continuous <Continuous>  dextrose 50% Injectable 12.5 Gram(s) IV Push once  dextrose 50% Injectable 25 Gram(s) IV Push once  dextrose 50% Injectable 25 Gram(s) IV Push once  docusate sodium Liquid 100 milliGRAM(s) Oral two times a day  enoxaparin Injectable 40 milliGRAM(s) SubCutaneous every 24 hours  insulin lispro (HumaLOG) corrective regimen sliding scale   SubCutaneous Before meals and at bedtime  levothyroxine Injectable 50 MICROGram(s) IV Push <User Schedule>  metroNIDAZOLE  IVPB 500 milliGRAM(s) IV Intermittent every 8 hours  senna Syrup 10 milliLiter(s) Oral at bedtime    MEDICATIONS  (PRN):  bisacodyl Suppository 10 milliGRAM(s) Rectal daily PRN Constipation  dextrose 40% Gel 15 Gram(s) Oral once PRN Blood Glucose LESS THAN 70 milliGRAM(s)/deciliter  glucagon  Injectable 1 milliGRAM(s) IntraMuscular once PRN Glucose LESS THAN 70 milligrams/deciliter  oxyCODONE    IR 5 milliGRAM(s) Oral every 4 hours PRN Moderate Pain (4 - 6)  oxyCODONE    IR 10 milliGRAM(s) Oral every 4 hours PRN Severe Pain (7 - 10)  polyethylene glycol 3350 17 Gram(s) Oral daily PRN Constipation      PHYSICAL EXAM  Vital Signs Last 24 Hrs  T(C): 36.7 (10 Jul 2019 09:00), Max: 37.3 (09 Jul 2019 18:01)  T(F): 98.1 (10 Jul 2019 09:00), Max: 99.2 (09 Jul 2019 18:01)  HR: 66 (10 Jul 2019 12:20) (64 - 86)  BP: 109/61 (10 Jul 2019 12:20) (94/50 - 116/55)  BP(mean): 79 (10 Jul 2019 12:20) (64 - 83)  RR: 16 (10 Jul 2019 12:20) (16 - 18)  SpO2: 94% (10 Jul 2019 12:20) (91% - 95%)    Constitutional: wn/wd in NAD.   HEENT: NCAT, MMM, no proptosis or lid retraction  Neck: surgical scar and tracheostomy site - looks wnl.  Respiratory: lungs CTAB.  Cardiovascular: regular rhythm, normal S1 and S2, no audible murmurs, no peripheral edema  GI: soft, NT/ND, no masses. PEG tube in place  Neurology: no tremors, DTR 2+. no muscular spasm  Skin: no visible rashes/lesions  Psychiatric: Awake and alert, normal affect/mood.    LABS:    07-10    140  |  98  |  12  ----------------------------<  94  4.9   |  30  |  0.63    Ca    8.4      10 Jul 2019 12:41  Phos  5.1     07-09    TPro  x   /  Alb  3.4  /  TBili  x   /  DBili  x   /  AST  x   /  ALT  x   /  AlkPhos  x   07-09        Thyroid Stimulating Hormone, Serum: 1.483 uIU/mL (07-03 @ 04:51)      HbA1C: 5.7 % (07-03 @ 11:17)    CAPILLARY BLOOD GLUCOSE      POCT Blood Glucose.: 71 mg/dL (10 Jul 2019 11:14)  POCT Blood Glucose.: 92 mg/dL (10 Jul 2019 06:28)  POCT Blood Glucose.: 114 mg/dL (09 Jul 2019 22:33)  POCT Blood Glucose.: 89 mg/dL (09 Jul 2019 16:35)      A/P: 63M hx COPD, 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, s/p DL biopsy for recurrence of laryngeal CA (found on frozen) and planned revision tracheostomy 6/25 due to tenuous airway 2/2                                                                                                                                                                                                                                                                                                                                                                                                                                                                                                                                                                                                                                                                                                                                                                                                                                                                                                                                                                                                                                                                                                                                                                                                                                                                                                                                                                                                                                                                                                                                                                                                                                                                                                                                                                                                                                                                                                                                                                                                                                                                                                                                                                                                                                                                                                                                                                                                                                                                                                                                                                                                                                                                                                                                                                                                                                                                                                                                                                                                                                                                                                                                                                                              recurrence laryngeal ca and radical laryngectomy with thyroidectomy and suspected parathyroidectomy on 7/2 now being treated for post op hypocalcemia.    1.  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 Continue calcitriol 0.5 mcg Q12H. Also continue Calcium carbonate 2500 mg PO TID. Check serum Ca every 8 hours. Check albumin every other day. Check phos, ionized ca daily. Check PTH daily. Vit D level low. Please start Vit D3 4000IU daily.    (If calcium level less then 6.9 mg/dl, please give calcium carbonate infusion with a 1 L NS bag containing at least 900-1000 mg of elemental calcium to be given over 24 hours.)    2.  Post surgical hypothyroidism - Cont Levothyroxine 50mcg IV daily.    3. Adrenal nodule - CT scan showed 1.4cm adrenal nodule. Can be followed up as an OP.    Will continue to monitor     For discharge, pt can continue TBD by clinical course    Pt can follow up at discharge with [facility] by calling  to make an appointment.   Will discuss case with Dr. Morgan and update primary team

## 2019-07-10 NOTE — PROGRESS NOTE ADULT - ATTENDING COMMENTS
The patient was seen and evaluated at the bedside with VLAD Flaherty. His corrected calcium is now up to 8.9. vitamin D is to be increased to 4000 units daily D3 as blood level is 10.   Will continue with Rocaltrol 0.5 mg twice daily.TSH is 1.48 so will continue current Levothyroxine replacement.

## 2019-07-10 NOTE — CHART NOTE - NSCHARTNOTEFT_GEN_A_CORE
Admitting Diagnosis:   Patient is a 63y old  Male who presents with a chief complaint of Total laryngectomy (08 Jul 2019 20:10)      PAST MEDICAL & SURGICAL HISTORY:  SOB (shortness of breath)  Throat cancer  Jaundice: 1965  Fracture: left ankle  Laryngeal mass: s/ p radiation  Surgery, elective: direct laryngoscopy with biopsy- 4/2017  Status post surgery: inguinal hernia  Status post surgery: Left ankle surgical repair  x2 (1965)      Current Nutrition Order:  NPO with Tubefeeds  Jevity 1.2 @ 408ml/hr x2 hours Q6H via PEG (1632ml TV, 1958kcal, 90g pro, 1317ml FW)    PO Intake: Good (%) [   ]  Fair (50-75%) [   ] Poor (<25%) [   ]- N/A     GI Issues:  Denies N/V,  Reports tolerating feeds well, occasionally feels full.   PEG in place  last BM this am (7/10)    Pain:  No pain reported    Skin Integrity:   Tomi score 17  Surgical incisions    Labs:   07-10    140  |  98  |  12  ----------------------------<  94  4.9   |  30  |  0.63    Ca    8.4      10 Jul 2019 12:41  Phos  5.1     07-09    TPro  x   /  Alb  3.4  /  TBili  x   /  DBili  x   /  AST  x   /  ALT  x   /  AlkPhos  x   07-09    CAPILLARY BLOOD GLUCOSE      POCT Blood Glucose.: 71 mg/dL (10 Jul 2019 11:14)  POCT Blood Glucose.: 92 mg/dL (10 Jul 2019 06:28)  POCT Blood Glucose.: 114 mg/dL (09 Jul 2019 22:33)  POCT Blood Glucose.: 89 mg/dL (09 Jul 2019 16:35)      Medications:  MEDICATIONS  (STANDING):  calcitriol  Solution 0.5 MICROGram(s) Enteral Tube <User Schedule>  calcium carbonate   Suspension 2500 milliGRAM(s) Oral three times a day  ceFAZolin   IVPB 1000 milliGRAM(s) IV Intermittent every 8 hours  chlorhexidine 4% Liquid 1 Application(s) Topical <User Schedule>  cholecalciferol 2000 Unit(s) Oral daily  dextrose 5%. 1000 milliLiter(s) (50 mL/Hr) IV Continuous <Continuous>  dextrose 50% Injectable 12.5 Gram(s) IV Push once  dextrose 50% Injectable 25 Gram(s) IV Push once  dextrose 50% Injectable 25 Gram(s) IV Push once  docusate sodium Liquid 100 milliGRAM(s) Oral two times a day  enoxaparin Injectable 40 milliGRAM(s) SubCutaneous every 24 hours  insulin lispro (HumaLOG) corrective regimen sliding scale   SubCutaneous Before meals and at bedtime  levothyroxine Injectable 50 MICROGram(s) IV Push <User Schedule>  metroNIDAZOLE  IVPB 500 milliGRAM(s) IV Intermittent every 8 hours  senna Syrup 10 milliLiter(s) Oral at bedtime    MEDICATIONS  (PRN):  bisacodyl Suppository 10 milliGRAM(s) Rectal daily PRN Constipation  dextrose 40% Gel 15 Gram(s) Oral once PRN Blood Glucose LESS THAN 70 milliGRAM(s)/deciliter  glucagon  Injectable 1 milliGRAM(s) IntraMuscular once PRN Glucose LESS THAN 70 milligrams/deciliter  oxyCODONE    IR 5 milliGRAM(s) Oral every 4 hours PRN Moderate Pain (4 - 6)  oxyCODONE    IR 10 milliGRAM(s) Oral every 4 hours PRN Severe Pain (7 - 10)  polyethylene glycol 3350 17 Gram(s) Oral daily PRN Constipation      Weight: 64Kg  Ht 172.72cm; IBW 70Kg; %IBW 91%  BMI 21.5    Weight Change:   Please obtain current weight and continue to trend weekly weights for further assessment  Bedscale weight reading 66kg this am (7/8). States UBW PTA was 155lbs. This indicates a 14lb unintentional wt loss.     Nutrition Focused Physical Exam: Completed [ X on 6/26  ]  Not Pertinent [   ]  Muscle Wasting- Temporal [   ]  Clavicle/Pectoral [ X  ]  Shoulder/Deltoid [   ]  Scapula [   ]  Interosseous [   ]  Quadriceps [   ]  Gastrocnemius [   ]  Fat Wasting- Orbital [   ]  Buccal [  X ]  Triceps [   ]  Rib [   ]  Suspect moderate-severe PCM 2/2 wt loss, decreased intake, and NFPE    Estimated energy needs:   ABW used for calculations as pt between % of IBW.   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 Currently NPO w/ Tubefeeds via PEG placement (placed 6/28). Pt seen in room, resting in bed. Feeds running at 200ml/hr at x2 hours. Discussed w/ RN doing 272ml/hr x1hr Q4H for more time off the pump. Tolerating well, no GI distress, denies N/V, last BM this am. Obtained bedscale weight 66kg indicating mild weight fluctuation. Pt understanding of meeting needs via EN for time being. Had no questions at time of assessment. Please see recs below. RD to follow.     Previous Nutrition Diagnosis:   Increased nutrient needs RT increased demand for nutrients (kcal/protein) AEB catabolic state  Active [ X  ]  Resolved [  ]    If resolved, new PES:    Goal: Pt to consistently meet % of estimated needs via most appropriate route    Recommendations:  1. Recommend intermittent feeds of Jevity 1.2 @272ml/hr Q4H or 6x/day via PEG (1632ml TV, 1958kcal, 90g pro, 1317ml FW).   >>Consider transitioning to bolus feeds (6 cans/day: 2 at breakfast, 2 at lunch, 2 at dinner) as tolerated.  2. PO diet advancement per SLP  4. Trend weights to assess adequacy of feeds  *d/w team     Education:   pt understanding of meeting needs via EN for time being.     Risk Level: High [ X  ] Moderate [   ] Low [   ].

## 2019-07-10 NOTE — PROGRESS NOTE ADULT - SUBJECTIVE AND OBJECTIVE BOX
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 C/b return to OR  for exchange to 6 distal XLT due to tracheomalacia and poor ventilatory status after coughing episode, now sp salvage TL, L myocutaneous pectoralis major flap.    6/26: Now doing well in SICU overnight, no additional respiratory issues, cuff taken down thsi AM. Failed SLP evaluation with evidence of gross aspiration on ice chip trial  6/27: has thick purulent secretions, SICU will keep for one more night. Low grade temps to 100.4. Will need PEG  6/28: NPO for PEG today. To SDU, when secretions are better managed. Received PEG for inability to swallow.  6/29: still requiring suctioning q30 minutes, small amount of glycopyrrolate given by SICU, received PEG tomorrow, will start trickle feeds  6/30: NAEON, feeds up to goal, still requiring frequent suctioning, will stop glycopyrrolate today  7/01: NAEON, tube feeds at goal, still requiring q1hr suctioning of copious thin nonpurulent secretions, dc'd glycopyrrolate  7/03: pt underwent TL, uncomplicated, tolerated; post-op PTH, iCa low so started on Rocaltrol, Ca IV; neck ABDON x1, left chest ABDON x2, salivary bypass stents in place  7/4: No acute events overnight. Calcium stable. Denies perioral/fingertip tingling. Tolerating trickle feeds. No new complaints.   7/05: NAEON. Pt complaining of increased pain. Otherwise, no new complaints. Calcium levels continue to be stable. Denies perioral/fingertip tingling. Tolerating TFs at goal.  7/6: No acute events, doing well, OOBTC. Tolerating TF at goal, will transition to bolus. Calcium changed yesterday to 2500mg q8h. Chest ABDON x1 removed today. Stable for transfer to SDU.   7/7: No acute events. Transferred to SDU yesterday. TF transitioned to bolus, tolerating well. Corrected Ca stable. Will decrease Ca checks to q12h per endocrinology.   7/8: No acute events, breathing comfortably with Bebeto tube in place. TFs well tolerated at bolus without subjective S&S reflux. Corrected Ca stable. Following Ca checks q12, Endocrinology following.  7/10: NAEON. Calcium levels normalizing, Endo recommending VD3 supplementation; tolerating TFs at goal rate without issue.    Allergies:  penicillin (Rash)    MEDICATIONS:  Antiinfectives:   ceFAZolin   IVPB 1000 milliGRAM(s) IV Intermittent every 8 hours  metroNIDAZOLE  IVPB 500 milliGRAM(s) IV Intermittent every 8 hours    IV fluids:  calcitriol  Solution 0.5 MICROGram(s) Enteral Tube <User Schedule>  calcium carbonate   Suspension 2500 milliGRAM(s) Oral three times a day  dextrose 5%. 1000 milliLiter(s) IV Continuous <Continuous>    Hematologic/Anticoagulation:  enoxaparin Injectable 40 milliGRAM(s) SubCutaneous every 24 hours    Pain medications/Neuro:  oxyCODONE    IR 5 milliGRAM(s) Oral every 4 hours PRN  oxyCODONE    IR 10 milliGRAM(s) Oral every 4 hours PRN    Endocrine Medications:   dextrose 40% Gel 15 Gram(s) Oral once PRN  dextrose 50% Injectable 12.5 Gram(s) IV Push once  dextrose 50% Injectable 25 Gram(s) IV Push once  dextrose 50% Injectable 25 Gram(s) IV Push once  glucagon  Injectable 1 milliGRAM(s) IntraMuscular once PRN  insulin lispro (HumaLOG) corrective regimen sliding scale   SubCutaneous Before meals and at bedtime  levothyroxine Injectable 50 MICROGram(s) IV Push <User Schedule>    All other standing medications:   chlorhexidine 4% Liquid 1 Application(s) Topical <User Schedule>  docusate sodium Liquid 100 milliGRAM(s) Oral two times a day  senna Syrup 10 milliLiter(s) Oral at bedtime    All other PRN medications:  bisacodyl Suppository 10 milliGRAM(s) Rectal daily PRN  polyethylene glycol 3350 17 Gram(s) Oral daily PRN    PHYSICAL EXAM:  NAD, A&Ox3, tolerating TFs at goal, breathing comfortably on RA  Face: no asymmetry  Neck: incisions c/d/i; neck soft, flat; ABDON holding suction; size 10-55 bebeto tube secured via soft collar, minimal secretions suctioned, HME valve in place  Chest: incisions c/d/i; chest soft, flat; ABDON sites c/d/i well healing    LABS/IMAGING:    AM labs pending    A/P:    63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 Chest JPs removed, neck stable no e/o hematoma or seroma.  - PATIENT IS NOT ABLE TO BE NASALLY OR ORALLY INTUBATED  - Will add Vitamin D3 supplementation per Endo recs   - Appreciate add'l endocrinology recs; synthroid, rocaltrol and calcium per endo  - Monitor Ca q12h  - Monitor neck ABDON   - Continue bolus TFs  - Pain control  - Suction frequently w/red rubber catheters only, humidified air at all times  - SCDs, Lovenox, OOB/ambulate  - Bowel regimen  - Page ENT with questions or concerns  - d/w attending MD whom agrees with the above plan

## 2019-07-11 LAB
ALBUMIN SERPL ELPH-MCNC: 3.3 G/DL — SIGNIFICANT CHANGE UP (ref 3.3–5)
ALP SERPL-CCNC: 48 U/L — SIGNIFICANT CHANGE UP (ref 40–120)
ALT FLD-CCNC: 8 U/L — LOW (ref 10–45)
ANION GAP SERPL CALC-SCNC: 10 MMOL/L — SIGNIFICANT CHANGE UP (ref 5–17)
AST SERPL-CCNC: 25 U/L — SIGNIFICANT CHANGE UP (ref 10–40)
BILIRUB SERPL-MCNC: 0.2 MG/DL — SIGNIFICANT CHANGE UP (ref 0.2–1.2)
BUN SERPL-MCNC: 13 MG/DL — SIGNIFICANT CHANGE UP (ref 7–23)
CALCIUM SERPL-MCNC: 8 MG/DL — LOW (ref 8.4–10.5)
CHLORIDE SERPL-SCNC: 98 MMOL/L — SIGNIFICANT CHANGE UP (ref 96–108)
CO2 SERPL-SCNC: 29 MMOL/L — SIGNIFICANT CHANGE UP (ref 22–31)
CREAT SERPL-MCNC: 0.62 MG/DL — SIGNIFICANT CHANGE UP (ref 0.5–1.3)
GLUCOSE BLDC GLUCOMTR-MCNC: 101 MG/DL — HIGH (ref 70–99)
GLUCOSE BLDC GLUCOMTR-MCNC: 101 MG/DL — HIGH (ref 70–99)
GLUCOSE BLDC GLUCOMTR-MCNC: 76 MG/DL — SIGNIFICANT CHANGE UP (ref 70–99)
GLUCOSE BLDC GLUCOMTR-MCNC: 76 MG/DL — SIGNIFICANT CHANGE UP (ref 70–99)
GLUCOSE SERPL-MCNC: 119 MG/DL — HIGH (ref 70–99)
POTASSIUM SERPL-MCNC: 4.2 MMOL/L — SIGNIFICANT CHANGE UP (ref 3.5–5.3)
POTASSIUM SERPL-SCNC: 4.2 MMOL/L — SIGNIFICANT CHANGE UP (ref 3.5–5.3)
PROT SERPL-MCNC: 6 G/DL — SIGNIFICANT CHANGE UP (ref 6–8.3)
SODIUM SERPL-SCNC: 137 MMOL/L — SIGNIFICANT CHANGE UP (ref 135–145)

## 2019-07-11 RX ADMIN — Medication 2500 MILLIGRAM(S): at 15:08

## 2019-07-11 RX ADMIN — Medication 100 MILLIGRAM(S): at 17:25

## 2019-07-11 RX ADMIN — OXYCODONE HYDROCHLORIDE 10 MILLIGRAM(S): 5 TABLET ORAL at 00:46

## 2019-07-11 RX ADMIN — Medication 2500 MILLIGRAM(S): at 06:47

## 2019-07-11 RX ADMIN — CALCITRIOL 0.5 MICROGRAM(S): 0.5 CAPSULE ORAL at 10:13

## 2019-07-11 RX ADMIN — Medication 100 MILLIGRAM(S): at 06:47

## 2019-07-11 RX ADMIN — OXYCODONE HYDROCHLORIDE 10 MILLIGRAM(S): 5 TABLET ORAL at 06:47

## 2019-07-11 RX ADMIN — Medication 4000 UNIT(S): at 12:47

## 2019-07-11 RX ADMIN — ENOXAPARIN SODIUM 40 MILLIGRAM(S): 100 INJECTION SUBCUTANEOUS at 12:48

## 2019-07-11 RX ADMIN — Medication 100 MILLIGRAM(S): at 00:46

## 2019-07-11 RX ADMIN — OXYCODONE HYDROCHLORIDE 10 MILLIGRAM(S): 5 TABLET ORAL at 22:39

## 2019-07-11 RX ADMIN — Medication 100 MILLIGRAM(S): at 10:12

## 2019-07-11 RX ADMIN — Medication 50 MICROGRAM(S): at 10:12

## 2019-07-11 RX ADMIN — CALCITRIOL 0.5 MICROGRAM(S): 0.5 CAPSULE ORAL at 21:27

## 2019-07-11 RX ADMIN — Medication 100 MILLIGRAM(S): at 15:07

## 2019-07-11 RX ADMIN — OXYCODONE HYDROCHLORIDE 10 MILLIGRAM(S): 5 TABLET ORAL at 21:26

## 2019-07-11 RX ADMIN — Medication 100 MILLIGRAM(S): at 17:26

## 2019-07-11 RX ADMIN — Medication 2500 MILLIGRAM(S): at 21:27

## 2019-07-11 RX ADMIN — SENNA PLUS 10 MILLILITER(S): 8.6 TABLET ORAL at 21:27

## 2019-07-11 RX ADMIN — OXYCODONE HYDROCHLORIDE 10 MILLIGRAM(S): 5 TABLET ORAL at 06:48

## 2019-07-11 NOTE — PROGRESS NOTE ADULT - ATTENDING COMMENTS
The patient was seen and evaluated at the bedside with Dr. Ibarra/ His adjusted calcium is now normal=8.6mg%. He has no new complaints. We will still continue Rocaltrol 0.5 mg twice daily plus vitamin D.

## 2019-07-11 NOTE — PROGRESS NOTE ADULT - SUBJECTIVE AND OBJECTIVE BOX
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 C/b return to OR  for exchange to 6 distal XLT due to tracheomalacia and poor ventilatory status after coughing episode, now sp salvage TL, L myocutaneous pectoralis major flap.    6/26: Now doing well in SICU overnight, no additional respiratory issues, cuff taken down thsi AM. Failed SLP evaluation with evidence of gross aspiration on ice chip trial  6/27: has thick purulent secretions, SICU will keep for one more night. Low grade temps to 100.4. Will need PEG  6/28: NPO for PEG today. To SDU, when secretions are better managed. Received PEG for inability to swallow.  6/29: still requiring suctioning q30 minutes, small amount of glycopyrrolate given by SICU, received PEG tomorrow, will start trickle feeds  6/30: NAEON, feeds up to goal, still requiring frequent suctioning, will stop glycopyrrolate today  7/01: NAEON, tube feeds at goal, still requiring q1hr suctioning of copious thin nonpurulent secretions, dc'd glycopyrrolate  7/03: pt underwent TL, uncomplicated, tolerated; post-op PTH, iCa low so started on Rocaltrol, Ca IV; neck ABDON x1, left chest ABDON x2, salivary bypass stents in place  7/4: No acute events overnight. Calcium stable. Denies perioral/fingertip tingling. Tolerating trickle feeds. No new complaints.   7/05: NAEON. Pt complaining of increased pain. Otherwise, no new complaints. Calcium levels continue to be stable. Denies perioral/fingertip tingling. Tolerating TFs at goal.  7/6: No acute events, doing well, OOBTC. Tolerating TF at goal, will transition to bolus. Calcium changed yesterday to 2500mg q8h. Chest ABDON x1 removed today. Stable for transfer to SDU.   7/7: No acute events. Transferred to SDU yesterday. TF transitioned to bolus, tolerating well. Corrected Ca stable. Will decrease Ca checks to q12h per endocrinology.   7/8: No acute events, breathing comfortably with Bebeto tube in place. TFs well tolerated at bolus without subjective S&S reflux. Corrected Ca stable. Following Ca checks q12, Endocrinology following.  7/10: NAEON. Calcium levels normalizing, Endo recommending VD3 supplementation; tolerating TFs at goal rate without issue.  7/11: NAEON, Ca stable, Endocrinology following, continuing Ca checks. Tolerating TFs at bolus. Well tolerating Bebeto tube, performed teaching on how to clean, manage and take in/out the Bebeto tube.    Allergies:  penicillin (Rash)    MEDICATIONS  (STANDING):  calcitriol  Solution 0.5 MICROGram(s) Enteral Tube <User Schedule>  calcium carbonate   Suspension 2500 milliGRAM(s) Oral three times a day  ceFAZolin   IVPB 1000 milliGRAM(s) IV Intermittent every 8 hours  chlorhexidine 4% Liquid 1 Application(s) Topical <User Schedule>  cholecalciferol 4000 Unit(s) Oral daily  dextrose 5%. 1000 milliLiter(s) (50 mL/Hr) IV Continuous <Continuous>  dextrose 50% Injectable 12.5 Gram(s) IV Push once  dextrose 50% Injectable 25 Gram(s) IV Push once  dextrose 50% Injectable 25 Gram(s) IV Push once  docusate sodium Liquid 100 milliGRAM(s) Oral two times a day  enoxaparin Injectable 40 milliGRAM(s) SubCutaneous every 24 hours  insulin lispro (HumaLOG) corrective regimen sliding scale   SubCutaneous Before meals and at bedtime  levothyroxine Injectable 50 MICROGram(s) IV Push <User Schedule>  metroNIDAZOLE  IVPB 500 milliGRAM(s) IV Intermittent every 8 hours  senna Syrup 10 milliLiter(s) Oral at bedtime    MEDICATIONS  (PRN):  bisacodyl Suppository 10 milliGRAM(s) Rectal daily PRN Constipation  dextrose 40% Gel 15 Gram(s) Oral once PRN Blood Glucose LESS THAN 70 milliGRAM(s)/deciliter  glucagon  Injectable 1 milliGRAM(s) IntraMuscular once PRN Glucose LESS THAN 70 milligrams/deciliter  oxyCODONE    IR 5 milliGRAM(s) Oral every 4 hours PRN Moderate Pain (4 - 6)  oxyCODONE    IR 10 milliGRAM(s) Oral every 4 hours PRN Severe Pain (7 - 10)  polyethylene glycol 3350 17 Gram(s) Oral daily PRN Constipation      PHYSICAL EXAM:  NAD, A&Ox3,  Breathing comfortably on RA  Face: no asymmetry  Neck: incisions c/d/i; neck soft, flat; ABDON holding suction; size 10-55 bebeto tube secured via soft collar, minimal secretions suctioned, HME valve in place  Chest: incisions c/d/i; chest soft, flat; ABDON sites c/d/i well healing    LABS/IMAGING:    Ca 8.6 (corrected)    A/P:    63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 Chest JPs removed, neck stable no e/o hematoma or seroma. Calcium stable on current regiment. Performing Bebeot tube teaching.  - PATIENT IS NOT ABLE TO BE NASALLY OR ORALLY INTUBATED  - salivary bypass tube to remain in place until [id number], at which point will be removed and po will be trialed  - Appreciate add'l endocrinology recs; synthroid, rocaltrol, VD3 and calcium per endo  - Monitor Ca q12h  - Monitor neck ABDON   - Continue bolus TFs  - Pain control  - Bebeto tube cleaning teaching  - Suction frequently w/red rubber catheters only, humidified air at all times  - SCDs, Lovenox, OOB/ambulate  - Bowel regimen  - Page ENT with questions or concerns  - d/w attending MD whom agrees with the above plan

## 2019-07-11 NOTE — PROGRESS NOTE ADULT - SUBJECTIVE AND OBJECTIVE BOX
INTERVAL HPI/OVERNIGHT EVENTS:    Patient seen and examined at bedside. He denies any perioral numbness or paresthesias of the hand. No muscle pain or spasm. He is tolerating Jevity tube feeds now at bolus Q6H for a total of 7 cans daily. Denies N/V but has been having some constipation.  PTH is still undetectable. Calcium stable. Albumin 3.3 with corrected Ca 8.6,. Vitamin D 25 is 10.5.  He is currently receiving calcitriol 0.50mcg Q12H, calcium carbonate 2500mg TID. Cholecalciferol 4,000 IU daily      Pt reports the following symptoms:    CONSTITUTIONAL:  Negative fever or chills, feels well, good appetite  EYES:  Negative  blurry vision or double vision  CARDIOVASCULAR:  Negative for chest pain or palpitations  RESPIRATORY:  Negative for cough, wheezing, or SOB   GASTROINTESTINAL:  Negative for nausea, vomiting, diarrhea, constipation, or abdominal pain  GENITOURINARY:  Negative frequency, urgency or dysuria  NEUROLOGIC:  No headache, confusion, dizziness, lightheadedness    MEDICATIONS  (STANDING):  calcitriol  Solution 0.5 MICROGram(s) Enteral Tube <User Schedule>  calcium carbonate   Suspension 2500 milliGRAM(s) Oral three times a day  ceFAZolin   IVPB 1000 milliGRAM(s) IV Intermittent every 8 hours  chlorhexidine 4% Liquid 1 Application(s) Topical <User Schedule>  cholecalciferol 4000 Unit(s) Oral daily  dextrose 5%. 1000 milliLiter(s) (50 mL/Hr) IV Continuous <Continuous>  dextrose 50% Injectable 12.5 Gram(s) IV Push once  dextrose 50% Injectable 25 Gram(s) IV Push once  dextrose 50% Injectable 25 Gram(s) IV Push once  docusate sodium Liquid 100 milliGRAM(s) Oral two times a day  enoxaparin Injectable 40 milliGRAM(s) SubCutaneous every 24 hours  insulin lispro (HumaLOG) corrective regimen sliding scale   SubCutaneous Before meals and at bedtime  levothyroxine Injectable 50 MICROGram(s) IV Push <User Schedule>  metroNIDAZOLE  IVPB 500 milliGRAM(s) IV Intermittent every 8 hours  senna Syrup 10 milliLiter(s) Oral at bedtime    MEDICATIONS  (PRN):  bisacodyl Suppository 10 milliGRAM(s) Rectal daily PRN Constipation  dextrose 40% Gel 15 Gram(s) Oral once PRN Blood Glucose LESS THAN 70 milliGRAM(s)/deciliter  glucagon  Injectable 1 milliGRAM(s) IntraMuscular once PRN Glucose LESS THAN 70 milligrams/deciliter  oxyCODONE    IR 5 milliGRAM(s) Oral every 4 hours PRN Moderate Pain (4 - 6)  oxyCODONE    IR 10 milliGRAM(s) Oral every 4 hours PRN Severe Pain (7 - 10)  polyethylene glycol 3350 17 Gram(s) Oral daily PRN Constipation      PHYSICAL EXAM  Vital Signs Last 24 Hrs  T(C): 36.9 (11 Jul 2019 13:11), Max: 36.9 (11 Jul 2019 10:00)  T(F): 98.4 (11 Jul 2019 13:11), Max: 98.5 (11 Jul 2019 10:00)  HR: 66 (11 Jul 2019 09:12) (66 - 74)  BP: 105/57 (11 Jul 2019 09:12) (96/55 - 106/57)  BP(mean): 75 (11 Jul 2019 09:12) (70 - 76)  RR: 18 (11 Jul 2019 09:12) (18 - 18)  SpO2: 94% (11 Jul 2019 09:12) (94% - 97%)    Constitutional: wn/wd in NAD.   HEENT: NCAT, MMM, no proptosis or lid retraction  Neck: surgical scar and tracheostomy site - looks wnl.  Respiratory: lungs CTAB.  Cardiovascular: regular rhythm, normal S1 and S2, no audible murmurs, no peripheral edema  GI: soft, NT/ND, no masses. PEG tube in place  Neurology: no tremors, DTR 2+. no muscular spasm  Skin: no visible rashes/lesions  Psychiatric: Awake and alert, normal affect/mood.    LABS:    07-11    137  |  98  |  13  ----------------------------<  119<H>  4.2   |  29  |  0.62    Ca    8.0<L>      11 Jul 2019 06:14    TPro  6.0  /  Alb  3.3  /  TBili  0.2  /  DBili  x   /  AST  25  /  ALT  8<L>  /  AlkPhos  48  07-11        Thyroid Stimulating Hormone, Serum: 1.483 uIU/mL (07-03 @ 04:51)      HbA1C: 5.7 % (07-03 @ 11:17)    CAPILLARY BLOOD GLUCOSE      POCT Blood Glucose.: 101 mg/dL (11 Jul 2019 11:58)  POCT Blood Glucose.: 76 mg/dL (11 Jul 2019 06:46)  POCT Blood Glucose.: 80 mg/dL (10 Jul 2019 21:24)  POCT Blood Glucose.: 78 mg/dL (10 Jul 2019 16:53)      A/P: 63M hx COPD, 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                                                                                                                                                                                                                                                                                                                                                                                                                                                                                                                                                                                                                                                                                                                                                                                                                                                                                                                                                                                                                                                                                                                                                                                                                                                                                                                                                                                                                                                                                                                                                                                                                                                                                                                                                                                                                                                                                                                                                                                                                                                                                                                                                                                                                                                                                                                                                                                                                                                                                                                                                                                                                       1.  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 Continue calcitriol 0.5 mcg Q12H. Also continue Calcium carbonate 2500 mg PO TID. Check serum Ca every 12 hours. Check albumin every other day. Check phos, ionized ca daily. Check PTH daily. Vit D level low. Please cont Vit D3 4000IU daily.    (If calcium level less then 6.9 mg/dl, please give calcium carbonate infusion with a 1 L NS bag containing at least 900-1000 mg of elemental calcium to be given over 24 hours.)    2.  Post surgical hypothyroidism - Cont Levothyroxine 50mcg IV daily.    3. Adrenal nodule - CT scan showed 1.4cm adrenal nodule. Can be followed up as an OP.    Will continue to monitor     For discharge, pt can continue TBD by clinical course    Pt can follow up at discharge with [facility] by calling  to make an appointment. D/w Dr Morgan  Will discuss case with Dr. Morgan and update primary team

## 2019-07-12 LAB
ALBUMIN SERPL ELPH-MCNC: 3.4 G/DL — SIGNIFICANT CHANGE UP (ref 3.3–5)
ALP SERPL-CCNC: 49 U/L — SIGNIFICANT CHANGE UP (ref 40–120)
ALT FLD-CCNC: 9 U/L — LOW (ref 10–45)
ANION GAP SERPL CALC-SCNC: 8 MMOL/L — SIGNIFICANT CHANGE UP (ref 5–17)
AST SERPL-CCNC: 18 U/L — SIGNIFICANT CHANGE UP (ref 10–40)
BILIRUB SERPL-MCNC: 0.2 MG/DL — SIGNIFICANT CHANGE UP (ref 0.2–1.2)
BUN SERPL-MCNC: 13 MG/DL — SIGNIFICANT CHANGE UP (ref 7–23)
CALCIUM SERPL-MCNC: 8.1 MG/DL — LOW (ref 8.4–10.5)
CALCIUM SERPL-MCNC: 8.2 MG/DL — LOW (ref 8.4–10.5)
CHLORIDE SERPL-SCNC: 98 MMOL/L — SIGNIFICANT CHANGE UP (ref 96–108)
CO2 SERPL-SCNC: 31 MMOL/L — SIGNIFICANT CHANGE UP (ref 22–31)
CREAT SERPL-MCNC: 0.61 MG/DL — SIGNIFICANT CHANGE UP (ref 0.5–1.3)
GLUCOSE BLDC GLUCOMTR-MCNC: 107 MG/DL — HIGH (ref 70–99)
GLUCOSE BLDC GLUCOMTR-MCNC: 153 MG/DL — HIGH (ref 70–99)
GLUCOSE BLDC GLUCOMTR-MCNC: 79 MG/DL — SIGNIFICANT CHANGE UP (ref 70–99)
GLUCOSE BLDC GLUCOMTR-MCNC: 86 MG/DL — SIGNIFICANT CHANGE UP (ref 70–99)
GLUCOSE SERPL-MCNC: 81 MG/DL — SIGNIFICANT CHANGE UP (ref 70–99)
POTASSIUM SERPL-MCNC: 4.6 MMOL/L — SIGNIFICANT CHANGE UP (ref 3.5–5.3)
POTASSIUM SERPL-SCNC: 4.6 MMOL/L — SIGNIFICANT CHANGE UP (ref 3.5–5.3)
PROT SERPL-MCNC: 6.4 G/DL — SIGNIFICANT CHANGE UP (ref 6–8.3)
SODIUM SERPL-SCNC: 137 MMOL/L — SIGNIFICANT CHANGE UP (ref 135–145)

## 2019-07-12 RX ORDER — OXYCODONE HYDROCHLORIDE 5 MG/1
10 TABLET ORAL EVERY 6 HOURS
Refills: 0 | Status: DISCONTINUED | OUTPATIENT
Start: 2019-07-12 | End: 2019-07-17

## 2019-07-12 RX ORDER — OXYCODONE HYDROCHLORIDE 5 MG/1
5 TABLET ORAL EVERY 4 HOURS
Refills: 0 | Status: DISCONTINUED | OUTPATIENT
Start: 2019-07-12 | End: 2019-07-17

## 2019-07-12 RX ORDER — OXYCODONE HYDROCHLORIDE 5 MG/1
10 TABLET ORAL EVERY 4 HOURS
Refills: 0 | Status: DISCONTINUED | OUTPATIENT
Start: 2019-07-12 | End: 2019-07-12

## 2019-07-12 RX ORDER — OXYCODONE HYDROCHLORIDE 5 MG/1
5 TABLET ORAL EVERY 4 HOURS
Refills: 0 | Status: DISCONTINUED | OUTPATIENT
Start: 2019-07-12 | End: 2019-07-12

## 2019-07-12 RX ADMIN — SENNA PLUS 10 MILLILITER(S): 8.6 TABLET ORAL at 21:25

## 2019-07-12 RX ADMIN — Medication 100 MILLIGRAM(S): at 17:02

## 2019-07-12 RX ADMIN — Medication 2500 MILLIGRAM(S): at 21:25

## 2019-07-12 RX ADMIN — CALCITRIOL 0.5 MICROGRAM(S): 0.5 CAPSULE ORAL at 22:30

## 2019-07-12 RX ADMIN — OXYCODONE HYDROCHLORIDE 10 MILLIGRAM(S): 5 TABLET ORAL at 18:00

## 2019-07-12 RX ADMIN — Medication 4000 UNIT(S): at 10:14

## 2019-07-12 RX ADMIN — Medication 2500 MILLIGRAM(S): at 06:01

## 2019-07-12 RX ADMIN — CALCITRIOL 0.5 MICROGRAM(S): 0.5 CAPSULE ORAL at 10:13

## 2019-07-12 RX ADMIN — OXYCODONE HYDROCHLORIDE 10 MILLIGRAM(S): 5 TABLET ORAL at 10:13

## 2019-07-12 RX ADMIN — OXYCODONE HYDROCHLORIDE 10 MILLIGRAM(S): 5 TABLET ORAL at 11:00

## 2019-07-12 RX ADMIN — OXYCODONE HYDROCHLORIDE 10 MILLIGRAM(S): 5 TABLET ORAL at 23:06

## 2019-07-12 RX ADMIN — Medication 1: at 22:30

## 2019-07-12 RX ADMIN — OXYCODONE HYDROCHLORIDE 10 MILLIGRAM(S): 5 TABLET ORAL at 06:01

## 2019-07-12 RX ADMIN — Medication 2500 MILLIGRAM(S): at 13:22

## 2019-07-12 RX ADMIN — Medication 100 MILLIGRAM(S): at 06:01

## 2019-07-12 RX ADMIN — OXYCODONE HYDROCHLORIDE 10 MILLIGRAM(S): 5 TABLET ORAL at 07:21

## 2019-07-12 RX ADMIN — ENOXAPARIN SODIUM 40 MILLIGRAM(S): 100 INJECTION SUBCUTANEOUS at 10:15

## 2019-07-12 RX ADMIN — Medication 50 MICROGRAM(S): at 10:13

## 2019-07-12 RX ADMIN — OXYCODONE HYDROCHLORIDE 10 MILLIGRAM(S): 5 TABLET ORAL at 17:03

## 2019-07-12 NOTE — PROGRESS NOTE ADULT - ATTENDING COMMENTS
Corrected serum calcium is 8.6mg% on Rocaltol,Vitamin D3 and high dose calcium.The patient seems in good spirits. Gevity feedings continue. We will continue current Caclium,,vitamin D and Rocaltrol orders.

## 2019-07-12 NOTE — PROGRESS NOTE ADULT - SUBJECTIVE AND OBJECTIVE BOX
INTERVAL HPI/OVERNIGHT EVENTS:    Patient seen and examined at bedside. He denies any perioral numbness or paresthesias of the hand. No muscle pain or spasm. He is tolerating Jevity tube feeds now at bolus Q6H for a total of 7 cans daily. Denies N/V but has been having some constipation.  PTH is still undetectable. Calcium stable at 8.1. Albumin 3.4 so corrected Ca 8.6,. Vitamin D 25 is 10.5.  He is currently receiving calcitriol 0.50mcg Q12H, calcium carbonate 2500mg TID. Cholecalciferol 4,000 IU daily      Pt reports the following symptoms:      CONSTITUTIONAL:  Negative fever or chills, feels well, good appetite  EYES:  Negative  blurry vision or double vision  CARDIOVASCULAR:  Negative for chest pain or palpitations  RESPIRATORY:  Negative for cough, wheezing, or SOB   GASTROINTESTINAL:  Negative for nausea, vomiting, diarrhea, constipation, or abdominal pain  GENITOURINARY:  Negative frequency, urgency or dysuria  NEUROLOGIC:  No headache, confusion, dizziness, lightheadedness    MEDICATIONS  (STANDING):  calcitriol  Solution 0.5 MICROGram(s) Enteral Tube <User Schedule>  calcium carbonate   Suspension 2500 milliGRAM(s) Oral three times a day  chlorhexidine 4% Liquid 1 Application(s) Topical <User Schedule>  cholecalciferol 4000 Unit(s) Oral daily  dextrose 50% Injectable 12.5 Gram(s) IV Push once  dextrose 50% Injectable 25 Gram(s) IV Push once  dextrose 50% Injectable 25 Gram(s) IV Push once  docusate sodium Liquid 100 milliGRAM(s) Oral two times a day  enoxaparin Injectable 40 milliGRAM(s) SubCutaneous every 24 hours  insulin lispro (HumaLOG) corrective regimen sliding scale   SubCutaneous Before meals and at bedtime  levothyroxine Injectable 50 MICROGram(s) IV Push <User Schedule>  senna Syrup 10 milliLiter(s) Oral at bedtime    MEDICATIONS  (PRN):  bisacodyl Suppository 10 milliGRAM(s) Rectal daily PRN Constipation  dextrose 40% Gel 15 Gram(s) Oral once PRN Blood Glucose LESS THAN 70 milliGRAM(s)/deciliter  glucagon  Injectable 1 milliGRAM(s) IntraMuscular once PRN Glucose LESS THAN 70 milligrams/deciliter  oxyCODONE    Solution 5 milliGRAM(s) Oral every 4 hours PRN Moderate Pain (4 - 6)  oxyCODONE    Solution 10 milliGRAM(s) Oral every 6 hours PRN Severe Pain (7 - 10)  polyethylene glycol 3350 17 Gram(s) Oral daily PRN Constipation      PHYSICAL EXAM  Vital Signs Last 24 Hrs  T(C): 37.3 (12 Jul 2019 17:51), Max: 37.3 (12 Jul 2019 17:51)  T(F): 99.1 (12 Jul 2019 17:51), Max: 99.1 (12 Jul 2019 17:51)  HR: 70 (12 Jul 2019 13:23) (60 - 78)  BP: 93/58 (12 Jul 2019 13:23) (89/55 - 117/68)  BP(mean): 69 (12 Jul 2019 13:23) (66 - 87)  RR: 18 (12 Jul 2019 13:23) (16 - 18)  SpO2: 93% (12 Jul 2019 13:23) (91% - 97%)    Constitutional: wn/wd in NAD.   HEENT: NCAT, MMM, no proptosis or lid retraction  Neck: surgical scar and tracheostomy site - looks wnl.  Respiratory: lungs CTAB.  Cardiovascular: regular rhythm, normal S1 and S2, no audible murmurs, no peripheral edema  GI: soft, NT/ND, no masses. PEG tube in place  Neurology: no tremors, DTR 2+. no muscular spasm  Skin: no visible rashes/lesions  Psychiatric: Awake and alert, normal affect/mood.    LABS:    07-12    137  |  98  |  13  ----------------------------<  81  4.6   |  31  |  0.61    Ca    8.1<L>      12 Jul 2019 06:18    TPro  6.4  /  Alb  3.4  /  TBili  0.2  /  DBili  x   /  AST  18  /  ALT  9<L>  /  AlkPhos  49  07-12        Thyroid Stimulating Hormone, Serum: 1.483 uIU/mL (07-03 @ 04:51)      HbA1C: 5.7 % (07-03 @ 11:17)    CAPILLARY BLOOD GLUCOSE      POCT Blood Glucose.: 86 mg/dL (12 Jul 2019 16:45)  POCT Blood Glucose.: 79 mg/dL (12 Jul 2019 11:27)  POCT Blood Glucose.: 107 mg/dL (12 Jul 2019 06:52)  POCT Blood Glucose.: 76 mg/dL (11 Jul 2019 21:12)      A/P: 63M hx COPD, 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                                                                                                                                                                                                                                                                                                                                                                                                                                                                                                                                                                                                                                                                                                                                                                                                                                                                                                                                                                                                                                                                                                                                                                                                                                                                                                                                                                                                                                                                                                                                                                                                                                                                                                                                                                                                                                                                                                                                                                                                                                                                                                                                                                                                                                                                                                                                                                                                                                                                                                                                                                                                                       1.  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 Continue calcitriol 0.5 mcg Q12H. Also continue Calcium carbonate 2500 mg PO TID. Check serum Ca every 12 hours. Check albumin every other day. Check phos, ionized ca daily. Check PTH daily. Vit D level low. Please cont Vit D3 4000IU daily.    (If calcium level less then 6.9 mg/dl, please give calcium carbonate infusion with a 1 L NS bag containing at least 900-1000 mg of elemental calcium to be given over 24 hours.)    2.  Post surgical hypothyroidism - Cont Levothyroxine 50mcg IV daily.    3. Adrenal nodule - CT scan showed 1.4cm adrenal nodule. Can be followed up as an OP.    Will continue to monitor     For discharge, pt can continue TBD by clinical course    Pt can follow up at discharge with [facility] by calling  to make an appointment. D/w Dr Morgan  Will discuss case with Dr. Morgan and update primary team

## 2019-07-12 NOTE — PROGRESS NOTE ADULT - SUBJECTIVE AND OBJECTIVE BOX
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 C/b return to OR  for exchange to 6 distal XLT due to tracheomalacia and poor ventilatory status after coughing episode, now sp salvage TL, L myocutaneous pectoralis major flap.    : Now doing well in SICU overnight, no additional respiratory issues, cuff taken down thsi AM. Failed SLP evaluation with evidence of gross aspiration on ice chip trial  : has thick purulent secretions, SICU will keep for one more night. Low grade temps to 100.4. Will need PEG  : NPO for PEG today. To SDU, when secretions are better managed. Received PEG for inability to swallow.  : still requiring suctioning q30 minutes, small amount of glycopyrrolate given by SICU, received PEG tomorrow, will start trickle feeds  : NAEON, feeds up to goal, still requiring frequent suctioning, will stop glycopyrrolate today  : NAEON, tube feeds at goal, still requiring q1hr suctioning of copious thin nonpurulent secretions, dc'd glycopyrrolate  : pt underwent TL, uncomplicated, tolerated; post-op PTH, iCa low so started on Rocaltrol, Ca IV; neck ABDON x1, left chest ABDON x2, salivary bypass stents in place  : No acute events overnight. Calcium stable. Denies perioral/fingertip tingling. Tolerating trickle feeds. No new complaints.   : NAEON. Pt complaining of increased pain. Otherwise, no new complaints. Calcium levels continue to be stable. Denies perioral/fingertip tingling. Tolerating TFs at goal.  : No acute events, doing well, OOBTC. Tolerating TF at goal, will transition to bolus. Calcium changed yesterday to 2500mg q8h. Chest ABDON x1 removed today. Stable for transfer to SDU.   : No acute events. Transferred to SDU yesterday. TF transitioned to bolus, tolerating well. Corrected Ca stable. Will decrease Ca checks to q12h per endocrinology.   : No acute events, breathing comfortably with Bebeto tube in place. TFs well tolerated at bolus without subjective S&S reflux. Corrected Ca stable. Following Ca checks q12, Endocrinology following.  7/10: KENZIEEON. Calcium levels normalizing, Endo recommending VD3 supplementation; tolerating TFs at goal rate without issue.  : NAEON, Ca stable, Endocrinology following, continuing Ca checks. Tolerating TFs at bolus. Well tolerating Bebeto tube, performed teaching on how to clean, manage and take in/out the Bebeto tube.  : NAEON, Ca stable, Endocrinology following, continuing Ca checks. Tolerating TFs at bolus. Well tolerating Bebeto tube, performed teaching on how to clean, manage and take in/out the Bebeto tube.    Allergies:  penicillin (Rash)    MEDICATIONS  (STANDING):  calcitriol  Solution 0.5 MICROGram(s) Enteral Tube <User Schedule>  calcium carbonate   Suspension 2500 milliGRAM(s) Oral three times a day  ceFAZolin   IVPB 1000 milliGRAM(s) IV Intermittent every 8 hours  chlorhexidine 4% Liquid 1 Application(s) Topical <User Schedule>  cholecalciferol 4000 Unit(s) Oral daily  dextrose 5%. 1000 milliLiter(s) (50 mL/Hr) IV Continuous <Continuous>  dextrose 50% Injectable 12.5 Gram(s) IV Push once  dextrose 50% Injectable 25 Gram(s) IV Push once  dextrose 50% Injectable 25 Gram(s) IV Push once  docusate sodium Liquid 100 milliGRAM(s) Oral two times a day  enoxaparin Injectable 40 milliGRAM(s) SubCutaneous every 24 hours  insulin lispro (HumaLOG) corrective regimen sliding scale   SubCutaneous Before meals and at bedtime  levothyroxine Injectable 50 MICROGram(s) IV Push <User Schedule>  metroNIDAZOLE  IVPB 500 milliGRAM(s) IV Intermittent every 8 hours  senna Syrup 10 milliLiter(s) Oral at bedtime    MEDICATIONS  (PRN):  bisacodyl Suppository 10 milliGRAM(s) Rectal daily PRN Constipation  dextrose 40% Gel 15 Gram(s) Oral once PRN Blood Glucose LESS THAN 70 milliGRAM(s)/deciliter  glucagon  Injectable 1 milliGRAM(s) IntraMuscular once PRN Glucose LESS THAN 70 milligrams/deciliter  oxyCODONE    IR 5 milliGRAM(s) Oral every 4 hours PRN Moderate Pain (4 - 6)  oxyCODONE    IR 10 milliGRAM(s) Oral every 4 hours PRN Severe Pain (7 - 10)  polyethylene glycol 3350 17 Gram(s) Oral daily PRN Constipation      PHYSICAL EXAM:  NAD, A&Ox3,  Breathing comfortably on RA  Face: no asymmetry  Neck: incisions c/d/i; neck soft, flat; ABDON holding suction; size 10-55 bebeto tube secured via soft collar, minimal secretions suctioned, HME valve in place  Chest: incisions c/d/i; chest soft, flat; ABDON sites c/d/i well healing    LABS/IMAGIN-12    137  |  98  |  13  ----------------------------<  81  4.6   |  31  |  0.61    Ca    8.1<L>      2019 06:18    TPro  6.4  /  Alb  3.4  /  TBili  0.2  /  DBili  x   /  AST  18  /  ALT  9<L>  /  AlkPhos  49  -12      A/P:    63M w/ recurrent laryngeal SCC s/p TL, total thyroidectomy, L pec flap , doing well. Chest JPs removed, neck stable no e/o hematoma or seroma. Calcium stable on current regiment. Performing Bebeto tube teaching.  - PATIENT IS NOT ABLE TO BE NASALLY OR ORALLY INTUBATED  - salivary bypass tube to remain in place until PRH42-26, at which point will be removed and po will be trialed  - Appreciate add'l endocrinology recs; synthroid, rocaltrol, VD3 and calcium per endo  - Monitor Ca q12h  - Monitor neck ABDON   - Continue bolus TFs  - Pain control  - Bebeto tube cleaning teaching  - Suction frequently w/red rubber catheters only, humidified air at all times  - SCDs, Lovenox, OOB/ambulate  - Bowel regimen  - Page ENT with questions or concerns  - d/w attending MD whom agrees with the above plan

## 2019-07-13 LAB
CALCIUM SERPL-MCNC: 7.9 MG/DL — LOW (ref 8.4–10.5)
CALCIUM SERPL-MCNC: 8.4 MG/DL — SIGNIFICANT CHANGE UP (ref 8.4–10.5)
GLUCOSE BLDC GLUCOMTR-MCNC: 114 MG/DL — HIGH (ref 70–99)
GLUCOSE BLDC GLUCOMTR-MCNC: 140 MG/DL — HIGH (ref 70–99)
GLUCOSE BLDC GLUCOMTR-MCNC: 147 MG/DL — HIGH (ref 70–99)
GLUCOSE BLDC GLUCOMTR-MCNC: 92 MG/DL — SIGNIFICANT CHANGE UP (ref 70–99)
PTH-INTACT IO % DIF SERPL: 10.8 PG/ML — SIGNIFICANT CHANGE UP (ref 8.5–72.5)

## 2019-07-13 RX ORDER — CALCIUM CARBONATE 500(1250)
1250 TABLET ORAL ONCE
Refills: 0 | Status: COMPLETED | OUTPATIENT
Start: 2019-07-13 | End: 2019-07-13

## 2019-07-13 RX ADMIN — OXYCODONE HYDROCHLORIDE 10 MILLIGRAM(S): 5 TABLET ORAL at 23:30

## 2019-07-13 RX ADMIN — Medication 100 MILLIGRAM(S): at 18:00

## 2019-07-13 RX ADMIN — CALCITRIOL 0.5 MICROGRAM(S): 0.5 CAPSULE ORAL at 22:50

## 2019-07-13 RX ADMIN — SENNA PLUS 10 MILLILITER(S): 8.6 TABLET ORAL at 22:22

## 2019-07-13 RX ADMIN — OXYCODONE HYDROCHLORIDE 10 MILLIGRAM(S): 5 TABLET ORAL at 22:25

## 2019-07-13 RX ADMIN — Medication 100 MILLIGRAM(S): at 05:58

## 2019-07-13 RX ADMIN — OXYCODONE HYDROCHLORIDE 10 MILLIGRAM(S): 5 TABLET ORAL at 11:17

## 2019-07-13 RX ADMIN — Medication 2500 MILLIGRAM(S): at 14:51

## 2019-07-13 RX ADMIN — Medication 1250 MILLIGRAM(S): at 14:51

## 2019-07-13 RX ADMIN — Medication 4000 UNIT(S): at 11:00

## 2019-07-13 RX ADMIN — Medication 2500 MILLIGRAM(S): at 22:22

## 2019-07-13 RX ADMIN — CALCITRIOL 0.5 MICROGRAM(S): 0.5 CAPSULE ORAL at 11:01

## 2019-07-13 RX ADMIN — ENOXAPARIN SODIUM 40 MILLIGRAM(S): 100 INJECTION SUBCUTANEOUS at 11:00

## 2019-07-13 RX ADMIN — OXYCODONE HYDROCHLORIDE 10 MILLIGRAM(S): 5 TABLET ORAL at 00:00

## 2019-07-13 RX ADMIN — Medication 50 MICROGRAM(S): at 11:01

## 2019-07-13 RX ADMIN — Medication 2500 MILLIGRAM(S): at 05:58

## 2019-07-13 NOTE — PROVIDER CONTACT NOTE (CRITICAL VALUE NOTIFICATION) - ACTION/TREATMENT ORDERED:
SHANA Menon notified. Awaiting further orders. Will continue to assess.
No new orders.
Continue to monitor and waiting for further order
Awaiting orders.

## 2019-07-13 NOTE — PROGRESS NOTE ADULT - SUBJECTIVE AND OBJECTIVE BOX
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 C/b return to OR  for exchange to 6 distal XLT due to tracheomalacia and poor ventilatory status after coughing episode, now sp salvage TL, L myocutaneous pectoralis major flap.    6/26: Now doing well in SICU overnight, no additional respiratory issues, cuff taken down thsi AM. Failed SLP evaluation with evidence of gross aspiration on ice chip trial  6/27: has thick purulent secretions, SICU will keep for one more night. Low grade temps to 100.4. Will need PEG  6/28: NPO for PEG today. To SDU, when secretions are better managed. Received PEG for inability to swallow.  6/29: still requiring suctioning q30 minutes, small amount of glycopyrrolate given by SICU, received PEG tomorrow, will start trickle feeds  6/30: NAEON, feeds up to goal, still requiring frequent suctioning, will stop glycopyrrolate today  7/01: NAEON, tube feeds at goal, still requiring q1hr suctioning of copious thin nonpurulent secretions, dc'd glycopyrrolate  7/03: pt underwent TL, uncomplicated, tolerated; post-op PTH, iCa low so started on Rocaltrol, Ca IV; neck ABDON x1, left chest ABDON x2, salivary bypass stents in place  7/4: No acute events overnight. Calcium stable. Denies perioral/fingertip tingling. Tolerating trickle feeds. No new complaints.   7/05: NAEON. Pt complaining of increased pain. Otherwise, no new complaints. Calcium levels continue to be stable. Denies perioral/fingertip tingling. Tolerating TFs at goal.  7/6: No acute events, doing well, OOBTC. Tolerating TF at goal, will transition to bolus. Calcium changed yesterday to 2500mg q8h. Chest ABDON x1 removed today. Stable for transfer to SDU.   7/7: No acute events. Transferred to SDU yesterday. TF transitioned to bolus, tolerating well. Corrected Ca stable. Will decrease Ca checks to q12h per endocrinology.   7/8: No acute events, breathing comfortably with Nasrin tube in place. TFs well tolerated at bolus without subjective S&S reflux. Corrected Ca stable. Following Ca checks q12, Endocrinology following.  7/10: NAEON. Calcium levels normalizing, Endo recommending VD3 supplementation; tolerating TFs at goal rate without issue.  7/11: NAEON, Ca stable, Endocrinology following, continuing Ca checks. Tolerating TFs at bolus. Well tolerating Nasrin tube, performed teaching on how to clean, manage and take in/out the Nasrin tube.  7/12: NAEON, Ca stable, Endocrinology following, continuing Ca checks. Tolerating TFs at bolus. Well tolerating Nasrin tube, performed teaching on how to clean, manage and take in/out the Nasrin tube.  7/13: NAEON. Ca with slight decrease, will give 1 additional of dose of CaCarb via PEG. Once again, pt taught how to manage/care for Nasrin tub and laryngectomy stoma at bedside this AM. No new complaints.    Labs/Imaging: reviewed, Ca mildly decreased    A/P:    63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 Chest JPs removed, neck stable no e/o hematoma or seroma. Calcium stable on current regiment. Performing Nasrin tube teaching.  - PATIENT IS NOT ABLE TO BE NASALLY OR ORALLY INTUBATED  - Will order additional 1g of CaCarb given low Ca on AM labs  - F/U PM Ca level  - salivary bypass tube to remain in place until [id number], at which point will be removed and po will be trialed  - Appreciate add'l endocrinology recs; synthroid, rocaltrol, VD3 and calcium per endo  - Monitor Ca q12h  - Monitor neck ABDON   - Continue bolus TFs  - Pain control  - Nasrin tube cleaning teaching  - Suction frequently w/red rubber catheters only, humidified air at all times  - SCDs, Lovenox, OOB/ambulate  - Bowel regimen  - Page ENT with questions or concerns  - d/w attending MD whom agrees with the above plan

## 2019-07-13 NOTE — PROVIDER CONTACT NOTE (CRITICAL VALUE NOTIFICATION) - BACKGROUND
s/p laryngeoscopy, s/p laryngectomy
s/p left pectoralis flap to laryngectomy, pharyngeal salivary bypass tube

## 2019-07-14 LAB
ALBUMIN SERPL ELPH-MCNC: 3.1 G/DL — LOW (ref 3.3–5)
CALCIUM SERPL-MCNC: 8 MG/DL — LOW (ref 8.4–10.5)
CALCIUM SERPL-MCNC: 8.1 MG/DL — LOW (ref 8.4–10.5)
CALCIUM SERPL-MCNC: 8.6 MG/DL — SIGNIFICANT CHANGE UP (ref 8.4–10.5)
EXTRA LAVENDER TOP TUBE: SIGNIFICANT CHANGE UP
GLUCOSE BLDC GLUCOMTR-MCNC: 72 MG/DL — SIGNIFICANT CHANGE UP (ref 70–99)
GLUCOSE BLDC GLUCOMTR-MCNC: 72 MG/DL — SIGNIFICANT CHANGE UP (ref 70–99)
GLUCOSE BLDC GLUCOMTR-MCNC: 76 MG/DL — SIGNIFICANT CHANGE UP (ref 70–99)
GLUCOSE BLDC GLUCOMTR-MCNC: 95 MG/DL — SIGNIFICANT CHANGE UP (ref 70–99)
PHOSPHATE SERPL-MCNC: 4.6 MG/DL — HIGH (ref 2.5–4.5)
PTH-INTACT FLD-MCNC: 9 PG/ML — LOW (ref 15–65)

## 2019-07-14 RX ORDER — ACETAMINOPHEN 500 MG
650 TABLET ORAL EVERY 6 HOURS
Refills: 0 | Status: DISCONTINUED | OUTPATIENT
Start: 2019-07-14 | End: 2019-07-22

## 2019-07-14 RX ADMIN — Medication 4000 UNIT(S): at 11:48

## 2019-07-14 RX ADMIN — SENNA PLUS 10 MILLILITER(S): 8.6 TABLET ORAL at 21:46

## 2019-07-14 RX ADMIN — Medication 650 MILLIGRAM(S): at 23:18

## 2019-07-14 RX ADMIN — OXYCODONE HYDROCHLORIDE 10 MILLIGRAM(S): 5 TABLET ORAL at 06:53

## 2019-07-14 RX ADMIN — CALCITRIOL 0.5 MICROGRAM(S): 0.5 CAPSULE ORAL at 21:46

## 2019-07-14 RX ADMIN — Medication 2500 MILLIGRAM(S): at 15:29

## 2019-07-14 RX ADMIN — CALCITRIOL 0.5 MICROGRAM(S): 0.5 CAPSULE ORAL at 11:48

## 2019-07-14 RX ADMIN — ENOXAPARIN SODIUM 40 MILLIGRAM(S): 100 INJECTION SUBCUTANEOUS at 16:49

## 2019-07-14 RX ADMIN — OXYCODONE HYDROCHLORIDE 10 MILLIGRAM(S): 5 TABLET ORAL at 16:39

## 2019-07-14 RX ADMIN — Medication 100 MILLIGRAM(S): at 17:14

## 2019-07-14 RX ADMIN — Medication 2500 MILLIGRAM(S): at 21:46

## 2019-07-14 RX ADMIN — Medication 100 MILLIGRAM(S): at 06:48

## 2019-07-14 RX ADMIN — Medication 2500 MILLIGRAM(S): at 06:48

## 2019-07-14 RX ADMIN — Medication 50 MICROGRAM(S): at 11:30

## 2019-07-14 RX ADMIN — Medication 650 MILLIGRAM(S): at 22:34

## 2019-07-14 RX ADMIN — OXYCODONE HYDROCHLORIDE 10 MILLIGRAM(S): 5 TABLET ORAL at 17:20

## 2019-07-14 NOTE — PROGRESS NOTE ADULT - SUBJECTIVE AND OBJECTIVE BOX
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 C/b return to OR  for exchange to 6 distal XLT due to tracheomalacia and poor ventilatory status after coughing episode, now sp salvage TL, L myocutaneous pectoralis major flap.    6/26: Now doing well in SICU overnight, no additional respiratory issues, cuff taken down thsi AM. Failed SLP evaluation with evidence of gross aspiration on ice chip trial  6/27: has thick purulent secretions, SICU will keep for one more night. Low grade temps to 100.4. Will need PEG  6/28: NPO for PEG today. To SDU, when secretions are better managed. Received PEG for inability to swallow.  6/29: still requiring suctioning q30 minutes, small amount of glycopyrrolate given by SICU, received PEG tomorrow, will start trickle feeds  6/30: NAEON, feeds up to goal, still requiring frequent suctioning, will stop glycopyrrolate today  7/01: NAEON, tube feeds at goal, still requiring q1hr suctioning of copious thin nonpurulent secretions, dc'd glycopyrrolate  7/03: pt underwent TL, uncomplicated, tolerated; post-op PTH, iCa low so started on Rocaltrol, Ca IV; neck ABDON x1, left chest ABDON x2, salivary bypass stents in place  7/4: No acute events overnight. Calcium stable. Denies perioral/fingertip tingling. Tolerating trickle feeds. No new complaints.   7/05: NAEON. Pt complaining of increased pain. Otherwise, no new complaints. Calcium levels continue to be stable. Denies perioral/fingertip tingling. Tolerating TFs at goal.  7/6: No acute events, doing well, OOBTC. Tolerating TF at goal, will transition to bolus. Calcium changed yesterday to 2500mg q8h. Chest ABDON x1 removed today. Stable for transfer to SDU.   7/7: No acute events. Transferred to SDU yesterday. TF transitioned to bolus, tolerating well. Corrected Ca stable. Will decrease Ca checks to q12h per endocrinology.   7/8: No acute events, breathing comfortably with Nasrin tube in place. TFs well tolerated at bolus without subjective S&S reflux. Corrected Ca stable. Following Ca checks q12, Endocrinology following.  7/10: PAUL. Calcium levels normalizing, Endo recommending VD3 supplementation; tolerating TFs at goal rate without issue.  7/11: NAEON, Ca stable, Endocrinology following, continuing Ca checks. Tolerating TFs at bolus. Well tolerating Nasrin tube, performed teaching on how to clean, manage and take in/out the Nasrin tube.  7/12: NAEON, Ca stable, Endocrinology following, continuing Ca checks. Tolerating TFs at bolus. Well tolerating Nasrin tube, performed teaching on how to clean, manage and take in/out the Nasrin tube.  7/13: KENZIEEON. Ca with slight decrease, will give 1 additional of dose of CaCarb via PEG. Once again, pt taught how to manage/care for Nasrin tub and laryngectomy stoma at bedside this AM. No new complaints.  7/14: PAUL. Calcium corrected with extra dose of CaCarb soln. No new complaints. Will continue teaching in anticipation of discharge.     Labs/Imaging: reviewed, Ca normalized    A/P:    63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 Chest JPs removed, neck stable no e/o hematoma or seroma. Calcium normalized. Performing Nasrin tube teaching. Will continue with dispo planning.  - PATIENT IS NOT ABLE TO BE NASALLY OR ORALLY INTUBATED  - salivary bypass tube to remain in place until [id number], at which point will be removed and po will be trialed  - Appreciate add'l endocrinology recs; synthroid, rocaltrol, VD3 and calcium per endo  - Monitor Ca q12h  - Monitor neck ABDON   - Continue bolus TFs  - Pain control  - Nasrin tube cleaning teaching  - Suction frequently w/red rubber catheters only, humidified air at all times  - SCDs, Lovenox, OOB/ambulate  - Bowel regimen  - Page ENT with questions or concerns  - d/w attending MD whom agrees with the above plan

## 2019-07-15 LAB
CA-I BLD-SCNC: 1.07 MMOL/L — LOW (ref 1.12–1.3)
CALCIUM SERPL-MCNC: 8.1 MG/DL — LOW (ref 8.4–10.5)
CALCIUM SERPL-MCNC: 8.3 MG/DL — LOW (ref 8.4–10.5)
CALCIUM SERPL-MCNC: 8.4 MG/DL — SIGNIFICANT CHANGE UP (ref 8.4–10.5)
EXTRA LAVENDER TOP TUBE: SIGNIFICANT CHANGE UP
GLUCOSE BLDC GLUCOMTR-MCNC: 103 MG/DL — HIGH (ref 70–99)
GLUCOSE BLDC GLUCOMTR-MCNC: 105 MG/DL — HIGH (ref 70–99)
GLUCOSE BLDC GLUCOMTR-MCNC: 106 MG/DL — HIGH (ref 70–99)
GLUCOSE BLDC GLUCOMTR-MCNC: 155 MG/DL — HIGH (ref 70–99)
GLUCOSE BLDC GLUCOMTR-MCNC: 78 MG/DL — SIGNIFICANT CHANGE UP (ref 70–99)
PHOSPHATE SERPL-MCNC: 4.3 MG/DL — SIGNIFICANT CHANGE UP (ref 2.5–4.5)
PTH-INTACT FLD-MCNC: 12 PG/ML — LOW (ref 15–65)

## 2019-07-15 PROCEDURE — 99232 SBSQ HOSP IP/OBS MODERATE 35: CPT | Mod: GC

## 2019-07-15 RX ORDER — LEVOTHYROXINE SODIUM 125 MCG
125 TABLET ORAL DAILY
Refills: 0 | Status: DISCONTINUED | OUTPATIENT
Start: 2019-07-15 | End: 2019-07-16

## 2019-07-15 RX ADMIN — Medication 4000 UNIT(S): at 12:31

## 2019-07-15 RX ADMIN — Medication 2500 MILLIGRAM(S): at 06:10

## 2019-07-15 RX ADMIN — CALCITRIOL 0.5 MICROGRAM(S): 0.5 CAPSULE ORAL at 22:52

## 2019-07-15 RX ADMIN — Medication 50 MICROGRAM(S): at 12:30

## 2019-07-15 RX ADMIN — OXYCODONE HYDROCHLORIDE 5 MILLIGRAM(S): 5 TABLET ORAL at 23:21

## 2019-07-15 RX ADMIN — CALCITRIOL 0.5 MICROGRAM(S): 0.5 CAPSULE ORAL at 10:10

## 2019-07-15 RX ADMIN — ENOXAPARIN SODIUM 40 MILLIGRAM(S): 100 INJECTION SUBCUTANEOUS at 12:30

## 2019-07-15 RX ADMIN — Medication 650 MILLIGRAM(S): at 06:47

## 2019-07-15 RX ADMIN — OXYCODONE HYDROCHLORIDE 5 MILLIGRAM(S): 5 TABLET ORAL at 10:10

## 2019-07-15 RX ADMIN — Medication 2500 MILLIGRAM(S): at 14:16

## 2019-07-15 RX ADMIN — Medication 100 MILLIGRAM(S): at 19:28

## 2019-07-15 RX ADMIN — Medication 100 MILLIGRAM(S): at 06:10

## 2019-07-15 RX ADMIN — Medication 650 MILLIGRAM(S): at 06:10

## 2019-07-15 RX ADMIN — SENNA PLUS 10 MILLILITER(S): 8.6 TABLET ORAL at 22:51

## 2019-07-15 RX ADMIN — Medication 650 MILLIGRAM(S): at 17:52

## 2019-07-15 RX ADMIN — OXYCODONE HYDROCHLORIDE 5 MILLIGRAM(S): 5 TABLET ORAL at 22:51

## 2019-07-15 RX ADMIN — Medication 2500 MILLIGRAM(S): at 22:52

## 2019-07-15 RX ADMIN — OXYCODONE HYDROCHLORIDE 5 MILLIGRAM(S): 5 TABLET ORAL at 11:00

## 2019-07-15 RX ADMIN — Medication 650 MILLIGRAM(S): at 18:50

## 2019-07-15 NOTE — CHART NOTE - NSCHARTNOTEFT_GEN_A_CORE
Admitting Diagnosis:   Patient is a 63y old  Male who presents with a chief complaint of Total laryngectomy (08 Jul 2019 20:10)      PAST MEDICAL & SURGICAL HISTORY:  SOB (shortness of breath)  Throat cancer  Jaundice: 1965  Fracture: left ankle  Laryngeal mass: s/ p radiation  Surgery, elective: direct laryngoscopy with biopsy- 4/2017  Status post surgery: inguinal hernia  Status post surgery: Left ankle surgical repair  x2 (1965)      Current Nutrition Order:  NPO + TF: Jevity 1.2 @272ml Q4hrs providing 1632ml TV, 1958kcal, 90g pro, 1317ml FW.    PO Intake: NPO    GI Issues: No apparent GI distress per RN, tolerating EN well, no residuals, last BM today.    Pain: Unable to assess at this time    Skin Integrity: no edema, +surgical incision    Labs:     Ca    8.3<L>      15 Jul 2019 06:26  Phos  4.3     07-15    TPro  x   /  Alb  3.1<L>  /  TBili  x   /  DBili  x   /  AST  x   /  ALT  x   /  AlkPhos  x   07-14    CAPILLARY BLOOD GLUCOSE      POCT Blood Glucose.: 103 mg/dL (15 Jul 2019 12:34)  POCT Blood Glucose.: 155 mg/dL (15 Jul 2019 11:33)  POCT Blood Glucose.: 78 mg/dL (15 Jul 2019 06:34)  POCT Blood Glucose.: 72 mg/dL (14 Jul 2019 21:26)  POCT Blood Glucose.: 95 mg/dL (14 Jul 2019 17:05)      Medications:  MEDICATIONS  (STANDING):  calcitriol  Solution 0.5 MICROGram(s) Enteral Tube <User Schedule>  calcium carbonate   Suspension 2500 milliGRAM(s) Oral three times a day  cholecalciferol 4000 Unit(s) Oral daily  dextrose 50% Injectable 12.5 Gram(s) IV Push once  dextrose 50% Injectable 25 Gram(s) IV Push once  dextrose 50% Injectable 25 Gram(s) IV Push once  docusate sodium Liquid 100 milliGRAM(s) Oral two times a day  enoxaparin Injectable 40 milliGRAM(s) SubCutaneous every 24 hours  insulin lispro (HumaLOG) corrective regimen sliding scale   SubCutaneous Before meals and at bedtime  levothyroxine Injectable 50 MICROGram(s) IV Push <User Schedule>  senna Syrup 10 milliLiter(s) Oral at bedtime    MEDICATIONS  (PRN):  acetaminophen    Suspension .. 650 milliGRAM(s) Enteral Tube every 6 hours PRN Mild Pain (1 - 3)  bisacodyl Suppository 10 milliGRAM(s) Rectal daily PRN Constipation  dextrose 40% Gel 15 Gram(s) Oral once PRN Blood Glucose LESS THAN 70 milliGRAM(s)/deciliter  glucagon  Injectable 1 milliGRAM(s) IntraMuscular once PRN Glucose LESS THAN 70 milligrams/deciliter  oxyCODONE    Solution 5 milliGRAM(s) Oral every 4 hours PRN Moderate Pain (4 - 6)  oxyCODONE    Solution 10 milliGRAM(s) Oral every 6 hours PRN Severe Pain (7 - 10)  polyethylene glycol 3350 17 Gram(s) Oral daily PRN Constipation      Weight:  64kg    Weight Change: No new wts to assess.  Please obtain current weight and continue to trend weekly weights for further assessment  Bedscale weight reading 66kg (7/8). States UBW PTA was 70.4kg. This indicates a 4.4kg unintentional wt loss.     Nutrition Focused Physical Exam: Completed [  x- 6/26 ]  Not Pertinent [   ]  Suspect moderate-severe PCM 2/2 wt loss, decreased intake, and NFPE    Estimated energy needs:   ABW used for calculations as pt between % of IBW.   Needs adjusted for age, suspected PCM, and post-op healing  1920-2240kcal/day (30-35 kcal/kg)  77-90g pro/day (1.2-1.4 g pro /kg)  1920-2240mL/day (30-35 mL/kg)    Subjective:   Pt seen for follow up. 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 Chest JPs removed, neck stable no e/o hematoma or seroma. Calcium normalized. +PEG placement 6/28, trached, unable to speak 2/2 laryngectomy. Pt seen resting in chair. Discussed pt w/ RN. Pt is currently NPO + EN: @272ml Q4hrs providing 1632ml TV, 1958kcal, 90g pro, 1317ml FW, w/ good tolerance per RN, no residuals. Nutrition recs below. RD to follow up per protocol.     Previous Nutrition Diagnosis:   Increased nutrient needs RT increased demand for nutrients (kcal/protein) AEB catabolic state  Active [ X  ]  Resolved [  ]    Goal: Pt to consistently meet % of estimated needs via most appropriate route    Recommendations:  1. Recommend continue current diet order of Jevity 1.2 @272ml/hr Q4H or 6x/day via PEG (1632ml TV, 1958kcal, 90g pro, 1317ml FW). Maintain aspiration precautions at all times, monitor s/s intolerance.   2. PO diet advancement per SLP  3. Updated wt, Trend weights to assess adequacy of feeds    Education: NA    Risk Level: High [  x ] Moderate [   ] Low [   ]

## 2019-07-15 NOTE — PROGRESS NOTE ADULT - SUBJECTIVE AND OBJECTIVE BOX
INTERVAL HPI/OVERNIGHT EVENTS:    Patient is a 63y old  Male who presents with a chief complaint of Total laryngectomy (08 Jul 2019 20:10)      Pt reports the following symptoms:    CONSTITUTIONAL:  Negative fever or chills, feels well, good appetite  EYES:  Negative  blurry vision or double vision  CARDIOVASCULAR:  Negative for chest pain or palpitations  RESPIRATORY:  Negative for cough, wheezing, or SOB   GASTROINTESTINAL:  Negative for nausea, vomiting, diarrhea, constipation, or abdominal pain  GENITOURINARY:  Negative frequency, urgency or dysuria  NEUROLOGIC:  No headache, confusion, dizziness, lightheadedness    MEDICATIONS  (STANDING):  calcitriol  Solution 0.5 MICROGram(s) Enteral Tube <User Schedule>  calcium carbonate   Suspension 2500 milliGRAM(s) Oral three times a day  cholecalciferol 4000 Unit(s) Oral daily  dextrose 50% Injectable 12.5 Gram(s) IV Push once  dextrose 50% Injectable 25 Gram(s) IV Push once  dextrose 50% Injectable 25 Gram(s) IV Push once  docusate sodium Liquid 100 milliGRAM(s) Oral two times a day  enoxaparin Injectable 40 milliGRAM(s) SubCutaneous every 24 hours  insulin lispro (HumaLOG) corrective regimen sliding scale   SubCutaneous Before meals and at bedtime  levothyroxine 125 MICROGram(s) Enteral Tube daily  senna Syrup 10 milliLiter(s) Oral at bedtime    MEDICATIONS  (PRN):  acetaminophen    Suspension .. 650 milliGRAM(s) Enteral Tube every 6 hours PRN Mild Pain (1 - 3)  bisacodyl Suppository 10 milliGRAM(s) Rectal daily PRN Constipation  dextrose 40% Gel 15 Gram(s) Oral once PRN Blood Glucose LESS THAN 70 milliGRAM(s)/deciliter  glucagon  Injectable 1 milliGRAM(s) IntraMuscular once PRN Glucose LESS THAN 70 milligrams/deciliter  oxyCODONE    Solution 5 milliGRAM(s) Oral every 4 hours PRN Moderate Pain (4 - 6)  oxyCODONE    Solution 10 milliGRAM(s) Oral every 6 hours PRN Severe Pain (7 - 10)  polyethylene glycol 3350 17 Gram(s) Oral daily PRN Constipation      PHYSICAL EXAM  Vital Signs Last 24 Hrs  T(C): 37 (15 Jul 2019 09:07), Max: 37.7 (14 Jul 2019 21:55)  T(F): 98.6 (15 Jul 2019 09:07), Max: 99.9 (14 Jul 2019 21:55)  HR: 94 (15 Jul 2019 12:47) (66 - 94)  BP: 93/58 (15 Jul 2019 12:47) (91/53 - 96/53)  BP(mean): 70 (15 Jul 2019 12:47) (66 - 72)  RR: 18 (15 Jul 2019 12:47) (16 - 18)  SpO2: 94% (15 Jul 2019 12:47) (93% - 98%)    Constitutional: wn/wd in NAD.   HEENT: NCAT, MMM, OP clear, EOMI, no proptosis or lid retraction  Neck: no thyromegaly or palpable thyroid nodules   Respiratory: lungs CTAB.  Cardiovascular: regular rhythm, normal S1 and S2, no audible murmurs, no peripheral edema  GI: soft, NT/ND, no masses/HSM appreciated.  Neurology: no tremors, DTR 2+  Skin: no visible rashes/lesions  Psychiatric: AAO x 3, normal affect/mood.    LABS:      Ca    8.3<L>      15 Jul 2019 06:26  Phos  4.3     07-15    TPro  x   /  Alb  3.1<L>  /  TBili  x   /  DBili  x   /  AST  x   /  ALT  x   /  AlkPhos  x   07-14        Thyroid Stimulating Hormone, Serum: 1.483 uIU/mL (07-03 @ 04:51)      HbA1C: 5.7 % (07-03 @ 11:17)    CAPILLARY BLOOD GLUCOSE      POCT Blood Glucose.: 103 mg/dL (15 Jul 2019 12:34)  POCT Blood Glucose.: 155 mg/dL (15 Jul 2019 11:33)  POCT Blood Glucose.: 78 mg/dL (15 Jul 2019 06:34)  POCT Blood Glucose.: 72 mg/dL (14 Jul 2019 21:26)  POCT Blood Glucose.: 95 mg/dL (14 Jul 2019 17:05)      Insulin Sliding Scale requirements X 24 Hours:    RADIOLOGY & ADDITIONAL TESTS:    A/P: 63y Male with history of DM type II presenting for       1.  DM -     Please continue           units lantus at bedtime  / in the morning and        units lispro with meals and lispro moderate / low dose sliding scale 4 times daily with meals and at bedtime.  Please continue consistent carbohydrate diet.      Goal FSG is   Will continue to monitor   For discharge, pt can continue    Pt can follow up at discharge with [facility] by calling  to make an appointment.   Will discuss case with     and update primary team INTERVAL HPI/OVERNIGHT EVENTS:    Patient seen and examined at the bedside. No acute events overnight. he denies any cramping, numbness or tingling. he is being planned for swallow evaluation later today or tomorrow and being planned for discharge later in the day. he is currently NPO with tube feeds with Jevity 1.2. no vomiting or aspiration.   His calcium was 8.3 at night with corrected at 9.0. Today at noon, his calcium was 8.1 with corrected calcium at 8.8, albumin 3.1    FSG & Insulin received:    Yesterday:  pre-dinner fs   bedtime fs  Today:  pre-breakfast fs  pre-lunch fs      Pt reports the following symptoms:    CONSTITUTIONAL:  Negative fever or chills, feels well, good appetite  EYES:  Negative  blurry vision or double vision  CARDIOVASCULAR:  Negative for chest pain or palpitations  RESPIRATORY:  Negative for cough, wheezing, or SOB   GASTROINTESTINAL:  Negative for nausea, vomiting, diarrhea, constipation, or abdominal pain  GENITOURINARY:  Negative frequency, urgency or dysuria  NEUROLOGIC:  No headache, confusion, dizziness, lightheadedness    MEDICATIONS  (STANDING):  calcitriol  Solution 0.5 MICROGram(s) Enteral Tube <User Schedule>  calcium carbonate   Suspension 2500 milliGRAM(s) Oral three times a day  cholecalciferol 4000 Unit(s) Oral daily  dextrose 50% Injectable 12.5 Gram(s) IV Push once  dextrose 50% Injectable 25 Gram(s) IV Push once  dextrose 50% Injectable 25 Gram(s) IV Push once  docusate sodium Liquid 100 milliGRAM(s) Oral two times a day  enoxaparin Injectable 40 milliGRAM(s) SubCutaneous every 24 hours  insulin lispro (HumaLOG) corrective regimen sliding scale   SubCutaneous Before meals and at bedtime  levothyroxine 125 MICROGram(s) Enteral Tube daily  senna Syrup 10 milliLiter(s) Oral at bedtime    MEDICATIONS  (PRN):  acetaminophen    Suspension .. 650 milliGRAM(s) Enteral Tube every 6 hours PRN Mild Pain (1 - 3)  bisacodyl Suppository 10 milliGRAM(s) Rectal daily PRN Constipation  dextrose 40% Gel 15 Gram(s) Oral once PRN Blood Glucose LESS THAN 70 milliGRAM(s)/deciliter  glucagon  Injectable 1 milliGRAM(s) IntraMuscular once PRN Glucose LESS THAN 70 milligrams/deciliter  oxyCODONE    Solution 5 milliGRAM(s) Oral every 4 hours PRN Moderate Pain (4 - 6)  oxyCODONE    Solution 10 milliGRAM(s) Oral every 6 hours PRN Severe Pain (7 - 10)  polyethylene glycol 3350 17 Gram(s) Oral daily PRN Constipation      PHYSICAL EXAM  Vital Signs Last 24 Hrs  T(C): 37 (15 Jul 2019 09:07), Max: 37.7 (2019 21:55)  T(F): 98.6 (15 Jul 2019 09:07), Max: 99.9 (2019 21:55)  HR: 94 (15 Jul 2019 12:47) (66 - 94)  BP: 93/58 (15 Jul 2019 12:47) (91/53 - 96/53)  BP(mean): 70 (15 Jul 2019 12:47) (66 - 72)  RR: 18 (15 Jul 2019 12:47) (16 - 18)  SpO2: 94% (15 Jul 2019 12:47) (93% - 98%)    Constitutional: wn/wd in NAD.   Neck: tracheostomy site looks clean   Respiratory: lungs CTAB.  Cardiovascular: regular rhythm, normal S1 and S2, no audible murmurs, no peripheral edema  GI: soft, NT/ND, PEG tube site looks clean  Neurology: no tremors, DTR 2+  Skin: no visible rashes/lesions  Psychiatric: AAO x 3, normal affect/mood.    LABS:      Ca    8.3<L>      15 Jul 2019 06:26  Phos  4.3     -15    TPro  x   /  Alb  3.1<L>  /  TBili  x   /  DBili  x   /  AST  x   /  ALT  x   /  AlkPhos  x   -        Thyroid Stimulating Hormone, Serum: 1.483 uIU/mL ( @ 04:51)      HbA1C: 5.7 % ( @ 11:17)    CAPILLARY BLOOD GLUCOSE      POCT Blood Glucose.: 103 mg/dL (15 Jul 2019 12:34)  POCT Blood Glucose.: 155 mg/dL (15 Jul 2019 11:33)  POCT Blood Glucose.: 78 mg/dL (15 Jul 2019 06:34)  POCT Blood Glucose.: 72 mg/dL (2019 21:26)  POCT Blood Glucose.: 95 mg/dL (2019 17:05)      Insulin Sliding Scale requirements X 24 Hours:    RADIOLOGY & ADDITIONAL TESTS:    A/P: 63M hx COPD, 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                                                                                                                                                                                                                                                                                                                                                                                                                                                                                                                                                                                                                                                                                                                                                                                                                                                                                                                                                                                                                                                                                                                                                                                                                                                                                                                                                                                                                                                                                                                                                                                                                                                                                                                                                                                                                                                                                                                                                                                                                                                                                                                                                                                                                                                                                                                                                                                                                                                                                                                                                                                                                       1.  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 Continue calcitriol 0.5 mcg Q12H. Also continue Calcium carbonate 2500 mg PO TID. Check serum Ca every 12 hours. Check albumin every other day. Check phos, ionized ca daily. Check PTH daily. Vit D level low. Please cont Vit D3 4000IU daily. If corrected calcium goes more than 9.0, we may need to decrease the calcitriol dose.     (If calcium level less then 6.9 mg/dl, please give calcium carbonate infusion with a 1 L NS bag containing at least 900-1000 mg of elemental calcium to be given over 24 hours.)    2.  Post surgical hypothyroidism - Cont Levothyroxine 50mcg IV daily. As an OP, patient can be discharged on levothyroxine 100mcg PO.    3. Adrenal nodule - CT scan showed 1.4cm adrenal nodule. Can be followed up as an OP.    Will continue to monitor     For discharge, pt can continue TBD by clinical course    Pt can follow up at discharge with [facility] by calling  to make an appointment. D/w Dr Morgan  patient seen and discussed with  and update primary team

## 2019-07-15 NOTE — PROGRESS NOTE ADULT - ATTENDING COMMENTS
Pt seen on rounds this afternoon and hospital course reviewed.  Is now s/p laryngectomy, total thyroidectomy and parathyroidectomy for recurrent SCCA with local extension.  He is receiving calcitriol and calcium replacement, as well as IV levothyroxine.  Agree with giving the levothyroxine IV--absorption via enteral tube is very erratic and often poor.  His target serum calcium level is in the mid-8 mg% range.  With his current level up to 9.0 mg% and rising, will probably taper the calcitriol tomorrow.  He has no hypocalcemic symptoms and his Chvostek is negative

## 2019-07-15 NOTE — PROGRESS NOTE ADULT - SUBJECTIVE AND OBJECTIVE BOX
OVERNIGHT/INTERVAL HPI:   63M former smoker ho 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 today admitted s/p DL biopsy for recurrence of laryngeal CA (found on frozen) and planned revision tracheostomy due to tenuous airway. C/b return to OR  for exchange to 6 distal XLT due to tracheomalacia and poor ventilatory status after coughing episode, now sp salvage TL, L myocutaneous pectoralis major flap.    6/26: Now doing well in SICU overnight, no additional respiratory issues, cuff taken down thsi AM. Failed SLP evaluation with evidence of gross aspiration on ice chip trial  6/27: has thick purulent secretions, SICU will keep for one more night. Low grade temps to 100.4. Will need PEG  6/28: NPO for PEG today. To SDU, when secretions are better managed. Received PEG for inability to swallow.  6/29: still requiring suctioning q30 minutes, small amount of glycopyrrolate given by SICU, received PEG tomorrow, will start trickle feeds  6/30: NAEON, feeds up to goal, still requiring frequent suctioning, will stop glycopyrrolate today  7/01: NAEON, tube feeds at goal, still requiring q1hr suctioning of copious thin nonpurulent secretions, dc'd glycopyrrolate  7/03: pt underwent TL, uncomplicated, tolerated; post-op PTH, iCa low so started on Rocaltrol, Ca IV; neck ABDON x1, left chest ABDON x2, salivary bypass stents in place  7/4: No acute events overnight. Calcium stable. Denies perioral/fingertip tingling. Tolerating trickle feeds. No new complaints.   7/05: NAEON. Pt complaining of increased pain. Otherwise, no new complaints. Calcium levels continue to be stable. Denies perioral/fingertip tingling. Tolerating TFs at goal.  7/6: No acute events, doing well, OOBTC. Tolerating TF at goal, will transition to bolus. Calcium changed yesterday to 2500mg q8h. Chest ABDON x1 removed today. Stable for transfer to SDU.   7/7: No acute events. Transferred to SDU yesterday. TF transitioned to bolus, tolerating well. Corrected Ca stable. Will decrease Ca checks to q12h per endocrinology.   7/8: No acute events, breathing comfortably with Bebeto tube in place. TFs well tolerated at bolus without subjective S&S reflux. Corrected Ca stable. Following Ca checks q12, Endocrinology following.  7/10: NAEON. Calcium levels normalizing, Endo recommending VD3 supplementation; tolerating TFs at goal rate without issue.  7/11: NAEON, Ca stable, Endocrinology following, continuing Ca checks. Tolerating TFs at bolus. Well tolerating Bebeto tube, performed teaching on how to clean, manage and take in/out the Bebeto tube.  7/12: NAEON, Ca stable, Endocrinology following, continuing Ca checks. Tolerating TFs at bolus. Well tolerating Bebeto tube, performed teaching on how to clean, manage and take in/out the Bebeto tube.  7/14: PAUL. Calcium corrected with extra dose of CaCarb soln. No new complaints. Will continue teaching in anticipation of discharge.   7/15: No acute events overnight.  Calcium stable.  Salivary bypass tube removed.  Educated on stoma care.     PHYSICAL EXAM:  NAD, A&Ox3,  Breathing comfortably on RA  Neck: incisions c/d/i; neck soft, flat; ABDON holding suction; size 10-55 bebeto tube secured via soft collar, minimal secretions suctioned, HME valve in place  Chest: incisions c/d/i; chest soft, flat; ABDON sites c/d/i well healing

## 2019-07-15 NOTE — PROGRESS NOTE ADULT - ASSESSMENT
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 Chest JPs removed, neck stable no e/o hematoma or seroma. Calcium stable on current regiment. Performing Nasrin tube teaching.  - PATIENT IS NOT ABLE TO BE NASALLY OR ORALLY INTUBATED  - salivary bypass tube removed, SLP evaluation today  - Appreciate add'l endocrinology recs; synthroid, rocaltrol, VD3 and calcium per endo  - Monitor Ca q12h  - Monitor neck ABDON   - Continue bolus TFs  - Pain control  - Nasrin tube cleaning teaching  - Suction frequently w/red rubber catheters only, humidified air at all times  - SCDs, Lovenox, OOB/ambulate  - Bowel regimen  - Page ENT with questions or concerns  - d/w attending MD whom agrees with the above plan

## 2019-07-16 ENCOUNTER — TRANSCRIPTION ENCOUNTER (OUTPATIENT)
Age: 64
End: 2019-07-16

## 2019-07-16 LAB
ALBUMIN SERPL ELPH-MCNC: 3.4 G/DL — SIGNIFICANT CHANGE UP (ref 3.3–5)
CALCIUM SERPL-MCNC: 7.9 MG/DL — LOW (ref 8.4–10.5)
CALCIUM SERPL-MCNC: 8.3 MG/DL — LOW (ref 8.4–10.5)
CALCIUM SERPL-MCNC: 8.8 MG/DL — SIGNIFICANT CHANGE UP (ref 8.4–10.5)
EXTRA LAVENDER TOP TUBE: SIGNIFICANT CHANGE UP
GLUCOSE BLDC GLUCOMTR-MCNC: 115 MG/DL — HIGH (ref 70–99)
GLUCOSE BLDC GLUCOMTR-MCNC: 126 MG/DL — HIGH (ref 70–99)
GLUCOSE BLDC GLUCOMTR-MCNC: 85 MG/DL — SIGNIFICANT CHANGE UP (ref 70–99)
GLUCOSE BLDC GLUCOMTR-MCNC: 94 MG/DL — SIGNIFICANT CHANGE UP (ref 70–99)
PHOSPHATE SERPL-MCNC: 4.2 MG/DL — SIGNIFICANT CHANGE UP (ref 2.5–4.5)
PTH-INTACT FLD-MCNC: 14 PG/ML — LOW (ref 15–65)

## 2019-07-16 PROCEDURE — 99232 SBSQ HOSP IP/OBS MODERATE 35: CPT | Mod: GC

## 2019-07-16 PROCEDURE — 74230 X-RAY XM SWLNG FUNCJ C+: CPT | Mod: 26

## 2019-07-16 RX ORDER — LEVOTHYROXINE SODIUM 125 MCG
100 TABLET ORAL DAILY
Refills: 0 | Status: DISCONTINUED | OUTPATIENT
Start: 2019-07-16 | End: 2019-07-17

## 2019-07-16 RX ADMIN — Medication 100 MILLIGRAM(S): at 05:51

## 2019-07-16 RX ADMIN — Medication 4000 UNIT(S): at 11:36

## 2019-07-16 RX ADMIN — CALCITRIOL 0.5 MICROGRAM(S): 0.5 CAPSULE ORAL at 21:59

## 2019-07-16 RX ADMIN — Medication 650 MILLIGRAM(S): at 19:00

## 2019-07-16 RX ADMIN — OXYCODONE HYDROCHLORIDE 5 MILLIGRAM(S): 5 TABLET ORAL at 14:50

## 2019-07-16 RX ADMIN — OXYCODONE HYDROCHLORIDE 5 MILLIGRAM(S): 5 TABLET ORAL at 05:50

## 2019-07-16 RX ADMIN — Medication 2500 MILLIGRAM(S): at 21:59

## 2019-07-16 RX ADMIN — SENNA PLUS 10 MILLILITER(S): 8.6 TABLET ORAL at 21:58

## 2019-07-16 RX ADMIN — Medication 2500 MILLIGRAM(S): at 05:50

## 2019-07-16 RX ADMIN — Medication 100 MILLIGRAM(S): at 18:04

## 2019-07-16 RX ADMIN — Medication 125 MICROGRAM(S): at 05:51

## 2019-07-16 RX ADMIN — ENOXAPARIN SODIUM 40 MILLIGRAM(S): 100 INJECTION SUBCUTANEOUS at 11:35

## 2019-07-16 RX ADMIN — Medication 650 MILLIGRAM(S): at 18:12

## 2019-07-16 RX ADMIN — Medication 2500 MILLIGRAM(S): at 15:06

## 2019-07-16 RX ADMIN — OXYCODONE HYDROCHLORIDE 5 MILLIGRAM(S): 5 TABLET ORAL at 14:19

## 2019-07-16 RX ADMIN — OXYCODONE HYDROCHLORIDE 5 MILLIGRAM(S): 5 TABLET ORAL at 06:00

## 2019-07-16 RX ADMIN — CALCITRIOL 0.5 MICROGRAM(S): 0.5 CAPSULE ORAL at 09:48

## 2019-07-16 NOTE — PROGRESS NOTE ADULT - ATTENDING COMMENTS
Pt seen on rounds this afternoon.  Continues to deny any hypocalcemic symptoms and Chvostek was negative.  His serum calcium is slightly lower today but corrected values are still in the target range of 8.4-8.8 mg%.  He will apparently be started on a pureed diet with thin liquids.  His calcitriol and calcium carbonate should be continued as is--still suspect that calcitriol dose will need to eventually be decreased.  His levothyroxine, if not given IV, should be given PO on an empty stomach before breakfast and first tube feed--NOT via the feeding tube, because absorption is too unreliable.  If given PO, should be 100 mcg/day

## 2019-07-16 NOTE — DISCHARGE NOTE PROVIDER - NSDCACTIVITY_GEN_ALL_CORE
Walking - Outdoors allowed/Stairs allowed/Do not drive or operate machinery/Showering allowed/Walking - Indoors allowed/No heavy lifting/straining

## 2019-07-16 NOTE — DISCHARGE NOTE PROVIDER - HOSPITAL COURSE
Hospital Course:         Discharge Medications:         Discharge Instructions:         Follow-up Appointments: Otolaryngology - Head & Neck Surgery Discharge Summary        Brief HPI    63M former smoker ho 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 today admitted s/p DL biopsy for recurrence of laryngeal CA (found on frozen) and planned revision tracheostomy due to tenuous airway. C/b return to OR  for exchange to 6 distal XLT due to tracheomalacia and poor ventilatory status after coughing episode, now sp salvage TL, L myocutaneous pectoralis major flap.        Hospital Course    6/26: Now doing well in SICU overnight, no additional respiratory issues, cuff taken down thsi AM. Failed SLP evaluation with evidence of gross aspiration on ice chip trial    6/27: has thick purulent secretions, SICU will keep for one more night. Low grade temps to 100.4. Will need PEG    6/28: NPO for PEG today. To SDU, when secretions are better managed. Received PEG for inability to swallow.    6/29: still requiring suctioning q30 minutes, small amount of glycopyrrolate given by SICU, received PEG tomorrow, will start trickle feeds    6/30: NAEON, feeds up to goal, still requiring frequent suctioning, will stop glycopyrrolate today    7/01: NAEON, tube feeds at goal, still requiring q1hr suctioning of copious thin nonpurulent secretions, dc'd glycopyrrolate    7/03: pt underwent TL, uncomplicated, tolerated; post-op PTH, iCa low so started on Rocaltrol, Ca IV; neck ABDON x1, left chest ABDON x2, salivary bypass stents in place    7/4: No acute events overnight. Calcium stable. Denies perioral/fingertip tingling. Tolerating trickle feeds. No new complaints.     7/05: NAEON. Pt complaining of increased pain. Otherwise, no new complaints. Calcium levels continue to be stable. Denies perioral/fingertip tingling. Tolerating TFs at goal.    7/6: No acute events, doing well, OOBTC. Tolerating TF at goal, will transition to bolus. Calcium changed yesterday to 2500mg q8h. Chest ABDON x1 removed today. Stable for transfer to SDU.     7/7: No acute events. Transferred to SDU yesterday. TF transitioned to bolus, tolerating well. Corrected Ca stable. Will decrease Ca checks to q12h per endocrinology.     7/8: No acute events, breathing comfortably with Nasrin tube in place. TFs well tolerated at bolus without subjective S&S reflux. Corrected Ca stable. Following Ca checks q12, Endocrinology following.    7/10: NAEON. Calcium levels normalizing, Endo recommending VD3 supplementation; tolerating TFs at goal rate without issue.    7/11: NAEON, Ca stable, Endocrinology following, continuing Ca checks. Tolerating TFs at bolus. Well tolerating Nasrin tube, performed teaching on how to clean, manage and take in/out the Nasrin tube.    7/12: NAEON, Ca stable, Endocrinology following, continuing Ca checks. Tolerating TFs at bolus. Well tolerating Nasrin tube, performed teaching on how to clean, manage and take in/out the Nasrin tube.    7/14: NAEON. Calcium corrected with extra dose of CaCarb soln. No new complaints. Will continue teaching in anticipation of discharge.     7/15: No acute events overnight.  Calcium stable.  Salivary bypass tube removed.  Educated on stoma care.     7/16: NAEON. Pt demonstrated stoma care to team on AM rounds. Ca levels remain stable. Pt for repeat SLP assessment/MBSS today. No new complaints.    7/17: NAEON. Salivary bypass removed/pt passed MBS on 7/16 PM without evidence of leak, cleared for pureed/thin liquids. Tolerated dinner yesterday without issues. No new complaints. Evidence of cellulitis inferior to stoma. Febrile.    7/18: NAEON, no respiratory events. Patient continues to well tolerate pureed foods and thin liquids. Patient taught how to clean Nasrin tube. Prior ABDON site packed with 1/4 gauze. Cellulitis improving. Afebrile.    7/19: No acute events overnight, continues to spike low grade fevers.  Pt restarted on antibiotics.  Continues on tube feeds, no respiratory complaints    7/20: No acute events overnight, afebrile. Continues to receive Ancef/Flagyl, continued tube feeds through with increased po intake feels full with half of bolus. No respiratory complaints.     7/21: No acute events overnight, afebrile. Continues to receive Ancef/Flagyl. DC'd tube feeds, completing nutritional requirements po. No respiratory complaints. Continues to ambulate, tolerate pureed diet well. Denies fevers/chills/nausea/vomiting. Denies neck pain or increased secretions. Erythema of neck significantly improved.    7/22: No acute events overnight, afebrile. Continues to receive Ancef/Flagyl, ambulating. Continues to well tolerate full pureed/liquid meals with Ensure supplements to meet caloric needs. Ambulating, voiding, fevers/chills/nausea/vomiting. Calcium stable. Medical cleared for discharge.            Diet    You should continue a pureed and thin liquid diet. You should supplement your diet with 3 Ensure Enlives per day, and 3 Ensure pudding supplements a day.        Discharge Medications:     Keflex 500mg three times a day for 7 days    Synthroid 125mcg every morning one hour before breakfast, do not take other medications at the same time    Calcitriol 0.5mcg, twice a day    Calcium carbonate 2,500 mg (1g elemental Ca) three times a day    Vitamin D3 (cholecalciferol) 4000U daily        Discharge Instructions:     Please clean your Nasrin tube twice a day, or more often as needed. To clean the Nasrin tube, remove the soft collar ties attached to it, then use soap and water on gauze or paper towel and put it through the tube multiple times to clean off the inside. You can also use the end of a Qtip to scratch off thick secretions/mucous that is stuck to the inner sides of the tube.        You should wear the HME valve at all times. The Nasrin tube can be removed for suctioning. You should place a soft gauze or mepilex or duoderm dressing under tube so it is resting on top of it and not directly on your skin.            DISCHARGE INSTRUCTIONS FOR PEG:    Return to the emergency department if:    You start coughing or vomiting during or after a feeding.     You have severe abdominal pain.     Blood or tube feeding fluid leaks from the PEG tube site.     Your PEG tube is shorter than it was when it was put in.     Your PEG tube comes out.     Your mouth feels dry, your heart feels like it is beating too fast, or you feel weak.    Contact your healthcare provider if:    You have nausea, diarrhea, or abdominal bloating or discomfort.     You have stomach pain after each feeding or when you move around.     You have discomfort or pain around your PEG tube site.     The skin around your PEG tube is red, swollen, or draining pus.     You weigh less than your healthcare provider says you should.     Your PEG tube is longer than it was when it was put in.     You have questions or concerns about your condition or care.    How to use your PEG tube:    Your healthcare provider will tell you when and how often to use your PEG tube for feedings. You may need a bolus, intermittent, or continuous feeding. A bolus feeding is when formula is give over a short period of time. An intermittent feeding is scheduled for certain times throughout the day. Continuous feedings run all the time. Ask your healthcare provider which method is best for you:    Use a feeding syringe. Connect the feeding syringe to the end of the PEG tube. Pour the correct amount of formula into the syringe. Hold the syringe up high. Formula will flow into the PEG tube. The syringe plunger may be used to gently push the last of the formula through the PEG tube.        How to care for your PEG tube:    Always flush your PEG tube before and after each use. This helps prevent blockage from formula or medicine. Use at least 2 tablespoons (30 milliliters) of water to flush the tube. Follow directions for flushing your PEG tube.     If your PEG tube becomes clogged, try to unclog it as soon as you can. Flush your PEG tube with a 60 milliliter (mL) syringe filled with warm water. Never use a wire to unclog the tube. A wire can poke a hole in the tube. Your healthcare provider may have you use a special medicine or a plastic brush to help unclog your tube.     Check the PEG tube daily.     Check the length of the tube from the end to where it goes into your body. If it gets longer, it may be at risk for coming out. If it gets shorter, let your healthcare provider know right away.     Check the bumper (piece that goes around the tube, next to your skin). It should be snug against your skin. Tell your healthcare provider if the bumper seems too tight or too loose.    Use an alcohol pad to clean the end of your PEG tube. Do this before you connect tubing or a syringe to your PEG tube and after you remove it. When you disconnect tubing or a syringe from your PEG tube, do not let the end of the PEG tube touch anything.    How do I care for the skin around my PEG tube?    Do not remove the stitches or medical tape that hold your PEG tube in place when you first get it. Your healthcare provider will take them off once the skin around your tube heals. Leave clean bandages over the tube area for the first 24 hours after the tube is put in. You may not need to use bandages after 24 hours if the skin around the tube looks dry. Ask when you can shower or bathe.     Routine skin care:     Clean the skin around your tube 1 to 2 times each day. Ask your healthcare provider what you should use to clean your skin. Check for redness and swelling in the area where the tube goes into your body. Check for fluid draining from your stoma (the hole where the tube was put in).     Gently turn your tube daily after your stitches come out. This may decrease pressure on your skin under the bumper. It may also help prevent an infection.     Keep the skin around your PEG tube dry. This will help prevent skin irritation and infection.    Use topical medicines as directed. You may need to put antibiotic cream on the skin around your tube after you are done cleaning it.            Follow-up Appointments: Otolaryngology - Head & Neck Surgery Discharge Summary        Brief HPI    63M former smoker ho 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 today admitted s/p DL biopsy for recurrence of laryngeal CA (found on frozen) and planned revision tracheostomy due to tenuous airway. C/b return to OR  for exchange to 6 distal XLT due to tracheomalacia and poor ventilatory status after coughing episode, now sp salvage TL, L myocutaneous pectoralis major flap.        Hospital Course    6/26: Now doing well in SICU overnight, no additional respiratory issues, cuff taken down thsi AM. Failed SLP evaluation with evidence of gross aspiration on ice chip trial    6/27: has thick purulent secretions, SICU will keep for one more night. Low grade temps to 100.4. Will need PEG    6/28: NPO for PEG today. To SDU, when secretions are better managed. Received PEG for inability to swallow.    6/29: still requiring suctioning q30 minutes, small amount of glycopyrrolate given by SICU, received PEG tomorrow, will start trickle feeds    6/30: NAEON, feeds up to goal, still requiring frequent suctioning, will stop glycopyrrolate today    7/01: NAEON, tube feeds at goal, still requiring q1hr suctioning of copious thin nonpurulent secretions, dc'd glycopyrrolate    7/03: pt underwent TL, uncomplicated, tolerated; post-op PTH, iCa low so started on Rocaltrol, Ca IV; neck ABDON x1, left chest ABDON x2, salivary bypass stents in place    7/4: No acute events overnight. Calcium stable. Denies perioral/fingertip tingling. Tolerating trickle feeds. No new complaints.     7/05: NAEON. Pt complaining of increased pain. Otherwise, no new complaints. Calcium levels continue to be stable. Denies perioral/fingertip tingling. Tolerating TFs at goal.    7/6: No acute events, doing well, OOBTC. Tolerating TF at goal, will transition to bolus. Calcium changed yesterday to 2500mg q8h. Chest ABDON x1 removed today. Stable for transfer to SDU.     7/7: No acute events. Transferred to SDU yesterday. TF transitioned to bolus, tolerating well. Corrected Ca stable. Will decrease Ca checks to q12h per endocrinology.     7/8: No acute events, breathing comfortably with Nasrin tube in place. TFs well tolerated at bolus without subjective S&S reflux. Corrected Ca stable. Following Ca checks q12, Endocrinology following.    7/10: NAEON. Calcium levels normalizing, Endo recommending VD3 supplementation; tolerating TFs at goal rate without issue.    7/11: NAEON, Ca stable, Endocrinology following, continuing Ca checks. Tolerating TFs at bolus. Well tolerating Nasrin tube, performed teaching on how to clean, manage and take in/out the Nasrin tube.    7/12: NAEON, Ca stable, Endocrinology following, continuing Ca checks. Tolerating TFs at bolus. Well tolerating Nasrin tube, performed teaching on how to clean, manage and take in/out the Nasrin tube.    7/14: NAEON. Calcium corrected with extra dose of CaCarb soln. No new complaints. Will continue teaching in anticipation of discharge.     7/15: No acute events overnight.  Calcium stable.  Salivary bypass tube removed.  Educated on stoma care.     7/16: NAEON. Pt demonstrated stoma care to team on AM rounds. Ca levels remain stable. Pt for repeat SLP assessment/MBSS today. No new complaints.    7/17: NAEON. Salivary bypass removed/pt passed MBS on 7/16 PM without evidence of leak, cleared for pureed/thin liquids. Tolerated dinner yesterday without issues. No new complaints. Evidence of cellulitis inferior to stoma. Febrile.    7/18: NAEON, no respiratory events. Patient continues to well tolerate pureed foods and thin liquids. Patient taught how to clean Nasrin tube. Prior ABDON site packed with 1/4 gauze. Cellulitis improving. Afebrile.    7/19: No acute events overnight, continues to spike low grade fevers.  Pt restarted on antibiotics.  Continues on tube feeds, no respiratory complaints    7/20: No acute events overnight, afebrile. Continues to receive Ancef/Flagyl, continued tube feeds through with increased po intake feels full with half of bolus. No respiratory complaints.     7/21: No acute events overnight, afebrile. Continues to receive Ancef/Flagyl. DC'd tube feeds, completing nutritional requirements po. No respiratory complaints. Continues to ambulate, tolerate pureed diet well. Denies fevers/chills/nausea/vomiting. Denies neck pain or increased secretions. Erythema of neck significantly improved.    7/22: No acute events overnight, afebrile. Continues to receive Ancef/Flagyl, ambulating. Continues to well tolerate full pureed/liquid meals with Ensure supplements to meet caloric needs. Ambulating, voiding, fevers/chills/nausea/vomiting. Calcium stable. Medical cleared for discharge.            Diet    You should continue a pureed and thin liquid diet. You should supplement your diet with 3 Ensure Enlives per day, and 3 Ensure pudding supplements a day.        Discharge Medications:     Keflex 500mg three times a day for 7 days    Synthroid 125mcg every morning one hour before breakfast, do not take other medications at the same time    Calcitriol 0.5mcg, twice a day    Calcium carbonate 2,500 mg (1g elemental Ca) three times a day at 10a, 4p, 10p    Vitamin D3 (cholecalciferol) 4000U daily        Discharge Instructions:     Please clean your Nasrin tube twice a day, or more often as needed. To clean the Nasrin tube, remove the soft collar ties attached to it, then use soap and water on gauze or paper towel and put it through the tube multiple times to clean off the inside. You can also use the end of a Qtip to scratch off thick secretions/mucous that is stuck to the inner sides of the tube.        You should wear the HME valve at all times. The Nasrin tube can be removed for suctioning. You should place a soft gauze or mepilex or duoderm dressing under tube so it is resting on top of it and not directly on your skin.            DISCHARGE INSTRUCTIONS FOR PEG:    Return to the emergency department if:    You start coughing or vomiting during or after a feeding.     You have severe abdominal pain.     Blood or tube feeding fluid leaks from the PEG tube site.     Your PEG tube is shorter than it was when it was put in.     Your PEG tube comes out.     Your mouth feels dry, your heart feels like it is beating too fast, or you feel weak.    Contact your healthcare provider if:    You have nausea, diarrhea, or abdominal bloating or discomfort.     You have stomach pain after each feeding or when you move around.     You have discomfort or pain around your PEG tube site.     The skin around your PEG tube is red, swollen, or draining pus.     You weigh less than your healthcare provider says you should.     Your PEG tube is longer than it was when it was put in.     You have questions or concerns about your condition or care.    How to use your PEG tube:    Your healthcare provider will tell you when and how often to use your PEG tube for feedings. You may need a bolus, intermittent, or continuous feeding. A bolus feeding is when formula is give over a short period of time. An intermittent feeding is scheduled for certain times throughout the day. Continuous feedings run all the time. Ask your healthcare provider which method is best for you:    Use a feeding syringe. Connect the feeding syringe to the end of the PEG tube. Pour the correct amount of formula into the syringe. Hold the syringe up high. Formula will flow into the PEG tube. The syringe plunger may be used to gently push the last of the formula through the PEG tube.        How to care for your PEG tube:    Always flush your PEG tube before and after each use. This helps prevent blockage from formula or medicine. Use at least 2 tablespoons (30 milliliters) of water to flush the tube. Follow directions for flushing your PEG tube.     If your PEG tube becomes clogged, try to unclog it as soon as you can. Flush your PEG tube with a 60 milliliter (mL) syringe filled with warm water. Never use a wire to unclog the tube. A wire can poke a hole in the tube. Your healthcare provider may have you use a special medicine or a plastic brush to help unclog your tube.     Check the PEG tube daily.     Check the length of the tube from the end to where it goes into your body. If it gets longer, it may be at risk for coming out. If it gets shorter, let your healthcare provider know right away.     Check the bumper (piece that goes around the tube, next to your skin). It should be snug against your skin. Tell your healthcare provider if the bumper seems too tight or too loose.    Use an alcohol pad to clean the end of your PEG tube. Do this before you connect tubing or a syringe to your PEG tube and after you remove it. When you disconnect tubing or a syringe from your PEG tube, do not let the end of the PEG tube touch anything.    How do I care for the skin around my PEG tube?    Do not remove the stitches or medical tape that hold your PEG tube in place when you first get it. Your healthcare provider will take them off once the skin around your tube heals. Leave clean bandages over the tube area for the first 24 hours after the tube is put in. You may not need to use bandages after 24 hours if the skin around the tube looks dry. Ask when you can shower or bathe.     Routine skin care:     Clean the skin around your tube 1 to 2 times each day. Ask your healthcare provider what you should use to clean your skin. Check for redness and swelling in the area where the tube goes into your body. Check for fluid draining from your stoma (the hole where the tube was put in).     Gently turn your tube daily after your stitches come out. This may decrease pressure on your skin under the bumper. It may also help prevent an infection.     Keep the skin around your PEG tube dry. This will help prevent skin irritation and infection.    Use topical medicines as directed. You may need to put antibiotic cream on the skin around your tube after you are done cleaning it.            Follow-up Appointments:

## 2019-07-16 NOTE — PROGRESS NOTE ADULT - SUBJECTIVE AND OBJECTIVE BOX
INTERVAL HPI/OVERNIGHT EVENTS:    Patient seen and examined at bedside. He denies any perioral numbness or paresthesias of the hand. No muscle pain or spasm. He is tolerating Jevity tube feeds now at bolus Q6H for a total of 7 cans daily. Denies N/V but has been having some constipation. Barium swallow study done today with recs of pureed diet with thin liquids.  AM Calcium stable at 8.3 corrected---->8.8 and noon Ca 7.9 corrected--->8.4. Albumin 3.4 so corrected Ca 8.6,. Vitamin D 25 is 10.5. PTH 14.  He is currently receiving calcitriol 0.50mcg Q12H, calcium carbonate 2500mg TID. Cholecalciferol 4,000 IU daily, and levothyroxine changed from 50mcg IV to 175mcg PO.        Pt reports the following symptoms:    CONSTITUTIONAL:  Negative fever or chills, feels well, good appetite  EYES:  Negative  blurry vision or double vision  CARDIOVASCULAR:  Negative for chest pain or palpitations  RESPIRATORY:  Negative for cough, wheezing, or SOB   GASTROINTESTINAL:  Negative for nausea, vomiting, diarrhea, constipation, or abdominal pain  GENITOURINARY:  Negative frequency, urgency or dysuria  NEUROLOGIC:  No headache, confusion, dizziness, lightheadedness    MEDICATIONS  (STANDING):  calcitriol  Solution 0.5 MICROGram(s) Enteral Tube <User Schedule>  calcium carbonate   Suspension 2500 milliGRAM(s) Oral three times a day  cholecalciferol 4000 Unit(s) Oral daily  dextrose 50% Injectable 12.5 Gram(s) IV Push once  dextrose 50% Injectable 25 Gram(s) IV Push once  dextrose 50% Injectable 25 Gram(s) IV Push once  docusate sodium Liquid 100 milliGRAM(s) Oral two times a day  enoxaparin Injectable 40 milliGRAM(s) SubCutaneous every 24 hours  insulin lispro (HumaLOG) corrective regimen sliding scale   SubCutaneous Before meals and at bedtime  levothyroxine 125 MICROGram(s) Enteral Tube daily  senna Syrup 10 milliLiter(s) Oral at bedtime    MEDICATIONS  (PRN):  acetaminophen    Suspension .. 650 milliGRAM(s) Enteral Tube every 6 hours PRN Mild Pain (1 - 3)  bisacodyl Suppository 10 milliGRAM(s) Rectal daily PRN Constipation  dextrose 40% Gel 15 Gram(s) Oral once PRN Blood Glucose LESS THAN 70 milliGRAM(s)/deciliter  glucagon  Injectable 1 milliGRAM(s) IntraMuscular once PRN Glucose LESS THAN 70 milligrams/deciliter  oxyCODONE    Solution 5 milliGRAM(s) Oral every 4 hours PRN Moderate Pain (4 - 6)  oxyCODONE    Solution 10 milliGRAM(s) Oral every 6 hours PRN Severe Pain (7 - 10)  polyethylene glycol 3350 17 Gram(s) Oral daily PRN Constipation      PHYSICAL EXAM  Vital Signs Last 24 Hrs  T(C): 36.7 (16 Jul 2019 09:03), Max: 37.6 (15 Jul 2019 14:00)  T(F): 98.1 (16 Jul 2019 09:03), Max: 99.6 (15 Jul 2019 14:00)  HR: 80 (16 Jul 2019 09:03) (68 - 88)  BP: 95/53 (16 Jul 2019 08:34) (87/53 - 107/65)  BP(mean): 71 (16 Jul 2019 08:34) (65 - 81)  RR: 18 (16 Jul 2019 09:03) (17 - 18)  SpO2: 98% (16 Jul 2019 09:03) (94% - 98%)    Constitutional: wn/wd in NAD.   HEENT: NCAT, MMM, no proptosis or lid retraction  Neck: surgical scar and tracheostomy site - looks wnl.  Respiratory: lungs CTAB.  Cardiovascular: regular rhythm, normal S1 and S2, no audible murmurs, no peripheral edema  GI: soft, NT/ND, no masses. PEG tube in place  Neurology: no tremors, DTR 2+. no muscular spasm  Skin: no visible rashes/lesions  Psychiatric: Awake and alert, normal affect/mood.    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                                                                                                                                                                                                                                                                                                                                                                                                                                                                                                                                                                                                                                                                                                                                                                                                                                                                                                                                                                                                                                                                                                                                                                                                                                                                                                                                                                                                                                                                                                                                                                                                                                                                                                                                                                                                                                                                                                                                                                                                                                                                                                                                                                                                                                                                                                                                                                                                                                                                                                                                                                                                                       1.  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 Continue calcitriol 0.5 mcg Q12H. Also continue Calcium carbonate 2500 mg PO TID. Check serum Ca every 12 hours. Check albumin every other day. Check phos, ionized ca daily. Check PTH daily. Vit D level low. Please cont Vit D3 4000IU daily. If corrected calcium goes more than 9.0, we may need to decrease the calcitriol dose.     (If calcium level less then 6.9 mg/dl, please give calcium carbonate infusion with a 1 L NS bag containing at least 900-1000 mg of elemental calcium to be given over 24 hours.)    2.  Post surgical hypothyroidism - Cont Levothyroxine 50mcg IV daily. As an OP, patient can be discharged on levothyroxine 100mcg PO.    3. Adrenal nodule - CT scan showed 1.4cm adrenal nodule. Can be followed up as an OP.    Will continue to monitor     For discharge, pt can continue TBD by clinical course    Pt can follow up at discharge with [facility] by calling  to make an appointment.    patient seen and discussed with  and update primary team INTERVAL HPI/OVERNIGHT EVENTS:    Patient seen and examined at bedside. He denies any perioral numbness or paresthesias of the hand. No muscle pain or spasm. He passed speech and swallow and has been ordered puree diet with thin liquids, in addition to nepro 2 cans at 8AM, 2 cans at noon, 2 cans at 6PM and 1 can at bedtime.  AM Calcium stable at 8.3 corrected---->8.8 and noon Ca 7.9 corrected--->8.4. Albumin 3.4. Vitamin D 25 is 10.5. PTH 14.  He is currently receiving calcitriol 0.50mcg Q12H, calcium carbonate 2500mg TID. Cholecalciferol 4,000 IU daily, and levothyroxine changed from 50mcg IV to 175mcg PO.        Pt reports the following symptoms:    CONSTITUTIONAL:  Negative fever or chills, feels well, good appetite  EYES:  Negative  blurry vision or double vision  CARDIOVASCULAR:  Negative for chest pain or palpitations  RESPIRATORY:  Negative for cough, wheezing, or SOB   GASTROINTESTINAL:  Negative for nausea, vomiting, diarrhea, constipation, or abdominal pain  GENITOURINARY:  Negative frequency, urgency or dysuria  NEUROLOGIC:  No headache, confusion, dizziness, lightheadedness    MEDICATIONS  (STANDING):  calcitriol  Solution 0.5 MICROGram(s) Enteral Tube <User Schedule>  calcium carbonate   Suspension 2500 milliGRAM(s) Oral three times a day  cholecalciferol 4000 Unit(s) Oral daily  dextrose 50% Injectable 12.5 Gram(s) IV Push once  dextrose 50% Injectable 25 Gram(s) IV Push once  dextrose 50% Injectable 25 Gram(s) IV Push once  docusate sodium Liquid 100 milliGRAM(s) Oral two times a day  enoxaparin Injectable 40 milliGRAM(s) SubCutaneous every 24 hours  insulin lispro (HumaLOG) corrective regimen sliding scale   SubCutaneous Before meals and at bedtime  levothyroxine 125 MICROGram(s) Enteral Tube daily  senna Syrup 10 milliLiter(s) Oral at bedtime    MEDICATIONS  (PRN):  acetaminophen    Suspension .. 650 milliGRAM(s) Enteral Tube every 6 hours PRN Mild Pain (1 - 3)  bisacodyl Suppository 10 milliGRAM(s) Rectal daily PRN Constipation  dextrose 40% Gel 15 Gram(s) Oral once PRN Blood Glucose LESS THAN 70 milliGRAM(s)/deciliter  glucagon  Injectable 1 milliGRAM(s) IntraMuscular once PRN Glucose LESS THAN 70 milligrams/deciliter  oxyCODONE    Solution 5 milliGRAM(s) Oral every 4 hours PRN Moderate Pain (4 - 6)  oxyCODONE    Solution 10 milliGRAM(s) Oral every 6 hours PRN Severe Pain (7 - 10)  polyethylene glycol 3350 17 Gram(s) Oral daily PRN Constipation      PHYSICAL EXAM  Vital Signs Last 24 Hrs  T(C): 36.7 (16 Jul 2019 09:03), Max: 37.6 (15 Jul 2019 14:00)  T(F): 98.1 (16 Jul 2019 09:03), Max: 99.6 (15 Jul 2019 14:00)  HR: 80 (16 Jul 2019 09:03) (68 - 88)  BP: 95/53 (16 Jul 2019 08:34) (87/53 - 107/65)  BP(mean): 71 (16 Jul 2019 08:34) (65 - 81)  RR: 18 (16 Jul 2019 09:03) (17 - 18)  SpO2: 98% (16 Jul 2019 09:03) (94% - 98%)    Constitutional: wn/wd in NAD.   HEENT: NCAT, MMM, no proptosis or lid retraction  Neck: surgical scar and tracheostomy site - looks wnl.  Respiratory: lungs CTAB.  Cardiovascular: regular rhythm, normal S1 and S2, no audible murmurs, no peripheral edema  GI: soft, NT/ND, no masses. PEG tube in place  Neurology: no tremors, DTR 2+. no muscular spasm  Skin: no visible rashes/lesions  Psychiatric: Awake and alert, normal affect/mood.    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                                                                                                                                                                                                                                                                                                                                                                                                                                                                                                                                                                                                                                                                                                                                                                                                                                                                                                                                                                                                                                                                                                                                                                                                                                                                                                                                                                                                                                                                                                                                                                                                                                                                                                                                                                                                                                                                                                                                                                                                                                                                                                                                                                                                                                                                                                                                                                                                                                                                                                                                                                                                                       1.  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 Continue calcitriol 0.5 mcg Q12H. Also continue Calcium carbonate 2500 mg PO TID. Check serum Ca every 12 hours. Check albumin every other day. Check phos, ionized ca daily. Check PTH daily. Vit D level low. Please cont Vit D3 4000IU daily. If corrected calcium goes more than 9.0, we may need to decrease the calcitriol dose by half.     (If calcium level less then 6.9 mg/dl, please give calcium carbonate infusion with a 1 L NS bag containing at least 900-1000 mg of elemental calcium to be given over 24 hours.)    2.  Post surgical hypothyroidism - Please give levothyroxine 100mcg daily PO, not through peg tube.    3. Adrenal nodule - CT scan showed 1.4cm adrenal nodule. Can be followed up as an OP.    Will continue to monitor     For discharge, pt can continue TBD by clinical course    Pt can follow up at discharge within 1 week to check Ca level at [facility] by calling  to make an appointment.    Patient seen and discussed with  and ENT.

## 2019-07-16 NOTE — SWALLOW VFSS/MBS ASSESSMENT ADULT - PHARYNGEAL PHASE COMMENTS
Post laryngectomy anatomy appreciated. Bolus flow was mildly inefficient due to limited contraction with min backflow into the pseudo-pharynx

## 2019-07-16 NOTE — DISCHARGE NOTE PROVIDER - CARE PROVIDER_API CALL
Lee Branch)  Otolaryngology  [address] [place] [address]  Phone: [phone]  Fax: [phone]  Follow Up Time: Lee Branch)  Otolaryngology  [address] [place] [address]  Phone: [phone]  Fax: [phone]  Follow Up Time: 1 week    Andrew Asif (DO)  Radiation Oncology  [address] [place] [address]  Phone: [phone]  Fax: [phone]  Follow Up Time: 1 week Lee Branch)  Otolaryngology  [address] [place] [address]  Phone: [phone]  Fax: [phone]  Follow Up Time: 1 week    Andrew Asif (DO)  Radiation Oncology  [address] [place] [address]  Phone: [phone]  Fax: [phone]  Follow Up Time: 1 week    Tahira Flaherty (NP; RN)  NP in Primary Care AdultGerontology  [address] [address]  Phone: [phone]  Fax: [phone]  Follow Up Time: 1 week

## 2019-07-16 NOTE — DISCHARGE NOTE PROVIDER - CARE PROVIDERS DIRECT ADDRESSES
,[email or web address]

## 2019-07-16 NOTE — DISCHARGE NOTE PROVIDER - NSDCFUADDINST_GEN_ALL_CORE_FT
Discharge Instructions:   Please clean your Nasrin tube twice a day, or more often as needed. To clean the Nasrin tube, remove the soft collar ties attached to it, then use soap and water on gauze or paper towel and put it through the tube multiple times to clean off the inside. You can also use the end of a Qtip to scratch off thick secretions/mucous that is stuck to the inner sides of the tube.    You should wear the HME valve at all times. The Nasrin tube can be removed for suctioning. You should place a soft gauze or mepilex or duoderm dressing under tube so it is resting on top of it and not directly on your skin.    If you are having difficulty breathing take out the Nasrin tube, there may be mucous plugging it up.    Follow-up Appointments:   Please follow up with Dr. Branch in one week. Please also follow up with Dr. Asif, the Radiation Oncologist. Please call both offices to schedule an appointment for the same day. Discharge Instructions:   Please clean your Nasrin tube twice a day, or more often as needed. To clean the Nasrin tube, remove the soft collar ties attached to it, then use soap and water on gauze or paper towel and put it through the tube multiple times to clean off the inside. You can also use the end of a Qtip to scratch off thick secretions/mucous that is stuck to the inner sides of the tube.    You should wear the HME valve at all times. The Nasrin tube can be removed for suctioning. You should place a soft gauze or mepilex or duoderm dressing under tube so it is resting on top of it and not directly on your skin.    If you are having difficulty breathing take out the Nasrin tube, there may be mucous plugging it up.    Follow-up Appointments:   Please follow up with Dr. Branch in one week. Please also follow up with Dr. Asif, the Radiation Oncologist. Please call both offices to schedule an appointment for the same day.    Please follow up within 1 week to check Ca level with Tahira Flaherty NP at [facility] by calling  to make an appointment.

## 2019-07-16 NOTE — SWALLOW VFSS/MBS ASSESSMENT ADULT - ESOPHAGEAL STAGE
Mildly reduced bolus clearance noted through proximal and thoracic esophagus. A liquid wash was minimally effective in facilitating bolus clearance.

## 2019-07-16 NOTE — PROGRESS NOTE ADULT - ASSESSMENT
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 Chest JPs removed, neck stable no e/o hematoma or seroma. Calcium stable on current regiment. Performing Nasrin tube teaching.  - PATIENT IS NOT ABLE TO BE NASALLY OR ORALLY INTUBATED  - SLP evaluation today  - Appreciate add'l endocrinology recs; synthroid, rocaltrol, VD3 and calcium per endo  - Monitor Ca q12h  - Monitor neck ABDON   - Continue bolus TFs  - Pain control  - Nasrin tube cleaning teaching  - Suction frequently w/red rubber catheters only, humidified air at all times  - SCDs, Lovenox, OOB/ambulate  - Bowel regimen  - Page ENT with questions or concerns  - d/w attending MD whom agrees with the above plan

## 2019-07-16 NOTE — DISCHARGE NOTE PROVIDER - NSDCCPTREATMENT_GEN_ALL_CORE_FT
PRINCIPAL PROCEDURE  Procedure: Total laryngectomy without radical neck dissection  Findings and Treatment:

## 2019-07-16 NOTE — DISCHARGE NOTE PROVIDER - PROVIDER TOKENS
PROVIDER:[TOKEN:[[id number]]] PROVIDER:[TOKEN:[[id number]],FOLLOWUP:[1 week]],PROVIDER:[TOKEN:[[id number]],FOLLOWUP:[1 week]] PROVIDER:[TOKEN:[[id number]],FOLLOWUP:[1 week]],PROVIDER:[TOKEN:[[id number]],FOLLOWUP:[1 week]],PROVIDER:[TOKEN:[[id number]],FOLLOWUP:[1 week]]

## 2019-07-16 NOTE — DISCHARGE NOTE PROVIDER - INSTRUCTIONS
See additional instructions You should continue a pureed and thin liquid diet. You should supplement your diet with 3 Ensure Enlives per day, and 3 Ensure pudding supplements a day.

## 2019-07-16 NOTE — SWALLOW VFSS/MBS ASSESSMENT ADULT - DIAGNOSTIC IMPRESSIONS
Pt presents with mildly reduced bolus clearance efficiency with minimal backflow consistent with post laryngectomy anatomical changes.

## 2019-07-16 NOTE — PROGRESS NOTE ADULT - SUBJECTIVE AND OBJECTIVE BOX
OVERNIGHT/INTERVAL HPI:   63M former smoker ho 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 today admitted s/p DL biopsy for recurrence of laryngeal CA (found on frozen) and planned revision tracheostomy due to tenuous airway. C/b return to OR  for exchange to 6 distal XLT due to tracheomalacia and poor ventilatory status after coughing episode, now sp salvage TL, L myocutaneous pectoralis major flap.    6/26: Now doing well in SICU overnight, no additional respiratory issues, cuff taken down thsi AM. Failed SLP evaluation with evidence of gross aspiration on ice chip trial  6/27: has thick purulent secretions, SICU will keep for one more night. Low grade temps to 100.4. Will need PEG  6/28: NPO for PEG today. To SDU, when secretions are better managed. Received PEG for inability to swallow.  6/29: still requiring suctioning q30 minutes, small amount of glycopyrrolate given by SICU, received PEG tomorrow, will start trickle feeds  6/30: NAEON, feeds up to goal, still requiring frequent suctioning, will stop glycopyrrolate today  7/01: NAEON, tube feeds at goal, still requiring q1hr suctioning of copious thin nonpurulent secretions, dc'd glycopyrrolate  7/03: pt underwent TL, uncomplicated, tolerated; post-op PTH, iCa low so started on Rocaltrol, Ca IV; neck ABDON x1, left chest ABDON x2, salivary bypass stents in place  7/4: No acute events overnight. Calcium stable. Denies perioral/fingertip tingling. Tolerating trickle feeds. No new complaints.   7/05: NAEON. Pt complaining of increased pain. Otherwise, no new complaints. Calcium levels continue to be stable. Denies perioral/fingertip tingling. Tolerating TFs at goal.  7/6: No acute events, doing well, OOBTC. Tolerating TF at goal, will transition to bolus. Calcium changed yesterday to 2500mg q8h. Chest ABDON x1 removed today. Stable for transfer to SDU.   7/7: No acute events. Transferred to SDU yesterday. TF transitioned to bolus, tolerating well. Corrected Ca stable. Will decrease Ca checks to q12h per endocrinology.   7/8: No acute events, breathing comfortably with Bebeto tube in place. TFs well tolerated at bolus without subjective S&S reflux. Corrected Ca stable. Following Ca checks q12, Endocrinology following.  7/10: PAUL. Calcium levels normalizing, Endo recommending VD3 supplementation; tolerating TFs at goal rate without issue.  7/11: NAEON, Ca stable, Endocrinology following, continuing Ca checks. Tolerating TFs at bolus. Well tolerating Bebeto tube, performed teaching on how to clean, manage and take in/out the Bebeto tube.  7/12: NAEON, Ca stable, Endocrinology following, continuing Ca checks. Tolerating TFs at bolus. Well tolerating Bebeto tube, performed teaching on how to clean, manage and take in/out the Bebeto tube.  7/14: PAUL. Calcium corrected with extra dose of CaCarb soln. No new complaints. Will continue teaching in anticipation of discharge.   7/15: No acute events overnight.  Calcium stable.  Salivary bypass tube removed.  Educated on stoma care.   7/16: PAUL. Pt demonstrated stoma care to team on AM rounds. Ca levels remain stable. Pt for repeat SLP assessment/MBSS today. No new complaints.    PHYSICAL EXAM:  NAD, A&Ox3,  Breathing comfortably on RA  Neck: incisions c/d/i; neck soft, flat; ABDON holding suction; size 10-55 bebeto tube secured via soft collar, minimal secretions suctioned, HME valve in place  Chest: incisions c/d/i; chest soft, flat; ABDON sites c/d/i well healing

## 2019-07-17 LAB
ALBUMIN SERPL ELPH-MCNC: 3.2 G/DL — LOW (ref 3.3–5)
ALP SERPL-CCNC: 54 U/L — SIGNIFICANT CHANGE UP (ref 40–120)
ALT FLD-CCNC: 8 U/L — LOW (ref 10–45)
ANION GAP SERPL CALC-SCNC: 10 MMOL/L — SIGNIFICANT CHANGE UP (ref 5–17)
AST SERPL-CCNC: 13 U/L — SIGNIFICANT CHANGE UP (ref 10–40)
BILIRUB SERPL-MCNC: 0.5 MG/DL — SIGNIFICANT CHANGE UP (ref 0.2–1.2)
BUN SERPL-MCNC: 10 MG/DL — SIGNIFICANT CHANGE UP (ref 7–23)
CALCIUM SERPL-MCNC: 7.8 MG/DL — LOW (ref 8.4–10.5)
CALCIUM SERPL-MCNC: 8 MG/DL — LOW (ref 8.4–10.5)
CHLORIDE SERPL-SCNC: 94 MMOL/L — LOW (ref 96–108)
CO2 SERPL-SCNC: 29 MMOL/L — SIGNIFICANT CHANGE UP (ref 22–31)
CREAT SERPL-MCNC: 0.64 MG/DL — SIGNIFICANT CHANGE UP (ref 0.5–1.3)
EXTRA LAVENDER TOP TUBE: SIGNIFICANT CHANGE UP
GLUCOSE BLDC GLUCOMTR-MCNC: 111 MG/DL — HIGH (ref 70–99)
GLUCOSE BLDC GLUCOMTR-MCNC: 133 MG/DL — HIGH (ref 70–99)
GLUCOSE BLDC GLUCOMTR-MCNC: 160 MG/DL — HIGH (ref 70–99)
GLUCOSE BLDC GLUCOMTR-MCNC: 90 MG/DL — SIGNIFICANT CHANGE UP (ref 70–99)
GLUCOSE BLDC GLUCOMTR-MCNC: 98 MG/DL — SIGNIFICANT CHANGE UP (ref 70–99)
GLUCOSE SERPL-MCNC: 167 MG/DL — HIGH (ref 70–99)
HCT VFR BLD CALC: 31.1 % — LOW (ref 39–50)
HGB BLD-MCNC: 10.1 G/DL — LOW (ref 13–17)
MCHC RBC-ENTMCNC: 30.2 PG — SIGNIFICANT CHANGE UP (ref 27–34)
MCHC RBC-ENTMCNC: 32.5 GM/DL — SIGNIFICANT CHANGE UP (ref 32–36)
MCV RBC AUTO: 93.1 FL — SIGNIFICANT CHANGE UP (ref 80–100)
NRBC # BLD: 0 /100 WBCS — SIGNIFICANT CHANGE UP (ref 0–0)
PHOSPHATE SERPL-MCNC: 4.4 MG/DL — SIGNIFICANT CHANGE UP (ref 2.5–4.5)
PLATELET # BLD AUTO: 363 K/UL — SIGNIFICANT CHANGE UP (ref 150–400)
POTASSIUM SERPL-MCNC: 4.1 MMOL/L — SIGNIFICANT CHANGE UP (ref 3.5–5.3)
POTASSIUM SERPL-SCNC: 4.1 MMOL/L — SIGNIFICANT CHANGE UP (ref 3.5–5.3)
PROT SERPL-MCNC: 6.3 G/DL — SIGNIFICANT CHANGE UP (ref 6–8.3)
PTH-INTACT FLD-MCNC: 12 PG/ML — LOW (ref 15–65)
RBC # BLD: 3.34 M/UL — LOW (ref 4.2–5.8)
RBC # FLD: 15 % — HIGH (ref 10.3–14.5)
SODIUM SERPL-SCNC: 133 MMOL/L — LOW (ref 135–145)
WBC # BLD: 12.96 K/UL — HIGH (ref 3.8–10.5)
WBC # FLD AUTO: 12.96 K/UL — HIGH (ref 3.8–10.5)

## 2019-07-17 PROCEDURE — 71045 X-RAY EXAM CHEST 1 VIEW: CPT | Mod: 26

## 2019-07-17 PROCEDURE — 99232 SBSQ HOSP IP/OBS MODERATE 35: CPT | Mod: GC

## 2019-07-17 RX ORDER — METRONIDAZOLE 500 MG
500 TABLET ORAL EVERY 8 HOURS
Refills: 0 | Status: DISCONTINUED | OUTPATIENT
Start: 2019-07-17 | End: 2019-07-23

## 2019-07-17 RX ORDER — CALCIUM CARBONATE 500(1250)
2500 TABLET ORAL THREE TIMES A DAY
Refills: 0 | Status: DISCONTINUED | OUTPATIENT
Start: 2019-07-18 | End: 2019-07-19

## 2019-07-17 RX ORDER — LEVOTHYROXINE SODIUM 125 MCG
100 TABLET ORAL DAILY
Refills: 0 | Status: DISCONTINUED | OUTPATIENT
Start: 2019-07-17 | End: 2019-07-18

## 2019-07-17 RX ORDER — SODIUM CHLORIDE 9 MG/ML
500 INJECTION, SOLUTION INTRAVENOUS ONCE
Refills: 0 | Status: DISCONTINUED | OUTPATIENT
Start: 2019-07-17 | End: 2019-07-23

## 2019-07-17 RX ORDER — OXYCODONE HYDROCHLORIDE 5 MG/1
10 TABLET ORAL EVERY 6 HOURS
Refills: 0 | Status: DISCONTINUED | OUTPATIENT
Start: 2019-07-17 | End: 2019-07-22

## 2019-07-17 RX ORDER — CEFAZOLIN SODIUM 1 G
1000 VIAL (EA) INJECTION EVERY 8 HOURS
Refills: 0 | Status: DISCONTINUED | OUTPATIENT
Start: 2019-07-17 | End: 2019-07-19

## 2019-07-17 RX ORDER — OXYCODONE HYDROCHLORIDE 5 MG/1
5 TABLET ORAL EVERY 4 HOURS
Refills: 0 | Status: DISCONTINUED | OUTPATIENT
Start: 2019-07-17 | End: 2019-07-23

## 2019-07-17 RX ADMIN — Medication 4000 UNIT(S): at 13:09

## 2019-07-17 RX ADMIN — ENOXAPARIN SODIUM 40 MILLIGRAM(S): 100 INJECTION SUBCUTANEOUS at 13:08

## 2019-07-17 RX ADMIN — Medication 650 MILLIGRAM(S): at 03:30

## 2019-07-17 RX ADMIN — OXYCODONE HYDROCHLORIDE 5 MILLIGRAM(S): 5 TABLET ORAL at 23:37

## 2019-07-17 RX ADMIN — Medication 650 MILLIGRAM(S): at 03:00

## 2019-07-17 RX ADMIN — OXYCODONE HYDROCHLORIDE 5 MILLIGRAM(S): 5 TABLET ORAL at 11:32

## 2019-07-17 RX ADMIN — OXYCODONE HYDROCHLORIDE 5 MILLIGRAM(S): 5 TABLET ORAL at 22:14

## 2019-07-17 RX ADMIN — Medication 650 MILLIGRAM(S): at 11:58

## 2019-07-17 RX ADMIN — Medication 100 MILLIGRAM(S): at 06:15

## 2019-07-17 RX ADMIN — Medication 650 MILLIGRAM(S): at 18:35

## 2019-07-17 RX ADMIN — Medication 650 MILLIGRAM(S): at 21:10

## 2019-07-17 RX ADMIN — OXYCODONE HYDROCHLORIDE 5 MILLIGRAM(S): 5 TABLET ORAL at 12:14

## 2019-07-17 RX ADMIN — Medication 650 MILLIGRAM(S): at 19:01

## 2019-07-17 RX ADMIN — CALCITRIOL 0.5 MICROGRAM(S): 0.5 CAPSULE ORAL at 09:48

## 2019-07-17 RX ADMIN — SENNA PLUS 10 MILLILITER(S): 8.6 TABLET ORAL at 22:14

## 2019-07-17 RX ADMIN — Medication 100 MILLIGRAM(S): at 14:02

## 2019-07-17 RX ADMIN — Medication 2500 MILLIGRAM(S): at 06:15

## 2019-07-17 RX ADMIN — Medication 100 MILLIGRAM(S): at 22:15

## 2019-07-17 RX ADMIN — Medication 100 MILLIGRAM(S): at 14:03

## 2019-07-17 RX ADMIN — Medication 2500 MILLIGRAM(S): at 14:02

## 2019-07-17 RX ADMIN — Medication 100 MICROGRAM(S): at 06:15

## 2019-07-17 RX ADMIN — Medication 100 MILLIGRAM(S): at 18:35

## 2019-07-17 RX ADMIN — Medication 100 MILLIGRAM(S): at 11:15

## 2019-07-17 RX ADMIN — CALCITRIOL 0.5 MICROGRAM(S): 0.5 CAPSULE ORAL at 22:14

## 2019-07-17 NOTE — PROVIDER CONTACT NOTE (OTHER) - DATE AND TIME:
17-Jul-2019 11:51 maternal fever/pre eclampsia/abnormal fetal heart rate tracing/prolonged rupture of membranes

## 2019-07-17 NOTE — PROGRESS NOTE ADULT - SUBJECTIVE AND OBJECTIVE BOX
INTERVAL HPI/OVERNIGHT EVENTS:    Patient seen and examined at bedside. He denies any perioral numbness or paresthesias of the hand. No muscle pain or spasm. He passed speech and swallow and has been ordered puree diet with thin liquids, in addition to nepro 2 cans at 8AM, 2 cans at noon, 2 cans at 6PM and 1 can at bedtime.  Today pt is c/o cough and fever, though pt remains afebrile. WBC 12.9. CXR and sputum cx pending. Started on Abx.  AM Calcium stable at 8.0 corrected---->8.5 and noon Ca 7.8 corrected--->8.3. Albumin 3.4. Vitamin D 25 is 10.5. PTH 14.  He is currently receiving calcitriol 0.50mcg Q12H, calcium carbonate 2500mg TID. Cholecalciferol 4,000 IU daily, and levothyroxine 100mcg PO.          Pt reports the following symptoms:    CONSTITUTIONAL:  Negative fever or chills, feels well, good appetite  EYES:  Negative  blurry vision or double vision  CARDIOVASCULAR:  Negative for chest pain or palpitations  RESPIRATORY:  Negative for cough, wheezing, or SOB   GASTROINTESTINAL:  Negative for nausea, vomiting, diarrhea, constipation, or abdominal pain  GENITOURINARY:  Negative frequency, urgency or dysuria  NEUROLOGIC:  No headache, confusion, dizziness, lightheadedness    MEDICATIONS  (STANDING):  calcitriol  Solution 0.5 MICROGram(s) Enteral Tube <User Schedule>  calcium carbonate   Suspension 2500 milliGRAM(s) Oral three times a day  ceFAZolin   IVPB 1000 milliGRAM(s) IV Intermittent every 8 hours  cholecalciferol 4000 Unit(s) Oral daily  dextrose 50% Injectable 12.5 Gram(s) IV Push once  dextrose 50% Injectable 25 Gram(s) IV Push once  dextrose 50% Injectable 25 Gram(s) IV Push once  docusate sodium Liquid 100 milliGRAM(s) Oral two times a day  enoxaparin Injectable 40 milliGRAM(s) SubCutaneous every 24 hours  insulin lispro (HumaLOG) corrective regimen sliding scale   SubCutaneous Before meals and at bedtime  levothyroxine 100 MICROGram(s) Oral daily  metroNIDAZOLE  IVPB 500 milliGRAM(s) IV Intermittent every 8 hours  senna Syrup 10 milliLiter(s) Oral at bedtime    MEDICATIONS  (PRN):  acetaminophen    Suspension .. 650 milliGRAM(s) Enteral Tube every 6 hours PRN Mild Pain (1 - 3)  bisacodyl Suppository 10 milliGRAM(s) Rectal daily PRN Constipation  dextrose 40% Gel 15 Gram(s) Oral once PRN Blood Glucose LESS THAN 70 milliGRAM(s)/deciliter  glucagon  Injectable 1 milliGRAM(s) IntraMuscular once PRN Glucose LESS THAN 70 milligrams/deciliter  oxyCODONE    Solution 10 milliGRAM(s) Oral every 6 hours PRN Severe Pain (7 - 10)  polyethylene glycol 3350 17 Gram(s) Oral daily PRN Constipation      PHYSICAL EXAM  Vital Signs Last 24 Hrs  T(C): 36.2 (17 Jul 2019 10:00), Max: 38.1 (16 Jul 2019 14:00)  T(F): 97.1 (17 Jul 2019 10:00), Max: 100.5 (16 Jul 2019 14:00)  HR: 114 (17 Jul 2019 10:39) (83 - 114)  BP: 113/57 (17 Jul 2019 08:23) (95/52 - 113/57)  BP(mean): 80 (17 Jul 2019 08:23) (68 - 80)  RR: 18 (17 Jul 2019 10:39) (16 - 19)  SpO2: 98% (17 Jul 2019 10:39) (90% - 98%)    Constitutional: wn/wd in NAD.   HEENT: NCAT, MMM, no proptosis or lid retraction  Neck: surgical scar and tracheostomy site - looks wnl.  Respiratory: lungs CTAB.  Cardiovascular: regular rhythm, normal S1 and S2, no audible murmurs, no peripheral edema  GI: soft, NT/ND, no masses. PEG tube in place  Neurology: no tremors, DTR 2+. no muscular spasm  Skin: no visible rashes/lesions  Psychiatric: Awake and alert, normal affect/mood.    LABS:                        10.1   12.96 )-----------( 363      ( 17 Jul 2019 11:24 )             31.1     07-17    133<L>  |  94<L>  |  10  ----------------------------<  167<H>  4.1   |  29  |  0.64    Ca    8.0<L>      17 Jul 2019 11:24  Phos  4.4     07-17    TPro  6.3  /  Alb  3.2<L>  /  TBili  0.5  /  DBili  x   /  AST  13  /  ALT  8<L>  /  AlkPhos  54  07-17        Thyroid Stimulating Hormone, Serum: 1.483 uIU/mL (07-03 @ 04:51)      HbA1C: 5.7 % (07-03 @ 11:17)    CAPILLARY BLOOD GLUCOSE      POCT Blood Glucose.: 160 mg/dL (17 Jul 2019 11:42)  POCT Blood Glucose.: 111 mg/dL (17 Jul 2019 06:16)  POCT Blood Glucose.: 126 mg/dL (16 Jul 2019 22:20)  POCT Blood Glucose.: 85 mg/dL (16 Jul 2019 16:33)    Will Discuss in Rounds  A/P: 63M hx COPD, 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                                                                                                                                                                                                                                                                                                                                                                                                                                                                                                                                                                                                                                                                                                                                                                                                                                                                                                                                                                                                                                                                                                                                                                                                                                                                                                                                                                                                                                                                                                                                                                                                                                                                                                                                                                                                                                                                                                                                                                                                                                                                                                                                                                                                                                                                                                                                                                                                                                                                                                                                                                                                                       1.  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 Continue calcitriol 0.5 mcg Q12H. Also continue Calcium carbonate 2500 mg PO TID. Check serum Ca every 12 hours. Check albumin every other day. Check phos, ionized ca daily. Check PTH daily. Vit D level low. Please cont Vit D3 4000IU daily. If corrected calcium goes more than 9.0, we may need to decrease the calcitriol dose by half.     (If calcium level less then 6.9 mg/dl, please give calcium carbonate infusion with a 1 L NS bag containing at least 900-1000 mg of elemental calcium to be given over 24 hours.)    2.  Post surgical hypothyroidism - Please give levothyroxine 100mcg daily PO, not through peg tube. Levothyroxine should be given separately from other meds, specifically calcium carbonate and food/tube feeds, for adequate absorption. Please schedule at 6AM and schedule calcium carb for 10AM, 4PM, and 10PM.    3. Adrenal nodule - CT scan showed 1.4cm adrenal nodule. Can be followed up as an OP.    Will continue to monitor     For discharge, pt can continue TBD by clinical course    Pt can follow up at discharge within 1 week to check Ca level with Tahira Flaherty NP at [facility] by calling  to make an appointment.    Patient seen and discussed with  and ENT. INTERVAL HPI/OVERNIGHT EVENTS:    Patient seen and examined at bedside. He denies any perioral numbness or paresthesias of the hand. No muscle pain or spasm. He passed speech and swallow and has been ordered puree diet with thin liquids, in addition to nepro 2 cans at 8AM, 2 cans at noon, 2 cans at 6PM and 1 can at bedtime.  Pt febrile today to 102 per RN with WBC 12.9. CXR and sputum cx pending. Started on Abx.  AM Calcium stable at 8.0 corrected---->8.5 and noon Ca 7.8 corrected--->8.3. Albumin 3.4. Vitamin D 25 is 10.5. PTH 14.  He is currently receiving calcitriol 0.50mcg Q12H, calcium carbonate 2500mg TID. Cholecalciferol 4,000 IU daily, and levothyroxine 100mcg PO.      Pt reports the following symptoms:    CONSTITUTIONAL:  Negative fever or chills, feels well, good appetite  EYES:  Negative  blurry vision or double vision  CARDIOVASCULAR:  Negative for chest pain or palpitations  RESPIRATORY:  Negative for cough, wheezing, or SOB   GASTROINTESTINAL:  Negative for nausea, vomiting, diarrhea, constipation, or abdominal pain  GENITOURINARY:  Negative frequency, urgency or dysuria  NEUROLOGIC:  No headache, confusion, dizziness, lightheadedness    MEDICATIONS  (STANDING):  calcitriol  Solution 0.5 MICROGram(s) Enteral Tube <User Schedule>  calcium carbonate   Suspension 2500 milliGRAM(s) Oral three times a day  ceFAZolin   IVPB 1000 milliGRAM(s) IV Intermittent every 8 hours  cholecalciferol 4000 Unit(s) Oral daily  dextrose 50% Injectable 12.5 Gram(s) IV Push once  dextrose 50% Injectable 25 Gram(s) IV Push once  dextrose 50% Injectable 25 Gram(s) IV Push once  docusate sodium Liquid 100 milliGRAM(s) Oral two times a day  enoxaparin Injectable 40 milliGRAM(s) SubCutaneous every 24 hours  insulin lispro (HumaLOG) corrective regimen sliding scale   SubCutaneous Before meals and at bedtime  levothyroxine 100 MICROGram(s) Oral daily  metroNIDAZOLE  IVPB 500 milliGRAM(s) IV Intermittent every 8 hours  senna Syrup 10 milliLiter(s) Oral at bedtime    MEDICATIONS  (PRN):  acetaminophen    Suspension .. 650 milliGRAM(s) Enteral Tube every 6 hours PRN Mild Pain (1 - 3)  bisacodyl Suppository 10 milliGRAM(s) Rectal daily PRN Constipation  dextrose 40% Gel 15 Gram(s) Oral once PRN Blood Glucose LESS THAN 70 milliGRAM(s)/deciliter  glucagon  Injectable 1 milliGRAM(s) IntraMuscular once PRN Glucose LESS THAN 70 milligrams/deciliter  oxyCODONE    Solution 10 milliGRAM(s) Oral every 6 hours PRN Severe Pain (7 - 10)  polyethylene glycol 3350 17 Gram(s) Oral daily PRN Constipation      PHYSICAL EXAM  Vital Signs Last 24 Hrs  T(C): 36.2 (17 Jul 2019 10:00), Max: 38.1 (16 Jul 2019 14:00)  T(F): 97.1 (17 Jul 2019 10:00), Max: 100.5 (16 Jul 2019 14:00)  HR: 114 (17 Jul 2019 10:39) (83 - 114)  BP: 113/57 (17 Jul 2019 08:23) (95/52 - 113/57)  BP(mean): 80 (17 Jul 2019 08:23) (68 - 80)  RR: 18 (17 Jul 2019 10:39) (16 - 19)  SpO2: 98% (17 Jul 2019 10:39) (90% - 98%)    Constitutional: wn/wd in NAD.   HEENT: NCAT, MMM, no proptosis or lid retraction  Neck: surgical scar and tracheostomy site - looks wnl.  Respiratory: lungs CTAB.  Cardiovascular: regular rhythm, normal S1 and S2, no audible murmurs, no peripheral edema  GI: soft, NT/ND, no masses. PEG tube in place  Neurology: no tremors, DTR 2+. no muscular spasm  Skin: no visible rashes/lesions  Psychiatric: Awake and alert, normal affect/mood.    LABS:                        10.1   12.96 )-----------( 363      ( 17 Jul 2019 11:24 )             31.1     07-17    133<L>  |  94<L>  |  10  ----------------------------<  167<H>  4.1   |  29  |  0.64    Ca    8.0<L>      17 Jul 2019 11:24  Phos  4.4     07-17    TPro  6.3  /  Alb  3.2<L>  /  TBili  0.5  /  DBili  x   /  AST  13  /  ALT  8<L>  /  AlkPhos  54  07-17        Thyroid Stimulating Hormone, Serum: 1.483 uIU/mL (07-03 @ 04:51)      HbA1C: 5.7 % (07-03 @ 11:17)    CAPILLARY BLOOD GLUCOSE      POCT Blood Glucose.: 160 mg/dL (17 Jul 2019 11:42)  POCT Blood Glucose.: 111 mg/dL (17 Jul 2019 06:16)  POCT Blood Glucose.: 126 mg/dL (16 Jul 2019 22:20)  POCT Blood Glucose.: 85 mg/dL (16 Jul 2019 16:33)      A/P: 63M hx COPD, 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                                                                                                                                                                                                                                                                                                                                                                                                                                                                                                                                                                                                                                                                                                                                                                                                                                                                                                                                                                                                                                                                                                                                                                                                                                                                                                                                                                                                                                                                                                                                                                                                                                                                                                                                                                                                                                                                                                                                                                                                                                                                                                                                                                                                                                                                                                                                                                                                                                                                                                                                                                                                                       1.  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 Continue calcitriol 0.5 mcg Q12H. Also continue Calcium carbonate 2500 mg PO TID. Check serum Ca every 12 hours. Check albumin every other day. Check phos, ionized ca daily. Check PTH daily. Vit D level low. Please cont Vit D3 4000IU daily. If corrected calcium goes more than 9.0, we may need to decrease the calcitriol dose by half.     (If calcium level less then 6.9 mg/dl, please give calcium carbonate infusion with a 1 L NS bag containing at least 900-1000 mg of elemental calcium to be given over 24 hours.)    2.  Post surgical hypothyroidism - Please give levothyroxine 100mcg daily PO, not through peg tube. Levothyroxine should be given separately from other meds, specifically calcium carbonate and food/tube feeds, for adequate absorption.  Please schedule at levothyroxine at 6AM and schedule calcium carb for 10AM, 4PM, and 10PM. Levothyroxine should be given by mouth, not thru feeding tube.     3. Adrenal nodule - CT scan showed 1.4cm adrenal nodule. Can be followed up as an OP.    Will continue to monitor     For discharge, pt can continue TBD by clinical course    Pt can follow up at discharge within 1 week to check Ca level with Tahira Flaherty NP at [facility] by calling  to make an appointment.    Patient seen and discussed with  and ENT.

## 2019-07-17 NOTE — CHART NOTE - NSCHARTNOTEFT_GEN_A_CORE
Admitting Diagnosis:   Patient is a 63y old  Male who presents with a chief complaint of Recurrence of SCC of vocal cords (15 Jul 2019 14:05)      PAST MEDICAL & SURGICAL HISTORY:  SOB (shortness of breath)  Throat cancer  Jaundice: 1965  Fracture: left ankle  Laryngeal mass: s/ p radiation  Surgery, elective: direct laryngoscopy with biopsy- 4/2017  Status post surgery: inguinal hernia  Status post surgery: Left ankle surgical repair  x2 (1965)    Current Nutrition Order:  NPO with Tubefeeds  Jevity 1.2 @272ml/hr Q4H or 6x/day via PEG (1632ml TV, 1958kcal, 90g pro, 1317ml FW).     PO Intake: Good (%) [   ]  Fair (50-75%) [   ] Poor (<25%) [   ]- N/A     GI Issues:  Denies N/V,  Reports tolerating feeds well  PEG in place  last BM 7/5 per flowsheet.     Pain:  No pain reported    Skin Integrity:   Tomi score 17  Surgical incisions      Labs:     Ca    8.0<L>      17 Jul 2019 06:35  Phos  4.4     07-17    TPro  x   /  Alb  3.4  /  TBili  x   /  DBili  x   /  AST  x   /  ALT  x   /  AlkPhos  x   07-16    CAPILLARY BLOOD GLUCOSE      POCT Blood Glucose.: 111 mg/dL (17 Jul 2019 06:16)  POCT Blood Glucose.: 126 mg/dL (16 Jul 2019 22:20)  POCT Blood Glucose.: 85 mg/dL (16 Jul 2019 16:33)  POCT Blood Glucose.: 115 mg/dL (16 Jul 2019 11:29)      Medications:  MEDICATIONS  (STANDING):  calcitriol  Solution 0.5 MICROGram(s) Enteral Tube <User Schedule>  calcium carbonate   Suspension 2500 milliGRAM(s) Oral three times a day  cholecalciferol 4000 Unit(s) Oral daily  clindamycin IVPB      clindamycin IVPB 600 milliGRAM(s) IV Intermittent once  clindamycin IVPB 600 milliGRAM(s) IV Intermittent every 8 hours  dextrose 50% Injectable 12.5 Gram(s) IV Push once  dextrose 50% Injectable 25 Gram(s) IV Push once  dextrose 50% Injectable 25 Gram(s) IV Push once  docusate sodium Liquid 100 milliGRAM(s) Oral two times a day  enoxaparin Injectable 40 milliGRAM(s) SubCutaneous every 24 hours  insulin lispro (HumaLOG) corrective regimen sliding scale   SubCutaneous Before meals and at bedtime  levothyroxine 100 MICROGram(s) Oral daily  senna Syrup 10 milliLiter(s) Oral at bedtime    MEDICATIONS  (PRN):  acetaminophen    Suspension .. 650 milliGRAM(s) Enteral Tube every 6 hours PRN Mild Pain (1 - 3)  bisacodyl Suppository 10 milliGRAM(s) Rectal daily PRN Constipation  dextrose 40% Gel 15 Gram(s) Oral once PRN Blood Glucose LESS THAN 70 milliGRAM(s)/deciliter  glucagon  Injectable 1 milliGRAM(s) IntraMuscular once PRN Glucose LESS THAN 70 milligrams/deciliter  oxyCODONE    Solution 5 milliGRAM(s) Oral every 4 hours PRN Moderate Pain (4 - 6)  oxyCODONE    Solution 10 milliGRAM(s) Oral every 6 hours PRN Severe Pain (7 - 10)  polyethylene glycol 3350 17 Gram(s) Oral daily PRN Constipation      Weight: 64Kg  Ht 172.72cm; IBW 70Kg; %IBW 91%  BMI 21.5    Weight Change:   Please obtain current weight and continue to trend weekly weights for further assessment  Bedscale weight reading 66kg this am (7/8). States UBW PTA was 155lbs. This indicates a 14lb unintentional wt loss.     Nutrition Focused Physical Exam: Completed [ X on 6/26  ]  Not Pertinent [   ]  Muscle Wasting- Temporal [   ]  Clavicle/Pectoral [ X  ]  Shoulder/Deltoid [   ]  Scapula [   ]  Interosseous [   ]  Quadriceps [   ]  Gastrocnemius [   ]  Fat Wasting- Orbital [   ]  Buccal [  X ]  Triceps [   ]  Rib [   ]  Suspect moderate-severe PCM 2/2 wt loss, decreased intake, and NFPE    Estimated energy needs:   ABW used for calculations as pt between % of IBW.   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 S/p SLP FEES w/ recs for pureed diet and thin liquids. Pt is now Pureed diet w/ Tubefeeds via PEG placement (placed 6/28). Pt seen in room, resting in bed. Feeds running at 272ml/hr at x1 hours 6x/day. Discussed w/ patient advancement to pureed and monitoring PO intake to wean dependence on EN. Pt receptive and understaning. Agreeable to receiving ONS (EnsureEnlive and EnsurePudding). Please see recs below. RD to follow.     Previous Nutrition Diagnosis:   Increased nutrient needs RT increased demand for nutrients (kcal/protein) AEB catabolic state  Active [ X  ]  Resolved [  ]    If resolved, new PES:    Goal: Pt to consistently meet % of estimated needs via most appropriate route    Recommendations:  1. Recommend transitioning to bolus feeds (6 cans/day: 2 at breakfast, 2 at lunch, 2 at dinner).   2. Recommend EnsureEnlive TID (1050kcal, 60g pro) and EnsurePudding 2x/day (340kcal, 8g pro).  3. Continue w/ EN until pt is capable of meeting >65% of EER PO.  4. Trend weights to assess adequacy of feeds  *paged ENT to discuss    Education:   pt understanding of new diet order and weaning EN    Risk Level: High [ X  ] Moderate [   ] Low [   ].

## 2019-07-17 NOTE — PROGRESS NOTE ADULT - SUBJECTIVE AND OBJECTIVE BOX
OVERNIGHT/INTERVAL HPI:   63M former smoker ho 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 today admitted s/p DL biopsy for recurrence of laryngeal CA (found on frozen) and planned revision tracheostomy due to tenuous airway. C/b return to OR  for exchange to 6 distal XLT due to tracheomalacia and poor ventilatory status after coughing episode, now sp salvage TL, L myocutaneous pectoralis major flap.    6/26: Now doing well in SICU overnight, no additional respiratory issues, cuff taken down thsi AM. Failed SLP evaluation with evidence of gross aspiration on ice chip trial  6/27: has thick purulent secretions, SICU will keep for one more night. Low grade temps to 100.4. Will need PEG  6/28: NPO for PEG today. To SDU, when secretions are better managed. Received PEG for inability to swallow.  6/29: still requiring suctioning q30 minutes, small amount of glycopyrrolate given by SICU, received PEG tomorrow, will start trickle feeds  6/30: NAEON, feeds up to goal, still requiring frequent suctioning, will stop glycopyrrolate today  7/01: NAEON, tube feeds at goal, still requiring q1hr suctioning of copious thin nonpurulent secretions, dc'd glycopyrrolate  7/03: pt underwent TL, uncomplicated, tolerated; post-op PTH, iCa low so started on Rocaltrol, Ca IV; neck ABDON x1, left chest ABDON x2, salivary bypass stents in place  7/4: No acute events overnight. Calcium stable. Denies perioral/fingertip tingling. Tolerating trickle feeds. No new complaints.   7/05: NAEON. Pt complaining of increased pain. Otherwise, no new complaints. Calcium levels continue to be stable. Denies perioral/fingertip tingling. Tolerating TFs at goal.  7/6: No acute events, doing well, OOBTC. Tolerating TF at goal, will transition to bolus. Calcium changed yesterday to 2500mg q8h. Chest ABDON x1 removed today. Stable for transfer to SDU.   7/7: No acute events. Transferred to SDU yesterday. TF transitioned to bolus, tolerating well. Corrected Ca stable. Will decrease Ca checks to q12h per endocrinology.   7/8: No acute events, breathing comfortably with Bebeto tube in place. TFs well tolerated at bolus without subjective S&S reflux. Corrected Ca stable. Following Ca checks q12, Endocrinology following.  7/10: PAUL. Calcium levels normalizing, Endo recommending VD3 supplementation; tolerating TFs at goal rate without issue.  7/11: NAEON, Ca stable, Endocrinology following, continuing Ca checks. Tolerating TFs at bolus. Well tolerating Bebeto tube, performed teaching on how to clean, manage and take in/out the Bebeto tube.  7/12: NAEON, Ca stable, Endocrinology following, continuing Ca checks. Tolerating TFs at bolus. Well tolerating Bebeto tube, performed teaching on how to clean, manage and take in/out the Bebeto tube.  7/14: PAUL. Calcium corrected with extra dose of CaCarb soln. No new complaints. Will continue teaching in anticipation of discharge.   7/15: No acute events overnight.  Calcium stable.  Salivary bypass tube removed.  Educated on stoma care.   7/16: PAUL. Pt demonstrated stoma care to team on AM rounds. Ca levels remain stable. Pt for repeat SLP assessment/MBSS today. No new complaints.  7/17: PAUL. Salivary bypass removed/pt passed MBS on 7/16 PM without evidence of leak, cleared for pureed/thin liquids. Tolerated dinner yesterday without issues. No new complaints.     PHYSICAL EXAM:  NAD, A&Ox3,  Breathing comfortably on RA  Neck: incisions c/d/i; neck soft, flat; ABDON holding suction; size 10-55 bebeto tube secured via soft collar, minimal secretions suctioned, HME valve in place  Chest: incisions c/d/i; chest soft, flat; ABDON sites c/d/i well healing

## 2019-07-17 NOTE — PROGRESS NOTE ADULT - ATTENDING COMMENTS
Pt seen on rounds this afternoon.  New development of fever and leukocytosis noted  Pt reports mild chills in association with the fever, but no increased cough.  Still denies any hypocalcemic symptoms.  [place] is negative.  Lung exam significant for rales at the R base.  Serum calcium level is fluctuating more than expected, but most of the values are still in target range.   Should continue the present regimen of calcitriol and calcium, both of which can be given via the tube.  The levothyroxine, however, must be given either PO (at 100 mcg/day) or IV (at 50 mcg/day)--not via the feeding tube, where delivery and absorption will be erratic.  Please call me if you have any questions about this

## 2019-07-18 LAB
ALBUMIN SERPL ELPH-MCNC: 3.1 G/DL — LOW (ref 3.3–5)
ANION GAP SERPL CALC-SCNC: 9 MMOL/L — SIGNIFICANT CHANGE UP (ref 5–17)
BUN SERPL-MCNC: 11 MG/DL — SIGNIFICANT CHANGE UP (ref 7–23)
CALCIUM SERPL-MCNC: 7.6 MG/DL — LOW (ref 8.4–10.5)
CALCIUM SERPL-MCNC: 8 MG/DL — LOW (ref 8.4–10.5)
CALCIUM SERPL-MCNC: 8.1 MG/DL — LOW (ref 8.4–10.5)
CHLORIDE SERPL-SCNC: 98 MMOL/L — SIGNIFICANT CHANGE UP (ref 96–108)
CO2 SERPL-SCNC: 31 MMOL/L — SIGNIFICANT CHANGE UP (ref 22–31)
CREAT SERPL-MCNC: 0.71 MG/DL — SIGNIFICANT CHANGE UP (ref 0.5–1.3)
GLUCOSE BLDC GLUCOMTR-MCNC: 101 MG/DL — HIGH (ref 70–99)
GLUCOSE BLDC GLUCOMTR-MCNC: 124 MG/DL — HIGH (ref 70–99)
GLUCOSE BLDC GLUCOMTR-MCNC: 125 MG/DL — HIGH (ref 70–99)
GLUCOSE BLDC GLUCOMTR-MCNC: 127 MG/DL — HIGH (ref 70–99)
GLUCOSE SERPL-MCNC: 105 MG/DL — HIGH (ref 70–99)
GRAM STN FLD: SIGNIFICANT CHANGE UP
HCT VFR BLD CALC: 26.7 % — LOW (ref 39–50)
HCT VFR BLD CALC: 27.6 % — LOW (ref 39–50)
HGB BLD-MCNC: 8.5 G/DL — LOW (ref 13–17)
HGB BLD-MCNC: 8.8 G/DL — LOW (ref 13–17)
MCHC RBC-ENTMCNC: 29.8 PG — SIGNIFICANT CHANGE UP (ref 27–34)
MCHC RBC-ENTMCNC: 30.1 PG — SIGNIFICANT CHANGE UP (ref 27–34)
MCHC RBC-ENTMCNC: 31.8 GM/DL — LOW (ref 32–36)
MCHC RBC-ENTMCNC: 31.9 GM/DL — LOW (ref 32–36)
MCV RBC AUTO: 93.6 FL — SIGNIFICANT CHANGE UP (ref 80–100)
MCV RBC AUTO: 94.7 FL — SIGNIFICANT CHANGE UP (ref 80–100)
NRBC # BLD: 0 /100 WBCS — SIGNIFICANT CHANGE UP (ref 0–0)
NRBC # BLD: 0 /100 WBCS — SIGNIFICANT CHANGE UP (ref 0–0)
PHOSPHATE SERPL-MCNC: 4.8 MG/DL — HIGH (ref 2.5–4.5)
PLATELET # BLD AUTO: 318 K/UL — SIGNIFICANT CHANGE UP (ref 150–400)
PLATELET # BLD AUTO: 346 K/UL — SIGNIFICANT CHANGE UP (ref 150–400)
POTASSIUM SERPL-MCNC: 4.2 MMOL/L — SIGNIFICANT CHANGE UP (ref 3.5–5.3)
POTASSIUM SERPL-SCNC: 4.2 MMOL/L — SIGNIFICANT CHANGE UP (ref 3.5–5.3)
PTH-INTACT FLD-MCNC: 12 PG/ML — LOW (ref 15–65)
RBC # BLD: 2.82 M/UL — LOW (ref 4.2–5.8)
RBC # BLD: 2.95 M/UL — LOW (ref 4.2–5.8)
RBC # FLD: 14.9 % — HIGH (ref 10.3–14.5)
RBC # FLD: 15 % — HIGH (ref 10.3–14.5)
SODIUM SERPL-SCNC: 138 MMOL/L — SIGNIFICANT CHANGE UP (ref 135–145)
SPECIMEN SOURCE: SIGNIFICANT CHANGE UP
T4 FREE SERPL-MCNC: 0.66 NG/DL — LOW (ref 0.7–1.48)
TSH SERPL-MCNC: 27.28 UIU/ML — HIGH (ref 0.35–4.94)
WBC # BLD: 10.06 K/UL — SIGNIFICANT CHANGE UP (ref 3.8–10.5)
WBC # BLD: 10.37 K/UL — SIGNIFICANT CHANGE UP (ref 3.8–10.5)
WBC # FLD AUTO: 10.06 K/UL — SIGNIFICANT CHANGE UP (ref 3.8–10.5)
WBC # FLD AUTO: 10.37 K/UL — SIGNIFICANT CHANGE UP (ref 3.8–10.5)

## 2019-07-18 PROCEDURE — 99232 SBSQ HOSP IP/OBS MODERATE 35: CPT | Mod: GC

## 2019-07-18 RX ORDER — CALCITRIOL 0.5 UG/1
0.75 CAPSULE ORAL AT BEDTIME
Refills: 0 | Status: DISCONTINUED | OUTPATIENT
Start: 2019-07-18 | End: 2019-07-20

## 2019-07-18 RX ORDER — CALCITRIOL 0.5 UG/1
0.5 CAPSULE ORAL DAILY
Refills: 0 | Status: DISCONTINUED | OUTPATIENT
Start: 2019-07-19 | End: 2019-07-20

## 2019-07-18 RX ORDER — CALCIUM CARBONATE 500(1250)
2 TABLET ORAL ONCE
Refills: 0 | Status: COMPLETED | OUTPATIENT
Start: 2019-07-18 | End: 2019-07-18

## 2019-07-18 RX ORDER — LEVOTHYROXINE SODIUM 125 MCG
125 TABLET ORAL DAILY
Refills: 0 | Status: DISCONTINUED | OUTPATIENT
Start: 2019-07-19 | End: 2019-07-23

## 2019-07-18 RX ADMIN — Medication 100 MILLIGRAM(S): at 05:43

## 2019-07-18 RX ADMIN — Medication 100 MILLIGRAM(S): at 21:09

## 2019-07-18 RX ADMIN — Medication 4000 UNIT(S): at 11:19

## 2019-07-18 RX ADMIN — Medication 650 MILLIGRAM(S): at 14:07

## 2019-07-18 RX ADMIN — SENNA PLUS 10 MILLILITER(S): 8.6 TABLET ORAL at 21:11

## 2019-07-18 RX ADMIN — ENOXAPARIN SODIUM 40 MILLIGRAM(S): 100 INJECTION SUBCUTANEOUS at 11:20

## 2019-07-18 RX ADMIN — Medication 100 MILLIGRAM(S): at 15:45

## 2019-07-18 RX ADMIN — Medication 100 MILLIGRAM(S): at 17:10

## 2019-07-18 RX ADMIN — Medication 2500 MILLIGRAM(S): at 12:36

## 2019-07-18 RX ADMIN — Medication 650 MILLIGRAM(S): at 01:22

## 2019-07-18 RX ADMIN — CALCITRIOL 0.75 MICROGRAM(S): 0.5 CAPSULE ORAL at 21:10

## 2019-07-18 RX ADMIN — OXYCODONE HYDROCHLORIDE 10 MILLIGRAM(S): 5 TABLET ORAL at 16:25

## 2019-07-18 RX ADMIN — Medication 100 MILLIGRAM(S): at 21:10

## 2019-07-18 RX ADMIN — Medication 2500 MILLIGRAM(S): at 21:10

## 2019-07-18 RX ADMIN — CALCITRIOL 0.5 MICROGRAM(S): 0.5 CAPSULE ORAL at 11:19

## 2019-07-18 RX ADMIN — OXYCODONE HYDROCHLORIDE 10 MILLIGRAM(S): 5 TABLET ORAL at 16:55

## 2019-07-18 RX ADMIN — Medication 100 MICROGRAM(S): at 05:43

## 2019-07-18 RX ADMIN — Medication 100 MILLIGRAM(S): at 13:36

## 2019-07-18 RX ADMIN — Medication 650 MILLIGRAM(S): at 13:36

## 2019-07-18 RX ADMIN — Medication 2 TABLET(S): at 15:45

## 2019-07-18 NOTE — PROGRESS NOTE ADULT - SUBJECTIVE AND OBJECTIVE BOX
OVERNIGHT/INTERVAL HPI:   63M former smoker ho 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 today admitted s/p DL biopsy for recurrence of laryngeal CA (found on frozen) and planned revision tracheostomy due to tenuous airway. C/b return to OR  for exchange to 6 distal XLT due to tracheomalacia and poor ventilatory status after coughing episode, now sp salvage TL, L myocutaneous pectoralis major flap.    6/26: Now doing well in SICU overnight, no additional respiratory issues, cuff taken down thsi AM. Failed SLP evaluation with evidence of gross aspiration on ice chip trial  6/27: has thick purulent secretions, SICU will keep for one more night. Low grade temps to 100.4. Will need PEG  6/28: NPO for PEG today. To SDU, when secretions are better managed. Received PEG for inability to swallow.  6/29: still requiring suctioning q30 minutes, small amount of glycopyrrolate given by SICU, received PEG tomorrow, will start trickle feeds  6/30: NAEON, feeds up to goal, still requiring frequent suctioning, will stop glycopyrrolate today  7/01: NAEON, tube feeds at goal, still requiring q1hr suctioning of copious thin nonpurulent secretions, dc'd glycopyrrolate  7/03: pt underwent TL, uncomplicated, tolerated; post-op PTH, iCa low so started on Rocaltrol, Ca IV; neck ABDON x1, left chest ABDON x2, salivary bypass stents in place  7/4: No acute events overnight. Calcium stable. Denies perioral/fingertip tingling. Tolerating trickle feeds. No new complaints.   7/05: NAEON. Pt complaining of increased pain. Otherwise, no new complaints. Calcium levels continue to be stable. Denies perioral/fingertip tingling. Tolerating TFs at goal.  7/6: No acute events, doing well, OOBTC. Tolerating TF at goal, will transition to bolus. Calcium changed yesterday to 2500mg q8h. Chest ABDON x1 removed today. Stable for transfer to SDU.   7/7: No acute events. Transferred to SDU yesterday. TF transitioned to bolus, tolerating well. Corrected Ca stable. Will decrease Ca checks to q12h per endocrinology.   7/8: No acute events, breathing comfortably with Bebeto tube in place. TFs well tolerated at bolus without subjective S&S reflux. Corrected Ca stable. Following Ca checks q12, Endocrinology following.  7/10: NAEON. Calcium levels normalizing, Endo recommending VD3 supplementation; tolerating TFs at goal rate without issue.  7/11: NAEON, Ca stable, Endocrinology following, continuing Ca checks. Tolerating TFs at bolus. Well tolerating Bebeto tube, performed teaching on how to clean, manage and take in/out the Bebeto tube.  7/12: NAEON, Ca stable, Endocrinology following, continuing Ca checks. Tolerating TFs at bolus. Well tolerating Bebeto tube, performed teaching on how to clean, manage and take in/out the Bebeto tube.  7/14: NAEON. Calcium corrected with extra dose of CaCarb soln. No new complaints. Will continue teaching in anticipation of discharge.   7/15: No acute events overnight.  Calcium stable.  Salivary bypass tube removed.  Educated on stoma care.   7/16: NAEON. Pt demonstrated stoma care to team on AM rounds. Ca levels remain stable. Pt for repeat SLP assessment/MBSS today. No new complaints.  7/17: NAEON. Salivary bypass removed/pt passed MBS on 7/16 PM without evidence of leak, cleared for pureed/thin liquids. Tolerated dinner yesterday without issues. No new complaints. Evidence of cellulitis inferior to stoma. Febrile.  7/18: NAEON, no respiratory events. Patient continues to well tolerate pureed foods and thin liquids. Patient taught how to clean Bebeto tube. Prior ABDON site packed with 1/4 gauze. Cellulitis improving. Afebrile.    PHYSICAL EXAM:  NAD, A&Ox3,  Breathing comfortably on RA  Neck: incisions c/d/i; neck soft, flat; ABDON holding suction; size 10-55 bebeto tube secured via soft collar, minimal secretions suctioned, HME valve in place  Chest: incisions c/d/i; chest soft, flat; inferior to stoma area of erythema and mild TTP, improved from yesterday; prior R neck ABDON site dehiscent, packed with 1/4 gauze.                          8.5    10.37 )-----------( 346      ( 18 Jul 2019 06:29 )             26.7       07-18    138  |  98  |  11  ----------------------------<  105<H>  4.2   |  31  |  0.71    Ca    8.0<L>      18 Jul 2019 06:29  Phos  4.8     07-18    TPro  x   /  Alb  3.1<L>  /  TBili  x   /  DBili  x   /  AST  x   /  ALT  x   /  AlkPhos  x   07-18

## 2019-07-18 NOTE — PROGRESS NOTE ADULT - ATTENDING COMMENTS
Pt seen on rounds this afternoon.   Has been afebrile today.  Has no hypocalcemic symptoms despite somewhat lower values (8.1, 8.5 post correction for albumin level).  His somewhat poor response to what should be adequate doses of calcium and calcitriol is difficult to explain, but will increase the calcitriol to 0.5 AM/0.75 mcg PM.  Continue the same calcium replacement.  His TFTs are quite low (FT4 0.6, TH 27 uU/ml), and these values reflect his IV levothyroxine, indicating that the 50 mcg dose was markedly insufficient.  This is clearly not a problem of impaired absorption (since IV), but more likely that the usual rule of a 50% reduction in the IV vs PO dose is in error.  He is now taking the levothyroxine PO, and it is being given on an empty stomach at 5 AM.  Will increase the dose to 125 mcg for now, though he may eventually need 150 mcg.

## 2019-07-18 NOTE — PROGRESS NOTE ADULT - SUBJECTIVE AND OBJECTIVE BOX
INTERVAL HPI/OVERNIGHT EVENTS:    Patient is a 63y old  Male who presents with a chief complaint of Recurrence of SCC of vocal cords (15 Jul 2019 14:05)      Pt reports the following symptoms:    CONSTITUTIONAL:  Negative fever or chills, feels well, good appetite  EYES:  Negative  blurry vision or double vision  CARDIOVASCULAR:  Negative for chest pain or palpitations  RESPIRATORY:  Negative for cough, wheezing, or SOB   GASTROINTESTINAL:  Negative for nausea, vomiting, diarrhea, constipation, or abdominal pain  GENITOURINARY:  Negative frequency, urgency or dysuria  NEUROLOGIC:  No headache, confusion, dizziness, lightheadedness    MEDICATIONS  (STANDING):  calcitriol  Solution 0.5 MICROGram(s) Enteral Tube <User Schedule>  calcium carbonate   1250 mG (OsCal) 2 Tablet(s) Oral once  calcium carbonate   Suspension 2500 milliGRAM(s) Oral three times a day  ceFAZolin   IVPB 1000 milliGRAM(s) IV Intermittent every 8 hours  cholecalciferol 4000 Unit(s) Oral daily  dextrose 50% Injectable 12.5 Gram(s) IV Push once  dextrose 50% Injectable 25 Gram(s) IV Push once  dextrose 50% Injectable 25 Gram(s) IV Push once  docusate sodium Liquid 100 milliGRAM(s) Oral two times a day  enoxaparin Injectable 40 milliGRAM(s) SubCutaneous every 24 hours  insulin lispro (HumaLOG) corrective regimen sliding scale   SubCutaneous Before meals and at bedtime  lactated ringers Bolus 500 milliLiter(s) IV Bolus once  levothyroxine 100 MICROGram(s) Oral daily  metroNIDAZOLE  IVPB 500 milliGRAM(s) IV Intermittent every 8 hours  senna Syrup 10 milliLiter(s) Oral at bedtime    MEDICATIONS  (PRN):  acetaminophen    Suspension .. 650 milliGRAM(s) Enteral Tube every 6 hours PRN Mild Pain (1 - 3)  bisacodyl Suppository 10 milliGRAM(s) Rectal daily PRN Constipation  dextrose 40% Gel 15 Gram(s) Oral once PRN Blood Glucose LESS THAN 70 milliGRAM(s)/deciliter  glucagon  Injectable 1 milliGRAM(s) IntraMuscular once PRN Glucose LESS THAN 70 milligrams/deciliter  oxyCODONE    Solution 5 milliGRAM(s) Oral every 4 hours PRN Moderate Pain (4 - 6)  oxyCODONE    Solution 10 milliGRAM(s) Oral every 6 hours PRN Severe Pain (7 - 10)  polyethylene glycol 3350 17 Gram(s) Oral daily PRN Constipation      PHYSICAL EXAM  Vital Signs Last 24 Hrs  T(C): 38.1 (18 Jul 2019 13:40), Max: 39.9 (17 Jul 2019 17:43)  T(F): 100.6 (18 Jul 2019 13:40), Max: 103.9 (17 Jul 2019 17:43)  HR: 102 (18 Jul 2019 12:17) (74 - 102)  BP: 94/62 (18 Jul 2019 12:17) (94/62 - 101/59)  BP(mean): 70 (18 Jul 2019 12:17) (66 - 76)  RR: 21 (18 Jul 2019 12:17) (16 - 21)  SpO2: 95% (18 Jul 2019 12:17) (94% - 97%)    Constitutional: wn/wd in NAD.   HEENT: NCAT, MMM, OP clear, EOMI, no proptosis or lid retraction  Neck: no thyromegaly or palpable thyroid nodules   Respiratory: lungs CTAB.  Cardiovascular: regular rhythm, normal S1 and S2, no audible murmurs, no peripheral edema  GI: soft, NT/ND, no masses/HSM appreciated.  Neurology: no tremors, DTR 2+  Skin: no visible rashes/lesions  Psychiatric: AAO x 3, normal affect/mood.    LABS:                        8.5    10.37 )-----------( 346      ( 18 Jul 2019 06:29 )             26.7     07-18    138  |  98  |  11  ----------------------------<  105<H>  4.2   |  31  |  0.71    Ca    8.0<L>      18 Jul 2019 06:29  Phos  4.8     07-18    TPro  x   /  Alb  3.1<L>  /  TBili  x   /  DBili  x   /  AST  x   /  ALT  x   /  AlkPhos  x   07-18        Thyroid Stimulating Hormone, Serum: 27.281 uIU/mL (07-18 @ 06:29)  Thyroid Stimulating Hormone, Serum: 1.483 uIU/mL (07-03 @ 04:51)      HbA1C: 5.7 % (07-03 @ 11:17)    CAPILLARY BLOOD GLUCOSE      POCT Blood Glucose.: 101 mg/dL (18 Jul 2019 11:22)  POCT Blood Glucose.: 127 mg/dL (18 Jul 2019 06:55)  POCT Blood Glucose.: 98 mg/dL (17 Jul 2019 22:16)  POCT Blood Glucose.: 90 mg/dL (17 Jul 2019 16:45)      Insulin Sliding Scale requirements X 24 Hours:    RADIOLOGY & ADDITIONAL TESTS:    A/P: 63y Male with history of DM type II presenting for       1.  DM -     Please continue           units lantus at bedtime  / in the morning and        units lispro with meals and lispro moderate / low dose sliding scale 4 times daily with meals and at bedtime.  Please continue consistent carbohydrate diet.      Goal FSG is   Will continue to monitor   For discharge, pt can continue    Pt can follow up at discharge with [facility] by calling  to make an appointment.   Will discuss case with     and update primary team INTERVAL HPI/OVERNIGHT EVENTS:    Patient seen and examined at the bedside. He is able to tolerate his pureed diet with thin liquids. no other acute events overnight.  His TSH was 27.281, free T4 was 0.66.  His calcium was 8.0 in the morning  (corrected 8.5) which decreased to 7.6 at noon (corrected 8.1) and he received one extra calcium carbonate 1250 m today morning.  The levothyroxine regimen was adjusted to be given in empty stomach via his mouth. His blood glucose ranges between 90-130s    Pt reports the following symptoms:    CONSTITUTIONAL:  Negative fever or chills, feels well, good appetite  EYES:  Negative  blurry vision or double vision  CARDIOVASCULAR:  Negative for chest pain or palpitations  RESPIRATORY:  Negative for cough, wheezing, or SOB   GASTROINTESTINAL:  Negative for nausea, vomiting, diarrhea, constipation, or abdominal pain  GENITOURINARY:  Negative frequency, urgency or dysuria  NEUROLOGIC:  No headache, confusion, dizziness, lightheadedness    MEDICATIONS  (STANDING):  calcitriol  Solution 0.5 MICROGram(s) Enteral Tube <User Schedule>  calcium carbonate   1250 mG (OsCal) 2 Tablet(s) Oral once  calcium carbonate   Suspension 2500 milliGRAM(s) Oral three times a day  ceFAZolin   IVPB 1000 milliGRAM(s) IV Intermittent every 8 hours  cholecalciferol 4000 Unit(s) Oral daily  dextrose 50% Injectable 12.5 Gram(s) IV Push once  dextrose 50% Injectable 25 Gram(s) IV Push once  dextrose 50% Injectable 25 Gram(s) IV Push once  docusate sodium Liquid 100 milliGRAM(s) Oral two times a day  enoxaparin Injectable 40 milliGRAM(s) SubCutaneous every 24 hours  insulin lispro (HumaLOG) corrective regimen sliding scale   SubCutaneous Before meals and at bedtime  lactated ringers Bolus 500 milliLiter(s) IV Bolus once  levothyroxine 100 MICROGram(s) Oral daily  metroNIDAZOLE  IVPB 500 milliGRAM(s) IV Intermittent every 8 hours  senna Syrup 10 milliLiter(s) Oral at bedtime    MEDICATIONS  (PRN):  acetaminophen    Suspension .. 650 milliGRAM(s) Enteral Tube every 6 hours PRN Mild Pain (1 - 3)  bisacodyl Suppository 10 milliGRAM(s) Rectal daily PRN Constipation  dextrose 40% Gel 15 Gram(s) Oral once PRN Blood Glucose LESS THAN 70 milliGRAM(s)/deciliter  glucagon  Injectable 1 milliGRAM(s) IntraMuscular once PRN Glucose LESS THAN 70 milligrams/deciliter  oxyCODONE    Solution 5 milliGRAM(s) Oral every 4 hours PRN Moderate Pain (4 - 6)  oxyCODONE    Solution 10 milliGRAM(s) Oral every 6 hours PRN Severe Pain (7 - 10)  polyethylene glycol 3350 17 Gram(s) Oral daily PRN Constipation      PHYSICAL EXAM  Vital Signs Last 24 Hrs  T(C): 38.1 (18 Jul 2019 13:40), Max: 39.9 (17 Jul 2019 17:43)  T(F): 100.6 (18 Jul 2019 13:40), Max: 103.9 (17 Jul 2019 17:43)  HR: 102 (18 Jul 2019 12:17) (74 - 102)  BP: 94/62 (18 Jul 2019 12:17) (94/62 - 101/59)  BP(mean): 70 (18 Jul 2019 12:17) (66 - 76)  RR: 21 (18 Jul 2019 12:17) (16 - 21)  SpO2: 95% (18 Jul 2019 12:17) (94% - 97%)    Constitutional: wn/wd in NAD.   Neck: surgical site looks clean  Respiratory: lungs CTAB.  Cardiovascular: regular rhythm, normal S1 and S2, no audible murmurs, no peripheral edema  GI: soft, NT/ND, no masses/HSM appreciated.  Neurology: no tremors, no focal deficits  Psychiatric: AAO x 3, normal affect/mood.    LABS:                        8.5    10.37 )-----------( 346      ( 18 Jul 2019 06:29 )             26.7     07-18    138  |  98  |  11  ----------------------------<  105<H>  4.2   |  31  |  0.71    Ca    8.0<L>      18 Jul 2019 06:29  Phos  4.8     07-18    TPro  x   /  Alb  3.1<L>  /  TBili  x   /  DBili  x   /  AST  x   /  ALT  x   /  AlkPhos  x   07-18        Thyroid Stimulating Hormone, Serum: 27.281 uIU/mL (07-18 @ 06:29)  Thyroid Stimulating Hormone, Serum: 1.483 uIU/mL (07-03 @ 04:51)      HbA1C: 5.7 % (07-03 @ 11:17)    CAPILLARY BLOOD GLUCOSE      POCT Blood Glucose.: 101 mg/dL (18 Jul 2019 11:22)  POCT Blood Glucose.: 127 mg/dL (18 Jul 2019 06:55)  POCT Blood Glucose.: 98 mg/dL (17 Jul 2019 22:16)  POCT Blood Glucose.: 90 mg/dL (17 Jul 2019 16:45)      Insulin Sliding Scale requirements X 24 Hours:    RADIOLOGY & ADDITIONAL TESTS:    A/P: 63M hx COPD, 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                                                                                                                                                                                                                                                                                                                                                                                                                                                                                                                                                                                                                                                                                                                                                                                                                                                                                                                                                                                                                                                                                                                                                                                                                                                                                                                                                                                                                                                                                                                                                                                                                                                                                                                                                                                                                                                                                                                                                                                                                                                                                                                                                                                                                                                                                                                                                                                                                                                                                                                                                                                                                       1.  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 Please continue calcitriol 0.5 mcg QAM and increase to calcitriol 0.75mcg at night. Also continue Calcium carbonate 2500 mg PO TID. Check serum Ca every 12 hours. Check albumin every other day. Check phos, ionized ca daily. Check PTH daily. Vit D level low. Please cont Vit D3 4000IU daily. If corrected calcium goes more than 9.0, we may need to decrease the calcitriol dose by half.     (If calcium level less then 6.9 mg/dl, please give calcium carbonate infusion with a 1 L NS bag containing at least 900-1000 mg of elemental calcium to be given over 24 hours.)    2.  Post surgical hypothyroidism - Please increase to levothyroxine 125mcg daily PO, not through peg tube. Levothyroxine should be given separately from other meds, specifically calcium carbonate and food/tube feeds, for adequate absorption.  Please schedule at levothyroxine at 6AM and schedule calcium carb for 10AM, 4PM, and 10PM. Levothyroxine should be given by mouth, not thru feeding tube.     3. Adrenal nodule - CT scan showed 1.4cm adrenal nodule. Can be followed up as an OP.    Will continue to monitor     For discharge, pt can continue TBD by clinical course    Pt can follow up at discharge within 1 week to check Ca level with Tahira Flaherty NP at [facility] by calling  to make an appointment.    Patient seen and discussed with  and ENT.

## 2019-07-18 NOTE — PROGRESS NOTE ADULT - ASSESSMENT
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 No evidence of hematoma/seroma/salivary leak, though with peristomal erythema and was febrile yesterday. Improving peristomal cellulitis, afebrile since yesterday. This am Hb and WBC decreased, will obtain FOBT and f/u CBC. Calcium decreased from yesterday, d/w Endocrine will increase rocaltrol, also to increase Synthroid. Performing Nasrin tube teaching.  - PATIENT IS NOT ABLE TO BE NASALLY OR ORALLY INTUBATED  - SLP  - Appreciate add'l endocrinology recs; synthroid 125mcg, rocaltrol (0.5mcg am, 0.75mcg pm), VD3 and calcium per endo  - Monitor Ca  - f/u CBC and FOBT  - pack R prior ABDON insertion site with 1/4 iodoform  - f/u peristomal cellulitis  - Continue bolus TFs  - Pain control  - Nasrin tube cleaning teaching  - Suction frequently w/red rubber catheters only, humidified air at all times  - SCDs, Lovenox, OOB/ambulate  - Bowel regimen  - Page ENT with questions or concerns  - d/w attending MD whom agrees with the above plan

## 2019-07-19 LAB
-  CEFAZOLIN: SIGNIFICANT CHANGE UP
-  CLINDAMYCIN: SIGNIFICANT CHANGE UP
-  ERYTHROMYCIN: SIGNIFICANT CHANGE UP
-  LINEZOLID: SIGNIFICANT CHANGE UP
-  OXACILLIN: SIGNIFICANT CHANGE UP
-  PENICILLIN: SIGNIFICANT CHANGE UP
-  RIFAMPIN: SIGNIFICANT CHANGE UP
-  TRIMETHOPRIM/SULFAMETHOXAZOLE: SIGNIFICANT CHANGE UP
-  VANCOMYCIN: SIGNIFICANT CHANGE UP
ALBUMIN SERPL ELPH-MCNC: 2.8 G/DL — LOW (ref 3.3–5)
ALP SERPL-CCNC: 51 U/L — SIGNIFICANT CHANGE UP (ref 40–120)
ALT FLD-CCNC: 7 U/L — LOW (ref 10–45)
ANION GAP SERPL CALC-SCNC: 9 MMOL/L — SIGNIFICANT CHANGE UP (ref 5–17)
AST SERPL-CCNC: 11 U/L — SIGNIFICANT CHANGE UP (ref 10–40)
BILIRUB SERPL-MCNC: 0.2 MG/DL — SIGNIFICANT CHANGE UP (ref 0.2–1.2)
BUN SERPL-MCNC: 12 MG/DL — SIGNIFICANT CHANGE UP (ref 7–23)
CALCIUM SERPL-MCNC: 7.4 MG/DL — LOW (ref 8.4–10.5)
CALCIUM SERPL-MCNC: 8 MG/DL — LOW (ref 8.4–10.5)
CALCIUM SERPL-MCNC: 8.7 MG/DL — SIGNIFICANT CHANGE UP (ref 8.4–10.5)
CHLORIDE SERPL-SCNC: 100 MMOL/L — SIGNIFICANT CHANGE UP (ref 96–108)
CO2 SERPL-SCNC: 30 MMOL/L — SIGNIFICANT CHANGE UP (ref 22–31)
CREAT SERPL-MCNC: 0.58 MG/DL — SIGNIFICANT CHANGE UP (ref 0.5–1.3)
CULTURE RESULTS: SIGNIFICANT CHANGE UP
GLUCOSE BLDC GLUCOMTR-MCNC: 102 MG/DL — HIGH (ref 70–99)
GLUCOSE BLDC GLUCOMTR-MCNC: 109 MG/DL — HIGH (ref 70–99)
GLUCOSE BLDC GLUCOMTR-MCNC: 130 MG/DL — HIGH (ref 70–99)
GLUCOSE BLDC GLUCOMTR-MCNC: 144 MG/DL — HIGH (ref 70–99)
GLUCOSE SERPL-MCNC: 137 MG/DL — HIGH (ref 70–99)
HCT VFR BLD CALC: 26.1 % — LOW (ref 39–50)
HGB BLD-MCNC: 8.2 G/DL — LOW (ref 13–17)
MCHC RBC-ENTMCNC: 29.8 PG — SIGNIFICANT CHANGE UP (ref 27–34)
MCHC RBC-ENTMCNC: 31.4 GM/DL — LOW (ref 32–36)
MCV RBC AUTO: 94.9 FL — SIGNIFICANT CHANGE UP (ref 80–100)
METHOD TYPE: SIGNIFICANT CHANGE UP
NRBC # BLD: 0 /100 WBCS — SIGNIFICANT CHANGE UP (ref 0–0)
ORGANISM # SPEC MICROSCOPIC CNT: SIGNIFICANT CHANGE UP
ORGANISM # SPEC MICROSCOPIC CNT: SIGNIFICANT CHANGE UP
PHOSPHATE SERPL-MCNC: 4.3 MG/DL — SIGNIFICANT CHANGE UP (ref 2.5–4.5)
PLATELET # BLD AUTO: 299 K/UL — SIGNIFICANT CHANGE UP (ref 150–400)
POTASSIUM SERPL-MCNC: 3.9 MMOL/L — SIGNIFICANT CHANGE UP (ref 3.5–5.3)
POTASSIUM SERPL-SCNC: 3.9 MMOL/L — SIGNIFICANT CHANGE UP (ref 3.5–5.3)
PROT SERPL-MCNC: 5.8 G/DL — LOW (ref 6–8.3)
PTH-INTACT FLD-MCNC: 11 PG/ML — LOW (ref 15–65)
RBC # BLD: 2.75 M/UL — LOW (ref 4.2–5.8)
RBC # FLD: 15.1 % — HIGH (ref 10.3–14.5)
SODIUM SERPL-SCNC: 139 MMOL/L — SIGNIFICANT CHANGE UP (ref 135–145)
SPECIMEN SOURCE: SIGNIFICANT CHANGE UP
WBC # BLD: 7.57 K/UL — SIGNIFICANT CHANGE UP (ref 3.8–10.5)
WBC # FLD AUTO: 7.57 K/UL — SIGNIFICANT CHANGE UP (ref 3.8–10.5)

## 2019-07-19 PROCEDURE — 99222 1ST HOSP IP/OBS MODERATE 55: CPT | Mod: GC

## 2019-07-19 PROCEDURE — 99232 SBSQ HOSP IP/OBS MODERATE 35: CPT | Mod: GC

## 2019-07-19 RX ORDER — CALCIUM CARBONATE 500(1250)
2500 TABLET ORAL
Refills: 0 | Status: DISCONTINUED | OUTPATIENT
Start: 2019-07-20 | End: 2019-07-23

## 2019-07-19 RX ORDER — CALCIUM CARBONATE 500(1250)
2500 TABLET ORAL
Refills: 0 | Status: DISCONTINUED | OUTPATIENT
Start: 2019-07-19 | End: 2019-07-20

## 2019-07-19 RX ORDER — CEFAZOLIN SODIUM 1 G
2000 VIAL (EA) INJECTION EVERY 8 HOURS
Refills: 0 | Status: DISCONTINUED | OUTPATIENT
Start: 2019-07-19 | End: 2019-07-23

## 2019-07-19 RX ORDER — CEFAZOLIN SODIUM 1 G
2000 VIAL (EA) INJECTION EVERY 8 HOURS
Refills: 0 | Status: DISCONTINUED | OUTPATIENT
Start: 2019-07-19 | End: 2019-07-19

## 2019-07-19 RX ADMIN — OXYCODONE HYDROCHLORIDE 10 MILLIGRAM(S): 5 TABLET ORAL at 09:52

## 2019-07-19 RX ADMIN — Medication 2500 MILLIGRAM(S): at 06:12

## 2019-07-19 RX ADMIN — Medication 100 MILLIGRAM(S): at 06:12

## 2019-07-19 RX ADMIN — ENOXAPARIN SODIUM 40 MILLIGRAM(S): 100 INJECTION SUBCUTANEOUS at 11:43

## 2019-07-19 RX ADMIN — Medication 650 MILLIGRAM(S): at 07:07

## 2019-07-19 RX ADMIN — OXYCODONE HYDROCHLORIDE 10 MILLIGRAM(S): 5 TABLET ORAL at 18:38

## 2019-07-19 RX ADMIN — Medication 650 MILLIGRAM(S): at 02:20

## 2019-07-19 RX ADMIN — SENNA PLUS 10 MILLILITER(S): 8.6 TABLET ORAL at 23:08

## 2019-07-19 RX ADMIN — OXYCODONE HYDROCHLORIDE 10 MILLIGRAM(S): 5 TABLET ORAL at 18:08

## 2019-07-19 RX ADMIN — OXYCODONE HYDROCHLORIDE 10 MILLIGRAM(S): 5 TABLET ORAL at 00:02

## 2019-07-19 RX ADMIN — Medication 4000 UNIT(S): at 11:43

## 2019-07-19 RX ADMIN — Medication 2000 MILLIGRAM(S): at 13:58

## 2019-07-19 RX ADMIN — Medication 125 MICROGRAM(S): at 06:12

## 2019-07-19 RX ADMIN — Medication 100 MILLIGRAM(S): at 22:05

## 2019-07-19 RX ADMIN — CALCITRIOL 0.5 MICROGRAM(S): 0.5 CAPSULE ORAL at 09:54

## 2019-07-19 RX ADMIN — Medication 2500 MILLIGRAM(S): at 22:09

## 2019-07-19 RX ADMIN — Medication 650 MILLIGRAM(S): at 22:34

## 2019-07-19 RX ADMIN — Medication 100 MILLIGRAM(S): at 13:59

## 2019-07-19 RX ADMIN — Medication 650 MILLIGRAM(S): at 15:50

## 2019-07-19 RX ADMIN — Medication 2000 MILLIGRAM(S): at 22:05

## 2019-07-19 RX ADMIN — Medication 2500 MILLIGRAM(S): at 16:54

## 2019-07-19 RX ADMIN — CALCITRIOL 0.75 MICROGRAM(S): 0.5 CAPSULE ORAL at 22:06

## 2019-07-19 RX ADMIN — OXYCODONE HYDROCHLORIDE 10 MILLIGRAM(S): 5 TABLET ORAL at 10:15

## 2019-07-19 RX ADMIN — Medication 100 MILLIGRAM(S): at 17:41

## 2019-07-19 RX ADMIN — Medication 650 MILLIGRAM(S): at 16:00

## 2019-07-19 RX ADMIN — OXYCODONE HYDROCHLORIDE 10 MILLIGRAM(S): 5 TABLET ORAL at 01:00

## 2019-07-19 RX ADMIN — Medication 650 MILLIGRAM(S): at 07:37

## 2019-07-19 RX ADMIN — Medication 650 MILLIGRAM(S): at 22:04

## 2019-07-19 NOTE — PROGRESS NOTE ADULT - SUBJECTIVE AND OBJECTIVE BOX
INTERVAL HPI/OVERNIGHT EVENTS:    Patient is a 63y old  Male who presents with a chief complaint of S?p larnygectomy, thyroidectomy and parathyroidectomy (18 Jul 2019 14:16)      Pt reports the following symptoms:    CONSTITUTIONAL:  Negative fever or chills, feels well, good appetite  EYES:  Negative  blurry vision or double vision  CARDIOVASCULAR:  Negative for chest pain or palpitations  RESPIRATORY:  Negative for cough, wheezing, or SOB   GASTROINTESTINAL:  Negative for nausea, vomiting, diarrhea, constipation, or abdominal pain  GENITOURINARY:  Negative frequency, urgency or dysuria  NEUROLOGIC:  No headache, confusion, dizziness, lightheadedness    MEDICATIONS  (STANDING):  calcitriol   Capsule 0.75 MICROGram(s) Oral at bedtime  calcitriol  Solution 0.5 MICROGram(s) Enteral Tube daily  calcium carbonate   Suspension 2500 milliGRAM(s) Oral three times a day  ceFAZolin   IVPB 1000 milliGRAM(s) IV Intermittent every 8 hours  cholecalciferol 4000 Unit(s) Oral daily  dextrose 50% Injectable 12.5 Gram(s) IV Push once  dextrose 50% Injectable 25 Gram(s) IV Push once  dextrose 50% Injectable 25 Gram(s) IV Push once  docusate sodium Liquid 100 milliGRAM(s) Oral two times a day  enoxaparin Injectable 40 milliGRAM(s) SubCutaneous every 24 hours  insulin lispro (HumaLOG) corrective regimen sliding scale   SubCutaneous Before meals and at bedtime  lactated ringers Bolus 500 milliLiter(s) IV Bolus once  levothyroxine 125 MICROGram(s) Oral daily  metroNIDAZOLE  IVPB 500 milliGRAM(s) IV Intermittent every 8 hours  senna Syrup 10 milliLiter(s) Oral at bedtime    MEDICATIONS  (PRN):  acetaminophen    Suspension .. 650 milliGRAM(s) Enteral Tube every 6 hours PRN Mild Pain (1 - 3)  bisacodyl Suppository 10 milliGRAM(s) Rectal daily PRN Constipation  dextrose 40% Gel 15 Gram(s) Oral once PRN Blood Glucose LESS THAN 70 milliGRAM(s)/deciliter  glucagon  Injectable 1 milliGRAM(s) IntraMuscular once PRN Glucose LESS THAN 70 milligrams/deciliter  oxyCODONE    Solution 5 milliGRAM(s) Oral every 4 hours PRN Moderate Pain (4 - 6)  oxyCODONE    Solution 10 milliGRAM(s) Oral every 6 hours PRN Severe Pain (7 - 10)  polyethylene glycol 3350 17 Gram(s) Oral daily PRN Constipation      PHYSICAL EXAM  Vital Signs Last 24 Hrs  T(C): 36.9 (19 Jul 2019 09:08), Max: 38.2 (18 Jul 2019 20:44)  T(F): 98.5 (19 Jul 2019 09:08), Max: 100.8 (18 Jul 2019 20:44)  HR: 70 (19 Jul 2019 11:55) (70 - 102)  BP: 89/52 (19 Jul 2019 11:55) (89/52 - 105/55)  BP(mean): 63 (19 Jul 2019 11:55) (63 - 77)  RR: 18 (19 Jul 2019 11:55) (15 - 21)  SpO2: 95% (19 Jul 2019 11:55) (93% - 98%)    Constitutional: wn/wd in NAD.   HEENT: NCAT, MMM, OP clear, EOMI, no proptosis or lid retraction  Neck: no thyromegaly or palpable thyroid nodules   Respiratory: lungs CTAB.  Cardiovascular: regular rhythm, normal S1 and S2, no audible murmurs, no peripheral edema  GI: soft, NT/ND, no masses/HSM appreciated.  Neurology: no tremors, DTR 2+  Skin: no visible rashes/lesions  Psychiatric: AAO x 3, normal affect/mood.    LABS:                        8.2    7.57  )-----------( 299      ( 19 Jul 2019 06:05 )             26.1     07-19    139  |  100  |  12  ----------------------------<  137<H>  3.9   |  30  |  0.58    Ca    8.0<L>      19 Jul 2019 06:05  Phos  4.3     07-19    TPro  5.8<L>  /  Alb  2.8<L>  /  TBili  0.2  /  DBili  x   /  AST  11  /  ALT  7<L>  /  AlkPhos  51  07-19        Thyroid Stimulating Hormone, Serum: 27.281 uIU/mL (07-18 @ 06:29)  Thyroid Stimulating Hormone, Serum: 1.483 uIU/mL (07-03 @ 04:51)      HbA1C: 5.7 % (07-03 @ 11:17)    CAPILLARY BLOOD GLUCOSE      POCT Blood Glucose.: 130 mg/dL (19 Jul 2019 11:43)  POCT Blood Glucose.: 144 mg/dL (19 Jul 2019 07:55)  POCT Blood Glucose.: 124 mg/dL (18 Jul 2019 21:42)  POCT Blood Glucose.: 125 mg/dL (18 Jul 2019 16:51)      Insulin Sliding Scale requirements X 24 Hours:    RADIOLOGY & ADDITIONAL TESTS:    A/P: 63y Male with history of DM type II presenting for       1.  DM -     Please continue           units lantus at bedtime  / in the morning and        units lispro with meals and lispro moderate / low dose sliding scale 4 times daily with meals and at bedtime.  Please continue consistent carbohydrate diet.      Goal FSG is   Will continue to monitor   For discharge, pt can continue    Pt can follow up at discharge with [facility] by calling  to make an appointment.   Will discuss case with     and update primary team INTERVAL HPI/OVERNIGHT EVENTS:    Patient is a 63y old  Male who presents with a chief complaint of S?p larnygectomy, thyroidectomy and parathyroidectomy (18 Jul 2019 14:16)      Pt reports the following symptoms:    CONSTITUTIONAL:  Negative fever or chills, feels well, good appetite  EYES:  Negative  blurry vision or double vision  CARDIOVASCULAR:  Negative for chest pain or palpitations  RESPIRATORY:  Negative for cough, wheezing, or SOB   GASTROINTESTINAL:  Negative for nausea, vomiting, diarrhea, constipation, or abdominal pain  GENITOURINARY:  Negative frequency, urgency or dysuria  NEUROLOGIC:  No headache, confusion, dizziness, lightheadedness    MEDICATIONS  (STANDING):  calcitriol   Capsule 0.75 MICROGram(s) Oral at bedtime  calcitriol  Solution 0.5 MICROGram(s) Enteral Tube daily  calcium carbonate   Suspension 2500 milliGRAM(s) Oral three times a day  ceFAZolin   IVPB 1000 milliGRAM(s) IV Intermittent every 8 hours  cholecalciferol 4000 Unit(s) Oral daily  dextrose 50% Injectable 12.5 Gram(s) IV Push once  dextrose 50% Injectable 25 Gram(s) IV Push once  dextrose 50% Injectable 25 Gram(s) IV Push once  docusate sodium Liquid 100 milliGRAM(s) Oral two times a day  enoxaparin Injectable 40 milliGRAM(s) SubCutaneous every 24 hours  insulin lispro (HumaLOG) corrective regimen sliding scale   SubCutaneous Before meals and at bedtime  lactated ringers Bolus 500 milliLiter(s) IV Bolus once  levothyroxine 125 MICROGram(s) Oral daily  metroNIDAZOLE  IVPB 500 milliGRAM(s) IV Intermittent every 8 hours  senna Syrup 10 milliLiter(s) Oral at bedtime    MEDICATIONS  (PRN):  acetaminophen    Suspension .. 650 milliGRAM(s) Enteral Tube every 6 hours PRN Mild Pain (1 - 3)  bisacodyl Suppository 10 milliGRAM(s) Rectal daily PRN Constipation  dextrose 40% Gel 15 Gram(s) Oral once PRN Blood Glucose LESS THAN 70 milliGRAM(s)/deciliter  glucagon  Injectable 1 milliGRAM(s) IntraMuscular once PRN Glucose LESS THAN 70 milligrams/deciliter  oxyCODONE    Solution 5 milliGRAM(s) Oral every 4 hours PRN Moderate Pain (4 - 6)  oxyCODONE    Solution 10 milliGRAM(s) Oral every 6 hours PRN Severe Pain (7 - 10)  polyethylene glycol 3350 17 Gram(s) Oral daily PRN Constipation      PHYSICAL EXAM  Vital Signs Last 24 Hrs  T(C): 36.9 (19 Jul 2019 09:08), Max: 38.2 (18 Jul 2019 20:44)  T(F): 98.5 (19 Jul 2019 09:08), Max: 100.8 (18 Jul 2019 20:44)  HR: 70 (19 Jul 2019 11:55) (70 - 102)  BP: 89/52 (19 Jul 2019 11:55) (89/52 - 105/55)  BP(mean): 63 (19 Jul 2019 11:55) (63 - 77)  RR: 18 (19 Jul 2019 11:55) (15 - 21)  SpO2: 95% (19 Jul 2019 11:55) (93% - 98%)    Constitutional: wn/wd in NAD.   HEENT: NCAT, MMM, OP clear, EOMI, no proptosis or lid retraction  Neck: no thyromegaly or palpable thyroid nodules   Respiratory: lungs CTAB.  Cardiovascular: regular rhythm, normal S1 and S2, no audible murmurs, no peripheral edema  GI: soft, NT/ND, no masses/HSM appreciated.  Neurology: no tremors, DTR 2+  Skin: no visible rashes/lesions  Psychiatric: AAO x 3, normal affect/mood.    LABS:                        8.2    7.57  )-----------( 299      ( 19 Jul 2019 06:05 )             26.1     07-19    139  |  100  |  12  ----------------------------<  137<H>  3.9   |  30  |  0.58    Ca    8.0<L>      19 Jul 2019 06:05  Phos  4.3     07-19    TPro  5.8<L>  /  Alb  2.8<L>  /  TBili  0.2  /  DBili  x   /  AST  11  /  ALT  7<L>  /  AlkPhos  51  07-19        Thyroid Stimulating Hormone, Serum: 27.281 uIU/mL (07-18 @ 06:29)  Thyroid Stimulating Hormone, Serum: 1.483 uIU/mL (07-03 @ 04:51)      HbA1C: 5.7 % (07-03 @ 11:17)    CAPILLARY BLOOD GLUCOSE      POCT Blood Glucose.: 130 mg/dL (19 Jul 2019 11:43)  POCT Blood Glucose.: 144 mg/dL (19 Jul 2019 07:55)  POCT Blood Glucose.: 124 mg/dL (18 Jul 2019 21:42)  POCT Blood Glucose.: 125 mg/dL (18 Jul 2019 16:51)      Insulin Sliding Scale requirements X 24 Hours:    RADIOLOGY & ADDITIONAL TESTS:    A/P: 63M hx COPD, 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                                                                                                                                                                                                                                                                                                                                                                                                                                                                                                                                                                                                                                                                                                                                                                                                                                                                                                                                                                                                                                                                                                                                                                                                                                                                                                                                                                                                                                                                                                                                                                                                                                                                                                                                                                                                                                                                                                                                                                                                                                                                                                                                                                                                                                                                                                                                                                                                                                                                                                                                                                                                                       1.  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 Please continue calcitriol 0.5 mcg QAM and increase to calcitriol 0.75mcg at night. Also continue Calcium carbonate 2500 mg PO TID. Check serum Ca every 12 hours. Check albumin every other day. Check phos, ionized ca daily. Check PTH daily. Vit D level low. Please cont Vit D3 4000IU daily. If corrected calcium goes more than 9.0, we may need to decrease the calcitriol dose by half.     (If calcium level less then 6.9 mg/dl, please give calcium carbonate infusion with a 1 L NS bag containing at least 900-1000 mg of elemental calcium to be given over 24 hours.)    2.  Post surgical hypothyroidism - Please increase to levothyroxine 125mcg daily PO, not through peg tube. Levothyroxine should be given separately from other meds, specifically calcium carbonate and food/tube feeds, for adequate absorption.  Please schedule at levothyroxine at 6AM and schedule calcium carb for 10AM, 4PM, and 10PM. Levothyroxine should be given by mouth, not thru feeding tube.     3. Adrenal nodule - CT scan showed 1.4cm adrenal nodule. Can be followed up as an OP.    Will continue to monitor     For discharge, pt can continue TBD by clinical course    Pt can follow up at discharge within 1 week to check Ca level with Tahira Flaherty NP at [facility] by calling  to make an appointment.    Patient seen and discussed with  and ENT. INTERVAL HPI/OVERNIGHT EVENTS:    Patient seen and examined at the bedside. he had some fever spikes yesterday evening at around 800 PM. He was resumed on ancef and flagyl. Besides, his levothyroxine was again given along with caco3 this morning. The nurse was made aware about the situation again today. He denied any perioral tingling, numbness or any muscle cramps.  His calcium trend   19 at 20:13 8.1 with corrected 9.1 with albumin 2.8  19 6:00 8.0 with corrected 9.0  19 11:00 7.4 with corrected 8.4    FSG & Insulin received:    Yesterday:  pre-dinner fs, he had some beef mashed  bedtime fs, had some teas  Today:  pre-breakfast fs, had egg, sausage  pre-lunch fs, carrot, potato mashed, soup      Pt reports the following symptoms:    CONSTITUTIONAL:  Negative fever or chills, feels well, good appetite  EYES:  Negative  blurry vision or double vision  CARDIOVASCULAR:  Negative for chest pain or palpitations  RESPIRATORY:  Negative for cough, wheezing, or SOB   GASTROINTESTINAL:  Negative for nausea, vomiting, diarrhea, constipation, or abdominal pain  GENITOURINARY:  Negative frequency, urgency or dysuria  NEUROLOGIC:  No headache, confusion, dizziness, lightheadedness    MEDICATIONS  (STANDING):  calcitriol   Capsule 0.75 MICROGram(s) Oral at bedtime  calcitriol  Solution 0.5 MICROGram(s) Enteral Tube daily  calcium carbonate   Suspension 2500 milliGRAM(s) Oral three times a day  ceFAZolin   IVPB 1000 milliGRAM(s) IV Intermittent every 8 hours  cholecalciferol 4000 Unit(s) Oral daily  dextrose 50% Injectable 12.5 Gram(s) IV Push once  dextrose 50% Injectable 25 Gram(s) IV Push once  dextrose 50% Injectable 25 Gram(s) IV Push once  docusate sodium Liquid 100 milliGRAM(s) Oral two times a day  enoxaparin Injectable 40 milliGRAM(s) SubCutaneous every 24 hours  insulin lispro (HumaLOG) corrective regimen sliding scale   SubCutaneous Before meals and at bedtime  lactated ringers Bolus 500 milliLiter(s) IV Bolus once  levothyroxine 125 MICROGram(s) Oral daily  metroNIDAZOLE  IVPB 500 milliGRAM(s) IV Intermittent every 8 hours  senna Syrup 10 milliLiter(s) Oral at bedtime    MEDICATIONS  (PRN):  acetaminophen    Suspension .. 650 milliGRAM(s) Enteral Tube every 6 hours PRN Mild Pain (1 - 3)  bisacodyl Suppository 10 milliGRAM(s) Rectal daily PRN Constipation  dextrose 40% Gel 15 Gram(s) Oral once PRN Blood Glucose LESS THAN 70 milliGRAM(s)/deciliter  glucagon  Injectable 1 milliGRAM(s) IntraMuscular once PRN Glucose LESS THAN 70 milligrams/deciliter  oxyCODONE    Solution 5 milliGRAM(s) Oral every 4 hours PRN Moderate Pain (4 - 6)  oxyCODONE    Solution 10 milliGRAM(s) Oral every 6 hours PRN Severe Pain (7 - 10)  polyethylene glycol 3350 17 Gram(s) Oral daily PRN Constipation      PHYSICAL EXAM  Vital Signs Last 24 Hrs  T(C): 36.9 (2019 09:08), Max: 38.2 (2019 20:44)  T(F): 98.5 (2019 09:08), Max: 100.8 (2019 20:44)  HR: 70 (2019 11:55) (70 - 102)  BP: 89/52 (2019 11:55) (89/52 - 105/55)  BP(mean): 63 (2019 11:55) (63 - 77)  RR: 18 (2019 11:55) (15 - 21)  SpO2: 95% (2019 11:55) (93% - 98%)    Constitutional: wn/wd in NAD.   Neck: tracheostomy site - collar in  Respiratory: lungs CTAB.  Cardiovascular: regular rhythm, normal S1 and S2, no audible murmurs, no peripheral edema  GI: soft, NT/ND, no masses/HSM appreciated.  Neurology: no tremors, no deficits  Psychiatric: AAO x 3, normal affect/mood.    LABS:                        8.2    7.57  )-----------( 299      ( 2019 06:05 )             26.1     07-19    139  |  100  |  12  ----------------------------<  137<H>  3.9   |  30  |  0.58    Ca    8.0<L>      2019 06:05  Phos  4.3         TPro  5.8<L>  /  Alb  2.8<L>  /  TBili  0.2  /  DBili  x   /  AST  11  /  ALT  7<L>  /  AlkPhos  51          Thyroid Stimulating Hormone, Serum: 27.281 uIU/mL ( @ 06:29)  Thyroid Stimulating Hormone, Serum: 1.483 uIU/mL ( @ 04:51)      HbA1C: 5.7 % ( @ 11:17)    CAPILLARY BLOOD GLUCOSE      POCT Blood Glucose.: 130 mg/dL (2019 11:43)  POCT Blood Glucose.: 144 mg/dL (2019 07:55)  POCT Blood Glucose.: 124 mg/dL (2019 21:42)  POCT Blood Glucose.: 125 mg/dL (2019 16:51)      Insulin Sliding Scale requirements X 24 Hours:    RADIOLOGY & ADDITIONAL TESTS:    A/P: 63M hx COPD, 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                                                                                                                                                                                                                                                                                                                                                                                                                                                                                                                                                                                                                                                                                                                                                                                                                                                                                                                                                                                                                                                                                                                                                                                                                                                                                                                                                                                                                                                                                                                                                                                                                                                                                                                                                                                                                                                                                                                                                                                                                                                                                                                                                                                                                                                                                                                                                                                                                                                                                                                                                                                                                       1.  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 Please continue calcitriol 0.5 mcg QAM and calcitriol 0.75mcg at night. Also continue Calcium carbonate 2500 mg PO TID. Check serum Ca every 12 hours. Check albumin every other day. Check phos, ionized ca daily. Check PTH daily. Vit D level low. Please cont Vit D3 4000IU daily. If corrected calcium goes more than 9.0, we may need to decrease the calcitriol dose by half.     (If calcium level less then 6.9 mg/dl, please give calcium carbonate infusion with a 1 L NS bag containing at least 900-1000 mg of elemental calcium to be given over 24 hours.)    2.  Post surgical hypothyroidism - Please continue to levothyroxine 125mcg daily PO, not through peg tube. Levothyroxine should be given separately from other meds, specifically calcium carbonate and food/tube feeds, for adequate absorption.  Please schedule at levothyroxine at 6AM and schedule calcium carb for 10AM, 4PM, and 10PM. Levothyroxine should be given by mouth, not thru feeding tube.     3. Adrenal nodule - CT scan showed 1.4cm adrenal nodule. Can be followed up as an OP.    Will continue to monitor     For discharge, pt can continue TBD by clinical course    Pt can follow up at discharge within 1 week to check Ca level with Tahira Flaherty NP at [facility] by calling  to make an appointment.    Patient seen and discussed with  and ENT.

## 2019-07-19 NOTE — PROGRESS NOTE ADULT - ATTENDING COMMENTS
Pt seen on rounds this afternoon.  Again had low-grade fever, though without chills.  On exam he has coarse breath sounds but no definite rales at the R base--left lung is clear.    Serum calcium levels have increased to 8.4-9.1 (corrected), and he has no hypocalcemic symptoms  Nurses need to follow the timing of his calcium replacement strictly--first dose is at 10 AM per orders, though it appears that he got it today at 6 AM, close to his levothyroxine dose.

## 2019-07-19 NOTE — PROGRESS NOTE ADULT - ASSESSMENT
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 No evidence of hematoma/seroma/salivary leak, though with peristomal erythema and fevers, overall fever curve downtrending s/p initiation of antibiotics. Improving peristomal cellulitis. Performing Nasrin tube teaching.  - PATIENT IS NOT ABLE TO BE NASALLY OR ORALLY INTUBATED  - SLP  - Appreciate add'l endocrinology recs; synthroid 125mcg, rocaltrol (0.5mcg am, 0.75mcg pm), VD3 and calcium per endo  - Monitor Ca  - f/u CBC and FOBT  - daily packing exchage at site of R prior ABDON insertion site with 1/4 iodoform  - monitor peristomal cellulitis  - continue ancef/flagyl  - Continue bolus TFs  - Pain control  - Nasrin tube cleaning teaching  - Suction frequently w/red rubber catheters only, humidified air at all times  - SCDs, Lovenox, OOB/ambulate  - Bowel regimen  - Page ENT with questions or concerns  - d/w attending MD whom agrees with the above plan

## 2019-07-19 NOTE — CONSULT NOTE ADULT - ASSESSMENT
Recommendations:  - Please increase cefazolin to 2g IV q8h  - Continue with metronidazole 500 mg q8h  - ID team 1 will continue to follow  - Case discussed with ID attending, Dr. Jhaveri Recommendations:  - Please increase cefazolin to 2g IV q8h  - Continue with metronidazole 500 mg q8h  - Please obtain baseline ESR/CRP  - ID team 1 will continue to follow  - Case discussed with ID attending, Dr. 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 Patient with worsening stridor, s/p CT neck on 6/4 which showed new sites of enhancing soft tissue, suspicious for recurrence of laryngeal carcinoma. He saw Dr. Branch on 6/5, he was sent for PET/CT on 6/7 which showed abnormal FDG activity (SUV 14.6) of the glottic and subglottic regions concerning for recurrence. CT chest also showed 2mm SHAKIR pulmonary nodule and 1.4 cm adrenal nodule. Admitted on 6/25 for planned open tracheostomy with biopsy with lesions noted in subglottis, biopsied, frozen showing malignancy. PEG placement on 6/28 2/2 failed SLP eval/difficulty swallowing. Now s/p radical laryngectomy with total thyroidectomy and suspected parathyroidectomy on 7/2 hypocalcemia. Patient spiking intermittent fevers since surgery, tmax 104 on 7/17. Sputum +MSSA on 7/17, blood cx NGTD. Patient started on ancef and flagyl post-op until 7/11, stopped and ancef/flagyl restarted on 7/17.    - As per the operative note, the pectoralis muscle was used as a flap for reconstruction of the laryngeal opening. Given the intricacies of the surgery, and the site being a sensitive area for anaerobes to grow, patient will need a prolonged course of therapy. For now, recommendations as follows:  - Please increase cefazolin to 2g IV q8h  - Continue with metronidazole 500 mg q8h  - Please obtain baseline ESR/CRP  - ID team 1 will continue to follow  - Case discussed with ID attending, Dr. 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 Patient with worsening stridor, s/p CT neck on 6/4 which showed new sites of enhancing soft tissue, suspicious for recurrence of laryngeal carcinoma. He saw Dr. Branch on 6/5, he was sent for PET/CT on 6/7 which showed abnormal FDG activity (SUV 14.6) of the glottic and subglottic regions concerning for recurrence. CT chest also showed 2mm SHAKIR pulmonary nodule and 1.4 cm adrenal nodule. Admitted on 6/25 for planned open tracheostomy with biopsy with lesions noted in subglottis, biopsied, frozen showing malignancy. PEG placement on 6/28 2/2 failed SLP eval/difficulty swallowing. Now s/p radical laryngectomy with total thyroidectomy and suspected parathyroidectomy on 7/2 hypocalcemia. Patient spiking intermittent fevers since surgery, tmax 104 on 7/17. Sputum +MSSA on 7/17, blood cx NGTD. Patient started on ancef and flagyl post-op until 7/11, stopped and ancef/flagyl restarted on 7/17.    - As per the operative note, the pectoralis muscle was used as a flap for reconstruction of the laryngeal opening. Given the intricacies of the surgery, and the site being a sensitive area for anaerobes to grow, can continue anaerobic coverage at this time. For now, recommendations as follows:  - Please increase cefazolin to 2g IV q8h  - Continue with metronidazole 500 mg q8h for now - can give via PEG  - Please obtain baseline ESR/CRP  - ID team 1 will continue to follow  - Case discussed with ID attending, Dr. Jhaveri

## 2019-07-19 NOTE — CONSULT NOTE ADULT - SUBJECTIVE AND OBJECTIVE BOX
INFECTIOUS DISEASE CONSULT NOTE    HPI:  Patient history of prior laryngeal CA s/p definitive RT and history of prior trach s/p prior decannulation now is admitted following direct laryngoscopy biopsy of laryngeal lesion (frozen showing malig.) and planned revision tracheostomy due to tenuous airway (25 Jun 2019 09:59)      ADDITIONAL MEDICINE HPI:    REVIEW OF SYSTEMS:   Otherwise negative except as specified in HPI    PAST MEDICAL HISTORY:     PAST SURGICAL HISTORY:    FAMILY HISTORY:    SOCIAL HISTORY:  Tobacco use:  EtOH use:  Illicit drug use:    MEDICATIONS:  MEDICATIONS  (STANDING):  calcitriol   Capsule 0.75 MICROGram(s) Oral at bedtime  calcitriol  Solution 0.5 MICROGram(s) Enteral Tube daily  calcium carbonate   Suspension 2500 milliGRAM(s) Oral three times a day  ceFAZolin   IVPB 1000 milliGRAM(s) IV Intermittent every 8 hours  cholecalciferol 4000 Unit(s) Oral daily  dextrose 50% Injectable 12.5 Gram(s) IV Push once  dextrose 50% Injectable 25 Gram(s) IV Push once  dextrose 50% Injectable 25 Gram(s) IV Push once  docusate sodium Liquid 100 milliGRAM(s) Oral two times a day  enoxaparin Injectable 40 milliGRAM(s) SubCutaneous every 24 hours  insulin lispro (HumaLOG) corrective regimen sliding scale   SubCutaneous Before meals and at bedtime  lactated ringers Bolus 500 milliLiter(s) IV Bolus once  levothyroxine 125 MICROGram(s) Oral daily  metroNIDAZOLE  IVPB 500 milliGRAM(s) IV Intermittent every 8 hours  senna Syrup 10 milliLiter(s) Oral at bedtime    MEDICATIONS  (PRN):  acetaminophen    Suspension .. 650 milliGRAM(s) Enteral Tube every 6 hours PRN Mild Pain (1 - 3)  bisacodyl Suppository 10 milliGRAM(s) Rectal daily PRN Constipation  dextrose 40% Gel 15 Gram(s) Oral once PRN Blood Glucose LESS THAN 70 milliGRAM(s)/deciliter  glucagon  Injectable 1 milliGRAM(s) IntraMuscular once PRN Glucose LESS THAN 70 milligrams/deciliter  oxyCODONE    Solution 5 milliGRAM(s) Oral every 4 hours PRN Moderate Pain (4 - 6)  oxyCODONE    Solution 10 milliGRAM(s) Oral every 6 hours PRN Severe Pain (7 - 10)  polyethylene glycol 3350 17 Gram(s) Oral daily PRN Constipation      ALLERGIES:  Allergies    penicillin (Rash)    Intolerances        VITAL SIGNS:  Vital Signs Last 24 Hrs  T(C): 36.9 (19 Jul 2019 09:08), Max: 38.2 (18 Jul 2019 20:44)  T(F): 98.5 (19 Jul 2019 09:08), Max: 100.8 (18 Jul 2019 20:44)  HR: 70 (19 Jul 2019 11:55) (70 - 92)  BP: 89/52 (19 Jul 2019 11:55) (89/52 - 105/55)  BP(mean): 63 (19 Jul 2019 11:55) (63 - 77)  RR: 18 (19 Jul 2019 11:55) (15 - 20)  SpO2: 95% (19 Jul 2019 11:55) (93% - 98%)    07-18-19 @ 07:01  -  07-19-19 @ 07:00  --------------------------------------------------------  IN:    Enteral Tube Flush: 150 mL    IV PiggyBack: 300 mL    ns in tub fed  [id number]: 810 mL  Total IN: 1260 mL    OUT:    Voided: 825 mL  Total OUT: 825 mL    Total NET: 435 mL      07-19-19 @ 07:01  -  07-19-19 @ 12:17  --------------------------------------------------------  IN:    Oral Fluid: 240 mL  Total IN: 240 mL    OUT:    Voided: 300 mL  Total OUT: 300 mL    Total NET: -60 mL          PHYSICAL EXAM:  Constitutional: WDWN resting comfortably in bed; NAD  Head: Normocephalic/Atraumatic  Eyes: PERRL, EOMI, anicteric sclera  ENT: no nasal discharge; uvula midline, no oropharyngeal erythema or exudates; MMM  Neck: supple; +trach, capped  Respiratory: CTA B/L; no W/R/R, no retractions, large C-shaped scar around left pectoralis, C/D/I, nontender to palpation  Cardiac: +S1/S2; RRR; no M/R/G  Gastrointestinal: abdomen soft, NT/ND, +BSx4  Genitourinary: normal external genitalia  Back: spine midline, no bony tenderness or step-offs; no CVAT B/L  Extremities: WWP, no clubbing or cyanosis; no peripheral edema  Musculoskeletal: NROM x4; no joint swelling, tenderness or erythema  Vascular: 2+ radial, femoral, DP/PT pulses B/L  Dermatologic: skin warm, dry and intact; no rashes, wounds, or scars  Lymphatic: no submandibular or cervical LAD  Neurologic: AAOx3; CNII-XII grossly intact; no focal deficits  Psychiatric: affect and characteristics of appearance, verbalizations, behaviors are appropriate    LABS:                        8.2    7.57  )-----------( 299      ( 19 Jul 2019 06:05 )             26.1     07-19    139  |  100  |  12  ----------------------------<  137<H>  3.9   |  30  |  0.58    Ca    8.0<L>      19 Jul 2019 06:05  Phos  4.3     07-19    TPro  5.8<L>  /  Alb  2.8<L>  /  TBili  0.2  /  DBili  x   /  AST  11  /  ALT  7<L>  /  AlkPhos  51  07-19            CAPILLARY BLOOD GLUCOSE      POCT Blood Glucose.: 130 mg/dL (19 Jul 2019 11:43)  POCT Blood Glucose.: 144 mg/dL (19 Jul 2019 07:55)  POCT Blood Glucose.: 124 mg/dL (18 Jul 2019 21:42)  POCT Blood Glucose.: 125 mg/dL (18 Jul 2019 16:51)    Culture - Sputum . (07.17.19 @ 16:09)    Gram Stain:   No epithelial cells  Moderate White blood cells  Few Gram Positive Cocci in Clusters    -  Clindamycin: S <=0.25    -  Erythromycin: S <=0.25    -  Linezolid: S 2    -  Oxacillin: S 0.5    -  Penicillin: R >8    -  Trimethoprim/Sulfamethoxazole: S <=0.5/9.5    -  Vancomycin: S 2    -  RIF- Rifampin: S <=1 Should not be used as monotherapy    -  Cefazolin: S <=4    Specimen Source: .Sputum Sputum    Culture Results:   Moderate Staphylococcus aureus    Organism Identification: Staphylococcus aureus    Organism: Staphylococcus aureus    Method Type: WILLIAM    Culture - Blood (07.17.19 @ 15:20)    Specimen Source: .Blood Blood-Venous    Culture Results:   No growth at 1 day.    RADIOLOGY & ADDITIONAL TESTS:   < from: Xray Chest 1 View- PORTABLE-Routine (07.17.19 @ 10:28) >  FINDINGS: Heart size, mediastinal and hilar contours are unchanged. Again   noted is uncoiling of the thoracic aorta. Lung zones remain clear. Soft   tissues and osseous structures are intact.    IMPRESSION:  No recent pleural or parenchymal pathology. 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 Patient with worsening stridor, s/p CT neck on 6/4 which showed new sites of enhancing soft tissue, suspicious for recurrence of laryngeal carcinoma. He saw Dr. Branch on 6/5, he was sent for PET/CT on 6/7 which showed abnormal FDG activity (SUV 14.6) of the glottic and subglottic regions concerning for recurrence. CT chest also showed 2mm SHAKIR pulmonary nodule and 1.4 cm adrenal nodule. Admitted on 6/25 for planned open tracheostomy with biopsy with lesions noted in subglottis, biopsied, frozen showing malignancy. PEG placement on 6/28 2/2 failed SLP eval/difficulty swallowing. Now s/p radical laryngectomy with total thyroidectomy and suspected parathyroidectomy on 7/2 hypocalcemia. Patient spiking intermittent fevers since surgery, tmax 104 on 7/17. Sputum +MSSA on 7/17, blood cx NGTD. Patient started on ancef and flagyl post-op until 7/11, and restarted on 7/17.    REVIEW OF SYSTEMS:   Otherwise negative except as specified in HPI    FAMILY HISTORY: Noncontributory    SOCIAL HISTORY:  Past smoker, quit 2/2018, 48 pack year history. Social drinker, denies illicit drug use.    MEDICATIONS:  MEDICATIONS  (STANDING):  calcitriol   Capsule 0.75 MICROGram(s) Oral at bedtime  calcitriol  Solution 0.5 MICROGram(s) Enteral Tube daily  calcium carbonate   Suspension 2500 milliGRAM(s) Oral three times a day  ceFAZolin   IVPB 1000 milliGRAM(s) IV Intermittent every 8 hours  cholecalciferol 4000 Unit(s) Oral daily  dextrose 50% Injectable 12.5 Gram(s) IV Push once  dextrose 50% Injectable 25 Gram(s) IV Push once  dextrose 50% Injectable 25 Gram(s) IV Push once  docusate sodium Liquid 100 milliGRAM(s) Oral two times a day  enoxaparin Injectable 40 milliGRAM(s) SubCutaneous every 24 hours  insulin lispro (HumaLOG) corrective regimen sliding scale   SubCutaneous Before meals and at bedtime  lactated ringers Bolus 500 milliLiter(s) IV Bolus once  levothyroxine 125 MICROGram(s) Oral daily  metroNIDAZOLE  IVPB 500 milliGRAM(s) IV Intermittent every 8 hours  senna Syrup 10 milliLiter(s) Oral at bedtime    MEDICATIONS  (PRN):  acetaminophen    Suspension .. 650 milliGRAM(s) Enteral Tube every 6 hours PRN Mild Pain (1 - 3)  bisacodyl Suppository 10 milliGRAM(s) Rectal daily PRN Constipation  dextrose 40% Gel 15 Gram(s) Oral once PRN Blood Glucose LESS THAN 70 milliGRAM(s)/deciliter  glucagon  Injectable 1 milliGRAM(s) IntraMuscular once PRN Glucose LESS THAN 70 milligrams/deciliter  oxyCODONE    Solution 5 milliGRAM(s) Oral every 4 hours PRN Moderate Pain (4 - 6)  oxyCODONE    Solution 10 milliGRAM(s) Oral every 6 hours PRN Severe Pain (7 - 10)  polyethylene glycol 3350 17 Gram(s) Oral daily PRN Constipation      ALLERGIES:  Allergies    penicillin (Rash)    Intolerances        VITAL SIGNS:  Vital Signs Last 24 Hrs  T(C): 36.9 (19 Jul 2019 09:08), Max: 38.2 (18 Jul 2019 20:44)  T(F): 98.5 (19 Jul 2019 09:08), Max: 100.8 (18 Jul 2019 20:44)  HR: 70 (19 Jul 2019 11:55) (70 - 92)  BP: 89/52 (19 Jul 2019 11:55) (89/52 - 105/55)  BP(mean): 63 (19 Jul 2019 11:55) (63 - 77)  RR: 18 (19 Jul 2019 11:55) (15 - 20)  SpO2: 95% (19 Jul 2019 11:55) (93% - 98%)    07-18-19 @ 07:01  -  07-19-19 @ 07:00  --------------------------------------------------------  IN:    Enteral Tube Flush: 150 mL    IV PiggyBack: 300 mL    ns in tub fed  [id number]: 810 mL  Total IN: 1260 mL    OUT:    Voided: 825 mL  Total OUT: 825 mL    Total NET: 435 mL      07-19-19 @ 07:01  -  07-19-19 @ 12:17  --------------------------------------------------------  IN:    Oral Fluid: 240 mL  Total IN: 240 mL    OUT:    Voided: 300 mL  Total OUT: 300 mL    Total NET: -60 mL    PHYSICAL EXAM:  Constitutional: WDWN resting comfortably in bed; NAD  Head: Normocephalic/Atraumatic  Eyes: PERRL, EOMI, anicteric sclera  ENT: no nasal discharge; uvula midline, no oropharyngeal erythema or exudates; MMM  Neck: supple; +trach, capped  Respiratory: CTA B/L; no W/R/R, no retractions, large C-shaped scar around left pectoralis, C/D/I, nontender to palpation  Cardiac: +S1/S2; RRR; no M/R/G  Gastrointestinal: abdomen soft, NT/ND, +BSx4  Genitourinary: normal external genitalia  Back: spine midline, no bony tenderness or step-offs; no CVAT B/L  Extremities: WWP, no clubbing or cyanosis; no peripheral edema  Musculoskeletal: NROM x4; no joint swelling, tenderness or erythema  Vascular: 2+ radial, femoral, DP/PT pulses B/L  Dermatologic: skin warm, dry and intact; no rashes, wounds, or scars  Lymphatic: no submandibular or cervical LAD  Neurologic: AAOx3; CNII-XII grossly intact; no focal deficits  Psychiatric: affect and characteristics of appearance, verbalizations, behaviors are appropriate    LABS:                        8.2    7.57  )-----------( 299      ( 19 Jul 2019 06:05 )             26.1     07-19    139  |  100  |  12  ----------------------------<  137<H>  3.9   |  30  |  0.58    Ca    8.0<L>      19 Jul 2019 06:05  Phos  4.3     07-19    TPro  5.8<L>  /  Alb  2.8<L>  /  TBili  0.2  /  DBili  x   /  AST  11  /  ALT  7<L>  /  AlkPhos  51  07-19      CAPILLARY BLOOD GLUCOSE      POCT Blood Glucose.: 130 mg/dL (19 Jul 2019 11:43)  POCT Blood Glucose.: 144 mg/dL (19 Jul 2019 07:55)  POCT Blood Glucose.: 124 mg/dL (18 Jul 2019 21:42)  POCT Blood Glucose.: 125 mg/dL (18 Jul 2019 16:51)    Culture - Sputum . (07.17.19 @ 16:09)    Gram Stain:   No epithelial cells  Moderate White blood cells  Few Gram Positive Cocci in Clusters    -  Clindamycin: S <=0.25    -  Erythromycin: S <=0.25    -  Linezolid: S 2    -  Oxacillin: S 0.5    -  Penicillin: R >8    -  Trimethoprim/Sulfamethoxazole: S <=0.5/9.5    -  Vancomycin: S 2    -  RIF- Rifampin: S <=1 Should not be used as monotherapy    -  Cefazolin: S <=4    Specimen Source: .Sputum Sputum    Culture Results:   Moderate Staphylococcus aureus    Organism Identification: Staphylococcus aureus    Organism: Staphylococcus aureus    Method Type: WILLIAM    Culture - Blood (07.17.19 @ 15:20)    Specimen Source: .Blood Blood-Venous    Culture Results:   No growth at 1 day.    RADIOLOGY & ADDITIONAL TESTS:   < from: Xray Chest 1 View- PORTABLE-Routine (07.17.19 @ 10:28) >  FINDINGS: Heart size, mediastinal and hilar contours are unchanged. Again   noted is uncoiling of the thoracic aorta. Lung zones remain clear. Soft   tissues and osseous structures are intact.    IMPRESSION:  No recent pleural or parenchymal pathology.

## 2019-07-19 NOTE — CONSULT NOTE ADULT - ATTENDING COMMENTS
I have reviewed the medical record, including laboratory and radiographic studies, interviewed and examined the patient and discussed the plan with Dr. Carballo, the ID Resident.  Agree with above. Agree with treating MSSA with cefazolin - IV for now.  Can continue metronidazole but would not give prolonged course.   Will continue to follow with you – ID Team 1.

## 2019-07-19 NOTE — PROGRESS NOTE ADULT - SUBJECTIVE AND OBJECTIVE BOX
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 C/b return to OR  for exchange to 6 distal XLT due to tracheomalacia and poor ventilatory status after coughing episode, now sp salvage TL, L myocutaneous pectoralis major flap.    6/26: Now doing well in SICU overnight, no additional respiratory issues, cuff taken down thsi AM. Failed SLP evaluation with evidence of gross aspiration on ice chip trial  6/27: has thick purulent secretions, SICU will keep for one more night. Low grade temps to 100.4. Will need PEG  6/28: NPO for PEG today. To SDU, when secretions are better managed. Received PEG for inability to swallow.  6/29: still requiring suctioning q30 minutes, small amount of glycopyrrolate given by SICU, received PEG tomorrow, will start trickle feeds  6/30: NAEON, feeds up to goal, still requiring frequent suctioning, will stop glycopyrrolate today  7/01: NAEON, tube feeds at goal, still requiring q1hr suctioning of copious thin nonpurulent secretions, dc'd glycopyrrolate  7/03: pt underwent TL, uncomplicated, tolerated; post-op PTH, iCa low so started on Rocaltrol, Ca IV; neck ABDON x1, left chest ABDON x2, salivary bypass stents in place  7/4: No acute events overnight. Calcium stable. Denies perioral/fingertip tingling. Tolerating trickle feeds. No new complaints.   7/05: NAEON. Pt complaining of increased pain. Otherwise, no new complaints. Calcium levels continue to be stable. Denies perioral/fingertip tingling. Tolerating TFs at goal.  7/6: No acute events, doing well, OOBTC. Tolerating TF at goal, will transition to bolus. Calcium changed yesterday to 2500mg q8h. Chest ABDON x1 removed today. Stable for transfer to SDU.   7/7: No acute events. Transferred to SDU yesterday. TF transitioned to bolus, tolerating well. Corrected Ca stable. Will decrease Ca checks to q12h per endocrinology.   7/8: No acute events, breathing comfortably with Bebeto tube in place. TFs well tolerated at bolus without subjective S&S reflux. Corrected Ca stable. Following Ca checks q12, Endocrinology following.  7/10: NAEON. Calcium levels normalizing, Endo recommending VD3 supplementation; tolerating TFs at goal rate without issue.  7/11: NAEON, Ca stable, Endocrinology following, continuing Ca checks. Tolerating TFs at bolus. Well tolerating Bebeto tube, performed teaching on how to clean, manage and take in/out the Bebeto tube.  7/12: NAEON, Ca stable, Endocrinology following, continuing Ca checks. Tolerating TFs at bolus. Well tolerating Bebeto tube, performed teaching on how to clean, manage and take in/out the Bebeto tube.  7/14: NAEON. Calcium corrected with extra dose of CaCarb soln. No new complaints. Will continue teaching in anticipation of discharge.   7/15: No acute events overnight.  Calcium stable.  Salivary bypass tube removed.  Educated on stoma care.   7/16: NAEON. Pt demonstrated stoma care to team on AM rounds. Ca levels remain stable. Pt for repeat SLP assessment/MBSS today. No new complaints.  7/17: NAEON. Salivary bypass removed/pt passed MBS on 7/16 PM without evidence of leak, cleared for pureed/thin liquids. Tolerated dinner yesterday without issues. No new complaints. Evidence of cellulitis inferior to stoma. Febrile.  7/18: NAEON, no respiratory events. Patient continues to well tolerate pureed foods and thin liquids. Patient taught how to clean Bebeto tube. Prior ABDON site packed with 1/4 gauze. Cellulitis improving. Afebrile.  7/19: No acute events overnight, continues to spike low grade fevers.  Pt restarted on antibiotics.  Continues on tube feeds, no respiratory complaints    Ca: 8  WBC: 7.57      PHYSICAL EXAM:  NAD, appears comfortable  Breathing comfortably on RA  Neck: incisions c/d/i; neck soft, flat; ABDON holding suction; size 10-55 bebeto tube secured via soft collar, minimal secretions suctioned, HME valve in place  Chest: incisions c/d/i; chest soft, flat; inferior to stoma area of erythema and mild TTP, improved from yesterday; prior R neck ABDON site dehiscent, packing removed and repacked with 1/4 gauze.

## 2019-07-20 ENCOUNTER — TRANSCRIPTION ENCOUNTER (OUTPATIENT)
Age: 64
End: 2019-07-20

## 2019-07-20 LAB
ALBUMIN SERPL ELPH-MCNC: 2.8 G/DL — LOW (ref 3.3–5)
ANION GAP SERPL CALC-SCNC: 11 MMOL/L — SIGNIFICANT CHANGE UP (ref 5–17)
BUN SERPL-MCNC: 12 MG/DL — SIGNIFICANT CHANGE UP (ref 7–23)
CALCIUM SERPL-MCNC: 8.3 MG/DL — LOW (ref 8.4–10.5)
CALCIUM SERPL-MCNC: 8.5 MG/DL — SIGNIFICANT CHANGE UP (ref 8.4–10.5)
CALCIUM SERPL-MCNC: 8.6 MG/DL — SIGNIFICANT CHANGE UP (ref 8.4–10.5)
CHLORIDE SERPL-SCNC: 98 MMOL/L — SIGNIFICANT CHANGE UP (ref 96–108)
CO2 SERPL-SCNC: 32 MMOL/L — HIGH (ref 22–31)
CREAT SERPL-MCNC: 0.65 MG/DL — SIGNIFICANT CHANGE UP (ref 0.5–1.3)
CRP SERPL-MCNC: 9.97 MG/DL — HIGH (ref 0–0.4)
ERYTHROCYTE [SEDIMENTATION RATE] IN BLOOD: 96 MM/HR — HIGH
GLUCOSE BLDC GLUCOMTR-MCNC: 100 MG/DL — HIGH (ref 70–99)
GLUCOSE BLDC GLUCOMTR-MCNC: 86 MG/DL — SIGNIFICANT CHANGE UP (ref 70–99)
GLUCOSE BLDC GLUCOMTR-MCNC: 87 MG/DL — SIGNIFICANT CHANGE UP (ref 70–99)
GLUCOSE BLDC GLUCOMTR-MCNC: 99 MG/DL — SIGNIFICANT CHANGE UP (ref 70–99)
GLUCOSE SERPL-MCNC: 76 MG/DL — SIGNIFICANT CHANGE UP (ref 70–99)
GRAM STN FLD: SIGNIFICANT CHANGE UP
HCT VFR BLD CALC: 27.8 % — LOW (ref 39–50)
HGB BLD-MCNC: 8.9 G/DL — LOW (ref 13–17)
MCHC RBC-ENTMCNC: 30.5 PG — SIGNIFICANT CHANGE UP (ref 27–34)
MCHC RBC-ENTMCNC: 32 GM/DL — SIGNIFICANT CHANGE UP (ref 32–36)
MCV RBC AUTO: 95.2 FL — SIGNIFICANT CHANGE UP (ref 80–100)
NRBC # BLD: 0 /100 WBCS — SIGNIFICANT CHANGE UP (ref 0–0)
PHOSPHATE SERPL-MCNC: 5.6 MG/DL — HIGH (ref 2.5–4.5)
PLATELET # BLD AUTO: 350 K/UL — SIGNIFICANT CHANGE UP (ref 150–400)
POTASSIUM SERPL-MCNC: 4.2 MMOL/L — SIGNIFICANT CHANGE UP (ref 3.5–5.3)
POTASSIUM SERPL-SCNC: 4.2 MMOL/L — SIGNIFICANT CHANGE UP (ref 3.5–5.3)
PTH-INTACT FLD-MCNC: 10 PG/ML — LOW (ref 15–65)
RBC # BLD: 2.92 M/UL — LOW (ref 4.2–5.8)
RBC # FLD: 14.8 % — HIGH (ref 10.3–14.5)
SODIUM SERPL-SCNC: 141 MMOL/L — SIGNIFICANT CHANGE UP (ref 135–145)
SPECIMEN SOURCE: SIGNIFICANT CHANGE UP
WBC # BLD: 5.66 K/UL — SIGNIFICANT CHANGE UP (ref 3.8–10.5)
WBC # FLD AUTO: 5.66 K/UL — SIGNIFICANT CHANGE UP (ref 3.8–10.5)

## 2019-07-20 PROCEDURE — 99231 SBSQ HOSP IP/OBS SF/LOW 25: CPT | Mod: GC

## 2019-07-20 PROCEDURE — 70491 CT SOFT TISSUE NECK W/DYE: CPT | Mod: 26

## 2019-07-20 RX ORDER — CALCITRIOL 0.5 UG/1
0.5 CAPSULE ORAL DAILY
Refills: 0 | Status: DISCONTINUED | OUTPATIENT
Start: 2019-07-20 | End: 2019-07-20

## 2019-07-20 RX ORDER — CALCITRIOL 0.5 UG/1
0.5 CAPSULE ORAL
Refills: 0 | Status: DISCONTINUED | OUTPATIENT
Start: 2019-07-20 | End: 2019-07-22

## 2019-07-20 RX ADMIN — Medication 100 MILLIGRAM(S): at 07:01

## 2019-07-20 RX ADMIN — Medication 650 MILLIGRAM(S): at 18:00

## 2019-07-20 RX ADMIN — Medication 100 MILLIGRAM(S): at 22:07

## 2019-07-20 RX ADMIN — Medication 4000 UNIT(S): at 10:55

## 2019-07-20 RX ADMIN — Medication 650 MILLIGRAM(S): at 18:16

## 2019-07-20 RX ADMIN — Medication 2500 MILLIGRAM(S): at 22:29

## 2019-07-20 RX ADMIN — OXYCODONE HYDROCHLORIDE 5 MILLIGRAM(S): 5 TABLET ORAL at 22:07

## 2019-07-20 RX ADMIN — CALCITRIOL 0.5 MICROGRAM(S): 0.5 CAPSULE ORAL at 22:08

## 2019-07-20 RX ADMIN — Medication 2500 MILLIGRAM(S): at 10:55

## 2019-07-20 RX ADMIN — Medication 2000 MILLIGRAM(S): at 07:01

## 2019-07-20 RX ADMIN — Medication 2500 MILLIGRAM(S): at 18:26

## 2019-07-20 RX ADMIN — CALCITRIOL 0.5 MICROGRAM(S): 0.5 CAPSULE ORAL at 10:55

## 2019-07-20 RX ADMIN — OXYCODONE HYDROCHLORIDE 10 MILLIGRAM(S): 5 TABLET ORAL at 00:26

## 2019-07-20 RX ADMIN — SENNA PLUS 10 MILLILITER(S): 8.6 TABLET ORAL at 22:08

## 2019-07-20 RX ADMIN — OXYCODONE HYDROCHLORIDE 5 MILLIGRAM(S): 5 TABLET ORAL at 22:45

## 2019-07-20 RX ADMIN — Medication 2000 MILLIGRAM(S): at 15:45

## 2019-07-20 RX ADMIN — Medication 2000 MILLIGRAM(S): at 22:07

## 2019-07-20 RX ADMIN — Medication 100 MILLIGRAM(S): at 18:15

## 2019-07-20 RX ADMIN — Medication 125 MICROGRAM(S): at 07:01

## 2019-07-20 RX ADMIN — Medication 100 MILLIGRAM(S): at 15:48

## 2019-07-20 RX ADMIN — ENOXAPARIN SODIUM 40 MILLIGRAM(S): 100 INJECTION SUBCUTANEOUS at 10:56

## 2019-07-20 RX ADMIN — OXYCODONE HYDROCHLORIDE 10 MILLIGRAM(S): 5 TABLET ORAL at 00:56

## 2019-07-20 NOTE — PROGRESS NOTE ADULT - SUBJECTIVE AND OBJECTIVE BOX
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 C/b return to OR  for exchange to 6 distal XLT due to tracheomalacia and poor ventilatory status after coughing episode, now sp salvage TL, L myocutaneous pectoralis major flap.    6/26: Now doing well in SICU overnight, no additional respiratory issues, cuff taken down thsi AM. Failed SLP evaluation with evidence of gross aspiration on ice chip trial  6/27: has thick purulent secretions, SICU will keep for one more night. Low grade temps to 100.4. Will need PEG  6/28: NPO for PEG today. To SDU, when secretions are better managed. Received PEG for inability to swallow.  6/29: still requiring suctioning q30 minutes, small amount of glycopyrrolate given by SICU, received PEG tomorrow, will start trickle feeds  6/30: NAEON, feeds up to goal, still requiring frequent suctioning, will stop glycopyrrolate today  7/01: NAEON, tube feeds at goal, still requiring q1hr suctioning of copious thin nonpurulent secretions, dc'd glycopyrrolate  7/03: pt underwent TL, uncomplicated, tolerated; post-op PTH, iCa low so started on Rocaltrol, Ca IV; neck ABDON x1, left chest ABDON x2, salivary bypass stents in place  7/4: No acute events overnight. Calcium stable. Denies perioral/fingertip tingling. Tolerating trickle feeds. No new complaints.   7/05: NAEON. Pt complaining of increased pain. Otherwise, no new complaints. Calcium levels continue to be stable. Denies perioral/fingertip tingling. Tolerating TFs at goal.  7/6: No acute events, doing well, OOBTC. Tolerating TF at goal, will transition to bolus. Calcium changed yesterday to 2500mg q8h. Chest ABDON x1 removed today. Stable for transfer to SDU.   7/7: No acute events. Transferred to SDU yesterday. TF transitioned to bolus, tolerating well. Corrected Ca stable. Will decrease Ca checks to q12h per endocrinology.   7/8: No acute events, breathing comfortably with Bebeto tube in place. TFs well tolerated at bolus without subjective S&S reflux. Corrected Ca stable. Following Ca checks q12, Endocrinology following.  7/10: NAEON. Calcium levels normalizing, Endo recommending VD3 supplementation; tolerating TFs at goal rate without issue.  7/11: NAEON, Ca stable, Endocrinology following, continuing Ca checks. Tolerating TFs at bolus. Well tolerating Bebeto tube, performed teaching on how to clean, manage and take in/out the Bebeto tube.  7/12: NAEON, Ca stable, Endocrinology following, continuing Ca checks. Tolerating TFs at bolus. Well tolerating Bebeto tube, performed teaching on how to clean, manage and take in/out the Bebeto tube.  7/14: NAEON. Calcium corrected with extra dose of CaCarb soln. No new complaints. Will continue teaching in anticipation of discharge.   7/15: No acute events overnight.  Calcium stable.  Salivary bypass tube removed.  Educated on stoma care.   7/16: NAEON. Pt demonstrated stoma care to team on AM rounds. Ca levels remain stable. Pt for repeat SLP assessment/MBSS today. No new complaints.  7/17: NAEON. Salivary bypass removed/pt passed MBS on 7/16 PM without evidence of leak, cleared for pureed/thin liquids. Tolerated dinner yesterday without issues. No new complaints. Evidence of cellulitis inferior to stoma. Febrile.  7/18: NAEON, no respiratory events. Patient continues to well tolerate pureed foods and thin liquids. Patient taught how to clean Bebeto tube. Prior ABDON site packed with 1/4 gauze. Cellulitis improving. Afebrile.  7/19: No acute events overnight, continues to spike low grade fevers.  Pt restarted on antibiotics.  Continues on tube feeds, no respiratory complaints  7/20: No acute events overnight, afebrile. Continues to receive Ancef/Flagyl, continued tube feeds through with increased po intake feels full with half of bolus. No respiratory complaints.           PHYSICAL EXAM:  NAD, appears comfortable  Breathing comfortably on RA  Neck: incisions c/d/i; neck soft, flat; ABDON holding suction; size 10-55 bebeto tube secured via soft collar, minimal secretions suctioned, HME valve in place  Chest: incisions c/d/i; chest soft, flat; inferior to stoma area of erythema and mild TTP, improved from yesterday; prior R neck ABDON site dehiscent, packing removed and repacked with 1/4 gauze.

## 2019-07-20 NOTE — PROGRESS NOTE ADULT - SUBJECTIVE AND OBJECTIVE BOX
INTERVAL HPI/OVERNIGHT EVENTS:    Patient is a 63y old  Male who presents with a chief complaint of s/p larnygectomy, thyroidectomy and parathyroidectomy (19 Jul 2019 12:14)      Pt reports the following symptoms:    CONSTITUTIONAL:  Negative fever or chills, feels well, good appetite  EYES:  Negative  blurry vision or double vision  CARDIOVASCULAR:  Negative for chest pain or palpitations  RESPIRATORY:  Negative for cough, wheezing, or SOB   GASTROINTESTINAL:  Negative for nausea, vomiting, diarrhea, constipation, or abdominal pain  GENITOURINARY:  Negative frequency, urgency or dysuria  NEUROLOGIC:  No headache, confusion, dizziness, lightheadedness    MEDICATIONS  (STANDING):  calcitriol   Capsule 0.75 MICROGram(s) Oral at bedtime  calcitriol  Solution 0.5 MICROGram(s) Enteral Tube daily  calcium carbonate   Suspension 2500 milliGRAM(s) Oral <User Schedule>  ceFAZolin  Injectable. 2000 milliGRAM(s) IV Push every 8 hours  cholecalciferol 4000 Unit(s) Oral daily  dextrose 50% Injectable 12.5 Gram(s) IV Push once  dextrose 50% Injectable 25 Gram(s) IV Push once  dextrose 50% Injectable 25 Gram(s) IV Push once  docusate sodium Liquid 100 milliGRAM(s) Oral two times a day  enoxaparin Injectable 40 milliGRAM(s) SubCutaneous every 24 hours  insulin lispro (HumaLOG) corrective regimen sliding scale   SubCutaneous Before meals and at bedtime  lactated ringers Bolus 500 milliLiter(s) IV Bolus once  levothyroxine 125 MICROGram(s) Oral daily  metroNIDAZOLE  IVPB 500 milliGRAM(s) IV Intermittent every 8 hours  senna Syrup 10 milliLiter(s) Oral at bedtime    MEDICATIONS  (PRN):  acetaminophen    Suspension .. 650 milliGRAM(s) Enteral Tube every 6 hours PRN Mild Pain (1 - 3)  bisacodyl Suppository 10 milliGRAM(s) Rectal daily PRN Constipation  dextrose 40% Gel 15 Gram(s) Oral once PRN Blood Glucose LESS THAN 70 milliGRAM(s)/deciliter  glucagon  Injectable 1 milliGRAM(s) IntraMuscular once PRN Glucose LESS THAN 70 milligrams/deciliter  oxyCODONE    Solution 5 milliGRAM(s) Oral every 4 hours PRN Moderate Pain (4 - 6)  oxyCODONE    Solution 10 milliGRAM(s) Oral every 6 hours PRN Severe Pain (7 - 10)  polyethylene glycol 3350 17 Gram(s) Oral daily PRN Constipation      PHYSICAL EXAM  Vital Signs Last 24 Hrs  T(C): 37.1 (20 Jul 2019 09:14), Max: 37.4 (19 Jul 2019 16:28)  T(F): 98.8 (20 Jul 2019 09:14), Max: 99.4 (19 Jul 2019 16:28)  HR: 80 (20 Jul 2019 08:40) (69 - 90)  BP: 94/56 (20 Jul 2019 08:40) (89/52 - 108/57)  BP(mean): 69 (20 Jul 2019 08:40) (63 - 77)  RR: 18 (20 Jul 2019 08:40) (18 - 20)  SpO2: 95% (20 Jul 2019 08:40) (93% - 96%)    Constitutional: wn/wd in NAD.   HEENT: NCAT, MMM, OP clear, EOMI, no proptosis or lid retraction  Neck: no thyromegaly or palpable thyroid nodules   Respiratory: lungs CTAB.  Cardiovascular: regular rhythm, normal S1 and S2, no audible murmurs, no peripheral edema  GI: soft, NT/ND, no masses/HSM appreciated.  Neurology: no tremors, DTR 2+  Skin: no visible rashes/lesions  Psychiatric: AAO x 3, normal affect/mood.    LABS:                        8.9    5.66  )-----------( 350      ( 20 Jul 2019 06:17 )             27.8     07-20    141  |  98  |  12  ----------------------------<  76  4.2   |  32<H>  |  0.65    Ca    8.5      20 Jul 2019 06:17  Phos  5.6     07-20    TPro  x   /  Alb  2.8<L>  /  TBili  x   /  DBili  x   /  AST  x   /  ALT  x   /  AlkPhos  x   07-20        Thyroid Stimulating Hormone, Serum: 27.281 uIU/mL (07-18 @ 06:29)  Thyroid Stimulating Hormone, Serum: 1.483 uIU/mL (07-03 @ 04:51)      HbA1C: 5.7 % (07-03 @ 11:17)    CAPILLARY BLOOD GLUCOSE      POCT Blood Glucose.: 86 mg/dL (20 Jul 2019 06:39)  POCT Blood Glucose.: 102 mg/dL (19 Jul 2019 21:59)  POCT Blood Glucose.: 109 mg/dL (19 Jul 2019 17:13)  POCT Blood Glucose.: 130 mg/dL (19 Jul 2019 11:43)      Insulin Sliding Scale requirements X 24 Hours:    RADIOLOGY & ADDITIONAL TESTS:    A/P: 63y Male with history of DM type II presenting for       1.  DM -     Please continue           units lantus at bedtime  / in the morning and        units lispro with meals and lispro moderate / low dose sliding scale 4 times daily with meals and at bedtime.  Please continue consistent carbohydrate diet.      Goal FSG is   Will continue to monitor   For discharge, pt can continue    Pt can follow up at discharge with [facility] by calling  to make an appointment.   Will discuss case with     and update primary team INTERVAL HPI/OVERNIGHT EVENTS:    Patient seen and examined at the bedside. No acute events overnight. he denies any perioral tingling, muscle cramps or constipation . He makes urine and denies any pain. No cough, vomiting or aspiration. Tolerating oral feeds. ID has been consulted for his fever spikes and has been on cefazolin and flagyl.     Albumin 2.8  600 AM calcium 8.0 (corrected 9.0); 11:11 7.4 (corrected 8.4), 18:56 8.7 (corrected 9.7)   calcium 6:17 8.5 (corrected 9.5)    FSG & Insulin received:    Yesterday:  pre-dinner fs, had fish, veg and pudding  bedtime fs  Today:  pre-breakfast fs, eggs, apple juice  pre-lunch fs       Pt reports the following symptoms:    CONSTITUTIONAL:  Negative fever or chills, feels well, good appetite  EYES:  Negative  blurry vision or double vision  CARDIOVASCULAR:  Negative for chest pain or palpitations  RESPIRATORY:  Negative for cough, wheezing, or SOB   GASTROINTESTINAL:  Negative for nausea, vomiting, diarrhea, constipation, or abdominal pain  GENITOURINARY:  Negative frequency, urgency or dysuria  NEUROLOGIC:  No headache, confusion, dizziness, lightheadedness    MEDICATIONS  (STANDING):  calcitriol   Capsule 0.75 MICROGram(s) Oral at bedtime  calcitriol  Solution 0.5 MICROGram(s) Enteral Tube daily  calcium carbonate   Suspension 2500 milliGRAM(s) Oral <User Schedule>  ceFAZolin  Injectable. 2000 milliGRAM(s) IV Push every 8 hours  cholecalciferol 4000 Unit(s) Oral daily  dextrose 50% Injectable 12.5 Gram(s) IV Push once  dextrose 50% Injectable 25 Gram(s) IV Push once  dextrose 50% Injectable 25 Gram(s) IV Push once  docusate sodium Liquid 100 milliGRAM(s) Oral two times a day  enoxaparin Injectable 40 milliGRAM(s) SubCutaneous every 24 hours  insulin lispro (HumaLOG) corrective regimen sliding scale   SubCutaneous Before meals and at bedtime  lactated ringers Bolus 500 milliLiter(s) IV Bolus once  levothyroxine 125 MICROGram(s) Oral daily  metroNIDAZOLE  IVPB 500 milliGRAM(s) IV Intermittent every 8 hours  senna Syrup 10 milliLiter(s) Oral at bedtime    MEDICATIONS  (PRN):  acetaminophen    Suspension .. 650 milliGRAM(s) Enteral Tube every 6 hours PRN Mild Pain (1 - 3)  bisacodyl Suppository 10 milliGRAM(s) Rectal daily PRN Constipation  dextrose 40% Gel 15 Gram(s) Oral once PRN Blood Glucose LESS THAN 70 milliGRAM(s)/deciliter  glucagon  Injectable 1 milliGRAM(s) IntraMuscular once PRN Glucose LESS THAN 70 milligrams/deciliter  oxyCODONE    Solution 5 milliGRAM(s) Oral every 4 hours PRN Moderate Pain (4 - 6)  oxyCODONE    Solution 10 milliGRAM(s) Oral every 6 hours PRN Severe Pain (7 - 10)  polyethylene glycol 3350 17 Gram(s) Oral daily PRN Constipation      PHYSICAL EXAM  Vital Signs Last 24 Hrs  T(C): 37.1 (2019 09:14), Max: 37.4 (2019 16:28)  T(F): 98.8 (2019 09:14), Max: 99.4 (2019 16:28)  HR: 80 (2019 08:40) (69 - 90)  BP: 94/56 (2019 08:40) (89/52 - 108/57)  BP(mean): 69 (2019 08:40) (63 - 77)  RR: 18 (2019 08:40) (18 - 20)  SpO2: 95% (2019 08:40) (93% - 96%)    Constitutional: wn/wd in NAD.   Neck: Trach collar  in place  Respiratory: lungs CTAB.  Cardiovascular: regular rhythm, normal S1 and S2, no audible murmurs, no peripheral edema  GI: soft, NT/ND, no masses/HSM appreciated.  Neurology: no tremors, DTR 2+, no focal neurological deficits  Psychiatric: AAO x 3, normal affect/mood.    LABS:                        8.9    5.66  )-----------( 350      ( 2019 06:17 )             27.8     07-20    141  |  98  |  12  ----------------------------<  76  4.2   |  32<H>  |  0.65    Ca    8.5      2019 06:17  Phos  5.6     07-    TPro  x   /  Alb  2.8<L>  /  TBili  x   /  DBili  x   /  AST  x   /  ALT  x   /  AlkPhos  x           Thyroid Stimulating Hormone, Serum: 27.281 uIU/mL ( @ 06:29)  Thyroid Stimulating Hormone, Serum: 1.483 uIU/mL ( @ 04:51)      HbA1C: 5.7 % ( @ 11:17)    CAPILLARY BLOOD GLUCOSE      POCT Blood Glucose.: 86 mg/dL (2019 06:39)  POCT Blood Glucose.: 102 mg/dL (2019 21:59)  POCT Blood Glucose.: 109 mg/dL (2019 17:13)  POCT Blood Glucose.: 130 mg/dL (2019 11:43)      Insulin Sliding Scale requirements X 24 Hours:    RADIOLOGY & ADDITIONAL TESTS:    A/P: 63M hx COPD, 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                                                                                                                                                                                                                                                                                                                                                                                                                                                                                                                                                                                                                                                                                                                                                                                                                                                                                                                                                                                                                                                                                                                                                                                                                                                                                                                                                                                                                                                                                                                                                                                                                                                                                                                                                                                                                                                                                                                                                                                                                                                                                                                                                                                                                                                                                                                                                                                                                                                                                                                                                                                                                       1.  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 Please decrease to calcitriol 0.5 mcg Q12H. Also continue Calcium carbonate 2500 mg PO TID. Check serum Ca every 12 hours. Check albumin every other day. Check phos, ionized ca daily. Check PTH daily. Vit D level low. Please cont Vit D3 4000IU daily. If corrected calcium goes more than 9.0, we may need to decrease the calcitriol dose by half.     (If calcium level less then 6.9 mg/dl, please give calcium carbonate infusion with a 1 L NS bag containing at least 900-1000 mg of elemental calcium to be given over 24 hours.)    2.  Post surgical hypothyroidism - Please continue to levothyroxine 125mcg daily PO, not through peg tube. Levothyroxine should be given separately from other meds, specifically calcium carbonate and food/tube feeds, for adequate absorption.  Please schedule at levothyroxine at 6AM and schedule calcium carb for 10AM, 4PM, and 10PM. Levothyroxine should be given by mouth, not thru feeding tube.     3. Adrenal nodule - CT scan showed 1.4cm adrenal nodule. Can be followed up as an OP.    Will continue to monitor     For discharge, pt can continue TBD by clinical course    Pt can follow up at discharge within 1 week to check Ca level with Tahira Flaherty NP at [facility] by calling  to make an appointment.    Patient seen and discussed with  and ENT.

## 2019-07-20 NOTE — PROGRESS NOTE ADULT - ASSESSMENT
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 No evidence of hematoma/seroma/salivary leak, though with peristomal erythema and fevers, now afebrile s/p initiation of antibiotics. Improving peristomal cellulitis. Performing Nasrin tube teaching. Fistula of R neck ABDON site packed with 1/4 in. Will obtain CT neck w contrast for f/u cellulitis and possible salivary fistula.  - PATIENT IS NOT ABLE TO BE NASALLY OR ORALLY INTUBATED  - SLP  - Appreciate add'l endocrinology recs; synthroid 125mcg, rocaltrol (0.5mcg am, 0.5mcg pm), VD3 and calcium per endo  - Monitor Ca  - f/u CBC and FOBT  - daily packing exchage at site of R prior ABDON insertion site with 1/4 iodoform  - monitor peristomal cellulitis  - continue ancef/flagyl  - Continue bolus TFs  - Pain control  - Nasrin tube cleaning teaching  - Suction frequently w/red rubber catheters only, humidified air at all times  - SCDs, Lovenox, OOB/ambulate  - Bowel regimen  - Page ENT with questions or concerns  - d/w attending MD whom agrees with the above plan

## 2019-07-20 NOTE — PROGRESS NOTE ADULT - ATTENDING COMMENTS
Pt seen with fellow at bedside (pt sitting in chair, appears comfortable).  s/p wide resection of recurrent squamous cell cancer, now with surgical hypothyroidism and surgical hypoparathyroidism.  Goal corrected calcium in mid 8 range.    Corrected calcium last night and this morning, in mid 9 range, so will reduce calcitriol dose back to 0.5mcg q 12 hours.  Continue calcium carbonate TID.  Explained that calcium and vitamin D replacements will be lifelong, along with thyroid replacement.  Thyroxine dose recently increased for high TSH (27).

## 2019-07-21 LAB
CALCIUM SERPL-MCNC: 8.3 MG/DL — LOW (ref 8.4–10.5)
CALCIUM SERPL-MCNC: 8.9 MG/DL — SIGNIFICANT CHANGE UP (ref 8.4–10.5)
CALCIUM SERPL-MCNC: 9.3 MG/DL — SIGNIFICANT CHANGE UP (ref 8.4–10.5)
GLUCOSE BLDC GLUCOMTR-MCNC: 100 MG/DL — HIGH (ref 70–99)
GLUCOSE BLDC GLUCOMTR-MCNC: 101 MG/DL — HIGH (ref 70–99)
GLUCOSE BLDC GLUCOMTR-MCNC: 127 MG/DL — HIGH (ref 70–99)
GLUCOSE BLDC GLUCOMTR-MCNC: 86 MG/DL — SIGNIFICANT CHANGE UP (ref 70–99)
PHOSPHATE SERPL-MCNC: 4.5 MG/DL — SIGNIFICANT CHANGE UP (ref 2.5–4.5)
PTH-INTACT FLD-MCNC: 8 PG/ML — LOW (ref 15–65)

## 2019-07-21 PROCEDURE — 99233 SBSQ HOSP IP/OBS HIGH 50: CPT

## 2019-07-21 PROCEDURE — 99231 SBSQ HOSP IP/OBS SF/LOW 25: CPT | Mod: GC

## 2019-07-21 RX ADMIN — Medication 125 MICROGRAM(S): at 06:54

## 2019-07-21 RX ADMIN — CALCITRIOL 0.5 MICROGRAM(S): 0.5 CAPSULE ORAL at 10:58

## 2019-07-21 RX ADMIN — SENNA PLUS 10 MILLILITER(S): 8.6 TABLET ORAL at 22:31

## 2019-07-21 RX ADMIN — Medication 2500 MILLIGRAM(S): at 10:57

## 2019-07-21 RX ADMIN — Medication 100 MILLIGRAM(S): at 06:54

## 2019-07-21 RX ADMIN — Medication 100 MILLIGRAM(S): at 22:31

## 2019-07-21 RX ADMIN — Medication 2000 MILLIGRAM(S): at 22:31

## 2019-07-21 RX ADMIN — Medication 650 MILLIGRAM(S): at 12:30

## 2019-07-21 RX ADMIN — Medication 100 MILLIGRAM(S): at 18:18

## 2019-07-21 RX ADMIN — Medication 2000 MILLIGRAM(S): at 06:54

## 2019-07-21 RX ADMIN — Medication 2000 MILLIGRAM(S): at 14:03

## 2019-07-21 RX ADMIN — ENOXAPARIN SODIUM 40 MILLIGRAM(S): 100 INJECTION SUBCUTANEOUS at 11:00

## 2019-07-21 RX ADMIN — Medication 2500 MILLIGRAM(S): at 16:06

## 2019-07-21 RX ADMIN — Medication 650 MILLIGRAM(S): at 11:07

## 2019-07-21 RX ADMIN — CALCITRIOL 0.5 MICROGRAM(S): 0.5 CAPSULE ORAL at 22:31

## 2019-07-21 RX ADMIN — Medication 2500 MILLIGRAM(S): at 22:31

## 2019-07-21 RX ADMIN — Medication 4000 UNIT(S): at 10:59

## 2019-07-21 RX ADMIN — Medication 100 MILLIGRAM(S): at 14:03

## 2019-07-21 NOTE — CHART NOTE - NSCHARTNOTEFT_GEN_A_CORE
Admitting Diagnosis:   Patient is a 63y old  Male who presents with a chief complaint of S/p laryngectomy, thyroidectomy and parathyrdoidectomy (21 Jul 2019 10:41)      PAST MEDICAL & SURGICAL HISTORY:  SOB (shortness of breath)  Throat cancer  Jaundice: 1965  Fracture: left ankle  Laryngeal mass: s/ p radiation  Surgery, elective: direct laryngoscopy with biopsy- 4/2017  Status post surgery: inguinal hernia  Status post surgery: Left ankle surgical repair  x2 (1965)      Current Nutrition Order:  Diet advanced to pureed after SLP eval 7/17.   Jevity 1.2 @272ml/hr Q4H or 6x/day via PEG (1632ml TV, 1958kcal, 90g pro, 1317ml FW).   EnsureEnlive TID (1050kcal, 60g pro)  EnsurePudding BID (340kcal, 8g pro)    PO Intake: Good (%) [   ]  Fair (50-75%) [   ] Poor (<25%) [   ]  Consuming 100% meals. RN weaning bolus feeds per PO intake.     GI Issues:  Denies N/V,  Reports tolerating feeds well  PEG in place    Pain:  No pain reported    Skin Integrity:   Tomi score 22  Surgical incisions      Labs:   07-20    141  |  98  |  12  ----------------------------<  76  4.2   |  32<H>  |  0.65    Ca    8.9      21 Jul 2019 06:30  Phos  4.5     07-21    TPro  x   /  Alb  2.8<L>  /  TBili  x   /  DBili  x   /  AST  x   /  ALT  x   /  AlkPhos  x   07-20    CAPILLARY BLOOD GLUCOSE      POCT Blood Glucose.: 100 mg/dL (21 Jul 2019 06:39)  POCT Blood Glucose.: 99 mg/dL (20 Jul 2019 21:16)  POCT Blood Glucose.: 100 mg/dL (20 Jul 2019 16:13)  POCT Blood Glucose.: 87 mg/dL (20 Jul 2019 11:47)      Medications:  MEDICATIONS  (STANDING):  calcitriol  Solution 0.5 MICROGram(s) Enteral Tube <User Schedule>  calcium carbonate   Suspension 2500 milliGRAM(s) Oral <User Schedule>  ceFAZolin  Injectable. 2000 milliGRAM(s) IV Push every 8 hours  cholecalciferol 4000 Unit(s) Oral daily  dextrose 50% Injectable 12.5 Gram(s) IV Push once  dextrose 50% Injectable 25 Gram(s) IV Push once  dextrose 50% Injectable 25 Gram(s) IV Push once  docusate sodium Liquid 100 milliGRAM(s) Oral two times a day  enoxaparin Injectable 40 milliGRAM(s) SubCutaneous every 24 hours  insulin lispro (HumaLOG) corrective regimen sliding scale   SubCutaneous Before meals and at bedtime  lactated ringers Bolus 500 milliLiter(s) IV Bolus once  levothyroxine 125 MICROGram(s) Oral daily  metroNIDAZOLE  IVPB 500 milliGRAM(s) IV Intermittent every 8 hours  senna Syrup 10 milliLiter(s) Oral at bedtime    MEDICATIONS  (PRN):  acetaminophen    Suspension .. 650 milliGRAM(s) Enteral Tube every 6 hours PRN Mild Pain (1 - 3)  bisacodyl Suppository 10 milliGRAM(s) Rectal daily PRN Constipation  dextrose 40% Gel 15 Gram(s) Oral once PRN Blood Glucose LESS THAN 70 milliGRAM(s)/deciliter  glucagon  Injectable 1 milliGRAM(s) IntraMuscular once PRN Glucose LESS THAN 70 milligrams/deciliter  oxyCODONE    Solution 5 milliGRAM(s) Oral every 4 hours PRN Moderate Pain (4 - 6)  oxyCODONE    Solution 10 milliGRAM(s) Oral every 6 hours PRN Severe Pain (7 - 10)  polyethylene glycol 3350 17 Gram(s) Oral daily PRN Constipation      Weight: 64Kg  Ht 172.72cm; IBW 70Kg; %IBW 91%  BMI 21.5    Weight Change:   Please obtain current weight and continue to trend weekly weights for further assessment  Bedscale weight reading 66kg this am (7/8). States UBW PTA was 155lbs. This indicates a 14lb unintentional wt loss.     Nutrition Focused Physical Exam: Completed [ X on 6/26  ]  Not Pertinent [   ]  Muscle Wasting- Temporal [   ]  Clavicle/Pectoral [ X  ]  Shoulder/Deltoid [   ]  Scapula [   ]  Interosseous [   ]  Quadriceps [   ]  Gastrocnemius [   ]  Fat Wasting- Orbital [   ]  Buccal [  X ]  Triceps [   ]  Rib [   ]  Suspect moderate-severe PCM 2/2 wt loss, decreased intake, and NFPE    Estimated energy needs:   ABW used for calculations as pt between % of IBW.   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 S/p SLP FEES w/ recs for pureed diet and thin liquids. Pt is now on a Pureed diet w/ ONS and Tubefeeds via PEG (placed 6/28). Pt seen in room, resting in bed. RN weaning bolus feeds as pt is consuming 100% meals PO. Discuss w/ team verifying EN order w/ ONS and considering SLP eval prior to d/c for advancement to more solid foods. Please see recs below. RD to follow.     Previous Nutrition Diagnosis:   Increased nutrient needs RT increased demand for nutrients (kcal/protein) AEB catabolic state  Active [ X  ]  Resolved [  ]    If resolved, new PES:    Goal: Pt to consistently meet % of estimated needs via most appropriate route    Recommendations:  1. Please d/c EN as pt is consuming >75% EER PO   2. Continue w/ EnsureEnlive TID (1050kcal, 60g pro) and EnsurePudding 2x/day (340kcal, 8g pro).  3. Consider SLP eval prior to d/c to assess any opportunity for advancement to solids.  4. Trend weights to assess adequacy of feeds  *d/w ENT    Education:   pt understanding of diet order, ONS, and weaning EN    Risk Level: High [ X  ] Moderate [   ] Low [   ].

## 2019-07-21 NOTE — PROGRESS NOTE ADULT - ASSESSMENT
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 No evidence of hematoma/seroma/salivary leak, though with peristomal erythema and fevers, now afebrile s/p initiation of antibiotics. Improving peristomal cellulitis. Performing Nasrin tube teaching. Fistula of R neck ABDON site packed with 1/4 in. CT neck with e/o small fluid collection on the L near anastomotic site, though no definitive e/o salivary leak or abscess. No intervention indicated at this time, will continue packing R neck ABDON site and antibiotics.  - PATIENT IS NOT ABLE TO BE NASALLY OR ORALLY INTUBATED  - SLP  - Appreciate add'l endocrinology recs; synthroid 125mcg, rocaltrol (0.5mcg am, 0.5mcg pm), VD3 and calcium per endo  - Monitor Ca  - f/u CBC and FOBT  - daily packing exchage at site of R prior ABDON insertion site with 1/4 iodoform  - monitor peristomal cellulitis  - continue ancef/flagyl  - Continue bolus TFs  - Pain control  - Nasrin tube cleaning teaching  - Suction frequently w/red rubber catheters only, humidified air at all times  - SCDs, Lovenox, OOB/ambulate  - Bowel regimen  - Page ENT with questions or concerns  - d/w attending MD whom agrees with the above plan

## 2019-07-21 NOTE — PROGRESS NOTE ADULT - SUBJECTIVE AND OBJECTIVE BOX
INTERVAL HPI/OVERNIGHT EVENTS:    Patient is a 63y old  Male who presents with a chief complaint of s/p laryngectomy, thyroidectomy and parathyroidectomy (20 Jul 2019 10:14)      Pt reports the following symptoms:    CONSTITUTIONAL:  Negative fever or chills, feels well, good appetite  EYES:  Negative  blurry vision or double vision  CARDIOVASCULAR:  Negative for chest pain or palpitations  RESPIRATORY:  Negative for cough, wheezing, or SOB   GASTROINTESTINAL:  Negative for nausea, vomiting, diarrhea, constipation, or abdominal pain  GENITOURINARY:  Negative frequency, urgency or dysuria  NEUROLOGIC:  No headache, confusion, dizziness, lightheadedness    MEDICATIONS  (STANDING):  calcitriol  Solution 0.5 MICROGram(s) Enteral Tube <User Schedule>  calcium carbonate   Suspension 2500 milliGRAM(s) Oral <User Schedule>  ceFAZolin  Injectable. 2000 milliGRAM(s) IV Push every 8 hours  cholecalciferol 4000 Unit(s) Oral daily  dextrose 50% Injectable 12.5 Gram(s) IV Push once  dextrose 50% Injectable 25 Gram(s) IV Push once  dextrose 50% Injectable 25 Gram(s) IV Push once  docusate sodium Liquid 100 milliGRAM(s) Oral two times a day  enoxaparin Injectable 40 milliGRAM(s) SubCutaneous every 24 hours  insulin lispro (HumaLOG) corrective regimen sliding scale   SubCutaneous Before meals and at bedtime  lactated ringers Bolus 500 milliLiter(s) IV Bolus once  levothyroxine 125 MICROGram(s) Oral daily  metroNIDAZOLE  IVPB 500 milliGRAM(s) IV Intermittent every 8 hours  senna Syrup 10 milliLiter(s) Oral at bedtime    MEDICATIONS  (PRN):  acetaminophen    Suspension .. 650 milliGRAM(s) Enteral Tube every 6 hours PRN Mild Pain (1 - 3)  bisacodyl Suppository 10 milliGRAM(s) Rectal daily PRN Constipation  dextrose 40% Gel 15 Gram(s) Oral once PRN Blood Glucose LESS THAN 70 milliGRAM(s)/deciliter  glucagon  Injectable 1 milliGRAM(s) IntraMuscular once PRN Glucose LESS THAN 70 milligrams/deciliter  oxyCODONE    Solution 5 milliGRAM(s) Oral every 4 hours PRN Moderate Pain (4 - 6)  oxyCODONE    Solution 10 milliGRAM(s) Oral every 6 hours PRN Severe Pain (7 - 10)  polyethylene glycol 3350 17 Gram(s) Oral daily PRN Constipation      PHYSICAL EXAM  Vital Signs Last 24 Hrs  T(C): 37.1 (21 Jul 2019 09:37), Max: 37.2 (20 Jul 2019 14:32)  T(F): 98.8 (21 Jul 2019 09:37), Max: 98.9 (20 Jul 2019 14:32)  HR: 86 (21 Jul 2019 08:12) (62 - 86)  BP: 89/54 (21 Jul 2019 08:12) (89/54 - 108/63)  BP(mean): 66 (21 Jul 2019 08:12) (66 - 79)  RR: 18 (21 Jul 2019 08:12) (16 - 19)  SpO2: 95% (21 Jul 2019 08:12) (91% - 95%)    Constitutional: wn/wd in NAD.   HEENT: NCAT, MMM, OP clear, EOMI, no proptosis or lid retraction  Neck: no thyromegaly or palpable thyroid nodules   Respiratory: lungs CTAB.  Cardiovascular: regular rhythm, normal S1 and S2, no audible murmurs, no peripheral edema  GI: soft, NT/ND, no masses/HSM appreciated.  Neurology: no tremors, DTR 2+  Skin: no visible rashes/lesions  Psychiatric: AAO x 3, normal affect/mood.    LABS:                        8.9    5.66  )-----------( 350      ( 20 Jul 2019 06:17 )             27.8     07-20    141  |  98  |  12  ----------------------------<  76  4.2   |  32<H>  |  0.65    Ca    8.9      21 Jul 2019 06:30  Phos  4.5     07-21    TPro  x   /  Alb  2.8<L>  /  TBili  x   /  DBili  x   /  AST  x   /  ALT  x   /  AlkPhos  x   07-20        Thyroid Stimulating Hormone, Serum: 27.281 uIU/mL (07-18 @ 06:29)  Thyroid Stimulating Hormone, Serum: 1.483 uIU/mL (07-03 @ 04:51)      HbA1C: 5.7 % (07-03 @ 11:17)    CAPILLARY BLOOD GLUCOSE      POCT Blood Glucose.: 100 mg/dL (21 Jul 2019 06:39)  POCT Blood Glucose.: 99 mg/dL (20 Jul 2019 21:16)  POCT Blood Glucose.: 100 mg/dL (20 Jul 2019 16:13)  POCT Blood Glucose.: 87 mg/dL (20 Jul 2019 11:47)      Insulin Sliding Scale requirements X 24 Hours:    RADIOLOGY & ADDITIONAL TESTS:    A/P: 63y Male with history of DM type II presenting for       1.  DM -     Please continue           units lantus at bedtime  / in the morning and        units lispro with meals and lispro moderate / low dose sliding scale 4 times daily with meals and at bedtime.  Please continue consistent carbohydrate diet.      Goal FSG is   Will continue to monitor   For discharge, pt can continue    Pt can follow up at discharge with [facility] by calling  to make an appointment.   Will discuss case with     and update primary team INTERVAL HPI/OVERNIGHT EVENTS:    Patient seen and examined at the bedside. He was having some fever spike over couple of days. CT neck shwoed 1.4cm fluid collection over the pharyngeal anastomotic site - possible seroma or abscess. Plan is to treat with antibiotics for now as per ENT. Patient denies any perioral tingling, numbness or any muscle cramps. His last BM was yesterday. able to tolerate foods. he was c/o coughing with thick secretions. His blood sugars were between 80-100s    7/19 albumin 2.8 18:56 Sr.Calcium 8.7 with corrected calcium 9.7;  7/20 6:17 Sr.Calcium 8.5 with corrected calcium 9.5         18:20 Sr.Calcium 8.3 with corrected calcium 9.3  7/21 6:30 Sr.Calcium 8.9 with corrected calcium 9.9    Pt reports the following symptoms:    CONSTITUTIONAL:  Negative fever or chills, feels well, good appetite  EYES:  Negative  blurry vision or double vision  CARDIOVASCULAR:  Negative for chest pain or palpitations  RESPIRATORY:  Negative for cough, wheezing, or SOB   GASTROINTESTINAL:  Negative for nausea, vomiting, diarrhea, constipation, or abdominal pain  GENITOURINARY:  Negative frequency, urgency or dysuria  NEUROLOGIC:  No headache, confusion, dizziness, lightheadedness    MEDICATIONS  (STANDING):  calcitriol  Solution 0.5 MICROGram(s) Enteral Tube <User Schedule>  calcium carbonate   Suspension 2500 milliGRAM(s) Oral <User Schedule>  ceFAZolin  Injectable. 2000 milliGRAM(s) IV Push every 8 hours  cholecalciferol 4000 Unit(s) Oral daily  dextrose 50% Injectable 12.5 Gram(s) IV Push once  dextrose 50% Injectable 25 Gram(s) IV Push once  dextrose 50% Injectable 25 Gram(s) IV Push once  docusate sodium Liquid 100 milliGRAM(s) Oral two times a day  enoxaparin Injectable 40 milliGRAM(s) SubCutaneous every 24 hours  insulin lispro (HumaLOG) corrective regimen sliding scale   SubCutaneous Before meals and at bedtime  lactated ringers Bolus 500 milliLiter(s) IV Bolus once  levothyroxine 125 MICROGram(s) Oral daily  metroNIDAZOLE  IVPB 500 milliGRAM(s) IV Intermittent every 8 hours  senna Syrup 10 milliLiter(s) Oral at bedtime    MEDICATIONS  (PRN):  acetaminophen    Suspension .. 650 milliGRAM(s) Enteral Tube every 6 hours PRN Mild Pain (1 - 3)  bisacodyl Suppository 10 milliGRAM(s) Rectal daily PRN Constipation  dextrose 40% Gel 15 Gram(s) Oral once PRN Blood Glucose LESS THAN 70 milliGRAM(s)/deciliter  glucagon  Injectable 1 milliGRAM(s) IntraMuscular once PRN Glucose LESS THAN 70 milligrams/deciliter  oxyCODONE    Solution 5 milliGRAM(s) Oral every 4 hours PRN Moderate Pain (4 - 6)  oxyCODONE    Solution 10 milliGRAM(s) Oral every 6 hours PRN Severe Pain (7 - 10)  polyethylene glycol 3350 17 Gram(s) Oral daily PRN Constipation      PHYSICAL EXAM  Vital Signs Last 24 Hrs  T(C): 37.1 (21 Jul 2019 09:37), Max: 37.2 (20 Jul 2019 14:32)  T(F): 98.8 (21 Jul 2019 09:37), Max: 98.9 (20 Jul 2019 14:32)  HR: 86 (21 Jul 2019 08:12) (62 - 86)  BP: 89/54 (21 Jul 2019 08:12) (89/54 - 108/63)  BP(mean): 66 (21 Jul 2019 08:12) (66 - 79)  RR: 18 (21 Jul 2019 08:12) (16 - 19)  SpO2: 95% (21 Jul 2019 08:12) (91% - 95%)    Constitutional: wn/wd in NAD.   Neck: no thyromegaly or palpable thyroid nodules. trach collar in place  Respiratory: lungs - mild basilar crackles heard more in the left lung  Cardiovascular: regular rhythm, normal S1 and S2, no audible murmurs, no peripheral edema  GI: soft, NT/ND, no masses/HSM appreciated.  Neurology: no tremors, DTR 2+, no focal deficits  Psychiatric: AAO x 3, normal affect/mood.    LABS:                        8.9    5.66  )-----------( 350      ( 20 Jul 2019 06:17 )             27.8     07-20    141  |  98  |  12  ----------------------------<  76  4.2   |  32<H>  |  0.65    Ca    8.9      21 Jul 2019 06:30  Phos  4.5     07-21    TPro  x   /  Alb  2.8<L>  /  TBili  x   /  DBili  x   /  AST  x   /  ALT  x   /  AlkPhos  x   07-20        Thyroid Stimulating Hormone, Serum: 27.281 uIU/mL (07-18 @ 06:29)  Thyroid Stimulating Hormone, Serum: 1.483 uIU/mL (07-03 @ 04:51)      HbA1C: 5.7 % (07-03 @ 11:17)    CAPILLARY BLOOD GLUCOSE      POCT Blood Glucose.: 100 mg/dL (21 Jul 2019 06:39)  POCT Blood Glucose.: 99 mg/dL (20 Jul 2019 21:16)  POCT Blood Glucose.: 100 mg/dL (20 Jul 2019 16:13)  POCT Blood Glucose.: 87 mg/dL (20 Jul 2019 11:47)      Insulin Sliding Scale requirements X 24 Hours:    RADIOLOGY & ADDITIONAL TESTS:    A/P: 63M hx COPD, 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                                                                                                                                                                                                                                                                                                                                                                                                                                                                                                                                                                                                                                                                                                                                                                                                                                                                                                                                                                                                                                                                                                                                                                                                                                                                                                                                                                                                                                                                                                                                                                                                                                                                                                                                                                                                                                                                                                                                                                                                                                                                                                                                                                                                                                                                                                                                                                                                                                                                                                                                                                                                                       1.  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 The calcitriol dose was decreased to calcitriol 0.5 mcg Q12H from yesterday evening. We will monitor the calcium level trend and make changes accordingly tomorrow. Continue Calcium carbonate 2500 mg PO TID for now. Check serum Ca every 12 hours. Check albumin every other day. Check phos, ionized ca daily. Check PTH daily.   Vit D level low. Please cont Vit D3 4000IU daily. If corrected calcium goes more than 9.0, we may need to decrease the calcium carbonate dose tomorrow.    (If calcium level less then 6.9 mg/dl, please give calcium carbonate infusion with a 1 L NS bag containing at least 900-1000 mg of elemental calcium to be given over 24 hours.)    2.  Post surgical hypothyroidism - Please continue to levothyroxine 125mcg daily PO, not through peg tube. Levothyroxine should be given separately from other meds, specifically calcium carbonate and food/tube feeds, for adequate absorption.  Please schedule at levothyroxine at 6AM and schedule calcium carb for 10AM, 4PM, and 10PM. Levothyroxine should be given by mouth, not thru feeding tube.     3. Adrenal nodule - CT scan showed 1.4cm adrenal nodule. Can be followed up as an OP.    Will continue to monitor     For discharge, pt can continue TBD by clinical course    Pt can follow up at discharge within 1 week to check Ca level with Tahira Flaherty NP at [facility] by calling  to make an appointment.    Patient seen and discussed with  and ENT.

## 2019-07-21 NOTE — PROGRESS NOTE ADULT - ATTENDING COMMENTS
Corrected calcium up to 9.9 today.  Calcitriol dose reduced yesterday evening; will monitor today's calciums and reduce calcium carbonate dosing if corrected calcium tomorrow are still over 9.5.

## 2019-07-21 NOTE — PROGRESS NOTE ADULT - SUBJECTIVE AND OBJECTIVE BOX
INTERVAL HPI/OVERNIGHT EVENTS:  CT done for cellulitis/swelling R neck following ABDON drain removal    CONSTITUTIONAL:  No fever, chills, night sweats  EYES:  No photophobia or visual changes  CARDIOVASCULAR:  No chest pain  RESPIRATORY:  No cough, wheezing, or SOB   GASTROINTESTINAL:  No nausea, vomiting, diarrhea, constipation, or abdominal pain  GENITOURINARY:  No frequency, urgency, dysuria or hematuria  NEUROLOGIC:  No headache, lightheadedness      ANTIBIOTICS/RELEVANT:          Vital Signs Last 24 Hrs  T(C): 37.1 (21 Jul 2019 09:37), Max: 37.2 (20 Jul 2019 14:32)  T(F): 98.8 (21 Jul 2019 09:37), Max: 98.9 (20 Jul 2019 14:32)  HR: 80 (21 Jul 2019 09:55) (62 - 86)  BP: 89/54 (21 Jul 2019 08:12) (89/54 - 108/63)  BP(mean): 66 (21 Jul 2019 08:12) (66 - 79)  RR: 16 (21 Jul 2019 09:55) (16 - 19)  SpO2: 94% (21 Jul 2019 09:55) (91% - 95%)    PHYSICAL EXAM:  Constitutional:  Well-developed, well nourished  Eyes:  Sclerae anicteric, conjunctivae clear, PERRL  Ear/Nose/Throat:  No nasal exudate or sinus tenderness;  No buccal mucosal lesions, no pharyngeal erythema or exudate	  Neck:  Supple, no adenopathy  Respiratory:  Clear bilaterally  Cardiovascular:  RRR, S1S2, no murmur appreciated  Gastrointestinal:  Symmetric, normoactive BS, soft, NT, no masses, guarding or rebound.  No HSM  Extremities:  No edema      LABS:                        8.9    5.66  )-----------( 350      ( 20 Jul 2019 06:17 )             27.8         07-20    141  |  98  |  12  ----------------------------<  76  4.2   |  32<H>  |  0.65    Ca    8.9      21 Jul 2019 06:30  Phos  4.5     07-21    TPro  x   /  Alb  2.8<L>  /  TBili  x   /  DBili  x   /  AST  x   /  ALT  x   /  AlkPhos  x   07-20          MICROBIOLOGY:    Blood cultures:  7/17 X 1    Sputum 7/17:  mod WBCs, few GPCCl, mod Staph aureus MSSA    Tracheal stoma culture 7/20 - few WBCs, no orgs seen    RADIOLOGY & ADDITIONAL STUDIES:    < from: CT Neck Soft Tissue w/ IV Cont (07.20.19 @ 14:44) >  Findings:    In the interim, the patient is status post total laryngectomy and   reconstruction with pectoralis flap. The flap itself shows expected   striated appearance isodense to muscle. Superficial to the flap, there is   small amount of fat stranding which is nonspecific. At the left   pharyngeal anastomosis there is a small pocket of more hypoattenuating   content measuring approximately 1.4 x 1.1 cm and situated just medial and   superior to a vascular surgical clip. This is indeterminate at this time   and could be seroma or possible infected fluid collection given that   there is faint enhancement surrounding an (series 3, image 57). On   sagittal images, this appears less well-defined without jeffery enhancing   wall. No extraluminal gas in its vicinity is identified to imply definite   presence of a fistula and no track-like enhancing tissue to imply a   fistula. There is a gas-containing defect in the skin of the right   infrahyoid neck that extends into the sternocleidomastoid muscle likely   secondary to recent intervention (drain removal). A small focus of gas is   also seen to the right of the proximal esophagus (axial image 61), likely   also expected residua after drain removal.    The more lateral portions of the neck remain unremarkable without   lymphadenopathy. The carotid arteries and jugular veins are well   opacified with contrast. The nasal cavity, nasopharynx, oral cavity and   oropharynx appear unremarkable aside from some persistent post radiation   change at the latter. The submandibular glands show stable posttreatment   atrophy. No parotid mass. The thyroid gland has been resected.   Tracheostomy is in place without fluid collection or enhancing mass and  its proximity.    No nodular or masslike enhancement remains to indicate residual tumor.   Correlate with pathology report of tissue margins. This study may serve   as a baseline for future surveillance.    The included portions of the lung apices are unremarkable. Osseous   structures show no acute abnormality with similar advanced cervical   spondylosis compared to the prior study.    The skull base is unremarkable. In the intracranial compartment, there is   unchanged encephalomalacia and gliosis in the right inferior frontal lobe   that is presumed secondary to old trauma.      IMPRESSION:    Status post total laryngectomy with pectoralis flap reconstruction. There   is a 1.4 cm region of fluid density at the left pharyngeal anastomotic  site which could be seroma or developing abscess in the appropriate   clinical setting. Close follow-up is recommended.     No extraluminal gas, aside from that in the right neck likely related to   recent drain removal, nor other definitive evidence of salivary leak.   Fluoroscopic swallow study is the best test to evaluate for such.    In the right neck area of reported cellulitis, small gas focus is seen at   the skin and superficial sternocleidomastoid muscle likely at site of   recent drain. No abscess there.    < end of copied text > INTERVAL HPI/OVERNIGHT EVENTS:  CT done for cellulitis/swelling R neck following ABDON drain removal    CONSTITUTIONAL:  No fever, chills, night sweats  EYES:  No photophobia or visual changes  CARDIOVASCULAR:  No chest pain  RESPIRATORY:  No cough, wheezing, or SOB   GASTROINTESTINAL:  No nausea, vomiting, diarrhea, constipation, or abdominal pain  GENITOURINARY:  No frequency, urgency, dysuria or hematuria  NEUROLOGIC:  No headache, lightheadedness      ANTIBIOTICS/RELEVANT:    Cefazolin 6/25-6/26; 7/2-7/11; 7/17-present  Metronidazole 7/2-7/11;  7/17-present    Ceftriaxone 6/27-7/1  Clindamycin 7/17      Vital Signs Last 24 Hrs  T(C): 37.1 (21 Jul 2019 09:37), Max: 37.2 (20 Jul 2019 14:32)  T(F): 98.8 (21 Jul 2019 09:37), Max: 98.9 (20 Jul 2019 14:32)  HR: 80 (21 Jul 2019 09:55) (62 - 86)  BP: 89/54 (21 Jul 2019 08:12) (89/54 - 108/63)  BP(mean): 66 (21 Jul 2019 08:12) (66 - 79)  RR: 16 (21 Jul 2019 09:55) (16 - 19)  SpO2: 94% (21 Jul 2019 09:55) (91% - 95%)    PHYSICAL EXAM:  Constitutional:  Well-developed, well nourished  Eyes:  Sclerae anicteric, conjunctivae clear, PERRL  Ear/Nose/Throat:  No nasal exudate or sinus tenderness;  No buccal mucosal lesions, no pharyngeal erythema or exudate	  Neck:  Supple, no adenopathy  Respiratory:  Clear bilaterally  Cardiovascular:  RRR, S1S2, no murmur appreciated  Gastrointestinal:  Symmetric, normoactive BS, soft, NT, no masses, guarding or rebound.  No HSM  Extremities:  No edema      LABS:                        8.9    5.66  )-----------( 350      ( 20 Jul 2019 06:17 )             27.8         07-20    141  |  98  |  12  ----------------------------<  76  4.2   |  32<H>  |  0.65    Ca    8.9      21 Jul 2019 06:30  Phos  4.5     07-21    TPro  x   /  Alb  2.8<L>  /  TBili  x   /  DBili  x   /  AST  x   /  ALT  x   /  AlkPhos  x   07-20          MICROBIOLOGY:    Blood cultures:  7/17 X 1    Sputum 7/17:  mod WBCs, few GPCCl, mod Staph aureus MSSA    Tracheal stoma culture 7/20 - few WBCs, no orgs seen    RADIOLOGY & ADDITIONAL STUDIES:    < from: CT Neck Soft Tissue w/ IV Cont (07.20.19 @ 14:44) >  Findings:    In the interim, the patient is status post total laryngectomy and   reconstruction with pectoralis flap. The flap itself shows expected   striated appearance isodense to muscle. Superficial to the flap, there is   small amount of fat stranding which is nonspecific. At the left   pharyngeal anastomosis there is a small pocket of more hypoattenuating   content measuring approximately 1.4 x 1.1 cm and situated just medial and   superior to a vascular surgical clip. This is indeterminate at this time   and could be seroma or possible infected fluid collection given that   there is faint enhancement surrounding an (series 3, image 57). On   sagittal images, this appears less well-defined without jeffery enhancing   wall. No extraluminal gas in its vicinity is identified to imply definite   presence of a fistula and no track-like enhancing tissue to imply a   fistula. There is a gas-containing defect in the skin of the right   infrahyoid neck that extends into the sternocleidomastoid muscle likely   secondary to recent intervention (drain removal). A small focus of gas is   also seen to the right of the proximal esophagus (axial image 61), likely   also expected residua after drain removal.    The more lateral portions of the neck remain unremarkable without   lymphadenopathy. The carotid arteries and jugular veins are well   opacified with contrast. The nasal cavity, nasopharynx, oral cavity and   oropharynx appear unremarkable aside from some persistent post radiation   change at the latter. The submandibular glands show stable posttreatment   atrophy. No parotid mass. The thyroid gland has been resected.   Tracheostomy is in place without fluid collection or enhancing mass and  its proximity.    No nodular or masslike enhancement remains to indicate residual tumor.   Correlate with pathology report of tissue margins. This study may serve   as a baseline for future surveillance.    The included portions of the lung apices are unremarkable. Osseous   structures show no acute abnormality with similar advanced cervical   spondylosis compared to the prior study.    The skull base is unremarkable. In the intracranial compartment, there is   unchanged encephalomalacia and gliosis in the right inferior frontal lobe   that is presumed secondary to old trauma.      IMPRESSION:    Status post total laryngectomy with pectoralis flap reconstruction. There   is a 1.4 cm region of fluid density at the left pharyngeal anastomotic  site which could be seroma or developing abscess in the appropriate   clinical setting. Close follow-up is recommended.     No extraluminal gas, aside from that in the right neck likely related to   recent drain removal, nor other definitive evidence of salivary leak.   Fluoroscopic swallow study is the best test to evaluate for such.    In the right neck area of reported cellulitis, small gas focus is seen at   the skin and superficial sternocleidomastoid muscle likely at site of   recent drain. No abscess there.    < end of copied text > INTERVAL HPI/OVERNIGHT EVENTS:  CT done for cellulitis/swelling R neck following ABDON drain removal    CONSTITUTIONAL:  No fever, chills, night sweats  EYES:  No photophobia or visual changes  CARDIOVASCULAR:  No chest pain  RESPIRATORY:  No cough, wheezing, or SOB   GASTROINTESTINAL:  No nausea, vomiting, diarrhea, constipation, or abdominal pain  GENITOURINARY:  No frequency, urgency, dysuria or hematuria  NEUROLOGIC:  No headache, lightheadedness      ANTIBIOTICS/RELEVANT:    Cefazolin 6/25-6/26; 7/2-7/11; 7/17-present  Metronidazole 7/2-7/11;  7/17-present    Ceftriaxone 6/27-7/1  Clindamycin 7/17      Vital Signs Last 24 Hrs  T(C): 37.1 (21 Jul 2019 09:37), Max: 37.2 (20 Jul 2019 14:32)  T(F): 98.8 (21 Jul 2019 09:37), Max: 98.9 (20 Jul 2019 14:32)  HR: 80 (21 Jul 2019 09:55) (62 - 86)  BP: 89/54 (21 Jul 2019 08:12) (89/54 - 108/63)  BP(mean): 66 (21 Jul 2019 08:12) (66 - 79)  RR: 16 (21 Jul 2019 09:55) (16 - 19)  SpO2: 94% (21 Jul 2019 09:55) (91% - 95%)    PHYSICAL EXAM:  Constitutional:  Cachectic, chronically ill-appearing  Eyes:  Sclerae anicteric, conjunctivae clear, PERRL  Ear/Nose/Throat:  No nasal exudate or sinus tenderness;  No buccal mucosal lesions, no pharyngeal erythema or exudate.  Edentulous	  Neck:  midline bebeto stoma.  Erythema surrounding R penrose;  fullness L supraclavicular area, nontender, no overlying warmth  Chest:  L pectoralis incision closed and dry without erythema  Respiratory:  Scattered end exp rhonchi  Cardiovascular:  RRR, S1S2, no murmur appreciated  Gastrointestinal:  +PEG, normoactive BS, soft, NT, no masses, guarding or rebound.  No HSM  Extremities:  No edema      LABS:                        8.9    5.66  )-----------( 350      ( 20 Jul 2019 06:17 )             27.8         07-20    141  |  98  |  12  ----------------------------<  76  4.2   |  32<H>  |  0.65    Ca    8.9      21 Jul 2019 06:30  Phos  4.5     07-21    TPro  x   /  Alb  2.8<L>  /  TBili  x   /  DBili  x   /  AST  x   /  ALT  x   /  AlkPhos  x   07-20          MICROBIOLOGY:    Blood cultures:  7/17 X 1    Sputum 7/17:  mod WBCs, few GPCCl, mod Staph aureus MSSA    Tracheal stoma culture 7/20 - few WBCs, no orgs seen    RADIOLOGY & ADDITIONAL STUDIES:    < from: CT Neck Soft Tissue w/ IV Cont (07.20.19 @ 14:44) >  Findings:    In the interim, the patient is status post total laryngectomy and   reconstruction with pectoralis flap. The flap itself shows expected   striated appearance isodense to muscle. Superficial to the flap, there is   small amount of fat stranding which is nonspecific. At the left   pharyngeal anastomosis there is a small pocket of more hypoattenuating   content measuring approximately 1.4 x 1.1 cm and situated just medial and   superior to a vascular surgical clip. This is indeterminate at this time   and could be seroma or possible infected fluid collection given that   there is faint enhancement surrounding an (series 3, image 57). On   sagittal images, this appears less well-defined without jeffery enhancing   wall. No extraluminal gas in its vicinity is identified to imply definite   presence of a fistula and no track-like enhancing tissue to imply a   fistula. There is a gas-containing defect in the skin of the right   infrahyoid neck that extends into the sternocleidomastoid muscle likely   secondary to recent intervention (drain removal). A small focus of gas is   also seen to the right of the proximal esophagus (axial image 61), likely   also expected residua after drain removal.    The more lateral portions of the neck remain unremarkable without   lymphadenopathy. The carotid arteries and jugular veins are well   opacified with contrast. The nasal cavity, nasopharynx, oral cavity and   oropharynx appear unremarkable aside from some persistent post radiation   change at the latter. The submandibular glands show stable posttreatment   atrophy. No parotid mass. The thyroid gland has been resected.   Tracheostomy is in place without fluid collection or enhancing mass and  its proximity.    No nodular or masslike enhancement remains to indicate residual tumor.   Correlate with pathology report of tissue margins. This study may serve   as a baseline for future surveillance.    The included portions of the lung apices are unremarkable. Osseous   structures show no acute abnormality with similar advanced cervical   spondylosis compared to the prior study.    The skull base is unremarkable. In the intracranial compartment, there is   unchanged encephalomalacia and gliosis in the right inferior frontal lobe   that is presumed secondary to old trauma.      IMPRESSION:    Status post total laryngectomy with pectoralis flap reconstruction. There   is a 1.4 cm region of fluid density at the left pharyngeal anastomotic  site which could be seroma or developing abscess in the appropriate   clinical setting. Close follow-up is recommended.     No extraluminal gas, aside from that in the right neck likely related to   recent drain removal, nor other definitive evidence of salivary leak.   Fluoroscopic swallow study is the best test to evaluate for such.    In the right neck area of reported cellulitis, small gas focus is seen at   the skin and superficial sternocleidomastoid muscle likely at site of   recent drain. No abscess there.    < end of copied text >

## 2019-07-21 NOTE — PROGRESS NOTE ADULT - SUBJECTIVE AND OBJECTIVE BOX
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 C/b return to OR  for exchange to 6 distal XLT due to tracheomalacia and poor ventilatory status after coughing episode, now sp salvage TL, L myocutaneous pectoralis major flap.    6/26: Now doing well in SICU overnight, no additional respiratory issues, cuff taken down thsi AM. Failed SLP evaluation with evidence of gross aspiration on ice chip trial  6/27: has thick purulent secretions, SICU will keep for one more night. Low grade temps to 100.4. Will need PEG  6/28: NPO for PEG today. To SDU, when secretions are better managed. Received PEG for inability to swallow.  6/29: still requiring suctioning q30 minutes, small amount of glycopyrrolate given by SICU, received PEG tomorrow, will start trickle feeds  6/30: NAEON, feeds up to goal, still requiring frequent suctioning, will stop glycopyrrolate today  7/01: NAEON, tube feeds at goal, still requiring q1hr suctioning of copious thin nonpurulent secretions, dc'd glycopyrrolate  7/03: pt underwent TL, uncomplicated, tolerated; post-op PTH, iCa low so started on Rocaltrol, Ca IV; neck ABDON x1, left chest ABDON x2, salivary bypass stents in place  7/4: No acute events overnight. Calcium stable. Denies perioral/fingertip tingling. Tolerating trickle feeds. No new complaints.   7/05: NAEON. Pt complaining of increased pain. Otherwise, no new complaints. Calcium levels continue to be stable. Denies perioral/fingertip tingling. Tolerating TFs at goal.  7/6: No acute events, doing well, OOBTC. Tolerating TF at goal, will transition to bolus. Calcium changed yesterday to 2500mg q8h. Chest ABDON x1 removed today. Stable for transfer to SDU.   7/7: No acute events. Transferred to SDU yesterday. TF transitioned to bolus, tolerating well. Corrected Ca stable. Will decrease Ca checks to q12h per endocrinology.   7/8: No acute events, breathing comfortably with Bebeto tube in place. TFs well tolerated at bolus without subjective S&S reflux. Corrected Ca stable. Following Ca checks q12, Endocrinology following.  7/10: NAEON. Calcium levels normalizing, Endo recommending VD3 supplementation; tolerating TFs at goal rate without issue.  7/11: NAEON, Ca stable, Endocrinology following, continuing Ca checks. Tolerating TFs at bolus. Well tolerating Bebeto tube, performed teaching on how to clean, manage and take in/out the Bebeto tube.  7/12: NAEON, Ca stable, Endocrinology following, continuing Ca checks. Tolerating TFs at bolus. Well tolerating Bebeto tube, performed teaching on how to clean, manage and take in/out the Bebeto tube.  7/14: NAEON. Calcium corrected with extra dose of CaCarb soln. No new complaints. Will continue teaching in anticipation of discharge.   7/15: No acute events overnight.  Calcium stable.  Salivary bypass tube removed.  Educated on stoma care.   7/16: NAEON. Pt demonstrated stoma care to team on AM rounds. Ca levels remain stable. Pt for repeat SLP assessment/MBSS today. No new complaints.  7/17: NAEON. Salivary bypass removed/pt passed MBS on 7/16 PM without evidence of leak, cleared for pureed/thin liquids. Tolerated dinner yesterday without issues. No new complaints. Evidence of cellulitis inferior to stoma. Febrile.  7/18: NAEON, no respiratory events. Patient continues to well tolerate pureed foods and thin liquids. Patient taught how to clean Bebeto tube. Prior ABDON site packed with 1/4 gauze. Cellulitis improving. Afebrile.  7/19: No acute events overnight, continues to spike low grade fevers.  Pt restarted on antibiotics.  Continues on tube feeds, no respiratory complaints  7/20: No acute events overnight, afebrile. Continues to receive Ancef/Flagyl, continued tube feeds through with increased po intake feels full with half of bolus. No respiratory complaints.   7/21: No acute events overnight, afebrile. Continues to receive Ancef/Flagyl, continued tube feeds through with increased po intake feels full with half of bolus. No respiratory complaints. Continues to ambulate, tolerate pureed diet well. Denies fevers/chills/nausea/vomiting. Denies neck pain or increased secretions.          PHYSICAL EXAM:  NAD, appears comfortable  Breathing comfortably on RA  Neck: incisions c/d/i; neck soft, flat; ABDON holding suction; size 10-55 bebeto tube secured via soft collar, minimal secretions suctioned, HME valve in place  Chest: incisions c/d/i; chest soft, flat; inferior to stoma area of erythema and mild TTP, stable from yesterday; prior R neck ABDON site dehiscent, packing removed and repacked with 1/4 gauze.      Labs/Imaging  07-20    141  |  98  |  12  ----------------------------<  76  4.2   |  32<H>  |  0.65    Ca    8.9      21 Jul 2019 06:30  Phos  4.5     07-21    TPro  x   /  Alb  2.8<L>  /  TBili  x   /  DBili  x   /  AST  x   /  ALT  x   /  AlkPhos  x   07-20      CT neck with contrast 7/20  Findings:     In the interim, the patient is status post total laryngectomy and   reconstruction with pectoralis flap. The flap itself shows expected striated   appearance isodense to muscle. Superficial to the flap, there is small   amount of fat stranding which is nonspecific. At the left pharyngeal   anastomosis there is a small pocket of more hypoattenuating content   measuring approximately 1.4 x 1.1 cm and situated just medial and superior   to a vascular surgical clip. This is indeterminate at this time and could be   seroma or possible infected fluid collection given that there is faint   enhancement surrounding an (series 3, image 57). On sagittal images, this   appears less well-defined without jeffery enhancing wall. No extraluminal gas   in its vicinity is identified to imply definite presence of a fistula and no   track-like enhancing tissue to imply a fistula. There is a gas-containing   defect in the skin of the right infrahyoid neck that extends into the   sternocleidomastoid muscle likely secondary to recent intervention (drain   removal). A small focus of gas is also seen to the right of the proximal   esophagus (axial image 61), likely also expected residua after drain removal.     The more lateral portions of the neck remain unremarkable without   lymphadenopathy. The carotid arteries and jugular veins are well opacified   with contrast. The nasal cavity, nasopharynx, oral cavity and oropharynx   appear unremarkable aside from some persistent post radiation change at the   latter. The submandibular glands show stable posttreatment atrophy. No   parotid mass. The thyroid gland has been resected. Tracheostomy is in place   without fluid collection or enhancing mass and its proximity.     No nodular or masslike enhancement remains to indicate residual tumor.   Correlate with pathology report of tissue margins. This study may serve as a   baseline for future surveillance.     The included portions of the lung apices are unremarkable. Osseous   structures show no acute abnormality with similar advanced cervical   spondylosis compared to the prior study.     The skull base is unremarkable. In the intracranial compartment, there is   unchanged encephalomalacia and gliosis in the right inferior frontal lobe   that is presumed secondary to old trauma.       IMPRESSION:     Status post total laryngectomy with pectoralis flap reconstruction. There is   a 1.4 cm region of fluid density at the left pharyngeal anastomotic site   which could be seroma or developing abscess in the appropriate clinical   setting. Close follow-up is recommended.     No extraluminal gas, aside from that in the right neck likely related to   recent drain removal, nor other definitive evidence of salivary leak.   Fluoroscopic swallow study is the best test to evaluate for such.     In the right neck area of reported cellulitis, small gas focus is seen at   the skin and superficial sternocleidomastoid muscle likely at site of recent   drain. No abscess there.

## 2019-07-21 NOTE — PROGRESS NOTE ADULT - ASSESSMENT
Patient: Cori Bolanos    Procedure Summary     Date Anesthesia Start Anesthesia Stop Room / Location    10/19/17 1018 1026  [facility] / [facility]       Procedure Diagnosis Surgeon Provider    ESOPHAGOGASTRODUODENOSCOPY WITH ANESTHESIA (N/A Esophagus) Difficulty swallowing solids  (Difficulty swallowing solids [R13.10]) MD Beka Scott, GARCIA          Anesthesia Type: general  Last vitals  BP   151/85 (10/19/17 1030)   Temp   97.6 °F (36.4 °C) (10/19/17 0813)   Pulse   77 (10/19/17 1030)   Resp   19 (10/19/17 1030)     SpO2   97 % (10/19/17 1030)     Post Anesthesia Care and Evaluation    Patient location during evaluation: PHASE II  Patient participation: complete - patient participated  Level of consciousness: awake  Pain management: adequate  Airway patency: patent  Anesthetic complications: No anesthetic complications  Respiratory status: acceptable  Hydration status: acceptable       62 yo M with recurrent laryngeal CA, chondronecrosis s/p radical laryngectomy & thyroidectomy with fever, sputum with MSSA on 7/17. Continues afebrile on cefazolin and metronidazole.  CT today shows cellulitis R neck and 1.4 cm collection in L pharyngeal anastomotic site - seroma v. developing abscess.  Per ENT, no intervention indicated.  Suggest:  - Continue cefazolin 2 g IV q8h   - Continue metronidazole 500 mg q8h - can change to via PEG.  Will continue to follow with you.

## 2019-07-22 LAB
-  CEFAZOLIN: SIGNIFICANT CHANGE UP
-  CLINDAMYCIN: SIGNIFICANT CHANGE UP
-  ERYTHROMYCIN: SIGNIFICANT CHANGE UP
-  LINEZOLID: SIGNIFICANT CHANGE UP
-  OXACILLIN: SIGNIFICANT CHANGE UP
-  PENICILLIN: SIGNIFICANT CHANGE UP
-  RIFAMPIN: SIGNIFICANT CHANGE UP
-  TRIMETHOPRIM/SULFAMETHOXAZOLE: SIGNIFICANT CHANGE UP
-  VANCOMYCIN: SIGNIFICANT CHANGE UP
ALBUMIN SERPL ELPH-MCNC: 3 G/DL — LOW (ref 3.3–5)
CALCIUM SERPL-MCNC: 8.2 MG/DL — LOW (ref 8.4–10.5)
CALCIUM SERPL-MCNC: 8.7 MG/DL — SIGNIFICANT CHANGE UP (ref 8.4–10.5)
CALCIUM SERPL-MCNC: 9.4 MG/DL — SIGNIFICANT CHANGE UP (ref 8.4–10.5)
CULTURE RESULTS: SIGNIFICANT CHANGE UP
CULTURE RESULTS: SIGNIFICANT CHANGE UP
EXTRA LAVENDER TOP TUBE: SIGNIFICANT CHANGE UP
GLUCOSE BLDC GLUCOMTR-MCNC: 119 MG/DL — HIGH (ref 70–99)
GLUCOSE BLDC GLUCOMTR-MCNC: 95 MG/DL — SIGNIFICANT CHANGE UP (ref 70–99)
METHOD TYPE: SIGNIFICANT CHANGE UP
ORGANISM # SPEC MICROSCOPIC CNT: SIGNIFICANT CHANGE UP
ORGANISM # SPEC MICROSCOPIC CNT: SIGNIFICANT CHANGE UP
PHOSPHATE SERPL-MCNC: 4.6 MG/DL — HIGH (ref 2.5–4.5)
PTH-INTACT FLD-MCNC: 9 PG/ML — LOW (ref 15–65)
SPECIMEN SOURCE: SIGNIFICANT CHANGE UP
SPECIMEN SOURCE: SIGNIFICANT CHANGE UP

## 2019-07-22 PROCEDURE — 99232 SBSQ HOSP IP/OBS MODERATE 35: CPT | Mod: GC

## 2019-07-22 RX ORDER — ACETAMINOPHEN 500 MG
650 TABLET ORAL EVERY 6 HOURS
Refills: 0 | Status: DISCONTINUED | OUTPATIENT
Start: 2019-07-22 | End: 2019-07-23

## 2019-07-22 RX ORDER — CALCITRIOL 0.5 UG/1
0.25 CAPSULE ORAL
Refills: 0 | Status: DISCONTINUED | OUTPATIENT
Start: 2019-07-22 | End: 2019-07-23

## 2019-07-22 RX ORDER — OXYCODONE HYDROCHLORIDE 5 MG/1
10 TABLET ORAL EVERY 6 HOURS
Refills: 0 | Status: DISCONTINUED | OUTPATIENT
Start: 2019-07-22 | End: 2019-07-23

## 2019-07-22 RX ORDER — CALCITRIOL 0.5 UG/1
0.5 CAPSULE ORAL
Refills: 0 | Status: DISCONTINUED | OUTPATIENT
Start: 2019-07-22 | End: 2019-07-22

## 2019-07-22 RX ORDER — CALCITRIOL 0.5 UG/1
0.5 CAPSULE ORAL
Refills: 0 | Status: DISCONTINUED | OUTPATIENT
Start: 2019-07-22 | End: 2019-07-23

## 2019-07-22 RX ADMIN — SENNA PLUS 10 MILLILITER(S): 8.6 TABLET ORAL at 22:29

## 2019-07-22 RX ADMIN — OXYCODONE HYDROCHLORIDE 5 MILLIGRAM(S): 5 TABLET ORAL at 22:55

## 2019-07-22 RX ADMIN — CALCITRIOL 0.5 MICROGRAM(S): 0.5 CAPSULE ORAL at 10:01

## 2019-07-22 RX ADMIN — Medication 650 MILLIGRAM(S): at 11:00

## 2019-07-22 RX ADMIN — Medication 2000 MILLIGRAM(S): at 05:48

## 2019-07-22 RX ADMIN — ENOXAPARIN SODIUM 40 MILLIGRAM(S): 100 INJECTION SUBCUTANEOUS at 12:25

## 2019-07-22 RX ADMIN — Medication 125 MICROGRAM(S): at 05:48

## 2019-07-22 RX ADMIN — Medication 2000 MILLIGRAM(S): at 14:14

## 2019-07-22 RX ADMIN — Medication 100 MILLIGRAM(S): at 05:48

## 2019-07-22 RX ADMIN — Medication 100 MILLIGRAM(S): at 18:06

## 2019-07-22 RX ADMIN — Medication 4000 UNIT(S): at 12:25

## 2019-07-22 RX ADMIN — CALCITRIOL 0.25 MICROGRAM(S): 0.5 CAPSULE ORAL at 22:31

## 2019-07-22 RX ADMIN — Medication 100 MILLIGRAM(S): at 22:28

## 2019-07-22 RX ADMIN — Medication 100 MILLIGRAM(S): at 14:14

## 2019-07-22 RX ADMIN — OXYCODONE HYDROCHLORIDE 5 MILLIGRAM(S): 5 TABLET ORAL at 22:28

## 2019-07-22 RX ADMIN — Medication 2500 MILLIGRAM(S): at 22:30

## 2019-07-22 RX ADMIN — Medication 2500 MILLIGRAM(S): at 18:05

## 2019-07-22 RX ADMIN — Medication 2000 MILLIGRAM(S): at 22:29

## 2019-07-22 RX ADMIN — Medication 650 MILLIGRAM(S): at 19:43

## 2019-07-22 RX ADMIN — Medication 650 MILLIGRAM(S): at 18:55

## 2019-07-22 RX ADMIN — Medication 2500 MILLIGRAM(S): at 10:00

## 2019-07-22 RX ADMIN — Medication 650 MILLIGRAM(S): at 10:00

## 2019-07-22 NOTE — PROGRESS NOTE ADULT - ATTENDING COMMENTS
I have reviewed the medical record, including laboratory and radiographic studies, interviewed and examined the patient and discussed the plan with Dr. Carballo, the ID Resident.  Agree with above. Please recall if further ID input is desired – ID Team 1.

## 2019-07-22 NOTE — PROGRESS NOTE ADULT - SUBJECTIVE AND OBJECTIVE BOX
INTERVAL HPI/OVERNIGHT EVENTS:    Patient is a 63y old  Male who presents with a chief complaint of S/p laryngectomy, thyroidectomy and parathyrdoidectomy (21 Jul 2019 10:41)      Pt reports the following symptoms:    CONSTITUTIONAL:  Negative fever or chills, feels well, good appetite  EYES:  Negative  blurry vision or double vision  CARDIOVASCULAR:  Negative for chest pain or palpitations  RESPIRATORY:  Negative for cough, wheezing, or SOB   GASTROINTESTINAL:  Negative for nausea, vomiting, diarrhea, constipation, or abdominal pain  GENITOURINARY:  Negative frequency, urgency or dysuria  NEUROLOGIC:  No headache, confusion, dizziness, lightheadedness    MEDICATIONS  (STANDING):  calcitriol  Solution 0.5 MICROGram(s) Enteral Tube <User Schedule>  calcium carbonate   Suspension 2500 milliGRAM(s) Oral <User Schedule>  ceFAZolin  Injectable. 2000 milliGRAM(s) IV Push every 8 hours  cholecalciferol 4000 Unit(s) Oral daily  dextrose 50% Injectable 12.5 Gram(s) IV Push once  dextrose 50% Injectable 25 Gram(s) IV Push once  dextrose 50% Injectable 25 Gram(s) IV Push once  docusate sodium Liquid 100 milliGRAM(s) Oral two times a day  enoxaparin Injectable 40 milliGRAM(s) SubCutaneous every 24 hours  insulin lispro (HumaLOG) corrective regimen sliding scale   SubCutaneous Before meals and at bedtime  lactated ringers Bolus 500 milliLiter(s) IV Bolus once  levothyroxine 125 MICROGram(s) Oral daily  metroNIDAZOLE  IVPB 500 milliGRAM(s) IV Intermittent every 8 hours  senna Syrup 10 milliLiter(s) Oral at bedtime    MEDICATIONS  (PRN):  acetaminophen    Suspension .. 650 milliGRAM(s) Enteral Tube every 6 hours PRN Mild Pain (1 - 3)  bisacodyl Suppository 10 milliGRAM(s) Rectal daily PRN Constipation  dextrose 40% Gel 15 Gram(s) Oral once PRN Blood Glucose LESS THAN 70 milliGRAM(s)/deciliter  glucagon  Injectable 1 milliGRAM(s) IntraMuscular once PRN Glucose LESS THAN 70 milligrams/deciliter  oxyCODONE    Solution 5 milliGRAM(s) Oral every 4 hours PRN Moderate Pain (4 - 6)  oxyCODONE    Solution 10 milliGRAM(s) Oral every 6 hours PRN Severe Pain (7 - 10)  polyethylene glycol 3350 17 Gram(s) Oral daily PRN Constipation      PHYSICAL EXAM  Vital Signs Last 24 Hrs  T(C): 37.1 (22 Jul 2019 10:15), Max: 37.1 (21 Jul 2019 21:35)  T(F): 98.8 (22 Jul 2019 10:15), Max: 98.8 (22 Jul 2019 10:15)  HR: 72 (22 Jul 2019 08:08) (66 - 74)  BP: 106/58 (22 Jul 2019 08:08) (92/60 - 107/62)  BP(mean): 75 (22 Jul 2019 08:08) (70 - 80)  RR: 16 (22 Jul 2019 08:08) (14 - 18)  SpO2: 96% (22 Jul 2019 08:08) (91% - 98%)    Constitutional: wn/wd in NAD.   HEENT: NCAT, MMM, OP clear, EOMI, no proptosis or lid retraction  Neck: no thyromegaly or palpable thyroid nodules   Respiratory: lungs CTAB.  Cardiovascular: regular rhythm, normal S1 and S2, no audible murmurs, no peripheral edema  GI: soft, NT/ND, no masses/HSM appreciated.  Neurology: no tremors, DTR 2+  Skin: no visible rashes/lesions  Psychiatric: AAO x 3, normal affect/mood.    LABS:      Ca    8.7      22 Jul 2019 06:51  Phos  4.6     07-22    TPro  x   /  Alb  3.0<L>  /  TBili  x   /  DBili  x   /  AST  x   /  ALT  x   /  AlkPhos  x   07-22        Thyroid Stimulating Hormone, Serum: 27.281 uIU/mL (07-18 @ 06:29)  Thyroid Stimulating Hormone, Serum: 1.483 uIU/mL (07-03 @ 04:51)      HbA1C: 5.7 % (07-03 @ 11:17)    CAPILLARY BLOOD GLUCOSE      POCT Blood Glucose.: 95 mg/dL (22 Jul 2019 06:53)  POCT Blood Glucose.: 127 mg/dL (21 Jul 2019 21:25)  POCT Blood Glucose.: 101 mg/dL (21 Jul 2019 16:44)  POCT Blood Glucose.: 86 mg/dL (21 Jul 2019 11:30)      Insulin Sliding Scale requirements X 24 Hours:    RADIOLOGY & ADDITIONAL TESTS:    A/P: 63y Male with history of DM type II presenting for       1.  DM -     Please continue           units lantus at bedtime  / in the morning and        units lispro with meals and lispro moderate / low dose sliding scale 4 times daily with meals and at bedtime.  Please continue consistent carbohydrate diet.      Goal FSG is   Will continue to monitor   For discharge, pt can continue    Pt can follow up at discharge with [facility] by calling  to make an appointment.   Will discuss case with     and update primary team INTERVAL HPI/OVERNIGHT EVENTS:    Patient seen and examined at the bedside. He was having some fever spike over couple of days. CT neck shwoed 1.4cm fluid collection over the pharyngeal anastomotic site - possible seroma or abscess. Plan is to treat with antibiotics for now as per ENT. Patient denies any perioral tingling, numbness or any muscle cramps. His last BM was yesterday. able to tolerate foods. he was c/o coughing with thick secretions. His blood sugars were between 80-100s    7/19 albumin 2.8 18:56 Sr.Calcium 8.7 with corrected calcium 9.7;  7/20 6:17 Sr.Calcium 8.5 with corrected calcium 9.5         18:20 Sr.Calcium 8.3 with corrected calcium 9.3  7/21 6:30 Sr.Calcium 8.9 with corrected calcium 9.9        Pt reports the following symptoms:    CONSTITUTIONAL:  Negative fever or chills, feels well, good appetite  EYES:  Negative  blurry vision or double vision  CARDIOVASCULAR:  Negative for chest pain or palpitations  RESPIRATORY:  Negative for cough, wheezing, or SOB   GASTROINTESTINAL:  Negative for nausea, vomiting, diarrhea, constipation, or abdominal pain  GENITOURINARY:  Negative frequency, urgency or dysuria  NEUROLOGIC:  No headache, confusion, dizziness, lightheadedness    MEDICATIONS  (STANDING):  calcitriol  Solution 0.5 MICROGram(s) Enteral Tube <User Schedule>  calcium carbonate   Suspension 2500 milliGRAM(s) Oral <User Schedule>  ceFAZolin  Injectable. 2000 milliGRAM(s) IV Push every 8 hours  cholecalciferol 4000 Unit(s) Oral daily  dextrose 50% Injectable 12.5 Gram(s) IV Push once  dextrose 50% Injectable 25 Gram(s) IV Push once  dextrose 50% Injectable 25 Gram(s) IV Push once  docusate sodium Liquid 100 milliGRAM(s) Oral two times a day  enoxaparin Injectable 40 milliGRAM(s) SubCutaneous every 24 hours  insulin lispro (HumaLOG) corrective regimen sliding scale   SubCutaneous Before meals and at bedtime  lactated ringers Bolus 500 milliLiter(s) IV Bolus once  levothyroxine 125 MICROGram(s) Oral daily  metroNIDAZOLE  IVPB 500 milliGRAM(s) IV Intermittent every 8 hours  senna Syrup 10 milliLiter(s) Oral at bedtime    MEDICATIONS  (PRN):  acetaminophen    Suspension .. 650 milliGRAM(s) Enteral Tube every 6 hours PRN Mild Pain (1 - 3)  bisacodyl Suppository 10 milliGRAM(s) Rectal daily PRN Constipation  dextrose 40% Gel 15 Gram(s) Oral once PRN Blood Glucose LESS THAN 70 milliGRAM(s)/deciliter  glucagon  Injectable 1 milliGRAM(s) IntraMuscular once PRN Glucose LESS THAN 70 milligrams/deciliter  oxyCODONE    Solution 5 milliGRAM(s) Oral every 4 hours PRN Moderate Pain (4 - 6)  oxyCODONE    Solution 10 milliGRAM(s) Oral every 6 hours PRN Severe Pain (7 - 10)  polyethylene glycol 3350 17 Gram(s) Oral daily PRN Constipation      PHYSICAL EXAM  Vital Signs Last 24 Hrs  T(C): 37.1 (22 Jul 2019 10:15), Max: 37.1 (21 Jul 2019 21:35)  T(F): 98.8 (22 Jul 2019 10:15), Max: 98.8 (22 Jul 2019 10:15)  HR: 72 (22 Jul 2019 08:08) (66 - 74)  BP: 106/58 (22 Jul 2019 08:08) (92/60 - 107/62)  BP(mean): 75 (22 Jul 2019 08:08) (70 - 80)  RR: 16 (22 Jul 2019 08:08) (14 - 18)  SpO2: 96% (22 Jul 2019 08:08) (91% - 98%)    Constitutional: wn/wd in NAD.   HEENT: NCAT, MMM, OP clear, EOMI, no proptosis or lid retraction  Neck: no thyromegaly or palpable thyroid nodules   Respiratory: lungs CTAB.  Cardiovascular: regular rhythm, normal S1 and S2, no audible murmurs, no peripheral edema  GI: soft, NT/ND, no masses/HSM appreciated.  Neurology: no tremors, DTR 2+  Skin: no visible rashes/lesions  Psychiatric: AAO x 3, normal affect/mood.    LABS:      Ca    8.7      22 Jul 2019 06:51  Phos  4.6     07-22    TPro  x   /  Alb  3.0<L>  /  TBili  x   /  DBili  x   /  AST  x   /  ALT  x   /  AlkPhos  x   07-22        Thyroid Stimulating Hormone, Serum: 27.281 uIU/mL (07-18 @ 06:29)  Thyroid Stimulating Hormone, Serum: 1.483 uIU/mL (07-03 @ 04:51)      HbA1C: 5.7 % (07-03 @ 11:17)    CAPILLARY BLOOD GLUCOSE      POCT Blood Glucose.: 95 mg/dL (22 Jul 2019 06:53)  POCT Blood Glucose.: 127 mg/dL (21 Jul 2019 21:25)  POCT Blood Glucose.: 101 mg/dL (21 Jul 2019 16:44)  POCT Blood Glucose.: 86 mg/dL (21 Jul 2019 11:30)      Insulin Sliding Scale requirements X 24 Hours:    RADIOLOGY & ADDITIONAL TESTS:    A/P: 63y Male with history of DM type II presenting for       1.  DM -     Please continue           units lantus at bedtime  / in the morning and        units lispro with meals and lispro moderate / low dose sliding scale 4 times daily with meals and at bedtime.  Please continue consistent carbohydrate diet.      Goal FSG is   Will continue to monitor   For discharge, pt can continue    Pt can follow up at discharge with [facility] by calling  to make an appointment.   Will discuss case with     and update primary team INTERVAL HPI/OVERNIGHT EVENTS:    Patient seen and examined at the bedside. CT neck showed 1.4cm fluid collection over the pharyngeal anastomotic site - possible seroma or abscess. Plan is to treat with antibiotics for now as per ENT. Patient denies any perioral tingling, numbness or any muscle cramps. His last BM was yesterday. able to tolerate foods. he was c/o coughing with thick secretions. His blood sugars were between 80-100s    7/19 albumin 2.8 18:56 Sr.Calcium 8.7 with corrected calcium 9.7;  7/20 6:17 Sr.Calcium 8.5 with corrected calcium 9.5         18:20 Sr.Calcium 8.3 with corrected calcium 9.3  7/21 6:30 Sr.Calcium 8.9 with corrected calcium 9.9        Pt reports the following symptoms:    CONSTITUTIONAL:  Negative fever or chills, feels well, good appetite  EYES:  Negative  blurry vision or double vision  CARDIOVASCULAR:  Negative for chest pain or palpitations  RESPIRATORY:  Negative for cough, wheezing, or SOB   GASTROINTESTINAL:  Negative for nausea, vomiting, diarrhea, constipation, or abdominal pain  GENITOURINARY:  Negative frequency, urgency or dysuria  NEUROLOGIC:  No headache, confusion, dizziness, lightheadedness    MEDICATIONS  (STANDING):  calcitriol  Solution 0.5 MICROGram(s) Enteral Tube <User Schedule>  calcium carbonate   Suspension 2500 milliGRAM(s) Oral <User Schedule>  ceFAZolin  Injectable. 2000 milliGRAM(s) IV Push every 8 hours  cholecalciferol 4000 Unit(s) Oral daily  dextrose 50% Injectable 12.5 Gram(s) IV Push once  dextrose 50% Injectable 25 Gram(s) IV Push once  dextrose 50% Injectable 25 Gram(s) IV Push once  docusate sodium Liquid 100 milliGRAM(s) Oral two times a day  enoxaparin Injectable 40 milliGRAM(s) SubCutaneous every 24 hours  insulin lispro (HumaLOG) corrective regimen sliding scale   SubCutaneous Before meals and at bedtime  lactated ringers Bolus 500 milliLiter(s) IV Bolus once  levothyroxine 125 MICROGram(s) Oral daily  metroNIDAZOLE  IVPB 500 milliGRAM(s) IV Intermittent every 8 hours  senna Syrup 10 milliLiter(s) Oral at bedtime    MEDICATIONS  (PRN):  acetaminophen    Suspension .. 650 milliGRAM(s) Enteral Tube every 6 hours PRN Mild Pain (1 - 3)  bisacodyl Suppository 10 milliGRAM(s) Rectal daily PRN Constipation  dextrose 40% Gel 15 Gram(s) Oral once PRN Blood Glucose LESS THAN 70 milliGRAM(s)/deciliter  glucagon  Injectable 1 milliGRAM(s) IntraMuscular once PRN Glucose LESS THAN 70 milligrams/deciliter  oxyCODONE    Solution 5 milliGRAM(s) Oral every 4 hours PRN Moderate Pain (4 - 6)  oxyCODONE    Solution 10 milliGRAM(s) Oral every 6 hours PRN Severe Pain (7 - 10)  polyethylene glycol 3350 17 Gram(s) Oral daily PRN Constipation      PHYSICAL EXAM  Vital Signs Last 24 Hrs  T(C): 37.1 (22 Jul 2019 10:15), Max: 37.1 (21 Jul 2019 21:35)  T(F): 98.8 (22 Jul 2019 10:15), Max: 98.8 (22 Jul 2019 10:15)  HR: 72 (22 Jul 2019 08:08) (66 - 74)  BP: 106/58 (22 Jul 2019 08:08) (92/60 - 107/62)  BP(mean): 75 (22 Jul 2019 08:08) (70 - 80)  RR: 16 (22 Jul 2019 08:08) (14 - 18)  SpO2: 96% (22 Jul 2019 08:08) (91% - 98%)    Constitutional: wn/wd in NAD.   HEENT: NCAT, MMM, OP clear, EOMI, no proptosis or lid retraction  Neck: no thyromegaly or palpable thyroid nodules   Respiratory: lungs CTAB.  Cardiovascular: regular rhythm, normal S1 and S2, no audible murmurs, no peripheral edema  GI: soft, NT/ND, no masses/HSM appreciated.  Neurology: no tremors, DTR 2+  Skin: no visible rashes/lesions  Psychiatric: AAO x 3, normal affect/mood.    LABS:      Ca    8.7      22 Jul 2019 06:51  Phos  4.6     07-22    TPro  x   /  Alb  3.0<L>  /  TBili  x   /  DBili  x   /  AST  x   /  ALT  x   /  AlkPhos  x   07-22        Thyroid Stimulating Hormone, Serum: 27.281 uIU/mL (07-18 @ 06:29)  Thyroid Stimulating Hormone, Serum: 1.483 uIU/mL (07-03 @ 04:51)      HbA1C: 5.7 % (07-03 @ 11:17)    CAPILLARY BLOOD GLUCOSE      POCT Blood Glucose.: 95 mg/dL (22 Jul 2019 06:53)  POCT Blood Glucose.: 127 mg/dL (21 Jul 2019 21:25)  POCT Blood Glucose.: 101 mg/dL (21 Jul 2019 16:44)  POCT Blood Glucose.: 86 mg/dL (21 Jul 2019 11:30)      Insulin Sliding Scale requirements X 24 Hours:    RADIOLOGY & ADDITIONAL TESTS:    A/P: 63M hx COPD, 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                                                                                                                                                                                                                                                                                                                                                                                                                                                                                                                                                                                                                                                                                                                                                                                                                                                                                                                                                                                                                                                                                                                                                                                                                                                                                                                                                                                                                                                                                                                                                                                                                                                                                                                                                                                                                                                                                                                                                                                                                                                                                                                                                                                                                                                                                                                                                                                                                                                                                                                                                                                                                       1.  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 The calcitriol dose was decreased to calcitriol 0.5 mcg Q12H from yesterday evening. We will monitor the calcium level trend and make changes accordingly tomorrow. Continue Calcium carbonate 2500 mg PO TID for now. Check serum Ca every 12 hours. Check albumin every other day. Check phos, ionized ca daily. Check PTH daily.   Vit D level low. Please cont Vit D3 4000IU daily. If corrected calcium goes more than 9.0, we may need to decrease the calcium carbonate dose tomorrow.    (If calcium level less then 6.9 mg/dl, please give calcium carbonate infusion with a 1 L NS bag containing at least 900-1000 mg of elemental calcium to be given over 24 hours.)    2.  Post surgical hypothyroidism - Please continue to levothyroxine 125mcg daily PO, not through peg tube. Levothyroxine should be given separately from other meds, specifically calcium carbonate and food/tube feeds, for adequate absorption.  Please schedule at levothyroxine at 6AM and schedule calcium carb for 10AM, 4PM, and 10PM. Levothyroxine should be given by mouth, not thru feeding tube.     3. Adrenal nodule - CT scan showed 1.4cm adrenal nodule. Can be followed up as an OP.    Will continue to monitor     For discharge, pt can continue TBD by clinical course    Pt can follow up at discharge within 1 week to check Ca level with Tahira Flaherty NP at [facility] by calling  to make an appointment.    Patient seen and discussed with  and ENT. INTERVAL HPI/OVERNIGHT EVENTS:    Patient seen and examined at the bedside. CT neck showed 1.4cm fluid collection over the pharyngeal anastomotic site - possible seroma or abscess. Plan is to treat with antibiotics for now as per ENT. Patient denies any perioral tingling, numbness or any muscle cramps. His last BM was yesterday. able to tolerate foods. he was c/o coughing with thick secretions. His blood sugars were between 80-100s. he is being planned for discharge to rehab facility ([facility]) tomorrow.    7/19 albumin 2.8 18:56 Sr.Calcium 8.7 with corrected calcium 9.7;  7/20 6:17 Sr.Calcium 8.5 with corrected calcium 9.5         18:20 Sr.Calcium 8.3 with corrected calcium 9.3  7/21 6:30 Sr.Calcium 8.9 with corrected calcium 9.9  	18:30 Sr.Calcium 9.3 with corrected calcium 10.3  7/22  6:30 Sr.Calcium 8.7 with corrected calcium 9.5 with albumin 3.0    Pt reports the following symptoms:    CONSTITUTIONAL:  Negative fever or chills, feels well, good appetite  EYES:  Negative  blurry vision or double vision  CARDIOVASCULAR:  Negative for chest pain or palpitations  RESPIRATORY:  Negative for cough, wheezing, or SOB   GASTROINTESTINAL:  Negative for nausea, vomiting, diarrhea, constipation, or abdominal pain  GENITOURINARY:  Negative frequency, urgency or dysuria  NEUROLOGIC:  No headache, confusion, dizziness, lightheadedness    MEDICATIONS  (STANDING):  calcitriol  Solution 0.5 MICROGram(s) Enteral Tube <User Schedule>  calcium carbonate   Suspension 2500 milliGRAM(s) Oral <User Schedule>  ceFAZolin  Injectable. 2000 milliGRAM(s) IV Push every 8 hours  cholecalciferol 4000 Unit(s) Oral daily  dextrose 50% Injectable 12.5 Gram(s) IV Push once  dextrose 50% Injectable 25 Gram(s) IV Push once  dextrose 50% Injectable 25 Gram(s) IV Push once  docusate sodium Liquid 100 milliGRAM(s) Oral two times a day  enoxaparin Injectable 40 milliGRAM(s) SubCutaneous every 24 hours  insulin lispro (HumaLOG) corrective regimen sliding scale   SubCutaneous Before meals and at bedtime  lactated ringers Bolus 500 milliLiter(s) IV Bolus once  levothyroxine 125 MICROGram(s) Oral daily  metroNIDAZOLE  IVPB 500 milliGRAM(s) IV Intermittent every 8 hours  senna Syrup 10 milliLiter(s) Oral at bedtime    MEDICATIONS  (PRN):  acetaminophen    Suspension .. 650 milliGRAM(s) Enteral Tube every 6 hours PRN Mild Pain (1 - 3)  bisacodyl Suppository 10 milliGRAM(s) Rectal daily PRN Constipation  dextrose 40% Gel 15 Gram(s) Oral once PRN Blood Glucose LESS THAN 70 milliGRAM(s)/deciliter  glucagon  Injectable 1 milliGRAM(s) IntraMuscular once PRN Glucose LESS THAN 70 milligrams/deciliter  oxyCODONE    Solution 5 milliGRAM(s) Oral every 4 hours PRN Moderate Pain (4 - 6)  oxyCODONE    Solution 10 milliGRAM(s) Oral every 6 hours PRN Severe Pain (7 - 10)  polyethylene glycol 3350 17 Gram(s) Oral daily PRN Constipation      PHYSICAL EXAM  Vital Signs Last 24 Hrs  T(C): 37.1 (22 Jul 2019 10:15), Max: 37.1 (21 Jul 2019 21:35)  T(F): 98.8 (22 Jul 2019 10:15), Max: 98.8 (22 Jul 2019 10:15)  HR: 72 (22 Jul 2019 08:08) (66 - 74)  BP: 106/58 (22 Jul 2019 08:08) (92/60 - 107/62)  BP(mean): 75 (22 Jul 2019 08:08) (70 - 80)  RR: 16 (22 Jul 2019 08:08) (14 - 18)  SpO2: 96% (22 Jul 2019 08:08) (91% - 98%)    Constitutional: wn/wd in NAD.   Respiratory: lungs minimal occasional crackles heard  Cardiovascular: regular rhythm, normal S1 and S2,  GI: soft, NT/ND, no masses/HSM appreciated.  Neurology: no tremors, DTR 2+, no focal neurological deficits  Psychiatric: AAO x 3, normal affect/mood.    LABS:      Ca    8.7      22 Jul 2019 06:51  Phos  4.6     07-22    TPro  x   /  Alb  3.0<L>  /  TBili  x   /  DBili  x   /  AST  x   /  ALT  x   /  AlkPhos  x   07-22        Thyroid Stimulating Hormone, Serum: 27.281 uIU/mL (07-18 @ 06:29)  Thyroid Stimulating Hormone, Serum: 1.483 uIU/mL (07-03 @ 04:51)      HbA1C: 5.7 % (07-03 @ 11:17)    CAPILLARY BLOOD GLUCOSE      POCT Blood Glucose.: 95 mg/dL (22 Jul 2019 06:53)  POCT Blood Glucose.: 127 mg/dL (21 Jul 2019 21:25)  POCT Blood Glucose.: 101 mg/dL (21 Jul 2019 16:44)  POCT Blood Glucose.: 86 mg/dL (21 Jul 2019 11:30)      Insulin Sliding Scale requirements X 24 Hours:    RADIOLOGY & ADDITIONAL TESTS:    A/P: 63M hx COPD, 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                                                                                                                                                                                                                                                                                                                                                                                                                                                                                                                                                                                                                                                                                                                                                                                                                                                                                                                                                                                                                                                                                                                                                                                                                                                                                                                                                                                                                                                                                                                                                                                                                                                                                                                                                                                                                                                                                                                                                                                                                                                                                                                                                                                                                                                                                                                                                                                                                                                                                                                                                                                                                       1.  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 Please decrease to calcitriol dose to 0.5 mcg QAM at 1000 and 0.25mcg QPM at 2200. We will monitor the calcium level trend and make changes accordingly tomorrow. Continue Calcium carbonate 2500 mg PO TID for now. Check serum Ca every 12 hours. Check albumin every other day. Check phos, ionized ca daily. Check PTH daily.   Vit D level low. Please cont Vit D3 4000IU daily. If corrected calcium goes more than 9.0, we may need to decrease the calcium carbonate dose tomorrow.    (If calcium level less then 6.9 mg/dl, please give calcium carbonate infusion with a 1 L NS bag containing at least 900-1000 mg of elemental calcium to be given over 24 hours.)    2.  Post surgical hypothyroidism - Please continue to levothyroxine 125mcg daily PO, not through peg tube. Levothyroxine should be given separately from other meds, specifically calcium carbonate and food/tube feeds, for adequate absorption. PLease re-check TSH and Free thyroxine (T4 tomorrow)  Please schedule at levothyroxine at 6AM and schedule calcium carb for 10AM, 4PM, and 10PM. Levothyroxine should be given by mouth, not thru feeding tube.     3. Adrenal nodule - CT scan showed 1.4cm adrenal nodule. Can be followed up as an OP.    Will continue to monitor     For discharge, TBD by clinical course    Pt can follow up at discharge within 2 week to check Ca level, TSH and Free T4 with Tahira Flaherty NP at [facility] by calling  to make an appointment.    Patient seen and discussed with  and ENT.

## 2019-07-22 NOTE — PROGRESS NOTE ADULT - ASSESSMENT
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 No evidence of hematoma/seroma/salivary leak. Afebrile, and peristomal cellulitis and erythema improving on Ancef/Flagyl. Performing Nasrin tube teaching. Fistula of R neck ABDON site packed with 1/4 in. CT neck with e/o small fluid collection on the L near anastomotic site, though no definitive e/o salivary leak or abscess. No intervention indicated at this time, will continue packing R neck ABDON site and antibiotics.  - PATIENT IS NOT ABLE TO BE NASALLY OR ORALLY INTUBATED  - SLP  - Appreciate add'l endocrinology recs; synthroid 125mcg, rocaltrol (0.5mcg am, 0.5mcg pm), VD3 and calcium per endo  - Monitor Ca  - f/u CBC and FOBT  - daily packing exchage at site of R prior ABDON insertion site with 1/4 iodoform  - monitor peristomal cellulitis  - continue ancef/flagyl  - Continue bolus TFs  - Pain control  - Nasrin tube cleaning teaching  - Suction frequently w/red rubber catheters only, humidified air at all times  - SCDs, Lovenox, OOB/ambulate  - Bowel regimen  - Page ENT with questions or concerns  - d/w attending MD whom agrees with the above plan

## 2019-07-22 NOTE — PROGRESS NOTE ADULT - ATTENDING COMMENTS
Pt seen on rounds this afternoon.  Serum calcium levels were higher over the weekend and calcitriol reduced to 0.5 mcg BID.  Corrected calcium levels today are 8.7-9.5 mg%.  As a precaution, since calcitriol doses are still above the usual requirements for pt with surgical hypopara, will decrease further to 0.75 mcg/day.  Calcium carbonate to remain as is, since it also needs to be at higher dose for phosphate binding.  To repeat TFTs tomorrow, but will likely discharge on the 125 mcg dose.  Note that transfer orders to [facility] must emphasize that the levothyroxine needs to be given PO, not via the tube, and at least an hour before any food or enteral feeds (or calcium doses)  Pt denies any hypocalcemic symptoms.  Lungs were basically clear on auscultation today

## 2019-07-22 NOTE — PROGRESS NOTE ADULT - SUBJECTIVE AND OBJECTIVE BOX
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 C/b return to OR  for exchange to 6 distal XLT due to tracheomalacia and poor ventilatory status after coughing episode, now sp salvage TL, L myocutaneous pectoralis major flap.    6/26: Now doing well in SICU overnight, no additional respiratory issues, cuff taken down thsi AM. Failed SLP evaluation with evidence of gross aspiration on ice chip trial  6/27: has thick purulent secretions, SICU will keep for one more night. Low grade temps to 100.4. Will need PEG  6/28: NPO for PEG today. To SDU, when secretions are better managed. Received PEG for inability to swallow.  6/29: still requiring suctioning q30 minutes, small amount of glycopyrrolate given by SICU, received PEG tomorrow, will start trickle feeds  6/30: NAEON, feeds up to goal, still requiring frequent suctioning, will stop glycopyrrolate today  7/01: NAEON, tube feeds at goal, still requiring q1hr suctioning of copious thin nonpurulent secretions, dc'd glycopyrrolate  7/03: pt underwent TL, uncomplicated, tolerated; post-op PTH, iCa low so started on Rocaltrol, Ca IV; neck ABDON x1, left chest ABDON x2, salivary bypass stents in place  7/4: No acute events overnight. Calcium stable. Denies perioral/fingertip tingling. Tolerating trickle feeds. No new complaints.   7/05: NAEON. Pt complaining of increased pain. Otherwise, no new complaints. Calcium levels continue to be stable. Denies perioral/fingertip tingling. Tolerating TFs at goal.  7/6: No acute events, doing well, OOBTC. Tolerating TF at goal, will transition to bolus. Calcium changed yesterday to 2500mg q8h. Chest ABDON x1 removed today. Stable for transfer to SDU.   7/7: No acute events. Transferred to SDU yesterday. TF transitioned to bolus, tolerating well. Corrected Ca stable. Will decrease Ca checks to q12h per endocrinology.   7/8: No acute events, breathing comfortably with Bebeto tube in place. TFs well tolerated at bolus without subjective S&S reflux. Corrected Ca stable. Following Ca checks q12, Endocrinology following.  7/10: NAEON. Calcium levels normalizing, Endo recommending VD3 supplementation; tolerating TFs at goal rate without issue.  7/11: NAEON, Ca stable, Endocrinology following, continuing Ca checks. Tolerating TFs at bolus. Well tolerating Bebeto tube, performed teaching on how to clean, manage and take in/out the Bebeto tube.  7/12: NAEON, Ca stable, Endocrinology following, continuing Ca checks. Tolerating TFs at bolus. Well tolerating Bebeto tube, performed teaching on how to clean, manage and take in/out the Bebeto tube.  7/14: NAEON. Calcium corrected with extra dose of CaCarb soln. No new complaints. Will continue teaching in anticipation of discharge.   7/15: No acute events overnight.  Calcium stable.  Salivary bypass tube removed.  Educated on stoma care.   7/16: NAEON. Pt demonstrated stoma care to team on AM rounds. Ca levels remain stable. Pt for repeat SLP assessment/MBSS today. No new complaints.  7/17: NAEON. Salivary bypass removed/pt passed MBS on 7/16 PM without evidence of leak, cleared for pureed/thin liquids. Tolerated dinner yesterday without issues. No new complaints. Evidence of cellulitis inferior to stoma. Febrile.  7/18: NAEON, no respiratory events. Patient continues to well tolerate pureed foods and thin liquids. Patient taught how to clean Bebeto tube. Prior ABDON site packed with 1/4 gauze. Cellulitis improving. Afebrile.  7/19: No acute events overnight, continues to spike low grade fevers.  Pt restarted on antibiotics.  Continues on tube feeds, no respiratory complaints  7/20: No acute events overnight, afebrile. Continues to receive Ancef/Flagyl, continued tube feeds through with increased po intake feels full with half of bolus. No respiratory complaints.   7/21: No acute events overnight, afebrile. Continues to receive Ancef/Flagyl, continued tube feeds through with increased po intake feels full with half of bolus. No respiratory complaints. Continues to ambulate, tolerate pureed diet well. Denies fevers/chills/nausea/vomiting. Denies neck pain or increased secretions.  7/22: No acute events overnight, afebrile. Secretions thinner and less copious, no evidence of salivary fistula. Continue ABDON site wound packing. Tolerating puree diet and half bolus feeds. Erythema of peristomal skin significantly improved. Patient continues to ambulate ad heena.          PHYSICAL EXAM:  NAD, appears comfortable  Breathing comfortably on RA  Neck: incisions c/d/i; neck soft, flat; ABDON holding suction; size 10-55 bebeto tube secured via soft collar, minimal secretions suctioned, HME valve in place  Chest: incisions c/d/i; chest soft, flat; inferior to stoma area of erythema and mild TTP, significant improvement in erythema from yesterday; prior R neck ABDON site dehiscent, packing removed and repacked with 1/4 gauze.      Labs/Imaging    Ca    8.7      22 Jul 2019 06:51  Phos  4.6     07-22    TPro  x   /  Alb  3.0<L>  /  TBili  x   /  DBili  x   /  AST  x   /  ALT  x   /  AlkPhos  x   07-22        CT neck with contrast 7/20  Findings:     In the interim, the patient is status post total laryngectomy and   reconstruction with pectoralis flap. The flap itself shows expected striated   appearance isodense to muscle. Superficial to the flap, there is small   amount of fat stranding which is nonspecific. At the left pharyngeal   anastomosis there is a small pocket of more hypoattenuating content   measuring approximately 1.4 x 1.1 cm and situated just medial and superior   to a vascular surgical clip. This is indeterminate at this time and could be   seroma or possible infected fluid collection given that there is faint   enhancement surrounding an (series 3, image 57). On sagittal images, this   appears less well-defined without jeffery enhancing wall. No extraluminal gas   in its vicinity is identified to imply definite presence of a fistula and no   track-like enhancing tissue to imply a fistula. There is a gas-containing   defect in the skin of the right infrahyoid neck that extends into the   sternocleidomastoid muscle likely secondary to recent intervention (drain   removal). A small focus of gas is also seen to the right of the proximal   esophagus (axial image 61), likely also expected residua after drain removal.     The more lateral portions of the neck remain unremarkable without   lymphadenopathy. The carotid arteries and jugular veins are well opacified   with contrast. The nasal cavity, nasopharynx, oral cavity and oropharynx   appear unremarkable aside from some persistent post radiation change at the   latter. The submandibular glands show stable posttreatment atrophy. No   parotid mass. The thyroid gland has been resected. Tracheostomy is in place   without fluid collection or enhancing mass and its proximity.     No nodular or masslike enhancement remains to indicate residual tumor.   Correlate with pathology report of tissue margins. This study may serve as a   baseline for future surveillance.     The included portions of the lung apices are unremarkable. Osseous   structures show no acute abnormality with similar advanced cervical   spondylosis compared to the prior study.     The skull base is unremarkable. In the intracranial compartment, there is   unchanged encephalomalacia and gliosis in the right inferior frontal lobe   that is presumed secondary to old trauma.       IMPRESSION:     Status post total laryngectomy with pectoralis flap reconstruction. There is   a 1.4 cm region of fluid density at the left pharyngeal anastomotic site   which could be seroma or developing abscess in the appropriate clinical   setting. Close follow-up is recommended.     No extraluminal gas, aside from that in the right neck likely related to   recent drain removal, nor other definitive evidence of salivary leak.   Fluoroscopic swallow study is the best test to evaluate for such.     In the right neck area of reported cellulitis, small gas focus is seen at   the skin and superficial sternocleidomastoid muscle likely at site of recent   drain. No abscess there.

## 2019-07-22 NOTE — PROGRESS NOTE ADULT - ASSESSMENT
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 Patient with worsening stridor, s/p CT neck on 6/4 which showed new sites of enhancing soft tissue, suspicious for recurrence of laryngeal carcinoma. He saw Dr. Branch on 6/5, he was sent for PET/CT on 6/7 which showed abnormal FDG activity (SUV 14.6) of the glottic and subglottic regions concerning for recurrence. CT chest also showed 2mm SHAKIR pulmonary nodule and 1.4 cm adrenal nodule. Admitted on 6/25 for planned open tracheostomy with biopsy with lesions noted in subglottis, biopsied, frozen showing malignancy. PEG placement on 6/28 2/2 failed SLP eval/difficulty swallowing. Now s/p radical laryngectomy with total thyroidectomy and suspected parathyroidectomy on 7/2 hypocalcemia. Patient spiking intermittent fevers since surgery, tmax 104 on 7/17. Sputum +MSSA on 7/17, blood cx NGTD. Patient started on ancef and flagyl post-op until 7/11, stopped and ancef/flagyl restarted on 7/17.    As per the operative note, the pectoralis muscle was used as a flap for reconstruction of the laryngeal opening. Given the intricacies of the surgery, and the site being a sensitive area for anaerobes to grow, can continue anaerobic coverage at this time. There was a concern for the 1.4 cm fluid collection at the left pharyngeal anastomotic site, which could be a seroma vs. abscess. With discussion of the primary ENT team, there is low concern for other infectious processes greater than a cellulitis.   - Please stop cefazolin and start cephalexin 500 mg via PEG q6h  - Continue with metronidazole 500 mg q8h via PEG  - ID team 1 signing off, reconsult with questions; primary team aware  - Case discussed with ID attending, Dr. 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 Patient with worsening stridor, s/p CT neck on 6/4 which showed new sites of enhancing soft tissue, suspicious for recurrence of laryngeal carcinoma. He saw Dr. Branch on 6/5, he was sent for PET/CT on 6/7 which showed abnormal FDG activity (SUV 14.6) of the glottic and subglottic regions concerning for recurrence. CT chest also showed 2mm SHAKIR pulmonary nodule and 1.4 cm adrenal nodule. Admitted on 6/25 for planned open tracheostomy with biopsy with lesions noted in subglottis, biopsied, frozen showing malignancy. PEG placement on 6/28 2/2 failed SLP eval/difficulty swallowing. Now s/p radical laryngectomy with total thyroidectomy and suspected parathyroidectomy on 7/2 hypocalcemia. Patient spiking intermittent fevers since surgery, tmax 104 on 7/17. Sputum +MSSA on 7/17, blood cx NGTD. Patient started on ancef and flagyl post-op until 7/11, stopped and ancef/flagyl restarted on 7/17.    As per the operative note, the pectoralis muscle was used as a flap for reconstruction of the laryngeal opening. Given the intricacies of the surgery, and the site being a sensitive area for anaerobes to grow, can continue anaerobic coverage at this time. There was a concern for the 1.4 cm fluid collection at the left pharyngeal anastomotic site, which could be a seroma vs. abscess. With discussion of the primary ENT team, there is low concern for other infectious processes greater than a cellulitis.   - Please stop cefazolin and start cephalexin 500 mg via PEG q6h  - D/C metronidazole  - ID team 1 signing off, reconsult with questions; primary team aware  - Case discussed with ID attending, Dr. Jhaveri

## 2019-07-23 ENCOUNTER — TRANSCRIPTION ENCOUNTER (OUTPATIENT)
Age: 64
End: 2019-07-23

## 2019-07-23 VITALS — TEMPERATURE: 99 F

## 2019-07-23 LAB
CALCIUM SERPL-MCNC: 8.7 MG/DL — SIGNIFICANT CHANGE UP (ref 8.4–10.5)
T4 FREE SERPL-MCNC: 0.69 NG/DL — LOW (ref 0.7–1.48)
TSH SERPL-MCNC: 38.12 UIU/ML — HIGH (ref 0.35–4.94)

## 2019-07-23 PROCEDURE — 82310 ASSAY OF CALCIUM: CPT

## 2019-07-23 PROCEDURE — 87070 CULTURE OTHR SPECIMN AEROBIC: CPT

## 2019-07-23 PROCEDURE — 82330 ASSAY OF CALCIUM: CPT

## 2019-07-23 PROCEDURE — 87186 SC STD MICRODIL/AGAR DIL: CPT

## 2019-07-23 PROCEDURE — 82040 ASSAY OF SERUM ALBUMIN: CPT

## 2019-07-23 PROCEDURE — 74230 X-RAY XM SWLNG FUNCJ C+: CPT

## 2019-07-23 PROCEDURE — 82803 BLOOD GASES ANY COMBINATION: CPT

## 2019-07-23 PROCEDURE — 82306 VITAMIN D 25 HYDROXY: CPT

## 2019-07-23 PROCEDURE — 86901 BLOOD TYPING SEROLOGIC RH(D): CPT

## 2019-07-23 PROCEDURE — 85652 RBC SED RATE AUTOMATED: CPT

## 2019-07-23 PROCEDURE — 92597 ORAL SPEECH DEVICE EVAL: CPT

## 2019-07-23 PROCEDURE — 84134 ASSAY OF PREALBUMIN: CPT

## 2019-07-23 PROCEDURE — 83036 HEMOGLOBIN GLYCOSYLATED A1C: CPT

## 2019-07-23 PROCEDURE — C1889: CPT

## 2019-07-23 PROCEDURE — 84295 ASSAY OF SERUM SODIUM: CPT

## 2019-07-23 PROCEDURE — 87040 BLOOD CULTURE FOR BACTERIA: CPT

## 2019-07-23 PROCEDURE — 71045 X-RAY EXAM CHEST 1 VIEW: CPT

## 2019-07-23 PROCEDURE — 85730 THROMBOPLASTIN TIME PARTIAL: CPT

## 2019-07-23 PROCEDURE — 36415 COLL VENOUS BLD VENIPUNCTURE: CPT

## 2019-07-23 PROCEDURE — 86140 C-REACTIVE PROTEIN: CPT

## 2019-07-23 PROCEDURE — 86850 RBC ANTIBODY SCREEN: CPT

## 2019-07-23 PROCEDURE — 97116 GAIT TRAINING THERAPY: CPT

## 2019-07-23 PROCEDURE — 97164 PT RE-EVAL EST PLAN CARE: CPT

## 2019-07-23 PROCEDURE — 88305 TISSUE EXAM BY PATHOLOGIST: CPT

## 2019-07-23 PROCEDURE — 83735 ASSAY OF MAGNESIUM: CPT

## 2019-07-23 PROCEDURE — 80053 COMPREHEN METABOLIC PANEL: CPT

## 2019-07-23 PROCEDURE — 99231 SBSQ HOSP IP/OBS SF/LOW 25: CPT | Mod: GC

## 2019-07-23 PROCEDURE — 82962 GLUCOSE BLOOD TEST: CPT

## 2019-07-23 PROCEDURE — 97110 THERAPEUTIC EXERCISES: CPT

## 2019-07-23 PROCEDURE — 88331 PATH CONSLTJ SURG 1 BLK 1SPC: CPT

## 2019-07-23 PROCEDURE — 92611 MOTION FLUOROSCOPY/SWALLOW: CPT

## 2019-07-23 PROCEDURE — 84100 ASSAY OF PHOSPHORUS: CPT

## 2019-07-23 PROCEDURE — 85610 PROTHROMBIN TIME: CPT

## 2019-07-23 PROCEDURE — C9399: CPT

## 2019-07-23 PROCEDURE — 92610 EVALUATE SWALLOWING FUNCTION: CPT

## 2019-07-23 PROCEDURE — 84443 ASSAY THYROID STIM HORMONE: CPT

## 2019-07-23 PROCEDURE — 97162 PT EVAL MOD COMPLEX 30 MIN: CPT

## 2019-07-23 PROCEDURE — 94799 UNLISTED PULMONARY SVC/PX: CPT

## 2019-07-23 PROCEDURE — 80048 BASIC METABOLIC PNL TOTAL CA: CPT

## 2019-07-23 PROCEDURE — 85027 COMPLETE CBC AUTOMATED: CPT

## 2019-07-23 PROCEDURE — 86900 BLOOD TYPING SEROLOGIC ABO: CPT

## 2019-07-23 PROCEDURE — 84439 ASSAY OF FREE THYROXINE: CPT

## 2019-07-23 PROCEDURE — 84132 ASSAY OF SERUM POTASSIUM: CPT

## 2019-07-23 PROCEDURE — 92526 ORAL FUNCTION THERAPY: CPT

## 2019-07-23 PROCEDURE — 70491 CT SOFT TISSUE NECK W/DYE: CPT

## 2019-07-23 PROCEDURE — 88307 TISSUE EXAM BY PATHOLOGIST: CPT

## 2019-07-23 PROCEDURE — 92507 TX SP LANG VOICE COMM INDIV: CPT

## 2019-07-23 PROCEDURE — 83970 ASSAY OF PARATHORMONE: CPT

## 2019-07-23 RX ORDER — ACETAMINOPHEN 500 MG
20 TABLET ORAL
Qty: 500 | Refills: 2
Start: 2019-07-23 | End: 2019-10-20

## 2019-07-23 RX ORDER — CALCITRIOL 0.5 UG/1
0.25 CAPSULE ORAL
Qty: 30 | Refills: 0
Start: 2019-07-23 | End: 2019-08-21

## 2019-07-23 RX ORDER — CALCIUM CARBONATE 500(1250)
10 TABLET ORAL
Qty: 1000 | Refills: 3
Start: 2019-07-23 | End: 2019-11-19

## 2019-07-23 RX ORDER — LEVOTHYROXINE SODIUM 125 MCG
1 TABLET ORAL
Qty: 30 | Refills: 2
Start: 2019-07-23 | End: 2019-10-20

## 2019-07-23 RX ORDER — ACETAMINOPHEN 500 MG
2 TABLET ORAL
Qty: 0 | Refills: 0 | DISCHARGE

## 2019-07-23 RX ORDER — CEPHALEXIN 500 MG
10 CAPSULE ORAL
Qty: 210 | Refills: 0
Start: 2019-07-23 | End: 2019-07-29

## 2019-07-23 RX ORDER — CALCITRIOL 0.5 UG/1
0.5 CAPSULE ORAL
Qty: 30 | Refills: 2
Start: 2019-07-23 | End: 2019-10-20

## 2019-07-23 RX ORDER — CALCITRIOL 0.5 UG/1
0.5 CAPSULE ORAL
Qty: 60 | Refills: 2
Start: 2019-07-23 | End: 2019-10-20

## 2019-07-23 RX ORDER — CHOLECALCIFEROL (VITAMIN D3) 125 MCG
4000 CAPSULE ORAL
Qty: 30 | Refills: 2
Start: 2019-07-23 | End: 2019-10-20

## 2019-07-23 RX ORDER — OXYCODONE HYDROCHLORIDE 5 MG/1
5 TABLET ORAL
Qty: 60 | Refills: 0
Start: 2019-07-23 | End: 2019-07-29

## 2019-07-23 RX ADMIN — Medication 2000 MILLIGRAM(S): at 06:57

## 2019-07-23 RX ADMIN — Medication 100 MILLIGRAM(S): at 06:57

## 2019-07-23 RX ADMIN — Medication 2500 MILLIGRAM(S): at 10:38

## 2019-07-23 RX ADMIN — ENOXAPARIN SODIUM 40 MILLIGRAM(S): 100 INJECTION SUBCUTANEOUS at 12:32

## 2019-07-23 RX ADMIN — Medication 650 MILLIGRAM(S): at 07:22

## 2019-07-23 RX ADMIN — Medication 650 MILLIGRAM(S): at 07:52

## 2019-07-23 RX ADMIN — Medication 2000 MILLIGRAM(S): at 15:01

## 2019-07-23 RX ADMIN — Medication 4000 UNIT(S): at 12:32

## 2019-07-23 RX ADMIN — CALCITRIOL 0.5 MICROGRAM(S): 0.5 CAPSULE ORAL at 10:44

## 2019-07-23 RX ADMIN — Medication 125 MICROGRAM(S): at 06:57

## 2019-07-23 NOTE — DISCHARGE NOTE NURSING/CASE MANAGEMENT/SOCIAL WORK - NSDCVIVACCINE_GEN_ALL_CORE_FT
Tdap , 2015/10/5 18:35 , Kavita Haywood (RN)  Tdap , 2016/6/10 22:55 , Flavia Summers (RASHAD)
Tdap , 2015/10/5 18:35 , Kavita Haywood (RN)  Tdap , 2016/6/10 22:55 , Flavia Summers (RSAHAD)

## 2019-07-23 NOTE — PROGRESS NOTE ADULT - ATTENDING COMMENTS
Pt seen on rounds this afternoon prior to transfer to [facility].  Corrected calcium level of 9.5 remains above target, but reflects the higher dose of calcitriol.  Will stay with 0.75 mcg/day for now, but need a follow-up level in 1 week.  (Need to arrange transport from [facility] to our offices at [address] [place] [address])  Emphasized to the pt the need to take the levothyroxine > 30-45 min before breakfast or his first dose of calcium after he returns home.

## 2019-07-23 NOTE — PROGRESS NOTE ADULT - PROVIDER SPECIALTY LIST ADULT
ENT
Endocrinology
Infectious Disease
SICU
Surgery
ENT
Endocrinology
Genetics/Metabolism
Infectious Disease
Endocrinology
SICU

## 2019-07-23 NOTE — DISCHARGE NOTE NURSING/CASE MANAGEMENT/SOCIAL WORK - NSDCPEPAMP_GEN_ALL_CORE
“[facility] for Tobacco Control” pamphlet given
“[facility] for Tobacco Control” pamphlet given

## 2019-07-23 NOTE — DISCHARGE NOTE NURSING/CASE MANAGEMENT/SOCIAL WORK - NSDCDPATPORTLINK_GEN_ALL_CORE
You can access the [facility] Patient Portal, offered by [facility], by registering with the following website: [email or web address]
You can access the [facility] Patient Portal, offered by [facility], by registering with the following website: [email or web address]

## 2019-07-23 NOTE — DISCHARGE NOTE NURSING/CASE MANAGEMENT/SOCIAL WORK - NSDCPEHOTLINE_GEN_ALL_CORE
[facility] Smokers Quitline [phone] ([phone])
[facility] Smokers Quitline [phone] ([phone])

## 2019-07-23 NOTE — DISCHARGE NOTE NURSING/CASE MANAGEMENT/SOCIAL WORK - NSDCPEEMAIL_GEN_ALL_CORE
[facility] for Tobacco Control email [email or web address] for Tobacco [email or web address]

## 2019-07-23 NOTE — PROGRESS NOTE ADULT - SUBJECTIVE AND OBJECTIVE BOX
INTERVAL HPI/OVERNIGHT EVENTS:    Patient seen and examined at bedside. He denies any perioral numbness or paresthesias of the hand. No muscle pain or spasm. He has a good appetite and has been declining additional supplements through peg.   AM Calcium stable at 8.7 corrected---->9.5 with albumin 3.0. This was with reduced calcitriol dose last night from 0.5 to 0.25.   7/18: TSH 27 and FT4 0.66. Today TSH 38 and FT4 0.69.  He is currently receiving calcitriol 0.50mcg in AM and 0.25mcg in PM, calcium carbonate 2500mg TID. Cholecalciferol 4,000 IU daily, and levothyroxine 125mcg PO.      Pt reports the following symptoms:    CONSTITUTIONAL:  Negative fever or chills, feels well, good appetite  EYES:  Negative  blurry vision or double vision  CARDIOVASCULAR:  Negative for chest pain or palpitations  RESPIRATORY:  Negative for cough, wheezing, or SOB   GASTROINTESTINAL:  Negative for nausea, vomiting, diarrhea, constipation, or abdominal pain  GENITOURINARY:  Negative frequency, urgency or dysuria  NEUROLOGIC:  No headache, confusion, dizziness, lightheadedness    MEDICATIONS  (STANDING):  calcitriol  Solution 0.5 MICROGram(s) Oral <User Schedule>  calcitriol  Solution 0.25 MICROGram(s) Oral <User Schedule>  calcium carbonate   Suspension 2500 milliGRAM(s) Oral <User Schedule>  ceFAZolin  Injectable. 2000 milliGRAM(s) IV Push every 8 hours  cholecalciferol 4000 Unit(s) Oral daily  docusate sodium Liquid 100 milliGRAM(s) Oral two times a day  enoxaparin Injectable 40 milliGRAM(s) SubCutaneous every 24 hours  lactated ringers Bolus 500 milliLiter(s) IV Bolus once  levothyroxine 125 MICROGram(s) Oral daily  senna Syrup 10 milliLiter(s) Oral at bedtime    MEDICATIONS  (PRN):  acetaminophen    Suspension .. 650 milliGRAM(s) Oral every 6 hours PRN Temp greater or equal to 38C (100.4F), Mild Pain (1 - 3)  bisacodyl Suppository 10 milliGRAM(s) Rectal daily PRN Constipation  glucagon  Injectable 1 milliGRAM(s) IntraMuscular once PRN Glucose LESS THAN 70 milligrams/deciliter  oxyCODONE    Solution 5 milliGRAM(s) Oral every 4 hours PRN Moderate Pain (4 - 6)  oxyCODONE    Solution 10 milliGRAM(s) Oral every 6 hours PRN Severe Pain (7 - 10)  polyethylene glycol 3350 17 Gram(s) Oral daily PRN Constipation      PHYSICAL EXAM  Vital Signs Last 24 Hrs  T(C): 37.2 (23 Jul 2019 09:12), Max: 37.2 (23 Jul 2019 09:12)  T(F): 99 (23 Jul 2019 09:12), Max: 99 (23 Jul 2019 09:12)  HR: 78 (23 Jul 2019 12:00) (60 - 78)  BP: 92/56 (23 Jul 2019 12:00) (92/56 - 124/57)  BP(mean): 70 (23 Jul 2019 12:00) (70 - 80)  RR: 16 (23 Jul 2019 12:00) (16 - 17)  SpO2: 94% (23 Jul 2019 12:00) (91% - 97%)    Constitutional: wn/wd in NAD.   HEENT: NCAT, MMM, no proptosis or lid retraction  Neck: surgical scar and tracheostomy site - looks wnl.  Respiratory: lungs CTAB.  Cardiovascular: regular rhythm, normal S1 and S2, no audible murmurs, no peripheral edema  GI: soft, NT/ND, no masses. PEG tube in place  Neurology: no tremors, DTR 2+. no muscular spasm  Skin: no visible rashes/lesions  Psychiatric: Awake and alert, normal affect/mood.      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                                                                                                                                                                                                                                                                                                                                                                                                                                                                                                                                                                                                                                                                                                                                                                                                                                                                                                                                                                                                                                                                                                                                                                                                                                                                                                                                                                                                                                                                                                                                                                                                                                                                                                                                                                                                                                                                                                                                                                                                                                                                                                                                                                                                                                                                                                                                                                                                                                                                                                                                                                                                                       1.  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 Please decrease to calcitriol dose to 0.5 mcg QAM at 1000 and 0.25mcg QPM at 2200. We will monitor the calcium level trend and make changes accordingly tomorrow. Continue Calcium carbonate 2500 mg PO TID for now. Check serum Ca every 12 hours. Check albumin every other day. Check phos, ionized ca daily. Check PTH daily.   Vit D level low. Please cont Vit D3 4000IU daily. If corrected calcium goes more than 9.0, we may need to decrease the calcium carbonate dose tomorrow.    2.  Post surgical hypothyroidism - Please continue to levothyroxine 125mcg daily PO, not through peg tube. Levothyroxine should be given separately from other meds, specifically calcium carbonate and food/tube feeds, for adequate absorption.   Please schedule at levothyroxine at 6AM and schedule calcium carb for 10AM, 4PM, and 10PM. Levothyroxine should be given by mouth, not thru feeding tube.     3. Adrenal nodule - CT scan showed 1.4cm adrenal nodule. Can be followed up as an OP.    Will continue to monitor     For discharge, TBD by clinical course    Pt can follow up at discharge within 1 week to check Ca level, TSH and Free T4 with Tahira Flaherty NP at [facility] by calling  to make an appointment.    Patient seen and discussed with  and ENT. INTERVAL HPI/OVERNIGHT EVENTS:    Patient seen and examined at bedside. He denies any perioral numbness or paresthesias of the hand. No muscle pain or spasm. He has a good appetite and has been declining additional supplements through peg.   AM Calcium stable at 8.7 corrected---->9.5 with albumin 3.0. This was with reduced calcitriol dose last night from 0.5 to 0.25.   7/18: TSH 27 and FT4 0.66. Today TSH 38 and FT4 0.69.  He is currently receiving calcitriol 0.50mcg in AM and 0.25mcg in PM, calcium carbonate 2500mg TID. Cholecalciferol 4,000 IU daily, and levothyroxine 125mcg PO.      Pt reports the following symptoms:    CONSTITUTIONAL:  Negative fever or chills, feels well, good appetite  EYES:  Negative  blurry vision or double vision  CARDIOVASCULAR:  Negative for chest pain or palpitations  RESPIRATORY:  Negative for cough, wheezing, or SOB   GASTROINTESTINAL:  Negative for nausea, vomiting, diarrhea, constipation, or abdominal pain  GENITOURINARY:  Negative frequency, urgency or dysuria  NEUROLOGIC:  No headache, confusion, dizziness, lightheadedness    MEDICATIONS  (STANDING):  calcitriol  Solution 0.5 MICROGram(s) Oral <User Schedule>  calcitriol  Solution 0.25 MICROGram(s) Oral <User Schedule>  calcium carbonate   Suspension 2500 milliGRAM(s) Oral <User Schedule>  ceFAZolin  Injectable. 2000 milliGRAM(s) IV Push every 8 hours  cholecalciferol 4000 Unit(s) Oral daily  docusate sodium Liquid 100 milliGRAM(s) Oral two times a day  enoxaparin Injectable 40 milliGRAM(s) SubCutaneous every 24 hours  lactated ringers Bolus 500 milliLiter(s) IV Bolus once  levothyroxine 125 MICROGram(s) Oral daily  senna Syrup 10 milliLiter(s) Oral at bedtime    MEDICATIONS  (PRN):  acetaminophen    Suspension .. 650 milliGRAM(s) Oral every 6 hours PRN Temp greater or equal to 38C (100.4F), Mild Pain (1 - 3)  bisacodyl Suppository 10 milliGRAM(s) Rectal daily PRN Constipation  glucagon  Injectable 1 milliGRAM(s) IntraMuscular once PRN Glucose LESS THAN 70 milligrams/deciliter  oxyCODONE    Solution 5 milliGRAM(s) Oral every 4 hours PRN Moderate Pain (4 - 6)  oxyCODONE    Solution 10 milliGRAM(s) Oral every 6 hours PRN Severe Pain (7 - 10)  polyethylene glycol 3350 17 Gram(s) Oral daily PRN Constipation      PHYSICAL EXAM  Vital Signs Last 24 Hrs  T(C): 37.2 (23 Jul 2019 09:12), Max: 37.2 (23 Jul 2019 09:12)  T(F): 99 (23 Jul 2019 09:12), Max: 99 (23 Jul 2019 09:12)  HR: 78 (23 Jul 2019 12:00) (60 - 78)  BP: 92/56 (23 Jul 2019 12:00) (92/56 - 124/57)  BP(mean): 70 (23 Jul 2019 12:00) (70 - 80)  RR: 16 (23 Jul 2019 12:00) (16 - 17)  SpO2: 94% (23 Jul 2019 12:00) (91% - 97%)    Constitutional: wn/wd in NAD.   HEENT: NCAT, MMM, no proptosis or lid retraction  Neck: surgical scar and tracheostomy site - looks wnl.  Respiratory: lungs CTAB.  Cardiovascular: regular rhythm, normal S1 and S2, no audible murmurs, no peripheral edema  GI: soft, NT/ND, no masses. PEG tube in place  Neurology: no tremors, DTR 2+. no muscular spasm  Skin: no visible rashes/lesions  Psychiatric: Awake and alert, normal affect/mood.      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                                                                                                                                                                                                                                                                                                                                                                                                                                                                                                                                                                                                                                                                                                                                                                                                                                                                                                                                                                                                                                                                                                                                                                                                                                                                                                                                                                                                                                                                                                                                                                                                                                                                                                                                                                                                                                                                                                                                                                                                                                                                                                                                                                                                                                                                                                                                                                                                                                                                                                                                                                                                                       1.  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 Please decrease to calcitriol dose to 0.5 mcg QAM at 1000 and 0.25mcg QPM at 2200. We will monitor the calcium level trend and make changes accordingly tomorrow. Continue Calcium carbonate 2500 mg PO TID for now. Check serum Ca every 12 hours. Check albumin every other day. Check phos, ionized ca daily. Check PTH daily.   Vit D level low. Please cont Vit D3 4000IU daily. If corrected calcium goes more than 9.0, we may need to decrease the calcium carbonate dose tomorrow.    2.  Post surgical hypothyroidism - Please continue to levothyroxine 125mcg daily PO, not through peg tube. Levothyroxine should be given separately from other meds, specifically calcium carbonate and food/tube feeds, for adequate absorption.   Please schedule at levothyroxine at 6AM and schedule calcium carb for 10AM, 4PM, and 10PM. Levothyroxine should be given by mouth, not thru feeding tube.     3. Adrenal nodule - CT scan showed 1.4cm adrenal nodule. Can be followed up as an OP.    Will continue to monitor     For discharge, same as above.    Pt can follow up at discharge within 1-2 weeks to check Ca level, TSH and Free T4 with Tahira Flaherty NP at [facility] by calling  to make an appointment.    Patient seen and discussed with  and ENT.

## 2019-07-23 NOTE — DISCHARGE NOTE NURSING/CASE MANAGEMENT/SOCIAL WORK - NSDCPEWEB_GEN_ALL_CORE
abdominal pain
[place] website --- [email or web address] website --- [email or web address]
[facility] website --- [email or web address] website --- [email or web address]

## 2019-07-24 DIAGNOSIS — E27.9 DISORDER OF ADRENAL GLAND, UNSPECIFIED: ICD-10-CM

## 2019-07-24 DIAGNOSIS — Z92.3 PERSONAL HISTORY OF IRRADIATION: ICD-10-CM

## 2019-07-24 DIAGNOSIS — E89.2 POSTPROCEDURAL HYPOPARATHYROIDISM: ICD-10-CM

## 2019-07-24 DIAGNOSIS — E89.0 POSTPROCEDURAL HYPOTHYROIDISM: ICD-10-CM

## 2019-07-24 DIAGNOSIS — E83.51 HYPOCALCEMIA: ICD-10-CM

## 2019-07-24 DIAGNOSIS — R13.10 DYSPHAGIA, UNSPECIFIED: ICD-10-CM

## 2019-07-24 DIAGNOSIS — E44.0 MODERATE PROTEIN-CALORIE MALNUTRITION: ICD-10-CM

## 2019-07-24 DIAGNOSIS — J39.8 OTHER SPECIFIED DISEASES OF UPPER RESPIRATORY TRACT: ICD-10-CM

## 2019-07-24 DIAGNOSIS — J40 BRONCHITIS, NOT SPECIFIED AS ACUTE OR CHRONIC: ICD-10-CM
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
## 2019-07-24 DIAGNOSIS — C32.2 MALIGNANT NEOPLASM OF SUBGLOTTIS: ICD-10-CM

## 2019-07-24 DIAGNOSIS — Z87.891 PERSONAL HISTORY OF NICOTINE DEPENDENCE: ICD-10-CM
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
## 2019-07-24 DIAGNOSIS — C32.9 MALIGNANT NEOPLASM OF LARYNX, UNSPECIFIED: ICD-10-CM

## 2019-07-24 DIAGNOSIS — J95.02 INFECTION OF TRACHEOSTOMY STOMA: ICD-10-CM

## 2019-07-24 DIAGNOSIS — L03.221 CELLULITIS OF NECK: ICD-10-CM

## 2019-07-24 DIAGNOSIS — J44.9 CHRONIC OBSTRUCTIVE PULMONARY DISEASE, UNSPECIFIED: ICD-10-CM

## 2019-07-25 ENCOUNTER — OTHER (OUTPATIENT)
Age: 64
End: 2019-07-25

## 2019-08-05 ENCOUNTER — APPOINTMENT (OUTPATIENT)
Dept: OTOLARYNGOLOGY | Facility: CLINIC | Age: 64
End: 2019-08-05
Payer: MEDICAID

## 2019-08-05 VITALS — SYSTOLIC BLOOD PRESSURE: 120 MMHG | HEART RATE: 73 BPM | OXYGEN SATURATION: 97 % | DIASTOLIC BLOOD PRESSURE: 76 MMHG

## 2019-08-05 PROCEDURE — 99024 POSTOP FOLLOW-UP VISIT: CPT

## 2019-08-06 ENCOUNTER — APPOINTMENT (OUTPATIENT)
Dept: INFECTIOUS DISEASE | Facility: CLINIC | Age: 64
End: 2019-08-06
Payer: MEDICAID

## 2019-08-06 VITALS
TEMPERATURE: 98.6 F | RESPIRATION RATE: 15 BRPM | SYSTOLIC BLOOD PRESSURE: 102 MMHG | DIASTOLIC BLOOD PRESSURE: 69 MMHG | WEIGHT: 139 LBS | OXYGEN SATURATION: 97 % | HEIGHT: 67 IN | BODY MASS INDEX: 21.82 KG/M2 | HEART RATE: 73 BPM

## 2019-08-06 DIAGNOSIS — L03.221 CELLULITIS OF NECK: ICD-10-CM

## 2019-08-06 DIAGNOSIS — L02.11 CELLULITIS OF NECK: ICD-10-CM

## 2019-08-06 PROCEDURE — 99213 OFFICE O/P EST LOW 20 MIN: CPT

## 2019-08-06 NOTE — PHYSICAL EXAM
[General Appearance - Alert] : alert [General Appearance - In No Acute Distress] : in no acute distress [Sclera] : the sclera and conjunctiva were normal [PERRL With Normal Accommodation] : pupils were equal in size, round, reactive to light [Outer Ear] : the ears and nose were normal in appearance [Examination Of The Oral Cavity] : the lips and gums were normal [Oropharynx] : the oropharynx was normal with no thrush [Auscultation Breath Sounds / Voice Sounds] : lungs were clear to auscultation bilaterally [Heart Rate And Rhythm] : heart rate was normal and rhythm regular [Edema] : there was no peripheral edema [Heart Sounds] : normal S1 and S2 [Bowel Sounds] : normal bowel sounds [Abdomen Soft] : soft [Abdomen Tenderness] : non-tender [Costovertebral Angle Tenderness] : no CVA tenderness [Skin Color & Pigmentation] : normal skin color and pigmentation [Musculoskeletal - Swelling] : no joint swelling [] : no rash [FreeTextEntry1] : +PEG

## 2019-08-06 NOTE — REVIEW OF SYSTEMS
[Sputum] : coughing up ~M sputum [As Noted in HPI] : as noted in HPI [Negative] : Neurological [Fever] : no fever [Chills] : no chills [Chest Pain] : no chest pain [Palpitations] : no palpitations [Shortness Of Breath] : no shortness of breath [Abdominal Pain] : no abdominal pain [Vomiting] : no vomiting

## 2019-08-06 NOTE — HISTORY OF PRESENT ILLNESS
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 Patient developed worsening stridor.  CT neck on 6/4 showed new sites of enhancing soft tissue, suspicious for recurrence of laryngeal carcinoma. He saw Dr. Branch on 6/5, he was sent for PET/CT on 6/7 which showed abnormal FDG activity (SUV 14.6) of the glottic and subglottic regions concerning for recurrence. CT chest also showed 2mm SHAKIR pulmonary nodule and 1.4 cm adrenal nodule. Admitted on 6/25 for planned open tracheostomy with biopsy with lesions noted in subglottis, biopsied, frozen showing malignancy. PEG placement on 6/28 2/2 failed SLP eval/difficulty swallowing.  On 7/2 he underwent radical laryngectomy with total thyroidectomy and suspected parathyroidectomy. Post op he was spiking fevers, Tmax 104 on 7/17. Sputum +MSSA on 7/17, blood cx no growth. Patient started on cefazolin and flagyl post-op 7/2-7/11, and was restarted on 7/17.   Patient developed neck swelling after having ABDON drain removed.  CT neck done 7/20 showed cellulitis of right neck and 1.4 cm collection in left pharyngeal anastomotic site – seroma vs. abscess.  Primary ENT team thought low concern for other infectious processes greater than cellulitis.  On 7/22 metronidazole and cefazolin were discontinued and cephalexin 500 mg via peg q 6 h was started.  He completed cephalexin several days ago.  Reports improvement in right neck redness/swelling.

## 2019-08-06 NOTE — ASSESSMENT
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  March 2019 was found to have chondronecrosis of R arytenoid w/ abscess.  PET/CT on 6/7 showed glottic and subglottic regions concerning for recurrence.  S/p open tracheostomy on 6/25 with biopsy with lesions noted in subglottis showing malignancy.  Now s/p radical laryngectomy with total thyroidectomy and suspected parathyroidectomy on 7/2. Post op course c/b fevers and cellulitis of right neck.  He completed 2 week course of cephalexin via peg with improvement in redness and swelling.  He has now been stable off antibiotics for several days.  No indication for additional antibiotics at this time. Follow up PRN.

## 2019-08-07 ENCOUNTER — APPOINTMENT (OUTPATIENT)
Dept: RADIATION ONCOLOGY | Facility: CLINIC | Age: 64
End: 2019-08-07
Payer: MEDICAID

## 2019-08-07 VITALS
OXYGEN SATURATION: 99 % | HEART RATE: 73 BPM | DIASTOLIC BLOOD PRESSURE: 70 MMHG | RESPIRATION RATE: 16 BRPM | SYSTOLIC BLOOD PRESSURE: 108 MMHG

## 2019-08-07 PROCEDURE — 99215 OFFICE O/P EST HI 40 MIN: CPT | Mod: 25

## 2019-08-07 RX ORDER — OXYCODONE HYDROCHLORIDE 5 MG/5ML
5 SOLUTION ORAL
Refills: 0 | Status: DISCONTINUED | COMMUNITY

## 2019-08-07 NOTE — PHYSICAL EXAM
[General Appearance - Well Developed] : well developed [General Appearance - Alert] : alert [General Appearance - In No Acute Distress] : in no acute distress [Thin] : thin [Sclera] : the sclera and conjunctiva were normal [PERRL With Normal Accommodation] : pupils were equal in size, round, reactive to light [Extraocular Movements] : extraocular movements were intact [] : no respiratory distress [Skin Color & Pigmentation] : normal skin color and pigmentation [Normal] : oriented to person, place and time, the affect was normal, the mood was normal and not anxious [Mood] : the mood was normal [Respiration, Rhythm And Depth] : normal respiratory rhythm and effort [Auscultation Breath Sounds / Voice Sounds] : lungs were clear to auscultation bilaterally [Heart Rate And Rhythm] : heart rate and rhythm were normal [Heart Sounds] : normal S1 and S2 [de-identified] : laryngectomy stoma with appliance in place, voice prosthesis

## 2019-08-07 NOTE — REVIEW OF SYSTEMS
[Negative] : Psychiatric [Dysphagia: Grade 0] : Dysphagia: Grade 0 [Nausea: Grade 0] : Nausea: Grade 0 [Vomiting: Grade 0] : Vomiting: Grade 0 [Fatigue: Grade 0] : Fatigue: Grade 0 [Localized Edema: Grade 0] : Localized Edema: Grade 0  [Neck Edema: Grade 0] : Neck Edema: Grade 0 [Mucositis Oral: Grade 0] : Mucositis Oral: Grade 0  [Oral Pain: Grade 0] : Oral Pain: Grade 0 [Salivary duct inflammation: Grade 0] : Salivary duct inflammation: Grade 0 [Dysgeusia: Grade 0] : Dysgeusia: Grade 0 [Skin Induration: Grade 0] : Skin Induration: Grade 0 [Dermatitis Radiation: Grade 0] : Dermatitis Radiation: Grade 0 [FreeTextEntry2] : >20lb unintentional weight loss over the past 4 months [FreeTextEntry4] : see HPI [FreeTextEntry6] : see HPI

## 2019-08-07 NOTE — VITALS
[80: Normal activity with effort; some signs or symptoms of disease.] : 80: Normal activity with effort; some signs or symptoms of disease.

## 2019-08-07 NOTE — HISTORY OF PRESENT ILLNESS
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 He tolerated treatment well.  His post-RT course was complicated by laryngeal edema requiring a tracheostomy in Feb 2018.  \par \par 8/7/19 - SNA\par Mr. Alfonso returns today for SNA.  He was last seen here on 6/10/19, at which time he was found to have findings suspicious for recurrence on surveillance CT in the setting of dyspnea and stridor.  He was recommended direct laryngoscopy with biopsy and immediate laryngectomy by Dr. Branch, however, he declined immediate laryngectomy and agreed only to laryngoscopy with biopsy.  He underwent laryngoscopy with biopsy on 6/25/19 by Dr. Branch and required a tracheostomy for insufficient airway at the time of the procedure.  He was found to have subglottic recurrence.  Pathology demonstrated subglottic tissue infiltrated with well differentiated keratinizing squamous cell carcinoma.  He then underwent an urgent total laryngectomy and total thyroidectomy with bilateral tracheoesophageal groove dissections and pectoralis flap reconstruction by Dr. Branch and Dr. Tolliver on 7/2/19.  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  He presents today for consideration of adjuvant RT.  \par \par Today, he reports feeling well.  He states he is still in a rehab facility, but feels ready to be discharged home soon.  He denies pain, endorses mild soreness to his neck.  He states he has been eating a regular diet for the past 9 days.  He denies fevers, chills, dyspnea, chest pain, palpitations.  \par \par Surgical Final Report 7/2/19\par Final Diagnosis\par 1. Superior pharyngeal margin #1, excision:\par - Negative for tumor\par 2. Left lateral pharyngeal wall margin, excision:\par - Negative for tumor\par 3. Right lateral peripheral wall margin, excision:\par - Negative for tumor\par 4. Post cricoid mucosa, biopsy:\par - Squamous cell carcinoma\par 5. Anterior inferior tracheal margin, excision:\par - Negative for tumor\par 6. Larynx, total laryngectomy:\par - Squamous cell carcinoma, keratinizing, moderately\par differentiated, 4.5 cm\par - Tumor is bilaterally transglottic (supraglottis, true and\par false vocal cords, subglottis)\par with involvement of the trachea\par - Tumor extends into the periglottic soft tissue space,\par invades the thyroid gland\par bilaterally and the thyroid cartilages anterior soft\par tissues and stomal skin\par - Focally suspicious for perineural involvement\par - No convincing evidence of lymphovascular invasion\par - Tumor focally present on posterior soft tissue margin and\par present on left and right\par posterior cricoid / arytenoid mucosal margins\par - Remaining margins negative for tumor\par 7. Posterior cricoid mucosa #2:\par - Negative for tumor\par 8. Posterior cricoid mucosa, excision:\par - Squamous cell carcinoma with perineural and intraneural\par invasion\par - Tumor present on inked margin\par \par Comment:\par Tumor on the posterior soft tissue margin (slide 6L) is best seen\par on level 1 of several levels.\par \par Synoptic Summary\par Specimen: larynx\par Procedure: total laryngectomy\par Specimen integrity: intact\par Specimen size: 9.5 x 6.0 x 5.3 cm\par Tumor laterality: bilateral\par Tumor site: transglottic\par Tumor focality: single focus\par Tumor size: 4.5 cm\par Tumor description: soft ulcerated mass\par Macroscopic extent of tumor: into thyroid gland bilaterally\par paraglottic, pre-epiglottic spaces, anterior, lateral and\par posterior soft tissues\par Histologic type:  keratinizing squamous cell carcinoma\par Histologic grade:  moderately differentiated\par Microscopic tumor extent: as above\par Margins:  posterior soft tissue margin positive for tumor\par Lymphovascular invasion: not convincingly identified\par Perineural invasion: present\par Lymph nodes: not received\par Pathologic stage: pT4a  pNX\par \par 6/10/19 - Follow-up\par Mr. Alfonso returns today for routine follow-up.  He was last seen here on 2/19/19.  Plan at that time was for CT neck with contrast and CT chest without contrast in June and follow-up with PCP, dental and Dr. Branch.  \par \par CT neck with contrast 6/4/19 - IMPRESSION: There are new sites of enhancing soft tissue, most notably along the anterior tracheal wall, suspicious for recurrence of laryngeal carcinoma. Consider correlation with PET/CT for further characterization. Airway narrowing is present at the subglottic larynx and proximal trachea.\par \par CT chest without contrast 6/4/19 - IMPRESSION: 2 mm left upper lobe pulmonary nodule. 1.4 cm left adrenal nodule, not clearly seen on the prior study. Follow-up CT recommended. \par \par He was recently admitted to the hospital at the end of March 2019, for dyspnea and placed on steroids and antibiotics for glottic edema and stridor with resolution of stridor from medical therapy.  He states dyspnea initially improved following that admission, but then worsened again. He was discharged with cefpodoxime and flagyl. He saw Dr. Branch 4/8/19 at which time he was improved. He saw Dr. Branch again on 6/5, at which time he was having worsening stridor.  Plan was for PET/CT to further evaluate for recurrence; discussed possible laryngectomy given respiratory distress from paralyzed right tVC, decreased glottic opening, left glottic edema.  \par \par PET/CT 6/7/19 - There is abnormal FDG activity (SUV 14.6) of the glottic and subglottic regions with extension inferiorly along the anterior tracheal wall.  The superiormost extent of activity is in the supraglottic larynx at the level of the superior most portion of the thyroid cartilages.  Small additional focus of activity posterolateral to the left arytenoid cartilage.  No FDG avid lymphadenopathy.  \par Impression: Abnormal FDG activity extending from the posterior aspect of the supraglottic larynx to the subglottic region and along the anterior wall of the trachea suspicious for tumor recurrence.  \par \par He has not seen the dentist since prior to RT.  He does not currently have a PMD.  Today, he reports continued dyspnea, unchanged over the past month.  He states it is mostly with exertion, but sometimes at rest.  He notes productive cough of white sputum, mild sore throat, and mild xerostomia at night.  He denies dysphagia, odynophagia.  Reports he is eating a regular diet, but has decreased appetite lately.  \par \par 2/19/19- FOLLOW UP \par When he was last seen on 10/9/18, he was doing well, LILIANA. Plan was to continue follow up with Dr. Branch and do CT neck in 4 months. Patient saw Dr. Branch on 12/3/18. He reported voice and swallowing were stable, no neck masses, no weight loss. Plan was to RTC 4 months (appointment scheduled for April). \par \par CT scan done 2/12/19 revealed the following: \par Since 10/7/18, there is no significant interval change in CT appearance of the treated larynx. Stable fullness/nodularity of the right true vocal cord. No further chondromalacia is evident. \par \par Today he reports he is feeling pretty well. Only complaint is more shortness of breath when he climbs the stairs of his 4-5 floor walk up at home. He says no dyspnea at rest. Denies neck, throat or mouth pain. Denies dysphagia; eating a regular diet and has gained 12 pounds since December. Reports taste is normal. Continues to do neck exercises daily. Denies any other c/o to include fever, chills, CP. He reports that he continues to abstain from smoking or drinking (quit smoking Feb 2018).  Voice is overall improved from last year.\par \par ______________________________\par 10/9/18- Follow up\par At his last visit he was eating and drinking normally. He was advised to f/u with Dr Branch and return in 3 months with imaging PET/CT and CT neck. He f/u with Dr. Branch 8/2018. \par \par PET/CT done 10/7/18- No evidence of residual, recurrent, or metastatic disease identified. Increased uptake in the left vocal cord is in compensation for the fixed right vocal cord. \par \par CT neck done 10/7/18 showed the following: There is again evident nodularity of the right true vocal fold, without appreciable change since the prior CT study. Again evident is absent mineralization of the right arytenoid cartilage, which had been calcified prior to treatment. There is now a greater degree of fragmentation of the superior cricoid lamina. Given absence of suspicious FDG uptake in this region, findings within the laryngeal cartilages are felt more compatible with chondronecrosis than tumor recurrence. There is persistent though diminished edema in the supraglottic larynx compatible with radiation changes.\par *  Lymph Nodes: No cervical lymphadenopathy.\par *  Salivary Glands: Generalized increased density and decreased size, compatible with radiation effects.\par \par Today, he reports he is feeling well. Denies neck pain, mouth pain. Reports sore throat at times. Continues to have hoarseness. Mouth dryness has improved. Taste is normal, able to eat and drink and swallow all textures. He has gained 4 pounds in the past two months. Continues to do neck exercises. Reports breathing is good, denies SOB. Denies smoking or drinking (quit smoking Feb 2018).\par \par 7/2/18- Follow up\par CT neck on 6/29/18 showed now absence of the previously mineralized right arytenoid process, as well as subtle erosion of the superior right lateral cricoid cartilage, present in association with nodularity of the right true vocal fold. Differential includes both recurrent disease and chondronecrosis. Consider repeat PET/CT and/or/follow up neck CT in approximately 3 months. There was also a hypodensity in the right inferior frontal lone, possibly posttraumatic. This was present in 7/2017. Consider brain imaging. \par \par Today Mr. Alfonso is feeling well. He was decannulated in the interim since we saw him last. Breathing is good, walked about 10 blocks today without increased shortness of breath. Had mild SOB yesterday. Trach site closed. Denies pain to neck, denies lumps or bumps to neck. Mouth is sometimes dry, taste is normal. Able to eat and drink all textures. Has gained 17 pounds since april. His only concern is cough/phlegm and a hoarse voice.\par \par 7/2/18- \par Mr. Alfonso presents today for routine follow up. He was decannulated in the interim since we last saw him. \par \par Oncology History:\par Mr. 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 Branch on 4/5/17 after bring referred by Dr. King following investigation for hoarseness showing mass on flexible laryngoscopy.  He presented with 8 months- 1 year of increasing hoarseness on background of ~60 pack year smoking history. He denied pain, discomfort, otalgia, dysphagia, odynophagia, loss of weight, hemoptysis.  He was scheduled for surgery after this, however there were issues with his insurance which resulted in surgery cancellation. \par \par He eventually underwent DL and biopsy on 6/20/17. Pathology shows infiltrating squamous cell carcinoma of the right anterior vocal cord, the right supraglottis, the anterior commissure, and right vocal cord process, and right superior supraglottis. Left vocal cord process shows SCC, predominantly in situ, with focal superficial invasion. \par \par He has not had any imaging studies. His case was reviewed at multidisciplinary head and neck oncology tumor Board on July 10 and the recommendation was for definitive radiation therapy since  surgery would involve a total laryngectomy.\par \par He notes persistent hoarseness at this time. He denies coughing, trouble swallowing, bleeding, or pain. He has lost some weight that he attributes to anxiety. He works about 5-6 hours per day 5 days a week at [employer]. He has not seen a dentist in years. He continues to smoke 1ppd and has never tried quitting. \par \par \par 11/24/17 - Mr. Alfonso presents today for post treatment evaluation. Today reports feeling well - appetite good, swallowing better with soft textured food but able to eat most foods. Plans on resuming work at [employer] in 5 days. \par Reports:\par - intermittent cough for white secretions-smoking half pack/day-smoking cessation advised\par - dysgeusia, dry mouth-advised bicarb mouth rinses, increase fluid intake\par - minimal pain (only when swallowing), fever, night sweats, mucositis - using gabapentin 2 tabs bid\par - not using prevident toothpaste-never did\par \par 1/23/18- Mr. Alfonso presents today for routine follow up. He is feeling well. PO intake is good, weight stable. He is able to eat all textures of food when he is mindful of chewing. He is coughing a lot, and has a frequent tickle in his throat. His throat becomes sore when he talks frequently. He has reduced the amount that he is smoking, and is currently smoking less than a pack per day. he plans to quit over the next 10 days. \par He is doing head and neck exercises daily. He is not using prevident. He underwent PET scan on 1/22/18, report from which is not yet available. I called radiology and radiologist was not available. \par \par 4/24/18- Mr. Alfonso presents today for follow up. Since he saw us last in January he underwent a PET scan which showed LILIANA. \par In February Mr. Alfonso became persistently hoarse with worsening airway. he was taken to OR for awake tracheostomy and biopsy. Biopsy was negative on 2/27/18.  He was discharged to rehab. He saw Dr. Branch on 4/23, and he is planning for possible decannulation in 3 weeks. \par Today pt. is getting over URI.  He notes pain with swallowing. Lost 17 lbs since last visit. No other new complaints. Taking tylenol only.

## 2019-08-26 ENCOUNTER — APPOINTMENT (OUTPATIENT)
Dept: INTERNAL MEDICINE | Facility: CLINIC | Age: 64
End: 2019-08-26
Payer: MEDICAID

## 2019-08-26 VITALS
RESPIRATION RATE: 16 BRPM | BODY MASS INDEX: 22.6 KG/M2 | OXYGEN SATURATION: 99 % | DIASTOLIC BLOOD PRESSURE: 64 MMHG | SYSTOLIC BLOOD PRESSURE: 107 MMHG | HEIGHT: 67 IN | TEMPERATURE: 98.5 F | WEIGHT: 144 LBS | HEART RATE: 73 BPM

## 2019-08-26 DIAGNOSIS — Z13.6 ENCOUNTER FOR SCREENING FOR CARDIOVASCULAR DISORDERS: ICD-10-CM

## 2019-08-26 DIAGNOSIS — Z11.59 ENCOUNTER FOR SCREENING FOR OTHER VIRAL DISEASES: ICD-10-CM

## 2019-08-26 DIAGNOSIS — R79.1 ABNORMAL COAGULATION PROFILE: ICD-10-CM

## 2019-08-26 DIAGNOSIS — R91.1 SOLITARY PULMONARY NODULE: ICD-10-CM

## 2019-08-26 LAB — APTT BLD: 48 SEC

## 2019-08-26 PROCEDURE — 99214 OFFICE O/P EST MOD 30 MIN: CPT | Mod: 25,GC

## 2019-08-26 PROCEDURE — 36415 COLL VENOUS BLD VENIPUNCTURE: CPT

## 2019-08-26 RX ORDER — OXYCODONE HYDROCHLORIDE 5 MG/5ML
5 SOLUTION ORAL
Refills: 0 | Status: DISCONTINUED | COMMUNITY
End: 2019-08-26

## 2019-08-26 RX ORDER — CA/D3/MAG OX/ZINC/COP/MANG/BOR 600 MG-800
250 MCG TABLET,CHEWABLE ORAL DAILY
Refills: 0 | Status: DISCONTINUED | COMMUNITY
End: 2019-08-26

## 2019-08-26 RX ORDER — CALCITRIOL 1 UG/ML
1 SOLUTION ORAL TWICE DAILY
Refills: 0 | Status: DISCONTINUED | COMMUNITY
End: 2019-08-26

## 2019-08-26 RX ORDER — CEPHALEXIN 250 MG/5ML
250 FOR SUSPENSION ORAL
Refills: 0 | Status: DISCONTINUED | COMMUNITY
End: 2019-08-26

## 2019-08-26 NOTE — ASSESSMENT
[FreeTextEntry1] : 63M with recurrent larynx ca s/p total laryngectomy, doing well with oral electrolarynx and PEG.\par \par Plan:\par - Pt can be discharged to home from the nursing facility.\par - Plan TEP after completion of adjuvant therapy.\par - Continue PO diet, maintain PEG during adjuvant therapy.\par - Referred pt to see Dr. Asif today for RT consult.\par - RTC after completion of RT or sooner should any worrisome symptoms arise.\par - Pt verbalized understanding of above.

## 2019-08-26 NOTE — REASON FOR VISIT
[Post-Operative Visit] : a post-operative visit [FreeTextEntry2] : s/p total laryngectomy, larynx SCC

## 2019-08-26 NOTE — ADDENDUM
[FreeTextEntry1] : Speech Pathology:\par \par Pt seen today in conjunction with Dr. Branch in clinic.\par \par Pt with recurrent laryngeal SCCa, s/p total laryngectomy on 7/2/19.  He is now doing well with use of electrolarynx for communication.  Was initially using device with intraoral adapter but achieved better voicing and better speech intelligibility with R neck placement.  As such, he was advised to do R neck placement of electrolarynx at this time.  He is also using Nasrin Tube and HME cassettes provided by his nursing facility.  He is now on a full po diet of mechanical soft solids and thin liquids ands wants PEG d/c'ed.  However, pt advised to keep PEG in while he undergoes adjuvant RT in case po decreases during treatment if he develops mucositis, odynophagia, or worsened dysphagia.  Pt agreed.\par \par Boaz Davis MS, CCC-SLP, BCS-S\par Senior Speech Pathologist\par \par \par

## 2019-08-26 NOTE — HISTORY OF PRESENT ILLNESS
[de-identified] : 63M with larynx cancer s/p definitive RT completed in October 2017 with recurrence.  He is s/p 7/2/19 total laryngectomy, total thyroidectomy with bilateral tracheoesophageal groove dissection and pec flap.  Final path: [id number] moderately differentiated, keratinizing 4.5cm bilateral transglottic SCC, + PNI, no evidence of LVI.  \par \par He is doing well in the rehab facility, using an oral electrolarynx, eating PO diet with PEG in place.  He denies fever, chills, dyspnea, chest pain.

## 2019-08-26 NOTE — PHYSICAL EXAM
[Normal] : orientation to person, place, and time: normal [de-identified] : laryngectomy stoma well healed, no erythema or maceration, mild secretions

## 2019-08-29 LAB
APTT BLD: 44.2 SEC
CHOLEST SERPL-MCNC: 210 MG/DL
CHOLEST/HDLC SERPL: 3 RATIO
HCV AB SER QL: NONREACTIVE
HCV S/CO RATIO: 0.08 S/CO
HDLC SERPL-MCNC: 71 MG/DL
LDLC SERPL CALC-MCNC: 121 MG/DL
TRIGL SERPL-MCNC: 90 MG/DL
TSH SERPL-ACNC: 2.54 UIU/ML

## 2019-08-30 RX ORDER — SODIUM CHLORIDE 9 MG/ML
1000 INJECTION INTRAMUSCULAR; INTRAVENOUS; SUBCUTANEOUS ONCE
Refills: 0 | Status: COMPLETED | OUTPATIENT
Start: 2019-09-03 | End: 2019-09-03

## 2019-08-30 RX ORDER — SODIUM CHLORIDE 9 MG/ML
1000 INJECTION INTRAMUSCULAR; INTRAVENOUS; SUBCUTANEOUS ONCE
Refills: 0 | Status: COMPLETED | OUTPATIENT
Start: 2019-09-24 | End: 2019-09-24

## 2019-09-03 ENCOUNTER — OUTPATIENT (OUTPATIENT)
Dept: OUTPATIENT SERVICES | Facility: HOSPITAL | Age: 64
LOS: 1 days | End: 2019-09-03
Payer: COMMERCIAL

## 2019-09-03 ENCOUNTER — APPOINTMENT (OUTPATIENT)
Age: 64
End: 2019-09-03

## 2019-09-03 VITALS
WEIGHT: 146.83 LBS | HEIGHT: 67 IN | DIASTOLIC BLOOD PRESSURE: 70 MMHG | TEMPERATURE: 98 F | OXYGEN SATURATION: 99 % | HEART RATE: 70 BPM | RESPIRATION RATE: 18 BRPM | SYSTOLIC BLOOD PRESSURE: 115 MMHG

## 2019-09-03 VITALS
TEMPERATURE: 98 F | RESPIRATION RATE: 18 BRPM | DIASTOLIC BLOOD PRESSURE: 70 MMHG | HEART RATE: 82 BPM | OXYGEN SATURATION: 99 % | SYSTOLIC BLOOD PRESSURE: 112 MMHG

## 2019-09-03 DIAGNOSIS — Z41.9 ENCOUNTER FOR PROCEDURE FOR PURPOSES OTHER THAN REMEDYING HEALTH STATE, UNSPECIFIED: Chronic | ICD-10-CM

## 2019-09-03 DIAGNOSIS — Z98.890 OTHER SPECIFIED POSTPROCEDURAL STATES: Chronic | ICD-10-CM

## 2019-09-03 DIAGNOSIS — C32.9 MALIGNANT NEOPLASM OF LARYNX, UNSPECIFIED: ICD-10-CM

## 2019-09-03 LAB
ALBUMIN SERPL ELPH-MCNC: 4.3 G/DL — SIGNIFICANT CHANGE UP (ref 3.3–5)
ALP SERPL-CCNC: 54 U/L — SIGNIFICANT CHANGE UP (ref 40–120)
ALT FLD-CCNC: 18 U/L — SIGNIFICANT CHANGE UP (ref 10–45)
ANION GAP SERPL CALC-SCNC: 11 MMOL/L — SIGNIFICANT CHANGE UP (ref 5–17)
AST SERPL-CCNC: 22 U/L — SIGNIFICANT CHANGE UP (ref 10–40)
BASOPHILS # BLD AUTO: 0.06 K/UL — SIGNIFICANT CHANGE UP (ref 0–0.2)
BASOPHILS NFR BLD AUTO: 1 % — SIGNIFICANT CHANGE UP (ref 0–2)
BILIRUB SERPL-MCNC: 0.2 MG/DL — SIGNIFICANT CHANGE UP (ref 0.2–1.2)
BUN SERPL-MCNC: 17 MG/DL — SIGNIFICANT CHANGE UP (ref 7–23)
CALCIUM SERPL-MCNC: 10.3 MG/DL — SIGNIFICANT CHANGE UP (ref 8.4–10.5)
CHLORIDE SERPL-SCNC: 100 MMOL/L — SIGNIFICANT CHANGE UP (ref 96–108)
CO2 SERPL-SCNC: 31 MMOL/L — SIGNIFICANT CHANGE UP (ref 22–31)
CREAT SERPL-MCNC: 1.05 MG/DL — SIGNIFICANT CHANGE UP (ref 0.5–1.3)
EOSINOPHIL # BLD AUTO: 0.09 K/UL — SIGNIFICANT CHANGE UP (ref 0–0.5)
EOSINOPHIL NFR BLD AUTO: 1.6 % — SIGNIFICANT CHANGE UP (ref 0–6)
GLUCOSE SERPL-MCNC: 103 MG/DL — HIGH (ref 70–99)
HCT VFR BLD CALC: 39.3 % — SIGNIFICANT CHANGE UP (ref 39–50)
HGB BLD-MCNC: 12.7 G/DL — LOW (ref 13–17)
IMM GRANULOCYTES NFR BLD AUTO: 0.2 % — SIGNIFICANT CHANGE UP (ref 0–1.5)
LYMPHOCYTES # BLD AUTO: 0.88 K/UL — LOW (ref 1–3.3)
LYMPHOCYTES # BLD AUTO: 15.3 % — SIGNIFICANT CHANGE UP (ref 13–44)
MCHC RBC-ENTMCNC: 30.3 PG — SIGNIFICANT CHANGE UP (ref 27–34)
MCHC RBC-ENTMCNC: 32.3 GM/DL — SIGNIFICANT CHANGE UP (ref 32–36)
MCV RBC AUTO: 93.8 FL — SIGNIFICANT CHANGE UP (ref 80–100)
MONOCYTES # BLD AUTO: 0.66 K/UL — SIGNIFICANT CHANGE UP (ref 0–0.9)
MONOCYTES NFR BLD AUTO: 11.5 % — SIGNIFICANT CHANGE UP (ref 2–14)
NEUTROPHILS # BLD AUTO: 4.06 K/UL — SIGNIFICANT CHANGE UP (ref 1.8–7.4)
NEUTROPHILS NFR BLD AUTO: 70.4 % — SIGNIFICANT CHANGE UP (ref 43–77)
NRBC # BLD: 0 /100 WBCS — SIGNIFICANT CHANGE UP (ref 0–0)
PLATELET # BLD AUTO: 250 K/UL — SIGNIFICANT CHANGE UP (ref 150–400)
POTASSIUM SERPL-MCNC: 4.7 MMOL/L — SIGNIFICANT CHANGE UP (ref 3.5–5.3)
POTASSIUM SERPL-SCNC: 4.7 MMOL/L — SIGNIFICANT CHANGE UP (ref 3.5–5.3)
PROT SERPL-MCNC: 7.3 G/DL — SIGNIFICANT CHANGE UP (ref 6–8.3)
RBC # BLD: 4.19 M/UL — LOW (ref 4.2–5.8)
RBC # FLD: 14.7 % — HIGH (ref 10.3–14.5)
SODIUM SERPL-SCNC: 142 MMOL/L — SIGNIFICANT CHANGE UP (ref 135–145)
WBC # BLD: 5.76 K/UL — SIGNIFICANT CHANGE UP (ref 3.8–10.5)
WBC # FLD AUTO: 5.76 K/UL — SIGNIFICANT CHANGE UP (ref 3.8–10.5)

## 2019-09-03 PROCEDURE — 96361 HYDRATE IV INFUSION ADD-ON: CPT

## 2019-09-03 PROCEDURE — 96413 CHEMO IV INFUSION 1 HR: CPT

## 2019-09-03 PROCEDURE — 36415 COLL VENOUS BLD VENIPUNCTURE: CPT

## 2019-09-03 PROCEDURE — 96375 TX/PRO/DX INJ NEW DRUG ADDON: CPT

## 2019-09-03 PROCEDURE — 85025 COMPLETE CBC W/AUTO DIFF WBC: CPT

## 2019-09-03 PROCEDURE — 80053 COMPREHEN METABOLIC PANEL: CPT

## 2019-09-03 RX ORDER — CISPLATIN 1 MG/ML
70 INJECTION, SOLUTION INTRAVENOUS ONCE
Refills: 0 | Status: COMPLETED | OUTPATIENT
Start: 2019-09-03 | End: 2019-09-03

## 2019-09-03 RX ORDER — DEXAMETHASONE 0.5 MG/5ML
10 ELIXIR ORAL ONCE
Refills: 0 | Status: DISCONTINUED | OUTPATIENT
Start: 2019-09-03 | End: 2019-09-03

## 2019-09-03 RX ORDER — SODIUM CHLORIDE 9 MG/ML
1000 INJECTION INTRAMUSCULAR; INTRAVENOUS; SUBCUTANEOUS ONCE
Refills: 0 | Status: COMPLETED | OUTPATIENT
Start: 2019-09-03 | End: 2019-09-03

## 2019-09-03 RX ORDER — DEXAMETHASONE 0.5 MG/5ML
12 ELIXIR ORAL ONCE
Refills: 0 | Status: COMPLETED | OUTPATIENT
Start: 2019-09-03 | End: 2019-09-03

## 2019-09-03 RX ORDER — DIPHENHYDRAMINE HCL 50 MG
25 CAPSULE ORAL ONCE
Refills: 0 | Status: COMPLETED | OUTPATIENT
Start: 2019-09-03 | End: 2019-09-03

## 2019-09-03 RX ORDER — ONDANSETRON 8 MG/1
16 TABLET, FILM COATED ORAL ONCE
Refills: 0 | Status: COMPLETED | OUTPATIENT
Start: 2019-09-03 | End: 2019-09-03

## 2019-09-03 RX ADMIN — SODIUM CHLORIDE 1000 MILLILITER(S): 9 INJECTION INTRAMUSCULAR; INTRAVENOUS; SUBCUTANEOUS at 12:55

## 2019-09-03 RX ADMIN — Medication 204.8 MILLIGRAM(S): at 10:30

## 2019-09-03 RX ADMIN — SODIUM CHLORIDE 1000 MILLILITER(S): 9 INJECTION INTRAMUSCULAR; INTRAVENOUS; SUBCUTANEOUS at 09:30

## 2019-09-03 RX ADMIN — ONDANSETRON 16 MILLIGRAM(S): 8 TABLET, FILM COATED ORAL at 11:15

## 2019-09-03 RX ADMIN — ONDANSETRON 232 MILLIGRAM(S): 8 TABLET, FILM COATED ORAL at 11:00

## 2019-09-03 RX ADMIN — Medication 12 MILLIGRAM(S): at 10:45

## 2019-09-03 RX ADMIN — Medication 202 MILLIGRAM(S): at 10:45

## 2019-09-03 RX ADMIN — SODIUM CHLORIDE 1000 MILLILITER(S): 9 INJECTION INTRAMUSCULAR; INTRAVENOUS; SUBCUTANEOUS at 10:30

## 2019-09-03 RX ADMIN — CISPLATIN 70 MILLIGRAM(S): 1 INJECTION, SOLUTION INTRAVENOUS at 12:50

## 2019-09-03 RX ADMIN — SODIUM CHLORIDE 1000 MILLILITER(S): 9 INJECTION INTRAMUSCULAR; INTRAVENOUS; SUBCUTANEOUS at 13:55

## 2019-09-03 RX ADMIN — CISPLATIN 213.33 MILLIGRAM(S): 1 INJECTION, SOLUTION INTRAVENOUS at 11:20

## 2019-09-03 RX ADMIN — Medication 25 MILLIGRAM(S): at 11:00

## 2019-09-04 VITALS
SYSTOLIC BLOOD PRESSURE: 107 MMHG | DIASTOLIC BLOOD PRESSURE: 70 MMHG | BODY MASS INDEX: 23.59 KG/M2 | OXYGEN SATURATION: 100 % | WEIGHT: 150.6 LBS | RESPIRATION RATE: 16 BRPM | HEART RATE: 73 BPM

## 2019-09-04 NOTE — VITALS
[80: Normal activity with effort; some signs or symptoms of disease.] : 80: Normal activity with effort; some signs or symptoms of disease.  [Maximal Pain Intensity: 0/10] : 0/10 [Least Pain Intensity: 0/10] : 0/10

## 2019-09-04 NOTE — HISTORY OF PRESENT ILLNESS
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 He tolerated treatment well.  His post-RT course was complicated by laryngeal edema requiring a tracheostomy in Feb 2018.  He was found to have recurrence in June 2019, s/p total laryngectomy, total thyroidectomy and reconsturction on 7/2/19 by Jos Branch and Sharee.  \par \par 9/4/19 - OTV - Mr. Alfonso has completed 400cGy / 5000cGy to the larynx.  Today, he reports feeling well.  He states he has been discharged home from rehab.  He has been advanced to a full regular diet, which he states he is tolerating well.  No longer using PEG for tube feedings.  He denies pain and dyspnea.  Started chemotherapy under the care of Dr. Real yesterday.  Most recent CBC on 8/28 was WNL.  \par ________________________________________\par 8/7/19 - SNA\par Mr. Alfonso returns today for SNA.  He was last seen here on 6/10/19, at which time he was found to have findings suspicious for recurrence on surveillance CT in the setting of dyspnea and stridor.  He was recommended direct laryngoscopy with biopsy and immediate laryngectomy by Dr. Branch, however, he declined immediate laryngectomy and agreed only to laryngoscopy with biopsy.  He underwent laryngoscopy with biopsy on 6/25/19 by Dr. Branch and required a tracheostomy for insufficient airway at the time of the procedure.  He was found to have subglottic recurrence.  Pathology demonstrated subglottic tissue infiltrated with well differentiated keratinizing squamous cell carcinoma.  He then underwent an urgent total laryngectomy and total thyroidectomy with bilateral tracheoesophageal groove dissections and pectoralis flap reconstruction by Dr. Branch and Dr. Tolliver on 7/2/19.  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  He presents today for consideration of adjuvant RT.  \par \par Today, he reports feeling well.  He states he is still in a rehab facility, but feels ready to be discharged home soon.  He denies pain, endorses mild soreness to his neck.  He states he has been eating a regular diet for the past 9 days.  He denies fevers, chills, dyspnea, chest pain, palpitations.  \par \par Surgical Final Report 7/2/19\par Final Diagnosis\par 1. Superior pharyngeal margin #1, excision:\par - Negative for tumor\par 2. Left lateral pharyngeal wall margin, excision:\par - Negative for tumor\par 3. Right lateral peripheral wall margin, excision:\par - Negative for tumor\par 4. Post cricoid mucosa, biopsy:\par - Squamous cell carcinoma\par 5. Anterior inferior tracheal margin, excision:\par - Negative for tumor\par 6. Larynx, total laryngectomy:\par - Squamous cell carcinoma, keratinizing, moderately\par differentiated, 4.5 cm\par - Tumor is bilaterally transglottic (supraglottis, true and\par false vocal cords, subglottis)\par with involvement of the trachea\par - Tumor extends into the periglottic soft tissue space,\par invades the thyroid gland\par bilaterally and the thyroid cartilages anterior soft\par tissues and stomal skin\par - Focally suspicious for perineural involvement\par - No convincing evidence of lymphovascular invasion\par - Tumor focally present on posterior soft tissue margin and\par present on left and right\par posterior cricoid / arytenoid mucosal margins\par - Remaining margins negative for tumor\par 7. Posterior cricoid mucosa #2:\par - Negative for tumor\par 8. Posterior cricoid mucosa, excision:\par - Squamous cell carcinoma with perineural and intraneural\par invasion\par - Tumor present on inked margin\par \par Comment:\par Tumor on the posterior soft tissue margin (slide 6L) is best seen\par on level 1 of several levels.\par \par Synoptic Summary\par Specimen: larynx\par Procedure: total laryngectomy\par Specimen integrity: intact\par Specimen size: 9.5 x 6.0 x 5.3 cm\par Tumor laterality: bilateral\par Tumor site: transglottic\par Tumor focality: single focus\par Tumor size: 4.5 cm\par Tumor description: soft ulcerated mass\par Macroscopic extent of tumor: into thyroid gland bilaterally\par paraglottic, pre-epiglottic spaces, anterior, lateral and\par posterior soft tissues\par Histologic type:  keratinizing squamous cell carcinoma\par Histologic grade:  moderately differentiated\par Microscopic tumor extent: as above\par Margins:  posterior soft tissue margin positive for tumor\par Lymphovascular invasion: not convincingly identified\par Perineural invasion: present\par Lymph nodes: not received\par Pathologic stage: pT4a  pNX\par \par 6/10/19 - Follow-up\par Mr. Alfonso returns today for routine follow-up.  He was last seen here on 2/19/19.  Plan at that time was for CT neck with contrast and CT chest without contrast in June and follow-up with PCP, dental and Dr. Branch.  \par \par CT neck with contrast 6/4/19 - IMPRESSION: There are new sites of enhancing soft tissue, most notably along the anterior tracheal wall, suspicious for recurrence of laryngeal carcinoma. Consider correlation with PET/CT for further characterization. Airway narrowing is present at the subglottic larynx and proximal trachea.\par \par CT chest without contrast 6/4/19 - IMPRESSION: 2 mm left upper lobe pulmonary nodule. 1.4 cm left adrenal nodule, not clearly seen on the prior study. Follow-up CT recommended. \par \par He was recently admitted to the hospital at the end of March 2019, for dyspnea and placed on steroids and antibiotics for glottic edema and stridor with resolution of stridor from medical therapy.  He states dyspnea initially improved following that admission, but then worsened again. He was discharged with cefpodoxime and flagyl. He saw Dr. Branch 4/8/19 at which time he was improved. He saw Dr. Branch again on 6/5, at which time he was having worsening stridor.  Plan was for PET/CT to further evaluate for recurrence; discussed possible laryngectomy given respiratory distress from paralyzed right tVC, decreased glottic opening, left glottic edema.  \par \par PET/CT 6/7/19 - There is abnormal FDG activity (SUV 14.6) of the glottic and subglottic regions with extension inferiorly along the anterior tracheal wall.  The superiormost extent of activity is in the supraglottic larynx at the level of the superior most portion of the thyroid cartilages.  Small additional focus of activity posterolateral to the left arytenoid cartilage.  No FDG avid lymphadenopathy.  \par Impression: Abnormal FDG activity extending from the posterior aspect of the supraglottic larynx to the subglottic region and along the anterior wall of the trachea suspicious for tumor recurrence.  \par \par He has not seen the dentist since prior to RT.  He does not currently have a PMD.  Today, he reports continued dyspnea, unchanged over the past month.  He states it is mostly with exertion, but sometimes at rest.  He notes productive cough of white sputum, mild sore throat, and mild xerostomia at night.  He denies dysphagia, odynophagia.  Reports he is eating a regular diet, but has decreased appetite lately.  \par \par 2/19/19- FOLLOW UP \par When he was last seen on 10/9/18, he was doing well, LILIANA. Plan was to continue follow up with Dr. Branch and do CT neck in 4 months. Patient saw Dr. Branch on 12/3/18. He reported voice and swallowing were stable, no neck masses, no weight loss. Plan was to RTC 4 months (appointment scheduled for April). \par \par CT scan done 2/12/19 revealed the following: \par Since 10/7/18, there is no significant interval change in CT appearance of the treated larynx. Stable fullness/nodularity of the right true vocal cord. No further chondromalacia is evident. \par \par Today he reports he is feeling pretty well. Only complaint is more shortness of breath when he climbs the stairs of his 4-5 floor walk up at home. He says no dyspnea at rest. Denies neck, throat or mouth pain. Denies dysphagia; eating a regular diet and has gained 12 pounds since December. Reports taste is normal. Continues to do neck exercises daily. Denies any other c/o to include fever, chills, CP. He reports that he continues to abstain from smoking or drinking (quit smoking Feb 2018).  Voice is overall improved from last year.\par \par ______________________________\par 10/9/18- Follow up\par At his last visit he was eating and drinking normally. He was advised to f/u with Dr Branch and return in 3 months with imaging PET/CT and CT neck. He f/u with Dr. Branch 8/2018. \par \par PET/CT done 10/7/18- No evidence of residual, recurrent, or metastatic disease identified. Increased uptake in the left vocal cord is in compensation for the fixed right vocal cord. \par \par CT neck done 10/7/18 showed the following: There is again evident nodularity of the right true vocal fold, without appreciable change since the prior CT study. Again evident is absent mineralization of the right arytenoid cartilage, which had been calcified prior to treatment. There is now a greater degree of fragmentation of the superior cricoid lamina. Given absence of suspicious FDG uptake in this region, findings within the laryngeal cartilages are felt more compatible with chondronecrosis than tumor recurrence. There is persistent though diminished edema in the supraglottic larynx compatible with radiation changes.\par *  Lymph Nodes: No cervical lymphadenopathy.\par *  Salivary Glands: Generalized increased density and decreased size, compatible with radiation effects.\par \par Today, he reports he is feeling well. Denies neck pain, mouth pain. Reports sore throat at times. Continues to have hoarseness. Mouth dryness has improved. Taste is normal, able to eat and drink and swallow all textures. He has gained 4 pounds in the past two months. Continues to do neck exercises. Reports breathing is good, denies SOB. Denies smoking or drinking (quit smoking Feb 2018).\par \par 7/2/18- Follow up\par CT neck on 6/29/18 showed now absence of the previously mineralized right arytenoid process, as well as subtle erosion of the superior right lateral cricoid cartilage, present in association with nodularity of the right true vocal fold. Differential includes both recurrent disease and chondronecrosis. Consider repeat PET/CT and/or/follow up neck CT in approximately 3 months. There was also a hypodensity in the right inferior frontal lone, possibly posttraumatic. This was present in 7/2017. Consider brain imaging. \par \par Today Mr. Alfonso is feeling well. He was decannulated in the interim since we saw him last. Breathing is good, walked about 10 blocks today without increased shortness of breath. Had mild SOB yesterday. Trach site closed. Denies pain to neck, denies lumps or bumps to neck. Mouth is sometimes dry, taste is normal. Able to eat and drink all textures. Has gained 17 pounds since april. His only concern is cough/phlegm and a hoarse voice.\par \par 7/2/18- \par Mr. Alfonso presents today for routine follow up. He was decannulated in the interim since we last saw him. \par \par Oncology History:\par Mr. 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 Branch on 4/5/17 after bring referred by Dr. King following investigation for hoarseness showing mass on flexible laryngoscopy.  He presented with 8 months- 1 year of increasing hoarseness on background of ~60 pack year smoking history. He denied pain, discomfort, otalgia, dysphagia, odynophagia, loss of weight, hemoptysis.  He was scheduled for surgery after this, however there were issues with his insurance which resulted in surgery cancellation. \par \par He eventually underwent DL and biopsy on 6/20/17. Pathology shows infiltrating squamous cell carcinoma of the right anterior vocal cord, the right supraglottis, the anterior commissure, and right vocal cord process, and right superior supraglottis. Left vocal cord process shows SCC, predominantly in situ, with focal superficial invasion. \par \par He has not had any imaging studies. His case was reviewed at multidisciplinary head and neck oncology tumor Board on July 10 and the recommendation was for definitive radiation therapy since  surgery would involve a total laryngectomy.\par \par He notes persistent hoarseness at this time. He denies coughing, trouble swallowing, bleeding, or pain. He has lost some weight that he attributes to anxiety. He works about 5-6 hours per day 5 days a week at [employer]. He has not seen a dentist in years. He continues to smoke 1ppd and has never tried quitting. \par \par \par 11/24/17 - Mr. Alfonso presents today for post treatment evaluation. Today reports feeling well - appetite good, swallowing better with soft textured food but able to eat most foods. Plans on resuming work at [employer] in 5 days. \par Reports:\par - intermittent cough for white secretions-smoking half pack/day-smoking cessation advised\par - dysgeusia, dry mouth-advised bicarb mouth rinses, increase fluid intake\par - minimal pain (only when swallowing), fever, night sweats, mucositis - using gabapentin 2 tabs bid\par - not using prevident toothpaste-never did\par \par 1/23/18- Mr. Alfonso presents today for routine follow up. He is feeling well. PO intake is good, weight stable. He is able to eat all textures of food when he is mindful of chewing. He is coughing a lot, and has a frequent tickle in his throat. His throat becomes sore when he talks frequently. He has reduced the amount that he is smoking, and is currently smoking less than a pack per day. he plans to quit over the next 10 days. \par He is doing head and neck exercises daily. He is not using prevident. He underwent PET scan on 1/22/18, report from which is not yet available. I called radiology and radiologist was not available. \par \par 4/24/18- Mr. Alfonso presents today for follow up. Since he saw us last in January he underwent a PET scan which showed LILIANA. \par In February Mr. Alfonso became persistently hoarse with worsening airway. he was taken to OR for awake tracheostomy and biopsy. Biopsy was negative on 2/27/18.  He was discharged to rehab. He saw Dr. Branch on 4/23, and he is planning for possible decannulation in 3 weeks. \par Today pt. is getting over URI.  He notes pain with swallowing. Lost 17 lbs since last visit. No other new complaints. Taking tylenol only.

## 2019-09-04 NOTE — REVIEW OF SYSTEMS
[Dysphagia: Grade 0] : Dysphagia: Grade 0 [Nausea: Grade 0] : Nausea: Grade 0 [Vomiting: Grade 0] : Vomiting: Grade 0 [Fatigue: Grade 0] : Fatigue: Grade 0 [Localized Edema: Grade 0] : Localized Edema: Grade 0  [Neck Edema: Grade 0] : Neck Edema: Grade 0 [Mucositis Oral: Grade 0] : Mucositis Oral: Grade 0  [Oral Pain: Grade 0] : Oral Pain: Grade 0 [Salivary duct inflammation: Grade 0] : Salivary duct inflammation: Grade 0 [Dysgeusia: Grade 0] : Dysgeusia: Grade 0 [Skin Induration: Grade 0] : Skin Induration: Grade 0 [Dermatitis Radiation: Grade 0] : Dermatitis Radiation: Grade 0 [Negative] : Psychiatric [FreeTextEntry4] : see HPI [FreeTextEntry6] : see HPI

## 2019-09-04 NOTE — DISEASE MANAGEMENT
[Pathological] : TNM Stage: p [SHELLY] : SHELLY [TTNM] : 4 [NTNM] : 0 [MTNM] : 0 [de-identified] : 400 cGy [de-identified] : larynx [de-identified] : 5000 cGy

## 2019-09-05 DIAGNOSIS — C32.2 MALIGNANT NEOPLASM OF SUBGLOTTIS: ICD-10-CM

## 2019-09-05 DIAGNOSIS — J44.9 CHRONIC OBSTRUCTIVE PULMONARY DISEASE, UNSPECIFIED: ICD-10-CM

## 2019-09-05 DIAGNOSIS — R11.2 NAUSEA WITH VOMITING, UNSPECIFIED: ICD-10-CM

## 2019-09-05 DIAGNOSIS — E86.0 DEHYDRATION: ICD-10-CM

## 2019-09-09 ENCOUNTER — APPOINTMENT (OUTPATIENT)
Dept: OTOLARYNGOLOGY | Facility: CLINIC | Age: 64
End: 2019-09-09
Payer: MEDICAID

## 2019-09-09 VITALS
OXYGEN SATURATION: 100 % | WEIGHT: 147 LBS | HEART RATE: 73 BPM | BODY MASS INDEX: 23.02 KG/M2 | DIASTOLIC BLOOD PRESSURE: 77 MMHG | RESPIRATION RATE: 16 BRPM | SYSTOLIC BLOOD PRESSURE: 121 MMHG

## 2019-09-09 VITALS
HEIGHT: 67 IN | HEART RATE: 71 BPM | BODY MASS INDEX: 23.54 KG/M2 | OXYGEN SATURATION: 96 % | SYSTOLIC BLOOD PRESSURE: 136 MMHG | DIASTOLIC BLOOD PRESSURE: 76 MMHG | WEIGHT: 150 LBS

## 2019-09-09 DIAGNOSIS — F41.9 ANXIETY DISORDER, UNSPECIFIED: ICD-10-CM

## 2019-09-09 PROCEDURE — 99024 POSTOP FOLLOW-UP VISIT: CPT

## 2019-09-09 NOTE — PHYSICAL EXAM
[General Appearance - Alert] : alert [General Appearance - Well Developed] : well developed [General Appearance - In No Acute Distress] : in no acute distress [Thin] : thin [Sclera] : the sclera and conjunctiva were normal [PERRL With Normal Accommodation] : pupils were equal in size, round, reactive to light [Extraocular Movements] : extraocular movements were intact [Respiration, Rhythm And Depth] : normal respiratory rhythm and effort [] : no respiratory distress [Heart Rate And Rhythm] : heart rate and rhythm were normal [Auscultation Breath Sounds / Voice Sounds] : lungs were clear to auscultation bilaterally [Heart Sounds] : normal S1 and S2 [Skin Color & Pigmentation] : normal skin color and pigmentation [Normal] : oriented to person, place and time, the affect was normal, the mood was normal and not anxious [Mood] : the mood was normal [de-identified] : ECOG 1 [de-identified] : laryngectomy stoma with appliance in place, firm mass (?) below laryngectomy stoma, unchanged

## 2019-09-09 NOTE — REVIEW OF SYSTEMS
[Dysphagia: Grade 0] : Dysphagia: Grade 0 [Nausea: Grade 0] : Nausea: Grade 0 [Vomiting: Grade 0] : Vomiting: Grade 0 [Fatigue: Grade 0] : Fatigue: Grade 0 [Localized Edema: Grade 0] : Localized Edema: Grade 0  [Neck Edema: Grade 0] : Neck Edema: Grade 0 [Mucositis Oral: Grade 0] : Mucositis Oral: Grade 0  [Oral Pain: Grade 0] : Oral Pain: Grade 0 [Dysgeusia: Grade 0] : Dysgeusia: Grade 0 [Salivary duct inflammation: Grade 0] : Salivary duct inflammation: Grade 0 [Skin Induration: Grade 0] : Skin Induration: Grade 0 [Dermatitis Radiation: Grade 0] : Dermatitis Radiation: Grade 0 [Negative] : Psychiatric [Anxiety] : anxiety [FreeTextEntry4] : see HPI [FreeTextEntry6] : see HPI

## 2019-09-09 NOTE — DISEASE MANAGEMENT
[Pathological] : TNM Stage: p [SHELLY] : SHELLY [TTNM] : 4 [NTNM] : 0 [MTNM] : 0 [de-identified] : 800 cGy [de-identified] : 5000 cGy [de-identified] : larynx

## 2019-09-10 ENCOUNTER — APPOINTMENT (OUTPATIENT)
Age: 64
End: 2019-09-10

## 2019-09-10 ENCOUNTER — OUTPATIENT (OUTPATIENT)
Dept: OUTPATIENT SERVICES | Facility: HOSPITAL | Age: 64
LOS: 1 days | End: 2019-09-10
Payer: COMMERCIAL

## 2019-09-10 VITALS
SYSTOLIC BLOOD PRESSURE: 102 MMHG | HEART RATE: 69 BPM | OXYGEN SATURATION: 96 % | RESPIRATION RATE: 18 BRPM | DIASTOLIC BLOOD PRESSURE: 65 MMHG | TEMPERATURE: 97 F

## 2019-09-10 DIAGNOSIS — C32.9 MALIGNANT NEOPLASM OF LARYNX, UNSPECIFIED: ICD-10-CM

## 2019-09-10 DIAGNOSIS — Z98.890 OTHER SPECIFIED POSTPROCEDURAL STATES: Chronic | ICD-10-CM

## 2019-09-10 DIAGNOSIS — Z41.9 ENCOUNTER FOR PROCEDURE FOR PURPOSES OTHER THAN REMEDYING HEALTH STATE, UNSPECIFIED: Chronic | ICD-10-CM

## 2019-09-10 LAB
ANION GAP SERPL CALC-SCNC: 11 MMOL/L — SIGNIFICANT CHANGE UP (ref 5–17)
BASOPHILS # BLD AUTO: 0.06 K/UL — SIGNIFICANT CHANGE UP (ref 0–0.2)
BASOPHILS NFR BLD AUTO: 0.7 % — SIGNIFICANT CHANGE UP (ref 0–2)
BUN SERPL-MCNC: 19 MG/DL — SIGNIFICANT CHANGE UP (ref 7–23)
CALCIUM SERPL-MCNC: 9.7 MG/DL — SIGNIFICANT CHANGE UP (ref 8.4–10.5)
CHLORIDE SERPL-SCNC: 96 MMOL/L — SIGNIFICANT CHANGE UP (ref 96–108)
CO2 SERPL-SCNC: 29 MMOL/L — SIGNIFICANT CHANGE UP (ref 22–31)
CREAT SERPL-MCNC: 1.02 MG/DL — SIGNIFICANT CHANGE UP (ref 0.5–1.3)
EOSINOPHIL # BLD AUTO: 0.16 K/UL — SIGNIFICANT CHANGE UP (ref 0–0.5)
EOSINOPHIL NFR BLD AUTO: 2 % — SIGNIFICANT CHANGE UP (ref 0–6)
GLUCOSE SERPL-MCNC: 105 MG/DL — HIGH (ref 70–99)
HCT VFR BLD CALC: 39 % — SIGNIFICANT CHANGE UP (ref 39–50)
HGB BLD-MCNC: 12.9 G/DL — LOW (ref 13–17)
IMM GRANULOCYTES NFR BLD AUTO: 0.2 % — SIGNIFICANT CHANGE UP (ref 0–1.5)
LYMPHOCYTES # BLD AUTO: 1.03 K/UL — SIGNIFICANT CHANGE UP (ref 1–3.3)
LYMPHOCYTES # BLD AUTO: 12.9 % — LOW (ref 13–44)
MCHC RBC-ENTMCNC: 30.5 PG — SIGNIFICANT CHANGE UP (ref 27–34)
MCHC RBC-ENTMCNC: 33.1 GM/DL — SIGNIFICANT CHANGE UP (ref 32–36)
MCV RBC AUTO: 92.2 FL — SIGNIFICANT CHANGE UP (ref 80–100)
MONOCYTES # BLD AUTO: 0.74 K/UL — SIGNIFICANT CHANGE UP (ref 0–0.9)
MONOCYTES NFR BLD AUTO: 9.2 % — SIGNIFICANT CHANGE UP (ref 2–14)
NEUTROPHILS # BLD AUTO: 6 K/UL — SIGNIFICANT CHANGE UP (ref 1.8–7.4)
NEUTROPHILS NFR BLD AUTO: 75 % — SIGNIFICANT CHANGE UP (ref 43–77)
NRBC # BLD: 0 /100 WBCS — SIGNIFICANT CHANGE UP (ref 0–0)
PLATELET # BLD AUTO: 260 K/UL — SIGNIFICANT CHANGE UP (ref 150–400)
POTASSIUM SERPL-MCNC: 4.2 MMOL/L — SIGNIFICANT CHANGE UP (ref 3.5–5.3)
POTASSIUM SERPL-SCNC: 4.2 MMOL/L — SIGNIFICANT CHANGE UP (ref 3.5–5.3)
RBC # BLD: 4.23 M/UL — SIGNIFICANT CHANGE UP (ref 4.2–5.8)
RBC # FLD: 14.7 % — HIGH (ref 10.3–14.5)
SODIUM SERPL-SCNC: 136 MMOL/L — SIGNIFICANT CHANGE UP (ref 135–145)
WBC # BLD: 8.01 K/UL — SIGNIFICANT CHANGE UP (ref 3.8–10.5)
WBC # FLD AUTO: 8.01 K/UL — SIGNIFICANT CHANGE UP (ref 3.8–10.5)

## 2019-09-10 PROCEDURE — 36415 COLL VENOUS BLD VENIPUNCTURE: CPT

## 2019-09-10 PROCEDURE — 96375 TX/PRO/DX INJ NEW DRUG ADDON: CPT

## 2019-09-10 PROCEDURE — 85025 COMPLETE CBC W/AUTO DIFF WBC: CPT

## 2019-09-10 PROCEDURE — 96361 HYDRATE IV INFUSION ADD-ON: CPT

## 2019-09-10 PROCEDURE — 96413 CHEMO IV INFUSION 1 HR: CPT

## 2019-09-10 PROCEDURE — 80048 BASIC METABOLIC PNL TOTAL CA: CPT

## 2019-09-10 RX ORDER — SODIUM CHLORIDE 9 MG/ML
1000 INJECTION INTRAMUSCULAR; INTRAVENOUS; SUBCUTANEOUS ONCE
Refills: 0 | Status: COMPLETED | OUTPATIENT
Start: 2019-09-10 | End: 2019-09-10

## 2019-09-10 RX ORDER — CISPLATIN 1 MG/ML
70 INJECTION, SOLUTION INTRAVENOUS ONCE
Refills: 0 | Status: COMPLETED | OUTPATIENT
Start: 2019-09-10 | End: 2019-09-10

## 2019-09-10 RX ORDER — DEXAMETHASONE 0.5 MG/5ML
12 ELIXIR ORAL ONCE
Refills: 0 | Status: COMPLETED | OUTPATIENT
Start: 2019-09-10 | End: 2019-09-10

## 2019-09-10 RX ORDER — DIPHENHYDRAMINE HCL 50 MG
25 CAPSULE ORAL ONCE
Refills: 0 | Status: COMPLETED | OUTPATIENT
Start: 2019-09-10 | End: 2019-09-10

## 2019-09-10 RX ORDER — ONDANSETRON 8 MG/1
16 TABLET, FILM COATED ORAL ONCE
Refills: 0 | Status: COMPLETED | OUTPATIENT
Start: 2019-09-10 | End: 2019-09-10

## 2019-09-10 RX ADMIN — Medication 202 MILLIGRAM(S): at 11:25

## 2019-09-10 RX ADMIN — ONDANSETRON 232 MILLIGRAM(S): 8 TABLET, FILM COATED ORAL at 11:10

## 2019-09-10 RX ADMIN — ONDANSETRON 16 MILLIGRAM(S): 8 TABLET, FILM COATED ORAL at 11:25

## 2019-09-10 RX ADMIN — SODIUM CHLORIDE 1000 MILLILITER(S): 9 INJECTION INTRAMUSCULAR; INTRAVENOUS; SUBCUTANEOUS at 12:40

## 2019-09-10 RX ADMIN — SODIUM CHLORIDE 1000 MILLILITER(S): 9 INJECTION INTRAMUSCULAR; INTRAVENOUS; SUBCUTANEOUS at 09:55

## 2019-09-10 RX ADMIN — CISPLATIN 70 MILLIGRAM(S): 1 INJECTION, SOLUTION INTRAVENOUS at 12:40

## 2019-09-10 RX ADMIN — Medication 25 MILLIGRAM(S): at 11:40

## 2019-09-10 RX ADMIN — Medication 204.8 MILLIGRAM(S): at 10:55

## 2019-09-10 RX ADMIN — Medication 12 MILLIGRAM(S): at 11:10

## 2019-09-10 RX ADMIN — SODIUM CHLORIDE 1000 MILLILITER(S): 9 INJECTION INTRAMUSCULAR; INTRAVENOUS; SUBCUTANEOUS at 10:55

## 2019-09-10 RX ADMIN — CISPLATIN 213.33 MILLIGRAM(S): 1 INJECTION, SOLUTION INTRAVENOUS at 11:40

## 2019-09-10 RX ADMIN — SODIUM CHLORIDE 1000 MILLILITER(S): 9 INJECTION INTRAMUSCULAR; INTRAVENOUS; SUBCUTANEOUS at 13:40

## 2019-09-11 VITALS — WEIGHT: 150 LBS | BODY MASS INDEX: 23.49 KG/M2

## 2019-09-11 DIAGNOSIS — R11.2 NAUSEA WITH VOMITING, UNSPECIFIED: ICD-10-CM

## 2019-09-11 DIAGNOSIS — E86.0 DEHYDRATION: ICD-10-CM

## 2019-09-11 DIAGNOSIS — J44.9 CHRONIC OBSTRUCTIVE PULMONARY DISEASE, UNSPECIFIED: ICD-10-CM

## 2019-09-11 DIAGNOSIS — C32.2 MALIGNANT NEOPLASM OF SUBGLOTTIS: ICD-10-CM

## 2019-09-13 ENCOUNTER — RX RENEWAL (OUTPATIENT)
Age: 64
End: 2019-09-13

## 2019-09-16 VITALS
BODY MASS INDEX: 22.85 KG/M2 | HEART RATE: 94 BPM | OXYGEN SATURATION: 98 % | WEIGHT: 145.9 LBS | SYSTOLIC BLOOD PRESSURE: 101 MMHG | DIASTOLIC BLOOD PRESSURE: 58 MMHG | RESPIRATION RATE: 16 BRPM

## 2019-09-16 NOTE — REVIEW OF SYSTEMS
[Dysphagia: Grade 0] : Dysphagia: Grade 0 [Nausea: Grade 0] : Nausea: Grade 0 [Vomiting: Grade 0] : Vomiting: Grade 0 [Fatigue: Grade 0] : Fatigue: Grade 0 [Localized Edema: Grade 0] : Localized Edema: Grade 0  [Neck Edema: Grade 0] : Neck Edema: Grade 0 [Mucositis Oral: Grade 0] : Mucositis Oral: Grade 0  [Oral Pain: Grade 0] : Oral Pain: Grade 0 [Salivary duct inflammation: Grade 0] : Salivary duct inflammation: Grade 0 [Dysgeusia: Grade 0] : Dysgeusia: Grade 0 [Skin Induration: Grade 0] : Skin Induration: Grade 0 [Anxiety] : anxiety [Negative] : Psychiatric [Dermatitis Radiation: Grade 1 - Faint erythema or dry desquamation] : Dermatitis Radiation: Grade 1 - Faint erythema or dry desquamation [FreeTextEntry4] : see HPI [FreeTextEntry6] : see HPI

## 2019-09-16 NOTE — HISTORY OF PRESENT ILLNESS
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 He tolerated treatment well.  His post-RT course was complicated by laryngeal edema requiring a tracheostomy in Feb 2018.  He was found to have recurrence in June 2019, s/p total laryngectomy, total thyroidectomy and reconsturction on 7/2/19 by Jos Branch and Sharee.  \par \par 9/16/19 - OTV - Mr. Alfonso has completed 2000cGy / 5000cGy to the larynx.  Today, he reports feeling well.  He states anxiety is controlled with xanax.  He denies complaints to include fevers, chills, dyspnea, dysphagia, odynophagia, xerostomia and pain.  he notes intermittent cough.  He states he is eating a regular diet.  Using aquaphor and doing head and neck exercises.  Getting chemo on Tuesdays.  Requested recent labs from Dr. Real's office.  Received results from 8/28. \par \par 9/9/19 - OTV - Mr. Alfonso has completed 800cGy / 5000cGy to the larynx.  He states he was not able to tolerate treatment today due to anxiety.  He states wearing the mask makes him feel very anxious to the point where he starts to breath heavily.  He states that he had to stop treatment twice on Friday due to the same issue.  Otherwise, he states he is feeling well.  He denies dyspnea, dysphagia, odynophagia, xerostomia and pain.  He states he continues to eat a regular diet.  He states he saw Dr. Branch today, who told him everything looked "beautiful." \par \par 9/4/19 - OTV - Mr. Alfonso has completed 400cGy / 5000cGy to the larynx.  Today, he reports feeling well.  He states he has been discharged home from rehab.  He has been advanced to a full regular diet, which he states he is tolerating well.  No longer using PEG for tube feedings.  He denies pain and dyspnea.  Started chemotherapy under the care of Dr. Real yesterday.  Most recent CBC on 8/28 was WNL.  \par ________________________________________\par 8/7/19 - SNA\par Mr. Alfonso returns today for SNA.  He was last seen here on 6/10/19, at which time he was found to have findings suspicious for recurrence on surveillance CT in the setting of dyspnea and stridor.  He was recommended direct laryngoscopy with biopsy and immediate laryngectomy by Dr. Branch, however, he declined immediate laryngectomy and agreed only to laryngoscopy with biopsy.  He underwent laryngoscopy with biopsy on 6/25/19 by Dr. Branch and required a tracheostomy for insufficient airway at the time of the procedure.  He was found to have subglottic recurrence.  Pathology demonstrated subglottic tissue infiltrated with well differentiated keratinizing squamous cell carcinoma.  He then underwent an urgent total laryngectomy and total thyroidectomy with bilateral tracheoesophageal groove dissections and pectoralis flap reconstruction by Dr. Branch and Dr. Tolliver on 7/2/19.  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  He presents today for consideration of adjuvant RT.  \par \par Today, he reports feeling well.  He states he is still in a rehab facility, but feels ready to be discharged home soon.  He denies pain, endorses mild soreness to his neck.  He states he has been eating a regular diet for the past 9 days.  He denies fevers, chills, dyspnea, chest pain, palpitations.  \par \par Surgical Final Report 7/2/19\par Final Diagnosis\par 1. Superior pharyngeal margin #1, excision:\par - Negative for tumor\par 2. Left lateral pharyngeal wall margin, excision:\par - Negative for tumor\par 3. Right lateral peripheral wall margin, excision:\par - Negative for tumor\par 4. Post cricoid mucosa, biopsy:\par - Squamous cell carcinoma\par 5. Anterior inferior tracheal margin, excision:\par - Negative for tumor\par 6. Larynx, total laryngectomy:\par - Squamous cell carcinoma, keratinizing, moderately\par differentiated, 4.5 cm\par - Tumor is bilaterally transglottic (supraglottis, true and\par false vocal cords, subglottis)\par with involvement of the trachea\par - Tumor extends into the periglottic soft tissue space,\par invades the thyroid gland\par bilaterally and the thyroid cartilages anterior soft\par tissues and stomal skin\par - Focally suspicious for perineural involvement\par - No convincing evidence of lymphovascular invasion\par - Tumor focally present on posterior soft tissue margin and\par present on left and right\par posterior cricoid / arytenoid mucosal margins\par - Remaining margins negative for tumor\par 7. Posterior cricoid mucosa #2:\par - Negative for tumor\par 8. Posterior cricoid mucosa, excision:\par - Squamous cell carcinoma with perineural and intraneural\par invasion\par - Tumor present on inked margin\par \par Comment:\par Tumor on the posterior soft tissue margin (slide 6L) is best seen\par on level 1 of several levels.\par \par Synoptic Summary\par Specimen: larynx\par Procedure: total laryngectomy\par Specimen integrity: intact\par Specimen size: 9.5 x 6.0 x 5.3 cm\par Tumor laterality: bilateral\par Tumor site: transglottic\par Tumor focality: single focus\par Tumor size: 4.5 cm\par Tumor description: soft ulcerated mass\par Macroscopic extent of tumor: into thyroid gland bilaterally\par paraglottic, pre-epiglottic spaces, anterior, lateral and\par posterior soft tissues\par Histologic type:  keratinizing squamous cell carcinoma\par Histologic grade:  moderately differentiated\par Microscopic tumor extent: as above\par Margins:  posterior soft tissue margin positive for tumor\par Lymphovascular invasion: not convincingly identified\par Perineural invasion: present\par Lymph nodes: not received\par Pathologic stage: pT4a  pNX\par \par 6/10/19 - Follow-up\par Mr. Alfonso returns today for routine follow-up.  He was last seen here on 2/19/19.  Plan at that time was for CT neck with contrast and CT chest without contrast in June and follow-up with PCP, dental and Dr. Branch.  \par \par CT neck with contrast 6/4/19 - IMPRESSION: There are new sites of enhancing soft tissue, most notably along the anterior tracheal wall, suspicious for recurrence of laryngeal carcinoma. Consider correlation with PET/CT for further characterization. Airway narrowing is present at the subglottic larynx and proximal trachea.\par \par CT chest without contrast 6/4/19 - IMPRESSION: 2 mm left upper lobe pulmonary nodule. 1.4 cm left adrenal nodule, not clearly seen on the prior study. Follow-up CT recommended. \par \par He was recently admitted to the hospital at the end of March 2019, for dyspnea and placed on steroids and antibiotics for glottic edema and stridor with resolution of stridor from medical therapy.  He states dyspnea initially improved following that admission, but then worsened again. He was discharged with cefpodoxime and flagyl. He saw Dr. Branch 4/8/19 at which time he was improved. He saw Dr. Branch again on 6/5, at which time he was having worsening stridor.  Plan was for PET/CT to further evaluate for recurrence; discussed possible laryngectomy given respiratory distress from paralyzed right tVC, decreased glottic opening, left glottic edema.  \par \par PET/CT 6/7/19 - There is abnormal FDG activity (SUV 14.6) of the glottic and subglottic regions with extension inferiorly along the anterior tracheal wall.  The superiormost extent of activity is in the supraglottic larynx at the level of the superior most portion of the thyroid cartilages.  Small additional focus of activity posterolateral to the left arytenoid cartilage.  No FDG avid lymphadenopathy.  \par Impression: Abnormal FDG activity extending from the posterior aspect of the supraglottic larynx to the subglottic region and along the anterior wall of the trachea suspicious for tumor recurrence.  \par \par He has not seen the dentist since prior to RT.  He does not currently have a PMD.  Today, he reports continued dyspnea, unchanged over the past month.  He states it is mostly with exertion, but sometimes at rest.  He notes productive cough of white sputum, mild sore throat, and mild xerostomia at night.  He denies dysphagia, odynophagia.  Reports he is eating a regular diet, but has decreased appetite lately.  \par \par 2/19/19- FOLLOW UP \par When he was last seen on 10/9/18, he was doing well, LILIANA. Plan was to continue follow up with Dr. Branch and do CT neck in 4 months. Patient saw Dr. Branch on 12/3/18. He reported voice and swallowing were stable, no neck masses, no weight loss. Plan was to RTC 4 months (appointment scheduled for April). \par \par CT scan done 2/12/19 revealed the following: \par Since 10/7/18, there is no significant interval change in CT appearance of the treated larynx. Stable fullness/nodularity of the right true vocal cord. No further chondromalacia is evident. \par \par Today he reports he is feeling pretty well. Only complaint is more shortness of breath when he climbs the stairs of his 4-5 floor walk up at home. He says no dyspnea at rest. Denies neck, throat or mouth pain. Denies dysphagia; eating a regular diet and has gained 12 pounds since December. Reports taste is normal. Continues to do neck exercises daily. Denies any other c/o to include fever, chills, CP. He reports that he continues to abstain from smoking or drinking (quit smoking Feb 2018).  Voice is overall improved from last year.\par \par ______________________________\par 10/9/18- Follow up\par At his last visit he was eating and drinking normally. He was advised to f/u with Dr Branch and return in 3 months with imaging PET/CT and CT neck. He f/u with Dr. Branch 8/2018. \par \par PET/CT done 10/7/18- No evidence of residual, recurrent, or metastatic disease identified. Increased uptake in the left vocal cord is in compensation for the fixed right vocal cord. \par \par CT neck done 10/7/18 showed the following: There is again evident nodularity of the right true vocal fold, without appreciable change since the prior CT study. Again evident is absent mineralization of the right arytenoid cartilage, which had been calcified prior to treatment. There is now a greater degree of fragmentation of the superior cricoid lamina. Given absence of suspicious FDG uptake in this region, findings within the laryngeal cartilages are felt more compatible with chondronecrosis than tumor recurrence. There is persistent though diminished edema in the supraglottic larynx compatible with radiation changes.\par *  Lymph Nodes: No cervical lymphadenopathy.\par *  Salivary Glands: Generalized increased density and decreased size, compatible with radiation effects.\par \par Today, he reports he is feeling well. Denies neck pain, mouth pain. Reports sore throat at times. Continues to have hoarseness. Mouth dryness has improved. Taste is normal, able to eat and drink and swallow all textures. He has gained 4 pounds in the past two months. Continues to do neck exercises. Reports breathing is good, denies SOB. Denies smoking or drinking (quit smoking Feb 2018).\par \par 7/2/18- Follow up\par CT neck on 6/29/18 showed now absence of the previously mineralized right arytenoid process, as well as subtle erosion of the superior right lateral cricoid cartilage, present in association with nodularity of the right true vocal fold. Differential includes both recurrent disease and chondronecrosis. Consider repeat PET/CT and/or/follow up neck CT in approximately 3 months. There was also a hypodensity in the right inferior frontal lone, possibly posttraumatic. This was present in 7/2017. Consider brain imaging. \par \par Today Mr. Alfonso is feeling well. He was decannulated in the interim since we saw him last. Breathing is good, walked about 10 blocks today without increased shortness of breath. Had mild SOB yesterday. Trach site closed. Denies pain to neck, denies lumps or bumps to neck. Mouth is sometimes dry, taste is normal. Able to eat and drink all textures. Has gained 17 pounds since april. His only concern is cough/phlegm and a hoarse voice.\par \par 7/2/18- \par Mr. Alfonso presents today for routine follow up. He was decannulated in the interim since we last saw him. \par \par Oncology History:\par Mr. 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 Branch on 4/5/17 after bring referred by Dr. King following investigation for hoarseness showing mass on flexible laryngoscopy.  He presented with 8 months- 1 year of increasing hoarseness on background of ~60 pack year smoking history. He denied pain, discomfort, otalgia, dysphagia, odynophagia, loss of weight, hemoptysis.  He was scheduled for surgery after this, however there were issues with his insurance which resulted in surgery cancellation. \par \par He eventually underwent DL and biopsy on 6/20/17. Pathology shows infiltrating squamous cell carcinoma of the right anterior vocal cord, the right supraglottis, the anterior commissure, and right vocal cord process, and right superior supraglottis. Left vocal cord process shows SCC, predominantly in situ, with focal superficial invasion. \par \par He has not had any imaging studies. His case was reviewed at multidisciplinary head and neck oncology tumor Board on July 10 and the recommendation was for definitive radiation therapy since  surgery would involve a total laryngectomy.\par \par He notes persistent hoarseness at this time. He denies coughing, trouble swallowing, bleeding, or pain. He has lost some weight that he attributes to anxiety. He works about 5-6 hours per day 5 days a week at [employer]. He has not seen a dentist in years. He continues to smoke 1ppd and has never tried quitting. \par \par \par 11/24/17 - Mr. Alfonso presents today for post treatment evaluation. Today reports feeling well - appetite good, swallowing better with soft textured food but able to eat most foods. Plans on resuming work at [employer] in 5 days. \par Reports:\par - intermittent cough for white secretions-smoking half pack/day-smoking cessation advised\par - dysgeusia, dry mouth-advised bicarb mouth rinses, increase fluid intake\par - minimal pain (only when swallowing), fever, night sweats, mucositis - using gabapentin 2 tabs bid\par - not using prevident toothpaste-never did\par \par 1/23/18- Mr. Alfonso presents today for routine follow up. He is feeling well. PO intake is good, weight stable. He is able to eat all textures of food when he is mindful of chewing. He is coughing a lot, and has a frequent tickle in his throat. His throat becomes sore when he talks frequently. He has reduced the amount that he is smoking, and is currently smoking less than a pack per day. he plans to quit over the next 10 days. \par He is doing head and neck exercises daily. He is not using prevident. He underwent PET scan on 1/22/18, report from which is not yet available. I called radiology and radiologist was not available. \par \par 4/24/18- Mr. Alfonso presents today for follow up. Since he saw us last in January he underwent a PET scan which showed LILIANA. \par In February Mr. Alfonso became persistently hoarse with worsening airway. he was taken to OR for awake tracheostomy and biopsy. Biopsy was negative on 2/27/18.  He was discharged to rehab. He saw Dr. Branch on 4/23, and he is planning for possible decannulation in 3 weeks. \par Today pt. is getting over URI.  He notes pain with swallowing. Lost 17 lbs since last visit. No other new complaints. Taking tylenol only.

## 2019-09-16 NOTE — PHYSICAL EXAM
[General Appearance - Well Developed] : well developed [General Appearance - Alert] : alert [General Appearance - In No Acute Distress] : in no acute distress [Thin] : thin [Sclera] : the sclera and conjunctiva were normal [PERRL With Normal Accommodation] : pupils were equal in size, round, reactive to light [Extraocular Movements] : extraocular movements were intact [] : no respiratory distress [Respiration, Rhythm And Depth] : normal respiratory rhythm and effort [Auscultation Breath Sounds / Voice Sounds] : lungs were clear to auscultation bilaterally [Heart Rate And Rhythm] : heart rate and rhythm were normal [Heart Sounds] : normal S1 and S2 [Normal] : oriented to person, place and time, the affect was normal, the mood was normal and not anxious [Mood] : the mood was normal [de-identified] : ECOG 1 [de-identified] : laryngectomy stoma with appliance in place, firm area below laryngectomy stoma, unchanged  [de-identified] : faint erythema to anterior neck

## 2019-09-16 NOTE — DISEASE MANAGEMENT
[Pathological] : TNM Stage: p [SHELLY] : SHELLY [TTNM] : 4 [NTNM] : 0 [MTNM] : 0 [de-identified] : 2000 cGy [de-identified] : larynx [de-identified] : 5000 cGy

## 2019-09-17 ENCOUNTER — APPOINTMENT (OUTPATIENT)
Age: 64
End: 2019-09-17

## 2019-09-17 ENCOUNTER — OUTPATIENT (OUTPATIENT)
Dept: OUTPATIENT SERVICES | Facility: HOSPITAL | Age: 64
LOS: 1 days | End: 2019-09-17
Payer: COMMERCIAL

## 2019-09-17 VITALS
TEMPERATURE: 97 F | OXYGEN SATURATION: 99 % | HEART RATE: 61 BPM | SYSTOLIC BLOOD PRESSURE: 100 MMHG | HEIGHT: 67 IN | WEIGHT: 147.05 LBS | DIASTOLIC BLOOD PRESSURE: 64 MMHG | RESPIRATION RATE: 18 BRPM

## 2019-09-17 VITALS
RESPIRATION RATE: 18 BRPM | DIASTOLIC BLOOD PRESSURE: 70 MMHG | OXYGEN SATURATION: 99 % | SYSTOLIC BLOOD PRESSURE: 111 MMHG | HEART RATE: 65 BPM | TEMPERATURE: 98 F

## 2019-09-17 DIAGNOSIS — Z98.890 OTHER SPECIFIED POSTPROCEDURAL STATES: Chronic | ICD-10-CM

## 2019-09-17 DIAGNOSIS — Z41.9 ENCOUNTER FOR PROCEDURE FOR PURPOSES OTHER THAN REMEDYING HEALTH STATE, UNSPECIFIED: Chronic | ICD-10-CM

## 2019-09-17 DIAGNOSIS — C32.9 MALIGNANT NEOPLASM OF LARYNX, UNSPECIFIED: ICD-10-CM

## 2019-09-17 LAB
ANION GAP SERPL CALC-SCNC: 10 MMOL/L — SIGNIFICANT CHANGE UP (ref 5–17)
BASOPHILS # BLD AUTO: 0.05 K/UL — SIGNIFICANT CHANGE UP (ref 0–0.2)
BASOPHILS NFR BLD AUTO: 0.8 % — SIGNIFICANT CHANGE UP (ref 0–2)
BUN SERPL-MCNC: 16 MG/DL — SIGNIFICANT CHANGE UP (ref 7–23)
CALCIUM SERPL-MCNC: 8.5 MG/DL — SIGNIFICANT CHANGE UP (ref 8.4–10.5)
CHLORIDE SERPL-SCNC: 99 MMOL/L — SIGNIFICANT CHANGE UP (ref 96–108)
CO2 SERPL-SCNC: 31 MMOL/L — SIGNIFICANT CHANGE UP (ref 22–31)
CREAT SERPL-MCNC: 1.01 MG/DL — SIGNIFICANT CHANGE UP (ref 0.5–1.3)
EOSINOPHIL # BLD AUTO: 0.11 K/UL — SIGNIFICANT CHANGE UP (ref 0–0.5)
EOSINOPHIL NFR BLD AUTO: 1.7 % — SIGNIFICANT CHANGE UP (ref 0–6)
GLUCOSE SERPL-MCNC: 81 MG/DL — SIGNIFICANT CHANGE UP (ref 70–99)
HCT VFR BLD CALC: 36.7 % — LOW (ref 39–50)
HGB BLD-MCNC: 12 G/DL — LOW (ref 13–17)
IMM GRANULOCYTES NFR BLD AUTO: 0.3 % — SIGNIFICANT CHANGE UP (ref 0–1.5)
LYMPHOCYTES # BLD AUTO: 1.16 K/UL — SIGNIFICANT CHANGE UP (ref 1–3.3)
LYMPHOCYTES # BLD AUTO: 17.7 % — SIGNIFICANT CHANGE UP (ref 13–44)
MCHC RBC-ENTMCNC: 30.6 PG — SIGNIFICANT CHANGE UP (ref 27–34)
MCHC RBC-ENTMCNC: 32.7 GM/DL — SIGNIFICANT CHANGE UP (ref 32–36)
MCV RBC AUTO: 93.6 FL — SIGNIFICANT CHANGE UP (ref 80–100)
MONOCYTES # BLD AUTO: 0.57 K/UL — SIGNIFICANT CHANGE UP (ref 0–0.9)
MONOCYTES NFR BLD AUTO: 8.7 % — SIGNIFICANT CHANGE UP (ref 2–14)
NEUTROPHILS # BLD AUTO: 4.65 K/UL — SIGNIFICANT CHANGE UP (ref 1.8–7.4)
NEUTROPHILS NFR BLD AUTO: 70.8 % — SIGNIFICANT CHANGE UP (ref 43–77)
NRBC # BLD: 0 /100 WBCS — SIGNIFICANT CHANGE UP (ref 0–0)
PLATELET # BLD AUTO: 233 K/UL — SIGNIFICANT CHANGE UP (ref 150–400)
POTASSIUM SERPL-MCNC: 4.4 MMOL/L — SIGNIFICANT CHANGE UP (ref 3.5–5.3)
POTASSIUM SERPL-SCNC: 4.4 MMOL/L — SIGNIFICANT CHANGE UP (ref 3.5–5.3)
RBC # BLD: 3.92 M/UL — LOW (ref 4.2–5.8)
RBC # FLD: 14.7 % — HIGH (ref 10.3–14.5)
SODIUM SERPL-SCNC: 140 MMOL/L — SIGNIFICANT CHANGE UP (ref 135–145)
WBC # BLD: 6.56 K/UL — SIGNIFICANT CHANGE UP (ref 3.8–10.5)
WBC # FLD AUTO: 6.56 K/UL — SIGNIFICANT CHANGE UP (ref 3.8–10.5)

## 2019-09-17 PROCEDURE — 96361 HYDRATE IV INFUSION ADD-ON: CPT

## 2019-09-17 PROCEDURE — 85025 COMPLETE CBC W/AUTO DIFF WBC: CPT

## 2019-09-17 PROCEDURE — 96375 TX/PRO/DX INJ NEW DRUG ADDON: CPT

## 2019-09-17 PROCEDURE — 80048 BASIC METABOLIC PNL TOTAL CA: CPT

## 2019-09-17 PROCEDURE — 36415 COLL VENOUS BLD VENIPUNCTURE: CPT

## 2019-09-17 PROCEDURE — 96413 CHEMO IV INFUSION 1 HR: CPT

## 2019-09-17 RX ORDER — CISPLATIN 1 MG/ML
70 INJECTION, SOLUTION INTRAVENOUS ONCE
Refills: 0 | Status: COMPLETED | OUTPATIENT
Start: 2019-09-17 | End: 2019-09-17

## 2019-09-17 RX ORDER — ONDANSETRON 8 MG/1
16 TABLET, FILM COATED ORAL ONCE
Refills: 0 | Status: COMPLETED | OUTPATIENT
Start: 2019-09-17 | End: 2019-09-17

## 2019-09-17 RX ORDER — SODIUM CHLORIDE 9 MG/ML
1000 INJECTION INTRAMUSCULAR; INTRAVENOUS; SUBCUTANEOUS ONCE
Refills: 0 | Status: COMPLETED | OUTPATIENT
Start: 2019-09-17 | End: 2019-09-17

## 2019-09-17 RX ORDER — DIPHENHYDRAMINE HCL 50 MG
25 CAPSULE ORAL ONCE
Refills: 0 | Status: COMPLETED | OUTPATIENT
Start: 2019-09-17 | End: 2019-09-17

## 2019-09-17 RX ORDER — DEXAMETHASONE 0.5 MG/5ML
10 ELIXIR ORAL ONCE
Refills: 0 | Status: DISCONTINUED | OUTPATIENT
Start: 2019-09-17 | End: 2019-09-17

## 2019-09-17 RX ORDER — DEXAMETHASONE 0.5 MG/5ML
12 ELIXIR ORAL ONCE
Refills: 0 | Status: COMPLETED | OUTPATIENT
Start: 2019-09-17 | End: 2019-09-17

## 2019-09-17 RX ADMIN — SODIUM CHLORIDE 1000 MILLILITER(S): 9 INJECTION INTRAMUSCULAR; INTRAVENOUS; SUBCUTANEOUS at 09:57

## 2019-09-17 RX ADMIN — Medication 12 MILLIGRAM(S): at 11:25

## 2019-09-17 RX ADMIN — ONDANSETRON 16 MILLIGRAM(S): 8 TABLET, FILM COATED ORAL at 11:55

## 2019-09-17 RX ADMIN — Medication 25 MILLIGRAM(S): at 11:40

## 2019-09-17 RX ADMIN — SODIUM CHLORIDE 1000 MILLILITER(S): 9 INJECTION INTRAMUSCULAR; INTRAVENOUS; SUBCUTANEOUS at 14:31

## 2019-09-17 RX ADMIN — SODIUM CHLORIDE 1000 MILLILITER(S): 9 INJECTION INTRAMUSCULAR; INTRAVENOUS; SUBCUTANEOUS at 13:31

## 2019-09-17 RX ADMIN — Medication 202 MILLIGRAM(S): at 11:25

## 2019-09-17 RX ADMIN — CISPLATIN 70 MILLIGRAM(S): 1 INJECTION, SOLUTION INTRAVENOUS at 13:30

## 2019-09-17 RX ADMIN — ONDANSETRON 232 MILLIGRAM(S): 8 TABLET, FILM COATED ORAL at 11:40

## 2019-09-17 RX ADMIN — CISPLATIN 213.33 MILLIGRAM(S): 1 INJECTION, SOLUTION INTRAVENOUS at 12:00

## 2019-09-17 RX ADMIN — SODIUM CHLORIDE 1000 MILLILITER(S): 9 INJECTION INTRAMUSCULAR; INTRAVENOUS; SUBCUTANEOUS at 10:57

## 2019-09-17 RX ADMIN — Medication 212 MILLIGRAM(S): at 11:10

## 2019-09-18 DIAGNOSIS — R11.2 NAUSEA WITH VOMITING, UNSPECIFIED: ICD-10-CM

## 2019-09-18 DIAGNOSIS — E86.0 DEHYDRATION: ICD-10-CM

## 2019-09-18 DIAGNOSIS — J44.9 CHRONIC OBSTRUCTIVE PULMONARY DISEASE, UNSPECIFIED: ICD-10-CM

## 2019-09-18 DIAGNOSIS — C32.2 MALIGNANT NEOPLASM OF SUBGLOTTIS: ICD-10-CM

## 2019-09-23 ENCOUNTER — RX RENEWAL (OUTPATIENT)
Age: 64
End: 2019-09-23

## 2019-09-23 VITALS
SYSTOLIC BLOOD PRESSURE: 108 MMHG | DIASTOLIC BLOOD PRESSURE: 74 MMHG | BODY MASS INDEX: 22.71 KG/M2 | HEART RATE: 66 BPM | OXYGEN SATURATION: 97 % | WEIGHT: 145 LBS | RESPIRATION RATE: 16 BRPM

## 2019-09-23 RX ORDER — ALPRAZOLAM 0.25 MG/1
0.25 TABLET ORAL
Qty: 0 | Refills: 0 | Status: COMPLETED | OUTPATIENT
Start: 2019-09-23

## 2019-09-23 NOTE — DISEASE MANAGEMENT
[Pathological] : TNM Stage: p [SHELLY] : SHELLY [TTNM] : 4 [MTNM] : 0 [NTNM] : 0 [de-identified] : 5000 cGy [de-identified] : 3000 cGy [de-identified] : larynx

## 2019-09-23 NOTE — PHYSICAL EXAM
[General Appearance - Well Developed] : well developed [General Appearance - Alert] : alert [General Appearance - In No Acute Distress] : in no acute distress [Thin] : thin [Sclera] : the sclera and conjunctiva were normal [PERRL With Normal Accommodation] : pupils were equal in size, round, reactive to light [Extraocular Movements] : extraocular movements were intact [] : no respiratory distress [Respiration, Rhythm And Depth] : normal respiratory rhythm and effort [Auscultation Breath Sounds / Voice Sounds] : lungs were clear to auscultation bilaterally [Heart Rate And Rhythm] : heart rate and rhythm were normal [Heart Sounds] : normal S1 and S2 [Normal] : oriented to person, place and time, the affect was normal, the mood was normal and not anxious [Mood] : the mood was normal [de-identified] : laryngectomy stoma with appliance in place, firm area below laryngectomy stoma, unchanged  [de-identified] : ECOG 1 [de-identified] : faint erythema to anterior neck

## 2019-09-23 NOTE — REVIEW OF SYSTEMS
[Dysphagia: Grade 0] : Dysphagia: Grade 0 [Nausea: Grade 0] : Nausea: Grade 0 [Vomiting: Grade 0] : Vomiting: Grade 0 [Fatigue: Grade 0] : Fatigue: Grade 0 [Localized Edema: Grade 0] : Localized Edema: Grade 0  [Neck Edema: Grade 0] : Neck Edema: Grade 0 [Mucositis Oral: Grade 0] : Mucositis Oral: Grade 0  [Oral Pain: Grade 0] : Oral Pain: Grade 0 [Salivary duct inflammation: Grade 0] : Salivary duct inflammation: Grade 0 [Dysgeusia: Grade 0] : Dysgeusia: Grade 0 [Skin Induration: Grade 0] : Skin Induration: Grade 0 [Dermatitis Radiation: Grade 1 - Faint erythema or dry desquamation] : Dermatitis Radiation: Grade 1 - Faint erythema or dry desquamation [Anxiety] : anxiety [Negative] : Psychiatric [FreeTextEntry6] : see HPI [FreeTextEntry4] : see HPI

## 2019-09-23 NOTE — HISTORY OF PRESENT ILLNESS
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 He tolerated treatment well.  His post-RT course was complicated by laryngeal edema requiring a tracheostomy in Feb 2018.  He was found to have recurrence in June 2019, s/p total laryngectomy, total thyroidectomy and reconstruction on 7/2/19 by Jos Branch and Sharee.  \par \par 9/23/19 - OTV - Mr. Alfonso has completed 3000cGy / 5000cGy to the larynx.  Today, he reports that he lost his supply of Xanax.  He is unable to tolerate treatment without it, so he was given a dose here in clinic prior to treatment and an additional supply was sent to his pharmacy.  He reports feeling well.  He notes mild dysgeusia and nausea last week on Thursday.  He denies dysphagia, odynophagia, xerostomia, vomiting, constipation.  He reports using Aquaphor and doing head and neck exercises.  CBC done 9/17 - H/H 12.0/36.7, WBC 6.56, Plts 233, ANC 4.65.  \par \par 9/16/19 - OTV - Mr. Alfonso has completed 2000cGy / 5000cGy to the larynx.  Today, he reports feeling well.  He states anxiety is controlled with xanax.  He denies complaints to include fevers, chills, dyspnea, dysphagia, odynophagia, xerostomia and pain.  he notes intermittent cough.  He states he is eating a regular diet.  Using aquaphor and doing head and neck exercises.  Getting chemo on Tuesdays.  Requested recent labs from Dr. Real's office.  Received results from 8/28. \par \par 9/9/19 - OTV - Mr. Alfonso has completed 800cGy / 5000cGy to the larynx.  He states he was not able to tolerate treatment today due to anxiety.  He states wearing the mask makes him feel very anxious to the point where he starts to breath heavily.  He states that he had to stop treatment twice on Friday due to the same issue.  Otherwise, he states he is feeling well.  He denies dyspnea, dysphagia, odynophagia, xerostomia and pain.  He states he continues to eat a regular diet.  He states he saw Dr. Branch today, who told him everything looked "beautiful." \par \par 9/4/19 - OTV - Mr. Alfonso has completed 400cGy / 5000cGy to the larynx.  Today, he reports feeling well.  He states he has been discharged home from rehab.  He has been advanced to a full regular diet, which he states he is tolerating well.  No longer using PEG for tube feedings.  He denies pain and dyspnea.  Started chemotherapy under the care of Dr. Real yesterday.  Most recent CBC on 8/28 was WNL.  \par ________________________________________\par 8/7/19 - SNA\par Mr. Alfonso returns today for SNA.  He was last seen here on 6/10/19, at which time he was found to have findings suspicious for recurrence on surveillance CT in the setting of dyspnea and stridor.  He was recommended direct laryngoscopy with biopsy and immediate laryngectomy by Dr. Branch, however, he declined immediate laryngectomy and agreed only to laryngoscopy with biopsy.  He underwent laryngoscopy with biopsy on 6/25/19 by Dr. Branch and required a tracheostomy for insufficient airway at the time of the procedure.  He was found to have subglottic recurrence.  Pathology demonstrated subglottic tissue infiltrated with well differentiated keratinizing squamous cell carcinoma.  He then underwent an urgent total laryngectomy and total thyroidectomy with bilateral tracheoesophageal groove dissections and pectoralis flap reconstruction by Dr. Branch and Dr. Tolliver on 7/2/19.  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  He presents today for consideration of adjuvant RT.  \par \par Today, he reports feeling well.  He states he is still in a rehab facility, but feels ready to be discharged home soon.  He denies pain, endorses mild soreness to his neck.  He states he has been eating a regular diet for the past 9 days.  He denies fevers, chills, dyspnea, chest pain, palpitations.  \par \par Surgical Final Report 7/2/19\par Final Diagnosis\par 1. Superior pharyngeal margin #1, excision:\par - Negative for tumor\par 2. Left lateral pharyngeal wall margin, excision:\par - Negative for tumor\par 3. Right lateral peripheral wall margin, excision:\par - Negative for tumor\par 4. Post cricoid mucosa, biopsy:\par - Squamous cell carcinoma\par 5. Anterior inferior tracheal margin, excision:\par - Negative for tumor\par 6. Larynx, total laryngectomy:\par - Squamous cell carcinoma, keratinizing, moderately\par differentiated, 4.5 cm\par - Tumor is bilaterally transglottic (supraglottis, true and\par false vocal cords, subglottis)\par with involvement of the trachea\par - Tumor extends into the periglottic soft tissue space,\par invades the thyroid gland\par bilaterally and the thyroid cartilages anterior soft\par tissues and stomal skin\par - Focally suspicious for perineural involvement\par - No convincing evidence of lymphovascular invasion\par - Tumor focally present on posterior soft tissue margin and\par present on left and right\par posterior cricoid / arytenoid mucosal margins\par - Remaining margins negative for tumor\par 7. Posterior cricoid mucosa #2:\par - Negative for tumor\par 8. Posterior cricoid mucosa, excision:\par - Squamous cell carcinoma with perineural and intraneural\par invasion\par - Tumor present on inked margin\par \par Comment:\par Tumor on the posterior soft tissue margin (slide 6L) is best seen\par on level 1 of several levels.\par \par Synoptic Summary\par Specimen: larynx\par Procedure: total laryngectomy\par Specimen integrity: intact\par Specimen size: 9.5 x 6.0 x 5.3 cm\par Tumor laterality: bilateral\par Tumor site: transglottic\par Tumor focality: single focus\par Tumor size: 4.5 cm\par Tumor description: soft ulcerated mass\par Macroscopic extent of tumor: into thyroid gland bilaterally\par paraglottic, pre-epiglottic spaces, anterior, lateral and\par posterior soft tissues\par Histologic type:  keratinizing squamous cell carcinoma\par Histologic grade:  moderately differentiated\par Microscopic tumor extent: as above\par Margins:  posterior soft tissue margin positive for tumor\par Lymphovascular invasion: not convincingly identified\par Perineural invasion: present\par Lymph nodes: not received\par Pathologic stage: pT4a  pNX\par \par 6/10/19 - Follow-up\par Mr. Alfonso returns today for routine follow-up.  He was last seen here on 2/19/19.  Plan at that time was for CT neck with contrast and CT chest without contrast in June and follow-up with PCP, dental and Dr. Branch.  \par \par CT neck with contrast 6/4/19 - IMPRESSION: There are new sites of enhancing soft tissue, most notably along the anterior tracheal wall, suspicious for recurrence of laryngeal carcinoma. Consider correlation with PET/CT for further characterization. Airway narrowing is present at the subglottic larynx and proximal trachea.\par \par CT chest without contrast 6/4/19 - IMPRESSION: 2 mm left upper lobe pulmonary nodule. 1.4 cm left adrenal nodule, not clearly seen on the prior study. Follow-up CT recommended. \par \par He was recently admitted to the hospital at the end of March 2019, for dyspnea and placed on steroids and antibiotics for glottic edema and stridor with resolution of stridor from medical therapy.  He states dyspnea initially improved following that admission, but then worsened again. He was discharged with cefpodoxime and flagyl. He saw Dr. Branch 4/8/19 at which time he was improved. He saw Dr. Branch again on 6/5, at which time he was having worsening stridor.  Plan was for PET/CT to further evaluate for recurrence; discussed possible laryngectomy given respiratory distress from paralyzed right tVC, decreased glottic opening, left glottic edema.  \par \par PET/CT 6/7/19 - There is abnormal FDG activity (SUV 14.6) of the glottic and subglottic regions with extension inferiorly along the anterior tracheal wall.  The superiormost extent of activity is in the supraglottic larynx at the level of the superior most portion of the thyroid cartilages.  Small additional focus of activity posterolateral to the left arytenoid cartilage.  No FDG avid lymphadenopathy.  \par Impression: Abnormal FDG activity extending from the posterior aspect of the supraglottic larynx to the subglottic region and along the anterior wall of the trachea suspicious for tumor recurrence.  \par \par He has not seen the dentist since prior to RT.  He does not currently have a PMD.  Today, he reports continued dyspnea, unchanged over the past month.  He states it is mostly with exertion, but sometimes at rest.  He notes productive cough of white sputum, mild sore throat, and mild xerostomia at night.  He denies dysphagia, odynophagia.  Reports he is eating a regular diet, but has decreased appetite lately.  \par \par 2/19/19- FOLLOW UP \par When he was last seen on 10/9/18, he was doing well, LILIANA. Plan was to continue follow up with Dr. Branch and do CT neck in 4 months. Patient saw Dr. Branch on 12/3/18. He reported voice and swallowing were stable, no neck masses, no weight loss. Plan was to RTC 4 months (appointment scheduled for April). \par \par CT scan done 2/12/19 revealed the following: \par Since 10/7/18, there is no significant interval change in CT appearance of the treated larynx. Stable fullness/nodularity of the right true vocal cord. No further chondromalacia is evident. \par \par Today he reports he is feeling pretty well. Only complaint is more shortness of breath when he climbs the stairs of his 4-5 floor walk up at home. He says no dyspnea at rest. Denies neck, throat or mouth pain. Denies dysphagia; eating a regular diet and has gained 12 pounds since December. Reports taste is normal. Continues to do neck exercises daily. Denies any other c/o to include fever, chills, CP. He reports that he continues to abstain from smoking or drinking (quit smoking Feb 2018).  Voice is overall improved from last year.\par \par ______________________________\par 10/9/18- Follow up\par At his last visit he was eating and drinking normally. He was advised to f/u with Dr Branch and return in 3 months with imaging PET/CT and CT neck. He f/u with Dr. Branch 8/2018. \par \par PET/CT done 10/7/18- No evidence of residual, recurrent, or metastatic disease identified. Increased uptake in the left vocal cord is in compensation for the fixed right vocal cord. \par \par CT neck done 10/7/18 showed the following: There is again evident nodularity of the right true vocal fold, without appreciable change since the prior CT study. Again evident is absent mineralization of the right arytenoid cartilage, which had been calcified prior to treatment. There is now a greater degree of fragmentation of the superior cricoid lamina. Given absence of suspicious FDG uptake in this region, findings within the laryngeal cartilages are felt more compatible with chondronecrosis than tumor recurrence. There is persistent though diminished edema in the supraglottic larynx compatible with radiation changes.\par *  Lymph Nodes: No cervical lymphadenopathy.\par *  Salivary Glands: Generalized increased density and decreased size, compatible with radiation effects.\par \par Today, he reports he is feeling well. Denies neck pain, mouth pain. Reports sore throat at times. Continues to have hoarseness. Mouth dryness has improved. Taste is normal, able to eat and drink and swallow all textures. He has gained 4 pounds in the past two months. Continues to do neck exercises. Reports breathing is good, denies SOB. Denies smoking or drinking (quit smoking Feb 2018).\par \par 7/2/18- Follow up\par CT neck on 6/29/18 showed now absence of the previously mineralized right arytenoid process, as well as subtle erosion of the superior right lateral cricoid cartilage, present in association with nodularity of the right true vocal fold. Differential includes both recurrent disease and chondronecrosis. Consider repeat PET/CT and/or/follow up neck CT in approximately 3 months. There was also a hypodensity in the right inferior frontal lone, possibly posttraumatic. This was present in 7/2017. Consider brain imaging. \par \par Today Mr. Alfonso is feeling well. He was decannulated in the interim since we saw him last. Breathing is good, walked about 10 blocks today without increased shortness of breath. Had mild SOB yesterday. Trach site closed. Denies pain to neck, denies lumps or bumps to neck. Mouth is sometimes dry, taste is normal. Able to eat and drink all textures. Has gained 17 pounds since april. His only concern is cough/phlegm and a hoarse voice.\par \par 7/2/18- \par Mr. Alfonso presents today for routine follow up. He was decannulated in the interim since we last saw him. \par \par Oncology History:\par Mr. 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 Branch on 4/5/17 after bring referred by Dr. King following investigation for hoarseness showing mass on flexible laryngoscopy.  He presented with 8 months- 1 year of increasing hoarseness on background of ~60 pack year smoking history. He denied pain, discomfort, otalgia, dysphagia, odynophagia, loss of weight, hemoptysis.  He was scheduled for surgery after this, however there were issues with his insurance which resulted in surgery cancellation. \par \par He eventually underwent DL and biopsy on 6/20/17. Pathology shows infiltrating squamous cell carcinoma of the right anterior vocal cord, the right supraglottis, the anterior commissure, and right vocal cord process, and right superior supraglottis. Left vocal cord process shows SCC, predominantly in situ, with focal superficial invasion. \par \par He has not had any imaging studies. His case was reviewed at multidisciplinary head and neck oncology tumor Board on July 10 and the recommendation was for definitive radiation therapy since  surgery would involve a total laryngectomy.\par \par He notes persistent hoarseness at this time. He denies coughing, trouble swallowing, bleeding, or pain. He has lost some weight that he attributes to anxiety. He works about 5-6 hours per day 5 days a week at [employer]. He has not seen a dentist in years. He continues to smoke 1ppd and has never tried quitting. \par \par \par 11/24/17 - Mr. Alfonso presents today for post treatment evaluation. Today reports feeling well - appetite good, swallowing better with soft textured food but able to eat most foods. Plans on resuming work at [employer] in 5 days. \par Reports:\par - intermittent cough for white secretions-smoking half pack/day-smoking cessation advised\par - dysgeusia, dry mouth-advised bicarb mouth rinses, increase fluid intake\par - minimal pain (only when swallowing), fever, night sweats, mucositis - using gabapentin 2 tabs bid\par - not using prevident toothpaste-never did\par \par 1/23/18- Mr. Alfonso presents today for routine follow up. He is feeling well. PO intake is good, weight stable. He is able to eat all textures of food when he is mindful of chewing. He is coughing a lot, and has a frequent tickle in his throat. His throat becomes sore when he talks frequently. He has reduced the amount that he is smoking, and is currently smoking less than a pack per day. he plans to quit over the next 10 days. \par He is doing head and neck exercises daily. He is not using prevident. He underwent PET scan on 1/22/18, report from which is not yet available. I called radiology and radiologist was not available. \par \par 4/24/18- Mr. Alfonso presents today for follow up. Since he saw us last in January he underwent a PET scan which showed LILIANA. \par In February Mr. Alfonso became persistently hoarse with worsening airway. he was taken to OR for awake tracheostomy and biopsy. Biopsy was negative on 2/27/18.  He was discharged to rehab. He saw Dr. Branch on 4/23, and he is planning for possible decannulation in 3 weeks. \par Today pt. is getting over URI.  He notes pain with swallowing. Lost 17 lbs since last visit. No other new complaints. Taking tylenol only.

## 2019-09-24 ENCOUNTER — APPOINTMENT (OUTPATIENT)
Age: 64
End: 2019-09-24

## 2019-09-24 ENCOUNTER — OUTPATIENT (OUTPATIENT)
Dept: OUTPATIENT SERVICES | Facility: HOSPITAL | Age: 64
LOS: 1 days | End: 2019-09-24
Payer: COMMERCIAL

## 2019-09-24 VITALS
RESPIRATION RATE: 18 BRPM | SYSTOLIC BLOOD PRESSURE: 110 MMHG | DIASTOLIC BLOOD PRESSURE: 78 MMHG | TEMPERATURE: 98 F | OXYGEN SATURATION: 99 % | HEART RATE: 78 BPM

## 2019-09-24 DIAGNOSIS — Z41.9 ENCOUNTER FOR PROCEDURE FOR PURPOSES OTHER THAN REMEDYING HEALTH STATE, UNSPECIFIED: Chronic | ICD-10-CM

## 2019-09-24 DIAGNOSIS — Z98.890 OTHER SPECIFIED POSTPROCEDURAL STATES: Chronic | ICD-10-CM

## 2019-09-24 DIAGNOSIS — C32.9 MALIGNANT NEOPLASM OF LARYNX, UNSPECIFIED: ICD-10-CM

## 2019-09-24 LAB
ANION GAP SERPL CALC-SCNC: 10 MMOL/L — SIGNIFICANT CHANGE UP (ref 5–17)
BASOPHILS # BLD AUTO: 0.06 K/UL — SIGNIFICANT CHANGE UP (ref 0–0.2)
BASOPHILS NFR BLD AUTO: 1.2 % — SIGNIFICANT CHANGE UP (ref 0–2)
BUN SERPL-MCNC: 14 MG/DL — SIGNIFICANT CHANGE UP (ref 7–23)
CALCIUM SERPL-MCNC: 8.1 MG/DL — LOW (ref 8.4–10.5)
CHLORIDE SERPL-SCNC: 98 MMOL/L — SIGNIFICANT CHANGE UP (ref 96–108)
CO2 SERPL-SCNC: 28 MMOL/L — SIGNIFICANT CHANGE UP (ref 22–31)
CREAT SERPL-MCNC: 1.04 MG/DL — SIGNIFICANT CHANGE UP (ref 0.5–1.3)
EOSINOPHIL # BLD AUTO: 0.06 K/UL — SIGNIFICANT CHANGE UP (ref 0–0.5)
EOSINOPHIL NFR BLD AUTO: 1.2 % — SIGNIFICANT CHANGE UP (ref 0–6)
GLUCOSE SERPL-MCNC: 95 MG/DL — SIGNIFICANT CHANGE UP (ref 70–99)
HCT VFR BLD CALC: 33.7 % — LOW (ref 39–50)
HGB BLD-MCNC: 11.2 G/DL — LOW (ref 13–17)
IMM GRANULOCYTES NFR BLD AUTO: 0.2 % — SIGNIFICANT CHANGE UP (ref 0–1.5)
LYMPHOCYTES # BLD AUTO: 1.03 K/UL — SIGNIFICANT CHANGE UP (ref 1–3.3)
LYMPHOCYTES # BLD AUTO: 20.7 % — SIGNIFICANT CHANGE UP (ref 13–44)
MCHC RBC-ENTMCNC: 30.2 PG — SIGNIFICANT CHANGE UP (ref 27–34)
MCHC RBC-ENTMCNC: 33.2 GM/DL — SIGNIFICANT CHANGE UP (ref 32–36)
MCV RBC AUTO: 90.8 FL — SIGNIFICANT CHANGE UP (ref 80–100)
MONOCYTES # BLD AUTO: 0.58 K/UL — SIGNIFICANT CHANGE UP (ref 0–0.9)
MONOCYTES NFR BLD AUTO: 11.7 % — SIGNIFICANT CHANGE UP (ref 2–14)
NEUTROPHILS # BLD AUTO: 3.23 K/UL — SIGNIFICANT CHANGE UP (ref 1.8–7.4)
NEUTROPHILS NFR BLD AUTO: 65 % — SIGNIFICANT CHANGE UP (ref 43–77)
NRBC # BLD: 0 /100 WBCS — SIGNIFICANT CHANGE UP (ref 0–0)
PLATELET # BLD AUTO: 200 K/UL — SIGNIFICANT CHANGE UP (ref 150–400)
POTASSIUM SERPL-MCNC: 4.3 MMOL/L — SIGNIFICANT CHANGE UP (ref 3.5–5.3)
POTASSIUM SERPL-SCNC: 4.3 MMOL/L — SIGNIFICANT CHANGE UP (ref 3.5–5.3)
RBC # BLD: 3.71 M/UL — LOW (ref 4.2–5.8)
RBC # FLD: 14.6 % — HIGH (ref 10.3–14.5)
SODIUM SERPL-SCNC: 136 MMOL/L — SIGNIFICANT CHANGE UP (ref 135–145)
WBC # BLD: 4.97 K/UL — SIGNIFICANT CHANGE UP (ref 3.8–10.5)
WBC # FLD AUTO: 4.97 K/UL — SIGNIFICANT CHANGE UP (ref 3.8–10.5)

## 2019-09-24 PROCEDURE — 80048 BASIC METABOLIC PNL TOTAL CA: CPT

## 2019-09-24 PROCEDURE — 96375 TX/PRO/DX INJ NEW DRUG ADDON: CPT

## 2019-09-24 PROCEDURE — 96413 CHEMO IV INFUSION 1 HR: CPT

## 2019-09-24 PROCEDURE — 85025 COMPLETE CBC W/AUTO DIFF WBC: CPT

## 2019-09-24 PROCEDURE — 36415 COLL VENOUS BLD VENIPUNCTURE: CPT

## 2019-09-24 PROCEDURE — 96361 HYDRATE IV INFUSION ADD-ON: CPT

## 2019-09-24 RX ORDER — CISPLATIN 1 MG/ML
70 INJECTION, SOLUTION INTRAVENOUS ONCE
Refills: 0 | Status: COMPLETED | OUTPATIENT
Start: 2019-09-24 | End: 2019-09-24

## 2019-09-24 RX ORDER — DEXAMETHASONE 0.5 MG/5ML
10 ELIXIR ORAL ONCE
Refills: 0 | Status: DISCONTINUED | OUTPATIENT
Start: 2019-09-24 | End: 2019-09-24

## 2019-09-24 RX ORDER — ONDANSETRON 8 MG/1
16 TABLET, FILM COATED ORAL ONCE
Refills: 0 | Status: COMPLETED | OUTPATIENT
Start: 2019-09-24 | End: 2019-09-24

## 2019-09-24 RX ORDER — DIPHENHYDRAMINE HCL 50 MG
25 CAPSULE ORAL ONCE
Refills: 0 | Status: COMPLETED | OUTPATIENT
Start: 2019-09-24 | End: 2019-09-24

## 2019-09-24 RX ORDER — DEXAMETHASONE 0.5 MG/5ML
12 ELIXIR ORAL ONCE
Refills: 0 | Status: COMPLETED | OUTPATIENT
Start: 2019-09-24 | End: 2019-09-24

## 2019-09-24 RX ORDER — SODIUM CHLORIDE 9 MG/ML
1000 INJECTION INTRAMUSCULAR; INTRAVENOUS; SUBCUTANEOUS ONCE
Refills: 0 | Status: COMPLETED | OUTPATIENT
Start: 2019-09-24 | End: 2019-09-24

## 2019-09-24 RX ADMIN — Medication 202 MILLIGRAM(S): at 10:30

## 2019-09-24 RX ADMIN — ONDANSETRON 16 MILLIGRAM(S): 8 TABLET, FILM COATED ORAL at 10:30

## 2019-09-24 RX ADMIN — CISPLATIN 70 MILLIGRAM(S): 1 INJECTION, SOLUTION INTRAVENOUS at 11:46

## 2019-09-24 RX ADMIN — SODIUM CHLORIDE 1000 MILLILITER(S): 9 INJECTION INTRAMUSCULAR; INTRAVENOUS; SUBCUTANEOUS at 10:00

## 2019-09-24 RX ADMIN — SODIUM CHLORIDE 1000 MILLILITER(S): 9 INJECTION INTRAMUSCULAR; INTRAVENOUS; SUBCUTANEOUS at 11:50

## 2019-09-24 RX ADMIN — Medication 212 MILLIGRAM(S): at 10:00

## 2019-09-24 RX ADMIN — Medication 12 MILLIGRAM(S): at 10:15

## 2019-09-24 RX ADMIN — Medication 25 MILLIGRAM(S): at 10:45

## 2019-09-24 RX ADMIN — CISPLATIN 213.33 MILLIGRAM(S): 1 INJECTION, SOLUTION INTRAVENOUS at 10:46

## 2019-09-24 RX ADMIN — SODIUM CHLORIDE 1000 MILLILITER(S): 9 INJECTION INTRAMUSCULAR; INTRAVENOUS; SUBCUTANEOUS at 09:00

## 2019-09-24 RX ADMIN — ONDANSETRON 232 MILLIGRAM(S): 8 TABLET, FILM COATED ORAL at 10:15

## 2019-09-24 RX ADMIN — SODIUM CHLORIDE 1000 MILLILITER(S): 9 INJECTION INTRAMUSCULAR; INTRAVENOUS; SUBCUTANEOUS at 12:50

## 2019-09-25 DIAGNOSIS — E86.0 DEHYDRATION: ICD-10-CM

## 2019-09-25 DIAGNOSIS — R11.2 NAUSEA WITH VOMITING, UNSPECIFIED: ICD-10-CM

## 2019-09-26 ENCOUNTER — EMERGENCY (EMERGENCY)
Facility: HOSPITAL | Age: 64
LOS: 1 days | Discharge: HOME | End: 2019-09-26
Attending: EMERGENCY MEDICINE | Admitting: EMERGENCY MEDICINE
Payer: COMMERCIAL

## 2019-09-26 VITALS
SYSTOLIC BLOOD PRESSURE: 120 MMHG | TEMPERATURE: 98 F | OXYGEN SATURATION: 99 % | DIASTOLIC BLOOD PRESSURE: 79 MMHG | RESPIRATION RATE: 18 BRPM | HEART RATE: 63 BPM

## 2019-09-26 VITALS
SYSTOLIC BLOOD PRESSURE: 125 MMHG | OXYGEN SATURATION: 98 % | HEART RATE: 60 BPM | TEMPERATURE: 98 F | RESPIRATION RATE: 18 BRPM | DIASTOLIC BLOOD PRESSURE: 81 MMHG

## 2019-09-26 DIAGNOSIS — Z98.890 OTHER SPECIFIED POSTPROCEDURAL STATES: Chronic | ICD-10-CM
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
## 2019-09-26 DIAGNOSIS — S09.90XA UNSPECIFIED INJURY OF HEAD, INITIAL ENCOUNTER: ICD-10-CM

## 2019-09-26 DIAGNOSIS — Z41.9 ENCOUNTER FOR PROCEDURE FOR PURPOSES OTHER THAN REMEDYING HEALTH STATE, UNSPECIFIED: Chronic | ICD-10-CM
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
## 2019-09-26 PROCEDURE — 99284 EMERGENCY DEPT VISIT MOD MDM: CPT

## 2019-09-26 PROCEDURE — 70450 CT HEAD/BRAIN W/O DYE: CPT

## 2019-09-26 PROCEDURE — 70450 CT HEAD/BRAIN W/O DYE: CPT | Mod: 26

## 2019-09-26 NOTE — ED PROVIDER NOTE - ATTENDING CONTRIBUTION TO CARE
Patient s/p fall with head injury. head Ct negative. No focal neuro findings on exam. patient cleared for d/c to home.

## 2019-09-26 NOTE — ED ADULT NURSE NOTE - OBJECTIVE STATEMENT
Pt reported was standing on a stool yesterday when he fell off hitting the back of his head on wooden furniture. Pt also reported having soreness to right hip after falling. Denies loc. +ROM of extremities and able to bear weight to BLE. Noted hematoma and dried blood to back of his head. Denies neck or back pain. Trache collar in place. Pt is able to communicate by hand gesture and through writing.

## 2019-09-26 NOTE — ED PROVIDER NOTE - PMH
Fracture  left ankle  Jaundice  1966  Laryngeal mass  s/ p radiation  SOB (shortness of breath)    Throat cancer 89

## 2019-09-26 NOTE — ED PROVIDER NOTE - PHYSICAL EXAMINATION
+ left occipital swelling and bruising. skin intact. mild tenderness  right hip: non tender. no bruising or redness. no deformity. FROM. distal NVI.

## 2019-09-26 NOTE — ED ADULT TRIAGE NOTE - CHIEF COMPLAINT QUOTE
pt c/o fall off of a stool last night with head injury, no LOC. bump noted to back of head. pt s/p radiation for throat CA. pt also c/o R hip pain and neck pain.

## 2019-09-26 NOTE — ED PROVIDER NOTE - OBJECTIVE STATEMENT
64 y/o male with hx of laryngeal cancer c/o head injury x 1 day. pt states was on a stool changing a light bulb and fell striking head on rounded edge of cabinet. pt denies loc. no neck or back pain. no ha or dizziness. no visual changes. pt notes mild soreness to right hip. pt able to bear wt. no numbness, tingling or weakness. pt states he had radiation therapy today and sent for eval due to head injury. no further complaints.

## 2019-09-26 NOTE — ED PROVIDER NOTE - CLINICAL SUMMARY MEDICAL DECISION MAKING FREE TEXT BOX
head injury s/p fall. neuro exam intact. + swelling to occipital region on exam. ct head no acute pathology. pt declined pain meds in ED. f/u with pmd. return precautions d/w pt.

## 2019-09-26 NOTE — ED PROVIDER NOTE - PATIENT PORTAL LINK FT
You can access the FollowMyHealth Patient Portal offered by [facility] by registering at the following website: [email or web address]. By joining [employer]’s FollowMyHealth portal, you will also be able to view your health information using other applications (apps) compatible with our system.

## 2019-09-26 NOTE — ED PROVIDER NOTE - NSFOLLOWUPINSTRUCTIONS_ED_ALL_ED_FT
Follow up with your physician this week.         Head Injury    WHAT YOU NEED TO KNOW:    A head injury is most often caused by a blow to the head. This may occur from a fall, bicycle injury, sports injury, being struck in the head, or a motor vehicle accident.     DISCHARGE INSTRUCTIONS:    Call 911 or have someone else call for any of the following:     You cannot be woken.      You have a seizure.      You stop responding to others or you faint.      You have blurry or double vision.      Your speech becomes slurred or confused.      You have arm or leg weakness, loss of feeling, or new problems with coordination.      Your pupils are larger than usual or one pupil is a different size than the other.       You have blood or clear fluid coming out of your ears or nose.    Return to the emergency department if:     You have repeated or forceful vomiting.      You feel confused.      Your headache gets worse or becomes severe.      You or someone caring for you notices that you are harder to wake than usual.    Contact your healthcare provider if:     Your symptoms last longer than 6 weeks after the injury.      You have questions or concerns about your condition or care.    Medicines:     Acetaminophen decreases pain. Acetaminophen is available without a doctor's order. Ask how much to take and how often to take it. Follow directions. Acetaminophen can cause liver damage if not taken correctly.      Take your medicine as directed. Contact your healthcare provider if you think your medicine is not helping or if you have side effects. Tell him or her if you are allergic to any medicine. Keep a list of the medicines, vitamins, and herbs you take. Include the amounts, and when and why you take them. Bring the list or the pill bottles to follow-up visits. Carry your medicine list with you in case of an emergency.    Self-care:     Rest or do quiet activities for 24 to 48 hours. Limit your time watching TV, using the computer, or doing tasks that require a lot of thinking. Slowly return to your normal activities as directed. Do not play sports or do activities that may cause you to get hit in the head. Ask your healthcare provider when you can return to sports.       Apply ice on your head for 15 to 20 minutes every hour or as directed. Use an ice pack, or put crushed ice in a plastic bag. Cover it with a towel before you apply it to your skin. Ice helps prevent tissue damage and decreases swelling and pain.       Have someone stay with you for 24 hours or as directed. This person can monitor you for complications and call 911. When you are awake the person should ask you a few questions to see if you are thinking clearly. An example would be to ask your name or your address.     Prevent another head injury:     Wear a helmet that fits properly. Do this when you play sports, or ride a bike, scooter, or skateboard. Helmets help decrease your risk of a serious head injury. Talk to your healthcare provider about other ways you can protect yourself if you play sports.      Wear your seat belt every time you are in a car. This helps to decrease your risk for a head injury if you are in a car accident.     Follow up with your healthcare provider as directed: Write down your questions so you remember to ask them during your visits.        © Copyright [employer] 2019 All illustrations and images included in CareNotes are the copyrighted property of [employer] or [employer].      back to top                      © Copyright [employer] 2019

## 2019-10-01 ENCOUNTER — APPOINTMENT (OUTPATIENT)
Age: 64
End: 2019-10-01

## 2019-10-01 ENCOUNTER — OUTPATIENT (OUTPATIENT)
Dept: OUTPATIENT SERVICES | Facility: HOSPITAL | Age: 64
LOS: 1 days | End: 2019-10-01
Payer: COMMERCIAL

## 2019-10-01 VITALS
HEART RATE: 65 BPM | TEMPERATURE: 97 F | RESPIRATION RATE: 16 BRPM | HEIGHT: 67 IN | OXYGEN SATURATION: 99 % | WEIGHT: 143.08 LBS | SYSTOLIC BLOOD PRESSURE: 95 MMHG | DIASTOLIC BLOOD PRESSURE: 62 MMHG

## 2019-10-01 DIAGNOSIS — Z98.890 OTHER SPECIFIED POSTPROCEDURAL STATES: Chronic | ICD-10-CM

## 2019-10-01 DIAGNOSIS — Z41.9 ENCOUNTER FOR PROCEDURE FOR PURPOSES OTHER THAN REMEDYING HEALTH STATE, UNSPECIFIED: Chronic | ICD-10-CM

## 2019-10-01 DIAGNOSIS — C32.9 MALIGNANT NEOPLASM OF LARYNX, UNSPECIFIED: ICD-10-CM

## 2019-10-01 LAB
ANION GAP SERPL CALC-SCNC: 9 MMOL/L — SIGNIFICANT CHANGE UP (ref 5–17)
BASOPHILS # BLD AUTO: 0.04 K/UL — SIGNIFICANT CHANGE UP (ref 0–0.2)
BASOPHILS NFR BLD AUTO: 0.8 % — SIGNIFICANT CHANGE UP (ref 0–2)
BUN SERPL-MCNC: 11 MG/DL — SIGNIFICANT CHANGE UP (ref 7–23)
CALCIUM SERPL-MCNC: 7.2 MG/DL — LOW (ref 8.4–10.5)
CHLORIDE SERPL-SCNC: 100 MMOL/L — SIGNIFICANT CHANGE UP (ref 96–108)
CO2 SERPL-SCNC: 30 MMOL/L — SIGNIFICANT CHANGE UP (ref 22–31)
CREAT SERPL-MCNC: 0.99 MG/DL — SIGNIFICANT CHANGE UP (ref 0.5–1.3)
EOSINOPHIL # BLD AUTO: 0.04 K/UL — SIGNIFICANT CHANGE UP (ref 0–0.5)
EOSINOPHIL NFR BLD AUTO: 0.8 % — SIGNIFICANT CHANGE UP (ref 0–6)
GLUCOSE SERPL-MCNC: 88 MG/DL — SIGNIFICANT CHANGE UP (ref 70–99)
HCT VFR BLD CALC: 35.3 % — LOW (ref 39–50)
HGB BLD-MCNC: 11.5 G/DL — LOW (ref 13–17)
IMM GRANULOCYTES NFR BLD AUTO: 0.4 % — SIGNIFICANT CHANGE UP (ref 0–1.5)
LYMPHOCYTES # BLD AUTO: 0.78 K/UL — LOW (ref 1–3.3)
LYMPHOCYTES # BLD AUTO: 15.3 % — SIGNIFICANT CHANGE UP (ref 13–44)
MCHC RBC-ENTMCNC: 30.3 PG — SIGNIFICANT CHANGE UP (ref 27–34)
MCHC RBC-ENTMCNC: 32.6 GM/DL — SIGNIFICANT CHANGE UP (ref 32–36)
MCV RBC AUTO: 93.1 FL — SIGNIFICANT CHANGE UP (ref 80–100)
MONOCYTES # BLD AUTO: 0.44 K/UL — SIGNIFICANT CHANGE UP (ref 0–0.9)
MONOCYTES NFR BLD AUTO: 8.6 % — SIGNIFICANT CHANGE UP (ref 2–14)
NEUTROPHILS # BLD AUTO: 3.79 K/UL — SIGNIFICANT CHANGE UP (ref 1.8–7.4)
NEUTROPHILS NFR BLD AUTO: 74.1 % — SIGNIFICANT CHANGE UP (ref 43–77)
NRBC # BLD: 0 /100 WBCS — SIGNIFICANT CHANGE UP (ref 0–0)
PLATELET # BLD AUTO: 118 K/UL — LOW (ref 150–400)
POTASSIUM SERPL-MCNC: 4.1 MMOL/L — SIGNIFICANT CHANGE UP (ref 3.5–5.3)
POTASSIUM SERPL-SCNC: 4.1 MMOL/L — SIGNIFICANT CHANGE UP (ref 3.5–5.3)
RBC # BLD: 3.79 M/UL — LOW (ref 4.2–5.8)
RBC # FLD: 14.6 % — HIGH (ref 10.3–14.5)
SODIUM SERPL-SCNC: 139 MMOL/L — SIGNIFICANT CHANGE UP (ref 135–145)
WBC # BLD: 5.11 K/UL — SIGNIFICANT CHANGE UP (ref 3.8–10.5)
WBC # FLD AUTO: 5.11 K/UL — SIGNIFICANT CHANGE UP (ref 3.8–10.5)

## 2019-10-01 PROCEDURE — 96413 CHEMO IV INFUSION 1 HR: CPT

## 2019-10-01 PROCEDURE — 96361 HYDRATE IV INFUSION ADD-ON: CPT

## 2019-10-01 PROCEDURE — 96375 TX/PRO/DX INJ NEW DRUG ADDON: CPT

## 2019-10-01 PROCEDURE — 80048 BASIC METABOLIC PNL TOTAL CA: CPT

## 2019-10-01 PROCEDURE — 36415 COLL VENOUS BLD VENIPUNCTURE: CPT

## 2019-10-01 PROCEDURE — 85025 COMPLETE CBC W/AUTO DIFF WBC: CPT

## 2019-10-01 RX ORDER — SODIUM CHLORIDE 9 MG/ML
1000 INJECTION INTRAMUSCULAR; INTRAVENOUS; SUBCUTANEOUS ONCE
Refills: 0 | Status: COMPLETED | OUTPATIENT
Start: 2019-10-01 | End: 2019-10-01

## 2019-10-01 RX ORDER — ONDANSETRON 8 MG/1
16 TABLET, FILM COATED ORAL ONCE
Refills: 0 | Status: COMPLETED | OUTPATIENT
Start: 2019-10-01 | End: 2019-10-01

## 2019-10-01 RX ORDER — CISPLATIN 1 MG/ML
70 INJECTION, SOLUTION INTRAVENOUS ONCE
Refills: 0 | Status: COMPLETED | OUTPATIENT
Start: 2019-10-01 | End: 2019-10-01

## 2019-10-01 RX ORDER — DIPHENHYDRAMINE HCL 50 MG
25 CAPSULE ORAL ONCE
Refills: 0 | Status: COMPLETED | OUTPATIENT
Start: 2019-10-01 | End: 2019-10-01

## 2019-10-01 RX ORDER — DEXAMETHASONE 0.5 MG/5ML
12 ELIXIR ORAL ONCE
Refills: 0 | Status: COMPLETED | OUTPATIENT
Start: 2019-10-01 | End: 2019-10-01

## 2019-10-01 RX ADMIN — Medication 204.8 MILLIGRAM(S): at 10:30

## 2019-10-01 RX ADMIN — ONDANSETRON 232 MILLIGRAM(S): 8 TABLET, FILM COATED ORAL at 11:00

## 2019-10-01 RX ADMIN — Medication 25 MILLIGRAM(S): at 11:00

## 2019-10-01 RX ADMIN — CISPLATIN 70 MILLIGRAM(S): 1 INJECTION, SOLUTION INTRAVENOUS at 12:45

## 2019-10-01 RX ADMIN — SODIUM CHLORIDE 1000 MILLILITER(S): 9 INJECTION INTRAMUSCULAR; INTRAVENOUS; SUBCUTANEOUS at 09:30

## 2019-10-01 RX ADMIN — SODIUM CHLORIDE 1000 MILLILITER(S): 9 INJECTION INTRAMUSCULAR; INTRAVENOUS; SUBCUTANEOUS at 12:45

## 2019-10-01 RX ADMIN — Medication 12 MILLIGRAM(S): at 10:45

## 2019-10-01 RX ADMIN — ONDANSETRON 16 MILLIGRAM(S): 8 TABLET, FILM COATED ORAL at 11:15

## 2019-10-01 RX ADMIN — SODIUM CHLORIDE 1000 MILLILITER(S): 9 INJECTION INTRAMUSCULAR; INTRAVENOUS; SUBCUTANEOUS at 10:30

## 2019-10-01 RX ADMIN — Medication 202 MILLIGRAM(S): at 10:45

## 2019-10-01 RX ADMIN — SODIUM CHLORIDE 1000 MILLILITER(S): 9 INJECTION INTRAMUSCULAR; INTRAVENOUS; SUBCUTANEOUS at 13:45

## 2019-10-01 RX ADMIN — CISPLATIN 213.33 MILLIGRAM(S): 1 INJECTION, SOLUTION INTRAVENOUS at 11:15

## 2019-10-02 VITALS
SYSTOLIC BLOOD PRESSURE: 112 MMHG | HEART RATE: 68 BPM | RESPIRATION RATE: 16 BRPM | WEIGHT: 150.2 LBS | DIASTOLIC BLOOD PRESSURE: 64 MMHG | BODY MASS INDEX: 23.52 KG/M2

## 2019-10-02 DIAGNOSIS — C32.2 MALIGNANT NEOPLASM OF SUBGLOTTIS: ICD-10-CM

## 2019-10-02 DIAGNOSIS — E86.0 DEHYDRATION: ICD-10-CM

## 2019-10-02 DIAGNOSIS — R11.2 NAUSEA WITH VOMITING, UNSPECIFIED: ICD-10-CM

## 2019-10-02 DIAGNOSIS — J44.9 CHRONIC OBSTRUCTIVE PULMONARY DISEASE, UNSPECIFIED: ICD-10-CM

## 2019-10-02 NOTE — DISEASE MANAGEMENT
[Pathological] : TNM Stage: p [SHELLY] : SHELLY [TTNM] : 4 [MTNM] : 0 [NTNM] : 0 [de-identified] : 4400 cGy [de-identified] : 5000 cGy [de-identified] : larynx

## 2019-10-02 NOTE — HISTORY OF PRESENT ILLNESS
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 He tolerated treatment well.  His post-RT course was complicated by laryngeal edema requiring a tracheostomy in Feb 2018.  He was found to have recurrence in June 2019, s/p total laryngectomy, total thyroidectomy and reconstruction on 7/2/19 by Jso Branch and Sharee.  \par \par 10/2/19 - OTV - Mr. Alfonso has completed 4400cGy / 5000cGy to the larynx.  He sustained a fall at home during which he hit his head on 9/25.  He was evaluated in the ED on 9/16.   Head CT demonstrated swelling to the left occipital scalp, no acute intracranial hemorrhage or calvarial fracture.  Today, he reports feeling well and is without complaints.  He states he completed chemo on Thursday and is scheduled to follow-up with Dr. Real in 3 weeks. \par \par 9/23/19 - OTV - Mr. Alfonso has completed 3000cGy / 5000cGy to the larynx.  Today, he reports that he lost his supply of Xanax.  He is unable to tolerate treatment without it, so he was given a dose here in clinic prior to treatment and an additional supply was sent to his pharmacy.  He reports feeling well.  He notes mild dysgeusia and nausea last week on Thursday.  He denies dysphagia, odynophagia, xerostomia, vomiting, constipation.  He reports using Aquaphor and doing head and neck exercises.  CBC done 9/17 - H/H 12.0/36.7, WBC 6.56, Plts 233, ANC 4.65.  \par \par 9/16/19 - OTV - Mr. Alfonso has completed 2000cGy / 5000cGy to the larynx.  Today, he reports feeling well.  He states anxiety is controlled with xanax.  He denies complaints to include fevers, chills, dyspnea, dysphagia, odynophagia, xerostomia and pain.  he notes intermittent cough.  He states he is eating a regular diet.  Using aquaphor and doing head and neck exercises.  Getting chemo on Tuesdays.  Requested recent labs from Dr. Real's office.  Received results from 8/28. \par \par 9/9/19 - OTV - Mr. Alfonso has completed 800cGy / 5000cGy to the larynx.  He states he was not able to tolerate treatment today due to anxiety.  He states wearing the mask makes him feel very anxious to the point where he starts to breath heavily.  He states that he had to stop treatment twice on Friday due to the same issue.  Otherwise, he states he is feeling well.  He denies dyspnea, dysphagia, odynophagia, xerostomia and pain.  He states he continues to eat a regular diet.  He states he saw Dr. Branch today, who told him everything looked "beautiful." \par \par 9/4/19 - OTV - Mr. Alfonso has completed 400cGy / 5000cGy to the larynx.  Today, he reports feeling well.  He states he has been discharged home from rehab.  He has been advanced to a full regular diet, which he states he is tolerating well.  No longer using PEG for tube feedings.  He denies pain and dyspnea.  Started chemotherapy under the care of Dr. Real yesterday.  Most recent CBC on 8/28 was WNL.  \par ________________________________________\par 8/7/19 - SNA\par Mr. Alfonso returns today for SNA.  He was last seen here on 6/10/19, at which time he was found to have findings suspicious for recurrence on surveillance CT in the setting of dyspnea and stridor.  He was recommended direct laryngoscopy with biopsy and immediate laryngectomy by Dr. Branch, however, he declined immediate laryngectomy and agreed only to laryngoscopy with biopsy.  He underwent laryngoscopy with biopsy on 6/25/19 by Dr. Branch and required a tracheostomy for insufficient airway at the time of the procedure.  He was found to have subglottic recurrence.  Pathology demonstrated subglottic tissue infiltrated with well differentiated keratinizing squamous cell carcinoma.  He then underwent an urgent total laryngectomy and total thyroidectomy with bilateral tracheoesophageal groove dissections and pectoralis flap reconstruction by Dr. Branch and Dr. Tolliver on 7/2/19.  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  He presents today for consideration of adjuvant RT.  \par \par Today, he reports feeling well.  He states he is still in a rehab facility, but feels ready to be discharged home soon.  He denies pain, endorses mild soreness to his neck.  He states he has been eating a regular diet for the past 9 days.  He denies fevers, chills, dyspnea, chest pain, palpitations.  \par \par Surgical Final Report 7/2/19\par Final Diagnosis\par 1. Superior pharyngeal margin #1, excision:\par - Negative for tumor\par 2. Left lateral pharyngeal wall margin, excision:\par - Negative for tumor\par 3. Right lateral peripheral wall margin, excision:\par - Negative for tumor\par 4. Post cricoid mucosa, biopsy:\par - Squamous cell carcinoma\par 5. Anterior inferior tracheal margin, excision:\par - Negative for tumor\par 6. Larynx, total laryngectomy:\par - Squamous cell carcinoma, keratinizing, moderately\par differentiated, 4.5 cm\par - Tumor is bilaterally transglottic (supraglottis, true and\par false vocal cords, subglottis)\par with involvement of the trachea\par - Tumor extends into the periglottic soft tissue space,\par invades the thyroid gland\par bilaterally and the thyroid cartilages anterior soft\par tissues and stomal skin\par - Focally suspicious for perineural involvement\par - No convincing evidence of lymphovascular invasion\par - Tumor focally present on posterior soft tissue margin and\par present on left and right\par posterior cricoid / arytenoid mucosal margins\par - Remaining margins negative for tumor\par 7. Posterior cricoid mucosa #2:\par - Negative for tumor\par 8. Posterior cricoid mucosa, excision:\par - Squamous cell carcinoma with perineural and intraneural\par invasion\par - Tumor present on inked margin\par \par Comment:\par Tumor on the posterior soft tissue margin (slide 6L) is best seen\par on level 1 of several levels.\par \par Synoptic Summary\par Specimen: larynx\par Procedure: total laryngectomy\par Specimen integrity: intact\par Specimen size: 9.5 x 6.0 x 5.3 cm\par Tumor laterality: bilateral\par Tumor site: transglottic\par Tumor focality: single focus\par Tumor size: 4.5 cm\par Tumor description: soft ulcerated mass\par Macroscopic extent of tumor: into thyroid gland bilaterally\par paraglottic, pre-epiglottic spaces, anterior, lateral and\par posterior soft tissues\par Histologic type:  keratinizing squamous cell carcinoma\par Histologic grade:  moderately differentiated\par Microscopic tumor extent: as above\par Margins:  posterior soft tissue margin positive for tumor\par Lymphovascular invasion: not convincingly identified\par Perineural invasion: present\par Lymph nodes: not received\par Pathologic stage: pT4a  pNX\par \par 6/10/19 - Follow-up\par Mr. Alfonso returns today for routine follow-up.  He was last seen here on 2/19/19.  Plan at that time was for CT neck with contrast and CT chest without contrast in June and follow-up with PCP, dental and Dr. Branch.  \par \par CT neck with contrast 6/4/19 - IMPRESSION: There are new sites of enhancing soft tissue, most notably along the anterior tracheal wall, suspicious for recurrence of laryngeal carcinoma. Consider correlation with PET/CT for further characterization. Airway narrowing is present at the subglottic larynx and proximal trachea.\par \par CT chest without contrast 6/4/19 - IMPRESSION: 2 mm left upper lobe pulmonary nodule. 1.4 cm left adrenal nodule, not clearly seen on the prior study. Follow-up CT recommended. \par \par He was recently admitted to the hospital at the end of March 2019, for dyspnea and placed on steroids and antibiotics for glottic edema and stridor with resolution of stridor from medical therapy.  He states dyspnea initially improved following that admission, but then worsened again. He was discharged with cefpodoxime and flagyl. He saw Dr. Branch 4/8/19 at which time he was improved. He saw Dr. Branch again on 6/5, at which time he was having worsening stridor.  Plan was for PET/CT to further evaluate for recurrence; discussed possible laryngectomy given respiratory distress from paralyzed right tVC, decreased glottic opening, left glottic edema.  \par \par PET/CT 6/7/19 - There is abnormal FDG activity (SUV 14.6) of the glottic and subglottic regions with extension inferiorly along the anterior tracheal wall.  The superiormost extent of activity is in the supraglottic larynx at the level of the superior most portion of the thyroid cartilages.  Small additional focus of activity posterolateral to the left arytenoid cartilage.  No FDG avid lymphadenopathy.  \par Impression: Abnormal FDG activity extending from the posterior aspect of the supraglottic larynx to the subglottic region and along the anterior wall of the trachea suspicious for tumor recurrence.  \par \par He has not seen the dentist since prior to RT.  He does not currently have a PMD.  Today, he reports continued dyspnea, unchanged over the past month.  He states it is mostly with exertion, but sometimes at rest.  He notes productive cough of white sputum, mild sore throat, and mild xerostomia at night.  He denies dysphagia, odynophagia.  Reports he is eating a regular diet, but has decreased appetite lately.  \par \par 2/19/19- FOLLOW UP \par When he was last seen on 10/9/18, he was doing well, LILIANA. Plan was to continue follow up with Dr. Branch and do CT neck in 4 months. Patient saw Dr. Branch on 12/3/18. He reported voice and swallowing were stable, no neck masses, no weight loss. Plan was to RTC 4 months (appointment scheduled for April). \par \par CT scan done 2/12/19 revealed the following: \par Since 10/7/18, there is no significant interval change in CT appearance of the treated larynx. Stable fullness/nodularity of the right true vocal cord. No further chondromalacia is evident. \par \par Today he reports he is feeling pretty well. Only complaint is more shortness of breath when he climbs the stairs of his 4-5 floor walk up at home. He says no dyspnea at rest. Denies neck, throat or mouth pain. Denies dysphagia; eating a regular diet and has gained 12 pounds since December. Reports taste is normal. Continues to do neck exercises daily. Denies any other c/o to include fever, chills, CP. He reports that he continues to abstain from smoking or drinking (quit smoking Feb 2018).  Voice is overall improved from last year.\par \par ______________________________\par 10/9/18- Follow up\par At his last visit he was eating and drinking normally. He was advised to f/u with Dr Branch and return in 3 months with imaging PET/CT and CT neck. He f/u with Dr. Branch 8/2018. \par \par PET/CT done 10/7/18- No evidence of residual, recurrent, or metastatic disease identified. Increased uptake in the left vocal cord is in compensation for the fixed right vocal cord. \par \par CT neck done 10/7/18 showed the following: There is again evident nodularity of the right true vocal fold, without appreciable change since the prior CT study. Again evident is absent mineralization of the right arytenoid cartilage, which had been calcified prior to treatment. There is now a greater degree of fragmentation of the superior cricoid lamina. Given absence of suspicious FDG uptake in this region, findings within the laryngeal cartilages are felt more compatible with chondronecrosis than tumor recurrence. There is persistent though diminished edema in the supraglottic larynx compatible with radiation changes.\par *  Lymph Nodes: No cervical lymphadenopathy.\par *  Salivary Glands: Generalized increased density and decreased size, compatible with radiation effects.\par \par Today, he reports he is feeling well. Denies neck pain, mouth pain. Reports sore throat at times. Continues to have hoarseness. Mouth dryness has improved. Taste is normal, able to eat and drink and swallow all textures. He has gained 4 pounds in the past two months. Continues to do neck exercises. Reports breathing is good, denies SOB. Denies smoking or drinking (quit smoking Feb 2018).\par \par 7/2/18- Follow up\par CT neck on 6/29/18 showed now absence of the previously mineralized right arytenoid process, as well as subtle erosion of the superior right lateral cricoid cartilage, present in association with nodularity of the right true vocal fold. Differential includes both recurrent disease and chondronecrosis. Consider repeat PET/CT and/or/follow up neck CT in approximately 3 months. There was also a hypodensity in the right inferior frontal lone, possibly posttraumatic. This was present in 7/2017. Consider brain imaging. \par \par Today Mr. Alfonso is feeling well. He was decannulated in the interim since we saw him last. Breathing is good, walked about 10 blocks today without increased shortness of breath. Had mild SOB yesterday. Trach site closed. Denies pain to neck, denies lumps or bumps to neck. Mouth is sometimes dry, taste is normal. Able to eat and drink all textures. Has gained 17 pounds since april. His only concern is cough/phlegm and a hoarse voice.\par \par 7/2/18- \par Mr. Alfonso presents today for routine follow up. He was decannulated in the interim since we last saw him. \par \par Oncology History:\par Mr. 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 Branch on 4/5/17 after bring referred by Dr. King following investigation for hoarseness showing mass on flexible laryngoscopy.  He presented with 8 months- 1 year of increasing hoarseness on background of ~60 pack year smoking history. He denied pain, discomfort, otalgia, dysphagia, odynophagia, loss of weight, hemoptysis.  He was scheduled for surgery after this, however there were issues with his insurance which resulted in surgery cancellation. \par \par He eventually underwent DL and biopsy on 6/20/17. Pathology shows infiltrating squamous cell carcinoma of the right anterior vocal cord, the right supraglottis, the anterior commissure, and right vocal cord process, and right superior supraglottis. Left vocal cord process shows SCC, predominantly in situ, with focal superficial invasion. \par \par He has not had any imaging studies. His case was reviewed at multidisciplinary head and neck oncology tumor Board on July 10 and the recommendation was for definitive radiation therapy since  surgery would involve a total laryngectomy.\par \par He notes persistent hoarseness at this time. He denies coughing, trouble swallowing, bleeding, or pain. He has lost some weight that he attributes to anxiety. He works about 5-6 hours per day 5 days a week at [employer]. He has not seen a dentist in years. He continues to smoke 1ppd and has never tried quitting. \par \par \par 11/24/17 - Mr. Alfonso presents today for post treatment evaluation. Today reports feeling well - appetite good, swallowing better with soft textured food but able to eat most foods. Plans on resuming work at [employer] in 5 days. \par Reports:\par - intermittent cough for white secretions-smoking half pack/day-smoking cessation advised\par - dysgeusia, dry mouth-advised bicarb mouth rinses, increase fluid intake\par - minimal pain (only when swallowing), fever, night sweats, mucositis - using gabapentin 2 tabs bid\par - not using prevident toothpaste-never did\par \par 1/23/18- Mr. Alfonso presents today for routine follow up. He is feeling well. PO intake is good, weight stable. He is able to eat all textures of food when he is mindful of chewing. He is coughing a lot, and has a frequent tickle in his throat. His throat becomes sore when he talks frequently. He has reduced the amount that he is smoking, and is currently smoking less than a pack per day. he plans to quit over the next 10 days. \par He is doing head and neck exercises daily. He is not using prevident. He underwent PET scan on 1/22/18, report from which is not yet available. I called radiology and radiologist was not available. \par \par 4/24/18- Mr. Alfonso presents today for follow up. Since he saw us last in January he underwent a PET scan which showed LILIANA. \par In February Mr. Alfonso became persistently hoarse with worsening airway. he was taken to OR for awake tracheostomy and biopsy. Biopsy was negative on 2/27/18.  He was discharged to rehab. He saw Dr. Branch on 4/23, and he is planning for possible decannulation in 3 weeks. \par Today pt. is getting over URI.  He notes pain with swallowing. Lost 17 lbs since last visit. No other new complaints. Taking tylenol only.

## 2019-10-02 NOTE — REVIEW OF SYSTEMS
[Dysphagia: Grade 0] : Dysphagia: Grade 0 [Vomiting: Grade 0] : Vomiting: Grade 0 [Nausea: Grade 0] : Nausea: Grade 0 [Fatigue: Grade 0] : Fatigue: Grade 0 [Localized Edema: Grade 0] : Localized Edema: Grade 0  [Neck Edema: Grade 0] : Neck Edema: Grade 0 [Mucositis Oral: Grade 0] : Mucositis Oral: Grade 0  [Oral Pain: Grade 0] : Oral Pain: Grade 0 [Salivary duct inflammation: Grade 0] : Salivary duct inflammation: Grade 0 [Dysgeusia: Grade 0] : Dysgeusia: Grade 0 [Skin Induration: Grade 0] : Skin Induration: Grade 0 [Dermatitis Radiation: Grade 1 - Faint erythema or dry desquamation] : Dermatitis Radiation: Grade 1 - Faint erythema or dry desquamation [Anxiety] : anxiety [Negative] : Psychiatric [FreeTextEntry4] : see HPI [FreeTextEntry6] : see HPI

## 2019-10-02 NOTE — PHYSICAL EXAM
[General Appearance - Well Developed] : well developed [General Appearance - Alert] : alert [General Appearance - In No Acute Distress] : in no acute distress [Thin] : thin [Sclera] : the sclera and conjunctiva were normal [PERRL With Normal Accommodation] : pupils were equal in size, round, reactive to light [Extraocular Movements] : extraocular movements were intact [] : no respiratory distress [Respiration, Rhythm And Depth] : normal respiratory rhythm and effort [Auscultation Breath Sounds / Voice Sounds] : lungs were clear to auscultation bilaterally [Heart Rate And Rhythm] : heart rate and rhythm were normal [Heart Sounds] : normal S1 and S2 [Normal] : oriented to person, place and time, the affect was normal, the mood was normal and not anxious [Mood] : the mood was normal [de-identified] : ECOG 1 [de-identified] : laryngectomy stoma with appliance in place, firm area below laryngectomy stoma, unchanged  [de-identified] : faint erythema to anterior neck

## 2019-10-07 VITALS
HEART RATE: 78 BPM | WEIGHT: 146 LBS | OXYGEN SATURATION: 98 % | RESPIRATION RATE: 16 BRPM | DIASTOLIC BLOOD PRESSURE: 77 MMHG | BODY MASS INDEX: 22.87 KG/M2 | SYSTOLIC BLOOD PRESSURE: 119 MMHG

## 2019-10-07 NOTE — DISEASE MANAGEMENT
[Pathological] : TNM Stage: p [SHELLY] : SHELLY [TTNM] : 4 [NTNM] : 0 [MTNM] : 0 [de-identified] : 5000 cGy [de-identified] : 5000 cGy [de-identified] : larynx

## 2019-10-07 NOTE — PHYSICAL EXAM
[General Appearance - Well Developed] : well developed [General Appearance - Alert] : alert [General Appearance - In No Acute Distress] : in no acute distress [Thin] : thin [Sclera] : the sclera and conjunctiva were normal [PERRL With Normal Accommodation] : pupils were equal in size, round, reactive to light [Extraocular Movements] : extraocular movements were intact [] : no respiratory distress [Auscultation Breath Sounds / Voice Sounds] : lungs were clear to auscultation bilaterally [Heart Rate And Rhythm] : heart rate and rhythm were normal [Respiration, Rhythm And Depth] : normal respiratory rhythm and effort [Heart Sounds] : normal S1 and S2 [Normal] : oriented to person, place and time, the affect was normal, the mood was normal and not anxious [Mood] : the mood was normal [de-identified] : ECOG 1 [de-identified] : laryngectomy stoma with appliance in place, firm area below laryngectomy stoma, unchanged  [de-identified] : grade 1 dermatitis to anterior neck, no desquamation is anticipated

## 2019-10-07 NOTE — REVIEW OF SYSTEMS
[Dysphagia: Grade 0] : Dysphagia: Grade 0 [Nausea: Grade 0] : Nausea: Grade 0 [Vomiting: Grade 0] : Vomiting: Grade 0 [Fatigue: Grade 0] : Fatigue: Grade 0 [Localized Edema: Grade 0] : Localized Edema: Grade 0  [Neck Edema: Grade 0] : Neck Edema: Grade 0 [Mucositis Oral: Grade 0] : Mucositis Oral: Grade 0  [Oral Pain: Grade 0] : Oral Pain: Grade 0 [Salivary duct inflammation: Grade 0] : Salivary duct inflammation: Grade 0 [Dysgeusia: Grade 0] : Dysgeusia: Grade 0 [Skin Induration: Grade 0] : Skin Induration: Grade 0 [Dermatitis Radiation: Grade 1 - Faint erythema or dry desquamation] : Dermatitis Radiation: Grade 1 - Faint erythema or dry desquamation [Anxiety] : anxiety [Negative] : Neurological [FreeTextEntry4] : see HPI [FreeTextEntry6] : see HPI

## 2019-10-07 NOTE — HISTORY OF PRESENT ILLNESS
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 He tolerated treatment well.  His post-RT course was complicated by laryngeal edema requiring a tracheostomy in Feb 2018.  He was found to have recurrence in June 2019, s/p total laryngectomy, total thyroidectomy and reconstruction on 7/2/19 by Jos Branch and Sharee.  \par \par 10/7/19 - OTV - Mr. Alfonso has completed 5000cGy / 5000cGy to the larynx.  Today, he reports feeling well.  He notes occasional neck stiffness.  He denies other complaints.  He is scheduled to see Dr. Branch on 10/14 and Dr. Real on 10/28.  \par \par 10/2/19 - OTV - Mr. Alfonso has completed 4400cGy / 5000cGy to the larynx.  He sustained a fall at home during which he hit his head on 9/25.  He was evaluated in the ED on 9/16.   Head CT demonstrated swelling to the left occipital scalp, no acute intracranial hemorrhage or calvarial fracture.  Today, he reports feeling well and is without complaints.  He states he completed chemo on Thursday and is scheduled to follow-up with Dr. Real in 3 weeks. \par \par 9/23/19 - OTV - Mr. Alfonso has completed 3000cGy / 5000cGy to the larynx.  Today, he reports that he lost his supply of Xanax.  He is unable to tolerate treatment without it, so he was given a dose here in clinic prior to treatment and an additional supply was sent to his pharmacy.  He reports feeling well.  He notes mild dysgeusia and nausea last week on Thursday.  He denies dysphagia, odynophagia, xerostomia, vomiting, constipation.  He reports using Aquaphor and doing head and neck exercises.  CBC done 9/17 - H/H 12.0/36.7, WBC 6.56, Plts 233, ANC 4.65.  \par \par 9/16/19 - OTV - Mr. Alfonso has completed 2000cGy / 5000cGy to the larynx.  Today, he reports feeling well.  He states anxiety is controlled with xanax.  He denies complaints to include fevers, chills, dyspnea, dysphagia, odynophagia, xerostomia and pain.  he notes intermittent cough.  He states he is eating a regular diet.  Using aquaphor and doing head and neck exercises.  Getting chemo on Tuesdays.  Requested recent labs from Dr. Real's office.  Received results from 8/28. \par \par 9/9/19 - OTV - Mr. Alfonso has completed 800cGy / 5000cGy to the larynx.  He states he was not able to tolerate treatment today due to anxiety.  He states wearing the mask makes him feel very anxious to the point where he starts to breath heavily.  He states that he had to stop treatment twice on Friday due to the same issue.  Otherwise, he states he is feeling well.  He denies dyspnea, dysphagia, odynophagia, xerostomia and pain.  He states he continues to eat a regular diet.  He states he saw Dr. Branch today, who told him everything looked "beautiful." \par \par 9/4/19 - OTV - Mr. Alfonso has completed 400cGy / 5000cGy to the larynx.  Today, he reports feeling well.  He states he has been discharged home from rehab.  He has been advanced to a full regular diet, which he states he is tolerating well.  No longer using PEG for tube feedings.  He denies pain and dyspnea.  Started chemotherapy under the care of Dr. Real yesterday.  Most recent CBC on 8/28 was WNL.  \par ________________________________________\par 8/7/19 - SNA\par Mr. Alfonso returns today for SNA.  He was last seen here on 6/10/19, at which time he was found to have findings suspicious for recurrence on surveillance CT in the setting of dyspnea and stridor.  He was recommended direct laryngoscopy with biopsy and immediate laryngectomy by Dr. Branch, however, he declined immediate laryngectomy and agreed only to laryngoscopy with biopsy.  He underwent laryngoscopy with biopsy on 6/25/19 by Dr. Branch and required a tracheostomy for insufficient airway at the time of the procedure.  He was found to have subglottic recurrence.  Pathology demonstrated subglottic tissue infiltrated with well differentiated keratinizing squamous cell carcinoma.  He then underwent an urgent total laryngectomy and total thyroidectomy with bilateral tracheoesophageal groove dissections and pectoralis flap reconstruction by Dr. Branch and Dr. Tolliver on 7/2/19.  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  He presents today for consideration of adjuvant RT.  \par \par Today, he reports feeling well.  He states he is still in a rehab facility, but feels ready to be discharged home soon.  He denies pain, endorses mild soreness to his neck.  He states he has been eating a regular diet for the past 9 days.  He denies fevers, chills, dyspnea, chest pain, palpitations.  \par \par Surgical Final Report 7/2/19\par Final Diagnosis\par 1. Superior pharyngeal margin #1, excision:\par - Negative for tumor\par 2. Left lateral pharyngeal wall margin, excision:\par - Negative for tumor\par 3. Right lateral peripheral wall margin, excision:\par - Negative for tumor\par 4. Post cricoid mucosa, biopsy:\par - Squamous cell carcinoma\par 5. Anterior inferior tracheal margin, excision:\par - Negative for tumor\par 6. Larynx, total laryngectomy:\par - Squamous cell carcinoma, keratinizing, moderately\par differentiated, 4.5 cm\par - Tumor is bilaterally transglottic (supraglottis, true and\par false vocal cords, subglottis)\par with involvement of the trachea\par - Tumor extends into the periglottic soft tissue space,\par invades the thyroid gland\par bilaterally and the thyroid cartilages anterior soft\par tissues and stomal skin\par - Focally suspicious for perineural involvement\par - No convincing evidence of lymphovascular invasion\par - Tumor focally present on posterior soft tissue margin and\par present on left and right\par posterior cricoid / arytenoid mucosal margins\par - Remaining margins negative for tumor\par 7. Posterior cricoid mucosa #2:\par - Negative for tumor\par 8. Posterior cricoid mucosa, excision:\par - Squamous cell carcinoma with perineural and intraneural\par invasion\par - Tumor present on inked margin\par \par Comment:\par Tumor on the posterior soft tissue margin (slide 6L) is best seen\par on level 1 of several levels.\par \par Synoptic Summary\par Specimen: larynx\par Procedure: total laryngectomy\par Specimen integrity: intact\par Specimen size: 9.5 x 6.0 x 5.3 cm\par Tumor laterality: bilateral\par Tumor site: transglottic\par Tumor focality: single focus\par Tumor size: 4.5 cm\par Tumor description: soft ulcerated mass\par Macroscopic extent of tumor: into thyroid gland bilaterally\par paraglottic, pre-epiglottic spaces, anterior, lateral and\par posterior soft tissues\par Histologic type:  keratinizing squamous cell carcinoma\par Histologic grade:  moderately differentiated\par Microscopic tumor extent: as above\par Margins:  posterior soft tissue margin positive for tumor\par Lymphovascular invasion: not convincingly identified\par Perineural invasion: present\par Lymph nodes: not received\par Pathologic stage: pT4a  pNX\par \par 6/10/19 - Follow-up\par Mr. Alfonso returns today for routine follow-up.  He was last seen here on 2/19/19.  Plan at that time was for CT neck with contrast and CT chest without contrast in June and follow-up with PCP, dental and Dr. Branch.  \par \par CT neck with contrast 6/4/19 - IMPRESSION: There are new sites of enhancing soft tissue, most notably along the anterior tracheal wall, suspicious for recurrence of laryngeal carcinoma. Consider correlation with PET/CT for further characterization. Airway narrowing is present at the subglottic larynx and proximal trachea.\par \par CT chest without contrast 6/4/19 - IMPRESSION: 2 mm left upper lobe pulmonary nodule. 1.4 cm left adrenal nodule, not clearly seen on the prior study. Follow-up CT recommended. \par \par He was recently admitted to the hospital at the end of March 2019, for dyspnea and placed on steroids and antibiotics for glottic edema and stridor with resolution of stridor from medical therapy.  He states dyspnea initially improved following that admission, but then worsened again. He was discharged with cefpodoxime and flagyl. He saw Dr. Branch 4/8/19 at which time he was improved. He saw Dr. Branch again on 6/5, at which time he was having worsening stridor.  Plan was for PET/CT to further evaluate for recurrence; discussed possible laryngectomy given respiratory distress from paralyzed right tVC, decreased glottic opening, left glottic edema.  \par \par PET/CT 6/7/19 - There is abnormal FDG activity (SUV 14.6) of the glottic and subglottic regions with extension inferiorly along the anterior tracheal wall.  The superiormost extent of activity is in the supraglottic larynx at the level of the superior most portion of the thyroid cartilages.  Small additional focus of activity posterolateral to the left arytenoid cartilage.  No FDG avid lymphadenopathy.  \par Impression: Abnormal FDG activity extending from the posterior aspect of the supraglottic larynx to the subglottic region and along the anterior wall of the trachea suspicious for tumor recurrence.  \par \par He has not seen the dentist since prior to RT.  He does not currently have a PMD.  Today, he reports continued dyspnea, unchanged over the past month.  He states it is mostly with exertion, but sometimes at rest.  He notes productive cough of white sputum, mild sore throat, and mild xerostomia at night.  He denies dysphagia, odynophagia.  Reports he is eating a regular diet, but has decreased appetite lately.  \par \par 2/19/19- FOLLOW UP \par When he was last seen on 10/9/18, he was doing well, LILIANA. Plan was to continue follow up with Dr. Branch and do CT neck in 4 months. Patient saw Dr. Branch on 12/3/18. He reported voice and swallowing were stable, no neck masses, no weight loss. Plan was to RTC 4 months (appointment scheduled for April). \par \par CT scan done 2/12/19 revealed the following: \par Since 10/7/18, there is no significant interval change in CT appearance of the treated larynx. Stable fullness/nodularity of the right true vocal cord. No further chondromalacia is evident. \par \par Today he reports he is feeling pretty well. Only complaint is more shortness of breath when he climbs the stairs of his 4-5 floor walk up at home. He says no dyspnea at rest. Denies neck, throat or mouth pain. Denies dysphagia; eating a regular diet and has gained 12 pounds since December. Reports taste is normal. Continues to do neck exercises daily. Denies any other c/o to include fever, chills, CP. He reports that he continues to abstain from smoking or drinking (quit smoking Feb 2018).  Voice is overall improved from last year.\par \par ______________________________\par 10/9/18- Follow up\par At his last visit he was eating and drinking normally. He was advised to f/u with Dr Branch and return in 3 months with imaging PET/CT and CT neck. He f/u with Dr. Branch 8/2018. \par \par PET/CT done 10/7/18- No evidence of residual, recurrent, or metastatic disease identified. Increased uptake in the left vocal cord is in compensation for the fixed right vocal cord. \par \par CT neck done 10/7/18 showed the following: There is again evident nodularity of the right true vocal fold, without appreciable change since the prior CT study. Again evident is absent mineralization of the right arytenoid cartilage, which had been calcified prior to treatment. There is now a greater degree of fragmentation of the superior cricoid lamina. Given absence of suspicious FDG uptake in this region, findings within the laryngeal cartilages are felt more compatible with chondronecrosis than tumor recurrence. There is persistent though diminished edema in the supraglottic larynx compatible with radiation changes.\par *  Lymph Nodes: No cervical lymphadenopathy.\par *  Salivary Glands: Generalized increased density and decreased size, compatible with radiation effects.\par \par Today, he reports he is feeling well. Denies neck pain, mouth pain. Reports sore throat at times. Continues to have hoarseness. Mouth dryness has improved. Taste is normal, able to eat and drink and swallow all textures. He has gained 4 pounds in the past two months. Continues to do neck exercises. Reports breathing is good, denies SOB. Denies smoking or drinking (quit smoking Feb 2018).\par \par 7/2/18- Follow up\par CT neck on 6/29/18 showed now absence of the previously mineralized right arytenoid process, as well as subtle erosion of the superior right lateral cricoid cartilage, present in association with nodularity of the right true vocal fold. Differential includes both recurrent disease and chondronecrosis. Consider repeat PET/CT and/or/follow up neck CT in approximately 3 months. There was also a hypodensity in the right inferior frontal lone, possibly posttraumatic. This was present in 7/2017. Consider brain imaging. \par \par Today Mr. Alfonso is feeling well. He was decannulated in the interim since we saw him last. Breathing is good, walked about 10 blocks today without increased shortness of breath. Had mild SOB yesterday. Trach site closed. Denies pain to neck, denies lumps or bumps to neck. Mouth is sometimes dry, taste is normal. Able to eat and drink all textures. Has gained 17 pounds since april. His only concern is cough/phlegm and a hoarse voice.\par \par 7/2/18- \par Mr. Alfonso presents today for routine follow up. He was decannulated in the interim since we last saw him. \par \par Oncology History:\par Mr. 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 Branch on 4/5/17 after bring referred by Dr. King following investigation for hoarseness showing mass on flexible laryngoscopy.  He presented with 8 months- 1 year of increasing hoarseness on background of ~60 pack year smoking history. He denied pain, discomfort, otalgia, dysphagia, odynophagia, loss of weight, hemoptysis.  He was scheduled for surgery after this, however there were issues with his insurance which resulted in surgery cancellation. \par \par He eventually underwent DL and biopsy on 6/20/17. Pathology shows infiltrating squamous cell carcinoma of the right anterior vocal cord, the right supraglottis, the anterior commissure, and right vocal cord process, and right superior supraglottis. Left vocal cord process shows SCC, predominantly in situ, with focal superficial invasion. \par \par He has not had any imaging studies. His case was reviewed at multidisciplinary head and neck oncology tumor Board on July 10 and the recommendation was for definitive radiation therapy since  surgery would involve a total laryngectomy.\par \par He notes persistent hoarseness at this time. He denies coughing, trouble swallowing, bleeding, or pain. He has lost some weight that he attributes to anxiety. He works about 5-6 hours per day 5 days a week at [employer]. He has not seen a dentist in years. He continues to smoke 1ppd and has never tried quitting. \par \par \par 11/24/17 - Mr. Alfonso presents today for post treatment evaluation. Today reports feeling well - appetite good, swallowing better with soft textured food but able to eat most foods. Plans on resuming work at [employer] in 5 days. \par Reports:\par - intermittent cough for white secretions-smoking half pack/day-smoking cessation advised\par - dysgeusia, dry mouth-advised bicarb mouth rinses, increase fluid intake\par - minimal pain (only when swallowing), fever, night sweats, mucositis - using gabapentin 2 tabs bid\par - not using prevident toothpaste-never did\par \par 1/23/18- Mr. Alfonso presents today for routine follow up. He is feeling well. PO intake is good, weight stable. He is able to eat all textures of food when he is mindful of chewing. He is coughing a lot, and has a frequent tickle in his throat. His throat becomes sore when he talks frequently. He has reduced the amount that he is smoking, and is currently smoking less than a pack per day. he plans to quit over the next 10 days. \par He is doing head and neck exercises daily. He is not using prevident. He underwent PET scan on 1/22/18, report from which is not yet available. I called radiology and radiologist was not available. \par \par 4/24/18- Mr. Alfonso presents today for follow up. Since he saw us last in January he underwent a PET scan which showed LILIANA. \par In February Mr. Alfonso became persistently hoarse with worsening airway. he was taken to OR for awake tracheostomy and biopsy. Biopsy was negative on 2/27/18.  He was discharged to rehab. He saw Dr. Branch on 4/23, and he is planning for possible decannulation in 3 weeks. \par Today pt. is getting over URI.  He notes pain with swallowing. Lost 17 lbs since last visit. No other new complaints. Taking tylenol only.

## 2019-10-08 ENCOUNTER — RX RENEWAL (OUTPATIENT)
Age: 64
End: 2019-10-08

## 2019-10-14 ENCOUNTER — APPOINTMENT (OUTPATIENT)
Dept: OTOLARYNGOLOGY | Facility: CLINIC | Age: 64
End: 2019-10-14
Payer: MEDICAID

## 2019-10-14 VITALS
BODY MASS INDEX: 22.91 KG/M2 | WEIGHT: 146 LBS | HEIGHT: 67 IN | OXYGEN SATURATION: 97 % | SYSTOLIC BLOOD PRESSURE: 115 MMHG | HEART RATE: 75 BPM | DIASTOLIC BLOOD PRESSURE: 75 MMHG

## 2019-10-14 PROCEDURE — 99213 OFFICE O/P EST LOW 20 MIN: CPT

## 2019-10-18 ENCOUNTER — APPOINTMENT (OUTPATIENT)
Dept: INTERNAL MEDICINE | Facility: CLINIC | Age: 64
End: 2019-10-18
Payer: MEDICAID

## 2019-10-18 VITALS
TEMPERATURE: 97.8 F | SYSTOLIC BLOOD PRESSURE: 104 MMHG | DIASTOLIC BLOOD PRESSURE: 70 MMHG | OXYGEN SATURATION: 100 % | HEART RATE: 71 BPM | RESPIRATION RATE: 12 BRPM

## 2019-10-18 DIAGNOSIS — Z91.89 OTHER SPECIFIED PERSONAL RISK FACTORS, NOT ELSEWHERE CLASSIFIED: ICD-10-CM

## 2019-10-18 DIAGNOSIS — E27.9 DISORDER OF ADRENAL GLAND, UNSPECIFIED: ICD-10-CM

## 2019-10-18 PROCEDURE — 99214 OFFICE O/P EST MOD 30 MIN: CPT | Mod: 25,GC

## 2019-10-18 RX ORDER — LANOLIN 500 MG/G
CREAM TOPICAL
Refills: 0 | Status: DISCONTINUED | COMMUNITY
End: 2019-10-18

## 2019-10-18 RX ORDER — ACETAMINOPHEN 160 MG/5ML
160 LIQUID ORAL
Refills: 0 | Status: DISCONTINUED | COMMUNITY
End: 2019-10-18

## 2019-10-18 RX ORDER — PROPYLENE GLYCOL/PEG 400 0.3 %-0.4%
DROPS OPHTHALMIC (EYE)
Qty: 30 | Refills: 0 | Status: ACTIVE | COMMUNITY
Start: 2019-10-18 | End: 1900-01-01

## 2019-10-18 RX ORDER — ALPRAZOLAM 0.25 MG/1
0.25 TABLET ORAL
Qty: 10 | Refills: 0 | Status: DISCONTINUED | COMMUNITY
Start: 2019-09-09 | End: 2019-10-18

## 2019-10-18 RX ORDER — CRANBERRY FRUIT EXTRACT 650 MG
100 MCG CAPSULE ORAL DAILY
Qty: 30 | Refills: 0 | Status: DISCONTINUED | COMMUNITY
Start: 2019-08-20 | End: 2019-10-18

## 2019-10-18 RX ORDER — SWAB
SWAB, NON-MEDICATED MISCELLANEOUS
Qty: 100 | Refills: 3 | Status: ACTIVE | COMMUNITY
Start: 2019-10-18 | End: 1900-01-01

## 2019-10-18 RX ORDER — CALCIUM CARBONATE 1250 MG/5ML
1250 SUSPENSION ORAL 3 TIMES DAILY
Qty: 900 | Refills: 0 | Status: DISCONTINUED | COMMUNITY
Start: 2019-08-20 | End: 2019-10-18

## 2019-10-18 RX ORDER — CALCITRIOL 1 UG/ML
1 SOLUTION ORAL TWICE DAILY
Qty: 30 | Refills: 0 | Status: DISCONTINUED | COMMUNITY
Start: 2019-08-20 | End: 2019-10-18

## 2019-10-28 ENCOUNTER — EMERGENCY (EMERGENCY)
Facility: HOSPITAL | Age: 64
LOS: 1 days | Discharge: HOME | End: 2019-10-28
Attending: EMERGENCY MEDICINE | Admitting: EMERGENCY MEDICINE
Payer: COMMERCIAL

## 2019-10-28 VITALS
DIASTOLIC BLOOD PRESSURE: 77 MMHG | SYSTOLIC BLOOD PRESSURE: 117 MMHG | OXYGEN SATURATION: 97 % | HEART RATE: 75 BPM | TEMPERATURE: 98 F | RESPIRATION RATE: 17 BRPM

## 2019-10-28 VITALS
RESPIRATION RATE: 17 BRPM | OXYGEN SATURATION: 98 % | SYSTOLIC BLOOD PRESSURE: 118 MMHG | HEART RATE: 77 BPM | DIASTOLIC BLOOD PRESSURE: 83 MMHG | TEMPERATURE: 98 F

## 2019-10-28 DIAGNOSIS — Z98.890 OTHER SPECIFIED POSTPROCEDURAL STATES: Chronic | ICD-10-CM

## 2019-10-28 DIAGNOSIS — Z41.9 ENCOUNTER FOR PROCEDURE FOR PURPOSES OTHER THAN REMEDYING HEALTH STATE, UNSPECIFIED: Chronic | ICD-10-CM

## 2019-10-28 PROCEDURE — 99282 EMERGENCY DEPT VISIT SF MDM: CPT

## 2019-10-28 NOTE — ED PROVIDER NOTE - PATIENT PORTAL LINK FT
You can access the FollowMyHealth Patient Portal offered by [facility] by registering at the following website: [email or web address]. By joining [employer]’s FollowMyHealth portal, you will also be able to view your health information using other applications (apps) compatible with our system.

## 2019-10-28 NOTE — ED PROVIDER NOTE - ENMT, MLM
Airway patent, Nasal mucosa clear. Mouth with normal mucosa. Throat has no vesicles, no oropharyngeal exudates and uvula is midline. +bebeto tube in place

## 2019-10-28 NOTE — ED ADULT NURSE NOTE - NSIMPLEMENTINTERV_GEN_ALL_ED
Implemented All Universal Safety Interventions:  [place] to call system. Call bell, personal items and telephone within reach. Instruct patient to call for assistance. Room bathroom lighting operational. Non-slip footwear when patient is off stretcher. Physically safe environment: no spills, clutter or unnecessary equipment. Stretcher in lowest position, wheels locked, appropriate side rails in place.

## 2019-10-28 NOTE — ED PROVIDER NOTE - CLINICAL SUMMARY MEDICAL DECISION MAKING FREE TEXT BOX
d/w ENT- patient has not used feeding tube in months- they recommend removing the tube- px also wants the tube removed

## 2019-10-28 NOTE — ED PROVIDER NOTE - OBJECTIVE STATEMENT
63 M hx of laryngeal mass s/p rad/chemo- last chemo Oct 1- presents w leaking around feeding tube for 1 week-  not using for feeds- tube was placed in July- has not used since july/august- no n/v sukhjinder po  has Nasrin tube in place- not hooked to vent at any point- sukhjinder po- but did lose 8 lbs in October  no exac/allev factors  now chemo is completed

## 2019-10-28 NOTE — ED ADULT NURSE NOTE - OBJECTIVE STATEMENT
62 y/o male received into the ed, axox3, with complaints of having a leaking PEG tube.  Pt. has a trach in place and is able to eat and drink with no difficulty.  PEG tube dressing is wet.  Skin surrounding the dressing appears to be red and irritated.

## 2019-10-28 NOTE — ED PROVIDER NOTE - CARE PROVIDER_API CALL
Pippa Vasquez)  Surgery  [address]  [place], [place] [address]  Phone: [phone]  Fax: [phone]  Follow Up Time:

## 2019-11-01 DIAGNOSIS — K94.23 GASTROSTOMY MALFUNCTION: ICD-10-CM

## 2019-11-04 ENCOUNTER — APPOINTMENT (OUTPATIENT)
Dept: SURGERY | Facility: CLINIC | Age: 64
End: 2019-11-04
Payer: MEDICAID

## 2019-11-04 VITALS
HEART RATE: 80 BPM | TEMPERATURE: 97.7 F | DIASTOLIC BLOOD PRESSURE: 87 MMHG | BODY MASS INDEX: 21.66 KG/M2 | OXYGEN SATURATION: 97 % | HEIGHT: 67 IN | WEIGHT: 138 LBS | SYSTOLIC BLOOD PRESSURE: 129 MMHG

## 2019-11-04 PROCEDURE — 99213 OFFICE O/P EST LOW 20 MIN: CPT

## 2019-11-04 NOTE — PHYSICAL EXAM
[Calm] : calm [de-identified] : NAD, comfortable [de-identified] : NCAT, no scleral icterus.  Stoma in place. [de-identified] : soft NT ND.  No hepatosplenomegaly.  PEG in place with no significant erythema or induration around the site.  Mild ectropion tissue around PEG site.  The PEG was removed with steady traction.  The PEG site was covered with a dry gauze and tape.  The patient tolerated the procedure well. [de-identified] : No clubbing, cyanosis, or edema. [de-identified] : Warm, dry. [de-identified] : A&Ox3

## 2019-11-04 NOTE — ASSESSMENT
[FreeTextEntry1] : Mr. Alfonso is a 63-year-old man who underwent an EGD with PEG placement on June 28, 2019.  The PEG was removed today.  He is recovering as expected and will follow up with me as needed.

## 2019-11-04 NOTE — HISTORY OF PRESENT ILLNESS
[de-identified] : Mr. Alfonso presented previously with recurrent laryngeal cancer, for which he underwent a total laryngectomy with reconstruction on July 2, 2019.  He underwent an EGD with PEG placement on June 28, 2019. [de-identified] : He presented today for a follow up visit.  He is overall feeling well.  He denied fever, chills, nausea, vomiting, diarrhea, or constipation.  He is tolerating a diet well and has not used the PEG for a few months.  He has completed chemoradiation therapy and would like to have the PEG removed.

## 2019-11-05 NOTE — REASON FOR VISIT
[Subsequent Evaluation] : a subsequent evaluation for [FreeTextEntry2] : laryngeal SCC s/p laryngectomy and CRT

## 2019-11-05 NOTE — HISTORY OF PRESENT ILLNESS
[Yes] : patient has a history radiation therapy [de-identified] : 63M with larynx cancer s/p definitive RT completed in October 2017 with recurrence. He is s/p 7/2/19 total laryngectomy, total thyroidectomy with bilateral tracheoesophageal groove dissection and pec flap. Final path: [id number] moderately differentiated, keratinizing 4.5cm bilateral transglottic SCC, + PNI, no evidence of LVI. \par \par He presents for TL supplies and electrolarynx training.  He is currently undergoing chemoRT, eating PO diet with PEG in place. He denies fever, chills, dyspnea, dysphagia, chest pain.

## 2019-11-05 NOTE — ASSESSMENT
[FreeTextEntry1] : 63M with recurrent larynx ca s/p total laryngectomy, currently undergoing chemoRT, stable, in no distress.\par \par Plan:\par - SLP evaluation and electrolarynx training today. \par - Plan TEP after completion of adjuvant therapy.\par - Continue PO diet, maintain PEG during adjuvant therapy.\par - RTC after completion of chemoRT or sooner should any worrisome symptoms arise.\par - Pt verbalized understanding of above.

## 2019-11-05 NOTE — PHYSICAL EXAM
[Midline] : trachea located in midline position [Normal] : no neck adenopathy [de-identified] : central and lateral neck erythema consistent with radiation induced dermatitis, healthy laryngeal stoma

## 2019-11-05 NOTE — ASSESSMENT
[FreeTextEntry1] : 63M with recurrent larynx ca s/p total laryngectomy, just completed CRT. Patient is doing well with no evidence of disease.\par - F/u in 6 weeks \par - Repeat PET in 3 months \par - R/a PEG removal during next visit if weight is stable\par - Continue current synthroid dose

## 2019-11-05 NOTE — ADDENDUM
[FreeTextEntry1] : Speech Pathology:\par \par Pt seen in conjunction with Dr. Branch in clinic.\par \par Pt s/p total laryngectomy in July 2019 and is currently undergoing post-CRT.   He has been tolerating regular diet with thin liquids and maintaining weight.  He has also been a successful electrolarynx user with R neck placement but currently does not have an electrolarynx while awaiitng insurance verification/coverage.  Pt provided with [employer] laryngectomy starter kit today with a Melchor-Mckenzie electrolarynx, 10/55 Nasrin tube and HMEs.  Pt will ask assistance from cousin to follow-up with [facility] re: obtaining laryngectomy supplies.\par \par Boaz Davis MS, CCC-SLP, BCS-S\par Supervisor, Speech Pathology

## 2019-11-05 NOTE — PHYSICAL EXAM
[Normal] : orientation to person, place, and time: normal [de-identified] : laryngectomy stoma healed, with minor excoriation at 6 o'clock position, no sign of infection or disease

## 2019-11-05 NOTE — ADDENDUM
[FreeTextEntry1] : Speech Pathology:\par \par Pt seen in conjunction with Dr. Branch in clinic.\par \par Pt s/p total laryngectomy in July 2019 followed by adjuvant CRT.  Pt doing well post-CRT, has been tolerating regular diet with thin liquids and maintaining weight.  Pt inquiring re: PEG removal.  Pt currently communicating using [employer] from us.  He is using [employer] 10/55 Nasrin Tube with HME cassette.  Still does not have HME and related supplies. HME samples provided to pt.  I will assist patient in following up with [employer] re: supplies.\par \par Boaz Davis, MS, CCC-SLP, BCS-S\par Supervisor, Speech Pathology\par \par

## 2019-11-05 NOTE — HISTORY OF PRESENT ILLNESS
[de-identified] : 63M with larynx cancer s/p definitive RT completed in October 2017 with recurrence. He is s/p 7/2/19 total laryngectomy, total thyroidectomy with bilateral tracheoesophageal groove dissection and pec flap. 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 \par \par  [FreeTextEntry1] : Patient completed radiotherapy one week ago. He report nausea and neck skin erythema and some weight loss. Otherwise he is been having good energy, tolerating PO diet, minimal tracheal secretion and have  no fever. He still have PEG tube but not using it. He is on Synthroid with normal recent TFT.

## 2019-11-05 NOTE — REASON FOR VISIT
[Subsequent Evaluation] : a subsequent evaluation for [FreeTextEntry2] : s/p total laryngectomy, larynx SCC

## 2019-11-07 ENCOUNTER — APPOINTMENT (OUTPATIENT)
Dept: INTERNAL MEDICINE | Facility: CLINIC | Age: 64
End: 2019-11-07

## 2019-11-25 ENCOUNTER — APPOINTMENT (OUTPATIENT)
Dept: RADIATION ONCOLOGY | Facility: CLINIC | Age: 64
End: 2019-11-25
Payer: MEDICAID

## 2019-11-25 ENCOUNTER — APPOINTMENT (OUTPATIENT)
Dept: OTOLARYNGOLOGY | Facility: CLINIC | Age: 64
End: 2019-11-25
Payer: MEDICAID

## 2019-11-25 VITALS
SYSTOLIC BLOOD PRESSURE: 133 MMHG | HEART RATE: 76 BPM | DIASTOLIC BLOOD PRESSURE: 77 MMHG | OXYGEN SATURATION: 97 % | WEIGHT: 143 LBS | HEIGHT: 67 IN | BODY MASS INDEX: 22.44 KG/M2

## 2019-11-25 VITALS
BODY MASS INDEX: 22.43 KG/M2 | RESPIRATION RATE: 16 BRPM | WEIGHT: 143.2 LBS | OXYGEN SATURATION: 100 % | HEART RATE: 72 BPM | DIASTOLIC BLOOD PRESSURE: 72 MMHG | SYSTOLIC BLOOD PRESSURE: 123 MMHG

## 2019-11-25 PROCEDURE — 99024 POSTOP FOLLOW-UP VISIT: CPT

## 2019-11-25 PROCEDURE — 99213 OFFICE O/P EST LOW 20 MIN: CPT

## 2019-11-25 RX ORDER — ALPRAZOLAM 0.25 MG/1
0.25 TABLET ORAL
Qty: 2 | Refills: 0 | Status: ACTIVE | COMMUNITY
Start: 2019-11-25 | End: 1900-01-01

## 2019-11-25 RX ORDER — ONDANSETRON 4 MG/1
4 TABLET, ORALLY DISINTEGRATING ORAL
Refills: 0 | Status: DISCONTINUED | COMMUNITY
End: 2019-11-25

## 2019-11-25 NOTE — HISTORY OF PRESENT ILLNESS
[FreeTextEntry1] : Walt Alfonso is a 64yo man, heavy smoking history, with recurrent larynx cancer. 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 He tolerated treatment well.  His post-RT course was complicated by laryngeal edema requiring a tracheostomy in Feb 2018.  He was found to have recurrence in June 2019, s/p total laryngectomy, total thyroidectomy and reconstruction on 7/2/19 by Jos Branch and Sharee.  He completed adjuvant RT 5000cGy in 25 fractions from 9/3/19 - 10/7/19.  He received concurrent chemo under the care of Dr. Real. \par \par 11/25/19 - PTE\par Mr. Alfonso returns for post-treatment evaluation.  He saw Dr. Branch and Boaz Davis on 10/14/19.  Plan was for repeat PET in 3 months, PEG removal at next visit if weight is stable and continue synthroid.  He saw PMD Dr. Deras on 10/18/19.  He saw Dr. Vasquez and had PEG removed on 11/4/19.  He saw Dr. Branch earlier this morning and states he was told he needed a PET scan.  He is scheduled to see Dr. Real on 11/31. Today, he reports feeling "fine."  He notes intermittent cough with thick secretions.  He also notes dysgeusia, which is improving.  He denies other complaints to include pain, xerostomia, dysphagia, odynophagia, and dyspnea.  \par ________________________________________\par 8/7/19 - SNA\par Mr. Alfonso returns today for SNA.  He was last seen here on 6/10/19, at which time he was found to have findings suspicious for recurrence on surveillance CT in the setting of dyspnea and stridor.  He was recommended direct laryngoscopy with biopsy and immediate laryngectomy by Dr. Branch, however, he declined immediate laryngectomy and agreed only to laryngoscopy with biopsy.  He underwent laryngoscopy with biopsy on 6/25/19 by Dr. Branch and required a tracheostomy for insufficient airway at the time of the procedure.  He was found to have subglottic recurrence.  Pathology demonstrated subglottic tissue infiltrated with well differentiated keratinizing squamous cell carcinoma.  He then underwent an urgent total laryngectomy and total thyroidectomy with bilateral tracheoesophageal groove dissections and pectoralis flap reconstruction by Dr. Branch and Dr. Tolliver on 7/2/19.  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  He presents today for consideration of adjuvant RT.  \par \par Today, he reports feeling well.  He states he is still in a rehab facility, but feels ready to be discharged home soon.  He denies pain, endorses mild soreness to his neck.  He states he has been eating a regular diet for the past 9 days.  He denies fevers, chills, dyspnea, chest pain, palpitations.  \par \par Surgical Final Report 7/2/19\par Final Diagnosis\par 1. Superior pharyngeal margin #1, excision:\par - Negative for tumor\par 2. Left lateral pharyngeal wall margin, excision:\par - Negative for tumor\par 3. Right lateral peripheral wall margin, excision:\par - Negative for tumor\par 4. Post cricoid mucosa, biopsy:\par - Squamous cell carcinoma\par 5. Anterior inferior tracheal margin, excision:\par - Negative for tumor\par 6. Larynx, total laryngectomy:\par - Squamous cell carcinoma, keratinizing, moderately\par differentiated, 4.5 cm\par - Tumor is bilaterally transglottic (supraglottis, true and\par false vocal cords, subglottis)\par with involvement of the trachea\par - Tumor extends into the periglottic soft tissue space,\par invades the thyroid gland\par bilaterally and the thyroid cartilages anterior soft\par tissues and stomal skin\par - Focally suspicious for perineural involvement\par - No convincing evidence of lymphovascular invasion\par - Tumor focally present on posterior soft tissue margin and\par present on left and right\par posterior cricoid / arytenoid mucosal margins\par - Remaining margins negative for tumor\par 7. Posterior cricoid mucosa #2:\par - Negative for tumor\par 8. Posterior cricoid mucosa, excision:\par - Squamous cell carcinoma with perineural and intraneural\par invasion\par - Tumor present on inked margin\par \par Comment:\par Tumor on the posterior soft tissue margin (slide 6L) is best seen\par on level 1 of several levels.\par \par Synoptic Summary\par Specimen: larynx\par Procedure: total laryngectomy\par Specimen integrity: intact\par Specimen size: 9.5 x 6.0 x 5.3 cm\par Tumor laterality: bilateral\par Tumor site: transglottic\par Tumor focality: single focus\par Tumor size: 4.5 cm\par Tumor description: soft ulcerated mass\par Macroscopic extent of tumor: into thyroid gland bilaterally\par paraglottic, pre-epiglottic spaces, anterior, lateral and\par posterior soft tissues\par Histologic type:  keratinizing squamous cell carcinoma\par Histologic grade:  moderately differentiated\par Microscopic tumor extent: as above\par Margins:  posterior soft tissue margin positive for tumor\par Lymphovascular invasion: not convincingly identified\par Perineural invasion: present\par Lymph nodes: not received\par Pathologic stage: pT4a  pNX\par \par 6/10/19 - Follow-up\par Mr. Alfonso returns today for routine follow-up.  He was last seen here on 2/19/19.  Plan at that time was for CT neck with contrast and CT chest without contrast in June and follow-up with PCP, dental and Dr. Branch.  \par \par CT neck with contrast 6/4/19 - IMPRESSION: There are new sites of enhancing soft tissue, most notably along the anterior tracheal wall, suspicious for recurrence of laryngeal carcinoma. Consider correlation with PET/CT for further characterization. Airway narrowing is present at the subglottic larynx and proximal trachea.\par \par CT chest without contrast 6/4/19 - IMPRESSION: 2 mm left upper lobe pulmonary nodule. 1.4 cm left adrenal nodule, not clearly seen on the prior study. Follow-up CT recommended. \par \par He was recently admitted to the hospital at the end of March 2019, for dyspnea and placed on steroids and antibiotics for glottic edema and stridor with resolution of stridor from medical therapy.  He states dyspnea initially improved following that admission, but then worsened again. He was discharged with cefpodoxime and flagyl. He saw Dr. Branch 4/8/19 at which time he was improved. He saw Dr. Branch again on 6/5, at which time he was having worsening stridor.  Plan was for PET/CT to further evaluate for recurrence; discussed possible laryngectomy given respiratory distress from paralyzed right tVC, decreased glottic opening, left glottic edema.  \par \par PET/CT 6/7/19 - There is abnormal FDG activity (SUV 14.6) of the glottic and subglottic regions with extension inferiorly along the anterior tracheal wall.  The superiormost extent of activity is in the supraglottic larynx at the level of the superior most portion of the thyroid cartilages.  Small additional focus of activity posterolateral to the left arytenoid cartilage.  No FDG avid lymphadenopathy.  \par Impression: Abnormal FDG activity extending from the posterior aspect of the supraglottic larynx to the subglottic region and along the anterior wall of the trachea suspicious for tumor recurrence.  \par \par He has not seen the dentist since prior to RT.  He does not currently have a PMD.  Today, he reports continued dyspnea, unchanged over the past month.  He states it is mostly with exertion, but sometimes at rest.  He notes productive cough of white sputum, mild sore throat, and mild xerostomia at night.  He denies dysphagia, odynophagia.  Reports he is eating a regular diet, but has decreased appetite lately.  \par \par 2/19/19- FOLLOW UP \par When he was last seen on 10/9/18, he was doing well, LILIANA. Plan was to continue follow up with Dr. Branch and do CT neck in 4 months. Patient saw Dr. Branch on 12/3/18. He reported voice and swallowing were stable, no neck masses, no weight loss. Plan was to RTC 4 months (appointment scheduled for April). \par \par CT scan done 2/12/19 revealed the following: \par Since 10/7/18, there is no significant interval change in CT appearance of the treated larynx. Stable fullness/nodularity of the right true vocal cord. No further chondromalacia is evident. \par \par Today he reports he is feeling pretty well. Only complaint is more shortness of breath when he climbs the stairs of his 4-5 floor walk up at home. He says no dyspnea at rest. Denies neck, throat or mouth pain. Denies dysphagia; eating a regular diet and has gained 12 pounds since December. Reports taste is normal. Continues to do neck exercises daily. Denies any other c/o to include fever, chills, CP. He reports that he continues to abstain from smoking or drinking (quit smoking Feb 2018).  Voice is overall improved from last year.\par \par ______________________________\par 10/9/18- Follow up\par At his last visit he was eating and drinking normally. He was advised to f/u with Dr Branch and return in 3 months with imaging PET/CT and CT neck. He f/u with Dr. Branch 8/2018. \par \par PET/CT done 10/7/18- No evidence of residual, recurrent, or metastatic disease identified. Increased uptake in the left vocal cord is in compensation for the fixed right vocal cord. \par \par CT neck done 10/7/18 showed the following: There is again evident nodularity of the right true vocal fold, without appreciable change since the prior CT study. Again evident is absent mineralization of the right arytenoid cartilage, which had been calcified prior to treatment. There is now a greater degree of fragmentation of the superior cricoid lamina. Given absence of suspicious FDG uptake in this region, findings within the laryngeal cartilages are felt more compatible with chondronecrosis than tumor recurrence. There is persistent though diminished edema in the supraglottic larynx compatible with radiation changes.\par *  Lymph Nodes: No cervical lymphadenopathy.\par *  Salivary Glands: Generalized increased density and decreased size, compatible with radiation effects.\par \par Today, he reports he is feeling well. Denies neck pain, mouth pain. Reports sore throat at times. Continues to have hoarseness. Mouth dryness has improved. Taste is normal, able to eat and drink and swallow all textures. He has gained 4 pounds in the past two months. Continues to do neck exercises. Reports breathing is good, denies SOB. Denies smoking or drinking (quit smoking Feb 2018).\par \par 7/2/18- Follow up\par CT neck on 6/29/18 showed now absence of the previously mineralized right arytenoid process, as well as subtle erosion of the superior right lateral cricoid cartilage, present in association with nodularity of the right true vocal fold. Differential includes both recurrent disease and chondronecrosis. Consider repeat PET/CT and/or/follow up neck CT in approximately 3 months. There was also a hypodensity in the right inferior frontal lone, possibly posttraumatic. This was present in 7/2017. Consider brain imaging. \par \par Today Mr. Alfonso is feeling well. He was decannulated in the interim since we saw him last. Breathing is good, walked about 10 blocks today without increased shortness of breath. Had mild SOB yesterday. Trach site closed. Denies pain to neck, denies lumps or bumps to neck. Mouth is sometimes dry, taste is normal. Able to eat and drink all textures. Has gained 17 pounds since april. His only concern is cough/phlegm and a hoarse voice.\par \par 7/2/18- \par Mr. Alfonso presents today for routine follow up. He was decannulated in the interim since we last saw him. \par \par Oncology History:\par Mr. 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 Branch on 4/5/17 after bring referred by Dr. King following investigation for hoarseness showing mass on flexible laryngoscopy.  He presented with 8 months- 1 year of increasing hoarseness on background of ~60 pack year smoking history. He denied pain, discomfort, otalgia, dysphagia, odynophagia, loss of weight, hemoptysis.  He was scheduled for surgery after this, however there were issues with his insurance which resulted in surgery cancellation. \par \par He eventually underwent DL and biopsy on 6/20/17. Pathology shows infiltrating squamous cell carcinoma of the right anterior vocal cord, the right supraglottis, the anterior commissure, and right vocal cord process, and right superior supraglottis. Left vocal cord process shows SCC, predominantly in situ, with focal superficial invasion. \par \par He has not had any imaging studies. His case was reviewed at multidisciplinary head and neck oncology tumor Board on July 10 and the recommendation was for definitive radiation therapy since  surgery would involve a total laryngectomy.\par \par He notes persistent hoarseness at this time. He denies coughing, trouble swallowing, bleeding, or pain. He has lost some weight that he attributes to anxiety. He works about 5-6 hours per day 5 days a week at [employer]. He has not seen a dentist in years. He continues to smoke 1ppd and has never tried quitting. \par \par \par 11/24/17 - Mr. Alfonso presents today for post treatment evaluation. Today reports feeling well - appetite good, swallowing better with soft textured food but able to eat most foods. Plans on resuming work at [email or web address] in 5 days. \par Reports:\par - intermittent cough for white secretions-smoking half pack/day-smoking cessation advised\par - dysgeusia, dry mouth-advised bicarb mouth rinses, increase fluid intake\par - minimal pain (only when swallowing), fever, night sweats, mucositis - using gabapentin 2 tabs bid\par - not using prevident toothpaste-never did\par \par 1/23/18- Mr. Alfonso presents today for routine follow up. He is feeling well. PO intake is good, weight stable. He is able to eat all textures of food when he is mindful of chewing. He is coughing a lot, and has a frequent tickle in his throat. His throat becomes sore when he talks frequently. He has reduced the amount that he is smoking, and is currently smoking less than a pack per day. he plans to quit over the next 10 days. \par He is doing head and neck exercises daily. He is not using prevident. He underwent PET scan on 1/22/18, report from which is not yet available. I called radiology and radiologist was not available. \par \par 4/24/18- Mr. Alfonso presents today for follow up. Since he saw us last in January he underwent a PET scan which showed LILIANA. \par In February Mr. Alfonso became persistently hoarse with worsening airway. he was taken to OR for awake tracheostomy and biopsy. Biopsy was negative on 2/27/18.  He was discharged to rehab. He saw Dr. Branch on 4/23, and he is planning for possible decannulation in 3 weeks. \par Today pt. is getting over URI.  He notes pain with swallowing. Lost 17 lbs since last visit. No other new complaints. Taking tylenol only.

## 2019-11-25 NOTE — DATA REVIEWED
[FreeTextEntry1] : \par  [No studies available for review at this time.] : No studies available for review at this time.

## 2019-12-02 NOTE — ADDENDUM
[FreeTextEntry1] : Speech Pathology:\par \par Pt seen today in conjunction with Dr. Branch in [facility].\par \par Pt s/p total laryngectomy for recurrent laryngeal ca, s/p total laryngectomy 7/2/19 and s/p CRT completed in Oct 2019.  Pt doing well, now s/p PEG removal since our last visit.  Tolerating regular diet well.  Pt has also become a successful electrolarynx user for alaryngeal speech.  Wears a Nasrin tube with HME filter, tolerating well.  Pt reported that he is still not getting supplies from [employer].  I will assist him with this.\par \par Boaz Davis, MS, CCC-SLP, BCS-S\par Board-Certified Specialist in Swallowing Disorders\par Supervisor, Speech Pathology\par

## 2019-12-02 NOTE — HISTORY OF PRESENT ILLNESS
[FreeTextEntry1] : No issues. No chest infection. minimal tracheal secretions. Maintaining weight with good PO intake.\par Patient will see Dr. Reddy this afternoon. PET to be done in January 2020. [de-identified] : 63M with larynx cancer s/p definitive RT completed in October 2017 with recurrence. He is s/p 7/2/19 total laryngectomy, total thyroidectomy with bilateral tracheoesophageal groove dissection and pec flap. 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 Finished CRT October 2019. PEG removed 11/04/19. \par

## 2019-12-02 NOTE — PHYSICAL EXAM
[Normal] : orientation to person, place, and time: normal [de-identified] : healthy laryngeal stoma, healed scar, left neck pec flap swelling

## 2019-12-18 ENCOUNTER — MED ADMIN CHARGE (OUTPATIENT)
Age: 64
End: 2019-12-18

## 2019-12-18 ENCOUNTER — APPOINTMENT (OUTPATIENT)
Dept: INTERNAL MEDICINE | Facility: CLINIC | Age: 64
End: 2019-12-18
Payer: MEDICAID

## 2019-12-18 PROCEDURE — G0008: CPT

## 2019-12-18 PROCEDURE — 90686 IIV4 VACC NO PRSV 0.5 ML IM: CPT

## 2020-01-10 ENCOUNTER — MED ADMIN CHARGE (OUTPATIENT)
Age: 65
End: 2020-01-10

## 2020-01-10 ENCOUNTER — RX RENEWAL (OUTPATIENT)
Age: 65
End: 2020-01-10

## 2020-01-10 ENCOUNTER — APPOINTMENT (OUTPATIENT)
Dept: INTERNAL MEDICINE | Facility: CLINIC | Age: 65
End: 2020-01-10
Payer: MEDICARE

## 2020-01-10 DIAGNOSIS — Z23 ENCOUNTER FOR IMMUNIZATION: ICD-10-CM

## 2020-01-10 PROCEDURE — 90715 TDAP VACCINE 7 YRS/> IM: CPT | Mod: GY

## 2020-01-10 PROCEDURE — 90670 PCV13 VACCINE IM: CPT

## 2020-01-10 PROCEDURE — G0009: CPT

## 2020-01-10 PROCEDURE — 90472 IMMUNIZATION ADMIN EACH ADD: CPT

## 2020-01-17 ENCOUNTER — OUTPATIENT (OUTPATIENT)
Dept: OUTPATIENT SERVICES | Facility: HOSPITAL | Age: 65
LOS: 1 days | End: 2020-01-17
Payer: MEDICARE

## 2020-01-17 DIAGNOSIS — Z98.890 OTHER SPECIFIED POSTPROCEDURAL STATES: Chronic | ICD-10-CM

## 2020-01-17 DIAGNOSIS — Z41.9 ENCOUNTER FOR PROCEDURE FOR PURPOSES OTHER THAN REMEDYING HEALTH STATE, UNSPECIFIED: Chronic | ICD-10-CM

## 2020-01-17 LAB — GLUCOSE BLDC GLUCOMTR-MCNC: 102 MG/DL — HIGH (ref 70–99)

## 2020-01-17 PROCEDURE — 78815 PET IMAGE W/CT SKULL-THIGH: CPT | Mod: 26

## 2020-01-17 PROCEDURE — 78815 PET IMAGE W/CT SKULL-THIGH: CPT

## 2020-01-17 PROCEDURE — 82962 GLUCOSE BLOOD TEST: CPT

## 2020-01-17 PROCEDURE — A9552: CPT

## 2020-01-21 ENCOUNTER — APPOINTMENT (OUTPATIENT)
Dept: RADIATION ONCOLOGY | Facility: CLINIC | Age: 65
End: 2020-01-21
Payer: MEDICARE

## 2020-01-21 VITALS
OXYGEN SATURATION: 100 % | HEART RATE: 75 BPM | BODY MASS INDEX: 23.01 KG/M2 | RESPIRATION RATE: 16 BRPM | WEIGHT: 146.9 LBS | DIASTOLIC BLOOD PRESSURE: 83 MMHG | SYSTOLIC BLOOD PRESSURE: 124 MMHG

## 2020-01-21 PROCEDURE — 99213 OFFICE O/P EST LOW 20 MIN: CPT

## 2020-01-22 NOTE — ASSESSMENT
[Work-up necessary to assess local, regional or metastatic recurrence] : Work-up necessary to assess local, regional or metastatic recurrence [FreeTextEntry1] : possible local recurrence seen on PET.

## 2020-01-22 NOTE — REVIEW OF SYSTEMS
[Anxiety] : anxiety [Negative] : Integumentary [Nausea: Grade 0] : Nausea: Grade 0 [Dysphagia: Grade 0] : Dysphagia: Grade 0 [Vomiting: Grade 0] : Vomiting: Grade 0 [Fatigue: Grade 0] : Fatigue: Grade 0 [Localized Edema: Grade 0] : Localized Edema: Grade 0  [Neck Edema: Grade 0] : Neck Edema: Grade 0 [Oral Pain: Grade 0] : Oral Pain: Grade 0 [Mucositis Oral: Grade 0] : Mucositis Oral: Grade 0  [Salivary duct inflammation: Grade 0] : Salivary duct inflammation: Grade 0 [Dysgeusia: Grade 0] : Dysgeusia: Grade 0 [Skin Induration: Grade 0] : Skin Induration: Grade 0 [Dermatitis Radiation: Grade 0] : Dermatitis Radiation: Grade 0 [FreeTextEntry4] : see HPI [FreeTextEntry6] : see HPI

## 2020-01-22 NOTE — PHYSICAL EXAM
[General Appearance - Well Developed] : well developed [General Appearance - In No Acute Distress] : in no acute distress [General Appearance - Alert] : alert [Thin] : thin [Sclera] : the sclera and conjunctiva were normal [PERRL With Normal Accommodation] : pupils were equal in size, round, reactive to light [Extraocular Movements] : extraocular movements were intact [] : no respiratory distress [Respiration, Rhythm And Depth] : normal respiratory rhythm and effort [Heart Rate And Rhythm] : heart rate and rhythm were normal [Auscultation Breath Sounds / Voice Sounds] : lungs were clear to auscultation bilaterally [Heart Sounds] : normal S1 and S2 [Normal] : oriented to person, place and time, the affect was normal, the mood was normal and not anxious [Mood] : the mood was normal [Cervical Lymph Nodes Enlarged Posterior Bilaterally] : posterior cervical [Cervical Lymph Nodes Enlarged Anterior Bilaterally] : anterior cervical [Supraclavicular Lymph Nodes Enlarged Bilaterally] : supraclavicular [de-identified] : laryngectomy stoma, grade 1 fibrosis

## 2020-01-22 NOTE — HISTORY OF PRESENT ILLNESS
[FreeTextEntry1] : Walt Alfonso is a 62yo man, heavy smoking history, with recurrent larynx cancer. 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 He tolerated treatment well.  His post-RT course was complicated by laryngeal edema requiring a tracheostomy in Feb 2018.  He was found to have recurrence in June 2019, s/p total laryngectomy, total thyroidectomy and reconstruction on 7/2/19 by Jos Branch and Sharee.  He completed adjuvant RT 5000cGy in 25 fractions from 9/3/19 - 10/7/19.  He received concurrent chemo under the care of Dr. Real. \par \par 1/21/20 - Follow-up\par Mr. Alfonso returns for routine follow-up.  He was last seen on 11/25/19.  Plan was for PET/CT in January, f/u with Dr. Branch, f/u with Dr. Real, TSH with Dr. Real.  \par \par PET/CT - Impression: There is FDG uptake in the right surgical bed centered around the presumed right pharyngeal anastomotic clip at the level of the right common carotid artery bifurcation. While this may reflect post surgical or post radiation change, the focality of this uptake is suspicious for residual disease/local recurrence.  A repeat CT scan may be helpful in determining whether residual disease is present. \par \par He is scheduled to see Dr. Branch on 2/24.  He last saw Dr. Real about 2 weeks ago.  He had labs drawn, unsure if TSH was included.  He is scheduled to follow-up on 2/2.  Today, he reports feeling well.  He notes productive cough of white sputum.  He denies pain, dyspnea, dysphagia, odynophagia.  He is eating a regular diet.  He is doing head and neck exercises regularly.  \par \par 11/25/19 - PTE\par Mr. Alfonso returns for post-treatment evaluation.  He saw Dr. Branch and Boaz Davis on 10/14/19.  Plan was for repeat PET in 3 months, PEG removal at next visit if weight is stable and continue synthroid.  He saw PMD Dr. Deras on 10/18/19.  He saw Dr. Vasquez and had PEG removed on 11/4/19.  He saw Dr. Branch earlier this morning and states he was told he needed a PET scan.  He is scheduled to see Dr. Real on 11/31. Today, he reports feeling "fine."  He notes intermittent cough with thick secretions.  He also notes dysgeusia, which is improving.  He denies other complaints to include pain, xerostomia, dysphagia, odynophagia, and dyspnea.  \par ________________________________________\par 8/7/19 - SNA\par Mr. Alfonso returns today for SNA.  He was last seen here on 6/10/19, at which time he was found to have findings suspicious for recurrence on surveillance CT in the setting of dyspnea and stridor.  He was recommended direct laryngoscopy with biopsy and immediate laryngectomy by Dr. Branch, however, he declined immediate laryngectomy and agreed only to laryngoscopy with biopsy.  He underwent laryngoscopy with biopsy on 6/25/19 by Dr. Branch and required a tracheostomy for insufficient airway at the time of the procedure.  He was found to have subglottic recurrence.  Pathology demonstrated subglottic tissue infiltrated with well differentiated keratinizing squamous cell carcinoma.  He then underwent an urgent total laryngectomy and total thyroidectomy with bilateral tracheoesophageal groove dissections and pectoralis flap reconstruction by Dr. Branch and Dr. Tolliver on 7/2/19.  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  He presents today for consideration of adjuvant RT.  \par \par Today, he reports feeling well.  He states he is still in a rehab facility, but feels ready to be discharged home soon.  He denies pain, endorses mild soreness to his neck.  He states he has been eating a regular diet for the past 9 days.  He denies fevers, chills, dyspnea, chest pain, palpitations.  \par \par Surgical Final Report 7/2/19\par Final Diagnosis\par 1. Superior pharyngeal margin #1, excision:\par - Negative for tumor\par 2. Left lateral pharyngeal wall margin, excision:\par - Negative for tumor\par 3. Right lateral peripheral wall margin, excision:\par - Negative for tumor\par 4. Post cricoid mucosa, biopsy:\par - Squamous cell carcinoma\par 5. Anterior inferior tracheal margin, excision:\par - Negative for tumor\par 6. Larynx, total laryngectomy:\par - Squamous cell carcinoma, keratinizing, moderately\par differentiated, 4.5 cm\par - Tumor is bilaterally transglottic (supraglottis, true and\par false vocal cords, subglottis)\par with involvement of the trachea\par - Tumor extends into the periglottic soft tissue space,\par invades the thyroid gland\par bilaterally and the thyroid cartilages anterior soft\par tissues and stomal skin\par - Focally suspicious for perineural involvement\par - No convincing evidence of lymphovascular invasion\par - Tumor focally present on posterior soft tissue margin and\par present on left and right\par posterior cricoid / arytenoid mucosal margins\par - Remaining margins negative for tumor\par 7. Posterior cricoid mucosa #2:\par - Negative for tumor\par 8. Posterior cricoid mucosa, excision:\par - Squamous cell carcinoma with perineural and intraneural\par invasion\par - Tumor present on inked margin\par \par Comment:\par Tumor on the posterior soft tissue margin (slide 6L) is best seen\par on level 1 of several levels.\par \par Synoptic Summary\par Specimen: larynx\par Procedure: total laryngectomy\par Specimen integrity: intact\par Specimen size: 9.5 x 6.0 x 5.3 cm\par Tumor laterality: bilateral\par Tumor site: transglottic\par Tumor focality: single focus\par Tumor size: 4.5 cm\par Tumor description: soft ulcerated mass\par Macroscopic extent of tumor: into thyroid gland bilaterally\par paraglottic, pre-epiglottic spaces, anterior, lateral and\par posterior soft tissues\par Histologic type:  keratinizing squamous cell carcinoma\par Histologic grade:  moderately differentiated\par Microscopic tumor extent: as above\par Margins:  posterior soft tissue margin positive for tumor\par Lymphovascular invasion: not convincingly identified\par Perineural invasion: present\par Lymph nodes: not received\par Pathologic stage: pT4a  pNX\par \par 6/10/19 - Follow-up\par Mr. Alfonso returns today for routine follow-up.  He was last seen here on 2/19/19.  Plan at that time was for CT neck with contrast and CT chest without contrast in June and follow-up with PCP, dental and Dr. Branch.  \par \par CT neck with contrast 6/4/19 - IMPRESSION: There are new sites of enhancing soft tissue, most notably along the anterior tracheal wall, suspicious for recurrence of laryngeal carcinoma. Consider correlation with PET/CT for further characterization. Airway narrowing is present at the subglottic larynx and proximal trachea.\par \par CT chest without contrast 6/4/19 - IMPRESSION: 2 mm left upper lobe pulmonary nodule. 1.4 cm left adrenal nodule, not clearly seen on the prior study. Follow-up CT recommended. \par \par He was recently admitted to the hospital at the end of March 2019, for dyspnea and placed on steroids and antibiotics for glottic edema and stridor with resolution of stridor from medical therapy.  He states dyspnea initially improved following that admission, but then worsened again. He was discharged with cefpodoxime and flagyl. He saw Dr. Branch 4/8/19 at which time he was improved. He saw Dr. Branch again on 6/5, at which time he was having worsening stridor.  Plan was for PET/CT to further evaluate for recurrence; discussed possible laryngectomy given respiratory distress from paralyzed right tVC, decreased glottic opening, left glottic edema.  \par \par PET/CT 6/7/19 - There is abnormal FDG activity (SUV 14.6) of the glottic and subglottic regions with extension inferiorly along the anterior tracheal wall.  The superiormost extent of activity is in the supraglottic larynx at the level of the superior most portion of the thyroid cartilages.  Small additional focus of activity posterolateral to the left arytenoid cartilage.  No FDG avid lymphadenopathy.  \par Impression: Abnormal FDG activity extending from the posterior aspect of the supraglottic larynx to the subglottic region and along the anterior wall of the trachea suspicious for tumor recurrence.  \par \par He has not seen the dentist since prior to RT.  He does not currently have a PMD.  Today, he reports continued dyspnea, unchanged over the past month.  He states it is mostly with exertion, but sometimes at rest.  He notes productive cough of white sputum, mild sore throat, and mild xerostomia at night.  He denies dysphagia, odynophagia.  Reports he is eating a regular diet, but has decreased appetite lately.  \par \par 2/19/19- FOLLOW UP \par When he was last seen on 10/9/18, he was doing well, LILIANA. Plan was to continue follow up with Dr. Branch and do CT neck in 4 months. Patient saw Dr. Branch on 12/3/18. He reported voice and swallowing were stable, no neck masses, no weight loss. Plan was to RTC 4 months (appointment scheduled for April). \par \par CT scan done 2/12/19 revealed the following: \par Since 10/7/18, there is no significant interval change in CT appearance of the treated larynx. Stable fullness/nodularity of the right true vocal cord. No further chondromalacia is evident. \par \par Today he reports he is feeling pretty well. Only complaint is more shortness of breath when he climbs the stairs of his 4-5 floor walk up at home. He says no dyspnea at rest. Denies neck, throat or mouth pain. Denies dysphagia; eating a regular diet and has gained 12 pounds since December. Reports taste is normal. Continues to do neck exercises daily. Denies any other c/o to include fever, chills, CP. He reports that he continues to abstain from smoking or drinking (quit smoking Feb 2018).  Voice is overall improved from last year.\par \par ______________________________\par 10/9/18- Follow up\par At his last visit he was eating and drinking normally. He was advised to f/u with Dr Branch and return in 3 months with imaging PET/CT and CT neck. He f/u with Dr. Branch 8/2018. \par \par PET/CT done 10/7/18- No evidence of residual, recurrent, or metastatic disease identified. Increased uptake in the left vocal cord is in compensation for the fixed right vocal cord. \par \par CT neck done 10/7/18 showed the following: There is again evident nodularity of the right true vocal fold, without appreciable change since the prior CT study. Again evident is absent mineralization of the right arytenoid cartilage, which had been calcified prior to treatment. There is now a greater degree of fragmentation of the superior cricoid lamina. Given absence of suspicious FDG uptake in this region, findings within the laryngeal cartilages are felt more compatible with chondronecrosis than tumor recurrence. There is persistent though diminished edema in the supraglottic larynx compatible with radiation changes.\par *  Lymph Nodes: No cervical lymphadenopathy.\par *  Salivary Glands: Generalized increased density and decreased size, compatible with radiation effects.\par \par Today, he reports he is feeling well. Denies neck pain, mouth pain. Reports sore throat at times. Continues to have hoarseness. Mouth dryness has improved. Taste is normal, able to eat and drink and swallow all textures. He has gained 4 pounds in the past two months. Continues to do neck exercises. Reports breathing is good, denies SOB. Denies smoking or drinking (quit smoking Feb 2018).\par \par 7/2/18- Follow up\par CT neck on 6/29/18 showed now absence of the previously mineralized right arytenoid process, as well as subtle erosion of the superior right lateral cricoid cartilage, present in association with nodularity of the right true vocal fold. Differential includes both recurrent disease and chondronecrosis. Consider repeat PET/CT and/or/follow up neck CT in approximately 3 months. There was also a hypodensity in the right inferior frontal lone, possibly posttraumatic. This was present in 7/2017. Consider brain imaging. \par \par Today Mr. Alfonso is feeling well. He was decannulated in the interim since we saw him last. Breathing is good, walked about 10 blocks today without increased shortness of breath. Had mild SOB yesterday. Trach site closed. Denies pain to neck, denies lumps or bumps to neck. Mouth is sometimes dry, taste is normal. Able to eat and drink all textures. Has gained 17 pounds since april. His only concern is cough/phlegm and a hoarse voice.\par \par 7/2/18- \par Mr. Alfonso presents today for routine follow up. He was decannulated in the interim since we last saw him. \par \par Oncology History:\par Mr. 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 Branch on 4/5/17 after bring referred by Dr. King following investigation for hoarseness showing mass on flexible laryngoscopy.  He presented with 8 months- 1 year of increasing hoarseness on background of ~60 pack year smoking history. He denied pain, discomfort, otalgia, dysphagia, odynophagia, loss of weight, hemoptysis.  He was scheduled for surgery after this, however there were issues with his insurance which resulted in surgery cancellation. \par \par He eventually underwent DL and biopsy on 6/20/17. Pathology shows infiltrating squamous cell carcinoma of the right anterior vocal cord, the right supraglottis, the anterior commissure, and right vocal cord process, and right superior supraglottis. Left vocal cord process shows SCC, predominantly in situ, with focal superficial invasion. \par \par He has not had any imaging studies. His case was reviewed at multidisciplinary head and neck oncology tumor Board on July 10 and the recommendation was for definitive radiation therapy since  surgery would involve a total laryngectomy.\par \par He notes persistent hoarseness at this time. He denies coughing, trouble swallowing, bleeding, or pain. He has lost some weight that he attributes to anxiety. He works about 5-6 hours per day 5 days a week at [employer]. He has not seen a dentist in years. He continues to smoke 1ppd and has never tried quitting. \par \par \par 11/24/17 - Mr. Alfonso presents today for post treatment evaluation. Today reports feeling well - appetite good, swallowing better with soft textured food but able to eat most foods. Plans on resuming work at [employer] in 5 days. \par Reports:\par - intermittent cough for white secretions-smoking half pack/day-smoking cessation advised\par - dysgeusia, dry mouth-advised bicarb mouth rinses, increase fluid intake\par - minimal pain (only when swallowing), fever, night sweats, mucositis - using gabapentin 2 tabs bid\par - not using prevident toothpaste-never did\par \par 1/23/18- Mr. Alfonso presents today for routine follow up. He is feeling well. PO intake is good, weight stable. He is able to eat all textures of food when he is mindful of chewing. He is coughing a lot, and has a frequent tickle in his throat. His throat becomes sore when he talks frequently. He has reduced the amount that he is smoking, and is currently smoking less than a pack per day. he plans to quit over the next 10 days. \par He is doing head and neck exercises daily. He is not using prevident. He underwent PET scan on 1/22/18, report from which is not yet available. I called radiology and radiologist was not available. \par \par 4/24/18- Mr. Alfonso presents today for follow up. Since he saw us last in January he underwent a PET scan which showed LILIANA. \par In February Mr. Alfonso became persistently hoarse with worsening airway. he was taken to OR for awake tracheostomy and biopsy. Biopsy was negative on 2/27/18.  He was discharged to rehab. He saw Dr. Branch on 4/23, and he is planning for possible decannulation in 3 weeks. \par Today pt. is getting over URI.  He notes pain with swallowing. Lost 17 lbs since last visit. No other new complaints. Taking tylenol only.

## 2020-01-24 ENCOUNTER — FORM ENCOUNTER (OUTPATIENT)
Age: 65
End: 2020-01-24

## 2020-01-25 ENCOUNTER — APPOINTMENT (OUTPATIENT)
Dept: CT IMAGING | Facility: HOSPITAL | Age: 65
End: 2020-01-25
Payer: MEDICARE

## 2020-01-25 ENCOUNTER — OUTPATIENT (OUTPATIENT)
Dept: OUTPATIENT SERVICES | Facility: HOSPITAL | Age: 65
LOS: 1 days | End: 2020-01-25
Payer: MEDICARE

## 2020-01-25 DIAGNOSIS — Z98.890 OTHER SPECIFIED POSTPROCEDURAL STATES: Chronic | ICD-10-CM

## 2020-01-25 DIAGNOSIS — Z41.9 ENCOUNTER FOR PROCEDURE FOR PURPOSES OTHER THAN REMEDYING HEALTH STATE, UNSPECIFIED: Chronic | ICD-10-CM

## 2020-01-25 PROCEDURE — 70491 CT SOFT TISSUE NECK W/DYE: CPT

## 2020-01-25 PROCEDURE — 70491 CT SOFT TISSUE NECK W/DYE: CPT | Mod: 26

## 2020-01-27 ENCOUNTER — APPOINTMENT (OUTPATIENT)
Dept: INTERNAL MEDICINE | Facility: CLINIC | Age: 65
End: 2020-01-27

## 2020-01-27 VITALS
TEMPERATURE: 97.3 F | DIASTOLIC BLOOD PRESSURE: 73 MMHG | SYSTOLIC BLOOD PRESSURE: 130 MMHG | BODY MASS INDEX: 22.71 KG/M2 | WEIGHT: 145 LBS | OXYGEN SATURATION: 98 % | HEART RATE: 67 BPM

## 2020-01-30 ENCOUNTER — APPOINTMENT (OUTPATIENT)
Dept: RADIATION ONCOLOGY | Facility: CLINIC | Age: 65
End: 2020-01-30
Payer: MEDICARE

## 2020-01-30 VITALS
HEART RATE: 70 BPM | RESPIRATION RATE: 18 BRPM | SYSTOLIC BLOOD PRESSURE: 120 MMHG | OXYGEN SATURATION: 99 % | WEIGHT: 145 LBS | DIASTOLIC BLOOD PRESSURE: 80 MMHG | BODY MASS INDEX: 22.71 KG/M2

## 2020-01-30 PROCEDURE — 99213 OFFICE O/P EST LOW 20 MIN: CPT

## 2020-01-31 NOTE — REVIEW OF SYSTEMS
[Anxiety] : anxiety [Negative] : Integumentary [Dysphagia: Grade 0] : Dysphagia: Grade 0 [Nausea: Grade 0] : Nausea: Grade 0 [Vomiting: Grade 0] : Vomiting: Grade 0 [Fatigue: Grade 0] : Fatigue: Grade 0 [Localized Edema: Grade 0] : Localized Edema: Grade 0  [Neck Edema: Grade 0] : Neck Edema: Grade 0 [Mucositis Oral: Grade 0] : Mucositis Oral: Grade 0  [Salivary duct inflammation: Grade 0] : Salivary duct inflammation: Grade 0 [Dysgeusia: Grade 0] : Dysgeusia: Grade 0 [Oral Pain: Grade 0] : Oral Pain: Grade 0 [Dermatitis Radiation: Grade 0] : Dermatitis Radiation: Grade 0 [Skin Induration: Grade 0] : Skin Induration: Grade 0 [FreeTextEntry4] : see HPI [FreeTextEntry6] : see HPI

## 2020-01-31 NOTE — ASSESSMENT
[Work-up necessary to assess local, regional or metastatic recurrence] : Work-up necessary to assess local, regional or metastatic recurrence [FreeTextEntry1] : possible local recurrence seen on PET; CT inconclusive.

## 2020-01-31 NOTE — PHYSICAL EXAM
[General Appearance - Well Developed] : well developed [General Appearance - Alert] : alert [Thin] : thin [General Appearance - In No Acute Distress] : in no acute distress [Sclera] : the sclera and conjunctiva were normal [Extraocular Movements] : extraocular movements were intact [PERRL With Normal Accommodation] : pupils were equal in size, round, reactive to light [] : no respiratory distress [Respiration, Rhythm And Depth] : normal respiratory rhythm and effort [Heart Rate And Rhythm] : heart rate and rhythm were normal [Auscultation Breath Sounds / Voice Sounds] : lungs were clear to auscultation bilaterally [Heart Sounds] : normal S1 and S2 [Cervical Lymph Nodes Enlarged Posterior Bilaterally] : posterior cervical [Supraclavicular Lymph Nodes Enlarged Bilaterally] : supraclavicular [Cervical Lymph Nodes Enlarged Anterior Bilaterally] : anterior cervical [Normal] : oriented to person, place and time, the affect was normal, the mood was normal and not anxious [Mood] : the mood was normal [de-identified] : laryngectomy stoma, grade 1 fibrosis

## 2020-01-31 NOTE — HISTORY OF PRESENT ILLNESS
[FreeTextEntry1] : Walt Alfonso is a 65 yo man, heavy smoking history, with recurrent larynx cancer. 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 He tolerated treatment well.  His post-RT course was complicated by laryngeal edema requiring a tracheostomy in Feb 2018.  He was found to have recurrence in June 2019, s/p total laryngectomy, total thyroidectomy and reconstruction on 7/2/19 by Jos Branch and Sharee.  He completed adjuvant RT 5000cGy in 25 fractions from 9/3/19 - 10/7/19.  He received concurrent chemo under the care of Dr. Real. \par \par 1/30/20 - Follow-up\par Mr. Alfonso returns for follow-up.  He was last seen on 1/21/20.  Plan was for CT neck to evaluate area suspicious for recurrence on PET/CT done 1/17/20.  \par \par CT neck with contrast 1/27/20 - IMPRESSION: There is vague and ill-defined enhancement surrounding right anastomotic clip, corresponding to area of FDG uptake on recent PET/CT. The appearance is felt nonspecific, though might be better characterized on MR imaging. \par \par He reports he saw Dr. Real earlier this week. I discussed the case with Dr. Real and we have ordered an MRI. Patient went to Dr. Branch' office this week as well. He reports his trach tube was replaced as the previous one had broken. Today, he reports he feels well, no changes since previous visit last week.  He denies pain, dyspnea, dysphagia, odynophagia.\par \par 1/21/20 - Follow-up\par Mr. Alfonso returns for routine follow-up.  He was last seen on 11/25/19.  Plan was for PET/CT in January, f/u with Dr. Branch, f/u with Dr. Real, TSH with Dr. Real.  \par \par PET/CT - Impression: There is FDG uptake in the right surgical bed centered around the presumed right pharyngeal anastomotic clip at the level of the right common carotid artery bifurcation. While this may reflect post surgical or post radiation change, the focality of this uptake is suspicious for residual disease/local recurrence.  A repeat CT scan may be helpful in determining whether residual disease is present. \par \par He is scheduled to see Dr. Branch on 2/24.  He last saw Dr. Real about 2 weeks ago.  He had labs drawn, unsure if TSH was included.  He is scheduled to follow-up on 2/2.  Today, he reports feeling well.  He notes productive cough of white sputum.  He denies pain, dyspnea, dysphagia, odynophagia.  He is eating a regular diet.  He is doing head and neck exercises regularly.  \par \par 11/25/19 - PTE\par Mr. Alfonso returns for post-treatment evaluation.  He saw Dr. Branch and Boaz Davis on 10/14/19.  Plan was for repeat PET in 3 months, PEG removal at next visit if weight is stable and continue synthroid.  He saw PMD Dr. Deras on 10/18/19.  He saw Dr. Vasquez and had PEG removed on 11/4/19.  He saw Dr. Branch earlier this morning and states he was told he needed a PET scan.  He is scheduled to see Dr. Real on 11/31. Today, he reports feeling "fine."  He notes intermittent cough with thick secretions.  He also notes dysgeusia, which is improving.  He denies other complaints to include pain, xerostomia, dysphagia, odynophagia, and dyspnea.  \par ________________________________________\par 8/7/19 - SNA\par Mr. Alfonso returns today for SNA.  He was last seen here on 6/10/19, at which time he was found to have findings suspicious for recurrence on surveillance CT in the setting of dyspnea and stridor.  He was recommended direct laryngoscopy with biopsy and immediate laryngectomy by Dr. Branch, however, he declined immediate laryngectomy and agreed only to laryngoscopy with biopsy.  He underwent laryngoscopy with biopsy on 6/25/19 by Dr. Branch and required a tracheostomy for insufficient airway at the time of the procedure.  He was found to have subglottic recurrence.  Pathology demonstrated subglottic tissue infiltrated with well differentiated keratinizing squamous cell carcinoma.  He then underwent an urgent total laryngectomy and total thyroidectomy with bilateral tracheoesophageal groove dissections and pectoralis flap reconstruction by Dr. Branch and Dr. Tolliver on 7/2/19.  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  He presents today for consideration of adjuvant RT.  \par \par Today, he reports feeling well.  He states he is still in a rehab facility, but feels ready to be discharged home soon.  He denies pain, endorses mild soreness to his neck.  He states he has been eating a regular diet for the past 9 days.  He denies fevers, chills, dyspnea, chest pain, palpitations.  \par \par Surgical Final Report 7/2/19\par Final Diagnosis\par 1. Superior pharyngeal margin #1, excision:\par - Negative for tumor\par 2. Left lateral pharyngeal wall margin, excision:\par - Negative for tumor\par 3. Right lateral peripheral wall margin, excision:\par - Negative for tumor\par 4. Post cricoid mucosa, biopsy:\par - Squamous cell carcinoma\par 5. Anterior inferior tracheal margin, excision:\par - Negative for tumor\par 6. Larynx, total laryngectomy:\par - Squamous cell carcinoma, keratinizing, moderately\par differentiated, 4.5 cm\par - Tumor is bilaterally transglottic (supraglottis, true and\par false vocal cords, subglottis)\par with involvement of the trachea\par - Tumor extends into the periglottic soft tissue space,\par invades the thyroid gland\par bilaterally and the thyroid cartilages anterior soft\par tissues and stomal skin\par - Focally suspicious for perineural involvement\par - No convincing evidence of lymphovascular invasion\par - Tumor focally present on posterior soft tissue margin and\par present on left and right\par posterior cricoid / arytenoid mucosal margins\par - Remaining margins negative for tumor\par 7. Posterior cricoid mucosa #2:\par - Negative for tumor\par 8. Posterior cricoid mucosa, excision:\par - Squamous cell carcinoma with perineural and intraneural\par invasion\par - Tumor present on inked margin\par \par Comment:\par Tumor on the posterior soft tissue margin (slide 6L) is best seen\par on level 1 of several levels.\par \par Synoptic Summary\par Specimen: larynx\par Procedure: total laryngectomy\par Specimen integrity: intact\par Specimen size: 9.5 x 6.0 x 5.3 cm\par Tumor laterality: bilateral\par Tumor site: transglottic\par Tumor focality: single focus\par Tumor size: 4.5 cm\par Tumor description: soft ulcerated mass\par Macroscopic extent of tumor: into thyroid gland bilaterally\par paraglottic, pre-epiglottic spaces, anterior, lateral and\par posterior soft tissues\par Histologic type:  keratinizing squamous cell carcinoma\par Histologic grade:  moderately differentiated\par Microscopic tumor extent: as above\par Margins:  posterior soft tissue margin positive for tumor\par Lymphovascular invasion: not convincingly identified\par Perineural invasion: present\par Lymph nodes: not received\par Pathologic stage: pT4a  pNX\par \par 6/10/19 - Follow-up\par Mr. Alfonso returns today for routine follow-up.  He was last seen here on 2/19/19.  Plan at that time was for CT neck with contrast and CT chest without contrast in June and follow-up with PCP, dental and Dr. Branch.  \par \par CT neck with contrast 6/4/19 - IMPRESSION: There are new sites of enhancing soft tissue, most notably along the anterior tracheal wall, suspicious for recurrence of laryngeal carcinoma. Consider correlation with PET/CT for further characterization. Airway narrowing is present at the subglottic larynx and proximal trachea.\par \par CT chest without contrast 6/4/19 - IMPRESSION: 2 mm left upper lobe pulmonary nodule. 1.4 cm left adrenal nodule, not clearly seen on the prior study. Follow-up CT recommended. \par \par He was recently admitted to the hospital at the end of March 2019, for dyspnea and placed on steroids and antibiotics for glottic edema and stridor with resolution of stridor from medical therapy.  He states dyspnea initially improved following that admission, but then worsened again. He was discharged with cefpodoxime and flagyl. He saw Dr. Branch 4/8/19 at which time he was improved. He saw Dr. Branch again on 6/5, at which time he was having worsening stridor.  Plan was for PET/CT to further evaluate for recurrence; discussed possible laryngectomy given respiratory distress from paralyzed right tVC, decreased glottic opening, left glottic edema.  \par \par PET/CT 6/7/19 - There is abnormal FDG activity (SUV 14.6) of the glottic and subglottic regions with extension inferiorly along the anterior tracheal wall.  The superiormost extent of activity is in the supraglottic larynx at the level of the superior most portion of the thyroid cartilages.  Small additional focus of activity posterolateral to the left arytenoid cartilage.  No FDG avid lymphadenopathy.  \par Impression: Abnormal FDG activity extending from the posterior aspect of the supraglottic larynx to the subglottic region and along the anterior wall of the trachea suspicious for tumor recurrence.  \par \par He has not seen the dentist since prior to RT.  He does not currently have a PMD.  Today, he reports continued dyspnea, unchanged over the past month.  He states it is mostly with exertion, but sometimes at rest.  He notes productive cough of white sputum, mild sore throat, and mild xerostomia at night.  He denies dysphagia, odynophagia.  Reports he is eating a regular diet, but has decreased appetite lately.  \par \par 2/19/19- FOLLOW UP \par When he was last seen on 10/9/18, he was doing well, LILIANA. Plan was to continue follow up with Dr. Branch and do CT neck in 4 months. Patient saw Dr. Branch on 12/3/18. He reported voice and swallowing were stable, no neck masses, no weight loss. Plan was to RTC 4 months (appointment scheduled for April). \par \par CT scan done 2/12/19 revealed the following: \par Since 10/7/18, there is no significant interval change in CT appearance of the treated larynx. Stable fullness/nodularity of the right true vocal cord. No further chondromalacia is evident. \par \par Today he reports he is feeling pretty well. Only complaint is more shortness of breath when he climbs the stairs of his 4-5 floor walk up at home. He says no dyspnea at rest. Denies neck, throat or mouth pain. Denies dysphagia; eating a regular diet and has gained 12 pounds since December. Reports taste is normal. Continues to do neck exercises daily. Denies any other c/o to include fever, chills, CP. He reports that he continues to abstain from smoking or drinking (quit smoking Feb 2018).  Voice is overall improved from last year.\par \par ______________________________\par 10/9/18- Follow up\par At his last visit he was eating and drinking normally. He was advised to f/u with Dr Branch and return in 3 months with imaging PET/CT and CT neck. He f/u with Dr. Branch 8/2018. \par \par PET/CT done 10/7/18- No evidence of residual, recurrent, or metastatic disease identified. Increased uptake in the left vocal cord is in compensation for the fixed right vocal cord. \par \par CT neck done 10/7/18 showed the following: There is again evident nodularity of the right true vocal fold, without appreciable change since the prior CT study. Again evident is absent mineralization of the right arytenoid cartilage, which had been calcified prior to treatment. There is now a greater degree of fragmentation of the superior cricoid lamina. Given absence of suspicious FDG uptake in this region, findings within the laryngeal cartilages are felt more compatible with chondronecrosis than tumor recurrence. There is persistent though diminished edema in the supraglottic larynx compatible with radiation changes.\par *  Lymph Nodes: No cervical lymphadenopathy.\par *  Salivary Glands: Generalized increased density and decreased size, compatible with radiation effects.\par \par Today, he reports he is feeling well. Denies neck pain, mouth pain. Reports sore throat at times. Continues to have hoarseness. Mouth dryness has improved. Taste is normal, able to eat and drink and swallow all textures. He has gained 4 pounds in the past two months. Continues to do neck exercises. Reports breathing is good, denies SOB. Denies smoking or drinking (quit smoking Feb 2018).\par \par 7/2/18- Follow up\par CT neck on 6/29/18 showed now absence of the previously mineralized right arytenoid process, as well as subtle erosion of the superior right lateral cricoid cartilage, present in association with nodularity of the right true vocal fold. Differential includes both recurrent disease and chondronecrosis. Consider repeat PET/CT and/or/follow up neck CT in approximately 3 months. There was also a hypodensity in the right inferior frontal lone, possibly posttraumatic. This was present in 7/2017. Consider brain imaging. \par \par Today Mr. Alfonso is feeling well. He was decannulated in the interim since we saw him last. Breathing is good, walked about 10 blocks today without increased shortness of breath. Had mild SOB yesterday. Trach site closed. Denies pain to neck, denies lumps or bumps to neck. Mouth is sometimes dry, taste is normal. Able to eat and drink all textures. Has gained 17 pounds since april. His only concern is cough/phlegm and a hoarse voice.\par \par 7/2/18- \par Mr. Alfonso presents today for routine follow up. He was decannulated in the interim since we last saw him. \par \par Oncology History:\par Mr. 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 Branch on 4/5/17 after bring referred by Dr. King following investigation for hoarseness showing mass on flexible laryngoscopy.  He presented with 8 months- 1 year of increasing hoarseness on background of ~60 pack year smoking history. He denied pain, discomfort, otalgia, dysphagia, odynophagia, loss of weight, hemoptysis.  He was scheduled for surgery after this, however there were issues with his insurance which resulted in surgery cancellation. \par \par He eventually underwent DL and biopsy on 6/20/17. Pathology shows infiltrating squamous cell carcinoma of the right anterior vocal cord, the right supraglottis, the anterior commissure, and right vocal cord process, and right superior supraglottis. Left vocal cord process shows SCC, predominantly in situ, with focal superficial invasion. \par \par He has not had any imaging studies. His case was reviewed at multidisciplinary head and neck oncology tumor Board on July 10 and the recommendation was for definitive radiation therapy since  surgery would involve a total laryngectomy.\par \par He notes persistent hoarseness at this time. He denies coughing, trouble swallowing, bleeding, or pain. He has lost some weight that he attributes to anxiety. He works about 5-6 hours per day 5 days a week at [employer]. He has not seen a dentist in years. He continues to smoke 1ppd and has never tried quitting. \par \par \par 11/24/17 - Mr. Alfonso presents today for post treatment evaluation. Today reports feeling well - appetite good, swallowing better with soft textured food but able to eat most foods. Plans on resuming work at [employer] in 5 days. \par Reports:\par - intermittent cough for white secretions-smoking half pack/day-smoking cessation advised\par - dysgeusia, dry mouth-advised bicarb mouth rinses, increase fluid intake\par - minimal pain (only when swallowing), fever, night sweats, mucositis - using gabapentin 2 tabs bid\par - not using prevident toothpaste-never did\par \par 1/23/18- Mr. Alfonso presents today for routine follow up. He is feeling well. PO intake is good, weight stable. He is able to eat all textures of food when he is mindful of chewing. He is coughing a lot, and has a frequent tickle in his throat. His throat becomes sore when he talks frequently. He has reduced the amount that he is smoking, and is currently smoking less than a pack per day. he plans to quit over the next 10 days. \par He is doing head and neck exercises daily. He is not using prevident. He underwent PET scan on 1/22/18, report from which is not yet available. I called radiology and radiologist was not available. \par \par 4/24/18- Mr. Alfonso presents today for follow up. Since he saw us last in January he underwent a PET scan which showed LILIANA. \par In February Mr. Alfonso became persistently hoarse with worsening airway. he was taken to OR for awake tracheostomy and biopsy. Biopsy was negative on 2/27/18.  He was discharged to rehab. He saw Dr. Branch on 4/23, and he is planning for possible decannulation in 3 weeks. \par Today pt. is getting over URI.  He notes pain with swallowing. Lost 17 lbs since last visit. No other new complaints. Taking tylenol only.

## 2020-02-17 ENCOUNTER — NON-APPOINTMENT (OUTPATIENT)
Age: 65
End: 2020-02-17

## 2020-02-24 ENCOUNTER — APPOINTMENT (OUTPATIENT)
Dept: OTOLARYNGOLOGY | Facility: CLINIC | Age: 65
End: 2020-02-24
Payer: MEDICARE

## 2020-02-24 VITALS
OXYGEN SATURATION: 98 % | HEART RATE: 73 BPM | WEIGHT: 147 LBS | SYSTOLIC BLOOD PRESSURE: 126 MMHG | HEIGHT: 67 IN | BODY MASS INDEX: 23.07 KG/M2 | DIASTOLIC BLOOD PRESSURE: 83 MMHG

## 2020-02-24 PROCEDURE — 99213 OFFICE O/P EST LOW 20 MIN: CPT

## 2020-02-29 NOTE — PHYSICAL EXAM
[Normal] : no neck adenopathy [de-identified] : healthy laryngeal stoma, healed scar, mild irritation inferior to stoma

## 2020-02-29 NOTE — ASSESSMENT
[FreeTextEntry1] : 64M with recurrent larynx ca s/p total laryngectomy, completed CRT October 2019. Patient is doing well with no clinical evidence of disease. 1/2020 CT and PET showed suspicious area in right anastomotic clip, planning MRI on 3/3/20 for further work up.\par \par - MRI 3/3/20.\par - F/u appt with Maureen 3/13.\par - Advised pt to loosen neck tie so the bebeto tube/filter does not put pressure and irritate the surrounding skin. \par - Insurance not covering supplied.  Contacted [facility] for assistance with obtaining laryngectomy supplies.\par - Will review MRI results and discuss plan of care with Maureen/Farhan.\par - Pt verbalized understanding of above.

## 2020-02-29 NOTE — ADDENDUM
[FreeTextEntry1] : Speech Pathology:\par \par Pt seen today in conjunction with Dr. Branch [facility].\par \par Pt here today for f/u visit.  Pt s/p total laryngectomy on 7/2/19.  Pt doing well, eating a regular diet with thin liquids, also using electrolarynx ( Melchor-Mckenzie) for communication.  Patient inquired about getting HME supplies.  I informed patient that we contacted [facility] and [facility] for supplies, and they were unable to assist given patient's insurance.  We will reach out to Social work for assistance.\par \par Boaz Davis MS, CCC-SLP, BCS-S\par Board-Certified Specialist in Swallowing Disorders\par Supervisor, Speech Pathology\par

## 2020-02-29 NOTE — HISTORY OF PRESENT ILLNESS
[de-identified] : 64M with larynx cancer s/p definitive RT completed in October 2017 with recurrence. He is s/p 7/2/19 total laryngectomy, total thyroidectomy with bilateral tracheoesophageal groove dissection and pec flap. Final path: [id number] moderately differentiated, keratinizing 4.5cm bilateral transglottic SCC, + PNI, no evidence of LVI. Finished CRT October 2019 with Teckie/Futuri. PEG removed 11/04/19. \par \par No issues. No chest infection. Stable cough with white tracheal secretions. Maintaining weight with good PO intake. No respiratory distress, no fever, no chills. \par \par 1/17/20 PET: FDG uptake in the right surgical bed centered around the presumed right pharyngeal anastomotic clip at the level of the right common carotid artery bifurcation. While this may reflect post surgical or post radiation change, the focality of this uptake is suspicious for residual disease/local recurrence. \par \par 1/25/20 CT neck: there is vague and ill-defined enhancement surrounding right anastomotic clip, corresponding to area of FDG uptake on recent PET/CT.  The appearance is felt nonspecific.\par \par He is scheduled for an MRI neck on 3/3/20.\par

## 2020-03-03 ENCOUNTER — OUTPATIENT (OUTPATIENT)
Dept: OUTPATIENT SERVICES | Facility: HOSPITAL | Age: 65
LOS: 1 days | End: 2020-03-03
Payer: MEDICARE

## 2020-03-03 ENCOUNTER — APPOINTMENT (OUTPATIENT)
Dept: MRI IMAGING | Facility: HOSPITAL | Age: 65
End: 2020-03-03
Payer: MEDICARE

## 2020-03-03 DIAGNOSIS — Z41.9 ENCOUNTER FOR PROCEDURE FOR PURPOSES OTHER THAN REMEDYING HEALTH STATE, UNSPECIFIED: Chronic | ICD-10-CM

## 2020-03-03 DIAGNOSIS — Z98.890 OTHER SPECIFIED POSTPROCEDURAL STATES: Chronic | ICD-10-CM

## 2020-03-03 PROCEDURE — A9585: CPT

## 2020-03-03 PROCEDURE — 70543 MRI ORBT/FAC/NCK W/O &W/DYE: CPT | Mod: 26

## 2020-03-03 PROCEDURE — 70543 MRI ORBT/FAC/NCK W/O &W/DYE: CPT

## 2020-03-09 ENCOUNTER — APPOINTMENT (OUTPATIENT)
Dept: INTERNAL MEDICINE | Facility: CLINIC | Age: 65
End: 2020-03-09

## 2020-03-09 NOTE — H&P PST ADULT - DOES THIS PATIENT HAVE A HISTORY OF OR HAS BEEN DX WITH HEART FAILURE?
no Area M Indication Text: Tumors in this location are included in Area M (cheek, forehead, scalp, neck, jawline and pretibial skin).  Mohs surgery is indicated for tumors in these anatomic locations.

## 2020-03-12 ENCOUNTER — APPOINTMENT (OUTPATIENT)
Dept: INTERNAL MEDICINE | Facility: CLINIC | Age: 65
End: 2020-03-12
Payer: MEDICARE

## 2020-03-12 ENCOUNTER — APPOINTMENT (OUTPATIENT)
Dept: RADIATION ONCOLOGY | Facility: CLINIC | Age: 65
End: 2020-03-12
Payer: MEDICARE

## 2020-03-12 VITALS
SYSTOLIC BLOOD PRESSURE: 124 MMHG | HEART RATE: 62 BPM | WEIGHT: 144 LBS | DIASTOLIC BLOOD PRESSURE: 76 MMHG | OXYGEN SATURATION: 98 % | BODY MASS INDEX: 22.6 KG/M2 | HEIGHT: 67 IN | TEMPERATURE: 97.6 F

## 2020-03-12 DIAGNOSIS — Z12.11 ENCOUNTER FOR SCREENING FOR MALIGNANT NEOPLASM OF COLON: ICD-10-CM

## 2020-03-12 DIAGNOSIS — R22.1 LOCALIZED SWELLING, MASS AND LUMP, NECK: ICD-10-CM

## 2020-03-12 PROCEDURE — 99213 OFFICE O/P EST LOW 20 MIN: CPT

## 2020-03-12 NOTE — PHYSICAL EXAM
[de-identified] : there is an indistinct area of 3cm firmness in medial right level II/III neck. laryngectomy stoma, grade 1-2 bilateral neck fibrosis

## 2020-03-12 NOTE — HISTORY OF PRESENT ILLNESS
[FreeTextEntry1] : Walt Alfonso is a 63 yo man, heavy smoking history, with recurrent larynx cancer. 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 He tolerated treatment well.  His post-RT course was complicated by laryngeal edema requiring a tracheostomy in Feb 2018.  He was found to have recurrence in June 2019, s/p total laryngectomy, total thyroidectomy and reconstruction on 7/2/19 by Jos Branch and Sharee.  He completed adjuvant RT 5000cGy in 25 fractions from 9/3/19 - 10/7/19.  He received concurrent chemo under the care of Dr. Real. \par \par 3/12/20 - Follow-up\par Mr. Alfonso returns for routine follow-up.  He was last seen on 1/30/20.  Plan was for MRI and follow-up with Dr. Real.  \par \par MRI neck with and without contrast 3/3/20 - IMPRESSION: Persistent enhancement surrounds a right upper anastomotic clip, with T2-w imaging best showing rounded and masslike approximately 2 x 3 cm tissue of differing signal compared to adjacent posttreatment change. Appearance is worrisome for tumor recurrence. Consider tissue sampling; percutaneous sampling with CT may be achievable with the surgical clip affording lesion localization. \par \par He last saw Dr. Real recently and he knows the results. Today, he says he has a stable cough, mostly dry, sometimes whitish phlegm. Eating is OK, no pain and no new issues.\par \par 1/30/20 - Follow-up\par Mr. Alfonso returns for follow-up.  He was last seen on 1/21/20.  Plan was for CT neck to evaluate area suspicious for recurrence on PET/CT done 1/17/20.  \par \par CT neck with contrast 1/27/20 - IMPRESSION: There is vague and ill-defined enhancement surrounding right anastomotic clip, corresponding to area of FDG uptake on recent PET/CT. The appearance is felt nonspecific, though might be better characterized on MR imaging. \par \par He reports he saw Dr. Real earlier this week. I discussed the case with Dr. Real and we have ordered an MRI. Patient went to Dr. Branch' office this week as well. He reports his trach tube was replaced as the previous one had broken. Today, he reports he feels well, no changes since previous visit last week.  He denies pain, dyspnea, dysphagia, odynophagia.\par \par 1/21/20 - Follow-up\par Mr. Alfonso returns for routine follow-up.  He was last seen on 11/25/19.  Plan was for PET/CT in January, f/u with Dr. Branch, f/u with Dr. Real, TSH with Dr. Real.  \par \par PET/CT - Impression: There is FDG uptake in the right surgical bed centered around the presumed right pharyngeal anastomotic clip at the level of the right common carotid artery bifurcation. While this may reflect post surgical or post radiation change, the focality of this uptake is suspicious for residual disease/local recurrence.  A repeat CT scan may be helpful in determining whether residual disease is present. \par \par He is scheduled to see Dr. Branch on 2/24.  He last saw Dr. Real about 2 weeks ago.  He had labs drawn, unsure if TSH was included.  He is scheduled to follow-up on 2/2.  Today, he reports feeling well.  He notes productive cough of white sputum.  He denies pain, dyspnea, dysphagia, odynophagia.  He is eating a regular diet.  He is doing head and neck exercises regularly.  \par \par 11/25/19 - PTE\par Mr. Alfonso returns for post-treatment evaluation.  He saw Dr. Branch and Boaz Davis on 10/14/19.  Plan was for repeat PET in 3 months, PEG removal at next visit if weight is stable and continue synthroid.  He saw PMD Dr. Deras on 10/18/19.  He saw Dr. Vasquez and had PEG removed on 11/4/19.  He saw Dr. Branch earlier this morning and states he was told he needed a PET scan.  He is scheduled to see Dr. Real on 11/31. Today, he reports feeling "fine."  He notes intermittent cough with thick secretions.  He also notes dysgeusia, which is improving.  He denies other complaints to include pain, xerostomia, dysphagia, odynophagia, and dyspnea.  \par ________________________________________\par 8/7/19 - SNA\par Mr. Alfonso returns today for SNA.  He was last seen here on 6/10/19, at which time he was found to have findings suspicious for recurrence on surveillance CT in the setting of dyspnea and stridor.  He was recommended direct laryngoscopy with biopsy and immediate laryngectomy by Dr. Branch, however, he declined immediate laryngectomy and agreed only to laryngoscopy with biopsy.  He underwent laryngoscopy with biopsy on 6/25/19 by Dr. Branch and required a tracheostomy for insufficient airway at the time of the procedure.  He was found to have subglottic recurrence.  Pathology demonstrated subglottic tissue infiltrated with well differentiated keratinizing squamous cell carcinoma.  He then underwent an urgent total laryngectomy and total thyroidectomy with bilateral tracheoesophageal groove dissections and pectoralis flap reconstruction by Dr. Branch and Dr. Tolliver on 7/2/19.  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  He presents today for consideration of adjuvant RT.  \par \par Today, he reports feeling well.  He states he is still in a rehab facility, but feels ready to be discharged home soon.  He denies pain, endorses mild soreness to his neck.  He states he has been eating a regular diet for the past 9 days.  He denies fevers, chills, dyspnea, chest pain, palpitations.  \par \par Surgical Final Report 7/2/19\par Final Diagnosis\par 1. Superior pharyngeal margin #1, excision:\par - Negative for tumor\par 2. Left lateral pharyngeal wall margin, excision:\par - Negative for tumor\par 3. Right lateral peripheral wall margin, excision:\par - Negative for tumor\par 4. Post cricoid mucosa, biopsy:\par - Squamous cell carcinoma\par 5. Anterior inferior tracheal margin, excision:\par - Negative for tumor\par 6. Larynx, total laryngectomy:\par - Squamous cell carcinoma, keratinizing, moderately\par differentiated, 4.5 cm\par - Tumor is bilaterally transglottic (supraglottis, true and\par false vocal cords, subglottis)\par with involvement of the trachea\par - Tumor extends into the periglottic soft tissue space,\par invades the thyroid gland\par bilaterally and the thyroid cartilages anterior soft\par tissues and stomal skin\par - Focally suspicious for perineural involvement\par - No convincing evidence of lymphovascular invasion\par - Tumor focally present on posterior soft tissue margin and\par present on left and right\par posterior cricoid / arytenoid mucosal margins\par - Remaining margins negative for tumor\par 7. Posterior cricoid mucosa #2:\par - Negative for tumor\par 8. Posterior cricoid mucosa, excision:\par - Squamous cell carcinoma with perineural and intraneural\par invasion\par - Tumor present on inked margin\par \par Comment:\par Tumor on the posterior soft tissue margin (slide 6L) is best seen\par on level 1 of several levels.\par \par Synoptic Summary\par Specimen: larynx\par Procedure: total laryngectomy\par Specimen integrity: intact\par Specimen size: 9.5 x 6.0 x 5.3 cm\par Tumor laterality: bilateral\par Tumor site: transglottic\par Tumor focality: single focus\par Tumor size: 4.5 cm\par Tumor description: soft ulcerated mass\par Macroscopic extent of tumor: into thyroid gland bilaterally\par paraglottic, pre-epiglottic spaces, anterior, lateral and\par posterior soft tissues\par Histologic type:  keratinizing squamous cell carcinoma\par Histologic grade:  moderately differentiated\par Microscopic tumor extent: as above\par Margins:  posterior soft tissue margin positive for tumor\par Lymphovascular invasion: not convincingly identified\par Perineural invasion: present\par Lymph nodes: not received\par Pathologic stage: pT4a  pNX\par \par 6/10/19 - Follow-up\par Mr. Alfonso returns today for routine follow-up.  He was last seen here on 2/19/19.  Plan at that time was for CT neck with contrast and CT chest without contrast in June and follow-up with PCP, dental and Dr. Branch.  \par \par CT neck with contrast 6/4/19 - IMPRESSION: There are new sites of enhancing soft tissue, most notably along the anterior tracheal wall, suspicious for recurrence of laryngeal carcinoma. Consider correlation with PET/CT for further characterization. Airway narrowing is present at the subglottic larynx and proximal trachea.\par \par CT chest without contrast 6/4/19 - IMPRESSION: 2 mm left upper lobe pulmonary nodule. 1.4 cm left adrenal nodule, not clearly seen on the prior study. Follow-up CT recommended. \par \par He was recently admitted to the hospital at the end of March 2019, for dyspnea and placed on steroids and antibiotics for glottic edema and stridor with resolution of stridor from medical therapy.  He states dyspnea initially improved following that admission, but then worsened again. He was discharged with cefpodoxime and flagyl. He saw Dr. Branch 4/8/19 at which time he was improved. He saw Dr. Branch again on 6/5, at which time he was having worsening stridor.  Plan was for PET/CT to further evaluate for recurrence; discussed possible laryngectomy given respiratory distress from paralyzed right tVC, decreased glottic opening, left glottic edema.  \par \par PET/CT 6/7/19 - There is abnormal FDG activity (SUV 14.6) of the glottic and subglottic regions with extension inferiorly along the anterior tracheal wall.  The superiormost extent of activity is in the supraglottic larynx at the level of the superior most portion of the thyroid cartilages.  Small additional focus of activity posterolateral to the left arytenoid cartilage.  No FDG avid lymphadenopathy.  \par Impression: Abnormal FDG activity extending from the posterior aspect of the supraglottic larynx to the subglottic region and along the anterior wall of the trachea suspicious for tumor recurrence.  \par \par He has not seen the dentist since prior to RT.  He does not currently have a PMD.  Today, he reports continued dyspnea, unchanged over the past month.  He states it is mostly with exertion, but sometimes at rest.  He notes productive cough of white sputum, mild sore throat, and mild xerostomia at night.  He denies dysphagia, odynophagia.  Reports he is eating a regular diet, but has decreased appetite lately.  \par \par 2/19/19- FOLLOW UP \par When he was last seen on 10/9/18, he was doing well, LILIANA. Plan was to continue follow up with Dr. Branch and do CT neck in 4 months. Patient saw Dr. Branch on 12/3/18. He reported voice and swallowing were stable, no neck masses, no weight loss. Plan was to RTC 4 months (appointment scheduled for April). \par \par CT scan done 2/12/19 revealed the following: \par Since 10/7/18, there is no significant interval change in CT appearance of the treated larynx. Stable fullness/nodularity of the right true vocal cord. No further chondromalacia is evident. \par \par Today he reports he is feeling pretty well. Only complaint is more shortness of breath when he climbs the stairs of his 4-5 floor walk up at home. He says no dyspnea at rest. Denies neck, throat or mouth pain. Denies dysphagia; eating a regular diet and has gained 12 pounds since December. Reports taste is normal. Continues to do neck exercises daily. Denies any other c/o to include fever, chills, CP. He reports that he continues to abstain from smoking or drinking (quit smoking Feb 2018).  Voice is overall improved from last year.\par \par ______________________________\par 10/9/18- Follow up\par At his last visit he was eating and drinking normally. He was advised to f/u with Dr Branch and return in 3 months with imaging PET/CT and CT neck. He f/u with Dr. Branch 8/2018. \par \par PET/CT done 10/7/18- No evidence of residual, recurrent, or metastatic disease identified. Increased uptake in the left vocal cord is in compensation for the fixed right vocal cord. \par \par CT neck done 10/7/18 showed the following: There is again evident nodularity of the right true vocal fold, without appreciable change since the prior CT study. Again evident is absent mineralization of the right arytenoid cartilage, which had been calcified prior to treatment. There is now a greater degree of fragmentation of the superior cricoid lamina. Given absence of suspicious FDG uptake in this region, findings within the laryngeal cartilages are felt more compatible with chondronecrosis than tumor recurrence. There is persistent though diminished edema in the supraglottic larynx compatible with radiation changes.\par *  Lymph Nodes: No cervical lymphadenopathy.\par *  Salivary Glands: Generalized increased density and decreased size, compatible with radiation effects.\par \par Today, he reports he is feeling well. Denies neck pain, mouth pain. Reports sore throat at times. Continues to have hoarseness. Mouth dryness has improved. Taste is normal, able to eat and drink and swallow all textures. He has gained 4 pounds in the past two months. Continues to do neck exercises. Reports breathing is good, denies SOB. Denies smoking or drinking (quit smoking Feb 2018).\par \par 7/2/18- Follow up\par CT neck on 6/29/18 showed now absence of the previously mineralized right arytenoid process, as well as subtle erosion of the superior right lateral cricoid cartilage, present in association with nodularity of the right true vocal fold. Differential includes both recurrent disease and chondronecrosis. Consider repeat PET/CT and/or/follow up neck CT in approximately 3 months. There was also a hypodensity in the right inferior frontal lone, possibly posttraumatic. This was present in 7/2017. Consider brain imaging. \par \par Today Mr. Alfonso is feeling well. He was decannulated in the interim since we saw him last. Breathing is good, walked about 10 blocks today without increased shortness of breath. Had mild SOB yesterday. Trach site closed. Denies pain to neck, denies lumps or bumps to neck. Mouth is sometimes dry, taste is normal. Able to eat and drink all textures. Has gained 17 pounds since april. His only concern is cough/phlegm and a hoarse voice.\par \par 7/2/18- \par Mr. Alfonso presents today for routine follow up. He was decannulated in the interim since we last saw him. \par \par Oncology History:\par Mr. 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 Branch on 4/5/17 after bring referred by Dr. King following investigation for hoarseness showing mass on flexible laryngoscopy.  He presented with 8 months- 1 year of increasing hoarseness on background of ~60 pack year smoking history. He denied pain, discomfort, otalgia, dysphagia, odynophagia, loss of weight, hemoptysis.  He was scheduled for surgery after this, however there were issues with his insurance which resulted in surgery cancellation. \par \par He eventually underwent DL and biopsy on 6/20/17. Pathology shows infiltrating squamous cell carcinoma of the right anterior vocal cord, the right supraglottis, the anterior commissure, and right vocal cord process, and right superior supraglottis. Left vocal cord process shows SCC, predominantly in situ, with focal superficial invasion. \par \par He has not had any imaging studies. His case was reviewed at multidisciplinary head and neck oncology tumor Board on July 10 and the recommendation was for definitive radiation therapy since  surgery would involve a total laryngectomy.\par \par He notes persistent hoarseness at this time. He denies coughing, trouble swallowing, bleeding, or pain. He has lost some weight that he attributes to anxiety. He works about 5-6 hours per day 5 days a week at [employer]. He has not seen a dentist in years. He continues to smoke 1ppd and has never tried quitting. \par \par \par 11/24/17 - Mr. Alfonso presents today for post treatment evaluation. Today reports feeling well - appetite good, swallowing better with soft textured food but able to eat most foods. Plans on resuming work at [employer] in 5 days. \par Reports:\par - intermittent cough for white secretions-smoking half pack/day-smoking cessation advised\par - dysgeusia, dry mouth-advised bicarb mouth rinses, increase fluid intake\par - minimal pain (only when swallowing), fever, night sweats, mucositis - using gabapentin 2 tabs bid\par - not using prevident toothpaste-never did\par \par 1/23/18- Mr. Alfonso presents today for routine follow up. He is feeling well. PO intake is good, weight stable. He is able to eat all textures of food when he is mindful of chewing. He is coughing a lot, and has a frequent tickle in his throat. His throat becomes sore when he talks frequently. He has reduced the amount that he is smoking, and is currently smoking less than a pack per day. he plans to quit over the next 10 days. \par He is doing head and neck exercises daily. He is not using prevident. He underwent PET scan on 1/22/18, report from which is not yet available. I called radiology and radiologist was not available. \par \par 4/24/18- Mr. Alfonso presents today for follow up. Since he saw us last in January he underwent a PET scan which showed LILIANA. \par In February Mr. Alfonso became persistently hoarse with worsening airway. he was taken to OR for awake tracheostomy and biopsy. Biopsy was negative on 2/27/18.  He was discharged to rehab. He saw Dr. Branch on 4/23, and he is planning for possible decannulation in 3 weeks. \par Today pt. is getting over URI.  He notes pain with swallowing. Lost 17 lbs since last visit. No other new complaints. Taking tylenol only.

## 2020-03-18 NOTE — DISCUSSION/SUMMARY
[Cancer Type / Location / Histology Subtype: ________] : Cancer Type / Location / Histology Subtype: [unfilled] [Surgery] : Surgery: Yes [Surgery Date(s) (year): ____] : Surgery Date(s) (year): [unfilled] [Surgical Procedure / Location / Findings: _________] : Surgical Procedure / Location / Findings: [unfilled] [Radiation] : Radiation: Yes [Body Area Treated: _________] : Body Area Treated: [unfilled] [End Date (year): ____] : End Date (year): [unfilled] [Systemic Therapy (chemotherapy, hormonal therapy, other)] : Systemic Therapy (chemotherapy, hormonal therapy, other): Yes [Follow up with Oncologist in _____] : Follow up with Oncologist in [unfilled] [Follow up with Surgeon in _____] : Follow up with Surgeon in [unfilled] [Follow up with Radiation MD in _____] : Follow up with Radiation MD in [unfilled] [FreeTextEntry2] : CT guided biopsy to evaluate for local recurrence

## 2020-03-27 ENCOUNTER — RESULT REVIEW (OUTPATIENT)
Age: 65
End: 2020-03-27

## 2020-03-27 ENCOUNTER — APPOINTMENT (OUTPATIENT)
Dept: CT IMAGING | Facility: HOSPITAL | Age: 65
End: 2020-03-27
Payer: MEDICARE

## 2020-03-27 ENCOUNTER — OUTPATIENT (OUTPATIENT)
Dept: OUTPATIENT SERVICES | Facility: HOSPITAL | Age: 65
LOS: 1 days | End: 2020-03-27
Payer: MEDICARE

## 2020-03-27 DIAGNOSIS — Z41.9 ENCOUNTER FOR PROCEDURE FOR PURPOSES OTHER THAN REMEDYING HEALTH STATE, UNSPECIFIED: Chronic | ICD-10-CM

## 2020-03-27 DIAGNOSIS — Z98.890 OTHER SPECIFIED POSTPROCEDURAL STATES: Chronic | ICD-10-CM

## 2020-03-27 PROCEDURE — 77012 CT SCAN FOR NEEDLE BIOPSY: CPT

## 2020-03-27 PROCEDURE — 36415 COLL VENOUS BLD VENIPUNCTURE: CPT

## 2020-03-27 PROCEDURE — 88305 TISSUE EXAM BY PATHOLOGIST: CPT

## 2020-03-27 PROCEDURE — 88305 TISSUE EXAM BY PATHOLOGIST: CPT | Mod: 26

## 2020-03-27 PROCEDURE — 77012 CT SCAN FOR NEEDLE BIOPSY: CPT | Mod: 26

## 2020-03-27 PROCEDURE — 20206 BIOPSY MUSCLE PERQ NEEDLE: CPT

## 2020-03-27 PROCEDURE — 88173 CYTOPATH EVAL FNA REPORT: CPT

## 2020-03-27 PROCEDURE — 88173 CYTOPATH EVAL FNA REPORT: CPT | Mod: 26

## 2020-03-30 LAB
NON-GYNECOLOGICAL CYTOLOGY STUDY: SIGNIFICANT CHANGE UP
NON-GYNECOLOGICAL CYTOLOGY STUDY: SIGNIFICANT CHANGE UP

## 2020-04-12 NOTE — CHART NOTE - NSCHARTNOTEFT_GEN_A_CORE
- Nurse called me to inform me patient was noted to be hypoxia, and evidence of rales on examination  - patient seen and examined at bedside.  - patient is on 6 liters NC, spot pulse check was noted to be 83%. Non rebreather- placed  - patient transferred to any bed w/   - CXR- ordered Upon Nutritional Assessment by the Registered Dietitian your patient was determined to meet criteria / has evidence of the following diagnosis/diagnoses:          [ ]  Mild Protein Calorie Malnutrition        [X]  Moderate Protein Calorie Malnutrition        [ ] Severe Protein Calorie Malnutrition        [ ] Unspecified Protein Calorie Malnutrition        [ ] Underweight / BMI <19        [ ] Morbid Obesity / BMI > 40      Findings as based on:  •  Comprehensive nutrition assessment and consultation  •  Calorie counts (nutrient intake analysis)  •  Food acceptance and intake status from observations by staff  •  Follow up  •  Patient education  •  Intervention secondary to interdisciplinary rounds  •   concerns      Treatment:    The following diet has been recommended:  See initial eval    PROVIDER Section:     By signing this assessment you are acknowledging and agree with the diagnosis/diagnoses assigned by the Registered Dietitian    Comments:

## 2020-04-28 ENCOUNTER — RX CHANGE (OUTPATIENT)
Age: 65
End: 2020-04-28

## 2020-04-28 LAB — MISCELLANEOUS TEST NAME: SIGNIFICANT CHANGE UP

## 2020-05-01 ENCOUNTER — OUTPATIENT (OUTPATIENT)
Dept: OUTPATIENT SERVICES | Facility: HOSPITAL | Age: 65
LOS: 1 days | End: 2020-05-01
Payer: MEDICARE

## 2020-05-01 DIAGNOSIS — Z98.890 OTHER SPECIFIED POSTPROCEDURAL STATES: Chronic | ICD-10-CM

## 2020-05-01 DIAGNOSIS — Z41.9 ENCOUNTER FOR PROCEDURE FOR PURPOSES OTHER THAN REMEDYING HEALTH STATE, UNSPECIFIED: Chronic | ICD-10-CM

## 2020-05-01 PROCEDURE — G9001: CPT

## 2020-05-22 DIAGNOSIS — Z71.89 OTHER SPECIFIED COUNSELING: ICD-10-CM

## 2020-05-26 ENCOUNTER — RX RENEWAL (OUTPATIENT)
Age: 65
End: 2020-05-26

## 2020-06-03 ENCOUNTER — APPOINTMENT (OUTPATIENT)
Dept: OTOLARYNGOLOGY | Facility: CLINIC | Age: 65
End: 2020-06-03
Payer: MEDICARE

## 2020-06-03 VITALS
BODY MASS INDEX: 22.6 KG/M2 | OXYGEN SATURATION: 98 % | SYSTOLIC BLOOD PRESSURE: 124 MMHG | WEIGHT: 144 LBS | HEIGHT: 67 IN | TEMPERATURE: 99.1 F | HEART RATE: 71 BPM | DIASTOLIC BLOOD PRESSURE: 78 MMHG

## 2020-06-03 PROCEDURE — 99213 OFFICE O/P EST LOW 20 MIN: CPT

## 2020-06-04 NOTE — REASON FOR VISIT
[Subsequent Evaluation] : a subsequent evaluation for [FreeTextEntry2] : sore throat, dysphagia, larynx ca

## 2020-06-04 NOTE — ASSESSMENT
[FreeTextEntry1] : 64M with recurrent larynx ca treated with definitive RT in October 2017, s/p total laryngectomy 7/2/19, completed CRT October 2019, currently on Keytruda, with sore throat, pain and dysphagia. Oropharynx exam LILIANA.\par \par Plan:\par - Continue immunotherapy. 4th cycle Keytruda scheduled for June 9th.\par - Seen by SLP today.  Plan MBS after June 9th to evaluate for dysphagia.\par - [facility] planning repeat imaging after 4th cycle to monitor response.\par - Rx gabapentin for pain.\par - Will f/u with pt after MBS and as needed.\par - Pt verbalized understanding of above.\par

## 2020-06-04 NOTE — PHYSICAL EXAM
[Edentulous] : edentulous [Normal] : orientation to person, place, and time: normal [de-identified] : healthy laryngeal stoma. atrophic, fibrotic neck

## 2020-06-04 NOTE — HISTORY OF PRESENT ILLNESS
[de-identified] : 64M with larynx cancer s/p definitive RT completed in 2017 with recurrence. He is s/p 19 total laryngectomy, total thyroidectomy with bilateral tracheoesophageal groove dissection and pec flap. Final path: [id number] moderately differentiated, keratinizing 4.5cm bilateral transglottic SCC, + PNI, no evidence of LVI. Finished CRT 2019 with Teckie/Futuri. PEG removed 19.  \par \par 20 PET: FDG uptake in the right surgical bed centered around the presumed right pharyngeal anastomotic clip at the level of the right common carotid artery bifurcation. While this may reflect post surgical or post radiation change, the focality of this uptake is suspicious for residual disease/local recurrence. \par \par 20 CT neck: there is vague and ill-defined enhancement surrounding right anastomotic clip, corresponding to area of FDG uptake on recent PET/CT. The appearance is felt nonspecific.\par \par 3/3/20 MRI neck: persistent enhancement surrounds a right upper anastomotic clip, with T2-w imaging best showing rounded and masslike approximately 2 x 3 cm tissue of differing signal compared to adjacent posttreatment change.  Appearance is worrisome for tumor recurrence.\par \par 3/27/20 CT guided biopsy: positive for squamous cell carcinoma.\par \par Pt is on Keytruda, planning 4th cycle on  and repeat imaging after.  Pt c/o sore throat x 2 weeks and dysphagia requiring multiple swallows to move food along.  He also reported shooting pain from jaw to the head and difficulty sleeping.  Denies fever, cough, weight loss. No issues with laryngectomy. Not using electrolarynx today because batteries . Communicating by writing and mouthing words.

## 2020-06-24 NOTE — PROVIDER CONTACT NOTE (OTHER) - RECOMMENDATIONS
Detail Level: Detailed Depth Of Biopsy: dermis Was A Bandage Applied: Yes recommended team round Size Of Lesion In Cm: 0 Biopsy Type: H and E Biopsy Method: Personna blade Anesthesia Type: 1% lidocaine with epinephrine Anesthesia Volume In Cc (Will Not Render If 0): 0.5 Hemostasis: Electrodesiccation Wound Care: Aquaphor Dressing: bandage Destruction After The Procedure: No Type Of Destruction Used: Curettage Cryotherapy Text: The wound bed was treated with cryotherapy after the biopsy was performed. Electrodesiccation Text: The wound bed was treated with electrodesiccation after the biopsy was performed. Electrodesiccation And Curettage Text: The wound bed was treated with electrodesiccation and curettage after the biopsy was performed. Silver Nitrate Text: The wound bed was treated with silver nitrate after the biopsy was performed. Lab: [id number] Consent: Written consent was obtained and risks were reviewed including but not limited to scarring, infection, bleeding, scabbing, incomplete removal, nerve damage and allergy to anesthesia. Post-Care Instructions: I reviewed with the patient in detail post-care instructions. Patient is to keep the biopsy site dry overnight, and then apply bacitracin twice daily until healed. Patient may apply hydrogen peroxide soaks to remove any crusting. Notification Instructions: Patient will be notified of biopsy results. However, patient instructed to call the office if not contacted within 2 weeks. Billing Type: Third-Party Bill Information: Selecting Yes will display possible errors in your note based on the variables you have selected. This validation is only offered as a suggestion for you. PLEASE NOTE THAT THE VALIDATION TEXT WILL BE REMOVED WHEN YOU FINALIZE YOUR NOTE. IF YOU WANT TO FAX A PRELIMINARY NOTE YOU WILL NEED TO TOGGLE THIS TO 'NO' IF YOU DO NOT WANT IT IN YOUR FAXED NOTE.

## 2020-07-02 ENCOUNTER — APPOINTMENT (OUTPATIENT)
Dept: RADIOLOGY | Facility: HOSPITAL | Age: 65
End: 2020-07-02
Payer: MEDICARE

## 2020-07-02 ENCOUNTER — RESULT REVIEW (OUTPATIENT)
Age: 65
End: 2020-07-02

## 2020-07-02 ENCOUNTER — OUTPATIENT (OUTPATIENT)
Dept: OUTPATIENT SERVICES | Facility: HOSPITAL | Age: 65
LOS: 1 days | End: 2020-07-02
Payer: MEDICARE

## 2020-07-02 DIAGNOSIS — Z41.9 ENCOUNTER FOR PROCEDURE FOR PURPOSES OTHER THAN REMEDYING HEALTH STATE, UNSPECIFIED: Chronic | ICD-10-CM

## 2020-07-02 DIAGNOSIS — Z98.890 OTHER SPECIFIED POSTPROCEDURAL STATES: Chronic | ICD-10-CM

## 2020-07-02 PROCEDURE — 74230 X-RAY XM SWLNG FUNCJ C+: CPT

## 2020-07-02 PROCEDURE — 74230 X-RAY XM SWLNG FUNCJ C+: CPT | Mod: 26

## 2020-07-13 ENCOUNTER — APPOINTMENT (OUTPATIENT)
Dept: OTOLARYNGOLOGY | Facility: CLINIC | Age: 65
End: 2020-07-13
Payer: MEDICARE

## 2020-07-13 VITALS
WEIGHT: 144 LBS | HEART RATE: 74 BPM | DIASTOLIC BLOOD PRESSURE: 71 MMHG | SYSTOLIC BLOOD PRESSURE: 112 MMHG | HEIGHT: 67 IN | BODY MASS INDEX: 22.6 KG/M2

## 2020-07-13 PROCEDURE — 99213 OFFICE O/P EST LOW 20 MIN: CPT

## 2020-07-14 RX ORDER — GABAPENTIN 300 MG/1
300 CAPSULE ORAL
Qty: 90 | Refills: 0 | Status: DISCONTINUED | COMMUNITY
Start: 2020-06-03 | End: 2020-07-14

## 2020-07-14 RX ORDER — IBUPROFEN 50 MG/1.25ML
SUSPENSION/ DROPS ORAL
Refills: 0 | Status: ACTIVE | COMMUNITY

## 2020-07-14 NOTE — ADDENDUM
[FreeTextEntry1] : Speech Pathology Note: \par Patient seen in clinic in coordination with Dr. Branch. We reviewed images from MBS completed on 7/2/2020. He continues to endorse difficulty with transport of food from his mouth through his throat, reports that he has intermittent regurgitation of PO intake, and indicates that he has continued to lose weight due to dysphagia. He was encouraged to try the following strategies to facilitate the oropharyngeal swallow: (1) Avoid hard, crunchy foods, (2) Favor soft/moist foods (anything that can be cut with a fork), (3) Favor chewing and bolus prep on the left side of the mouth, (4) alternate solids with liquids, (5) swallow 2x per bite/sip, (6) Consider supplements such as Ensure or Boost to prevent further weight loss. Pt verbalized understanding of all education provided. He has planned for esophageal dilation with Dr. Branch and will f/u with our service afterwards if dysphagia persists. –Hayley Osuna, CCC-SLP.

## 2020-07-14 NOTE — PHYSICAL EXAM
[Edentulous] : edentulous [Normal] : orientation to person, place, and time: normal [de-identified] : healthy laryngeal stoma. atrophic, fibrotic neck [de-identified] : mild deviation of tongue, good tongue mobility, no oral tongue edema appreciated

## 2020-07-14 NOTE — ASSESSMENT
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 \par - Seen by SLP today. \par - Plan for esophageal dilation.\par - PST given.\par - Continue Tylenol/Motrin prn pain.\par - Pt verbalized understanding of above.

## 2020-07-14 NOTE — HISTORY OF PRESENT ILLNESS
[de-identified] : 64M with larynx cancer s/p definitive RT completed in October 2017 with recurrence. He is s/p 7/2/19 total laryngectomy, total thyroidectomy with bilateral tracheoesophageal groove dissection and pec flap. 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 Finished CRT October 2019 with Teckie/Futuri. PEG removed 11/04/19. \par \par 1/17/20 PET: FDG uptake in the right surgical bed centered around the presumed right pharyngeal anastomotic clip at the level of the right common carotid artery bifurcation. While this may reflect post surgical or post radiation change, the focality of this uptake is suspicious for residual disease/local recurrence. \par \par 1/25/20 CT neck: there is vague and ill-defined enhancement surrounding right anastomotic clip, corresponding to area of FDG uptake on recent PET/CT. The appearance is felt nonspecific.\par \par 3/3/20 MRI neck: persistent enhancement surrounds a right upper anastomotic clip, with T2-w imaging best showing rounded and masslike approximately 2 x 3 cm tissue of differing signal compared to adjacent posttreatment change. Appearance is worrisome for tumor recurrence.\par \par 3/27/20 CT guided biopsy: positive for squamous cell carcinoma.\par \par Pt is on 5FU + cisplatin + Keytruda. Pt continues to report dysphagia requiring multiple swallows to move food along, difficulty moving food along with his tongue, feels tongue is swollen. Reports weight loss.  He previously reported shooting pain from jaw to the head and difficulty sleeping. Reports gabapentin not effective so it was discontinued.  Uses Tylenol and Motrin with partial relief.  Denies fever, cough. No issues with laryngectomy. Using electrolarynx today to communicate. \par \par 7/2/20 MBS: functional oropharyngeal swallow.

## 2020-07-20 ENCOUNTER — APPOINTMENT (OUTPATIENT)
Dept: INTERNAL MEDICINE | Facility: CLINIC | Age: 65
End: 2020-07-20
Payer: MEDICARE

## 2020-07-20 ENCOUNTER — LABORATORY RESULT (OUTPATIENT)
Age: 65
End: 2020-07-20

## 2020-07-20 VITALS
HEART RATE: 72 BPM | HEIGHT: 67 IN | SYSTOLIC BLOOD PRESSURE: 95 MMHG | OXYGEN SATURATION: 98 % | RESPIRATION RATE: 15 BRPM | BODY MASS INDEX: 21.03 KG/M2 | TEMPERATURE: 98.6 F | DIASTOLIC BLOOD PRESSURE: 62 MMHG | WEIGHT: 134 LBS

## 2020-07-20 DIAGNOSIS — R49.0 DYSPHONIA: ICD-10-CM

## 2020-07-20 PROCEDURE — 99214 OFFICE O/P EST MOD 30 MIN: CPT | Mod: 25,GC

## 2020-07-20 PROCEDURE — 36415 COLL VENOUS BLD VENIPUNCTURE: CPT

## 2020-07-20 PROCEDURE — 93000 ELECTROCARDIOGRAM COMPLETE: CPT

## 2020-07-23 LAB
ALBUMIN SERPL ELPH-MCNC: 4.4 G/DL
ALP BLD-CCNC: 81 U/L
ALT SERPL-CCNC: 13 U/L
ANION GAP SERPL CALC-SCNC: 13 MMOL/L
APTT BLD: 50.7 SEC
AST SERPL-CCNC: 20 U/L
BASOPHILS # BLD AUTO: 0.07 K/UL
BASOPHILS NFR BLD AUTO: 1.1 %
BILIRUB SERPL-MCNC: 0.3 MG/DL
BUN SERPL-MCNC: 8 MG/DL
CALCIUM SERPL-MCNC: 8.5 MG/DL
CHLORIDE SERPL-SCNC: 99 MMOL/L
CO2 SERPL-SCNC: 30 MMOL/L
CREAT SERPL-MCNC: 0.98 MG/DL
EOSINOPHIL # BLD AUTO: 0.13 K/UL
EOSINOPHIL NFR BLD AUTO: 2.1 %
GLUCOSE SERPL-MCNC: 87 MG/DL
HCT VFR BLD CALC: 39.2 %
HGB BLD-MCNC: 11.7 G/DL
IMM GRANULOCYTES NFR BLD AUTO: 0.2 %
INR PPP: 0.98 RATIO
LYMPHOCYTES # BLD AUTO: 0.62 K/UL
LYMPHOCYTES NFR BLD AUTO: 10.1 %
MAN DIFF?: NORMAL
MCHC RBC-ENTMCNC: 29.8 GM/DL
MCHC RBC-ENTMCNC: 30.5 PG
MCV RBC AUTO: 102.1 FL
MONOCYTES # BLD AUTO: 0.59 K/UL
MONOCYTES NFR BLD AUTO: 9.7 %
NEUTROPHILS # BLD AUTO: 4.69 K/UL
NEUTROPHILS NFR BLD AUTO: 76.8 %
PLATELET # BLD AUTO: 294 K/UL
POTASSIUM SERPL-SCNC: 4.3 MMOL/L
PROT SERPL-MCNC: 7.1 G/DL
PT BLD: 11.7 SEC
RBC # BLD: 3.84 M/UL
RBC # FLD: 13.7 %
SODIUM SERPL-SCNC: 141 MMOL/L
WBC # FLD AUTO: 6.11 K/UL

## 2020-07-24 ENCOUNTER — RX RENEWAL (OUTPATIENT)
Age: 65
End: 2020-07-24

## 2020-07-25 ENCOUNTER — LABORATORY RESULT (OUTPATIENT)
Age: 65
End: 2020-07-25

## 2020-07-27 ENCOUNTER — TRANSCRIPTION ENCOUNTER (OUTPATIENT)
Age: 65
End: 2020-07-27

## 2020-07-27 VITALS
RESPIRATION RATE: 18 BRPM | WEIGHT: 125.44 LBS | HEIGHT: 71 IN | TEMPERATURE: 99 F | SYSTOLIC BLOOD PRESSURE: 101 MMHG | DIASTOLIC BLOOD PRESSURE: 63 MMHG | OXYGEN SATURATION: 96 % | HEART RATE: 88 BPM

## 2020-07-27 NOTE — ASU PATIENT PROFILE, ADULT - NS PRO ABUSE SCREEN SUSPICION NEGLECT YN
2020  EMPLOYEE INFORMATION: EMPLOYER INFORMATION:   NAME: Kev KIRBY   : 1965 [phone]   DATE OF INJURY/EVENT: 2020           Location: [facility]   Treating Provider: Behzad Jaimes MD  Time In:  8:30 AM Time Out:  9:05 AM      DIAGNOSIS:   1. Complex tear of medial meniscus of right knee as current injury, subsequent encounter      STATUS: This injury is determined to be WORK RELATED.    RETURN TO WORK:  Employee may return to work with restrictions.     Return Date: 2020            RESTRICTIONS:   Restrictions are to be followed at work and at home.  Restrictions are in effect until next follow-up visit.  Must take a break every 2 hours for 10 minutes of sitting to elevate and ice the knee throughout the workday as needed  Alternate between sitting and standing type work as he can tolerate  Allow time to take breaks   Wear knee brace as needed    TREATMENT PLAN:  Medications for this injury/condition:   OTC medications    Referral/Consult:  Orthopedic Services:  Start       Diagnostic Testing:   None         NEXT RETURN VISIT:  at 2:30 pm with Behzad Jaimes  Thank you for the privilege of providing medical care for this injury/condition.  If there are any questions, please call the occupational health clinic at Dept: [phone].      Electronically signed on 2020 at 9:05 AM by:   Behzad Jaimes MD   [facility]   no

## 2020-07-28 ENCOUNTER — TRANSCRIPTION ENCOUNTER (OUTPATIENT)
Age: 65
End: 2020-07-28

## 2020-07-28 ENCOUNTER — INPATIENT (INPATIENT)
Facility: HOSPITAL | Age: 65
LOS: 3 days | Discharge: HOME HEALTH | DRG: 147 | End: 2020-08-01
Attending: OTOLARYNGOLOGY | Admitting: OTOLARYNGOLOGY
Payer: MEDICARE

## 2020-07-28 ENCOUNTER — APPOINTMENT (OUTPATIENT)
Dept: OTOLARYNGOLOGY | Facility: HOSPITAL | Age: 65
End: 2020-07-28

## 2020-07-28 DIAGNOSIS — Z98.890 OTHER SPECIFIED POSTPROCEDURAL STATES: Chronic | ICD-10-CM

## 2020-07-28 DIAGNOSIS — Z41.9 ENCOUNTER FOR PROCEDURE FOR PURPOSES OTHER THAN REMEDYING HEALTH STATE, UNSPECIFIED: Chronic | ICD-10-CM

## 2020-07-28 LAB
APTT BLD: 33.5 SEC — SIGNIFICANT CHANGE UP (ref 27.5–35.5)
INR BLD: 1.13 — SIGNIFICANT CHANGE UP (ref 0.88–1.16)
PROTHROM AB SERPL-ACNC: 13.5 SEC — SIGNIFICANT CHANGE UP (ref 10.6–13.6)

## 2020-07-28 PROCEDURE — 43196 ESOPHAGOSCP GUIDE WIRE DILAT: CPT | Mod: 53

## 2020-07-28 RX ORDER — SODIUM CHLORIDE 9 MG/ML
1000 INJECTION, SOLUTION INTRAVENOUS
Refills: 0 | Status: DISCONTINUED | OUTPATIENT
Start: 2020-07-28 | End: 2020-07-30

## 2020-07-28 RX ORDER — MORPHINE SULFATE 50 MG/1
2 CAPSULE, EXTENDED RELEASE ORAL
Refills: 0 | Status: DISCONTINUED | OUTPATIENT
Start: 2020-07-28 | End: 2020-07-29

## 2020-07-28 RX ORDER — LEVOTHYROXINE SODIUM 125 MCG
125 TABLET ORAL DAILY
Refills: 0 | Status: DISCONTINUED | OUTPATIENT
Start: 2020-07-28 | End: 2020-07-29

## 2020-07-28 RX ADMIN — SODIUM CHLORIDE 100 MILLILITER(S): 9 INJECTION, SOLUTION INTRAVENOUS at 20:47

## 2020-07-28 RX ADMIN — MORPHINE SULFATE 2 MILLIGRAM(S): 50 CAPSULE, EXTENDED RELEASE ORAL at 20:50

## 2020-07-28 RX ADMIN — MORPHINE SULFATE 2 MILLIGRAM(S): 50 CAPSULE, EXTENDED RELEASE ORAL at 21:15

## 2020-07-28 NOTE — H&P ADULT - NSCORESITESY/N_GEN_A_CORE_RD
Bedside report given to ALISE Santiago, for the patient going to [id number]. Patient transported via gurney on the cardiac monitor. All belongings removed from the room.   No

## 2020-07-28 NOTE — CONSULT NOTE ADULT - ASSESSMENT
65 y/o M PMH Laryngeal Ca s/p RT completed 10/2017 w/ recurrence, s/p total Laryngectomy, total thyroidectomy w/ bilateral tracheoesophageal groove dissection and pec flap (7/2019), PEG placed 6/2019, s/p removal 11/2019 presents with dysphagia. Now s/p s/p rigid esophagoscopy for progressive dysphagia and found to have intraluminal mass concerning for persistence of laryngeal cancer. Surgery consulted for PEG placement.    PEG planned for 7/29  NPO after midnight/IVF  Please obtain pre op labs (cbc, BMP, Mg, Phos, Coags, Type and Screen, COVID)  Plan discussed with attending and chief resident

## 2020-07-28 NOTE — CONSULT NOTE ADULT - SUBJECTIVE AND OBJECTIVE BOX
CONSULT NOTE    HPI:  63 y/o M PMH Laryngeal Ca s/p RT completed 10/2017 w/ recurrence, s/p total Laryngectomy, total thyroidectomy w/ bilateral tracheoesophageal groove dissection and pec flap (7/2019), PEG placed 6/2019, s/p removal 11/2019 presents with dysphagia. Patient noticed inability to swallow solids over the past few month, limited to liquids described as difficulty moving food along w/ his tongue and tongue feels swollen. He has also noted weight loss in the past few months and difficulty sleeping. Denies fever, cough. Surgery consulted for placement of Gastrostomy tube.        PAST MEDICAL & SURGICAL HISTORY:  SOB (shortness of breath)  Throat cancer  Jaundice: 1965  Fracture: left ankle  Laryngeal mass: s/ p radiation  Surgery, elective: direct laryngoscopy with biopsy- 4/2017  Status post surgery: inguinal hernia  Status post surgery: Left ankle surgical repair  x2 (1965)      PAST SURGICAL HISTORY:     REVIEW OF SYSTEMS:   Pertinent positives/negatives noted in HPI.     HOME MEDICATIONS:    ALLERGIES:  Allergies    penicillin (Rash)    Intolerances        SOCIAL HISTORY:    FAMILY HISTORY:  No pertinent family history in first degree relatives      Vital Signs Last 24 Hrs  T(C): 36.5 (28 Jul 2020 20:15), Max: 36.5 (28 Jul 2020 20:15)  T(F): 97.7 (28 Jul 2020 20:15), Max: 97.7 (28 Jul 2020 20:15)  HR: 65 (28 Jul 2020 21:00) (65 - 80)  BP: 140/80 (28 Jul 2020 21:00) (130/85 - 140/80)  BP(mean): 104 (28 Jul 2020 21:00) (91 - 104)  RR: 16 (28 Jul 2020 21:00) (16 - 21)  SpO2: 100% (28 Jul 2020 21:00) (97% - 100%)    PHYSICAL EXAM:    LABS:          PT/INR - ( 28 Jul 2020 20:02 )   PT: 13.5 sec;   INR: 1.13          PTT - ( 28 Jul 2020 20:02 )  PTT:33.5 sec      RADIOLOGY AND ADDITIONAL STUDIES CONSULT NOTE    HPI:  65 y/o M PMH Laryngeal Ca s/p RT completed 10/2017 w/ recurrence, s/p total Laryngectomy, total thyroidectomy w/ bilateral tracheoesophageal groove dissection and pec flap (7/2019), PEG placed 6/2019, s/p removal 11/2019 presents with dysphagia. Patient noticed inability to swallow solids over the past few month, limited to liquids described as difficulty moving food along w/ his tongue and tongue feels swollen. He has also noted weight loss in the past few months and difficulty sleeping. Now s/p rigid esophagoscopy for progressive dysphagia and found to have intraluminal mass concerning for persistence of laryngeal cancer. Denies fever, cough. Surgery consulted for placement of Gastrostomy tube.        PAST MEDICAL & SURGICAL HISTORY:  SOB (shortness of breath)  Throat cancer  Jaundice: 1965  Fracture: left ankle  Laryngeal mass: s/ p radiation  Surgery, elective: direct laryngoscopy with biopsy- 4/2017  Status post surgery: inguinal hernia  Status post surgery: Left ankle surgical repair  x2 (1965)      PAST SURGICAL HISTORY:     REVIEW OF SYSTEMS:   Pertinent positives/negatives noted in HPI.     HOME MEDICATIONS:    ALLERGIES:  Allergies    penicillin (Rash)    Intolerances        SOCIAL HISTORY:    FAMILY HISTORY:  No pertinent family history in first degree relatives      Vital Signs Last 24 Hrs  T(C): 36.5 (28 Jul 2020 20:15), Max: 36.5 (28 Jul 2020 20:15)  T(F): 97.7 (28 Jul 2020 20:15), Max: 97.7 (28 Jul 2020 20:15)  HR: 65 (28 Jul 2020 21:00) (65 - 80)  BP: 140/80 (28 Jul 2020 21:00) (130/85 - 140/80)  BP(mean): 104 (28 Jul 2020 21:00) (91 - 104)  RR: 16 (28 Jul 2020 21:00) (16 - 21)  SpO2: 100% (28 Jul 2020 21:00) (97% - 100%)    Physical Exam  General: NAD, resting comfortably in bed  C/V: NSR  Pulm: Nonlabored breathing, no respiratory distress  Abd: soft, non-tender, non-distended.  Extrem: WWP, no edema,  Neuro: A/O x 3, CNs II-XII grossly intact, no focal deficits, normal sensation      LABS:          PT/INR - ( 28 Jul 2020 20:02 )   PT: 13.5 sec;   INR: 1.13          PTT - ( 28 Jul 2020 20:02 )  PTT:33.5 sec      RADIOLOGY AND ADDITIONAL STUDIES

## 2020-07-28 NOTE — H&P ADULT - HISTORY OF PRESENT ILLNESS
HPI: 64M w/ Pmhx of HLD, and [id number] laryngeal cancer with recurrence / persistence treated previously with total laryngectomy, total thyroidectomy, bilateral TE groove dissections and pec flap as well as RT (completed in October 2017) and recurrence treated with 4 cycles of keytruda.  Pt underwent rigid esophagoscopy for ongoing dysphagia with a planned dilation that was deferred due to findings of intraluminal mass within the esophagus.  Pt admitted due to poor nutritional intake with planned G tube insertion during admission.  Pt currently AFVSS.     Pmhx: as above   Pshx: as above   DA: PCN - unclear if true anaphylactic allergy  SH: denies toxic habits X 3     PE: gen: NAD, A+OX3   Face: NCAT, chacectic   Nose: clear to anterior rhinoscopy  OC/OP: poor dentition, MMM, OP clear   Neck: woody fibrotic neck with laryngectomy stoma patent with laryngectomy tube and HME filter in place.  left PEC flap pedicle present under skin of left neck   Resp: breathing unlabored on RA     A/P: 64M w/ pmhs of T4a laryngeal cancer s/p TL, total thyroidectomy and pec flap now POD 0 s/p rigid esophagoscopy for progressive dysphagia and found to have intraluminal mass concerning for persistence of laryngeal cancer.  - Admit to ENT   - PT has laryngectomy stoma and CANNOT BE INTUBATED FROM ABOVE.   - Transfer from PACU to regional bed   - NPO at midnight for G tube tomorrow   - Preop labs  - MIVF  - Pain control   -

## 2020-07-29 LAB
ALBUMIN SERPL ELPH-MCNC: 3.9 G/DL — SIGNIFICANT CHANGE UP (ref 3.3–5)
ALP SERPL-CCNC: 72 U/L — SIGNIFICANT CHANGE UP (ref 40–120)
ALT FLD-CCNC: 7 U/L — LOW (ref 10–45)
ANION GAP SERPL CALC-SCNC: 15 MMOL/L — SIGNIFICANT CHANGE UP (ref 5–17)
AST SERPL-CCNC: 17 U/L — SIGNIFICANT CHANGE UP (ref 10–40)
BILIRUB SERPL-MCNC: 0.3 MG/DL — SIGNIFICANT CHANGE UP (ref 0.2–1.2)
BLD GP AB SCN SERPL QL: NEGATIVE — SIGNIFICANT CHANGE UP
BUN SERPL-MCNC: 16 MG/DL — SIGNIFICANT CHANGE UP (ref 7–23)
CALCIUM SERPL-MCNC: 8 MG/DL — LOW (ref 8.4–10.5)
CHLORIDE SERPL-SCNC: 98 MMOL/L — SIGNIFICANT CHANGE UP (ref 96–108)
CO2 SERPL-SCNC: 25 MMOL/L — SIGNIFICANT CHANGE UP (ref 22–31)
CREAT SERPL-MCNC: 0.86 MG/DL — SIGNIFICANT CHANGE UP (ref 0.5–1.3)
GLUCOSE SERPL-MCNC: 126 MG/DL — HIGH (ref 70–99)
HCT VFR BLD CALC: 33.9 % — LOW (ref 39–50)
HGB BLD-MCNC: 11.1 G/DL — LOW (ref 13–17)
MAGNESIUM SERPL-MCNC: 2.2 MG/DL — SIGNIFICANT CHANGE UP (ref 1.6–2.6)
MCHC RBC-ENTMCNC: 30.7 PG — SIGNIFICANT CHANGE UP (ref 27–34)
MCHC RBC-ENTMCNC: 32.7 GM/DL — SIGNIFICANT CHANGE UP (ref 32–36)
MCV RBC AUTO: 93.6 FL — SIGNIFICANT CHANGE UP (ref 80–100)
NRBC # BLD: 0 /100 WBCS — SIGNIFICANT CHANGE UP (ref 0–0)
PHOSPHATE SERPL-MCNC: 4.4 MG/DL — SIGNIFICANT CHANGE UP (ref 2.5–4.5)
PLATELET # BLD AUTO: 278 K/UL — SIGNIFICANT CHANGE UP (ref 150–400)
POTASSIUM SERPL-MCNC: 4.7 MMOL/L — SIGNIFICANT CHANGE UP (ref 3.5–5.3)
POTASSIUM SERPL-SCNC: 4.7 MMOL/L — SIGNIFICANT CHANGE UP (ref 3.5–5.3)
PREALB SERPL-MCNC: 14 MG/DL — LOW (ref 20–40)
PROT SERPL-MCNC: 6.7 G/DL — SIGNIFICANT CHANGE UP (ref 6–8.3)
RBC # BLD: 3.62 M/UL — LOW (ref 4.2–5.8)
RBC # FLD: 12.7 % — SIGNIFICANT CHANGE UP (ref 10.3–14.5)
RH IG SCN BLD-IMP: POSITIVE — SIGNIFICANT CHANGE UP
SODIUM SERPL-SCNC: 138 MMOL/L — SIGNIFICANT CHANGE UP (ref 135–145)
WBC # BLD: 3.71 K/UL — LOW (ref 3.8–10.5)
WBC # FLD AUTO: 3.71 K/UL — LOW (ref 3.8–10.5)

## 2020-07-29 PROCEDURE — 99232 SBSQ HOSP IP/OBS MODERATE 35: CPT | Mod: GC,25

## 2020-07-29 PROCEDURE — 93010 ELECTROCARDIOGRAM REPORT: CPT

## 2020-07-29 PROCEDURE — 43246 EGD PLACE GASTROSTOMY TUBE: CPT

## 2020-07-29 RX ORDER — MORPHINE SULFATE 50 MG/1
2 CAPSULE, EXTENDED RELEASE ORAL EVERY 4 HOURS
Refills: 0 | Status: DISCONTINUED | OUTPATIENT
Start: 2020-07-29 | End: 2020-07-30

## 2020-07-29 RX ORDER — OXYCODONE HYDROCHLORIDE 5 MG/1
5 TABLET ORAL EVERY 6 HOURS
Refills: 0 | Status: DISCONTINUED | OUTPATIENT
Start: 2020-07-29 | End: 2020-08-01

## 2020-07-29 RX ORDER — HEPARIN SODIUM 5000 [USP'U]/ML
5000 INJECTION INTRAVENOUS; SUBCUTANEOUS EVERY 12 HOURS
Refills: 0 | Status: DISCONTINUED | OUTPATIENT
Start: 2020-07-29 | End: 2020-08-01

## 2020-07-29 RX ORDER — ONDANSETRON 8 MG/1
4 TABLET, FILM COATED ORAL ONCE
Refills: 0 | Status: DISCONTINUED | OUTPATIENT
Start: 2020-07-29 | End: 2020-08-01

## 2020-07-29 RX ORDER — ACETAMINOPHEN 500 MG
1000 TABLET ORAL EVERY 6 HOURS
Refills: 0 | Status: COMPLETED | OUTPATIENT
Start: 2020-07-29 | End: 2020-07-29

## 2020-07-29 RX ORDER — ACETAMINOPHEN 500 MG
1000 TABLET ORAL ONCE
Refills: 0 | Status: COMPLETED | OUTPATIENT
Start: 2020-07-29 | End: 2020-07-29

## 2020-07-29 RX ORDER — BENZOCAINE AND MENTHOL 5; 1 G/100ML; G/100ML
1 LIQUID ORAL ONCE
Refills: 0 | Status: DISCONTINUED | OUTPATIENT
Start: 2020-07-29 | End: 2020-07-29

## 2020-07-29 RX ORDER — OXYCODONE AND ACETAMINOPHEN 5; 325 MG/1; MG/1
1 TABLET ORAL EVERY 6 HOURS
Refills: 0 | Status: DISCONTINUED | OUTPATIENT
Start: 2020-07-29 | End: 2020-07-29

## 2020-07-29 RX ORDER — LEVOTHYROXINE SODIUM 125 MCG
100 TABLET ORAL
Refills: 0 | Status: DISCONTINUED | OUTPATIENT
Start: 2020-07-29 | End: 2020-08-01

## 2020-07-29 RX ORDER — ACETAMINOPHEN 500 MG
1000 TABLET ORAL ONCE
Refills: 0 | Status: DISCONTINUED | OUTPATIENT
Start: 2020-07-29 | End: 2020-07-29

## 2020-07-29 RX ADMIN — Medication 400 MILLIGRAM(S): at 01:53

## 2020-07-29 RX ADMIN — MORPHINE SULFATE 2 MILLIGRAM(S): 50 CAPSULE, EXTENDED RELEASE ORAL at 19:30

## 2020-07-29 RX ADMIN — Medication 1000 MILLIGRAM(S): at 13:30

## 2020-07-29 RX ADMIN — Medication 100 MICROGRAM(S): at 09:45

## 2020-07-29 RX ADMIN — HEPARIN SODIUM 5000 UNIT(S): 5000 INJECTION INTRAVENOUS; SUBCUTANEOUS at 05:57

## 2020-07-29 RX ADMIN — MORPHINE SULFATE 2 MILLIGRAM(S): 50 CAPSULE, EXTENDED RELEASE ORAL at 14:14

## 2020-07-29 RX ADMIN — OXYCODONE HYDROCHLORIDE 5 MILLIGRAM(S): 5 TABLET ORAL at 06:29

## 2020-07-29 RX ADMIN — MORPHINE SULFATE 2 MILLIGRAM(S): 50 CAPSULE, EXTENDED RELEASE ORAL at 14:25

## 2020-07-29 RX ADMIN — OXYCODONE HYDROCHLORIDE 5 MILLIGRAM(S): 5 TABLET ORAL at 06:59

## 2020-07-29 RX ADMIN — Medication 400 MILLIGRAM(S): at 12:35

## 2020-07-29 RX ADMIN — Medication 400 MILLIGRAM(S): at 18:12

## 2020-07-29 RX ADMIN — HEPARIN SODIUM 5000 UNIT(S): 5000 INJECTION INTRAVENOUS; SUBCUTANEOUS at 17:07

## 2020-07-29 RX ADMIN — MORPHINE SULFATE 2 MILLIGRAM(S): 50 CAPSULE, EXTENDED RELEASE ORAL at 19:15

## 2020-07-29 RX ADMIN — Medication 1000 MILLIGRAM(S): at 18:45

## 2020-07-29 RX ADMIN — Medication 1000 MILLIGRAM(S): at 02:12

## 2020-07-29 NOTE — PROGRESS NOTE ADULT - SUBJECTIVE AND OBJECTIVE BOX
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 PAUL. s/p PEG today, will start TF tomorrow at 12pm. will also start CLD for pleasure feeding, otherwise patient well .    Vital Signs Last 24 Hrs  T(C): 36.7 (29 Jul 2020 14:06), Max: 37.3 (29 Jul 2020 00:25)  T(F): 98 (29 Jul 2020 14:06), Max: 99.1 (29 Jul 2020 00:25)  HR: 82 (29 Jul 2020 14:06) (50 - 82)  BP: 115/62 (29 Jul 2020 14:06) (96/55 - 142/78)  BP(mean): 80 (29 Jul 2020 12:52) (72 - 104)  RR: 17 (29 Jul 2020 14:06) (14 - 21)  SpO2: 98% (29 Jul 2020 14:06) (95% - 100%)    MEDICATIONS  (STANDING):  acetaminophen  IVPB .. 1000 milliGRAM(s) IV Intermittent once  acetaminophen  IVPB .. 1000 milliGRAM(s) IV Intermittent once  heparin   Injectable 5000 Unit(s) SubCutaneous every 12 hours  lactated ringers. 1000 milliLiter(s) (100 mL/Hr) IV Continuous <Continuous>  levothyroxine Injectable 100 MICROGram(s) IV Push <User Schedule>      General: AAOx3, resting in bed, comfortable  C/V: NSR RRR  Pulm: Nonlabored breathing, no respiratory distress. Nasrin tube in place  Abd: abd ATTP. PEG tube in place      07-28-20 @ 07:01  -  07-29-20 @ 07:00  --------------------------------------------------------  IN: 1200 mL / OUT: 550 mL / NET: 650 mL    07-29-20 @ 07:01 - 07-29-20 @ 16:54  --------------------------------------------------------  IN: 700 mL / OUT: 200 mL / NET: 500 mL                              11.1   3.71  )-----------( 278      ( 29 Jul 2020 05:59 )             33.9     07-29    138  |  98  |  16  ----------------------------<  126<H>  4.7   |  25  |  0.86    Ca    8.0<L>      29 Jul 2020 05:59  Phos  4.4     07-29  Mg     2.2     07-29    TPro  6.7  /  Alb  3.9  /  TBili  0.3  /  DBili  x   /  AST  17  /  ALT  7<L>  /  AlkPhos  72  07-29    LIVER FUNCTIONS - ( 29 Jul 2020 05:59 )  Alb: 3.9 g/dL / Pro: 6.7 g/dL / ALK PHOS: 72 U/L / ALT: 7 U/L / AST: 17 U/L / GGT: x           PT/INR - ( 28 Jul 2020 20:02 )   PT: 13.5 sec;   INR: 1.13          PTT - ( 28 Jul 2020 20:02 )  PTT:33.5 sec    07-28-20 @ 07:01 - 07-29-20 @ 07:00  --------------------------------------------------------  IN: 1200 mL / OUT: 550 mL / NET: 650 mL    07-29-20 @ 07:01 - 07-29-20 @ 16:54  --------------------------------------------------------  IN: 700 mL / OUT: 200 mL / NET: 500 mL        A/P: 64M esophageal dilation, laryngeal Ca s/p TL w pec flap and TT, w recurrence s/p CRT + keytruda, now with likely recurrence/persistence (dilation aborted)  - start TF tomorrow 12p  - appreciate nutrition recs  - CLD pleasure feeds  - pain PRN    ----------------------------------------------  Chase Vang MD  Resident  Department of Otolaryngology - Head and Neck Surgery  [facility]

## 2020-07-29 NOTE — BRIEF OPERATIVE NOTE - NSICDXBRIEFPROCEDURE_GEN_ALL_CORE_FT
PROCEDURES:  Rigid esophagoscopy 28-Jul-2020 19:35:24  Freddy Branch
PROCEDURES:  Percutaneous endoscopic gastrostomy 29-Jul-2020 11:51:09  Pippa Vasquez

## 2020-07-29 NOTE — BRIEF OPERATIVE NOTE - NSICDXBRIEFPREOP_GEN_ALL_CORE_FT
PRE-OP DIAGNOSIS:  Dysphagia 28-Jul-2020 19:35:34  Freddy Branch
PRE-OP DIAGNOSIS:  Malnutrition of moderate degree 29-Jul-2020 11:51:52  Pippa Vasquez  Laryngeal cancer 29-Jul-2020 11:51:40  Pippa Vasquez  Dysphagia 28-Jul-2020 19:35:34  Freddy Branch

## 2020-07-29 NOTE — BRIEF OPERATIVE NOTE - OPERATION/FINDINGS
Mass within esophageal lumen.  Dilation aborted.
EGD performed with XP gastroscope.  Upper esophageal mass noted, able to pass with the XP scope.  No significant esophagitis, gastritis, or duodenitis.  20F PEG placed at 2.5 cm at the skin.

## 2020-07-29 NOTE — DIETITIAN INITIAL EVALUATION ADULT. - ENERGY NEEDS
Height 71"; ABW 56.9kg; IBW 78.2kg; 73%IBW  BMI 17.5  ABW used to estimate needs based on clinical judgement. Needs estimated for age and adjusted for oncology nutrition guidelines and suspected malnutrition

## 2020-07-29 NOTE — BRIEF OPERATIVE NOTE - NSICDXBRIEFPOSTOP_GEN_ALL_CORE_FT
POST-OP DIAGNOSIS:  Dysphagia 28-Jul-2020 19:35:43  Freddy Branch
POST-OP DIAGNOSIS:  Laryngeal cancer 29-Jul-2020 11:52:17  Pippa Vasquez  Malnutrition of moderate degree 29-Jul-2020 11:52:08  Pippa Vasquez  Dysphagia 28-Jul-2020 19:35:43  Freddy Branch

## 2020-07-29 NOTE — DIETITIAN INITIAL EVALUATION ADULT. - OTHER INFO
63 yo/M w/ PMHx of HLD, and laryngeal cancer with recurrence / persistence treated previously with total laryngectomy, total thyroidectomy, bilateral TE groove dissections and pec flap as well as RT (completed in October 2017) and recurrence treated with 4 cycles of keytruda. Pt underwent rigid esophagoscopy for ongoing dysphagia with a planned dilation that was deferred due to findings of intraluminal mass within the esophagus. Pt admitted due to poor nutritional intake with planned G tube insertion during admission. Pt is s/p PEG placement this morning. Pt seen in room, awake, alert, pleasant. Pt w/stoma in place. Difficult to understand some words, but he was able to write on paper to convey answers. LR running at 100cc/hr. Pt previously had PEG placed though was removed at some point over the past few months. At home pt was taking small sips of fluids, including coffee, water, smoothies, and very few small bites of pureed food. NKFA. Currently NPO, pending 24hrs post PEG to initiate EN. No complaints of N/V. Pain at PEG site endorsed. Skin intact pressure-wise. Afebrile this AM. Noted w/7kg weight loss from 1 year ago; NFPE significant for moderate protein-calorie malnutrition. Recommendations discussed w/team, who think this is cancer recurrence and pt unlikely to be able to take much PO (pleasure feeds/fluids). Will continue to follow per RD protocol.

## 2020-07-29 NOTE — PROGRESS NOTE ADULT - SUBJECTIVE AND OBJECTIVE BOX
24 hr events: NAEON.    SUBJECTIVE: Patient evaluated at bedside by Dr. Mccauley. In no apparent distress.     Vital Signs Last 24 Hrs  T(C): 36.6 (29 Jul 2020 16:51), Max: 37.3 (29 Jul 2020 00:25)  T(F): 97.8 (29 Jul 2020 16:51), Max: 99.1 (29 Jul 2020 00:25)  HR: 68 (29 Jul 2020 16:51) (50 - 82)  BP: 96/62 (29 Jul 2020 16:51) (96/55 - 142/78)  BP(mean): 80 (29 Jul 2020 12:52) (72 - 104)  RR: 17 (29 Jul 2020 16:51) (14 - 21)  SpO2: 100% (29 Jul 2020 16:51) (95% - 100%)    Physical Exam:  General: NAD  Pulmonary: Nonlabored breathing, no respiratory distress  Cardiovascular: NSR  Abdominal: soft, NT/ND, no organomegaly  Extremities: WWP, normal strength, no clubbing/cyanosis/edema  Neuro: A/O x3, no focal deficits, normal sensation  Pulses: palpable distal pulses    Lines/drains/tubes:    I&O's Summary    28 Jul 2020 07:01  -  29 Jul 2020 07:00  --------------------------------------------------------  IN: 1200 mL / OUT: 550 mL / NET: 650 mL    29 Jul 2020 07:01  -  29 Jul 2020 17:36  --------------------------------------------------------  IN: 700 mL / OUT: 200 mL / NET: 500 mL        LABS:                        11.1   3.71  )-----------( 278      ( 29 Jul 2020 05:59 )             33.9     07-29    138  |  98  |  16  ----------------------------<  126<H>  4.7   |  25  |  0.86    Ca    8.0<L>      29 Jul 2020 05:59  Phos  4.4     07-29  Mg     2.2     07-29    TPro  6.7  /  Alb  3.9  /  TBili  0.3  /  DBili  x   /  AST  17  /  ALT  7<L>  /  AlkPhos  72  07-29    PT/INR - ( 28 Jul 2020 20:02 )   PT: 13.5 sec;   INR: 1.13          PTT - ( 28 Jul 2020 20:02 )  PTT:33.5 sec    CAPILLARY BLOOD GLUCOSE        LIVER FUNCTIONS - ( 29 Jul 2020 05:59 )  Alb: 3.9 g/dL / Pro: 6.7 g/dL / ALK PHOS: 72 U/L / ALT: 7 U/L / AST: 17 U/L / GGT: x             RADIOLOGY & ADDITIONAL STUDIES:

## 2020-07-29 NOTE — PROGRESS NOTE ADULT - ASSESSMENT
65 y/o M PMH Laryngeal Ca s/p RT completed 10/2017 w/ recurrence, s/p total Laryngectomy, total thyroidectomy w/ bilateral tracheoesophageal groove dissection and pec flap (7/2019), PEG placed 6/2019, s/p removal 11/2019 presents with dysphagia. Now s/p s/p rigid esophagoscopy for progressive dysphagia and found to have intraluminal mass concerning for persistence of laryngeal cancer. Surgery consulted for PEG placement. S/p PEG tube placement 7/29/20.    - OK for liquid meds through the PEG tube today  - Ok for feeding through the tube after 24 hours.  - We will continue to follow.

## 2020-07-29 NOTE — PRE-OP CHECKLIST - SELECT TESTS ORDERED
CBC/INR/Type and Screen/EKG/PT/PTT/CXR EKG/CMP/Type and Screen/PT/PTT/CXR/INR/CBC INR/EKG/CXR/PT/PTT/Type and Screen/CMP/CBC/BMP

## 2020-07-29 NOTE — PRE-OP CHECKLIST - NS PREOP CHK MONITOR ANESTHESIA CONSENT
Alprazolam  0.5MG / Tablet  Rx# [id number] Benzodiazepine Qty: 90  Days: 30  Refills: 1 Prescribed: 2018  Dispensed: 2018 [address], [address] [place] [address]  : 1950 [address] [place] [id number]  Pay Type: Medicare
done

## 2020-07-29 NOTE — PACU DISCHARGE NOTE - COMMENTS
Discharged from PACU after meeting criteria.  Report given to 9U RASHAD Beckwith. VSS. RRWNL on room air. Laryngeal stoma capped. Cardiac monitor showing SR. Right AC HL 20g. LR 100cc/h. ABD benign. Patient tolerated apple juice. Pain meds given x 1 with good response. Patient voided freely.. Skin dry intact. Afebrile. Patient transported on bed unmonitored by transport team. One bag of belongings with patient. Security called for rest of belongings.
PACU CRITERIA MET  PATIENT CLEARED FOR DISCHARGE TO FLOOR PER ANESTHESIA

## 2020-07-29 NOTE — DIETITIAN INITIAL EVALUATION ADULT. - ENTERAL
Recommend starting Jevity 1.2 Slava @ 60mL/hr x 24hrs via PEG. Provides: 1440ml TV, 1728kcal, 80g pro, 1162ml free H2O, 144% RDI, 1.4g/kg ABW protein. Recommend starting at 10mL/hr and advancing by [id number] to goal as tolerated. Additional free H2O per team. Monitor for s/s intolerance; maintain aspiration precautions at all times.

## 2020-07-29 NOTE — DIETITIAN INITIAL EVALUATION ADULT. - ADD RECOMMEND
1. Please see EN recs above. If plan for bolus feed upon DC, recommend Jevity 1.2 Slava @ 6 cans/day (2 cans TID). 2. Monitor lytes and replete prn. POC BG q6hrs 3. Pain and bowel regimens per team 4. PO diet for pleasure per team discretion

## 2020-07-29 NOTE — CHART NOTE - NSCHARTNOTEFT_GEN_A_CORE
Upon Nutritional Assessment by the Registered Dietitian your patient was determined to meet criteria / has evidence of the following diagnosis/diagnoses:          [ ]  Mild Protein Calorie Malnutrition        [X ]  Moderate Protein Calorie Malnutrition        [ ] Severe Protein Calorie Malnutrition        [ ] Unspecified Protein Calorie Malnutrition        [ ] Underweight / BMI <19        [ ] Morbid Obesity / BMI > 40      Findings as based on:  •  Comprehensive nutrition assessment and consultation    Per physical assessment: Muscle Wasting- Temporal [ X  ]  Clavicle/Pectoral [ X  ]  Shoulder/Deltoid [ X  ]  Scapula [   ]  Interosseous [   ]  Quadriceps [   ]  Gastrocnemius [   ]; Fat Wasting- Orbital [ X  ]  Buccal [ X  ]  Triceps [  X ]  Rib [  X ]--> Suspect moderate protein-calorie malnutrition 2/2 to physical assessment, suspected inadequate energy intake of <75% for >1 month, and 11% weight loss x 1 year      Treatment:    The following diet has been recommended:  1. Recommend starting Jevity 1.2 Slava @ 60mL/hr x 24hrs via PEG. Provides: 1440ml TV, 1728kcal, 80g pro, 1162ml free H2O, 144% RDI, 1.4g/kg ABW protein. Recommend starting at 10mL/hr and advancing by [id number] to goal as tolerated. Additional free H2O per team. Monitor for s/s intolerance; maintain aspiration precautions at all times.  If plan for bolus feed upon DC, recommend Jevity 1.2 Slava @ 6 cans/day (2 cans TID).   2. Monitor lytes and replete prn. POC BG q6hrs   3. Pain and bowel regimens per team   4. PO diet for pleasure per team discretion    PROVIDER Section:     By signing this assessment you are acknowledging and agree with the diagnosis/diagnoses assigned by the Registered Dietitian    Comments: Upon Nutritional Assessment by the Registered Dietitian your patient was determined to meet criteria / has evidence of the following diagnosis/diagnoses:          [ ]  Mild Protein Calorie Malnutrition        [X ]  Moderate Protein Calorie Malnutrition        [ ] Severe Protein Calorie Malnutrition        [ ] Unspecified Protein Calorie Malnutrition        [X ] Underweight / BMI <19        [ ] Morbid Obesity / BMI > 40      Findings as based on:  •  Comprehensive nutrition assessment and consultation    Per physical assessment: Muscle Wasting- Temporal [ X  ]  Clavicle/Pectoral [ X  ]  Shoulder/Deltoid [ X  ]  Scapula [   ]  Interosseous [   ]  Quadriceps [   ]  Gastrocnemius [   ]; Fat Wasting- Orbital [ X  ]  Buccal [ X  ]  Triceps [  X ]  Rib [  X ]--> Suspect moderate protein-calorie malnutrition 2/2 to physical assessment, suspected inadequate energy intake of <75% for >1 month, and 11% weight loss x 1 year      Treatment:    The following diet has been recommended:  1. Recommend starting Jevity 1.2 Slava @ 60mL/hr x 24hrs via PEG. Provides: 1440ml TV, 1728kcal, 80g pro, 1162ml free H2O, 144% RDI, 1.4g/kg ABW protein. Recommend starting at 10mL/hr and advancing by [id number] to goal as tolerated. Additional free H2O per team. Monitor for s/s intolerance; maintain aspiration precautions at all times.  If plan for bolus feed upon DC, recommend Jevity 1.2 Slava @ 6 cans/day (2 cans TID).   2. Monitor lytes and replete prn. POC BG q6hrs   3. Pain and bowel regimens per team   4. PO diet for pleasure per team discretion    PROVIDER Section:     By signing this assessment you are acknowledging and agree with the diagnosis/diagnoses assigned by the Registered Dietitian    Comments:

## 2020-07-29 NOTE — CHART NOTE - NSCHARTNOTEFT_GEN_A_CORE
POST-OP DAY: 0 s/p PEG     SUBJECTIVE: Patient seen and examined bedside by resident. Per Dr. Vasquez, can use PEG for meds today (7/29) and feeds tomorrow (7/30).     heparin   Injectable 5000 Unit(s) SubCutaneous every 12 hours    MEDICATIONS  (PRN):  acetaminophen  IVPB .. 1000 milliGRAM(s) IV Intermittent once PRN Moderate Pain (4 - 6)  oxyCODONE    IR 5 milliGRAM(s) Oral every 6 hours PRN Severe Pain (7 - 10)      I&O's Detail    28 Jul 2020 07:01  -  29 Jul 2020 07:00  --------------------------------------------------------  IN:    lactated ringers.: 1100 mL    Oral Fluid: 100 mL  Total IN: 1200 mL    OUT:    Voided: 550 mL  Total OUT: 550 mL    Total NET: 650 mL      29 Jul 2020 07:01  -  29 Jul 2020 12:26  --------------------------------------------------------  IN:    lactated ringers.: 300 mL  Total IN: 300 mL    OUT:    Voided: 200 mL  Total OUT: 200 mL    Total NET: 100 mL          Vital Signs Last 24 Hrs  T(C): 36.4 (29 Jul 2020 12:07), Max: 37.3 (29 Jul 2020 00:25)  T(F): 97.5 (29 Jul 2020 12:07), Max: 99.1 (29 Jul 2020 00:25)  HR: 63 (29 Jul 2020 12:07) (50 - 80)  BP: 118/62 (29 Jul 2020 12:07) (106/62 - 142/78)  BP(mean): 85 (29 Jul 2020 12:07) (85 - 104)  RR: 14 (29 Jul 2020 12:07) (14 - 21)  SpO2: 98% (29 Jul 2020 12:07) (95% - 100%)    General: NAD, resting comfortably in bed  C/V: NSR  Pulm: Nonlabored breathing, no respiratory distress  Abd: soft, NT/ND, PEG in place  Extrem: WWP, no edema, SCDs in place    LABS:                        11.1   3.71  )-----------( 278      ( 29 Jul 2020 05:59 )             33.9     07-29    138  |  98  |  16  ----------------------------<  126<H>  4.7   |  25  |  0.86    Ca    8.0<L>      29 Jul 2020 05:59  Phos  4.4     07-29  Mg     2.2     07-29    TPro  6.7  /  Alb  3.9  /  TBili  0.3  /  DBili  x   /  AST  17  /  ALT  7<L>  /  AlkPhos  72  07-29    PT/INR - ( 28 Jul 2020 20:02 )   PT: 13.5 sec;   INR: 1.13          PTT - ( 28 Jul 2020 20:02 )  PTT:33.5 sec      RADIOLOGY & ADDITIONAL STUDIES:

## 2020-07-30 RX ORDER — ACETAMINOPHEN 500 MG
1000 TABLET ORAL EVERY 6 HOURS
Refills: 0 | Status: DISCONTINUED | OUTPATIENT
Start: 2020-07-30 | End: 2020-08-01

## 2020-07-30 RX ORDER — OXYCODONE HYDROCHLORIDE 5 MG/1
5 TABLET ORAL EVERY 4 HOURS
Refills: 0 | Status: DISCONTINUED | OUTPATIENT
Start: 2020-07-30 | End: 2020-08-01

## 2020-07-30 RX ORDER — ACETAMINOPHEN 500 MG
1000 TABLET ORAL ONCE
Refills: 0 | Status: COMPLETED | OUTPATIENT
Start: 2020-07-30 | End: 2020-07-30

## 2020-07-30 RX ADMIN — OXYCODONE HYDROCHLORIDE 5 MILLIGRAM(S): 5 TABLET ORAL at 15:52

## 2020-07-30 RX ADMIN — MORPHINE SULFATE 2 MILLIGRAM(S): 50 CAPSULE, EXTENDED RELEASE ORAL at 05:47

## 2020-07-30 RX ADMIN — Medication 100 MICROGRAM(S): at 05:39

## 2020-07-30 RX ADMIN — MORPHINE SULFATE 2 MILLIGRAM(S): 50 CAPSULE, EXTENDED RELEASE ORAL at 00:28

## 2020-07-30 RX ADMIN — HEPARIN SODIUM 5000 UNIT(S): 5000 INJECTION INTRAVENOUS; SUBCUTANEOUS at 18:13

## 2020-07-30 RX ADMIN — Medication 1000 MILLIGRAM(S): at 00:57

## 2020-07-30 RX ADMIN — Medication 1000 MILLIGRAM(S): at 09:36

## 2020-07-30 RX ADMIN — MORPHINE SULFATE 2 MILLIGRAM(S): 50 CAPSULE, EXTENDED RELEASE ORAL at 06:02

## 2020-07-30 RX ADMIN — MORPHINE SULFATE 2 MILLIGRAM(S): 50 CAPSULE, EXTENDED RELEASE ORAL at 00:13

## 2020-07-30 RX ADMIN — OXYCODONE HYDROCHLORIDE 5 MILLIGRAM(S): 5 TABLET ORAL at 22:00

## 2020-07-30 RX ADMIN — HEPARIN SODIUM 5000 UNIT(S): 5000 INJECTION INTRAVENOUS; SUBCUTANEOUS at 05:39

## 2020-07-30 RX ADMIN — Medication 400 MILLIGRAM(S): at 00:42

## 2020-07-30 RX ADMIN — OXYCODONE HYDROCHLORIDE 5 MILLIGRAM(S): 5 TABLET ORAL at 23:00

## 2020-07-30 NOTE — PROGRESS NOTE ADULT - SUBJECTIVE AND OBJECTIVE BOX
24 hr events: NAEON	    SUBJECTIVE: POD1 s/p PEG tube placement. Patient assessed at bedside by Dr. Lucas Mccauley. Patient lying comfortably in bed. Reposts mild sourness in the surgical site. VSS      Vital Signs Last 24 Hrs  T(C): 36.9 (30 Jul 2020 17:31), Max: 37 (30 Jul 2020 09:22)  T(F): 98.5 (30 Jul 2020 17:31), Max: 98.6 (30 Jul 2020 09:22)  HR: 67 (30 Jul 2020 17:31) (59 - 67)  BP: 119/65 (30 Jul 2020 17:31) (106/65 - 119/65)  BP(mean): --  RR: 18 (30 Jul 2020 17:31) (16 - 18)  SpO2: 96% (30 Jul 2020 17:31) (92% - 99%)    Physical Exam:  General: NAD  Pulmonary: Nonlabored breathing, no respiratory distress  Cardiovascular: NSR  Abdominal: soft, NT/ND, no organomegaly. G tube wound site C/D/I.  Extremities: WWP, normal strength, no clubbing/cyanosis/edema  Neuro: A/O x3, no focal deficits, normal sensation  Pulses: palpable distal pulses    Lines/drains/tubes:    I&O's Summary    29 Jul 2020 07:01  -  30 Jul 2020 07:00  --------------------------------------------------------  IN: 1860 mL / OUT: 1000 mL / NET: 860 mL    30 Jul 2020 07:01  -  30 Jul 2020 19:36  --------------------------------------------------------  IN: 120 mL / OUT: 1250 mL / NET: -1130 mL        LABS:                        11.1   3.71  )-----------( 278      ( 29 Jul 2020 05:59 )             33.9     07-29    138  |  98  |  16  ----------------------------<  126<H>  4.7   |  25  |  0.86    Ca    8.0<L>      29 Jul 2020 05:59  Phos  4.4     07-29  Mg     2.2     07-29    TPro  6.7  /  Alb  3.9  /  TBili  0.3  /  DBili  x   /  AST  17  /  ALT  7<L>  /  AlkPhos  72  07-29    PT/INR - ( 28 Jul 2020 20:02 )   PT: 13.5 sec;   INR: 1.13          PTT - ( 28 Jul 2020 20:02 )  PTT:33.5 sec    CAPILLARY BLOOD GLUCOSE        LIVER FUNCTIONS - ( 29 Jul 2020 05:59 )  Alb: 3.9 g/dL / Pro: 6.7 g/dL / ALK PHOS: 72 U/L / ALT: 7 U/L / AST: 17 U/L / GGT: x             RADIOLOGY & ADDITIONAL STUDIES:

## 2020-07-30 NOTE — PROGRESS NOTE ADULT - SUBJECTIVE AND OBJECTIVE BOX
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 NAEON. s/p PEG today, will start TF tomorrow at 12pm. will also start CLD for pleasure feeding, otherwise patient well .  07-30 Patient seen at bedside and discussed with attending. no acute events overnight. will begin tube feeds today begining at 12PM.     ALLERGIES  penicillin (Rash)    MEDICATIONS  (STANDING):  heparin   Injectable 5000 Unit(s) SubCutaneous every 12 hours  lactated ringers. 1000 milliLiter(s) (100 mL/Hr) IV Continuous <Continuous>  levothyroxine Injectable 100 MICROGram(s) IV Push <User Schedule>    MEDICATIONS  (PRN):  acetaminophen    Suspension .. 1000 milliGRAM(s) Oral every 6 hours PRN Mild Pain (1 - 3)  ondansetron Injectable 4 milliGRAM(s) IV Push once PRN Nausea and/or Vomiting  oxyCODONE    IR 5 milliGRAM(s) Oral every 6 hours PRN Severe Pain (7 - 10)  oxyCODONE    Solution 5 milliGRAM(s) Oral every 4 hours PRN Moderate Pain (4 - 6)      LABS                          11.1   3.71  )-----------( 278      ( 29 Jul 2020 05:59 )             33.9     07-29    138  |  98  |  16  ----------------------------<  126<H>  4.7   |  25  |  0.86    Ca    8.0<L>      29 Jul 2020 05:59  Phos  4.4     07-29  Mg     2.2     07-29    TPro  6.7  /  Alb  3.9  /  TBili  0.3  /  DBili  x   /  AST  17  /  ALT  7<L>  /  AlkPhos  72  07-29    LIVER FUNCTIONS - ( 29 Jul 2020 05:59 )  Alb: 3.9 g/dL / Pro: 6.7 g/dL / ALK PHOS: 72 U/L / ALT: 7 U/L / AST: 17 U/L / GGT: x           PT/INR - ( 28 Jul 2020 20:02 )   PT: 13.5 sec;   INR: 1.13          PTT - ( 28 Jul 2020 20:02 )  PTT:33.5 sec    INs & OUTs    07-29-20 @ 07:01  -  07-30-20 @ 07:00  --------------------------------------------------------  IN: 1860 mL / OUT: 1000 mL / NET: 860 mL    07-30-20 @ 07:01 - 07-30-20 @ 11:21  --------------------------------------------------------  IN: 120 mL / OUT: 400 mL / NET: -280 mL        RADIOLOGY AND IMAGING: reviewed    VITAL SIGNS:  ICU Vital Signs Last 24 Hrs  T(C): 37 (30 Jul 2020 09:22), Max: 37 (30 Jul 2020 09:22)  T(F): 98.6 (30 Jul 2020 09:22), Max: 98.6 (30 Jul 2020 09:22)  HR: 65 (30 Jul 2020 09:22) (50 - 82)  BP: 106/65 (30 Jul 2020 09:22) (96/55 - 119/63)  BP(mean): 80 (29 Jul 2020 12:52) (72 - 85)  ABP: --  ABP(mean): --  RR: 17 (30 Jul 2020 09:22) (14 - 20)  SpO2: 99% (30 Jul 2020 09:22) (95% - 100%)        General: AAOx3, resting in bed, comfortable  C/V: NSR RRR  Pulm: Nonlabored breathing, no respiratory distress. Bebeto tube in place  Abd: abd ATTP. PEG tube in place    A/P: 64M esophageal dilation, laryngeal Ca s/p TL w pec flap and TT, w recurrence s/p CRT + keytruda, now with likely recurrence/persistence (dilation aborted)  - start TF today 12p  - arrange PEG/bebeto tube supples and teaching  - appreciate nutrition recs  - CLD pleasure feeds  - pain PRN    ----------------------------------------------  Chase Vang MD  Resident  Department of Otolaryngology - Head and Neck Surgery  [facility]

## 2020-07-30 NOTE — PROGRESS NOTE ADULT - ASSESSMENT
3 y/o M PMH Laryngeal Ca s/p RT completed 10/2017 w/ recurrence, s/p total Laryngectomy, total thyroidectomy w/ bilateral tracheoesophageal groove dissection and pec flap (7/2019), PEG placed 6/2019, s/p removal 11/2019 presents with dysphagia. Now s/p s/p rigid esophagoscopy for progressive dysphagia and found to have intraluminal mass concerning for persistence of laryngeal cancer. Surgery consulted for PEG placement. S/p PEG tube placement 7/29/20.    Recs  - Start G tube feeds at 20 cc/hr and advance 10 cc/hr to goal  - We will continue to follow  - Plan discussed with Dr. Webster

## 2020-07-31 ENCOUNTER — TRANSCRIPTION ENCOUNTER (OUTPATIENT)
Age: 65
End: 2020-07-31

## 2020-07-31 RX ORDER — CEPHALEXIN 500 MG
5 CAPSULE ORAL
Qty: 0 | Refills: 0 | DISCHARGE

## 2020-07-31 RX ORDER — ACETAMINOPHEN 500 MG
20 TABLET ORAL
Qty: 400 | Refills: 0
Start: 2020-07-31 | End: 2020-08-04

## 2020-07-31 RX ADMIN — OXYCODONE HYDROCHLORIDE 5 MILLIGRAM(S): 5 TABLET ORAL at 04:59

## 2020-07-31 RX ADMIN — Medication 1000 MILLIGRAM(S): at 12:56

## 2020-07-31 RX ADMIN — HEPARIN SODIUM 5000 UNIT(S): 5000 INJECTION INTRAVENOUS; SUBCUTANEOUS at 05:02

## 2020-07-31 RX ADMIN — Medication 1000 MILLIGRAM(S): at 19:31

## 2020-07-31 RX ADMIN — OXYCODONE HYDROCHLORIDE 5 MILLIGRAM(S): 5 TABLET ORAL at 19:31

## 2020-07-31 RX ADMIN — OXYCODONE HYDROCHLORIDE 5 MILLIGRAM(S): 5 TABLET ORAL at 05:45

## 2020-07-31 RX ADMIN — Medication 100 MICROGRAM(S): at 05:46

## 2020-07-31 RX ADMIN — Medication 1000 MILLIGRAM(S): at 05:47

## 2020-07-31 RX ADMIN — Medication 1000 MILLIGRAM(S): at 06:53

## 2020-07-31 RX ADMIN — OXYCODONE HYDROCHLORIDE 5 MILLIGRAM(S): 5 TABLET ORAL at 12:56

## 2020-07-31 RX ADMIN — HEPARIN SODIUM 5000 UNIT(S): 5000 INJECTION INTRAVENOUS; SUBCUTANEOUS at 17:30

## 2020-07-31 NOTE — PROGRESS NOTE ADULT - ASSESSMENT
5 y/o M PMH Laryngeal Ca s/p RT completed 10/2017 w/ recurrence, s/p total Laryngectomy, total thyroidectomy w/ bilateral tracheoesophageal groove dissection and pec flap (7/2019), PEG placed 6/2019, s/p removal 11/2019 presents with dysphagia. Now s/p s/p rigid esophagoscopy for progressive dysphagia and found to have intraluminal mass concerning for persistence of laryngeal cancer. Surgery consulted for PEG placement. S/p PEG tube placement 7/29/20..    Recs  - Continue PEG tube diet as appropriate  - We will sign off  - Please reach out to Dr. Vasquez in the clinic in case the peg tube needs to be removed.  - Plan discusses with  Dr. Vasquez.

## 2020-07-31 NOTE — PROGRESS NOTE ADULT - SUBJECTIVE AND OBJECTIVE BOX
24 hr events: NAEON. Afebrile, VSS.    SUBJECTIVE: Patient assessed at bedside by Dr. Lucas Mccauley. Laying comfortably in bed. Endorses mild pain at the PEG tube site. Tolerating tube feeds at goal without and N/V.    Vital Signs Last 24 Hrs  T(C): 36.9 (31 Jul 2020 09:15), Max: 37.1 (31 Jul 2020 04:52)  T(F): 98.5 (31 Jul 2020 09:15), Max: 98.7 (31 Jul 2020 04:52)  HR: 74 (31 Jul 2020 09:15) (59 - 76)  BP: 122/79 (31 Jul 2020 09:15) (109/66 - 122/79)  BP(mean): 88 (31 Jul 2020 04:52) (88 - 88)  RR: 17 (31 Jul 2020 09:15) (17 - 18)  SpO2: 95% (31 Jul 2020 09:15) (92% - 96%)    Physical Exam:  General: NAD  Pulmonary: Nonlabored breathing, no respiratory distress  Cardiovascular: NSR  Abdominal: soft, NT/ND, no organomegaly. PEG tube site C/D/I,  Extremities: WWP, normal strength, no clubbing/cyanosis/edema  Neuro: A/O x3, no focal deficits, normal sensation  Pulses: palpable distal pulses    Lines/drains/tubes:    I&O's Summary    30 Jul 2020 07:01  -  31 Jul 2020 07:00  --------------------------------------------------------  IN: 960 mL / OUT: 2550 mL / NET: -1590 mL    31 Jul 2020 07:01  -  31 Jul 2020 11:15  --------------------------------------------------------  IN: 180 mL / OUT: 300 mL / NET: -120 mL        LABS:              CAPILLARY BLOOD GLUCOSE            RADIOLOGY & ADDITIONAL STUDIES:

## 2020-07-31 NOTE — DISCHARGE NOTE PROVIDER - NSDCFUADDINST_GEN_ALL_CORE_FT
Activity: No heavy lifting or strenuous activity. Walk as tolerated. Physical therapy per routine. Follow up 1 - 2 weeks after discharge. Call office for appointment. Call office for fever >101.5 or redness or drainage from wound.     Continue with your PEG feeds. Jevity 6 cans per day. Free water flushes in between feeds. Activity: No heavy lifting or strenuous activity. Walk as tolerated. Physical therapy per routine. Follow up 1 - 2 weeks after discharge. Call office for appointment. Call office for fever >101.5 or redness or drainage from wound.     Continue with your PEG feeds. Jevity 6 cans per day. Free water flushes in between feeds.     Take pain medications as needed. Tylenol for mild pain. Oxycodone solution for more severe pain.

## 2020-07-31 NOTE — PROGRESS NOTE ADULT - SUBJECTIVE AND OBJECTIVE BOX
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 NAEON. s/p PEG today, will start TF tomorrow at 12pm. will also start CLD for pleasure feeding, otherwise patient well .  07-30 Patient seen at bedside and discussed with attending. no acute events overnight. will begin tube feeds today begining at 12PM.   7/31:Patient seen at bedside and discussed with attending. no acute events overnight. tolerating continuous feeds at goal. Will transition to bolus feeds today    ALLERGIES  penicillin (Rash)    MEDICATIONS  (STANDING):  heparin   Injectable 5000 Unit(s) SubCutaneous every 12 hours  lactated ringers. 1000 milliLiter(s) (100 mL/Hr) IV Continuous <Continuous>  levothyroxine Injectable 100 MICROGram(s) IV Push <User Schedule>    MEDICATIONS  (PRN):  acetaminophen    Suspension .. 1000 milliGRAM(s) Oral every 6 hours PRN Mild Pain (1 - 3)  ondansetron Injectable 4 milliGRAM(s) IV Push once PRN Nausea and/or Vomiting  oxyCODONE    IR 5 milliGRAM(s) Oral every 6 hours PRN Severe Pain (7 - 10)  oxyCODONE    Solution 5 milliGRAM(s) Oral every 4 hours PRN Moderate Pain (4 - 6)    Vital Signs Last 24 Hrs  T(C): 37.1 (31 Jul 2020 04:52), Max: 37.1 (31 Jul 2020 04:52)  T(F): 98.7 (31 Jul 2020 04:52), Max: 98.7 (31 Jul 2020 04:52)  HR: 76 (31 Jul 2020 04:52) (59 - 76)  BP: 119/73 (31 Jul 2020 04:52) (109/66 - 119/73)  BP(mean): 88 (31 Jul 2020 04:52) (88 - 88)  RR: 18 (31 Jul 2020 04:52) (17 - 18)  SpO2: 94% (31 Jul 2020 04:52) (92% - 96%)    General: AAOx3, resting in bed, comfortable  C/V: NSR RRR  Pulm: Nonlabored breathing, no respiratory distress. Bebeto tube in place  Abd: abd ATTP. 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 Patient requiring PEG tube due to malnutrition, and severe dysphagia, and high risk of aspiration  - bolus feeds today, with CLD  - arrange PEG/bebeto tube supples and teaching  - appreciate nutrition recs  - CLD pleasure feeds  - pain PRN    ----------------------------------------------  Chase Vang MD  Resident  Department of Otolaryngology - Head and Neck Surgery  [facility] 64M esophageal dilation, laryngeal Ca s/p TL w pec flap and TT, w recurrence s/p CRT + keytruda, now with likely recurrence/persistence (dilation aborted)    Hospital Course:  07-29-20 @ 16:54. NAEON. s/p PEG today, will start TF tomorrow at 12pm. will also start CLD for pleasure feeding, otherwise patient well .  07-30 Patient seen at bedside and discussed with attending. no acute events overnight. will begin tube feeds today begining at 12PM.   7/31:Patient seen at bedside and discussed with attending. no acute events overnight. tolerating continuous feeds at goal. Will transition to bolus feeds today    ALLERGIES  penicillin (Rash)    MEDICATIONS  (STANDING):  heparin   Injectable 5000 Unit(s) SubCutaneous every 12 hours  lactated ringers. 1000 milliLiter(s) (100 mL/Hr) IV Continuous <Continuous>  levothyroxine Injectable 100 MICROGram(s) IV Push <User Schedule>    MEDICATIONS  (PRN):  acetaminophen    Suspension .. 1000 milliGRAM(s) Oral every 6 hours PRN Mild Pain (1 - 3)  ondansetron Injectable 4 milliGRAM(s) IV Push once PRN Nausea and/or Vomiting  oxyCODONE    IR 5 milliGRAM(s) Oral every 6 hours PRN Severe Pain (7 - 10)  oxyCODONE    Solution 5 milliGRAM(s) Oral every 4 hours PRN Moderate Pain (4 - 6)    Vital Signs Last 24 Hrs  T(C): 37.1 (31 Jul 2020 04:52), Max: 37.1 (31 Jul 2020 04:52)  T(F): 98.7 (31 Jul 2020 04:52), Max: 98.7 (31 Jul 2020 04:52)  HR: 76 (31 Jul 2020 04:52) (59 - 76)  BP: 119/73 (31 Jul 2020 04:52) (109/66 - 119/73)  BP(mean): 88 (31 Jul 2020 04:52) (88 - 88)  RR: 18 (31 Jul 2020 04:52) (17 - 18)  SpO2: 94% (31 Jul 2020 04:52) (92% - 96%)    General: AAOx3, resting in bed, comfortable  C/V: NSR RRR  Pulm: Nonlabored breathing, no respiratory distress. Bebeto tube in place  Abd: abd ATTP. 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 Patient requiring PEG tube due to malnutrition, and severe dysphagia, and high risk of aspiration  - bolus feeds today, with CLD  - arrange PEG/bebeto tube supples and teaching  - appreciate nutrition recs  - CLD pleasure feeds  - pain PRN  - length of need for PEG feeding more than 3 months    ----------------------------------------------  Chase Vang MD  Resident  Department of Otolaryngology - Head and Neck Surgery  [facility]

## 2020-07-31 NOTE — DISCHARGE NOTE PROVIDER - NSDCMRMEDTOKEN_GEN_ALL_CORE_FT
acetaminophen 160 mg/5 mL oral suspension: 20 milliliter(s) orally every 6 hours, As Needed -Mild Pain (1 - 3)   calcitriol 1 mcg/mL oral liquid: 0.5 milliliter(s) orally once a day  calcium carbonate 1250 mg/5 mL (100 mg/mL elemental calcium) oral suspension: 5 milliliter(s) orally 3 times a day  cephalexin 250 mg/5 mL oral liquid: 5 milliliter(s) orally 4 times a day  Jevity: Jevity 1.2, total of 6 cans daily. 2 cans every 3 hours during the day. Together with 50ml free water before and after each bolus.   levothyroxine 125 mcg (0.125 mg) oral tablet: 1 tab(s) orally once a day acetaminophen 160 mg/5 mL oral suspension: 20 milliliter(s) orally every 6 hours, As Needed -Mild Pain (1 - 3)   calcitriol 1 mcg/mL oral liquid: 0.5 milliliter(s) orally once a day  calcium carbonate 1250 mg/5 mL (100 mg/mL elemental calcium) oral suspension: 5 milliliter(s) orally 3 times a day  Jevity: Jevity 1.2, total of 6 cans daily. 2 cans every 3 hours during the day. Together with 50ml free water before and after each bolus.   levothyroxine 125 mcg (0.125 mg) oral tablet: 1 tab(s) orally once a day acetaminophen 160 mg/5 mL oral suspension: 20 milliliter(s) orally every 6 hours, As Needed -Mild Pain (1 - 3)   calcitriol 1 mcg/mL oral liquid: 0.5 milliliter(s) orally once a day  calcium carbonate 1250 mg/5 mL (100 mg/mL elemental calcium) oral suspension: 5 milliliter(s) orally 3 times a day  Jevity: Jevity 1.2, total of 6 cans daily. 2 cans every 3 hours during the day. Together with 50ml free water before and after each bolus.   levothyroxine 125 mcg (0.125 mg) oral tablet: 1 tab(s) orally once a day  oxyCODONE 5 mg/5 mL oral solution: 5 milliliter(s) orally every 4 hours, As Needed for pain not controlled by tylenol MDD:30mL

## 2020-07-31 NOTE — DISCHARGE NOTE PROVIDER - NSDCCPCAREPLAN_GEN_ALL_CORE_FT
PRINCIPAL DISCHARGE DIAGNOSIS  Diagnosis: S/P percutaneous endoscopic gastrostomy (PEG) tube placement  Assessment and Plan of Treatment:

## 2020-07-31 NOTE — DISCHARGE NOTE PROVIDER - HOSPITAL COURSE
64M esophageal dilation, laryngeal Ca s/p TL w pec flap and TT, w recurrence s/p CRT + keytruda, now with likely recurrence/persistence (dilation aborted)        Hospital Course:    07-29-20 @ 16:54. NAEON. s/p PEG today, will start TF tomorrow at 12pm. will also start CLD for pleasure feeding, otherwise patient well .    07-30 Patient seen at bedside and discussed with attending. no acute events overnight. will begin tube feeds today begining at 12PM.     7/31:Patient seen at bedside and discussed with attending. no acute events overnight. tolerating continuous feeds at goal. Will transition to bolus feeds today        Patient's post-operative course was uncomplicated. Diet was advanced as tolerated and pain was well controlled on medication. On day of discharge, pt deemed stable and ready to return home with plan to follow up as an outpatient.

## 2020-07-31 NOTE — DISCHARGE NOTE PROVIDER - CARE PROVIDER_API CALL
Lee Branch  OTOLARYNGOLOGY  [address] [place] [address]  Phone: [phone]  Fax: [phone]  Follow Up Time:

## 2020-08-01 ENCOUNTER — TRANSCRIPTION ENCOUNTER (OUTPATIENT)
Age: 65
End: 2020-08-01

## 2020-08-01 VITALS
DIASTOLIC BLOOD PRESSURE: 72 MMHG | TEMPERATURE: 98 F | OXYGEN SATURATION: 98 % | HEART RATE: 67 BPM | SYSTOLIC BLOOD PRESSURE: 142 MMHG | RESPIRATION RATE: 18 BRPM

## 2020-08-01 LAB
ANION GAP SERPL CALC-SCNC: 12 MMOL/L — SIGNIFICANT CHANGE UP (ref 5–17)
BUN SERPL-MCNC: 8 MG/DL — SIGNIFICANT CHANGE UP (ref 7–23)
CALCIUM SERPL-MCNC: 7.6 MG/DL — LOW (ref 8.4–10.5)
CHLORIDE SERPL-SCNC: 100 MMOL/L — SIGNIFICANT CHANGE UP (ref 96–108)
CO2 SERPL-SCNC: 29 MMOL/L — SIGNIFICANT CHANGE UP (ref 22–31)
CREAT SERPL-MCNC: 0.67 MG/DL — SIGNIFICANT CHANGE UP (ref 0.5–1.3)
GLUCOSE SERPL-MCNC: 91 MG/DL — SIGNIFICANT CHANGE UP (ref 70–99)
MAGNESIUM SERPL-MCNC: 2 MG/DL — SIGNIFICANT CHANGE UP (ref 1.6–2.6)
PHOSPHATE SERPL-MCNC: 4.6 MG/DL — HIGH (ref 2.5–4.5)
POTASSIUM SERPL-MCNC: 3.8 MMOL/L — SIGNIFICANT CHANGE UP (ref 3.5–5.3)
POTASSIUM SERPL-SCNC: 3.8 MMOL/L — SIGNIFICANT CHANGE UP (ref 3.5–5.3)
SODIUM SERPL-SCNC: 141 MMOL/L — SIGNIFICANT CHANGE UP (ref 135–145)

## 2020-08-01 RX ORDER — OXYCODONE HYDROCHLORIDE 5 MG/1
5 TABLET ORAL
Qty: 120 | Refills: 0
Start: 2020-08-01 | End: 2020-08-04

## 2020-08-01 RX ADMIN — Medication 1000 MILLIGRAM(S): at 02:45

## 2020-08-01 RX ADMIN — Medication 1000 MILLIGRAM(S): at 01:44

## 2020-08-01 RX ADMIN — Medication 1000 MILLIGRAM(S): at 07:59

## 2020-08-01 RX ADMIN — Medication 100 MICROGRAM(S): at 05:59

## 2020-08-01 RX ADMIN — Medication 1000 MILLIGRAM(S): at 08:31

## 2020-08-01 RX ADMIN — OXYCODONE HYDROCHLORIDE 5 MILLIGRAM(S): 5 TABLET ORAL at 08:00

## 2020-08-01 RX ADMIN — HEPARIN SODIUM 5000 UNIT(S): 5000 INJECTION INTRAVENOUS; SUBCUTANEOUS at 05:59

## 2020-08-01 RX ADMIN — OXYCODONE HYDROCHLORIDE 5 MILLIGRAM(S): 5 TABLET ORAL at 01:49

## 2020-08-01 RX ADMIN — OXYCODONE HYDROCHLORIDE 5 MILLIGRAM(S): 5 TABLET ORAL at 08:31

## 2020-08-01 RX ADMIN — OXYCODONE HYDROCHLORIDE 5 MILLIGRAM(S): 5 TABLET ORAL at 02:45

## 2020-08-01 NOTE — DISCHARGE NOTE NURSING/CASE MANAGEMENT/SOCIAL WORK - PATIENT PORTAL LINK FT
You can access the FollowMyHealth Patient Portal offered by [facility] by registering at the following website: [email or web address]. By joining [facility]’s FollowMyHealth portal, you will also be able to view your health information using other applications (apps) compatible with our system.

## 2020-08-01 NOTE — PROGRESS NOTE ADULT - REASON FOR ADMISSION
dysphagia and inadequate nutritional intake

## 2020-08-01 NOTE — PROGRESS NOTE ADULT - SUBJECTIVE AND OBJECTIVE BOX
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 NAEON. s/p PEG today, will start TF tomorrow at 12pm. will also start CLD for pleasure feeding, otherwise patient well .  07-30 Patient seen at bedside and discussed with attending. no acute events overnight. will begin tube feeds today begining at 12PM.   7/31:Patient seen at bedside and discussed with attending. no acute events overnight. tolerating continuous feeds at goal. Will transition to bolus feeds today  8/1: NAEON. tolerating PEG feeds at bolus with FWF. no other issues. ambulating. tolerating CLD pleasure feeds    MEDICATIONS  (STANDING):  heparin   Injectable 5000 Unit(s) SubCutaneous every 12 hours  levothyroxine Injectable 100 MICROGram(s) IV Push <User Schedule>    MEDICATIONS  (PRN):  acetaminophen    Suspension .. 1000 milliGRAM(s) Oral every 6 hours PRN Mild Pain (1 - 3)  ondansetron Injectable 4 milliGRAM(s) IV Push once PRN Nausea and/or Vomiting  oxyCODONE    IR 5 milliGRAM(s) Oral every 6 hours PRN Severe Pain (7 - 10)  oxyCODONE    Solution 5 milliGRAM(s) Oral every 4 hours PRN Moderate Pain (4 - 6)    Vital Signs Last 24 Hrs  T(C): 36.7 (01 Aug 2020 09:04), Max: 37.1 (31 Jul 2020 16:54)  T(F): 98 (01 Aug 2020 09:04), Max: 98.7 (31 Jul 2020 16:54)  HR: 70 (01 Aug 2020 09:04) (61 - 74)  BP: 119/73 (01 Aug 2020 09:04) (96/60 - 119/73)  BP(mean): --  RR: 18 (01 Aug 2020 09:04) (16 - 18)  SpO2: 96% (01 Aug 2020 09:04) (96% - 97%)    General: AAOx3, resting in bed, comfortable  C/V: NSR RRR  Pulm: Nonlabored breathing, no respiratory distress. Bebeto tube in place  Abd: abd ATTP. 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 Patient requiring PEG tube due to malnutrition, and severe dysphagia, and high risk of aspiration  - dc home today w feeds  - arrange PEG/bebeto tube supples and teaching  - appreciate nutrition recs  - CLD pleasure feeds  - pain PRN  - length of need for PEG feeding more than 3 months    ----------------------------------------------  Chase Vang MD  Resident  Department of Otolaryngology - Head and Neck Surgery  [facility]

## 2020-08-03 PROCEDURE — 86850 RBC ANTIBODY SCREEN: CPT

## 2020-08-03 PROCEDURE — 85730 THROMBOPLASTIN TIME PARTIAL: CPT

## 2020-08-03 PROCEDURE — 86901 BLOOD TYPING SEROLOGIC RH(D): CPT

## 2020-08-03 PROCEDURE — 36415 COLL VENOUS BLD VENIPUNCTURE: CPT

## 2020-08-03 PROCEDURE — 84134 ASSAY OF PREALBUMIN: CPT

## 2020-08-03 PROCEDURE — C1889: CPT

## 2020-08-03 PROCEDURE — 93005 ELECTROCARDIOGRAM TRACING: CPT

## 2020-08-03 PROCEDURE — 84100 ASSAY OF PHOSPHORUS: CPT

## 2020-08-03 PROCEDURE — 83735 ASSAY OF MAGNESIUM: CPT

## 2020-08-03 PROCEDURE — L8699: CPT

## 2020-08-03 PROCEDURE — 85027 COMPLETE CBC AUTOMATED: CPT

## 2020-08-03 PROCEDURE — 85610 PROTHROMBIN TIME: CPT

## 2020-08-03 PROCEDURE — 80048 BASIC METABOLIC PNL TOTAL CA: CPT

## 2020-08-03 PROCEDURE — 80053 COMPREHEN METABOLIC PANEL: CPT

## 2020-08-04 LAB — TSH SERPL-ACNC: 0.21 UIU/ML

## 2020-08-05 DIAGNOSIS — Z53.09 PROCEDURE AND TREATMENT NOT CARRIED OUT BECAUSE OF OTHER CONTRAINDICATION: ICD-10-CM

## 2020-08-05 DIAGNOSIS — C32.9 MALIGNANT NEOPLASM OF LARYNX, UNSPECIFIED: ICD-10-CM

## 2020-08-05 DIAGNOSIS — Z88.0 ALLERGY STATUS TO PENICILLIN: ICD-10-CM

## 2020-08-05 DIAGNOSIS — R13.10 DYSPHAGIA, UNSPECIFIED: ICD-10-CM

## 2020-08-05 DIAGNOSIS — Z92.21 PERSONAL HISTORY OF ANTINEOPLASTIC CHEMOTHERAPY: ICD-10-CM

## 2020-08-05 DIAGNOSIS — E44.0 MODERATE PROTEIN-CALORIE MALNUTRITION: ICD-10-CM

## 2020-08-06 DIAGNOSIS — Z87.891 PERSONAL HISTORY OF NICOTINE DEPENDENCE: ICD-10-CM

## 2020-08-06 DIAGNOSIS — E89.0 POSTPROCEDURAL HYPOTHYROIDISM: ICD-10-CM

## 2020-08-10 ENCOUNTER — APPOINTMENT (OUTPATIENT)
Dept: OTOLARYNGOLOGY | Facility: CLINIC | Age: 65
End: 2020-08-10
Payer: MEDICARE

## 2020-08-10 VITALS
WEIGHT: 134 LBS | SYSTOLIC BLOOD PRESSURE: 106 MMHG | DIASTOLIC BLOOD PRESSURE: 69 MMHG | TEMPERATURE: 97.9 F | BODY MASS INDEX: 21.03 KG/M2 | HEIGHT: 67 IN | HEART RATE: 74 BPM

## 2020-08-10 PROCEDURE — 99213 OFFICE O/P EST LOW 20 MIN: CPT

## 2020-08-10 NOTE — HISTORY OF PRESENT ILLNESS
[de-identified] : 64M with larynx cancer s/p definitive RT completed in October 2017 with recurrence. He is s/p 7/2/19 total laryngectomy, total thyroidectomy with bilateral tracheoesophageal groove dissection and pec flap. 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 Finished CRT October 2019 with [place]/Farhan. PEG removed 11/04/19. \par \par 1/17/20 PET: FDG uptake in the right surgical bed centered around the presumed right pharyngeal anastomotic clip at the level of the right common carotid artery bifurcation. While this may reflect post surgical or post radiation change, the focality of this uptake is suspicious for residual disease/local recurrence. \par \par 1/25/20 CT neck: there is vague and ill-defined enhancement surrounding right anastomotic clip, corresponding to area of FDG uptake on recent PET/CT. The appearance is felt nonspecific.\par \par 3/3/20 MRI neck: persistent enhancement surrounds a right upper anastomotic clip, with T2-w imaging best showing rounded and masslike approximately 2 x 3 cm tissue of differing signal compared to adjacent posttreatment change. Appearance is worrisome for tumor recurrence.\par \par 3/27/20 CT guided biopsy: positive for squamous cell carcinoma.\par \par Pt is on 5FU + cisplatin + Keytruda. Pt continues to report dysphagia requiring multiple swallows to move food along, difficulty moving food along with his tongue, feels tongue is swollen. Reports weight loss. He previously reported shooting pain from jaw to the head and difficulty sleeping. Reports gabapentin not effective so it was discontinued. Uses Tylenol and Motrin with partial relief. Denies fever, cough. No issues with laryngectomy. Using electrolarynx today to communicate. \par \par 7/2/20 MBS: functional oropharyngeal swallow. \par \par Attempted esophageal dilation on 7/28/20 was aborted 2/2 of disease progression in the esophagus with partial obstruction of the lumen, and PEG was inserted.  Pt continues to have right neck and jaw pain alleviated by oxycodone.  He is NPO on Jevity 6 cans/day. He has an appointment with Dr. Sanders tomorrow at [facility] to evaluate for eligibility for a trial and an appointment with Farhan on 8/12.

## 2020-08-10 NOTE — PHYSICAL EXAM
[Edentulous] : edentulous [Normal] : orientation to person, place, and time: normal [de-identified] : healthy laryngeal stoma, tied in place, no discharge, erythema, bleeding, or edema. atrophic, fibrotic neck [de-identified] : mild deviation of tongue, good tongue mobility, no oral tongue edema appreciated

## 2020-08-10 NOTE — ASSESSMENT
[FreeTextEntry1] : 64M with recurrent larynx ca treated with definitive RT in October 2017, s/p total laryngectomy 7/2/19, completed CRT October 2019, completed 4 cycles of Keytruda with disease progression, now with extension of disease into esophagus, PEG placed.\par \par Plan:\par - Consult with Marilyn at [facility] tomorrow to check trial eligibility\par - F/u Futuri.\par - If tumor regresses with a trial, we then can evaluate for PO nutrition.\par - Pt verbalized understanding of above.

## 2020-08-21 ENCOUNTER — RX RENEWAL (OUTPATIENT)
Age: 65
End: 2020-08-21

## 2020-08-21 RX ORDER — ATORVASTATIN CALCIUM 40 MG/1
40 TABLET, FILM COATED ORAL
Qty: 90 | Refills: 0 | Status: ACTIVE | COMMUNITY
Start: 2019-10-18 | End: 1900-01-01

## 2020-08-25 ENCOUNTER — RX RENEWAL (OUTPATIENT)
Age: 65
End: 2020-08-25

## 2020-08-28 ENCOUNTER — APPOINTMENT (OUTPATIENT)
Dept: INTERNAL MEDICINE | Facility: CLINIC | Age: 65
End: 2020-08-28
Payer: MEDICARE

## 2020-08-28 VITALS
DIASTOLIC BLOOD PRESSURE: 70 MMHG | HEIGHT: 67 IN | SYSTOLIC BLOOD PRESSURE: 111 MMHG | RESPIRATION RATE: 14 BRPM | TEMPERATURE: 98.7 F | BODY MASS INDEX: 20.09 KG/M2 | HEART RATE: 83 BPM | WEIGHT: 128 LBS | OXYGEN SATURATION: 97 %

## 2020-08-28 DIAGNOSIS — Z01.818 ENCOUNTER FOR OTHER PREPROCEDURAL EXAMINATION: ICD-10-CM

## 2020-08-28 PROCEDURE — 99213 OFFICE O/P EST LOW 20 MIN: CPT

## 2020-08-29 ENCOUNTER — LABORATORY RESULT (OUTPATIENT)
Age: 65
End: 2020-08-29

## 2020-08-31 ENCOUNTER — TRANSCRIPTION ENCOUNTER (OUTPATIENT)
Age: 65
End: 2020-08-31

## 2020-08-31 VITALS
HEART RATE: 91 BPM | OXYGEN SATURATION: 98 % | DIASTOLIC BLOOD PRESSURE: 70 MMHG | SYSTOLIC BLOOD PRESSURE: 107 MMHG | RESPIRATION RATE: 16 BRPM | WEIGHT: 124.12 LBS | HEIGHT: 70 IN | TEMPERATURE: 97 F

## 2020-08-31 NOTE — ASU PATIENT PROFILE, ADULT - PMH
Fracture  left ankle  Jaundice  1966  Laryngeal mass  s/ p radiation  Larynx cancer    SOB (shortness of breath)    Throat cancer

## 2020-08-31 NOTE — ASU PATIENT PROFILE, ADULT - PSH
H/O laryngectomy    H/O thyroidectomy    H/O tracheostomy    PEG (percutaneous endoscopic gastrostomy) status    Status post surgery  inguinal hernia  Status post surgery  Left ankle surgical repair  x2 (1965)  Surgery, elective  direct laryngoscopy with biopsy- 4/2017

## 2020-09-01 ENCOUNTER — OUTPATIENT (OUTPATIENT)
Dept: OUTPATIENT SERVICES | Facility: HOSPITAL | Age: 65
LOS: 1 days | Discharge: HOME | End: 2020-09-01
Payer: MEDICARE

## 2020-09-01 ENCOUNTER — APPOINTMENT (OUTPATIENT)
Dept: OTOLARYNGOLOGY | Facility: HOSPITAL | Age: 65
End: 2020-09-01

## 2020-09-01 ENCOUNTER — RESULT REVIEW (OUTPATIENT)
Age: 65
End: 2020-09-01

## 2020-09-01 VITALS
HEART RATE: 66 BPM | OXYGEN SATURATION: 96 % | SYSTOLIC BLOOD PRESSURE: 120 MMHG | DIASTOLIC BLOOD PRESSURE: 73 MMHG | TEMPERATURE: 98 F | RESPIRATION RATE: 17 BRPM

## 2020-09-01 DIAGNOSIS — Z98.890 OTHER SPECIFIED POSTPROCEDURAL STATES: Chronic | ICD-10-CM

## 2020-09-01 DIAGNOSIS — Z41.9 ENCOUNTER FOR PROCEDURE FOR PURPOSES OTHER THAN REMEDYING HEALTH STATE, UNSPECIFIED: Chronic | ICD-10-CM

## 2020-09-01 DIAGNOSIS — Z93.1 GASTROSTOMY STATUS: Chronic | ICD-10-CM

## 2020-09-01 DIAGNOSIS — Z90.09 ACQUIRED ABSENCE OF OTHER PART OF HEAD AND NECK: Chronic | ICD-10-CM

## 2020-09-01 DIAGNOSIS — Z90.02 ACQUIRED ABSENCE OF LARYNX: Chronic | ICD-10-CM

## 2020-09-01 PROCEDURE — 43193 ESOPHAGOSCP RIG TRNSO BIOPSY: CPT | Mod: GC

## 2020-09-01 PROCEDURE — 88305 TISSUE EXAM BY PATHOLOGIST: CPT | Mod: 26

## 2020-09-01 PROCEDURE — 43193 ESOPHAGOSCP RIG TRNSO BIOPSY: CPT

## 2020-09-01 PROCEDURE — 71045 X-RAY EXAM CHEST 1 VIEW: CPT

## 2020-09-01 PROCEDURE — 88305 TISSUE EXAM BY PATHOLOGIST: CPT

## 2020-09-01 PROCEDURE — 71045 X-RAY EXAM CHEST 1 VIEW: CPT | Mod: 26

## 2020-09-01 RX ORDER — OXYCODONE HYDROCHLORIDE 5 MG/1
5 TABLET ORAL EVERY 6 HOURS
Refills: 0 | Status: DISCONTINUED | OUTPATIENT
Start: 2020-09-01 | End: 2020-09-01

## 2020-09-01 RX ORDER — CALCITRIOL 0.5 UG/1
0.5 CAPSULE ORAL
Qty: 0 | Refills: 0 | DISCHARGE

## 2020-09-01 RX ORDER — SODIUM CHLORIDE 9 MG/ML
1000 INJECTION, SOLUTION INTRAVENOUS
Refills: 0 | Status: DISCONTINUED | OUTPATIENT
Start: 2020-09-01 | End: 2020-09-01

## 2020-09-01 RX ORDER — LEVOTHYROXINE SODIUM 125 MCG
1 TABLET ORAL
Qty: 0 | Refills: 0 | DISCHARGE

## 2020-09-01 RX ORDER — CALCIUM CARBONATE 500(1250)
5 TABLET ORAL
Qty: 0 | Refills: 0 | DISCHARGE

## 2020-09-01 RX ORDER — ACETAMINOPHEN 500 MG
650 TABLET ORAL EVERY 6 HOURS
Refills: 0 | Status: DISCONTINUED | OUTPATIENT
Start: 2020-09-01 | End: 2020-09-01

## 2020-09-01 RX ADMIN — OXYCODONE HYDROCHLORIDE 5 MILLIGRAM(S): 5 TABLET ORAL at 18:15

## 2020-09-01 RX ADMIN — Medication 650 MILLIGRAM(S): at 17:30

## 2020-09-01 RX ADMIN — Medication 650 MILLIGRAM(S): at 18:15

## 2020-09-01 RX ADMIN — OXYCODONE HYDROCHLORIDE 5 MILLIGRAM(S): 5 TABLET ORAL at 17:30

## 2020-09-09 PROBLEM — C32.9 MALIGNANT NEOPLASM OF LARYNX, UNSPECIFIED: Chronic | Status: ACTIVE | Noted: 2020-08-31

## 2020-09-09 LAB — SURGICAL PATHOLOGY STUDY: SIGNIFICANT CHANGE UP

## 2020-09-11 ENCOUNTER — LABORATORY RESULT (OUTPATIENT)
Age: 65
End: 2020-09-11

## 2020-09-14 ENCOUNTER — OUTPATIENT (OUTPATIENT)
Dept: OUTPATIENT SERVICES | Facility: HOSPITAL | Age: 65
LOS: 1 days | End: 2020-09-14
Payer: MEDICARE

## 2020-09-14 ENCOUNTER — APPOINTMENT (OUTPATIENT)
Dept: INTERVENTIONAL RADIOLOGY/VASCULAR | Facility: HOSPITAL | Age: 65
End: 2020-09-14
Payer: MEDICARE

## 2020-09-14 ENCOUNTER — RESULT REVIEW (OUTPATIENT)
Age: 65
End: 2020-09-14

## 2020-09-14 DIAGNOSIS — Z93.1 GASTROSTOMY STATUS: Chronic | ICD-10-CM

## 2020-09-14 DIAGNOSIS — Z98.890 OTHER SPECIFIED POSTPROCEDURAL STATES: Chronic | ICD-10-CM

## 2020-09-14 DIAGNOSIS — Z90.02 ACQUIRED ABSENCE OF LARYNX: Chronic | ICD-10-CM

## 2020-09-14 DIAGNOSIS — Z90.09 ACQUIRED ABSENCE OF OTHER PART OF HEAD AND NECK: Chronic | ICD-10-CM

## 2020-09-14 DIAGNOSIS — Z41.9 ENCOUNTER FOR PROCEDURE FOR PURPOSES OTHER THAN REMEDYING HEALTH STATE, UNSPECIFIED: Chronic | ICD-10-CM

## 2020-09-14 PROCEDURE — 99152 MOD SED SAME PHYS/QHP 5/>YRS: CPT

## 2020-09-14 PROCEDURE — 99153 MOD SED SAME PHYS/QHP EA: CPT

## 2020-09-14 PROCEDURE — 77001 FLUOROGUIDE FOR VEIN DEVICE: CPT | Mod: 26

## 2020-09-14 PROCEDURE — 77001 FLUOROGUIDE FOR VEIN DEVICE: CPT

## 2020-09-14 PROCEDURE — 76937 US GUIDE VASCULAR ACCESS: CPT | Mod: 26

## 2020-09-14 PROCEDURE — 36561 INSERT TUNNELED CV CATH: CPT

## 2020-09-14 PROCEDURE — 76937 US GUIDE VASCULAR ACCESS: CPT

## 2020-09-14 PROCEDURE — C1788: CPT

## 2020-09-14 PROCEDURE — C1769: CPT

## 2020-10-05 ENCOUNTER — APPOINTMENT (OUTPATIENT)
Dept: INTERNAL MEDICINE | Facility: CLINIC | Age: 65
End: 2020-10-05
Payer: MEDICARE

## 2020-10-05 ENCOUNTER — LABORATORY RESULT (OUTPATIENT)
Age: 65
End: 2020-10-05

## 2020-10-05 VITALS
TEMPERATURE: 98.6 F | HEART RATE: 89 BPM | BODY MASS INDEX: 20.05 KG/M2 | WEIGHT: 128 LBS | OXYGEN SATURATION: 96 % | SYSTOLIC BLOOD PRESSURE: 104 MMHG | DIASTOLIC BLOOD PRESSURE: 67 MMHG

## 2020-10-05 DIAGNOSIS — Z43.0 ENCOUNTER FOR ATTENTION TO TRACHEOSTOMY: ICD-10-CM

## 2020-10-05 DIAGNOSIS — Z13.220 ENCOUNTER FOR SCREENING FOR LIPOID DISORDERS: ICD-10-CM

## 2020-10-05 DIAGNOSIS — D64.9 ANEMIA, UNSPECIFIED: ICD-10-CM

## 2020-10-05 DIAGNOSIS — R11.0 NAUSEA: ICD-10-CM

## 2020-10-05 PROCEDURE — 99214 OFFICE O/P EST MOD 30 MIN: CPT | Mod: GC,25

## 2020-10-05 PROCEDURE — 36415 COLL VENOUS BLD VENIPUNCTURE: CPT | Mod: GC

## 2020-10-05 RX ORDER — ONDANSETRON 4 MG/5ML
4 SOLUTION ORAL 3 TIMES DAILY
Qty: 450 | Refills: 0 | Status: ACTIVE | COMMUNITY
Start: 2020-10-05 | End: 1900-01-01

## 2020-10-05 RX ORDER — ONDANSETRON 4 MG/5ML
4 SOLUTION ORAL 3 TIMES DAILY
Qty: 150 | Refills: 0 | Status: COMPLETED | COMMUNITY
Start: 2020-10-05

## 2020-10-06 PROBLEM — Z43.0 ATTENTION TO TRACHEOSTOMY: Status: ACTIVE | Noted: 2018-03-26

## 2020-10-06 PROBLEM — Z13.220 LIPID SCREENING: Status: ACTIVE | Noted: 2019-08-26

## 2020-10-12 LAB
ALBUMIN SERPL ELPH-MCNC: 3.9 G/DL
ALP BLD-CCNC: 73 U/L
ALT SERPL-CCNC: 24 U/L
ANION GAP SERPL CALC-SCNC: 12 MMOL/L
AST SERPL-CCNC: 34 U/L
BASOPHILS # BLD AUTO: 0.05 K/UL
BASOPHILS NFR BLD AUTO: 0.5 %
BILIRUB SERPL-MCNC: 0.5 MG/DL
BUN SERPL-MCNC: 15 MG/DL
CALCIUM SERPL-MCNC: 8.6 MG/DL
CHLORIDE SERPL-SCNC: 92 MMOL/L
CO2 SERPL-SCNC: 31 MMOL/L
CREAT SERPL-MCNC: 0.59 MG/DL
EOSINOPHIL # BLD AUTO: 0.03 K/UL
EOSINOPHIL NFR BLD AUTO: 0.3 %
GLUCOSE SERPL-MCNC: 85 MG/DL
HCT VFR BLD CALC: 35.4 %
HGB BLD-MCNC: 11.1 G/DL
IMM GRANULOCYTES NFR BLD AUTO: 0.5 %
LYMPHOCYTES # BLD AUTO: 0.54 K/UL
LYMPHOCYTES NFR BLD AUTO: 5 %
MAGNESIUM SERPL-MCNC: 2.1 MG/DL
MAN DIFF?: NORMAL
MCHC RBC-ENTMCNC: 30.3 PG
MCHC RBC-ENTMCNC: 31.4 GM/DL
MCV RBC AUTO: 96.7 FL
MONOCYTES # BLD AUTO: 0.77 K/UL
MONOCYTES NFR BLD AUTO: 7.2 %
NEUTROPHILS # BLD AUTO: 9.31 K/UL
NEUTROPHILS NFR BLD AUTO: 86.5 %
PHOSPHATE SERPL-MCNC: 4.4 MG/DL
PLATELET # BLD AUTO: 513 K/UL
POTASSIUM SERPL-SCNC: 4.8 MMOL/L
PROT SERPL-MCNC: 6.7 G/DL
RBC # BLD: 3.66 M/UL
RBC # FLD: 14 %
SODIUM SERPL-SCNC: 134 MMOL/L
TSH SERPL-ACNC: 56.4 UIU/ML
WBC # FLD AUTO: 10.75 K/UL

## 2020-10-13 ENCOUNTER — RX RENEWAL (OUTPATIENT)
Age: 65
End: 2020-10-13

## 2020-10-13 NOTE — ED ADULT NURSE NOTE - NSFALLRSKPASTHIST_ED_ALL_ED
"Daily progress note    Chief complaint  Doing much better  No new complaints  Tolerating diet  No nausea vomiting  Family at bedside    History of present illness  75-year-old white male with history of seizure Parkinson disease and severe dementia other medical problem hypothyroidism gastroesophageal disease and obstructive sleep apnea who is a nursing home resident brought to the emergency room with choking episode while eating pork steak required Heimlich maneuver with partial success by EMS.  Patient further treated in ER with good air movement admit for management.  Patient is demented unable to give history most of history obtained from the wife nursing staff old record.  Patient evaluated for management and still having difficulty swallowing but no other complaint.  Patient denies any fever cough shortness of breath chest pain abdominal pain nausea vomiting diarrhea.      REVIEW OF SYSTEMS  All systems reviewed and negative except for those discussed in HPI.      PHYSICAL EXAM  Blood pressure 113/55, pulse 52, temperature 97.6 °F (36.4 °C), temperature source Oral, resp. rate 18, height 180.3 cm (71\"), weight 81 kg (178 lb 9.2 oz), SpO2 93 %.    GENERAL: Elderly male with O2 sat of 100% on 2-1/2 L.  He has an occasional cough.  He has very mild crackles in the base of his lungs bilaterally no acute distress.Vital signs on my initial evaluation unremarkable  HENT: nares patent  Head/neck/ face are symmetric without gross deformity, signs of trauma, or swelling.  Oral pharyngeal exam.  I do not see any foreign body.  He has no airway swelling or discharge.  EYES: no scleral icterus, no conjunctival pallor.  NECK: Supple, no meningismus  CV: regular rhythm, regular rate with intact distal pulses.  No murmur rub  RESPIRATORY: normal effort and no respiratory distress.  Occasional cough with some crackles in the base of his lungs bilaterally that are mild.  ABDOMEN: soft and non-tender.  MUSCULOSKELETAL: no " deformity.  Intact distal pulses no edema  NEURO: alert and appropriate, moves all extremities, follows commands.  Patient is alert and oriented.  He has diffuse weakness with some muscle rigidity.  He has En that is pretty consistent with Parkinson's disease as he has a tremor.  SKIN: warm, dry     LAB RESULTS  Lab Results (last 24 hours)     Procedure Component Value Units Date/Time    Vitamin B12 [[id number]]  (Normal) Collected: 10/13/20 0609    Specimen: Blood Updated: 10/13/20 0954     Vitamin B-12 504 pg/mL     Narrative:      Results may be falsely increased if patient taking Biotin.      BNP [[id number]]  (Normal) Collected: 10/13/20 0609    Specimen: Blood Updated: 10/13/20 0756     proBNP 89.9 pg/mL     Narrative:      Among patients with dyspnea, NT-proBNP is highly sensitive for the detection of acute congestive heart failure. In addition NT-proBNP of <300 pg/ml effectively rules out acute congestive heart failure with 99% negative predictive value.    Results may be falsely decreased if patient taking Biotin.      TSH [[id number]]  (Normal) Collected: 10/13/20 0609    Specimen: Blood Updated: 10/13/20 0717     TSH 2.570 uIU/mL     Comprehensive Metabolic Panel [[id number]]  (Abnormal) Collected: 10/13/20 0609    Specimen: Blood Updated: 10/13/20 0713     Glucose 88 mg/dL      BUN 13 mg/dL      Creatinine 0.91 mg/dL      Sodium 135 mmol/L      Potassium 4.2 mmol/L      Chloride 101 mmol/L      CO2 28.5 mmol/L      Calcium 8.2 mg/dL      Total Protein 5.9 g/dL      Albumin 3.50 g/dL      ALT (SGPT) <5 U/L      AST (SGOT) 11 U/L      Alkaline Phosphatase 58 U/L      Total Bilirubin 0.6 mg/dL      eGFR Non African Amer 81 mL/min/1.73      Globulin 2.4 gm/dL      A/G Ratio 1.5 g/dL      BUN/Creatinine Ratio 14.3     Anion Gap 5.5 mmol/L     Narrative:      GFR Normal >60  Chronic Kidney Disease <60  Kidney Failure <15      Lipid Panel [[id number]] Collected: 10/13/20 0609    Specimen: Blood Updated:  10/13/20 0713     Total Cholesterol 149 mg/dL      Triglycerides 83 mg/dL      HDL Cholesterol 40 mg/dL      LDL Cholesterol  93 mg/dL      VLDL Cholesterol 16 mg/dL      LDL/HDL Ratio 2.31    Narrative:      Cholesterol Reference Ranges  (U.S. Department of Health and Human Services ATP III Classifications)    Desirable          <200 mg/dL  Borderline High    200-239 mg/dL  High Risk          >240 mg/dL      Triglyceride Reference Ranges  (U.S. Department of Health and Human Services ATP III Classifications)    Normal           <150 mg/dL  Borderline High  150-199 mg/dL  High             200-499 mg/dL  Very High        >500 mg/dL    HDL Reference Ranges  (U.S. Department of Health and Human Services ATP III Classifcations)    Low     <40 mg/dl (major risk factor for CHD)  High    >60 mg/dl ('negative' risk factor for CHD)        LDL Reference Ranges  (U.S. Department of Health and Human Services ATP III Classifcations)    Optimal          <100 mg/dL  Near Optimal     100-129 mg/dL  Borderline High  130-159 mg/dL  High             160-189 mg/dL  Very High        >189 mg/dL    Hemoglobin A1c [[id number]]  (Normal) Collected: 10/13/20 0609    Specimen: Blood Updated: 10/13/20 0652     Hemoglobin A1C 5.00 %     Narrative:      Hemoglobin A1C Ranges:    Increased Risk for Diabetes  5.7% to 6.4%  Diabetes                     >= 6.5%  Diabetic Goal                < 7.0%    CBC & Differential [[id number]]  (Abnormal) Collected: 10/13/20 0609    Specimen: Blood Updated: 10/13/20 0639    Narrative:      The following orders were created for panel order CBC & Differential.  Procedure                               Abnormality         Status                     ---------                               -----------         ------                     CBC Auto Differential[[id number]]        Abnormal            Final result                 Please view results for these tests on the individual orders.    CBC Auto Differential  [[id number]]  (Abnormal) Collected: 10/13/20 0609    Specimen: Blood Updated: 10/13/20 0639     WBC 10.45 10*3/mm3      RBC 3.38 10*6/mm3      Hemoglobin 10.0 g/dL      Hematocrit 31.7 %      MCV 93.8 fL      MCH 29.6 pg      MCHC 31.5 g/dL      RDW 12.5 %      RDW-SD 43.1 fl      MPV 10.2 fL      Platelets 268 10*3/mm3      Neutrophil % 76.7 %      Lymphocyte % 15.0 %      Monocyte % 5.7 %      Eosinophil % 2.0 %      Basophil % 0.3 %      Immature Grans % 0.3 %      Neutrophils, Absolute 8.01 10*3/mm3      Lymphocytes, Absolute 1.57 10*3/mm3      Monocytes, Absolute 0.60 10*3/mm3      Eosinophils, Absolute 0.21 10*3/mm3      Basophils, Absolute 0.03 10*3/mm3      Immature Grans, Absolute 0.03 10*3/mm3      nRBC 0.0 /100 WBC     Blood Culture - Blood, Arm, Left [[id number]] Collected: 10/11/20 1725    Specimen: Blood from Arm, Left Updated: 10/12/20 1745     Blood Culture No growth at 24 hours    Blood Culture - Blood, Arm, Right [[id number]] Collected: 10/11/20 1724    Specimen: Blood from Arm, Right Updated: 10/12/20 1745     Blood Culture No growth at 24 hours        Imaging Results (Last 24 Hours)     Procedure Component Value Units Date/Time    FL Esophagram Complete Single Contrast [[id number]] Collected: 10/12/20 1514     Updated: 10/12/20 1642    Narrative:      ESOPHAGRAM 10/12/2020     HISTORY: Difficulty swallowing.     FINDINGS:   image of the chest shows minimal infiltrate or  atelectasis in the right infrahilar region. This appears similar to the  chest radiograph from 10/11/2020.     Single contrast esophagram was performed. Patient had limited mobility.     No aspiration was visualized during ingestion of the barium. There is  mild narrowing of the distal esophagus for a length of approximately 3  cm at the GE junction. The stricture appears slightly irregular The  barium passes rather readily into the stomach. The proximal to mid  esophagus distends normally.     No gastroesophageal reflux or  hiatal hernia is seen. Stomach appears  unremarkable though is incompletely opacified.       Impression:      1. Short segment of mild irregular narrowing involving the distal  esophagus. Contrast passes readily into the stomach.  2. This could be further evaluated with upper endoscopy if clinically  indicated or a follow-up esophagram may be helpful.  3. No other significant findings are noted.      Fluoroscopy time 52 seconds, 88 images.     This report was finalized on 10/12/2020 4:39 PM by Dr. Wood Vargas M.D.             Current Facility-Administered Medications:   •  carbidopa-levodopa CR (SINEMET CR)  MG per CR tablet 2 tablet, 2 tablet, Oral, BID, Darrell Kc MD, 2 tablet at 10/13/20 0820  •  donepezil (ARICEPT) tablet 5 mg, 5 mg, Oral, Nightly, Darrell Kc MD, 5 mg at 10/12/20 2239  •  escitalopram (LEXAPRO) tablet 10 mg, 10 mg, Oral, Daily, Darrell Kc MD, 10 mg at 10/13/20 0820  •  ipratropium-albuterol (DUO-NEB) nebulizer solution 3 mL, 3 mL, Nebulization, Q4H PRN, Darrell Kc MD  •  levothyroxine (SYNTHROID, LEVOTHROID) tablet 125 mcg, 125 mcg, Oral, Daily, Darrell Kc MD, 125 mcg at 10/13/20 0820  •  NIFEdipine XL (PROCARDIA XL) 24 hr tablet 30 mg, 30 mg, Oral, Daily, Darrell Kc MD, 30 mg at 10/13/20 0820  •  ondansetron (ZOFRAN) injection 4 mg, 4 mg, Intravenous, Q6H PRN, Darrell Kc MD  •  pantoprazole (PROTONIX) injection 40 mg, 40 mg, Intravenous, Q12H, Darrell Kc MD, 40 mg at 10/13/20 0820  •  piperacillin-tazobactam (ZOSYN) 3.375 g in iso-osmotic dextrose 50 ml (premix), 3.375 g, Intravenous, Q8H, Darrell Kc MD, 3.375 g at 10/13/20 0925  •  QUEtiapine (SEROquel) tablet 25 mg, 25 mg, Oral, Nightly, Darrell Kc MD, 25 mg at 10/12/20 2239  •  terazosin (HYTRIN) capsule 1 mg, 1 mg, Oral, Nightly, Darrell Kc MD, 1 mg at 10/12/20 2240  •  vitamin B-12 (CYANOCOBALAMIN) tablet 1,000 mcg, 1,000 mcg, Oral, Daily, Darrell Kc MD, 1,000 mcg at 10/13/20 0820          ASSESSMENT  Choking episode at nursing home with dysphagia  Distal esophageal narrowing  Possible aspiration with pneumonia  Chronic atrial fibrillation on Xarelto  Severe Parkinson disease  Progressive supranuclear palsy  Severe dementia  Hypothyroidism  Gastroesophageal reflux disease    PLAN  CPM  IV Protonix  Hold Xarelto  Empiric antibiotics  GI consult appreciated  Upper endoscopy in a.m.  Adjust nursing home medications  Stress ulcer DVT prophylaxis  Supportive care  DNR  Discussed with family and nursing staff  Follow closely further recommendation according to hospital course    KANIKA ESCOBEDO MD     no

## 2020-10-14 DIAGNOSIS — Z79.891 LONG TERM (CURRENT) USE OF OPIATE ANALGESIC: ICD-10-CM

## 2020-10-19 ENCOUNTER — APPOINTMENT (OUTPATIENT)
Dept: OTOLARYNGOLOGY | Facility: CLINIC | Age: 65
End: 2020-10-19
Payer: MEDICARE

## 2020-10-19 PROCEDURE — 99214 OFFICE O/P EST MOD 30 MIN: CPT

## 2020-10-20 NOTE — ASSESSMENT
[FreeTextEntry1] : 64M with recurrent larynx ca treated with definitive RT in October 2017, s/p total laryngectomy 7/2/19, completed CRT October 2019, completed 4 cycles of Keytruda with disease progression, now with extension of disease into esophagus, PEG placed, on 5FU/carbo/Keytruda.\par \par Plan:\par - SLP evaluation today.\par - Maintain NPO. Continue nutrition/hydration via PEG.\par - Continue systemic therapy with Dr. Real.\par - Discontinue oxycodone 2/2 short term relief.\par - Start Duragesic 50mg/72hr patch. Instructed on use. Discussed RBAR of chronic opiate use.  Pt denies any adverse effects at this time. Advised on using meds as directed, storing meds in a secure place, counseled against mixing with ETOH/benzos due to additive effects (denies ETOH/benzos). \par - Urine tox for drug monitoring.\par - Pt verbalized understanding of above. \par \par

## 2020-10-20 NOTE — PHYSICAL EXAM
[Normal] : orientation to person, place, and time: normal [de-identified] : healthy laryngeal stoma, tied in place, no discharge, erythema, bleeding, or edema. atrophic, fibrotic neck [FreeTextEntry2] : lower face edema bilaterally

## 2020-10-20 NOTE — ADDENDUM
[FreeTextEntry1] : Speech Pathology:\par \par Pt seen in conjunction with Dr. Branch in clinic.\par \par Briefly, pt with h/o laryngeal Ca s/p CRT in 2017 with recurrence, now s/p total laryngectomy, total thyroidectomy with b/l TE groove dissection and pec flap reconstruction on 7/2/19.  Pt then had adjuvant CRT completed Oct 2019.  CT-guided Bx in Mar 2020 showed persistent disease at surgical site.  Chemo/immunotherapy recommended.\par \par Pt then complained of dysphagia during his follow-up visit in June 2020 with reported difficulty with oral transit of bolus and occasional regurgitation.  MBSS performed on 7/2/20 showed functional oropharyngeal and esophageal swallow.  Given pt's persistent dysphagia and mouth/throat pain complaints, esophageal dilation was attempted on 7/28/20, however, this was aborted 2/2 disease progression in esophagus partially obstructing lumen.  Pt underwent PEG placement and had been NPO since.\par \par Today, pt awake and alert, pleasant.  He communicated via writing and mouthing words.  Pt reporting level 8/10 pain in the mouth and throat region.  Lower face also appeared very edematous.  Pt noted dysphagia has progressed that he now even has difficulty swallowing saliva.  Pt given po trials of small sips of water today, however, patient with regurgitation and expectoration after the swallow of each bolus.  Pt noted "it won't go down."  No further trials provided.  Plan is for pt to continue with PEG for nutrition/hydration and continue with chemo/immunotherapy.  Pt also encouraged again to use electrolarynx for communication. \par \par Boaz Davis, MS, CCC-SLP, BCS-S\par Supervisor, Speech Pathology

## 2020-10-20 NOTE — HISTORY OF PRESENT ILLNESS
[de-identified] : 64M with larynx cancer s/p definitive RT completed in October 2017 with recurrence. He is s/p 7/2/19 total laryngectomy, total thyroidectomy with bilateral tracheoesophageal groove dissection and pec flap. 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 Finished CRT October 2019 with [place]ckie/Futuri. PEG removed 11/04/19. \par \par 1/17/20 PET: FDG uptake in the right surgical bed centered around the presumed right pharyngeal anastomotic clip at the level of the right common carotid artery bifurcation. While this may reflect post surgical or post radiation change, the focality of this uptake is suspicious for residual disease/local recurrence. \par \par 1/25/20 CT neck: there is vague and ill-defined enhancement surrounding right anastomotic clip, corresponding to area of FDG uptake on recent PET/CT. The appearance is felt nonspecific.\par \par 3/3/20 MRI neck: persistent enhancement surrounds a right upper anastomotic clip, with T2-w imaging best showing rounded and masslike approximately 2 x 3 cm tissue of differing signal compared to adjacent posttreatment change. Appearance is worrisome for tumor recurrence.\par \par 3/27/20 CT guided biopsy: positive for squamous cell carcinoma.\par \par He underwent 4 cycles of Keytruda (last cycle on 6/10/2020) with progression of disease.\par \par Pt reported dysphagia requiring multiple swallows to move food along, difficulty moving food along with his tongue, feels tongue is swollen.  7/2/20 MBS was performed showing functional oropharyngeal swallow. \par \par Attempted esophageal dilation on 7/28/20 was aborted 2/2 of disease progression in the esophagus with partial obstruction of the lumen, and PEG was inserted. He was referred to Marilyn at [facility] to discuss possible trials.  An esophageal biopsy was performed 9/1/20 to obtain tissue for [facility] testing, which did not show any treatable mutations.  He was restarted on systemic therapy - 5FU/carbo/Keytruda.\par \par Pt presents today for follow up.  He is NPO on Jevity 6 cans/day.  He is sad that he cannot eat.  He is communicating by writing and mouthing words.  He continues to have 8-10/10 VAS right neck and jaw pain temporarily alleviated by oxycodone. He has lower facial edema.

## 2020-10-23 ENCOUNTER — LABORATORY RESULT (OUTPATIENT)
Age: 65
End: 2020-10-23

## 2020-10-23 ENCOUNTER — APPOINTMENT (OUTPATIENT)
Dept: INTERNAL MEDICINE | Facility: CLINIC | Age: 65
End: 2020-10-23
Payer: MEDICARE

## 2020-10-23 VITALS
WEIGHT: 128 LBS | SYSTOLIC BLOOD PRESSURE: 117 MMHG | HEIGHT: 67 IN | OXYGEN SATURATION: 99 % | RESPIRATION RATE: 15 BRPM | TEMPERATURE: 100.1 F | BODY MASS INDEX: 20.09 KG/M2 | HEART RATE: 87 BPM | DIASTOLIC BLOOD PRESSURE: 71 MMHG

## 2020-10-23 DIAGNOSIS — D72.829 ELEVATED WHITE BLOOD CELL COUNT, UNSPECIFIED: ICD-10-CM

## 2020-10-23 DIAGNOSIS — R50.9 FEVER, UNSPECIFIED: ICD-10-CM

## 2020-10-23 LAB
AMPHET UR-MCNC: NEGATIVE
BARBITURATES UR-MCNC: NEGATIVE
BENZODIAZ UR-MCNC: NEGATIVE
COCAINE METAB.OTHER UR-MCNC: NEGATIVE
CREATININE, URINE: 26.4 MG/DL
METHADONE UR-MCNC: NEGATIVE
METHAQUALONE UR-MCNC: NEGATIVE
OPIATES UR-MCNC: NEGATIVE
PCP UR-MCNC: NEGATIVE
PROPOXYPH UR QL: NEGATIVE
THC UR QL: NEGATIVE

## 2020-10-23 PROCEDURE — 36415 COLL VENOUS BLD VENIPUNCTURE: CPT

## 2020-10-23 PROCEDURE — 99214 OFFICE O/P EST MOD 30 MIN: CPT | Mod: GC,25

## 2020-10-26 ENCOUNTER — NON-APPOINTMENT (OUTPATIENT)
Age: 65
End: 2020-10-26

## 2020-10-26 DIAGNOSIS — L85.3 XEROSIS CUTIS: ICD-10-CM

## 2020-10-26 DIAGNOSIS — M79.89 OTHER SPECIFIED SOFT TISSUE DISORDERS: ICD-10-CM

## 2020-10-26 DIAGNOSIS — L60.8 OTHER NAIL DISORDERS: ICD-10-CM

## 2020-10-26 LAB
BASOPHILS # BLD AUTO: 0.04 K/UL
BASOPHILS NFR BLD AUTO: 0.9 %
EOSINOPHIL # BLD AUTO: 0 K/UL
EOSINOPHIL NFR BLD AUTO: 0 %
HCT VFR BLD CALC: 30.1 %
HGB BLD-MCNC: 9.5 G/DL
LYMPHOCYTES # BLD AUTO: 0.21 K/UL
LYMPHOCYTES NFR BLD AUTO: 4.4 %
MAN DIFF?: NORMAL
MCHC RBC-ENTMCNC: 31.6 GM/DL
MCHC RBC-ENTMCNC: 31.7 PG
MCV RBC AUTO: 100.3 FL
MONOCYTES # BLD AUTO: 0.17 K/UL
MONOCYTES NFR BLD AUTO: 3.5 %
NEUTROPHILS # BLD AUTO: 4.4 K/UL
NEUTROPHILS NFR BLD AUTO: 91.2 %
PLATELET # BLD AUTO: 172 K/UL
RBC # BLD: 3 M/UL
RBC # FLD: 16.5 %
T3 SERPL-MCNC: 54 NG/DL
TSH SERPL-ACNC: 43.6 UIU/ML
WBC # FLD AUTO: 4.83 K/UL

## 2020-10-26 RX ORDER — WHITE PETROLATUM 1.75 OZ
OINTMENT TOPICAL 3 TIMES DAILY
Qty: 1 | Refills: 0 | Status: ACTIVE | COMMUNITY
Start: 2020-10-26 | End: 1900-01-01

## 2020-10-28 RX ORDER — FENTANYL 50 UG/H
50 PATCH, EXTENDED RELEASE TRANSDERMAL
Qty: 5 | Refills: 0 | Status: DISCONTINUED | COMMUNITY
Start: 2020-10-19 | End: 2020-10-28

## 2020-10-28 RX ORDER — DOCUSATE SODIUM 50 MG/5ML
50 LIQUID ORAL EVERY 6 HOURS
Qty: 500 | Refills: 1 | Status: ACTIVE | COMMUNITY
Start: 2020-10-28 | End: 1900-01-01

## 2020-10-29 LAB
OXYCODONE, URINE: 238 NG/ML
OXYMORPHONE, URINE: 930 NG/ML

## 2020-11-02 ENCOUNTER — APPOINTMENT (OUTPATIENT)
Dept: SURGERY | Facility: CLINIC | Age: 65
End: 2020-11-02
Payer: MEDICARE

## 2020-11-02 VITALS
WEIGHT: 127 LBS | OXYGEN SATURATION: 98 % | DIASTOLIC BLOOD PRESSURE: 72 MMHG | HEIGHT: 67 IN | HEART RATE: 84 BPM | SYSTOLIC BLOOD PRESSURE: 129 MMHG | TEMPERATURE: 97.2 F | BODY MASS INDEX: 19.93 KG/M2

## 2020-11-02 DIAGNOSIS — Z87.891 PERSONAL HISTORY OF NICOTINE DEPENDENCE: ICD-10-CM

## 2020-11-02 DIAGNOSIS — F17.210 NICOTINE DEPENDENCE, CIGARETTES, UNCOMPLICATED: ICD-10-CM

## 2020-11-02 DIAGNOSIS — Z90.02 ACQUIRED ABSENCE OF LARYNX: ICD-10-CM

## 2020-11-02 DIAGNOSIS — Z93.1 GASTROSTOMY STATUS: ICD-10-CM

## 2020-11-02 DIAGNOSIS — Z78.9 OTHER SPECIFIED HEALTH STATUS: ICD-10-CM

## 2020-11-02 DIAGNOSIS — J38.7 OTHER DISEASES OF LARYNX: ICD-10-CM

## 2020-11-02 DIAGNOSIS — Z87.898 PERSONAL HISTORY OF OTHER SPECIFIED CONDITIONS: ICD-10-CM

## 2020-11-02 DIAGNOSIS — Z80.9 FAMILY HISTORY OF MALIGNANT NEOPLASM, UNSPECIFIED: ICD-10-CM

## 2020-11-02 DIAGNOSIS — R13.10 DYSPHAGIA, UNSPECIFIED: ICD-10-CM

## 2020-11-02 PROCEDURE — 99213 OFFICE O/P EST LOW 20 MIN: CPT

## 2020-11-02 RX ORDER — DEXAMETHASONE SODIUM PHOSPHATE 10 MG/ML
10 INJECTION INTRAMUSCULAR; INTRAVENOUS
Qty: 2 | Refills: 0 | Status: ACTIVE | COMMUNITY
Start: 2020-09-21

## 2020-11-02 RX ORDER — PEMBROLIZUMAB 25 MG/ML
100 INJECTION, SOLUTION INTRAVENOUS
Qty: 8 | Refills: 0 | Status: ACTIVE | COMMUNITY
Start: 2020-09-21

## 2020-11-02 RX ORDER — DIPHENHYDRAMINE HYDROCHLORIDE 50 MG/ML
50 INJECTION, SOLUTION INTRAMUSCULAR; INTRAVENOUS
Qty: 1 | Refills: 0 | Status: ACTIVE | COMMUNITY
Start: 2020-09-21

## 2020-11-02 RX ORDER — CETIRIZINE HYDROCHLORIDE 10 MG/1
10 TABLET, COATED ORAL
Qty: 15 | Refills: 0 | Status: ACTIVE | COMMUNITY
Start: 2020-09-30

## 2020-11-02 RX ORDER — CARBOPLATIN 10 MG/ML
150 INJECTION, SOLUTION INTRAVENOUS
Qty: 60 | Refills: 0 | Status: ACTIVE | COMMUNITY
Start: 2020-09-21

## 2020-11-02 RX ORDER — ONDANSETRON 2 MG/ML
40 INJECTION INTRAMUSCULAR; INTRAVENOUS
Qty: 20 | Refills: 0 | Status: ACTIVE | COMMUNITY
Start: 2020-09-21

## 2020-11-02 NOTE — ASSESSMENT
[FreeTextEntry1] : Mr. Alfonso is a 64-year-old man who underwent an EGD with PEG placement on June 28, 2019, with subsequent removal of the PEG on November 4, 2019.  He underwent an EGD with PEG placement on July 29, 2020.  He is recovering as expected and will follow up with me as needed or if/when he is ready for the PEG to be removed.

## 2020-11-02 NOTE — PHYSICAL EXAM
[Calm] : calm [de-identified] : NAD, comfortable [de-identified] : NCAT, no scleral icterus.  Stoma in place. [de-identified] : soft NT ND.  No hepatosplenomegaly.  PEG in place without erythema or induration around the site.   [de-identified] : No clubbing, cyanosis, or edema. [de-identified] : Warm, dry. [de-identified] : A&Ox3

## 2020-11-02 NOTE — HISTORY OF PRESENT ILLNESS
[de-identified] : Mr. Alfonso presented previously with recurrent laryngeal cancer, for which he underwent a total laryngectomy with reconstruction on July 2, 2019.  He underwent an EGD with PEG placement on June 28, 2019.  The PEG was removed on November 4, 2019.  He then had a subsequent recurrence of his laryngeal cancer.  He underwent repeat EGD with PEG placement on July 29, 2020. [de-identified] : He presented today for a follow up visit.  He continues to use the PEG exclusively for nutrition.  He denied nausea, vomiting, diarrhea, constipation, or issues with the PEG tube at this time.

## 2020-11-18 ENCOUNTER — APPOINTMENT (OUTPATIENT)
Dept: INTERNAL MEDICINE | Facility: CLINIC | Age: 65
End: 2020-11-18

## 2020-11-20 ENCOUNTER — APPOINTMENT (OUTPATIENT)
Dept: ENDOCRINOLOGY | Facility: CLINIC | Age: 65
End: 2020-11-20
Payer: MEDICARE

## 2020-11-20 VITALS
SYSTOLIC BLOOD PRESSURE: 109 MMHG | WEIGHT: 128 LBS | DIASTOLIC BLOOD PRESSURE: 71 MMHG | HEART RATE: 67 BPM | BODY MASS INDEX: 20.05 KG/M2

## 2020-11-20 DIAGNOSIS — E89.2 POSTPROCEDURAL HYPOPARATHYROIDISM: ICD-10-CM

## 2020-11-20 PROCEDURE — 99204 OFFICE O/P NEW MOD 45 MIN: CPT

## 2020-11-20 RX ORDER — ALPRAZOLAM 0.5 MG/1
0.5 TABLET ORAL
Qty: 2 | Refills: 0 | Status: ACTIVE | COMMUNITY
Start: 2020-06-09

## 2020-11-20 RX ORDER — AMOXICILLIN AND CLAVULANATE POTASSIUM 875; 125 MG/1; MG/1
875-125 TABLET, COATED ORAL
Qty: 28 | Refills: 0 | Status: DISCONTINUED | COMMUNITY
Start: 2020-09-30 | End: 2020-11-20

## 2020-11-20 RX ORDER — HYDROXYZINE HYDROCHLORIDE 10 MG/1
10 TABLET ORAL
Qty: 30 | Refills: 0 | Status: ACTIVE | COMMUNITY
Start: 2020-05-19

## 2020-11-20 NOTE — ASSESSMENT
[FreeTextEntry1] : Hypothyroidism. He was biochemically hypothyroid after missing at least one week of his levothyroxine. His weight-based dose is approximately 93 mcg. We reviewed proper use and compliance with levothyroxine. \par Continue levothyroxine 112 mcg daily; repeat TSH in 1-2 weeks for monitoring\par \par Any Martin, Phone [phone]\par \par CC:\par Dr. Isabel Real, Fax [phone]

## 2020-11-20 NOTE — PHYSICAL EXAM
[Alert] : alert [No Acute Distress] : no acute distress [Normal Sclera/Conjunctiva] : normal sclera/conjunctiva [No Neck Mass] : no neck mass was observed [No LAD] : no lymphadenopathy [No Stigmata of Cushings Syndrome] : no stigmata of Cushings Syndrome [Normal Gait] : normal gait [Acanthosis Nigricans] : no acanthosis nigricans [Normal Insight/Judgement] : insight and judgment were intact [de-identified] : no palpable thyroid tissue [de-identified] : no moon facies, no supraclavicular fat pads

## 2020-11-20 NOTE — HISTORY OF PRESENT ILLNESS
[FreeTextEntry1] : Mr. 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 He is accompanied by his cousin, Any. He is unable to speak but communicated by writing down relevant history. \par \par Hypothyroidism.\par He is status post total thyroidectomy in July 2019. \par He was taking levothyroxine 125 mcg daily, adjusted to 112 mcg daily in May 2020. \par He is crushing his levothyroxine with warm water and administering through the percutaneous endoscopic gastrostomy tube. He is waiting at east one hour prior to administration of any other mediations or tube feeds. \par He lost his levothyroxine and was off therapy for at least one week in October. TSH was 56.40 uIU/mL, dropping to 43.60 uIU/mL one week after restarting therapy. \par \par He has been receiving chemotherapy with fluorouracil, carboplatin, pembrolizumab.

## 2020-11-23 ENCOUNTER — MED ADMIN CHARGE (OUTPATIENT)
Age: 65
End: 2020-11-23

## 2020-11-23 ENCOUNTER — APPOINTMENT (OUTPATIENT)
Dept: INTERNAL MEDICINE | Facility: CLINIC | Age: 65
End: 2020-11-23
Payer: MEDICARE

## 2020-11-23 VITALS
OXYGEN SATURATION: 100 % | WEIGHT: 128 LBS | HEIGHT: 67 IN | HEART RATE: 70 BPM | DIASTOLIC BLOOD PRESSURE: 63 MMHG | TEMPERATURE: 98.6 F | RESPIRATION RATE: 14 BRPM | BODY MASS INDEX: 20.09 KG/M2 | SYSTOLIC BLOOD PRESSURE: 112 MMHG

## 2020-11-23 DIAGNOSIS — E89.0 POSTPROCEDURAL HYPOTHYROIDISM: ICD-10-CM

## 2020-11-23 DIAGNOSIS — Z92.29 PERSONAL HISTORY OF OTHER DRUG THERAPY: ICD-10-CM

## 2020-11-23 DIAGNOSIS — Z23 ENCOUNTER FOR IMMUNIZATION: ICD-10-CM

## 2020-11-23 DIAGNOSIS — C32.9 MALIGNANT NEOPLASM OF LARYNX, UNSPECIFIED: ICD-10-CM

## 2020-11-23 DIAGNOSIS — Z90.09 ACQUIRED ABSENCE OF OTHER PART OF HEAD AND NECK: ICD-10-CM

## 2020-11-23 PROCEDURE — 90662 IIV NO PRSV INCREASED AG IM: CPT

## 2020-11-23 PROCEDURE — G0009: CPT

## 2020-11-23 PROCEDURE — 90732 PPSV23 VACC 2 YRS+ SUBQ/IM: CPT

## 2020-11-23 PROCEDURE — 99214 OFFICE O/P EST MOD 30 MIN: CPT | Mod: GC,25

## 2020-11-23 PROCEDURE — G0008: CPT

## 2020-11-30 ENCOUNTER — APPOINTMENT (OUTPATIENT)
Dept: INTERNAL MEDICINE | Facility: CLINIC | Age: 65
End: 2020-11-30

## 2020-12-10 ENCOUNTER — RX RENEWAL (OUTPATIENT)
Age: 65
End: 2020-12-10

## 2020-12-14 ENCOUNTER — RX RENEWAL (OUTPATIENT)
Age: 65
End: 2020-12-14

## 2020-12-18 ENCOUNTER — OUTPATIENT (OUTPATIENT)
Dept: OUTPATIENT SERVICES | Facility: HOSPITAL | Age: 65
LOS: 1 days | End: 2020-12-18
Payer: MEDICARE

## 2020-12-18 DIAGNOSIS — Z93.1 GASTROSTOMY STATUS: Chronic | ICD-10-CM

## 2020-12-18 DIAGNOSIS — Z41.9 ENCOUNTER FOR PROCEDURE FOR PURPOSES OTHER THAN REMEDYING HEALTH STATE, UNSPECIFIED: Chronic | ICD-10-CM

## 2020-12-18 DIAGNOSIS — Z90.09 ACQUIRED ABSENCE OF OTHER PART OF HEAD AND NECK: Chronic | ICD-10-CM

## 2020-12-18 DIAGNOSIS — Z98.890 OTHER SPECIFIED POSTPROCEDURAL STATES: Chronic | ICD-10-CM

## 2020-12-18 DIAGNOSIS — Z90.02 ACQUIRED ABSENCE OF LARYNX: Chronic | ICD-10-CM

## 2020-12-18 LAB — GLUCOSE BLDC GLUCOMTR-MCNC: 86 MG/DL — SIGNIFICANT CHANGE UP (ref 70–99)

## 2020-12-18 PROCEDURE — A9552: CPT

## 2020-12-18 PROCEDURE — 82962 GLUCOSE BLOOD TEST: CPT

## 2020-12-18 PROCEDURE — 78815 PET IMAGE W/CT SKULL-THIGH: CPT | Mod: 26,MH

## 2020-12-18 PROCEDURE — 78815 PET IMAGE W/CT SKULL-THIGH: CPT

## 2021-01-11 DIAGNOSIS — T40.2X5A DRUG INDUCED CONSTIPATION: ICD-10-CM

## 2021-01-11 DIAGNOSIS — K59.03 DRUG INDUCED CONSTIPATION: ICD-10-CM

## 2021-01-11 RX ORDER — OXYCODONE HYDROCHLORIDE 5 MG/5ML
5 SOLUTION ORAL
Qty: 420 | Refills: 0 | Status: DISCONTINUED | COMMUNITY
Start: 2020-08-06 | End: 2021-01-11

## 2021-01-11 RX ORDER — ACETAMINOPHEN 325 MG/1
TABLET, FILM COATED ORAL
Refills: 0 | Status: DISCONTINUED | COMMUNITY
End: 2021-01-11

## 2021-01-11 RX ORDER — SENNOSIDES 8.8 MG/5ML
8.8 LIQUID ORAL
Qty: 1 | Refills: 0 | Status: ACTIVE | COMMUNITY
Start: 2021-01-11 | End: 1900-01-01

## 2021-01-24 NOTE — PROGRESS NOTE ADULT - REASON FOR ADMISSION
S/p laryngectomy, thyroidectomy and parathyrdoidectomy
S?p larnygectomy, thyroidectomy and parathyroidectomy
recurrence of larnygeal carcinoma
Recurrence of SCC of vocal cords
Recurrent laryngeal CA
Recurrent laryngeal cancer
Total laryngectomy
s/p larnygectomy, thyroidectomy and parathyroidectomy
s/p laryngectomy, parathyroidectomy and thyroidectomy
s/p laryngectomy, thyroidectomy and parathyroidectomy
hypocalcemia
Additional Progress Note...

## 2021-01-26 ENCOUNTER — INPATIENT (INPATIENT)
Facility: HOSPITAL | Age: 66
LOS: 0 days | Discharge: HOME | DRG: 303 | End: 2021-01-26
Attending: INTERNAL MEDICINE | Admitting: INTERNAL MEDICINE
Payer: COMMERCIAL

## 2021-01-26 ENCOUNTER — APPOINTMENT (OUTPATIENT)
Dept: PALLIATIVE MEDICINE | Facility: CLINIC | Age: 66
End: 2021-01-26

## 2021-01-26 ENCOUNTER — TRANSCRIPTION ENCOUNTER (OUTPATIENT)
Age: 66
End: 2021-01-26

## 2021-01-26 VITALS
TEMPERATURE: 98 F | HEART RATE: 62 BPM | SYSTOLIC BLOOD PRESSURE: 98 MMHG | RESPIRATION RATE: 17 BRPM | OXYGEN SATURATION: 97 % | DIASTOLIC BLOOD PRESSURE: 61 MMHG

## 2021-01-26 VITALS
OXYGEN SATURATION: 90 % | HEIGHT: 70 IN | DIASTOLIC BLOOD PRESSURE: 92 MMHG | RESPIRATION RATE: 24 BRPM | HEART RATE: 94 BPM | SYSTOLIC BLOOD PRESSURE: 152 MMHG

## 2021-01-26 DIAGNOSIS — Z98.890 OTHER SPECIFIED POSTPROCEDURAL STATES: Chronic | ICD-10-CM

## 2021-01-26 DIAGNOSIS — C32.9 MALIGNANT NEOPLASM OF LARYNX, UNSPECIFIED: ICD-10-CM

## 2021-01-26 DIAGNOSIS — Z41.9 ENCOUNTER FOR PROCEDURE FOR PURPOSES OTHER THAN REMEDYING HEALTH STATE, UNSPECIFIED: Chronic | ICD-10-CM

## 2021-01-26 DIAGNOSIS — Z90.02 ACQUIRED ABSENCE OF LARYNX: Chronic | ICD-10-CM

## 2021-01-26 DIAGNOSIS — E03.9 HYPOTHYROIDISM, UNSPECIFIED: ICD-10-CM

## 2021-01-26 DIAGNOSIS — Z90.09 ACQUIRED ABSENCE OF OTHER PART OF HEAD AND NECK: Chronic | ICD-10-CM

## 2021-01-26 DIAGNOSIS — R94.31 ABNORMAL ELECTROCARDIOGRAM [ECG] [EKG]: ICD-10-CM

## 2021-01-26 DIAGNOSIS — Z93.1 GASTROSTOMY STATUS: Chronic | ICD-10-CM

## 2021-01-26 LAB
ALBUMIN SERPL ELPH-MCNC: 4.6 G/DL — SIGNIFICANT CHANGE UP (ref 3.3–5)
ALP SERPL-CCNC: 99 U/L — SIGNIFICANT CHANGE UP (ref 40–120)
ALT FLD-CCNC: 16 U/L — SIGNIFICANT CHANGE UP (ref 10–45)
ANION GAP SERPL CALC-SCNC: 14 MMOL/L — SIGNIFICANT CHANGE UP (ref 5–17)
ANISOCYTOSIS BLD QL: SIGNIFICANT CHANGE UP
APPEARANCE UR: CLEAR — SIGNIFICANT CHANGE UP
APTT BLD: 49.7 SEC — HIGH (ref 27.5–35.5)
AST SERPL-CCNC: 30 U/L — SIGNIFICANT CHANGE UP (ref 10–40)
BASOPHILS # BLD AUTO: 0 K/UL — SIGNIFICANT CHANGE UP (ref 0–0.2)
BASOPHILS NFR BLD AUTO: 0 % — SIGNIFICANT CHANGE UP (ref 0–2)
BILIRUB SERPL-MCNC: 0.4 MG/DL — SIGNIFICANT CHANGE UP (ref 0.2–1.2)
BILIRUB UR-MCNC: NEGATIVE — SIGNIFICANT CHANGE UP
BUN SERPL-MCNC: 12 MG/DL — SIGNIFICANT CHANGE UP (ref 7–23)
CALCIUM SERPL-MCNC: 9.4 MG/DL — SIGNIFICANT CHANGE UP (ref 8.4–10.5)
CHLORIDE SERPL-SCNC: 85 MMOL/L — LOW (ref 96–108)
CK SERPL-CCNC: 182 U/L — SIGNIFICANT CHANGE UP (ref 30–200)
CO2 SERPL-SCNC: 32 MMOL/L — HIGH (ref 22–31)
COLOR SPEC: YELLOW — SIGNIFICANT CHANGE UP
CREAT SERPL-MCNC: 0.47 MG/DL — LOW (ref 0.5–1.3)
CRP SERPL-MCNC: 5.17 MG/DL — HIGH (ref 0–0.4)
D DIMER BLD IA.RAPID-MCNC: 287 NG/ML DDU — HIGH
DACRYOCYTES BLD QL SMEAR: SLIGHT — SIGNIFICANT CHANGE UP
DIFF PNL FLD: NEGATIVE — SIGNIFICANT CHANGE UP
EOSINOPHIL # BLD AUTO: 0 K/UL — SIGNIFICANT CHANGE UP (ref 0–0.5)
EOSINOPHIL NFR BLD AUTO: 0 % — SIGNIFICANT CHANGE UP (ref 0–6)
FERRITIN SERPL-MCNC: 201 NG/ML — SIGNIFICANT CHANGE UP (ref 30–400)
FIBRINOGEN PPP-MCNC: 634 MG/DL — HIGH (ref 258–438)
GIANT PLATELETS BLD QL SMEAR: PRESENT — SIGNIFICANT CHANGE UP
GLUCOSE SERPL-MCNC: 141 MG/DL — HIGH (ref 70–99)
GLUCOSE UR QL: NEGATIVE — SIGNIFICANT CHANGE UP
HCT VFR BLD CALC: 34 % — LOW (ref 39–50)
HGB BLD-MCNC: 11.2 G/DL — LOW (ref 13–17)
HIV 1+2 AB+HIV1 P24 AG SERPL QL IA: SIGNIFICANT CHANGE UP
HYPOCHROMIA BLD QL: SLIGHT — SIGNIFICANT CHANGE UP
INR BLD: 1.08 — SIGNIFICANT CHANGE UP (ref 0.88–1.16)
KETONES UR-MCNC: NEGATIVE — SIGNIFICANT CHANGE UP
LACTATE SERPL-SCNC: 2.1 MMOL/L — HIGH (ref 0.5–2)
LDH SERPL L TO P-CCNC: 298 U/L — HIGH (ref 50–242)
LEUKOCYTE ESTERASE UR-ACNC: NEGATIVE — SIGNIFICANT CHANGE UP
LYMPHOCYTES # BLD AUTO: 0.38 K/UL — LOW (ref 1–3.3)
LYMPHOCYTES # BLD AUTO: 3.5 % — LOW (ref 13–44)
MACROCYTES BLD QL: SIGNIFICANT CHANGE UP
MANUAL SMEAR VERIFICATION: SIGNIFICANT CHANGE UP
MCHC RBC-ENTMCNC: 32.9 GM/DL — SIGNIFICANT CHANGE UP (ref 32–36)
MCHC RBC-ENTMCNC: 36.1 PG — HIGH (ref 27–34)
MCV RBC AUTO: 109.7 FL — HIGH (ref 80–100)
MONOCYTES # BLD AUTO: 1.4 K/UL — HIGH (ref 0–0.9)
MONOCYTES NFR BLD AUTO: 13 % — SIGNIFICANT CHANGE UP (ref 2–14)
NEUTROPHILS # BLD AUTO: 8.92 K/UL — HIGH (ref 1.8–7.4)
NEUTROPHILS NFR BLD AUTO: 82.6 % — HIGH (ref 43–77)
NITRITE UR-MCNC: NEGATIVE — SIGNIFICANT CHANGE UP
NT-PROBNP SERPL-SCNC: 131 PG/ML — SIGNIFICANT CHANGE UP (ref 0–300)
OVALOCYTES BLD QL SMEAR: SLIGHT — SIGNIFICANT CHANGE UP
PH UR: 7.5 — SIGNIFICANT CHANGE UP (ref 5–8)
PLAT MORPH BLD: ABNORMAL
PLATELET # BLD AUTO: 436 K/UL — HIGH (ref 150–400)
POIKILOCYTOSIS BLD QL AUTO: SLIGHT — SIGNIFICANT CHANGE UP
POLYCHROMASIA BLD QL SMEAR: SLIGHT — SIGNIFICANT CHANGE UP
POTASSIUM SERPL-MCNC: 3.9 MMOL/L — SIGNIFICANT CHANGE UP (ref 3.5–5.3)
POTASSIUM SERPL-SCNC: 3.9 MMOL/L — SIGNIFICANT CHANGE UP (ref 3.5–5.3)
PROCALCITONIN SERPL-MCNC: 0.08 NG/ML — SIGNIFICANT CHANGE UP (ref 0.02–0.1)
PROT SERPL-MCNC: 9.1 G/DL — HIGH (ref 6–8.3)
PROT UR-MCNC: NEGATIVE MG/DL — SIGNIFICANT CHANGE UP
PROTHROM AB SERPL-ACNC: 12.9 SEC — SIGNIFICANT CHANGE UP (ref 10.6–13.6)
RBC # BLD: 3.1 M/UL — LOW (ref 4.2–5.8)
RBC # FLD: 13.7 % — SIGNIFICANT CHANGE UP (ref 10.3–14.5)
RBC BLD AUTO: ABNORMAL
SARS-COV-2 RNA SPEC QL NAA+PROBE: SIGNIFICANT CHANGE UP
SODIUM SERPL-SCNC: 131 MMOL/L — LOW (ref 135–145)
SP GR SPEC: 1.01 — SIGNIFICANT CHANGE UP (ref 1–1.03)
SPHEROCYTES BLD QL SMEAR: SLIGHT — SIGNIFICANT CHANGE UP
STOMATOCYTES BLD QL SMEAR: SLIGHT — SIGNIFICANT CHANGE UP
TROPONIN T SERPL-MCNC: <0.01 NG/ML — SIGNIFICANT CHANGE UP (ref 0–0.01)
UROBILINOGEN FLD QL: 0.2 E.U./DL — SIGNIFICANT CHANGE UP
VARIANT LYMPHS # BLD: 0.9 % — SIGNIFICANT CHANGE UP (ref 0–6)
WBC # BLD: 10.8 K/UL — HIGH (ref 3.8–10.5)
WBC # FLD AUTO: 10.8 K/UL — HIGH (ref 3.8–10.5)

## 2021-01-26 PROCEDURE — 87389 HIV-1 AG W/HIV-1&-2 AB AG IA: CPT

## 2021-01-26 PROCEDURE — 96374 THER/PROPH/DIAG INJ IV PUSH: CPT | Mod: XU

## 2021-01-26 PROCEDURE — 71045 X-RAY EXAM CHEST 1 VIEW: CPT | Mod: 26

## 2021-01-26 PROCEDURE — 81003 URINALYSIS AUTO W/O SCOPE: CPT

## 2021-01-26 PROCEDURE — 99234 HOSP IP/OBS SM DT SF/LOW 45: CPT

## 2021-01-26 PROCEDURE — 71045 X-RAY EXAM CHEST 1 VIEW: CPT

## 2021-01-26 PROCEDURE — 85730 THROMBOPLASTIN TIME PARTIAL: CPT

## 2021-01-26 PROCEDURE — 84484 ASSAY OF TROPONIN QUANT: CPT

## 2021-01-26 PROCEDURE — 85025 COMPLETE CBC W/AUTO DIFF WBC: CPT

## 2021-01-26 PROCEDURE — 99285 EMERGENCY DEPT VISIT HI MDM: CPT | Mod: 25

## 2021-01-26 PROCEDURE — 96375 TX/PRO/DX INJ NEW DRUG ADDON: CPT | Mod: XU

## 2021-01-26 PROCEDURE — 87086 URINE CULTURE/COLONY COUNT: CPT

## 2021-01-26 PROCEDURE — 85379 FIBRIN DEGRADATION QUANT: CPT

## 2021-01-26 PROCEDURE — 83605 ASSAY OF LACTIC ACID: CPT

## 2021-01-26 PROCEDURE — 80053 COMPREHEN METABOLIC PANEL: CPT

## 2021-01-26 PROCEDURE — 31502 CHANGE OF WINDPIPE AIRWAY: CPT

## 2021-01-26 PROCEDURE — 75574 CT ANGIO HRT W/3D IMAGE: CPT | Mod: 26,MH

## 2021-01-26 PROCEDURE — 82550 ASSAY OF CK (CPK): CPT

## 2021-01-26 PROCEDURE — 96372 THER/PROPH/DIAG INJ SC/IM: CPT | Mod: XU

## 2021-01-26 PROCEDURE — U0003: CPT

## 2021-01-26 PROCEDURE — 83880 ASSAY OF NATRIURETIC PEPTIDE: CPT

## 2021-01-26 PROCEDURE — 85384 FIBRINOGEN ACTIVITY: CPT

## 2021-01-26 PROCEDURE — 93010 ELECTROCARDIOGRAM REPORT: CPT

## 2021-01-26 PROCEDURE — 36415 COLL VENOUS BLD VENIPUNCTURE: CPT

## 2021-01-26 PROCEDURE — U0005: CPT

## 2021-01-26 PROCEDURE — 84145 PROCALCITONIN (PCT): CPT

## 2021-01-26 PROCEDURE — 87040 BLOOD CULTURE FOR BACTERIA: CPT

## 2021-01-26 PROCEDURE — 83615 LACTATE (LD) (LDH) ENZYME: CPT

## 2021-01-26 PROCEDURE — 82728 ASSAY OF FERRITIN: CPT

## 2021-01-26 PROCEDURE — 75574 CT ANGIO HRT W/3D IMAGE: CPT

## 2021-01-26 PROCEDURE — 86140 C-REACTIVE PROTEIN: CPT

## 2021-01-26 PROCEDURE — 85610 PROTHROMBIN TIME: CPT

## 2021-01-26 PROCEDURE — 31577 LARGSC W/RMVL FOREIGN BDY(S): CPT

## 2021-01-26 RX ORDER — ACETAMINOPHEN 500 MG
650 TABLET ORAL EVERY 6 HOURS
Refills: 0 | Status: DISCONTINUED | OUTPATIENT
Start: 2021-01-26 | End: 2021-01-26

## 2021-01-26 RX ORDER — SODIUM CHLORIDE 9 MG/ML
1000 INJECTION INTRAMUSCULAR; INTRAVENOUS; SUBCUTANEOUS ONCE
Refills: 0 | Status: COMPLETED | OUTPATIENT
Start: 2021-01-26 | End: 2021-01-26

## 2021-01-26 RX ORDER — MORPHINE SULFATE 50 MG/1
20 CAPSULE, EXTENDED RELEASE ORAL EVERY 8 HOURS
Refills: 0 | Status: DISCONTINUED | OUTPATIENT
Start: 2021-01-26 | End: 2021-01-26

## 2021-01-26 RX ORDER — LEVOTHYROXINE SODIUM 125 MCG
112 TABLET ORAL EVERY 24 HOURS
Refills: 0 | Status: DISCONTINUED | OUTPATIENT
Start: 2021-01-27 | End: 2021-01-26

## 2021-01-26 RX ORDER — EPINEPHRINE 0.3 MG/.3ML
0.3 INJECTION INTRAMUSCULAR; SUBCUTANEOUS ONCE
Refills: 0 | Status: COMPLETED | OUTPATIENT
Start: 2021-01-26 | End: 2021-01-26

## 2021-01-26 RX ORDER — DEXAMETHASONE 0.5 MG/5ML
10 ELIXIR ORAL ONCE
Refills: 0 | Status: COMPLETED | OUTPATIENT
Start: 2021-01-26 | End: 2021-01-26

## 2021-01-26 RX ORDER — METOPROLOL TARTRATE 50 MG
50 TABLET ORAL ONCE
Refills: 0 | Status: DISCONTINUED | OUTPATIENT
Start: 2021-01-26 | End: 2021-01-26

## 2021-01-26 RX ORDER — METOPROLOL TARTRATE 50 MG
25 TABLET ORAL ONCE
Refills: 0 | Status: COMPLETED | OUTPATIENT
Start: 2021-01-26 | End: 2021-01-26

## 2021-01-26 RX ORDER — FAMOTIDINE 10 MG/ML
20 INJECTION INTRAVENOUS ONCE
Refills: 0 | Status: COMPLETED | OUTPATIENT
Start: 2021-01-26 | End: 2021-01-26

## 2021-01-26 RX ORDER — DIPHENHYDRAMINE HCL 50 MG
50 CAPSULE ORAL ONCE
Refills: 0 | Status: COMPLETED | OUTPATIENT
Start: 2021-01-26 | End: 2021-01-26

## 2021-01-26 RX ADMIN — EPINEPHRINE 0.3 MILLIGRAM(S): 0.3 INJECTION INTRAMUSCULAR; SUBCUTANEOUS at 10:00

## 2021-01-26 RX ADMIN — Medication 50 MILLIGRAM(S): at 10:00

## 2021-01-26 RX ADMIN — SODIUM CHLORIDE 1000 MILLILITER(S): 9 INJECTION INTRAMUSCULAR; INTRAVENOUS; SUBCUTANEOUS at 10:36

## 2021-01-26 RX ADMIN — MORPHINE SULFATE 20 MILLIGRAM(S): 50 CAPSULE, EXTENDED RELEASE ORAL at 14:04

## 2021-01-26 RX ADMIN — Medication 25 MILLIGRAM(S): at 12:45

## 2021-01-26 RX ADMIN — Medication 10 MILLIGRAM(S): at 10:00

## 2021-01-26 RX ADMIN — Medication 0.5 MILLIGRAM(S): at 16:01

## 2021-01-26 RX ADMIN — FAMOTIDINE 20 MILLIGRAM(S): 10 INJECTION INTRAVENOUS at 10:00

## 2021-01-26 NOTE — H&P ADULT - NSHPLABSRESULTS_GEN_ALL_CORE
11.2   10.80 )-----------( 436      ( 2021 10:23 )             34.0           131<L>  |  85<L>  |  12  ----------------------------<  141<H>  3.9   |  32<H>  |  0.47<L>    Ca    9.4      2021 10:23    TPro  9.1<H>  /  Alb  4.6  /  TBili  0.4  /  DBili  x   /  AST  30  /  ALT  16  /  AlkPhos  99        PT/INR - ( 2021 10:23 )   PT: 12.9 sec;   INR: 1.08          PTT - ( 2021 10:23 )  PTT:49.7 sec    CARDIAC MARKERS ( 2021 10:23 )  x     / <0.01 ng/mL / 182 U/L / x     / x            Urinalysis Basic - ( 2021 13:16 )    Color: Yellow / Appearance: Clear / S.010 / pH: x  Gluc: x / Ketone: NEGATIVE  / Bili: Negative / Urobili: 0.2 E.U./dL   Blood: x / Protein: NEGATIVE mg/dL / Nitrite: NEGATIVE   Leuk Esterase: NEGATIVE / RBC: x / WBC x   Sq Epi: x / Non Sq Epi: x / Bacteria: x      EKG: sinus rhythm 93 bpm, inferolateral ST depressions

## 2021-01-26 NOTE — H&P ADULT - PROBLEM SELECTOR PLAN 2
-s/p total laryngectomy and PEG  -[place] [facility] [place] and [place]  -[place] [facility] [place]pain control with morphine and tylenol PRN -s/p total laryngectomy and PEG  -on chemo, follows with Dr. Real at [facility] [place]  -pain control with morphine and tylenol PRN    Per ENT recs:  - use size 8 bebeto tube for now. extra foam tie given to patient  - f/u with SLP or ENT as necessary. Follow up with the ENT (Ear, Nose, Throat) department after discharge. Call [phone] for appointment.   - humidication of airway at home, or use HME valve (heat moisture exchange)

## 2021-01-26 NOTE — ED PROVIDER NOTE - PROGRESS NOTE DETAILS
ENT scope pt  airway patent   mucous plug removed  with sig improvement in breathing sats 100%  will observe  CXR neg  await labs ENT scope pt  airway patent   mucous plug removed  with sig improvement in breathing sats 100%  will observe  CXR neg noted ST depressions inferior and laterall cards rec CCTA  will admit for monitoring until CCTA complete in light of EKG changes

## 2021-01-26 NOTE — H&P ADULT - PROBLEM SELECTOR PLAN 1
-Pt noted to have inferolateral ST depressions on EKG  -CP-free, trop negative x1  -f/u CCTA -Pt noted to have inferolateral ST depressions on EKG  -CP-free, trop negative x1. Repeat EKG with resolution of ST depressions  -f/u CCTA

## 2021-01-26 NOTE — DISCHARGE NOTE PROVIDER - NSDCMRMEDTOKEN_GEN_ALL_CORE_FT
acetaminophen 160 mg/5 mL oral liquid: 5 milliliter(s) orally every 4 hours, As Needed  levothyroxine 112 mcg (0.112 mg) oral tablet: 1 tab(s) orally once a day  morphine 10 mg/5 mL oral solution: 10 milliliter(s) orally every 4 hours, As Needed

## 2021-01-26 NOTE — ED PROVIDER NOTE - CARE PLAN
Principal Discharge DX:	EKG abnormalities  Secondary Diagnosis:	Allergic reaction  Secondary Diagnosis:	Larynx cancer

## 2021-01-26 NOTE — ED PROVIDER NOTE - RESPIRATORY, MLM
Trach collar patent, no evidence of bleeding. Breath sounds clear and equal bilaterally. No wheezing, stridor.

## 2021-01-26 NOTE — H&P ADULT - NSICDXPASTSURGICALHX_GEN_ALL_CORE_FT
PAST SURGICAL HISTORY:  H/O laryngectomy     H/O thyroidectomy     H/O tracheostomy     PEG (percutaneous endoscopic gastrostomy) status     Status post surgery Left ankle surgical repair  x2 (1965)    Status post surgery inguinal hernia    Surgery, elective direct laryngoscopy with biopsy- 4/2017

## 2021-01-26 NOTE — DISCHARGE NOTE PROVIDER - NSDCCPCAREPLAN_GEN_ALL_CORE_FT
PRINCIPAL DISCHARGE DIAGNOSIS  Diagnosis: Coronary artery disease  Assessment and Plan of Treatment:       SECONDARY DISCHARGE DIAGNOSES  Diagnosis: Laryngeal cancer  Assessment and Plan of Treatment:      PRINCIPAL DISCHARGE DIAGNOSIS  Diagnosis: Coronary artery disease  Assessment and Plan of Treatment: You had abnormal findings on your EKG, prompting a CT scan looking at your coronary arteries, which revealed severe calcifications but only moderate blockage in your right coronary artery. Please follow up with Dr. Carmen Natarajan in 2 weeks for further management. We recommend starting aspirin 81mg daily if okay with your oncologist.      SECONDARY DISCHARGE DIAGNOSES  Diagnosis: Laryngeal cancer  Assessment and Plan of Treatment: You were seen by the ENT team, who did a tracheostomy and removed the the crusting seen at the distal end of the bebeto tube. Tracheoscopy repeated and airway was clear. No thick mucus or erythema or pus to suggest tracheitis. Bebeto tube size 10 changed to size 8 bebeto tube. Follow up with the ENT (Ear, Nose, Throat) department after discharge. Call [phone] for appointment. Humidication of airway at home, or use HME valve (heat moisture exchange).     PRINCIPAL DISCHARGE DIAGNOSIS  Diagnosis: Coronary artery disease  Assessment and Plan of Treatment: You had abnormal findings on your EKG, prompting a CT scan looking at your coronary arteries, which revealed severe calcifications but only moderate blockage in your right coronary artery. Please follow up with Dr. Carmen Natarajan in 2 weeks for further management. We recommend starting aspirin 81mg daily if okay with your oncologist.  Please return to the hospital or seek immediate medical attention if you have symptoms including, but not limited to, persistent chest pain or shortness of breath, especially if it’s associated with nausea, vomiting, sweating, passing out, or pain radiating to your jaw, shoulder, or arm.      SECONDARY DISCHARGE DIAGNOSES  Diagnosis: Laryngeal cancer  Assessment and Plan of Treatment: You were seen by the ENT team, who did a tracheostomy and removed the the crusting seen at the distal end of the bebeto tube, which was causign an occlusion. Tracheoscopy repeated and airway was clear. No thick mucus or erythema or pus to suggest tracheitis. Bebeto tube size 10 changed to size 8 bebeto tube. Follow up with the ENT (Ear, Nose, Throat) department after discharge. Call [phone] for appointment. Humidication of airway at home, or use HME valve (heat moisture exchange).

## 2021-01-26 NOTE — ED PROVIDER NOTE - CLINICAL SUMMARY MEDICAL DECISION MAKING FREE TEXT BOX
66 y/o M 64 y/o M with PMHx of total laryngectomy, on trach collar with facial edema, ? lymphedema, ENT scoped airway and noted mucous obstruction of upper airway, cleared, O2 sats improved to 100% on RA. No wheezes, no respiratory distress, HD stable, await labs. CXR clear, Will discuss with onc. Await COVID.

## 2021-01-26 NOTE — H&P ADULT - ASSESSMENT
66 y/o M, former smoker, PMHx of laryngeal scc s/p total laryngectomy, on chemo, presents to [facility] ER on 1/26 c/o SOB x1 week, was hypoxic in ER, seen by ENT, who removed significant crusting seen at the distal end of the bebeto tube that was occluding a significant part of the stomal airway, with improvement in O2 sat to 100% on RA. She was noted to have inferolateral ST depressions on EKG, admitted to cardiac tele for ischemic evaluation with coronary CTA.  64 y/o M, former smoker, PMHx of laryngeal scc s/p total laryngectomy, on chemo, presents to [facility] ER on 1/26 c/o SOB x1 week, was hypoxic in ER, seen by ENT, who removed significant crusting seen at the distal end of the bebeto tube that was occluding a significant part of the stomal airway, with improvement in O2 sat to 100% on RA. He was noted to have inferolateral ST depressions on EKG, admitted to cardiac tele for ischemic evaluation with coronary CTA.

## 2021-01-26 NOTE — ED PROVIDER NOTE - MUSCULOSKELETAL, MLM
Spine appears normal, range of motion is not limited, no muscle or joint tenderness. No leg edema, calf tenderness, no LE swelling.

## 2021-01-26 NOTE — DISCHARGE NOTE NURSING/CASE MANAGEMENT/SOCIAL WORK - NSDCFUADDAPPT_GEN_ALL_CORE_FT
Follow up with the ENT (Ear, Nose, Throat) department after discharge. Call [phone] for appointment.    Follow up with your primary care provider and oncologist.

## 2021-01-26 NOTE — H&P ADULT - NSHPOUTPATIENTPROVIDERS_GEN_ALL_CORE
PMD- Lee Branch  Onc- Dr. Mcgrath @ [facility] [place] PMD- Lee Branch  [place] [facility] [place]

## 2021-01-26 NOTE — DISCHARGE NOTE PROVIDER - CARE PROVIDER_API CALL
Carmen Natarajan)  Cardiology; Internal Medicine; Interventional Cardiology  [address] [place] [address]  Phone: [phone]  Fax: [phone]  Follow Up Time: Routine

## 2021-01-26 NOTE — CONSULT NOTE ADULT - SUBJECTIVE AND OBJECTIVE BOX
HPI: 66 y/o M PMHx of laryngeal scc s/p TL and CRT 1 yr ago. now today presents w approx 1wk sob which is worsening, together with few days of facial swelling. He was short of breath and appeared in distress initially. He was treated for laryngospasm in the ED. Cardiology consulted for diffuse ST depressions on EKG. Troponin negative.     Patient's partner was at bedside and noted patient has never seen a cardiology or had any ACS events. He is able to walk about 10 blocks although recently this has been reduced due to recent chemotherapy. He denied any history of stress test or any history of chest pain. Other than atorvastatin he is not on any other cardiac medications. His baseline EKG from last year is unremarkable. He was an ex-smoker.       MEDICATIONS  (STANDING):  metoprolol tartrate 25 milliGRAM(s) Enteral Tube Once    MEDICATIONS  (PRN):      REVIEW OF SYSTEMS:  12 point ROS negative except for that stated in the HPI    PHYSICAL EXAM:  Vitals past 24 Hours: T(C): 37.4 (01-26-21 @ 10:26), Max: 37.4 (01-26-21 @ 10:26)  HR: 81 (01-26-21 @ 12:00) (76 - 94)  BP: 122/70 (01-26-21 @ 12:00) (122/70 - 152/92)  RR: 18 (01-26-21 @ 12:00) (18 - 24)  SpO2: 97% (01-26-21 @ 12:00) (90% - 100%)	    Daily Height in cm: 177.8 (26 Jan 2021 10:03)    Daily     GEN: Alert and awake, no acute distress, cachectic   HEENT: Moist mucous membranes, swollen eyes and facial edema   Neck: No JVD, tracheostomy   Cardiovascular: Regular rate and rhythm, +S1 S2. No murmurs, rubs, or gallops appreciated  Respiratory: Lungs clear to auscultation bilaterally  Gastrointestinal:  Soft, non-tender, non-distended, normoactive bowel sounds  Skin: No rashes, No ecchymoses, No cyanosis  Neurologic: Non-focal, alert and oriented x3.   Extremities: No clubbing, cyanosis or edema. Warm, well-perfused extremities.   Vascular: Radial and dorsalis pedis pulses 2+ bilaterally    I&O's Detail        LABS:                        11.2   10.80 )-----------( 436      ( 26 Jan 2021 10:23 )             34.0     01-26    131<L>  |  85<L>  |  12  ----------------------------<  141<H>  3.9   |  32<H>  |  0.47<L>    Ca    9.4      26 Jan 2021 10:23    TPro  9.1<H>  /  Alb  4.6  /  TBili  0.4  /  DBili  x   /  AST  30  /  ALT  16  /  AlkPhos  99  01-26    CARDIAC MARKERS ( 26 Jan 2021 10:23 )  x     / <0.01 ng/mL / 182 U/L / x     / x          PT/INR - ( 26 Jan 2021 10:23 )   PT: 12.9 sec;   INR: 1.08          PTT - ( 26 Jan 2021 10:23 )  PTT:49.7 sec    I&O's Summary      ASSESSMENT/PLAN:  66 y/o M with laryngeal scc s/p TL and CRT 1 yr ago. now today presents w approx 1wk sob which is worsening, together with few days of facial swelling. EKG with concerning ST depressions.     - patient is without any chest pain, troponin and CK are negative   - recommend ccta to rule out CAD   - if ccta without obstructive disease may be discharged from a cardiac perspective to follow up with outpatient cardiology   - will review and discuss CCTA findings with patient once it is done       Valentine Dixon MD   Cardiology fellow   Discussed with Dr. Michael

## 2021-01-26 NOTE — CONSULT NOTE ADULT - ATTENDING COMMENTS
Initial attending contact date  1/26/21    . See fellow note written above for details. I reviewed the fellow documentation. I have personally seen and examined this patient. I reviewed vitals, labs, medications, cardiac studies, and additional imaging. I agree with the above fellow's findings and plans as written above

## 2021-01-26 NOTE — H&P ADULT - NSHPPHYSICALEXAM_GEN_ALL_CORE
Vitals:  Vital Signs Last 24 Hrs  T(C): 36.2 (26 Jan 2021 16:00), Max: 37.4 (26 Jan 2021 10:26)  T(F): 97.2 (26 Jan 2021 16:00), Max: 99.4 (26 Jan 2021 10:26)  HR: 68 (26 Jan 2021 16:00) (63 - 94)  BP: 101/64 (26 Jan 2021 16:00) (101/64 - 152/92)  RR: 18 (26 Jan 2021 16:00) (18 - 24)  SpO2: 98% (26 Jan 2021 16:00) (90% - 100%)    GEN: Alert and awake, no acute distress, cachectic   HEENT: Moist mucous membranes, swollen eyes and facial edema   Neck: No JVD, tracheostomy   Cardiovascular: Regular rate and rhythm, +S1 S2. No murmurs, rubs, or gallops appreciated  Respiratory: Lungs clear to auscultation bilaterally  Gastrointestinal:  Soft, non-tender, non-distended, normoactive bowel sounds. PEG in place  Skin: No rashes, No ecchymoses, No cyanosis  Neurologic: Non-focal, alert and oriented x3.   Extremities: No clubbing, cyanosis or edema. Warm, well-perfused extremities.   Vascular: Radial and dorsalis pedis pulses 2+ bilaterally

## 2021-01-26 NOTE — ED ADULT NURSE REASSESSMENT NOTE - NS ED NURSE REASSESS COMMENT FT1
ENT scoped pt at bedside, scab/scar tissue noted. Pt with +relief of SOB. Weaned off O2, 97% on RA. Plan for CTA, pt medicated per MAR with 25mg metoprolol. HR 70's NSR. Report to holding RASHAD Aleman

## 2021-01-26 NOTE — DISCHARGE NOTE PROVIDER - HOSPITAL COURSE
66 y/o M, former smoker, PMHx of laryngeal scc s/p total laryngectomy, on chemo, presents to [facility] ER on 1/26 c/o SOB x1 week. SOB worse in the past several hours. Patient also has had worsening facial swelling, per patient due to tumor compression. No fever, chills, cough, URI symptoms, recent illness. Patient's partner was at bedside and noted patient has never seen a cardiology or had any ACS events. He is able to walk about 10 blocks although recently this has been reduced due to recent chemotherapy. He denied any history of stress test or any history of chest pain. His baseline EKG from last year is unremarkable.     In the ER, SpO2 90% on RA, vitals otherwise stable. Labs notable for trop negative x1, Na 131, Hgb 11.2, WBC 10. EKG revealed new inferolateral ST depressions.     Patient evaluated by ENT, who removed significant crusting seen at the distal end of the bebeto tube that was occluding a significant part of the stomal airway, with improvement in O2 sat to 100% on RA. Per ENT- use size 8 bebeto tube for now. extra foam tie given to patient. F/u with SLP or ENT as necessary. Follow up with the ENT (Ear, Nose, Throat) department after discharge. Call [phone] for appointment. Humidication of airway at home, or use HME valve (heat moisture exchange).     Seen by cardiology consult team who recommended CCTA revealing moderate RCA disease, severe Ca score (860). Subsequent EKG with resolution of ST depressions. Patient remained chest pain-free. Recommended starting aspirin 81mg daily if cleared by his oncologist. Provided information for cardiology follow up with Dr. Natarajan.              Patient demonstrated difficulty following low-level verbal and visual prompting to formally assess oral-motor function at this time.

## 2021-01-26 NOTE — DISCHARGE NOTE NURSING/CASE MANAGEMENT/SOCIAL WORK - PATIENT PORTAL LINK FT
You can access the FollowMyHealth Patient Portal offered by [facility] by registering at the following website: [email or web address]. By joining [employer]’s FollowMyHealth portal, you will also be able to view your health information using other applications (apps) compatible with our system.

## 2021-01-26 NOTE — ED PROVIDER NOTE - WR INTERPRETATION 1
CXR negative - No infiltrates, No consolidation, No atelectasis seen, No CHF, No cardiomegaly, No pleural effusions, No pneumothorax, No opacities, No free air, normal media stinum

## 2021-01-26 NOTE — H&P ADULT - NSICDXPASTMEDICALHX_GEN_ALL_CORE_FT
PAST MEDICAL HISTORY:  Laryngeal mass s/ p radiation    Larynx cancer     SOB (shortness of breath)

## 2021-01-26 NOTE — ED ADULT TRIAGE NOTE - CHIEF COMPLAINT QUOTE
patient with trach collar from cancer complains of SOB since last night worsening,. present with facial swelling. direct to resus for upgrade

## 2021-01-26 NOTE — CONSULT NOTE ADULT - SUBJECTIVE AND OBJECTIVE BOX
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 now today presents w approx 1wk sob which is worsening, together with few days of facial swelling. has bebeto tube in place. no chest pain. no thick secretions. does not have humidification at home but has an HME. no fevers.     Allergies    penicillin (Rash)    Intolerances    PAST MEDICAL & SURGICAL HISTORY:  Larynx cancer    SOB (shortness of breath)    Throat cancer    Jaundice  1965    Fracture  left ankle    Laryngeal mass  s/ p radiation    H/O laryngectomy    H/O thyroidectomy    H/O tracheostomy    PEG (percutaneous endoscopic gastrostomy) status    Surgery, elective  direct laryngoscopy with biopsy- 4/2017    Status post surgery  inguinal hernia    Status post surgery  Left ankle surgical repair  x2 (1965)    FAMILY HISTORY:  No pertinent family history in first degree relatives    Vital Signs Last 24 Hrs  T(C): 37.4 (26 Jan 2021 10:26), Max: 37.4 (26 Jan 2021 10:26)  T(F): 99.4 (26 Jan 2021 10:26), Max: 99.4 (26 Jan 2021 10:26)  HR: 83 (26 Jan 2021 10:40) (83 - 94)  BP: 130/80 (26 Jan 2021 10:40) (130/80 - 152/92)  BP(mean): --  RR: 20 (26 Jan 2021 10:40) (20 - 24)  SpO2: 100% (26 Jan 2021 10:40) (90% - 100%)    LABS:  CBC-                        11.2   10.80 )-----------( 436      ( 26 Jan 2021 10:23 )             34.0         01-26    131<L>  |  85<L>  |  12  ----------------------------<  141<H>  3.9   |  32<H>  |  0.47<L>    Ca    9.4      26 Jan 2021 10:23    TPro  9.1<H>  /  Alb  4.6  /  TBili  0.4  /  DBili  x   /  AST  30  /  ALT  16  /  AlkPhos  99  01-26    Coagulation Studies-  PT/INR - ( 26 Jan 2021 10:23 )   PT: 12.9 sec;   INR: 1.08          PTT - ( 26 Jan 2021 10:23 )  PTT:49.7 sec  Endocrine Panel-  --  --  9.4 mg/dL        PHYSICAL EXAM:    ENT EXAM-   AAOx3  no resp distress, saturating 100% on RA. has air flow from laryngectomy stoma but has a high pitched strained quality  larytube in place, clean, secured with soft tie    TRACHEOSCOPY:  tracheoscopy performed, significant crusting seen at the distal end of the bebeto tube, occluding a significant part of the stomal airway.     PROCEDURE:  using a lev clamp, the crusting was carefully removed. no bleeding. tracheoscopy repeated and airway was clear to maddie. no thick mucus or erythema or pus to suggest tracheitis. patient tolerated procedure well. bebeto tube size 10 changed to size 8 bebeto tube    A/P:  65y Male s/p TL and CRT, now with facial swelling and SOB, s/p airway crusting removal and bebeto tube size change (size 8)  - use size 8 bebeto tube for now. extra foam tie given to patient  - f/u with SLP or ENT as necessary. Follow up with the ENT (Ear, Nose, Throat) department after discharge. Call [phone] for appointment.   - humidication of airway at home, or use HME valve (heat moisture exchange)      Thank you for the consult, please page ENT at [phone] with any questions/concerns.  -------------------------------------------------  Chase Vang MD  Department of Otolaryngology - Head and Neck Surgery  [facility]

## 2021-01-26 NOTE — ED ADULT NURSE NOTE - OBJECTIVE STATEMENT
Pt presents to ED with swelling and SOB. Pt face edematous, pt with T tube, unable to speak. LAbored RR, +tachypnea. Pt Pl[place]on trach collar 50% Fio2. MD Avila at bedside, IM 0.3ml epi given, benadryl, decadron, and pepcid given. Pt on monitor, EKG done. ENT stat to bedside scoping pt. Pt AAOX3, MAEX4. +relief with oxygen. Pt with history of CA, worsening SOB overlast week. L 18g PIV placed, labs collected.

## 2021-01-26 NOTE — H&P ADULT - HISTORY OF PRESENT ILLNESS
INCOMPLETE    66 y/o M PMHx of laryngeal scc s/p total laryngectomy and CRT 1 yr ago, presents to [facility] ER on 1/26 c/o SOB x1 week. He also endorses a few days of facial swelling.    Patient's partner was at bedside and noted patient has never seen a cardiology or had any ACS events. He is able to walk about 10 blocks although recently this has been reduced due to recent chemotherapy. He denied any history of stress test or any history of chest pain. Other than atorvastatin he is not on any other cardiac medications. His baseline EKG from last year is unremarkable. He was an ex-smoker.     In the ER, EKG revealed new inferolateral ST depressions. Trop negative x1. Patient evaluated by ENT, who removed significant crusting seen at the distal end of the bebeto tube that was occluding a significant part of the stomal airway, with improvement in O2 sat to 100% on RA. Seen by cardiology consult team who recommended CCTA.        64 y/o M, former smoker, PMHx of laryngeal scc s/p total laryngectomy, on chemo, presents to [facility] ER on 1/26 c/o SOB x1 week. SOB worse in the past several hours. Patient also has had worsening facial swelling, per patient due to tumor compression. No fever, chills, cough, URI symptoms, recent illness. Patient's partner was at bedside and noted patient has never seen a cardiology or had any ACS events. He is able to walk about 10 blocks although recently this has been reduced due to recent chemotherapy. He denied any history of stress test or any history of chest pain. His baseline EKG from last year is unremarkable.     In the ER, SpO2 90% on RA, vitals otherwise stable. Labs notable for trop negative x1, Na 131, Hgb 11.2, WBC 10. EKG revealed new inferolateral ST depressions. Patient evaluated by ENT, who removed significant crusting seen at the distal end of the bebeto tube that was occluding a significant part of the stomal airway, with improvement in O2 sat to 100% on RA. Seen by cardiology consult team who recommended CCTA. Admitted to cardiac tele for further management.

## 2021-01-26 NOTE — ED PROVIDER NOTE - OBJECTIVE STATEMENT
66 y/o M, hx pharyngeal cancer w/ total laryngectomy, with trach collar in place, currently undergoing chemo at [facility] [place] followed by Dr. Mcgrath, presents with facial swelling x1w, worse in the past 6 hours, with difficulty breathing. No recent fever, chills, COVID symptoms. 66 y/o M, hx pharyngeal cancer w/ total laryngectomy, with trach collar in place, currently undergoing chemo at [facility] [place] by Dr. Gramajo, presents with facial swelling x1w, upper chest tightness worse in the past 6 hours, with difficulty breathing. No recent fever, chills, COVID symptoms.

## 2021-01-27 LAB
CULTURE RESULTS: NO GROWTH — SIGNIFICANT CHANGE UP
SPECIMEN SOURCE: SIGNIFICANT CHANGE UP

## 2021-01-31 LAB
CULTURE RESULTS: SIGNIFICANT CHANGE UP
CULTURE RESULTS: SIGNIFICANT CHANGE UP
SPECIMEN SOURCE: SIGNIFICANT CHANGE UP
SPECIMEN SOURCE: SIGNIFICANT CHANGE UP

## 2021-02-01 RX ORDER — ACETAMINOPHEN 160 MG/5ML
160 SUSPENSION ORAL EVERY 6 HOURS
Qty: 2400 | Refills: 2 | Status: ACTIVE | COMMUNITY
Start: 2020-08-05 | End: 1900-01-01

## 2021-02-01 RX ORDER — MORPHINE SULFATE 10 MG/5ML
10 SOLUTION ORAL
Qty: 420 | Refills: 0 | Status: ACTIVE | COMMUNITY
Start: 2021-01-04 | End: 1900-01-01

## 2021-02-02 DIAGNOSIS — Z79.890 HORMONE REPLACEMENT THERAPY: ICD-10-CM
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
## 2021-02-02 DIAGNOSIS — E03.9 HYPOTHYROIDISM, UNSPECIFIED: ICD-10-CM

## 2021-02-02 DIAGNOSIS — Z93.1 GASTROSTOMY STATUS: ICD-10-CM

## 2021-02-02 DIAGNOSIS — Z88.0 ALLERGY STATUS TO PENICILLIN: ICD-10-CM

## 2021-02-02 DIAGNOSIS — C32.9 MALIGNANT NEOPLASM OF LARYNX, UNSPECIFIED: ICD-10-CM

## 2021-02-02 DIAGNOSIS — Z87.891 PERSONAL HISTORY OF NICOTINE DEPENDENCE: ICD-10-CM
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
## 2021-02-02 DIAGNOSIS — R94.31 ABNORMAL ELECTROCARDIOGRAM [ECG] [EKG]: ICD-10-CM

## 2021-02-02 DIAGNOSIS — T17.490A OTHER FOREIGN OBJECT IN TRACHEA CAUSING ASPHYXIATION, INITIAL ENCOUNTER: ICD-10-CM

## 2021-02-02 DIAGNOSIS — Z90.02 ACQUIRED ABSENCE OF LARYNX: ICD-10-CM

## 2021-02-02 DIAGNOSIS — I25.10 ATHEROSCLEROTIC HEART DISEASE OF NATIVE CORONARY ARTERY WITHOUT ANGINA PECTORIS: ICD-10-CM

## 2021-02-02 DIAGNOSIS — Z92.3 PERSONAL HISTORY OF IRRADIATION: ICD-10-CM

## 2021-02-09 ENCOUNTER — NON-APPOINTMENT (OUTPATIENT)
Age: 66
End: 2021-02-09

## 2021-02-09 ENCOUNTER — EMERGENCY (EMERGENCY)
Facility: HOSPITAL | Age: 66
LOS: 1 days | Discharge: HOME | End: 2021-02-09
Attending: EMERGENCY MEDICINE | Admitting: EMERGENCY MEDICINE
Payer: MEDICARE

## 2021-02-09 ENCOUNTER — RX RENEWAL (OUTPATIENT)
Age: 66
End: 2021-02-09

## 2021-02-09 VITALS
HEART RATE: 106 BPM | SYSTOLIC BLOOD PRESSURE: 134 MMHG | RESPIRATION RATE: 30 BRPM | DIASTOLIC BLOOD PRESSURE: 82 MMHG | HEIGHT: 70 IN | OXYGEN SATURATION: 94 %

## 2021-02-09 VITALS
OXYGEN SATURATION: 97 % | DIASTOLIC BLOOD PRESSURE: 69 MMHG | RESPIRATION RATE: 22 BRPM | SYSTOLIC BLOOD PRESSURE: 107 MMHG | HEART RATE: 72 BPM | TEMPERATURE: 98 F

## 2021-02-09 DIAGNOSIS — Z90.09 ACQUIRED ABSENCE OF OTHER PART OF HEAD AND NECK: Chronic | ICD-10-CM

## 2021-02-09 DIAGNOSIS — Z98.890 OTHER SPECIFIED POSTPROCEDURAL STATES: Chronic | ICD-10-CM

## 2021-02-09 DIAGNOSIS — Z90.02 ACQUIRED ABSENCE OF LARYNX: Chronic | ICD-10-CM

## 2021-02-09 DIAGNOSIS — Z93.1 GASTROSTOMY STATUS: Chronic | ICD-10-CM

## 2021-02-09 DIAGNOSIS — Z41.9 ENCOUNTER FOR PROCEDURE FOR PURPOSES OTHER THAN REMEDYING HEALTH STATE, UNSPECIFIED: Chronic | ICD-10-CM

## 2021-02-09 LAB
ALBUMIN SERPL ELPH-MCNC: 3.9 G/DL — SIGNIFICANT CHANGE UP (ref 3.3–5)
ALP SERPL-CCNC: 90 U/L — SIGNIFICANT CHANGE UP (ref 40–120)
ALT FLD-CCNC: 19 U/L — SIGNIFICANT CHANGE UP (ref 10–45)
ANION GAP SERPL CALC-SCNC: 11 MMOL/L — SIGNIFICANT CHANGE UP (ref 5–17)
APTT BLD: 36.9 SEC — HIGH (ref 27.5–35.5)
AST SERPL-CCNC: 29 U/L — SIGNIFICANT CHANGE UP (ref 10–40)
BASE EXCESS BLDV CALC-SCNC: 7.7 MMOL/L — SIGNIFICANT CHANGE UP
BASOPHILS # BLD AUTO: 0 K/UL — SIGNIFICANT CHANGE UP (ref 0–0.2)
BASOPHILS NFR BLD AUTO: 0 % — SIGNIFICANT CHANGE UP (ref 0–2)
BILIRUB SERPL-MCNC: 0.4 MG/DL — SIGNIFICANT CHANGE UP (ref 0.2–1.2)
BUN SERPL-MCNC: 13 MG/DL — SIGNIFICANT CHANGE UP (ref 7–23)
CALCIUM SERPL-MCNC: 9.1 MG/DL — SIGNIFICANT CHANGE UP (ref 8.4–10.5)
CHLORIDE SERPL-SCNC: 84 MMOL/L — LOW (ref 96–108)
CK SERPL-CCNC: 144 U/L — SIGNIFICANT CHANGE UP (ref 30–200)
CO2 SERPL-SCNC: 33 MMOL/L — HIGH (ref 22–31)
CREAT SERPL-MCNC: 0.6 MG/DL — SIGNIFICANT CHANGE UP (ref 0.5–1.3)
CRP SERPL-MCNC: 9.34 MG/DL — HIGH (ref 0–0.4)
D DIMER BLD IA.RAPID-MCNC: 444 NG/ML DDU — HIGH
EOSINOPHIL # BLD AUTO: 0 K/UL — SIGNIFICANT CHANGE UP (ref 0–0.5)
EOSINOPHIL NFR BLD AUTO: 0 % — SIGNIFICANT CHANGE UP (ref 0–6)
FERRITIN SERPL-MCNC: 244 NG/ML — SIGNIFICANT CHANGE UP (ref 30–400)
FIBRINOGEN PPP-MCNC: 654 MG/DL — HIGH (ref 258–438)
GAS PNL BLDV: SIGNIFICANT CHANGE UP
GLUCOSE SERPL-MCNC: 226 MG/DL — HIGH (ref 70–99)
HCO3 BLDV-SCNC: 37 MMOL/L — HIGH (ref 20–27)
HCT VFR BLD CALC: 34.1 % — LOW (ref 39–50)
HGB BLD-MCNC: 11 G/DL — LOW (ref 13–17)
HYPOCHROMIA BLD QL: SIGNIFICANT CHANGE UP
INR BLD: 1.14 — SIGNIFICANT CHANGE UP (ref 0.88–1.16)
LACTATE SERPL-SCNC: 3.2 MMOL/L — HIGH (ref 0.5–2)
LDH SERPL L TO P-CCNC: 271 U/L — HIGH (ref 50–242)
LYMPHOCYTES # BLD AUTO: 0.29 K/UL — LOW (ref 1–3.3)
LYMPHOCYTES # BLD AUTO: 2.6 % — LOW (ref 13–44)
MACROCYTES BLD QL: SIGNIFICANT CHANGE UP
MANUAL SMEAR VERIFICATION: SIGNIFICANT CHANGE UP
MCHC RBC-ENTMCNC: 32.3 GM/DL — SIGNIFICANT CHANGE UP (ref 32–36)
MCHC RBC-ENTMCNC: 35.4 PG — HIGH (ref 27–34)
MCV RBC AUTO: 109.6 FL — HIGH (ref 80–100)
MONOCYTES # BLD AUTO: 0.97 K/UL — HIGH (ref 0–0.9)
MONOCYTES NFR BLD AUTO: 8.7 % — SIGNIFICANT CHANGE UP (ref 2–14)
MYELOCYTES NFR BLD: 0.9 % — HIGH (ref 0–0)
NEUTROPHILS # BLD AUTO: 9.72 K/UL — HIGH (ref 1.8–7.4)
NEUTROPHILS NFR BLD AUTO: 86.1 % — HIGH (ref 43–77)
NEUTS BAND # BLD: 0.8 % — SIGNIFICANT CHANGE UP (ref 0–8)
NT-PROBNP SERPL-SCNC: 229 PG/ML — SIGNIFICANT CHANGE UP (ref 0–300)
OVALOCYTES BLD QL SMEAR: SLIGHT — SIGNIFICANT CHANGE UP
PCO2 BLDV: 79 MMHG — HIGH (ref 41–51)
PH BLDV: 7.28 — LOW (ref 7.32–7.43)
PLAT MORPH BLD: NORMAL — SIGNIFICANT CHANGE UP
PLATELET # BLD AUTO: 365 K/UL — SIGNIFICANT CHANGE UP (ref 150–400)
PO2 BLDV: 46 MMHG — SIGNIFICANT CHANGE UP
POLYCHROMASIA BLD QL SMEAR: SLIGHT — SIGNIFICANT CHANGE UP
POTASSIUM SERPL-MCNC: 4 MMOL/L — SIGNIFICANT CHANGE UP (ref 3.5–5.3)
POTASSIUM SERPL-SCNC: 4 MMOL/L — SIGNIFICANT CHANGE UP (ref 3.5–5.3)
PROCALCITONIN SERPL-MCNC: 0.1 NG/ML — SIGNIFICANT CHANGE UP (ref 0.02–0.1)
PROT SERPL-MCNC: 8.2 G/DL — SIGNIFICANT CHANGE UP (ref 6–8.3)
PROTHROM AB SERPL-ACNC: 13.6 SEC — SIGNIFICANT CHANGE UP (ref 10.6–13.6)
RBC # BLD: 3.11 M/UL — LOW (ref 4.2–5.8)
RBC # FLD: 12.4 % — SIGNIFICANT CHANGE UP (ref 10.3–14.5)
RBC BLD AUTO: ABNORMAL
SAO2 % BLDV: 74 % — SIGNIFICANT CHANGE UP
SARS-COV-2 RNA SPEC QL NAA+PROBE: SIGNIFICANT CHANGE UP
SODIUM SERPL-SCNC: 128 MMOL/L — LOW (ref 135–145)
STOMATOCYTES BLD QL SMEAR: SLIGHT — SIGNIFICANT CHANGE UP
TROPONIN T SERPL-MCNC: <0.01 NG/ML — SIGNIFICANT CHANGE UP (ref 0–0.01)
VARIANT LYMPHS # BLD: 0.9 % — SIGNIFICANT CHANGE UP (ref 0–6)
WBC # BLD: 11.18 K/UL — HIGH (ref 3.8–10.5)
WBC # FLD AUTO: 11.18 K/UL — HIGH (ref 3.8–10.5)

## 2021-02-09 PROCEDURE — 70491 CT SOFT TISSUE NECK W/DYE: CPT | Mod: 26,MG

## 2021-02-09 PROCEDURE — 99291 CRITICAL CARE FIRST HOUR: CPT | Mod: 25

## 2021-02-09 PROCEDURE — G1004: CPT

## 2021-02-09 PROCEDURE — 71260 CT THORAX DX C+: CPT | Mod: 26,MG

## 2021-02-09 PROCEDURE — 71045 X-RAY EXAM CHEST 1 VIEW: CPT | Mod: 26

## 2021-02-09 PROCEDURE — 93010 ELECTROCARDIOGRAM REPORT: CPT

## 2021-02-09 RX ORDER — EPINEPHRINE 0.3 MG/.3ML
0.3 INJECTION INTRAMUSCULAR; SUBCUTANEOUS ONCE
Refills: 0 | Status: COMPLETED | OUTPATIENT
Start: 2021-02-09 | End: 2021-02-09

## 2021-02-09 RX ORDER — LEVOTHYROXINE SODIUM 0.11 MG/1
112 TABLET ORAL
Qty: 30 | Refills: 0 | Status: ACTIVE | COMMUNITY
Start: 2021-02-09 | End: 1900-01-01

## 2021-02-09 RX ORDER — DIPHENHYDRAMINE HCL 50 MG
50 CAPSULE ORAL ONCE
Refills: 0 | Status: COMPLETED | OUTPATIENT
Start: 2021-02-09 | End: 2021-02-09

## 2021-02-09 RX ORDER — LEVOTHYROXINE SODIUM 0.11 MG/1
112 TABLET ORAL
Qty: 30 | Refills: 1 | Status: ACTIVE | COMMUNITY
Start: 2019-09-23 | End: 1900-01-01

## 2021-02-09 RX ORDER — FAMOTIDINE 10 MG/ML
20 INJECTION INTRAVENOUS ONCE
Refills: 0 | Status: COMPLETED | OUTPATIENT
Start: 2021-02-09 | End: 2021-02-09

## 2021-02-09 RX ADMIN — Medication 50 MILLIGRAM(S): at 01:14

## 2021-02-09 RX ADMIN — Medication 125 MILLIGRAM(S): at 01:13

## 2021-02-09 RX ADMIN — EPINEPHRINE 0.3 MILLIGRAM(S): 0.3 INJECTION INTRAMUSCULAR; SUBCUTANEOUS at 01:12

## 2021-02-09 RX ADMIN — Medication 0.5 MILLIGRAM(S): at 01:50

## 2021-02-09 RX ADMIN — FAMOTIDINE 20 MILLIGRAM(S): 10 INJECTION INTRAVENOUS at 01:13

## 2021-02-09 NOTE — ED PROVIDER NOTE - PROGRESS NOTE DETAILS
Quynh: d/w emergency contact/cousin ashish valdez: was with pt yesterday, [place] [facility] HAs worsening facial swelling for 2w and eyes were already swollen shut yesterday. Has chronic HA. Otherwise had no complaints yesterday. She was not with him tonight when EMS was called.   Pt sattting 34521% on RA, mild tachypnea, no stridor. Placed on trach collar for comfort. Will reassess. Klepfish: pt w/ granulation tissue occluding airway on ENT eval. ENT successfully removed at [facility], airway now patent, pt now feels back to baseline. Awaiting CT results, likely outpt f/u. Quynh: CT showing "1. Approximately 9 cm complex/centrally necrotic enhancing deep right neck mass with adjacent surgical clips, presumably represents a squamous cell carcinoma. There is obliteration of inferior oropharynx, hypopharynx, and larynx and the right jugular vein is not confidently visualized.  2. Severe diffuse facial edema."  Pt remains feeling like his baseline. Has doctors to f/u w/ outpt. Clinically no indication for further emergent ED workup or hospitalization at this time. Comfortable for dc, outpt f/u.

## 2021-02-09 NOTE — ED PROVIDER NOTE - PHYSICAL EXAMINATION
+severe facial swelling, eye lids swollen shut. +angioedema.   trach collar, no stridor  attempted to suction trach but unable to pass suction tubing through.   no LE edema, normal equal distal pulses  +nasal secretions --> suctioned

## 2021-02-09 NOTE — ED ADULT NURSE NOTE - NSIMPLEMENTINTERV_GEN_ALL_ED
Implemented All Fall Risk Interventions:  [place] to call system. Call bell, personal items and telephone within reach. Instruct patient to call for assistance. Room bathroom lighting operational. Non-slip footwear when patient is off stretcher. Physically safe environment: no spills, clutter or unnecessary equipment. Stretcher in lowest position, wheels locked, appropriate side rails in place. Provide visual cue, wrist band, yellow gown, etc. Monitor gait and stability. Monitor for mental status changes and reorient to person, place, and time. Review medications for side effects contributing to fall risk. Reinforce activity limits and safety measures with patient and family.

## 2021-02-09 NOTE — ED PROVIDER NOTE - OBJECTIVE STATEMENT
65M PMH pharyngeal cancer s/p radiation, total laryngectomy, trach/PEG, chemo at [facility] [place] by Dr. Gramajo, p/w SOB. PT in ED 1/26/21 for SOB/facial swelling. ENT at that time scoped and found "significant crusting seen at the distal end of the bebeto tube, occluding a significant part of the stomal airway" which was removed. Pt unable to talk and limited communication with writing. Unable to obtain additional info at this time. Per EMS, pt coming from home for SOB of 30min. Pt seems to indicate that he has SOB 2/2 tightness in trach area. EMS reported sats 50s on RA, improved w/ NRB. Denies fevers, NVD, abd pain, CP.

## 2021-02-09 NOTE — ED PROVIDER NOTE - CLINICAL SUMMARY MEDICAL DECISION MAKING FREE TEXT BOX
65M PMH pharyngeal cancer s/p radiation, total laryngectomy, trach/PEG, chemo at [facility] [place] followed by Dr. Gramajo, p/w SOB. PT in ED 1/26/21 for SOB/facial swelling. ENT at that time scoped and found "significant crusting seen at the distal end of the bebeto tube, occluding a significant part of the stomal airway" which was removed. Pt unable to talk and limited communication with writing. Unable to obtain additional info at this time. Per EMS, pt coming from home for SOB of 30min. Pt seems to indicate that he has SOB 2/2 tightness in trach area. EMS reported sats 50s on RA, improved w/ NRB. No other reported symptoms. Tachycardic. Pt arrives on NRB to face - however pt breathes through stoma and not mouth/nose. NRB turned off, pt satting 96% on RA. Other vitals wnl. Exam as above.  ddx: Likely related to obstructive process/cancer/crusting. Possible aspect of angioedema, less likely anaphylaxis. Swelling likely 2/2 cancer.  ENT consulted. Pt stable for now.   LAbs, CT, observe/reassess.

## 2021-02-09 NOTE — ED ADULT NURSE NOTE - MODE OF DISCHARGE
[facility]  TRANSFER/DISCHARGE SUMMARY FORM    HHPI / SCP: HHp Other Ins.:      Patient Name: Donna Morales  Admission Date: 19  Level of Care Attended:  Intens.OP : 1980  Transfer/Discharge Date: MRN: [id number]  19       SIGNIFICANT FINDINGS/CLINICAL IMPRESSION:   DSM Codes:   Alcohol use disorder    ICD10 Codes:   No diagnosis found. F10.20    Additional problems identified via assessment: n.a    Treatment Components in Which Patient Participated (check all that apply):  Education group(s), 1:1 teaching/therapy and Group Therapy    Summary of Course of Treatment: Donna came to group twice and the second time she had a breathalizer which was .0387 and then ended up in the hospital twice for detox on  and .  She wanted to come back to EOP and was referred to inpatient treatment since she was unable to stay sober.    Condition at Time of Transfer/Discharge: Case administratively closed due to requiring higher level of care.    [] Medications Reviewed with Copy to Patient    Referred to: Refer to [facility]     Patient is in agreement with discharge plan: yes    Jackelyn Tinoco R.N.  
Stretcher

## 2021-02-09 NOTE — ED ADULT NURSE NOTE - OBJECTIVE STATEMENT
Received via stretcher BIBEMS from home with chief complaints of shortness of breath, difficulty breathing. Patient w hx of laryngeal CA, tracheostomy stoma noted- suctioned as per verbal oders of MD Beauchamp and while MD at bedside-- resistance met, suction aborted. AOX4, able to communicate by writing.  +facial and periorbital swelling. +PEG tube to RUQ. Moves all extremities. Patient oriented to ED area. All needs attended. POC reviewed. Placed on continuos cardiac monitoring. EKG done. Hourly rounding in progress. Fall risk precautions maintained.

## 2021-02-09 NOTE — ED PROVIDER NOTE - NSFOLLOWUPINSTRUCTIONS_ED_ALL_ED_FT
Your shortness of breath was likely caused by "granulation tissue" building up in your airway.  It is important to continue to follow up with your oncologist and ENT. Can also follow up at [facility] ([facility]). Can call [phone] to schedule appointment.     Can take tylenol 650mg or motrin 600mg (May cause stomach irritation - take with food and avoid prolonged use) every 6hrs as needed for pain.  Stay well hydrated.  Return for fevers, persistent vomit, uncontrolled pain, worsening breathing, worsening lightheaded.  Follow up with primary doctor within 1-2 days. Your shortness of breath was likely caused by "granulation tissue" building up in your airway.  It is important to continue to follow up with your oncologist and ENT. Can also follow up at [facility] ([facility]). Can call [phone] to schedule appointment.     Can take tylenol 650mg or motrin 600mg (May cause stomach irritation - take with food and avoid prolonged use) every 6hrs as needed for pain.  Stay well hydrated.  Return for fevers, persistent vomit, uncontrolled pain, worsening breathing, worsening lightheaded.  Follow up with primary doctor within 1-2 days.    Bring copy of CT results to your doctors.

## 2021-02-09 NOTE — ED PROVIDER NOTE - PATIENT PORTAL LINK FT
You can access the FollowMyHealth Patient Portal offered by [facility] by registering at the following website: [email or web address]. By joining [employer]’s FollowMyHealth portal, you will also be able to view your health information using other applications (apps) compatible with our system.

## 2021-02-09 NOTE — CONSULT NOTE ADULT - SUBJECTIVE AND OBJECTIVE BOX
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 Presenting with difficulty breathing. Per nursing, brother called EMS after patient had an episode of respiratory distress. Also with facial edema, currently undergoing chemotherapy treatment. In the ED patient received benadryl and steroids. At bedside exam, reports difficulty breathing with audible wheezing. Laryngectomy tube in place.       Allergies    penicillin (Rash)    Intolerances    PAST MEDICAL & SURGICAL HISTORY:  Larynx cancer    SOB (shortness of breath)    Throat cancer    Jaundice  1965    Fracture  left ankle    Laryngeal mass  s/ p radiation    H/O laryngectomy    H/O thyroidectomy    H/O tracheostomy    PEG (percutaneous endoscopic gastrostomy) status    Surgery, elective  direct laryngoscopy with biopsy- 4/2017    Status post surgery  inguinal hernia    Status post surgery  Left ankle surgical repair x2 (1965)    FAMILY HISTORY:  No pertinent family history in first degree relatives    ICU Vital Signs Last 24 Hrs  T(C): 37.4 (09 Feb 2021 02:19), Max: 37.4 (09 Feb 2021 02:19)  T(F): 99.4 (09 Feb 2021 02:19), Max: 99.4 (09 Feb 2021 02:19)  HR: 96 (09 Feb 2021 02:35) (96 - 106)  BP: 118/67 (09 Feb 2021 02:35) (115/67 - 134/82)  BP(mean): 86 (09 Feb 2021 02:35) (82 - 86)  ABP: --  ABP(mean): --  RR: 24 (09 Feb 2021 02:35) (24 - 30)  SpO2: 97% (09 Feb 2021 02:35) (94% - 100%)    --  9.4 mg/dL        PHYSICAL EXAM:    ENT EXAM-  AAOx3  no resp distress, saturating 100% on RA. has air flow from laryngectomy stoma  but has a high pitched strained quality  larytube in place, clean, secured with soft tie    TRACHEOSCOPY:  tracheoscopy performed, significant crusting seen at the distal end of the bebeto  tube, occluding a significant part of the stomal airway.    PROCEDURE:  using a lev clamp, the crusting was carefully removed. no bleeding.  tracheoscopy repeated and airway was clear to maddie. no thick mucus or  erythema or pus to suggest tracheitis. patient tolerated procedure well.    A/P: 65y Male s/p TL and CRT, now with facial swelling and SOB, s/p airway  crusting removal.    - f/u with SLP or ENT as necessary. Follow up with the ENT (Ear, Nose, Throat)  department after discharge. Call [phone] for appointment.  - humidication of airway at home, or use HME valve (heat moisture exchange)

## 2021-02-10 ENCOUNTER — EMERGENCY (EMERGENCY)
Facility: HOSPITAL | Age: 66
LOS: 1 days | Discharge: HOME | End: 2021-02-10
Attending: EMERGENCY MEDICINE | Admitting: EMERGENCY MEDICINE
Payer: MEDICARE

## 2021-02-10 VITALS
WEIGHT: 130.07 LBS | RESPIRATION RATE: 22 BRPM | HEIGHT: 70 IN | OXYGEN SATURATION: 96 % | DIASTOLIC BLOOD PRESSURE: 90 MMHG | TEMPERATURE: 98 F | SYSTOLIC BLOOD PRESSURE: 187 MMHG | HEART RATE: 79 BPM

## 2021-02-10 DIAGNOSIS — R06.02 SHORTNESS OF BREATH: ICD-10-CM

## 2021-02-10 DIAGNOSIS — Z98.890 OTHER SPECIFIED POSTPROCEDURAL STATES: Chronic | ICD-10-CM

## 2021-02-10 DIAGNOSIS — Z90.02 ACQUIRED ABSENCE OF LARYNX: Chronic | ICD-10-CM

## 2021-02-10 DIAGNOSIS — Z41.9 ENCOUNTER FOR PROCEDURE FOR PURPOSES OTHER THAN REMEDYING HEALTH STATE, UNSPECIFIED: Chronic | ICD-10-CM

## 2021-02-10 DIAGNOSIS — Z93.1 GASTROSTOMY STATUS: Chronic | ICD-10-CM

## 2021-02-10 DIAGNOSIS — R05 COUGH: ICD-10-CM

## 2021-02-10 DIAGNOSIS — Z90.09 ACQUIRED ABSENCE OF OTHER PART OF HEAD AND NECK: Chronic | ICD-10-CM

## 2021-02-10 DIAGNOSIS — Z20.822 CONTACT WITH AND (SUSPECTED) EXPOSURE TO COVID-19: ICD-10-CM

## 2021-02-10 LAB
APTT BLD: 40.7 SEC — HIGH (ref 27.5–35.5)
HCT VFR BLD CALC: 30.2 % — LOW (ref 39–50)
HGB BLD-MCNC: 9.7 G/DL — LOW (ref 13–17)
INR BLD: 1.08 — SIGNIFICANT CHANGE UP (ref 0.88–1.16)
MCHC RBC-ENTMCNC: 32.1 GM/DL — SIGNIFICANT CHANGE UP (ref 32–36)
MCHC RBC-ENTMCNC: 35.4 PG — HIGH (ref 27–34)
MCV RBC AUTO: 110.2 FL — HIGH (ref 80–100)
PLATELET # BLD AUTO: 368 K/UL — SIGNIFICANT CHANGE UP (ref 150–400)
PROTHROM AB SERPL-ACNC: 12.9 SEC — SIGNIFICANT CHANGE UP (ref 10.6–13.6)
RBC # BLD: 2.74 M/UL — LOW (ref 4.2–5.8)
RBC # FLD: 12.4 % — SIGNIFICANT CHANGE UP (ref 10.3–14.5)
WBC # BLD: 14.85 K/UL — HIGH (ref 3.8–10.5)
WBC # FLD AUTO: 14.85 K/UL — HIGH (ref 3.8–10.5)

## 2021-02-10 PROCEDURE — 99285 EMERGENCY DEPT VISIT HI MDM: CPT | Mod: 25

## 2021-02-10 PROCEDURE — 93010 ELECTROCARDIOGRAM REPORT: CPT

## 2021-02-10 PROCEDURE — 71045 X-RAY EXAM CHEST 1 VIEW: CPT | Mod: 26

## 2021-02-10 NOTE — ED ADULT NURSE NOTE - OBJECTIVE STATEMENT
66yo male co SOB. Pt nonverbal, able to indicate yes and no, otherwise communicates through limited written text when stable. Pt presents tachypneic, SpO2 93% RA. With pt consent, pt brother contacted for further information. Per brother Demetrius [phone] (number left with Tang in triage), "he cant breathe... he has junk up in his nose and in his tube... it started up around 6 or 6 and I wanted to take him to the hospital but he said it got better but then it got worse again." PMH includes pharyngeal Ca with total laryngectomy, PEG placement and current treatment at [facility] [place]. Humidified O2 applied to laryngectomy tube with improvement of SpO2 to 97% and WOB.

## 2021-02-11 ENCOUNTER — INPATIENT (INPATIENT)
Facility: HOSPITAL | Age: 66
LOS: 5 days | Discharge: NURSING, REHAB OR LONG-TERM CARE | DRG: 167 | End: 2021-02-17
Attending: OTOLARYNGOLOGY | Admitting: OTOLARYNGOLOGY
Payer: MEDICARE

## 2021-02-11 VITALS
SYSTOLIC BLOOD PRESSURE: 145 MMHG | RESPIRATION RATE: 22 BRPM | HEIGHT: 70 IN | TEMPERATURE: 99 F | HEART RATE: 94 BPM | OXYGEN SATURATION: 95 % | DIASTOLIC BLOOD PRESSURE: 79 MMHG

## 2021-02-11 VITALS
HEART RATE: 72 BPM | TEMPERATURE: 97 F | OXYGEN SATURATION: 94 % | SYSTOLIC BLOOD PRESSURE: 109 MMHG | RESPIRATION RATE: 20 BRPM | DIASTOLIC BLOOD PRESSURE: 63 MMHG

## 2021-02-11 DIAGNOSIS — Z98.890 OTHER SPECIFIED POSTPROCEDURAL STATES: Chronic | ICD-10-CM

## 2021-02-11 DIAGNOSIS — Z90.02 ACQUIRED ABSENCE OF LARYNX: Chronic | ICD-10-CM

## 2021-02-11 DIAGNOSIS — Z90.09 ACQUIRED ABSENCE OF OTHER PART OF HEAD AND NECK: Chronic | ICD-10-CM

## 2021-02-11 DIAGNOSIS — Z41.9 ENCOUNTER FOR PROCEDURE FOR PURPOSES OTHER THAN REMEDYING HEALTH STATE, UNSPECIFIED: Chronic | ICD-10-CM

## 2021-02-11 DIAGNOSIS — Z93.1 GASTROSTOMY STATUS: Chronic | ICD-10-CM

## 2021-02-11 LAB
ALBUMIN SERPL ELPH-MCNC: 3.4 G/DL — SIGNIFICANT CHANGE UP (ref 3.3–5)
ALBUMIN SERPL ELPH-MCNC: 3.5 G/DL — SIGNIFICANT CHANGE UP (ref 3.3–5)
ALP SERPL-CCNC: 72 U/L — SIGNIFICANT CHANGE UP (ref 40–120)
ALP SERPL-CCNC: 77 U/L — SIGNIFICANT CHANGE UP (ref 40–120)
ALT FLD-CCNC: 21 U/L — SIGNIFICANT CHANGE UP (ref 10–45)
ALT FLD-CCNC: 22 U/L — SIGNIFICANT CHANGE UP (ref 10–45)
ANION GAP SERPL CALC-SCNC: 10 MMOL/L — SIGNIFICANT CHANGE UP (ref 5–17)
ANION GAP SERPL CALC-SCNC: 12 MMOL/L — SIGNIFICANT CHANGE UP (ref 5–17)
ANISOCYTOSIS BLD QL: SLIGHT — SIGNIFICANT CHANGE UP
AST SERPL-CCNC: 29 U/L — SIGNIFICANT CHANGE UP (ref 10–40)
AST SERPL-CCNC: 32 U/L — SIGNIFICANT CHANGE UP (ref 10–40)
BASE EXCESS BLDV CALC-SCNC: 11.1 MMOL/L — SIGNIFICANT CHANGE UP
BASOPHILS # BLD AUTO: 0 K/UL — SIGNIFICANT CHANGE UP (ref 0–0.2)
BASOPHILS # BLD AUTO: 0.04 K/UL — SIGNIFICANT CHANGE UP (ref 0–0.2)
BASOPHILS NFR BLD AUTO: 0 % — SIGNIFICANT CHANGE UP (ref 0–2)
BASOPHILS NFR BLD AUTO: 0.3 % — SIGNIFICANT CHANGE UP (ref 0–2)
BILIRUB SERPL-MCNC: 0.3 MG/DL — SIGNIFICANT CHANGE UP (ref 0.2–1.2)
BILIRUB SERPL-MCNC: <0.2 MG/DL — SIGNIFICANT CHANGE UP (ref 0.2–1.2)
BUN SERPL-MCNC: 15 MG/DL — SIGNIFICANT CHANGE UP (ref 7–23)
BUN SERPL-MCNC: 19 MG/DL — SIGNIFICANT CHANGE UP (ref 7–23)
CA-I SERPL-SCNC: 1.08 MMOL/L — LOW (ref 1.12–1.3)
CALCIUM SERPL-MCNC: 8.5 MG/DL — SIGNIFICANT CHANGE UP (ref 8.4–10.5)
CALCIUM SERPL-MCNC: 9 MG/DL — SIGNIFICANT CHANGE UP (ref 8.4–10.5)
CHLORIDE SERPL-SCNC: 88 MMOL/L — LOW (ref 96–108)
CHLORIDE SERPL-SCNC: 91 MMOL/L — LOW (ref 96–108)
CO2 SERPL-SCNC: 33 MMOL/L — HIGH (ref 22–31)
CO2 SERPL-SCNC: 35 MMOL/L — HIGH (ref 22–31)
CREAT SERPL-MCNC: 0.53 MG/DL — SIGNIFICANT CHANGE UP (ref 0.5–1.3)
CREAT SERPL-MCNC: 0.6 MG/DL — SIGNIFICANT CHANGE UP (ref 0.5–1.3)
EOSINOPHIL # BLD AUTO: 0 K/UL — SIGNIFICANT CHANGE UP (ref 0–0.5)
EOSINOPHIL # BLD AUTO: 0.01 K/UL — SIGNIFICANT CHANGE UP (ref 0–0.5)
EOSINOPHIL NFR BLD AUTO: 0 % — SIGNIFICANT CHANGE UP (ref 0–6)
EOSINOPHIL NFR BLD AUTO: 0.1 % — SIGNIFICANT CHANGE UP (ref 0–6)
GAS PNL BLDV: 132 MMOL/L — LOW (ref 138–146)
GAS PNL BLDV: SIGNIFICANT CHANGE UP
GIANT PLATELETS BLD QL SMEAR: PRESENT — SIGNIFICANT CHANGE UP
GLUCOSE SERPL-MCNC: 126 MG/DL — HIGH (ref 70–99)
GLUCOSE SERPL-MCNC: 149 MG/DL — HIGH (ref 70–99)
HCO3 BLDV-SCNC: 39 MMOL/L — HIGH (ref 20–27)
HCT VFR BLD CALC: 29.1 % — LOW (ref 39–50)
HGB BLD-MCNC: 9.3 G/DL — LOW (ref 13–17)
HYPOCHROMIA BLD QL: SLIGHT — SIGNIFICANT CHANGE UP
IMM GRANULOCYTES NFR BLD AUTO: 0.4 % — SIGNIFICANT CHANGE UP (ref 0–1.5)
LYMPHOCYTES # BLD AUTO: 0.32 K/UL — LOW (ref 1–3.3)
LYMPHOCYTES # BLD AUTO: 0.64 K/UL — LOW (ref 1–3.3)
LYMPHOCYTES # BLD AUTO: 2.8 % — LOW (ref 13–44)
LYMPHOCYTES # BLD AUTO: 4.3 % — LOW (ref 13–44)
MACROCYTES BLD QL: SIGNIFICANT CHANGE UP
MANUAL SMEAR VERIFICATION: SIGNIFICANT CHANGE UP
MCHC RBC-ENTMCNC: 32 GM/DL — SIGNIFICANT CHANGE UP (ref 32–36)
MCHC RBC-ENTMCNC: 35.1 PG — HIGH (ref 27–34)
MCV RBC AUTO: 109.8 FL — HIGH (ref 80–100)
MONOCYTES # BLD AUTO: 0.91 K/UL — HIGH (ref 0–0.9)
MONOCYTES # BLD AUTO: 0.97 K/UL — HIGH (ref 0–0.9)
MONOCYTES NFR BLD AUTO: 6.1 % — SIGNIFICANT CHANGE UP (ref 2–14)
MONOCYTES NFR BLD AUTO: 8.5 % — SIGNIFICANT CHANGE UP (ref 2–14)
NEUTROPHILS # BLD AUTO: 10.05 K/UL — HIGH (ref 1.8–7.4)
NEUTROPHILS # BLD AUTO: 13.31 K/UL — HIGH (ref 1.8–7.4)
NEUTROPHILS NFR BLD AUTO: 87.9 % — HIGH (ref 43–77)
NEUTROPHILS NFR BLD AUTO: 88.7 % — HIGH (ref 43–77)
NEUTS BAND # BLD: 0.9 % — SIGNIFICANT CHANGE UP (ref 0–8)
NRBC # BLD: 0 /100 WBCS — SIGNIFICANT CHANGE UP (ref 0–0)
NT-PROBNP SERPL-SCNC: 229 PG/ML — SIGNIFICANT CHANGE UP (ref 0–300)
PCO2 BLDV: 71 MMHG — HIGH (ref 41–51)
PH BLDV: 7.35 — SIGNIFICANT CHANGE UP (ref 7.32–7.43)
PLAT MORPH BLD: NORMAL — SIGNIFICANT CHANGE UP
PLATELET # BLD AUTO: 348 K/UL — SIGNIFICANT CHANGE UP (ref 150–400)
PO2 BLDV: 23 MMHG — SIGNIFICANT CHANGE UP
POLYCHROMASIA BLD QL SMEAR: SLIGHT — SIGNIFICANT CHANGE UP
POTASSIUM BLDV-SCNC: 4.5 MMOL/L — SIGNIFICANT CHANGE UP (ref 3.5–4.9)
POTASSIUM SERPL-MCNC: 3.9 MMOL/L — SIGNIFICANT CHANGE UP (ref 3.5–5.3)
POTASSIUM SERPL-MCNC: 4.5 MMOL/L — SIGNIFICANT CHANGE UP (ref 3.5–5.3)
POTASSIUM SERPL-SCNC: 3.9 MMOL/L — SIGNIFICANT CHANGE UP (ref 3.5–5.3)
POTASSIUM SERPL-SCNC: 4.5 MMOL/L — SIGNIFICANT CHANGE UP (ref 3.5–5.3)
PROT SERPL-MCNC: 6.8 G/DL — SIGNIFICANT CHANGE UP (ref 6–8.3)
PROT SERPL-MCNC: 7 G/DL — SIGNIFICANT CHANGE UP (ref 6–8.3)
RBC # BLD: 2.65 M/UL — LOW (ref 4.2–5.8)
RBC # FLD: 12.3 % — SIGNIFICANT CHANGE UP (ref 10.3–14.5)
RBC BLD AUTO: ABNORMAL
SAO2 % BLDV: 32 % — SIGNIFICANT CHANGE UP
SARS-COV-2 RNA SPEC QL NAA+PROBE: SIGNIFICANT CHANGE UP
SARS-COV-2 RNA SPEC QL NAA+PROBE: SIGNIFICANT CHANGE UP
SODIUM SERPL-SCNC: 133 MMOL/L — LOW (ref 135–145)
SODIUM SERPL-SCNC: 136 MMOL/L — SIGNIFICANT CHANGE UP (ref 135–145)
STOMATOCYTES BLD QL SMEAR: SIGNIFICANT CHANGE UP
TROPONIN T SERPL-MCNC: <0.01 NG/ML — SIGNIFICANT CHANGE UP (ref 0–0.01)
WBC # BLD: 11.44 K/UL — HIGH (ref 3.8–10.5)
WBC # FLD AUTO: 11.44 K/UL — HIGH (ref 3.8–10.5)

## 2021-02-11 PROCEDURE — 71045 X-RAY EXAM CHEST 1 VIEW: CPT | Mod: 26

## 2021-02-11 PROCEDURE — 99285 EMERGENCY DEPT VISIT HI MDM: CPT

## 2021-02-11 RX ORDER — LEVOTHYROXINE SODIUM 125 MCG
150 TABLET ORAL DAILY
Refills: 0 | Status: DISCONTINUED | OUTPATIENT
Start: 2021-02-11 | End: 2021-02-17

## 2021-02-11 RX ORDER — LISINOPRIL 2.5 MG/1
20 TABLET ORAL DAILY
Refills: 0 | Status: DISCONTINUED | OUTPATIENT
Start: 2021-02-11 | End: 2021-02-11

## 2021-02-11 RX ORDER — LEVOTHYROXINE SODIUM 125 MCG
112 TABLET ORAL DAILY
Refills: 0 | Status: DISCONTINUED | OUTPATIENT
Start: 2021-02-11 | End: 2021-02-11

## 2021-02-11 RX ORDER — OXYCODONE HYDROCHLORIDE 5 MG/1
5 TABLET ORAL EVERY 4 HOURS
Refills: 0 | Status: DISCONTINUED | OUTPATIENT
Start: 2021-02-11 | End: 2021-02-11

## 2021-02-11 RX ORDER — SODIUM CHLORIDE 9 MG/ML
3 INJECTION INTRAMUSCULAR; INTRAVENOUS; SUBCUTANEOUS ONCE
Refills: 0 | Status: COMPLETED | OUTPATIENT
Start: 2021-02-11 | End: 2021-02-11

## 2021-02-11 RX ORDER — OXYCODONE HYDROCHLORIDE 5 MG/1
10 TABLET ORAL DAILY
Refills: 0 | Status: DISCONTINUED | OUTPATIENT
Start: 2021-02-11 | End: 2021-02-11

## 2021-02-11 RX ORDER — METHADONE HYDROCHLORIDE 40 MG/1
0.05 TABLET ORAL EVERY 4 HOURS
Refills: 0 | Status: DISCONTINUED | OUTPATIENT
Start: 2021-02-11 | End: 2021-02-11

## 2021-02-11 RX ORDER — SODIUM CHLORIDE 9 MG/ML
3 INJECTION INTRAMUSCULAR; INTRAVENOUS; SUBCUTANEOUS EVERY 4 HOURS
Refills: 0 | Status: DISCONTINUED | OUTPATIENT
Start: 2021-02-11 | End: 2021-02-17

## 2021-02-11 RX ORDER — ACETAMINOPHEN 500 MG
650 TABLET ORAL EVERY 6 HOURS
Refills: 0 | Status: DISCONTINUED | OUTPATIENT
Start: 2021-02-11 | End: 2021-02-17

## 2021-02-11 RX ORDER — LISINOPRIL 2.5 MG/1
20 TABLET ORAL DAILY
Refills: 0 | Status: DISCONTINUED | OUTPATIENT
Start: 2021-02-11 | End: 2021-02-17

## 2021-02-11 RX ORDER — OXYCODONE HYDROCHLORIDE 5 MG/1
10 TABLET ORAL EVERY 4 HOURS
Refills: 0 | Status: DISCONTINUED | OUTPATIENT
Start: 2021-02-11 | End: 2021-02-12

## 2021-02-11 RX ORDER — METHADONE HYDROCHLORIDE 40 MG/1
10 TABLET ORAL THREE TIMES A DAY
Refills: 0 | Status: DISCONTINUED | OUTPATIENT
Start: 2021-02-11 | End: 2021-02-11

## 2021-02-11 RX ORDER — HEPARIN SODIUM 5000 [USP'U]/ML
5000 INJECTION INTRAVENOUS; SUBCUTANEOUS EVERY 12 HOURS
Refills: 0 | Status: DISCONTINUED | OUTPATIENT
Start: 2021-02-11 | End: 2021-02-17

## 2021-02-11 RX ORDER — ACETAMINOPHEN 500 MG
650 TABLET ORAL EVERY 6 HOURS
Refills: 0 | Status: DISCONTINUED | OUTPATIENT
Start: 2021-02-11 | End: 2021-02-11

## 2021-02-11 RX ORDER — OXYCODONE HYDROCHLORIDE 5 MG/1
10 TABLET ORAL EVERY 4 HOURS
Refills: 0 | Status: DISCONTINUED | OUTPATIENT
Start: 2021-02-11 | End: 2021-02-11

## 2021-02-11 RX ORDER — METHADONE HYDROCHLORIDE 40 MG/1
10 TABLET ORAL THREE TIMES A DAY
Refills: 0 | Status: DISCONTINUED | OUTPATIENT
Start: 2021-02-11 | End: 2021-02-17

## 2021-02-11 RX ADMIN — Medication 650 MILLIGRAM(S): at 16:29

## 2021-02-11 RX ADMIN — SODIUM CHLORIDE 3 MILLILITER(S): 9 INJECTION INTRAMUSCULAR; INTRAVENOUS; SUBCUTANEOUS at 16:30

## 2021-02-11 RX ADMIN — Medication 112 MICROGRAM(S): at 15:10

## 2021-02-11 RX ADMIN — OXYCODONE HYDROCHLORIDE 10 MILLIGRAM(S): 5 TABLET ORAL at 23:00

## 2021-02-11 RX ADMIN — SODIUM CHLORIDE 3 MILLILITER(S): 9 INJECTION INTRAMUSCULAR; INTRAVENOUS; SUBCUTANEOUS at 11:34

## 2021-02-11 RX ADMIN — HEPARIN SODIUM 5000 UNIT(S): 5000 INJECTION INTRAVENOUS; SUBCUTANEOUS at 23:00

## 2021-02-11 RX ADMIN — OXYCODONE HYDROCHLORIDE 10 MILLIGRAM(S): 5 TABLET ORAL at 16:30

## 2021-02-11 RX ADMIN — Medication 650 MILLIGRAM(S): at 23:40

## 2021-02-11 RX ADMIN — OXYCODONE HYDROCHLORIDE 5 MILLIGRAM(S): 5 TABLET ORAL at 15:10

## 2021-02-11 NOTE — ED PROVIDER NOTE - NSFOLLOWUPINSTRUCTIONS_ED_ALL_ED_FT
A tracheostomy, or trach, is a surgically created opening in the trachea that helps to improve breathing. It is important to suction a trach from time to time. Typical times to suction the tube include in the morning, at bedtime, before meals, and before changing the tube. Doing this:  •Removes mucus and other fluids that build up in the trachea.    •Keeps the airway clear.    •Makes it easier to breathe.    You will also need to suction the tube whenever mucus is blocking the tube. Signs that you need to suction the tube include:  •Restlessness.    •Increased coughing.    •Trouble breathing.    •Rattling in the chest.    •Difficulty eating or feeding.    •Mucus bubbles at the tube opening.    •Clammy or pale skin.    •Bluish lips or nails.    What are the risks?  This is a safe procedure. However, problems may occur, including:  •Airway becoming blocked.    •Infection.    •Inhaling fluids or mucus (aspiration).    •Tube coming out.    General recommendations  Work with a health care provider before you suction the trach tube at home. Two people may be needed to suction a trach. Make sure you know:  •How to suction properly and safely.    •How to give oxygen if needed.    •How to change the tube if it becomes blocked.    •When to suction and how often to suction.    •When to call for help.    Supplies needed:    •A suction catheter.    •Clean gloves.    •Sterile gloves.    •A clean towel or paper drape.    •A suction machine.    •Connecting tubing.    •Sterile container.    •0.9% saline solution or sterile water.    •Self-inflating bag.    How to suction a trach tube     There are several types of trach tubes. These are general guidelines. Follow the health care provider's instructions on how to suction the tube.    Getting ready     1.Have all supplies ready and available.      2.Wash your hands with soap and water.      3.Put on clean gloves.      4.Attach one end of the connecting tubing to the suction machine. Place the other end next to the person who has a trach.      5.Turn on the suction machine.      6.Set the vacuum regulator to the appropriate negative pressure.      7.Have the person take deep breaths.    8.Prepare the suction catheter. While you do this, make sure the tube tip does not touch any non-sterile surface.•For a one-time-use catheter:  •Open the catheter or kit.      •Lay a clean towel or paper drape across the person's chest.      •Unwrap or open the sterile container and put it on a nearby table.      •Pour the saline solution or sterile water into the container.      •Take off your gloves.      •Wash your hands.      •Put on sterile gloves. After you put these gloves on, do not touch any non-sterile surfaces.      •For a closed-suction catheter:  •Take off your gloves.      •Wash your hands.      •Put on sterile gloves. After you put these gloves on, do not touch any non-sterile surfaces.      9. the connecting tubing and the catheter, and attach the connecting tubing to the catheter tubing.      10.To check that all equipment is working as it should, try suctioning a small amount of saline solution or sterile water from the container.      Suctioning the trach tube   To suction the trach tube, do the followin.Give extra oxygen as needed.      2.If the person is receiving mechanical ventilation, open the suction access (swivel adapter). If necessary, remove the oxygen or humidity delivery device.      3.Without applying suction, gently and quickly insert the catheter into the trach using your thumb and forefinger. When you feel resistance, pull it back ½ inch (1.27 cm). The person may cough. This is normal.      4.Start suctioning as you slowly pull out the catheter. Suction for no longer than 5 seconds before removing the catheter.      5.If the person is receiving mechanical ventilation, close the swivel adapter or replace the oxygen delivery device.      6.Have the person take deep breaths.      7.Rinse the catheter and the connecting tubing with saline solution or sterile water to clear any mucus. Use continuous suction.      8.Repeat these steps one or two more times until the person no longer has noisy breathing. Pause for at least 30 seconds before you repeat these steps.      9.Suction secretions out of the mouth.      After suctioning the trach tube    1.Provide oxygen with a self-inflating bag if told by the health care provider.      2.Observe breathing for a few minutes to make sure the person is breathing easily.      3.Turn off the suction machine.      4.Throw away any used supplies.      5.Wash your hands.        Contact a health care provider if:    •The person is not breathing easily after suctioning.      •The person's mucus becomes thick and discolored, or the mucus has an odor.      •You have any questions about how to care for the trach tube.      •The skin around the trach is red, tender, or weepy.        Get help right away if:    •The person is struggling to breathe. This may include rapid breathing, flared nostrils, noisy breathing, clammy skin, restlessness, or blue lips.      •The trach tube comes out and you cannot replace it.      •There is bright red blood coming from the tube or bright red blood inside the suction catheter after suctioning.      These symptoms may represent a serious problem that is an emergency. Do not wait to see if the symptoms will go away. Get medical help right away. Call your local emergency services (911 in the [place]       Summary    •A tracheostomy, or trach, is a surgically created opening in the trachea that helps to improve breathing. It is important to suction a trach from time to time.      •When you suction a trach, make sure to follow any specific instructions that were given by the health care provider.      •Before you begin suctioning the trach, make sure that you have all supplies and that the machine is working properly.      •Be sure to wash hands with soap and water throughout your care as directed by the health care provider.      This information is not intended to replace advice given to you by your health care provider. Make sure you discuss any questions you have with your health care provider.

## 2021-02-11 NOTE — ED PROVIDER NOTE - PATIENT PORTAL LINK FT
You can access the FollowMyHealth Patient Portal offered by [facility] by registering at the following website: [email or web address]. By joining [employer]’s FollowMyHealth portal, you will also be able to view your health information using other applications (apps) compatible with our system.

## 2021-02-11 NOTE — CONSULT NOTE ADULT - SUBJECTIVE AND OBJECTIVE BOX
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 Presenting with difficulty breathing. Has had previous presentations to the ED with extensive crusting in trachea requiring bedside debridement. Also with facial edema, currently undergoing chemotherapy treatment.At bedside exam, reports difficulty breathing with audible wheezing. Laryngectomy tube in place.      Allergies    penicillin (Rash)    Intolerances    PAST MEDICAL & SURGICAL HISTORY:  Larynx cancer    SOB (shortness of breath)    Throat cancer    Jaundice  1965    Fracture  left ankle    Laryngeal mass  s/ p radiation    H/O laryngectomy    H/O thyroidectomy    H/O tracheostomy    PEG (percutaneous endoscopic gastrostomy) status    Surgery, elective  direct laryngoscopy with biopsy- 4/2017    Status post surgery  inguinal hernia    Status post surgery  Left ankle surgical repair x2 (1965)    FAMILY HISTORY:  No pertinent family history in first degree relatives    ICU Vital Signs Last 24 Hrs  T(C): 36.8 (10 Feb 2021 22:28), Max: 36.8 (10 Feb 2021 22:28)  T(F): 98.3 (10 Feb 2021 22:28), Max: 98.3 (10 Feb 2021 22:28)  HR: 98 (11 Feb 2021 01:00) (79 - 98)  BP: 120/75 (11 Feb 2021 01:00) (120/75 - 187/90)  BP(mean): --  ABP: --  ABP(mean): --  RR: 22 (11 Feb 2021 01:00) (22 - 22)  SpO2: 95% (11 Feb 2021 01:00) (95% - 96%)      PHYSICAL EXAM:    ENT EXAM-  AAOx3  appears uncomfortable, audible stridor; periorbital facial edema; but has a high pitched strained quality  larytube in place, clean, secured with soft tie    TRACHEOSCOPY:  tracheoscopy performed, significant crusting seen at the distal end of the bebeto  tube, occluding a significant part of the stomal airway.    PROCEDURE:  using a lev clamp, the crusting was carefully removed. no bleeding.  tracheoscopy repeated and airway was clear to maddie. no thick mucus or  erythema or pus to suggest tracheitis. patient tolerated procedure well.    A/P: 65y Male s/p TL and CRT, now with facial swelling and SOB, s/p airway  crusting removal.    - f/u with SLP or ENT as necessary. Follow up with the ENT (Ear, Nose, Throat)  department after discharge. Call [phone] for appointment.  - humidication of airway at home, or use HME valve (heat moisture exchange)

## 2021-02-11 NOTE — H&P ADULT - HISTORY OF PRESENT ILLNESS
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 Patient has presented three times in the past week with difficulty breathing and found to have near 100% occlusion at the distal tip of his laryngectomy tube. He has undergone debridement and now cauterization of granulation tissue. A 8 Shiley tracheostomy tube has been placed in order to bypass area of stenosis and occlusion. He will be transferred to the stepdown unit for airway monitoring.    Allergies    penicillin (Rash)    Intolerances    PAST MEDICAL & SURGICAL HISTORY:  Larynx cancer    SOB (shortness of breath)    Throat cancer    Jaundice  1965    Fracture  left ankle    Laryngeal mass  s/ p radiation    H/O laryngectomy    H/O thyroidectomy    H/O tracheostomy    PEG (percutaneous endoscopic gastrostomy) status    Surgery, elective  direct laryngoscopy with biopsy- 4/2017    Status post surgery  inguinal hernia    Status post surgery  Left ankle surgical repair x2 (1965)    FAMILY HISTORY:  No pertinent family history in first degree relatives  ICU Vital Signs Last 24 Hrs  T(C): 37.1 (11 Feb 2021 12:17), Max: 37.1 (11 Feb 2021 07:28)  T(F): 98.7 (11 Feb 2021 12:17), Max: 98.7 (11 Feb 2021 07:28)  HR: 71 (11 Feb 2021 12:17) (67 - 98)  BP: 115/78 (11 Feb 2021 12:17) (109/63 - 187/90)  BP(mean): --  ABP: --  ABP(mean): --  RR: 20 (11 Feb 2021 12:17) (20 - 22)  SpO2: 100% (11 Feb 2021 12:17) (94% - 100%)    PHYSICAL EXAM:    ENT EXAM-  AAOx3  General: comfortable in no acute distress, audible stridor;   Face: periorbital facial edema, positionally improved  Neck: 8 Shiley tracheostomy tube in place, secured with soft tie.       TRACHEOSCOPY:  tracheoscopy performed, significant crusting seen at the distal end of the larytube, occluding a significant part of the stomal airway.    PROCEDURE:  using a lev clamp, the crusting was carefully removed. no bleeding. Shelfed area of granulation tissue cauterized with silver nitrate. tracheoscopy repeated and airway was clear to maddie. no thick mucus or erythema or pus to suggest tracheitis. patient tolerated procedure well.    A/P: 65y Male s/p TL and CRT, now with facial swelling and SOB, found to have occlusion at distal end of tracheostomy tube 2/2 to inspissated secretions, s/p airway crusting removal, granulation tissue cauterization, and laryngectomy tube exchange to a 8Shiley tracheostomy tube. Tracheostomy tube is not flush with skin and should extend 1 cm anteriorly.    -Humidified air at all times  -Saline nebulizer every 6 hours  -SLP consultation  -please page ENT with questions or concerns

## 2021-02-11 NOTE — ED PROVIDER NOTE - CLINICAL SUMMARY MEDICAL DECISION MAKING FREE TEXT BOX
Impression: recurrent sob/ stridor 2/2 occlusion of tracheostomy due to extensive dried secretions, debrided at bedside by ENT with improvement. Afebrile. HDS. Plan for admission to ENT svc for treatment w/ humidified air to prevent build-up of dry secretions and monitoring of respiratory status.

## 2021-02-11 NOTE — ED ADULT NURSE NOTE - NSIMPLEMENTINTERV_GEN_ALL_ED
Implemented All Fall with Harm Risk Interventions:  [place] to call system. Call bell, personal items and telephone within reach. Instruct patient to call for assistance. Room bathroom lighting operational. Non-slip footwear when patient is off stretcher. Physically safe environment: no spills, clutter or unnecessary equipment. Stretcher in lowest position, wheels locked, appropriate side rails in place. Provide visual cue, wrist band, yellow gown, etc. Monitor gait and stability. Monitor for mental status changes and reorient to person, place, and time. Review medications for side effects contributing to fall risk. Reinforce activity limits and safety measures with patient and family. Provide visual clues: red socks.

## 2021-02-11 NOTE — ED PROVIDER NOTE - PHYSICAL EXAMINATION
VITAL SIGNS: I have reviewed nursing notes and confirm.  CONSTITUTIONAL: Well-developed; well-nourished; in mild resp distress, stridorous.  SKIN:  warm and dry, no acute rash.   HEAD:  normocephalic, atraumatic.  EYES: EOM intact; conjunctiva and sclera clear.  ENT: No nasal discharge; airway clear.   NECK: Supple; + tracheostomy.  CARD: S1, S2 normal; no murmurs, gallops, or rubs. Regular rate and rhythm.   RESP:  Clear to auscultation b/l, no wheezes, rales or rhonchi.  ABD: Normal bowel sounds; soft; non-distended; non-tender; no guarding/ rebound.  EXT: Normal ROM. No clubbing, cyanosis or edema. 2+ pulses to b/l ue/le.  NEURO: Alert, oriented, grossly unremarkable  PSYCH: Cooperative, mood and affect appropriate. VITAL SIGNS: I have reviewed nursing notes and confirm.  CONSTITUTIONAL: Well-developed; well-nourished; in mild resp distress, stridorous.  SKIN:  warm and dry, no acute rash.  HEAD:  normocephalic, atraumatic.  EYES: EOM intact; conjunctiva and sclera clear.  ENT: No nasal discharge; airway clear. + facial edema R>L.  NECK: Supple; + tracheostomy.  CARD: S1, S2 normal; no murmurs, gallops, or rubs. Regular rate and rhythm.   RESP:  Clear to auscultation b/l, no wheezes, rales or rhonchi.  ABD: Normal bowel sounds; soft; non-distended; non-tender; no guarding/ rebound.  EXT: Normal ROM. No clubbing, cyanosis or edema. 2+ pulses to b/l ue/le.  NEURO: Alert, oriented, grossly unremarkable  PSYCH: Cooperative, mood and affect appropriate.

## 2021-02-11 NOTE — ED PROVIDER NOTE - PROGRESS NOTE DETAILS
ENT - pt had secretions adhering and occluding airway at stoma. debrided - now airway open. pt feeling much better.  states they are working with Dr. Branch' office to get pt humidified air at home. I have discussed the discharge plan with the patient. The patient agrees with the plan, as discussed.  The patient understands Emergency Department diagnosis is a preliminary diagnosis often based on limited information and that the patient must adhere to the follow-up plan as discussed.  The patient understands that if the symptoms worsen the patient may return to the Emergency Department at any time for further evaluation and treatment.

## 2021-02-11 NOTE — ED PROVIDER NOTE - CARE PLAN
Principal Discharge DX:	Shortness of breath   Principal Discharge DX:	Shortness of breath  Secondary Diagnosis:	Tracheostomy complication, unspecified complication type

## 2021-02-11 NOTE — ED ADULT NURSE NOTE - OBJECTIVE STATEMENT
66yo male returns to ED after discharge early this morning with note that reads "LISTEN HE CAN'T BREATHE!!! PLEASE DON'T RELEASE HIM UNTIL HE IS ACTUALLY STABLE LET HIM SIT IN REG CHAIR SO YOU CAN SEE HOW IT REACTS INSTEAD OF LAYING BACK IN THE BED THANK YOU! MAYBE PANIC ATTACKS? DR LOVE 10TH FL MIGHT HELP?" Per pt, note written by brother, Demetrius. Pt nonverbal, able to indicate yes and no, otherwise communicates through limited written text when stable. Pt presents tachypneic, SpO2 95% RA, audible stridor. Pt discharged this morning stable, no indications of acute distress. PMH includes pharyngeal Ca with total laryngectomy, PEG placement and current treatment at [facility]. Humidified O2 applied to laryngectomy tube. Endorses pt brother smoker.

## 2021-02-11 NOTE — ED PROVIDER NOTE - OBJECTIVE STATEMENT
65M hx laryngeal ca, s/p laryngectomy, tracheostomy, peg, c/o SOB.  pt unable to talk, communicates with writing.  +cough. no fevers. no vomiting. pt was here 2/9 for similar complaint.  seen by ENT and on scope found to have significant crusting to end of laryngectomy tube that was removed with improvement in pt's symptoms.

## 2021-02-11 NOTE — ED ADULT TRIAGE NOTE - ARRIVAL INFO ADDITIONAL COMMENTS
pt c/o sob,  was discharged a few hours ago after being seen for same.  pt hx of trach and he pulled it out when he could not breath.

## 2021-02-11 NOTE — ED PROVIDER NOTE - OBJECTIVE STATEMENT
Pt is a 66yo m, h/o laryngeal scc s/p tracheostomy, laryngectomy, on chemo, seen in ed last night for sob and found to have dense crusting to trachea s/p debridement and dc, who presents again w/ sob. No associated chest pain, palpitations, fever, chills, cough. Limited hx provided as pt is non-verbal.

## 2021-02-12 LAB
ANION GAP SERPL CALC-SCNC: 7 MMOL/L — SIGNIFICANT CHANGE UP (ref 5–17)
BUN SERPL-MCNC: 17 MG/DL — SIGNIFICANT CHANGE UP (ref 7–23)
CALCIUM SERPL-MCNC: 8 MG/DL — LOW (ref 8.4–10.5)
CHLORIDE SERPL-SCNC: 93 MMOL/L — LOW (ref 96–108)
CO2 SERPL-SCNC: 36 MMOL/L — HIGH (ref 22–31)
CREAT SERPL-MCNC: 0.52 MG/DL — SIGNIFICANT CHANGE UP (ref 0.5–1.3)
GLUCOSE SERPL-MCNC: 106 MG/DL — HIGH (ref 70–99)
HCT VFR BLD CALC: 29.9 % — LOW (ref 39–50)
HGB BLD-MCNC: 9.6 G/DL — LOW (ref 13–17)
MAGNESIUM SERPL-MCNC: 1.9 MG/DL — SIGNIFICANT CHANGE UP (ref 1.6–2.6)
MCHC RBC-ENTMCNC: 32.1 GM/DL — SIGNIFICANT CHANGE UP (ref 32–36)
MCHC RBC-ENTMCNC: 34.7 PG — HIGH (ref 27–34)
MCV RBC AUTO: 107.9 FL — HIGH (ref 80–100)
NRBC # BLD: 0 /100 WBCS — SIGNIFICANT CHANGE UP (ref 0–0)
PHOSPHATE SERPL-MCNC: 3.4 MG/DL — SIGNIFICANT CHANGE UP (ref 2.5–4.5)
PLATELET # BLD AUTO: 344 K/UL — SIGNIFICANT CHANGE UP (ref 150–400)
POTASSIUM SERPL-MCNC: 4.1 MMOL/L — SIGNIFICANT CHANGE UP (ref 3.5–5.3)
POTASSIUM SERPL-SCNC: 4.1 MMOL/L — SIGNIFICANT CHANGE UP (ref 3.5–5.3)
RBC # BLD: 2.77 M/UL — LOW (ref 4.2–5.8)
RBC # FLD: 12.3 % — SIGNIFICANT CHANGE UP (ref 10.3–14.5)
SODIUM SERPL-SCNC: 136 MMOL/L — SIGNIFICANT CHANGE UP (ref 135–145)
WBC # BLD: 9.4 K/UL — SIGNIFICANT CHANGE UP (ref 3.8–10.5)
WBC # FLD AUTO: 9.4 K/UL — SIGNIFICANT CHANGE UP (ref 3.8–10.5)

## 2021-02-12 RX ORDER — OXYCODONE HYDROCHLORIDE 5 MG/1
5 TABLET ORAL EVERY 6 HOURS
Refills: 0 | Status: DISCONTINUED | OUTPATIENT
Start: 2021-02-12 | End: 2021-02-13

## 2021-02-12 RX ORDER — SODIUM CHLORIDE 0.65 %
1 AEROSOL, SPRAY (ML) NASAL
Refills: 0 | Status: DISCONTINUED | OUTPATIENT
Start: 2021-02-12 | End: 2021-02-17

## 2021-02-12 RX ORDER — SODIUM CHLORIDE 9 MG/ML
500 INJECTION, SOLUTION INTRAVENOUS ONCE
Refills: 0 | Status: COMPLETED | OUTPATIENT
Start: 2021-02-12 | End: 2021-02-12

## 2021-02-12 RX ORDER — ACETYLCYSTEINE 200 MG/ML
4 VIAL (ML) MISCELLANEOUS EVERY 6 HOURS
Refills: 0 | Status: COMPLETED | OUTPATIENT
Start: 2021-02-12 | End: 2021-02-15

## 2021-02-12 RX ADMIN — SODIUM CHLORIDE 1000 MILLILITER(S): 9 INJECTION, SOLUTION INTRAVENOUS at 21:15

## 2021-02-12 RX ADMIN — OXYCODONE HYDROCHLORIDE 10 MILLIGRAM(S): 5 TABLET ORAL at 02:30

## 2021-02-12 RX ADMIN — OXYCODONE HYDROCHLORIDE 10 MILLIGRAM(S): 5 TABLET ORAL at 15:12

## 2021-02-12 RX ADMIN — METHADONE HYDROCHLORIDE 10 MILLIGRAM(S): 40 TABLET ORAL at 15:12

## 2021-02-12 RX ADMIN — SODIUM CHLORIDE 3 MILLILITER(S): 9 INJECTION INTRAMUSCULAR; INTRAVENOUS; SUBCUTANEOUS at 18:08

## 2021-02-12 RX ADMIN — METHADONE HYDROCHLORIDE 10 MILLIGRAM(S): 40 TABLET ORAL at 06:49

## 2021-02-12 RX ADMIN — SODIUM CHLORIDE 3 MILLILITER(S): 9 INJECTION INTRAMUSCULAR; INTRAVENOUS; SUBCUTANEOUS at 22:15

## 2021-02-12 RX ADMIN — OXYCODONE HYDROCHLORIDE 10 MILLIGRAM(S): 5 TABLET ORAL at 18:08

## 2021-02-12 RX ADMIN — HEPARIN SODIUM 5000 UNIT(S): 5000 INJECTION INTRAVENOUS; SUBCUTANEOUS at 18:07

## 2021-02-12 RX ADMIN — METHADONE HYDROCHLORIDE 10 MILLIGRAM(S): 40 TABLET ORAL at 22:47

## 2021-02-12 RX ADMIN — Medication 1 SPRAY(S): at 21:55

## 2021-02-12 RX ADMIN — Medication 4 MILLILITER(S): at 22:48

## 2021-02-12 RX ADMIN — SODIUM CHLORIDE 3 MILLILITER(S): 9 INJECTION INTRAMUSCULAR; INTRAVENOUS; SUBCUTANEOUS at 15:12

## 2021-02-12 RX ADMIN — OXYCODONE HYDROCHLORIDE 10 MILLIGRAM(S): 5 TABLET ORAL at 10:02

## 2021-02-12 RX ADMIN — Medication 650 MILLIGRAM(S): at 06:52

## 2021-02-12 RX ADMIN — SODIUM CHLORIDE 3 MILLILITER(S): 9 INJECTION INTRAMUSCULAR; INTRAVENOUS; SUBCUTANEOUS at 07:13

## 2021-02-12 RX ADMIN — SODIUM CHLORIDE 3 MILLILITER(S): 9 INJECTION INTRAMUSCULAR; INTRAVENOUS; SUBCUTANEOUS at 10:02

## 2021-02-12 RX ADMIN — METHADONE HYDROCHLORIDE 10 MILLIGRAM(S): 40 TABLET ORAL at 00:34

## 2021-02-12 RX ADMIN — HEPARIN SODIUM 5000 UNIT(S): 5000 INJECTION INTRAVENOUS; SUBCUTANEOUS at 06:49

## 2021-02-12 RX ADMIN — LISINOPRIL 20 MILLIGRAM(S): 2.5 TABLET ORAL at 06:49

## 2021-02-12 RX ADMIN — OXYCODONE HYDROCHLORIDE 10 MILLIGRAM(S): 5 TABLET ORAL at 06:49

## 2021-02-12 RX ADMIN — Medication 150 MICROGRAM(S): at 06:49

## 2021-02-12 RX ADMIN — Medication 650 MILLIGRAM(S): at 18:48

## 2021-02-12 RX ADMIN — SODIUM CHLORIDE 1000 MILLILITER(S): 9 INJECTION, SOLUTION INTRAVENOUS at 20:37

## 2021-02-13 RX ORDER — OXYCODONE HYDROCHLORIDE 5 MG/1
5 TABLET ORAL EVERY 6 HOURS
Refills: 0 | Status: DISCONTINUED | OUTPATIENT
Start: 2021-02-13 | End: 2021-02-17

## 2021-02-13 RX ADMIN — Medication 4 MILLILITER(S): at 16:43

## 2021-02-13 RX ADMIN — SODIUM CHLORIDE 3 MILLILITER(S): 9 INJECTION INTRAMUSCULAR; INTRAVENOUS; SUBCUTANEOUS at 22:46

## 2021-02-13 RX ADMIN — OXYCODONE HYDROCHLORIDE 5 MILLIGRAM(S): 5 TABLET ORAL at 06:11

## 2021-02-13 RX ADMIN — Medication 1 SPRAY(S): at 06:12

## 2021-02-13 RX ADMIN — SODIUM CHLORIDE 3 MILLILITER(S): 9 INJECTION INTRAMUSCULAR; INTRAVENOUS; SUBCUTANEOUS at 02:42

## 2021-02-13 RX ADMIN — Medication 650 MILLIGRAM(S): at 16:42

## 2021-02-13 RX ADMIN — SODIUM CHLORIDE 3 MILLILITER(S): 9 INJECTION INTRAMUSCULAR; INTRAVENOUS; SUBCUTANEOUS at 06:12

## 2021-02-13 RX ADMIN — Medication 4 MILLILITER(S): at 12:13

## 2021-02-13 RX ADMIN — HEPARIN SODIUM 5000 UNIT(S): 5000 INJECTION INTRAVENOUS; SUBCUTANEOUS at 06:11

## 2021-02-13 RX ADMIN — SODIUM CHLORIDE 3 MILLILITER(S): 9 INJECTION INTRAMUSCULAR; INTRAVENOUS; SUBCUTANEOUS at 13:30

## 2021-02-13 RX ADMIN — METHADONE HYDROCHLORIDE 10 MILLIGRAM(S): 40 TABLET ORAL at 21:38

## 2021-02-13 RX ADMIN — OXYCODONE HYDROCHLORIDE 5 MILLIGRAM(S): 5 TABLET ORAL at 16:42

## 2021-02-13 RX ADMIN — HEPARIN SODIUM 5000 UNIT(S): 5000 INJECTION INTRAVENOUS; SUBCUTANEOUS at 18:40

## 2021-02-13 RX ADMIN — Medication 4 MILLILITER(S): at 03:56

## 2021-02-13 RX ADMIN — Medication 150 MICROGRAM(S): at 06:11

## 2021-02-13 RX ADMIN — Medication 4 MILLILITER(S): at 21:38

## 2021-02-13 RX ADMIN — SODIUM CHLORIDE 3 MILLILITER(S): 9 INJECTION INTRAMUSCULAR; INTRAVENOUS; SUBCUTANEOUS at 17:40

## 2021-02-13 NOTE — DIETITIAN INITIAL EVALUATION ADULT. - MALNUTRITION
malnutrition suspected however can not confirm as no current wt noted, has been receiving TF to meet 100% of EER
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
## 2021-02-13 NOTE — DIETITIAN INITIAL EVALUATION ADULT. - OTHER CALCULATIONS
Ideal body weight used for calculations as pt <80% of IBW. (73% IBW). Nutrient needs based on [facility] standards of care for maintenance in adults, adjusted for trach, infection, catabolic illness, fluid per team

## 2021-02-13 NOTE — DIETITIAN INITIAL EVALUATION ADULT. - ENTERAL
Recommend Jevity 1.2 x8 cans/ day via PEG to provide: 1896ml TV, 2280kcal, 104g pro, 1528ml h2o (1.4g/kg pro/IBW). Recommend 2 cans (474ml) at breakfast, lunch, dinner, and after dinner (8am, 12pm, 4pm, 8pm) or as tolerated

## 2021-02-13 NOTE — DIETITIAN INITIAL EVALUATION ADULT. - ADD RECOMMEND
1. Switch TF to above 2. Manage pain 3. Monitor and replete lytes prn 4. Trend wts 5. Monitor for s/sx of intolerance

## 2021-02-14 LAB
ANION GAP SERPL CALC-SCNC: 8 MMOL/L — SIGNIFICANT CHANGE UP (ref 5–17)
BUN SERPL-MCNC: 12 MG/DL — SIGNIFICANT CHANGE UP (ref 7–23)
CALCIUM SERPL-MCNC: 7.5 MG/DL — LOW (ref 8.4–10.5)
CHLORIDE SERPL-SCNC: 87 MMOL/L — LOW (ref 96–108)
CO2 SERPL-SCNC: 34 MMOL/L — HIGH (ref 22–31)
CREAT SERPL-MCNC: 0.36 MG/DL — LOW (ref 0.5–1.3)
CULTURE RESULTS: SIGNIFICANT CHANGE UP
CULTURE RESULTS: SIGNIFICANT CHANGE UP
GLUCOSE SERPL-MCNC: 93 MG/DL — SIGNIFICANT CHANGE UP (ref 70–99)
HCT VFR BLD CALC: 32.9 % — LOW (ref 39–50)
HGB BLD-MCNC: 10.3 G/DL — LOW (ref 13–17)
MAGNESIUM SERPL-MCNC: 1.6 MG/DL — SIGNIFICANT CHANGE UP (ref 1.6–2.6)
MCHC RBC-ENTMCNC: 31.3 GM/DL — LOW (ref 32–36)
MCHC RBC-ENTMCNC: 34.7 PG — HIGH (ref 27–34)
MCV RBC AUTO: 110.8 FL — HIGH (ref 80–100)
NRBC # BLD: 0 /100 WBCS — SIGNIFICANT CHANGE UP (ref 0–0)
PHOSPHATE SERPL-MCNC: 3.4 MG/DL — SIGNIFICANT CHANGE UP (ref 2.5–4.5)
PLATELET # BLD AUTO: 303 K/UL — SIGNIFICANT CHANGE UP (ref 150–400)
POTASSIUM SERPL-MCNC: 4 MMOL/L — SIGNIFICANT CHANGE UP (ref 3.5–5.3)
POTASSIUM SERPL-SCNC: 4 MMOL/L — SIGNIFICANT CHANGE UP (ref 3.5–5.3)
RBC # BLD: 2.97 M/UL — LOW (ref 4.2–5.8)
RBC # FLD: 12.3 % — SIGNIFICANT CHANGE UP (ref 10.3–14.5)
SARS-COV-2 IGG SERPL QL IA: NEGATIVE — SIGNIFICANT CHANGE UP
SARS-COV-2 IGM SERPL IA-ACNC: 0.52 INDEX — SIGNIFICANT CHANGE UP
SODIUM SERPL-SCNC: 129 MMOL/L — LOW (ref 135–145)
SPECIMEN SOURCE: SIGNIFICANT CHANGE UP
SPECIMEN SOURCE: SIGNIFICANT CHANGE UP
WBC # BLD: 11.39 K/UL — HIGH (ref 3.8–10.5)
WBC # FLD AUTO: 11.39 K/UL — HIGH (ref 3.8–10.5)

## 2021-02-14 PROCEDURE — 71045 X-RAY EXAM CHEST 1 VIEW: CPT | Mod: 26

## 2021-02-14 RX ADMIN — SODIUM CHLORIDE 3 MILLILITER(S): 9 INJECTION INTRAMUSCULAR; INTRAVENOUS; SUBCUTANEOUS at 17:50

## 2021-02-14 RX ADMIN — Medication 4 MILLILITER(S): at 17:50

## 2021-02-14 RX ADMIN — SODIUM CHLORIDE 3 MILLILITER(S): 9 INJECTION INTRAMUSCULAR; INTRAVENOUS; SUBCUTANEOUS at 05:20

## 2021-02-14 RX ADMIN — LISINOPRIL 20 MILLIGRAM(S): 2.5 TABLET ORAL at 05:40

## 2021-02-14 RX ADMIN — SODIUM CHLORIDE 3 MILLILITER(S): 9 INJECTION INTRAMUSCULAR; INTRAVENOUS; SUBCUTANEOUS at 14:29

## 2021-02-14 RX ADMIN — Medication 4 MILLILITER(S): at 22:50

## 2021-02-14 RX ADMIN — HEPARIN SODIUM 5000 UNIT(S): 5000 INJECTION INTRAVENOUS; SUBCUTANEOUS at 17:50

## 2021-02-14 RX ADMIN — METHADONE HYDROCHLORIDE 10 MILLIGRAM(S): 40 TABLET ORAL at 17:50

## 2021-02-14 RX ADMIN — Medication 4 MILLILITER(S): at 05:00

## 2021-02-14 RX ADMIN — SODIUM CHLORIDE 3 MILLILITER(S): 9 INJECTION INTRAMUSCULAR; INTRAVENOUS; SUBCUTANEOUS at 22:00

## 2021-02-14 RX ADMIN — HEPARIN SODIUM 5000 UNIT(S): 5000 INJECTION INTRAVENOUS; SUBCUTANEOUS at 05:40

## 2021-02-14 RX ADMIN — Medication 4 MILLILITER(S): at 11:07

## 2021-02-14 RX ADMIN — Medication 150 MICROGRAM(S): at 05:40

## 2021-02-14 RX ADMIN — SODIUM CHLORIDE 3 MILLILITER(S): 9 INJECTION INTRAMUSCULAR; INTRAVENOUS; SUBCUTANEOUS at 11:33

## 2021-02-14 RX ADMIN — SODIUM CHLORIDE 3 MILLILITER(S): 9 INJECTION INTRAMUSCULAR; INTRAVENOUS; SUBCUTANEOUS at 02:10

## 2021-02-14 RX ADMIN — OXYCODONE HYDROCHLORIDE 5 MILLIGRAM(S): 5 TABLET ORAL at 05:40

## 2021-02-15 RX ADMIN — SODIUM CHLORIDE 3 MILLILITER(S): 9 INJECTION INTRAMUSCULAR; INTRAVENOUS; SUBCUTANEOUS at 14:50

## 2021-02-15 RX ADMIN — LISINOPRIL 20 MILLIGRAM(S): 2.5 TABLET ORAL at 17:21

## 2021-02-15 RX ADMIN — OXYCODONE HYDROCHLORIDE 5 MILLIGRAM(S): 5 TABLET ORAL at 22:31

## 2021-02-15 RX ADMIN — SODIUM CHLORIDE 3 MILLILITER(S): 9 INJECTION INTRAMUSCULAR; INTRAVENOUS; SUBCUTANEOUS at 17:21

## 2021-02-15 RX ADMIN — Medication 150 MICROGRAM(S): at 05:54

## 2021-02-15 RX ADMIN — Medication 4 MILLILITER(S): at 10:42

## 2021-02-15 RX ADMIN — Medication 650 MILLIGRAM(S): at 14:37

## 2021-02-15 RX ADMIN — HEPARIN SODIUM 5000 UNIT(S): 5000 INJECTION INTRAVENOUS; SUBCUTANEOUS at 05:54

## 2021-02-15 RX ADMIN — SODIUM CHLORIDE 3 MILLILITER(S): 9 INJECTION INTRAMUSCULAR; INTRAVENOUS; SUBCUTANEOUS at 10:05

## 2021-02-15 RX ADMIN — SODIUM CHLORIDE 3 MILLILITER(S): 9 INJECTION INTRAMUSCULAR; INTRAVENOUS; SUBCUTANEOUS at 05:54

## 2021-02-15 RX ADMIN — METHADONE HYDROCHLORIDE 10 MILLIGRAM(S): 40 TABLET ORAL at 18:11

## 2021-02-15 RX ADMIN — Medication 650 MILLIGRAM(S): at 22:31

## 2021-02-15 RX ADMIN — OXYCODONE HYDROCHLORIDE 5 MILLIGRAM(S): 5 TABLET ORAL at 14:11

## 2021-02-15 RX ADMIN — Medication 4 MILLILITER(S): at 05:54

## 2021-02-15 RX ADMIN — METHADONE HYDROCHLORIDE 10 MILLIGRAM(S): 40 TABLET ORAL at 00:56

## 2021-02-15 RX ADMIN — SODIUM CHLORIDE 3 MILLILITER(S): 9 INJECTION INTRAMUSCULAR; INTRAVENOUS; SUBCUTANEOUS at 22:31

## 2021-02-15 RX ADMIN — HEPARIN SODIUM 5000 UNIT(S): 5000 INJECTION INTRAVENOUS; SUBCUTANEOUS at 17:21

## 2021-02-15 RX ADMIN — Medication 4 MILLILITER(S): at 17:21

## 2021-02-15 RX ADMIN — SODIUM CHLORIDE 3 MILLILITER(S): 9 INJECTION INTRAMUSCULAR; INTRAVENOUS; SUBCUTANEOUS at 01:13

## 2021-02-16 ENCOUNTER — TRANSCRIPTION ENCOUNTER (OUTPATIENT)
Age: 66
End: 2021-02-16

## 2021-02-16 LAB
ANION GAP SERPL CALC-SCNC: 9 MMOL/L — SIGNIFICANT CHANGE UP (ref 5–17)
BUN SERPL-MCNC: 16 MG/DL — SIGNIFICANT CHANGE UP (ref 7–23)
CALCIUM SERPL-MCNC: 7.8 MG/DL — LOW (ref 8.4–10.5)
CHLORIDE SERPL-SCNC: 89 MMOL/L — LOW (ref 96–108)
CO2 SERPL-SCNC: 36 MMOL/L — HIGH (ref 22–31)
CREAT SERPL-MCNC: 0.43 MG/DL — LOW (ref 0.5–1.3)
GLUCOSE SERPL-MCNC: 90 MG/DL — SIGNIFICANT CHANGE UP (ref 70–99)
MAGNESIUM SERPL-MCNC: 1.9 MG/DL — SIGNIFICANT CHANGE UP (ref 1.6–2.6)
PHOSPHATE SERPL-MCNC: 3.4 MG/DL — SIGNIFICANT CHANGE UP (ref 2.5–4.5)
POTASSIUM SERPL-MCNC: 4.2 MMOL/L — SIGNIFICANT CHANGE UP (ref 3.5–5.3)
POTASSIUM SERPL-SCNC: 4.2 MMOL/L — SIGNIFICANT CHANGE UP (ref 3.5–5.3)
SODIUM SERPL-SCNC: 134 MMOL/L — LOW (ref 135–145)

## 2021-02-16 RX ORDER — LEVOTHYROXINE SODIUM 125 MCG
1 TABLET ORAL
Qty: 0 | Refills: 0 | DISCHARGE

## 2021-02-16 RX ORDER — ACETAMINOPHEN 500 MG
5 TABLET ORAL
Qty: 0 | Refills: 0 | DISCHARGE

## 2021-02-16 RX ORDER — SODIUM CHLORIDE 9 MG/ML
3 INJECTION INTRAMUSCULAR; INTRAVENOUS; SUBCUTANEOUS
Qty: 540 | Refills: 0
Start: 2021-02-16 | End: 2021-03-17

## 2021-02-16 RX ORDER — LEVOTHYROXINE SODIUM 125 MCG
1 TABLET ORAL
Qty: 0 | Refills: 0 | DISCHARGE
Start: 2021-02-16

## 2021-02-16 RX ORDER — ACETYLCYSTEINE 200 MG/ML
4 VIAL (ML) MISCELLANEOUS DAILY
Refills: 0 | Status: DISCONTINUED | OUTPATIENT
Start: 2021-02-16 | End: 2021-02-17

## 2021-02-16 RX ORDER — SODIUM CHLORIDE 9 MG/ML
3 INJECTION INTRAMUSCULAR; INTRAVENOUS; SUBCUTANEOUS
Qty: 0 | Refills: 0 | DISCHARGE
Start: 2021-02-16

## 2021-02-16 RX ORDER — ACETAMINOPHEN 500 MG
20.31 TABLET ORAL
Qty: 0 | Refills: 0 | DISCHARGE
Start: 2021-02-16

## 2021-02-16 RX ORDER — SODIUM CHLORIDE 9 MG/ML
500 INJECTION, SOLUTION INTRAVENOUS
Refills: 0 | Status: DISCONTINUED | OUTPATIENT
Start: 2021-02-16 | End: 2021-02-17

## 2021-02-16 RX ORDER — METHADONE HYDROCHLORIDE 40 MG/1
1 TABLET ORAL
Qty: 0 | Refills: 0 | DISCHARGE
Start: 2021-02-16

## 2021-02-16 RX ORDER — SODIUM CHLORIDE 9 MG/ML
500 INJECTION, SOLUTION INTRAVENOUS
Refills: 0 | Status: COMPLETED | OUTPATIENT
Start: 2021-02-16 | End: 2021-02-16

## 2021-02-16 RX ORDER — ACETYLCYSTEINE 200 MG/ML
4 VIAL (ML) MISCELLANEOUS THREE TIMES A DAY
Refills: 0 | Status: DISCONTINUED | OUTPATIENT
Start: 2021-02-16 | End: 2021-02-16

## 2021-02-16 RX ORDER — ACETYLCYSTEINE 200 MG/ML
4 VIAL (ML) MISCELLANEOUS
Qty: 360 | Refills: 0
Start: 2021-02-16 | End: 2021-03-17

## 2021-02-16 RX ORDER — LISINOPRIL 2.5 MG/1
1 TABLET ORAL
Qty: 0 | Refills: 0 | DISCHARGE
Start: 2021-02-16

## 2021-02-16 RX ORDER — ALBUTEROL 90 UG/1
1.25 AEROSOL, METERED ORAL DAILY
Refills: 0 | Status: DISCONTINUED | OUTPATIENT
Start: 2021-02-16 | End: 2021-02-17

## 2021-02-16 RX ADMIN — SODIUM CHLORIDE 3 MILLILITER(S): 9 INJECTION INTRAMUSCULAR; INTRAVENOUS; SUBCUTANEOUS at 19:29

## 2021-02-16 RX ADMIN — METHADONE HYDROCHLORIDE 10 MILLIGRAM(S): 40 TABLET ORAL at 06:32

## 2021-02-16 RX ADMIN — SODIUM CHLORIDE 3 MILLILITER(S): 9 INJECTION INTRAMUSCULAR; INTRAVENOUS; SUBCUTANEOUS at 10:27

## 2021-02-16 RX ADMIN — HEPARIN SODIUM 5000 UNIT(S): 5000 INJECTION INTRAVENOUS; SUBCUTANEOUS at 05:23

## 2021-02-16 RX ADMIN — METHADONE HYDROCHLORIDE 10 MILLIGRAM(S): 40 TABLET ORAL at 19:43

## 2021-02-16 RX ADMIN — Medication 4 MILLILITER(S): at 15:06

## 2021-02-16 RX ADMIN — SODIUM CHLORIDE 3 MILLILITER(S): 9 INJECTION INTRAMUSCULAR; INTRAVENOUS; SUBCUTANEOUS at 03:32

## 2021-02-16 RX ADMIN — ALBUTEROL 1.25 MILLIGRAM(S): 90 AEROSOL, METERED ORAL at 15:07

## 2021-02-16 RX ADMIN — SODIUM CHLORIDE 3 MILLILITER(S): 9 INJECTION INTRAMUSCULAR; INTRAVENOUS; SUBCUTANEOUS at 15:07

## 2021-02-16 RX ADMIN — SODIUM CHLORIDE 3 MILLILITER(S): 9 INJECTION INTRAMUSCULAR; INTRAVENOUS; SUBCUTANEOUS at 05:23

## 2021-02-16 RX ADMIN — LISINOPRIL 20 MILLIGRAM(S): 2.5 TABLET ORAL at 05:23

## 2021-02-16 RX ADMIN — SODIUM CHLORIDE 500 MILLILITER(S): 9 INJECTION, SOLUTION INTRAVENOUS at 14:34

## 2021-02-16 RX ADMIN — Medication 150 MICROGRAM(S): at 05:24

## 2021-02-16 RX ADMIN — SODIUM CHLORIDE 500 MILLILITER(S): 9 INJECTION, SOLUTION INTRAVENOUS at 16:00

## 2021-02-16 RX ADMIN — SODIUM CHLORIDE 3 MILLILITER(S): 9 INJECTION INTRAMUSCULAR; INTRAVENOUS; SUBCUTANEOUS at 22:47

## 2021-02-16 RX ADMIN — HEPARIN SODIUM 5000 UNIT(S): 5000 INJECTION INTRAVENOUS; SUBCUTANEOUS at 19:28

## 2021-02-16 NOTE — PHYSICAL THERAPY INITIAL EVALUATION ADULT - GAIT DEVIATIONS NOTED, PT EVAL
decreased sonido/increased time in double stance/decreased step length/decreased weight-shifting ability

## 2021-02-16 NOTE — DISCHARGE NOTE PROVIDER - NSDCCPCAREPLAN_GEN_ALL_CORE_FT
PRINCIPAL DISCHARGE DIAGNOSIS  Diagnosis: Shortness of breath  Assessment and Plan of Treatment:       SECONDARY DISCHARGE DIAGNOSES  Diagnosis: Tracheostomy complication, unspecified complication type  Assessment and Plan of Treatment:      PRINCIPAL DISCHARGE DIAGNOSIS  Diagnosis: Shortness of breath  Assessment and Plan of Treatment: laryngectomy tube in place, cleaned and proper care with regular suctioning and humidifier cap.      SECONDARY DISCHARGE DIAGNOSES  Diagnosis: Tracheostomy complication, unspecified complication type  Assessment and Plan of Treatment: laryngectomy tube in place, cleaned and proper care with regular suctioning and humidifier cap.

## 2021-02-16 NOTE — DISCHARGE NOTE PROVIDER - NSDCQMAMI_CARD_ALL_CORE
Asked to come assess this baby due to poor feeding.  Please see my note from earlier regarding his history.    Since I saw him last, baby has had a normal EKG and normal pulse ox checks (97 or above).  Mother had  him prior to my initial exam.  He was tried at breast but didn't really latch later on.  The nurses were instructed to trial formula.  Baby had a gagging episode without color change.  Baby has voided and stooled since delivery    Baby was brought into nursery and placed in warmer.    Baby awake, alert, no distress.  Skin: Pink, well perfused, abrasions to cheeks  CV:  HR remains around 100, regular, no murmurs  Lungs: Clear  Abd: Soft, nontender  Neuro: normal tone and movement    Blood sugar checked and was 56.    I swaddled the baby after my exam and fed him.  He did experience some hiccups after the first several mLs but after giving him a break for several minutes, he was able to resume feeding.  He had a good latch on the bottle with coordinated suck/swallow/breathe.  He paced himself well and did not require any imposed breathing breaks.  No color change, gagging, coughing noted during feeds.  He was limited to 20ml at this feed, although he probably would have taken more.    At this time, the baby continues to appear well and is feeding as expected for a baby less than 12 hours old.  I spoke to the parents regarding the normalcy of feeding well initially and then not being as interested over the next several hours.  I encouraged them to continue to watch his feeds carefully like all babies, watching for the breathing breaks and pulling the nipple out if any gagging/coughing with feeds.      Spoke with the baby's nurses and they feel comfortable with the baby.    Baby can remain in postpartum with parents.   No

## 2021-02-16 NOTE — DISCHARGE NOTE PROVIDER - NSDCMRMEDTOKEN_GEN_ALL_CORE_FT
acetaminophen 160 mg/5 mL oral suspension: 20.31 milliliter(s) orally every 6 hours, As needed, Temp greater or equal to 38C (100.4F), Mild Pain (1 - 3)  levothyroxine 150 mcg (0.15 mg) oral tablet: 1 tab(s) orally once a day  lisinopril 20 mg oral tablet: 1 tab(s) orally once a day  methadone 10 mg/mL oral concentrate: 1 milliliter(s) orally 3 times a day  morphine 10 mg/5 mL oral solution: 10 milliliter(s) orally every 4 hours, As Needed  sodium chloride 0.9% inhalation solution: 3 milliliter(s) inhaled every 4 hours   acetaminophen 160 mg/5 mL oral suspension: 20.31 milliliter(s) orally every 6 hours, As needed, Temp greater or equal to 38C (100.4F), Mild Pain (1 - 3)  acetylcysteine 20% inhalation solution: 4 milliliter(s) inhaled 3 times a day  levothyroxine 150 mcg (0.15 mg) oral tablet: 1 tab(s) orally once a day  lisinopril 20 mg oral tablet: 1 tab(s) orally once a day  methadone 10 mg/mL oral concentrate: 1 milliliter(s) orally 3 times a day  morphine 10 mg/5 mL oral solution: 10 milliliter(s) orally every 4 hours, As Needed  sodium chloride 0.9% inhalation solution: 3 milliliter(s) inhaled every 4 hours  sodium chloride 0.9% inhalation solution: 3 milliliter(s) inhaled every 4 hours  tube feeds: PEG feeds. Jevity 1.2. Give 8 cans per day. 2 cans (474ml) 4 times a day, at 8am, 12pm, 4pm, 8pm. Free water flush 50mL before and after each bolus.    acetaminophen 160 mg/5 mL oral suspension: 20.31 milliliter(s) orally every 6 hours, As needed, Temp greater or equal to 38C (100.4F), Mild Pain (1 - 3)  acetylcysteine 20% inhalation solution: 4 milliliter(s) inhaled 3 times a day  albuterol 1.25 mg/3 mL (0.042%) inhalation solution: 2 puff(s) inhaled every 6 hours, As Needed; please apply to the laryngectomy stoma  levothyroxine 150 mcg (0.15 mg) oral tablet: 1 tab(s) orally once a day  lisinopril 20 mg oral tablet: 1 tab(s) orally once a day  methadone 10 mg/mL oral concentrate: 1 milliliter(s) orally 3 times a day  morphine 10 mg/5 mL oral solution: 10 milliliter(s) orally every 4 hours, As Needed  sodium chloride 0.9% inhalation solution: 3 milliliter(s) inhaled every 4 hours  sodium chloride 0.9% inhalation solution: 3 milliliter(s) inhaled every 4 hours  tube feeds: PEG feeds. Jevity 1.2. Give 8 cans per day. 2 cans (474ml) 4 times a day, at 8am, 12pm, 4pm, 8pm. Free water flush 50mL before and after each bolus.    acetaminophen 160 mg/5 mL oral suspension: 20.31 milliliter(s) orally every 6 hours, As needed, Temp greater or equal to 38C (100.4F), Mild Pain (1 - 3)  acetylcysteine 20% inhalation solution: 4 milliliter(s) inhaled 3 times a day  albuterol 1.25 mg/3 mL (0.042%) inhalation solution: 2 puff(s) inhaled every 6 hours, As Needed; please apply to the laryngectomy stoma  levothyroxine 150 mcg (0.15 mg) oral tablet: 1 tab(s) orally once a day  lisinopril 20 mg oral tablet: 1 tab(s) orally once a day  methadone 10 mg/mL oral concentrate: 1 milliliter(s) orally 3 times a day  morphine 10 mg/5 mL oral solution: 10 milliliter(s) orally every 4 hours, As Needed  sodium chloride 0.9% inhalation solution: 3 milliliter(s) inhaled every 4 hours  tube feeds: PEG feeds. Jevity 1.2. Give 8 cans per day. 2 cans (474ml) 4 times a day, at 8am, 12pm, 4pm, 8pm. Free water flush 50mL before and after each bolus.

## 2021-02-16 NOTE — PHYSICAL THERAPY INITIAL EVALUATION ADULT - REHAB POTENTIAL, PT EVAL
[facility]  Hospitalist Discharge Summary      Date of Admission:  8/31/2020  Date of Discharge:  9/2/2020  1:31 PM  Discharging Provider: Patric Poole MD      Discharge Diagnoses     Scrotal Cellulitis    Follow-ups Needed After Discharge   Follow-up Appointments     Follow-up and recommended labs and tests       Follow up with primary care provider, Dr Nabil Tucker, as scheduled for   you on Tuesday 9/8/20 at 1:25 PM, for hospital follow- up.  The following   labs/tests are recommended: None.   Location:[place]    Follow-up with Urology, Dr Patterson, as scheduled for you on 9/22/20 at   1:00PM for follow up on scrotal abscess.  Please see below for clinic   address and phone number             Unresulted Labs Ordered in the Past 30 Days of this Admission     No orders found from 8/1/2020 to 9/1/2020.          Discharge Disposition   Discharged to home  Condition at discharge: Stable    Hospital Course           Scrotal Abscess    Cellulitis of scrotum     Assessment: presents with 2 weeks of worsening scrotal pain and swelling. US shows Probable small abscess lateral left scrotal wall with possible communication to the skin surface. CT shows Probable left scrotal wall abscess. No gas is noted in the soft tissues to suggest gangrenous infection. Findings are consistent with recent testicular ultrasound findings. No intra-abdominal or intraperitoneal abnormality associated with this infection. Urology felt surgical intervention not warranted    Plan:   - IV Zosyn for now -> Augmentin at discharge.        Gastroesophageal reflux disease without esophagitis    Assessment/Plan: PPI as needed       Major depressive disorder, recurrent episode, mild (H)    Assessment/Plan: stable       Mixed hyperlipidemia    Assessment/Plan: follow as outpatient       Type 2 diabetes mellitus with complication, without long-term current use of insulin (H)    Assessment: PTA on Metformin    Plan:   - Resume  PTA meformin    Consultations This Hospital Stay   PHARMACY TO DOSE VANCO  UROLOGY IP CONSULT  PHARMACY TO DOSE VANCO    Code Status   Prior    Time Spent on this Encounter   I, Patric Poole MD, personally saw the patient today and spent greater than 30 minutes discharging this patient.       Patric Poole MD  [facility]  ______________________________________________________________________    Physical Exam   Vital Signs:                   Weight: 218 lbs 11.14 oz    Primary Care Physician   Nabil Tucker    Discharge Orders      Reason for your hospital stay    You had a scrotal infection and needed Urology evaluation and IV antibiotics.     Follow-up and recommended labs and tests     Follow up with primary care provider, Dr Nabil Tucker, as scheduled for you on Tuesday 9/8/20 at 1:25 PM, for hospital follow- up.  The following labs/tests are recommended: None.   Location:[facility]    Follow-up with Urology, Dr Patterson, as scheduled for you on 9/22/20 at 1:00PM for follow up on scrotal abscess.  Please see below for clinic address and phone number     Activity    Your activity upon discharge: activity as tolerated     When to contact your care team    Call your primary doctor if you have any of the following: temperature greater than 100.4F     Diet    Follow this diet upon discharge: Orders Placed This Encounter      Moderate Consistent CHO Diet       Significant Results and Procedures   Most Recent 3 CBC's:  Recent Labs   Lab Test 09/01/20  0846 08/31/20  1526   WBC 5.0 7.3   HGB 12.1* 13.9   MCV 95 96    247     Most Recent 3 BMP's:  Recent Labs   Lab Test 09/02/20  0937 09/01/20  0846 08/31/20  1526   NA  --  140 142   POTASSIUM  --  3.8 4.4   CHLORIDE  --  109 109   CO2  --  25 26   BUN  --  12 19   CR 1.05 1.05 1.31*   ANIONGAP  --  6 7   RICKIE  --  8.1* 9.8   GLC  --  104* 151*     Most Recent 2 LFT's:No lab results found.  Most Recent 3 INR's:No lab results found.  Most  Recent Urinalysis:  Recent Labs   Lab Test 08/31/20 2025   COLOR Yellow   APPEARANCE Clear   URINEGLC Negative   URINEBILI Negative   URINEKETONE 5*   SG 1.020   UBLD Negative   URINEPH 5.0   PROTEIN Negative   NITRITE Negative   LEUKEST Negative   RBCU 1   WBCU 4   ,   Results for orders placed or performed during the hospital encounter of 08/31/20   US Testicular & Scrotum w Doppler Ltd    Narrative    US TESTICULAR AND SCROTUM WITH DOPPLER LIMITED 8/31/2020 4:53 PM     HISTORY: Left testicular swelling-abscess.    FINDINGS: The testicles bilaterally are homogeneous in echotexture  without focal mass. The right testicle measures 4.5 x 2.6 x 2.8 cm.  The left testicle measures 4.5 x 2.8 x 2.0 cm. Color Doppler waveform  analysis demonstrates normal arterial waveforms within the testicles.  No evidence of testicular torsion. The right epididymis is normal. The  left epididymis is normal. No varicoceles or hydroceles are evident.  There is a small complex fluid collection lateral to the left testicle  in the overlying thickened scrotal wall measuring 1.2 x 1.2 x 1.1 cm.  There appears to be some communication with the skin surface.      Impression    IMPRESSION:    1. Normal testicles without torsion or mass. No epididymitis or  hydroceles.  2. Probable small abscess lateral left scrotal wall with possible  communication to the skin surface.    VU GAMEZ MD   CT Abdomen Pelvis w Contrast    Narrative    CT ABDOMEN PELVIS WITH CONTRAST 8/31/2020 5:45 PM    CLINICAL HISTORY: Please go to mid-thigh with concern for Lobo  gangrene.    TECHNIQUE: CT scan of the abdomen and pelvis was performed following  injection of IV contrast. Multiplanar reformats were obtained. Dose  reduction techniques were used.  CONTRAST: 111mL Isovue-370    COMPARISON: Testicular ultrasound 8/31/2020.    FINDINGS:   LOWER CHEST: Normal.    HEPATOBILIARY: Cholelithiasis. Gallbladder is otherwise unremarkable.  Probable collapsing cyst  lateral segment left hepatic lobe on series  4, image 31. Liver is otherwise within normal limits.    PANCREAS: Normal.    SPLEEN: Normal.    ADRENAL GLANDS: Normal.    KIDNEYS/BLADDER: Small probable cyst measuring 1.5 cm mid left kidney  on series 4, image 82. Kidneys, ureters and bladder are otherwise  unremarkable. No urinary tract calculi or hydronephrosis.    BOWEL: Probable mild colonic constipation. No bowel obstruction or  diverticulitis. Appendix not visualized.    LYMPH NODES: Prominent upper abdominal lymph nodes are present but not  significantly enlarged by CT criteria. No enlarged retroperitoneal  lymph nodes. Mildly prominent left inguinal lymph node is present on  image 210 of series 4. Reactive left pelvic lymph nodes are also noted  but are not enlarged by CT criteria.    VASCULATURE: Calcified plaque abdominal aorta without aneurysm or  dissection.    PELVIC ORGANS: Bladder, prostate and rectum are unremarkable.    ADDITIONAL FINDINGS: Focal inflammation noted in the left scrotal wall  extending into the perineal region. 2.4 cm fluid collection noted in  this area on series 4, image 265 as described on testicular ultrasound  performed earlier. Finding is most consistent with a scrotal  wall/perineal abscess. The right scrotal wall and right lateral aspect  of the perineum is not significantly involved. No gas is noted in the  soft tissues to suggest gangrenous infection. Incidental lipoma noted  in the adductor musculature of the left proximal thigh.    MUSCULOSKELETAL: Bilateral L5 pars defects with grade 1  spondylolisthesis of L5 on S1 is noted.      Impression    IMPRESSION:   1.  Probable left scrotal wall abscess. No gas is noted in the soft  tissues to suggest gangrenous infection. Findings are consistent with  recent testicular ultrasound findings. No intra-abdominal or  intraperitoneal abnormality associated with this infection.    2.  Prominent upper abdominal lymph nodes and  borderline-enlarged left  inguinal lymph node. Inguinal lymph node is likely reactive in nature.    3.  Cholelithiasis. Probable collapsing cyst lateral segment left  hepatic lobe.    4.  Simple-appearing cyst left renal cortex. No further follow-up  required.    5.  Colonic constipation but no bowel obstruction or diverticulitis.    VU GAMEZ MD       Discharge Medications   Discharge Medication List as of 9/2/2020 12:52 PM      CONTINUE these medications which have CHANGED    Details   amoxicillin-clavulanate (AUGMENTIN) 875-125 MG tablet Take 1 tablet by mouth 2 times daily, Disp-28 tablet,R-0, E-Prescribe         CONTINUE these medications which have NOT CHANGED    Details   aspirin 81 MG EC tablet Take 81 mg by mouth daily, Historical      atorvastatin (LIPITOR) 10 MG tablet Take 10 mg by mouth daily , Historical      Calcium-Magnesium-Zinc 333-133-5 MG TABS per tablet Take 1 tablet by mouth daily, Historical      escitalopram (LEXAPRO) 10 MG tablet Take 10 mg by mouth daily , Historical      Ferrous Gluconate 324 (37.5 Fe) MG TABS Take 324 mg by mouth daily, Historical      ibuprofen (ADVIL/MOTRIN) 200 MG tablet Take 400 mg by mouth 2 times daily (with meals), Historical      !! metFORMIN (GLUCOPHAGE) 500 MG tablet Take 500 mg by mouth daily (with breakfast), Historical      !! metFORMIN (GLUCOPHAGE) 500 MG tablet Take 1,500 mg by mouth daily (with dinner) (3 tablets of 500mg), Historical      multivitamin w/minerals (THERA-VIT-M) tablet Take 1 tablet by mouth daily, Historical      Vitamin D, Cholecalciferol, 25 MCG (1000 UT) TABS Take 1,000 Units by mouth daily, Historical       !! - Potential duplicate medications found. Please discuss with provider.        Allergies   No Known Allergies   fair, will monitor progress closely

## 2021-02-16 NOTE — PHYSICAL THERAPY INITIAL EVALUATION ADULT - ADDITIONAL COMMENTS
pt states that he lives on the 4th floor of a walk up apartment building w/ his brother who is unable to assist him 2/2 a bad back. Ambulates w/ use of SC. Denies hx of recent falls. No home health aide

## 2021-02-16 NOTE — PHYSICAL THERAPY INITIAL EVALUATION ADULT - GENERAL OBSERVATIONS, REHAB EVAL
pt received/returned semi-supine in bed +IV, +tele, +trach w/ humidification, c/o general malaise, SBP improved to 90s

## 2021-02-16 NOTE — DISCHARGE NOTE PROVIDER - CARE PROVIDER_API CALL
Lee Branch)  Otolaryngology  [address] [place] [address]  Phone: [phone]  Fax: [phone]  Follow Up Time:

## 2021-02-16 NOTE — DISCHARGE NOTE NURSING/CASE MANAGEMENT/SOCIAL WORK - PATIENT PORTAL LINK FT
You can access the FollowMyHealth Patient Portal offered by [facility] by registering at the following website: [email or web address]. By joining [facility]’s FollowMyHealth portal, you will also be able to view your health information using other applications (apps) compatible with our system.

## 2021-02-16 NOTE — DISCHARGE NOTE PROVIDER - HOSPITAL COURSE
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 Presenting with difficulty breathing. Has had previous presentations to the ED with extensive crusting in trachea requiring bedside debridement. Also with facial edema, currently undergoing chemotherapy treatment.At bedside exam, reports difficulty breathing with audible wheezing. Laryngectomy tube in place.    2/12: No acute events overnight. Patient admitted to step down unit for airway monitoring. No further episodes of desats after exchanging laryngectomy tube with tracheostomy tube. Social work to help acquiring home humidifier and nebulizer treatment.    2/13: No acute events overnight or episodes of breathing difficulties. Patient started on mucomyst treatment yesterday and saline bullet irrigation/suctioning by ENT yesterday. Secretions have improved. Systolic blood pressures soft this morning to 80s, patient asymptomatic.  2/13: Yesterday patient laryngectomy tube changed to 8/36 from 8/55. Per tracheoscopy, tube sitting proximal to area of granulation tissue maintaining a more patent airway.   2/14: NAEON. BID saline bullets to trach.   2/15: NAEON. BID saline bullets to trach.   2/16: NAEON. BID saline bullets to trach. tracheoscopy scope clear with minimal crusting. may be dc today. for chemo as outpatient.    Patient's course was uncomplicated. Diet was advanced as tolerated and pain was well controlled on medication. On day of discharge, pt deemed stable and ready to return home with plan to follow up as an outpatient. 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 Presenting with difficulty breathing. Has had previous presentations to the ED with extensive crusting in trachea requiring bedside debridement. Also with facial edema, currently undergoing chemotherapy treatment.At bedside exam, reports difficulty breathing with audible wheezing. Laryngectomy tube in place.    2/12: No acute events overnight. Patient admitted to step down unit for airway monitoring. No further episodes of desats after exchanging laryngectomy tube with tracheostomy tube. Social work to help acquiring home humidifier and nebulizer treatment.    2/13: No acute events overnight or episodes of breathing difficulties. Patient started on mucomyst treatment yesterday and saline bullet irrigation/suctioning by ENT yesterday. Secretions have improved. Systolic blood pressures soft this morning to 80s, patient asymptomatic.  2/13: Yesterday patient laryngectomy tube changed to 8/36 from 8/55. Per tracheoscopy, tube sitting proximal to area of granulation tissue maintaining a more patent airway.   2/14: NAEON. BID saline bullets to trach.   2/15: NAEON. BID saline bullets to trach.   2/16: NAEON. BID saline bullets to trach. tracheoscopy scope clear with minimal crusting. may be dc today. for chemo as outpatient. pt teaching reinforced - trach self suctioning. importance of humidification at home, importance of wearing HME when not on humidification.     Patient's course was uncomplicated. Diet was advanced as tolerated and pain was well controlled on medication. On day of discharge, pt deemed stable and ready to return home with plan to follow up as an outpatient. PEG feeds length of need more than 90 days. 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 Presenting with difficulty breathing. Has had previous presentations to the ED with extensive crusting in trachea requiring bedside debridement. Also with facial edema, currently undergoing chemotherapy treatment.At bedside exam, reports difficulty breathing with audible wheezing. Laryngectomy tube in place.    2/12: No acute events overnight. Patient admitted to step down unit for airway monitoring. No further episodes of desats after exchanging laryngectomy tube with tracheostomy tube. Social work to help acquiring home humidifier and nebulizer treatment.    2/13: No acute events overnight or episodes of breathing difficulties. Patient started on mucomyst treatment yesterday and saline bullet irrigation/suctioning by ENT yesterday. Secretions have improved. Systolic blood pressures soft this morning to 80s, patient asymptomatic.  2/13: Yesterday patient laryngectomy tube changed to 8/36 from 8/55. Per tracheoscopy, tube sitting proximal to area of granulation tissue maintaining a more patent airway.   2/14: NAEON. BID saline bullets to trach.   2/15: NAEON. BID saline bullets to trach.   2/16: NAEON. BID saline bullets to trach. tracheoscopy scope clear with minimal crusting. may be dc today. for chemo as outpatient. pt teaching reinforced - trach self suctioning. importance of humidification at home, importance of wearing HME when not on humidification.   2/17: patient decided more comfortable with going to [facility].    Patient's course was uncomplicated. Diet was advanced as tolerated and pain was well controlled on medication. On day of discharge, pt deemed stable and ready to return home with plan to follow up as an outpatient. PEG feeds length of need more than 90 days.

## 2021-02-16 NOTE — DISCHARGE NOTE PROVIDER - NSDCFUADDINST_GEN_ALL_CORE_FT
Laryngectomy CARE  - Use humification at home with nebulized saline  -Replace the soft trach collar as needed when soiled or stained  -Use the humidifier to keep the air you breathe moisturized  -You may gently clean around your stoma with 1/2 saline 1/2 peroxide solution  -Suction yourself if needed only using soft red rubber catheters   -Report  any difficulty breathing or change in your stoma such as concerning drainage or bleeding to your doctor’s office immediately and report to your nearest emergency room  Laryngectomy CARE  - Use humification at home with nebulized saline and mucomyst  -Replace the soft trach collar as needed when soiled or stained  -Use the humidifier to keep the air you breathe moisturized  -You may gently clean around your stoma with 1/2 saline 1/2 peroxide solution  -Suction yourself if needed only using soft red rubber catheters  - regular suctioning   -Report  any difficulty breathing or change in your stoma such as concerning drainage or bleeding to your doctor’s office immediately and report to your nearest emergency room

## 2021-02-17 VITALS
HEART RATE: 70 BPM | OXYGEN SATURATION: 97 % | DIASTOLIC BLOOD PRESSURE: 52 MMHG | RESPIRATION RATE: 18 BRPM | SYSTOLIC BLOOD PRESSURE: 88 MMHG

## 2021-02-17 RX ORDER — SODIUM CHLORIDE 9 MG/ML
1000 INJECTION INTRAMUSCULAR; INTRAVENOUS; SUBCUTANEOUS ONCE
Refills: 0 | Status: COMPLETED | OUTPATIENT
Start: 2021-02-17 | End: 2021-02-17

## 2021-02-17 RX ORDER — EPINEPHRINE 0.3 MG/.3ML
2 INJECTION INTRAMUSCULAR; SUBCUTANEOUS
Qty: 0 | Refills: 0 | DISCHARGE
Start: 2021-02-17

## 2021-02-17 RX ORDER — ALPRAZOLAM 0.25 MG
0.25 TABLET ORAL ONCE
Refills: 0 | Status: DISCONTINUED | OUTPATIENT
Start: 2021-02-17 | End: 2021-02-17

## 2021-02-17 RX ADMIN — SODIUM CHLORIDE 3 MILLILITER(S): 9 INJECTION INTRAMUSCULAR; INTRAVENOUS; SUBCUTANEOUS at 05:02

## 2021-02-17 RX ADMIN — SODIUM CHLORIDE 500 MILLILITER(S): 9 INJECTION INTRAMUSCULAR; INTRAVENOUS; SUBCUTANEOUS at 12:31

## 2021-02-17 RX ADMIN — Medication 0.25 MILLIGRAM(S): at 14:44

## 2021-02-17 RX ADMIN — Medication 650 MILLIGRAM(S): at 14:44

## 2021-02-17 RX ADMIN — Medication 4 MILLILITER(S): at 12:31

## 2021-02-17 RX ADMIN — HEPARIN SODIUM 5000 UNIT(S): 5000 INJECTION INTRAVENOUS; SUBCUTANEOUS at 05:02

## 2021-02-17 RX ADMIN — METHADONE HYDROCHLORIDE 10 MILLIGRAM(S): 40 TABLET ORAL at 07:23

## 2021-02-17 RX ADMIN — Medication 150 MICROGRAM(S): at 05:02

## 2021-02-17 RX ADMIN — LISINOPRIL 20 MILLIGRAM(S): 2.5 TABLET ORAL at 05:02

## 2021-02-17 RX ADMIN — SODIUM CHLORIDE 3 MILLILITER(S): 9 INJECTION INTRAMUSCULAR; INTRAVENOUS; SUBCUTANEOUS at 10:29

## 2021-02-17 RX ADMIN — SODIUM CHLORIDE 3 MILLILITER(S): 9 INJECTION INTRAMUSCULAR; INTRAVENOUS; SUBCUTANEOUS at 02:00

## 2021-02-17 RX ADMIN — ALBUTEROL 1.25 MILLIGRAM(S): 90 AEROSOL, METERED ORAL at 12:31

## 2021-02-17 NOTE — PROGRESS NOTE ADULT - ASSESSMENT
A/P: 65y Male s/p TL and CRT 1 yr ago, now with facial swelling and SOB, s/p airway  crusting removal and exhange to tracheostomy tube temporarily, now with 8/36 laryngectomy tube.     - patient requesting FAUSTO  - awaiting FAUSTO placement  - pain control, hold oxycodone if BP <90/60  - home methadone  - home meds  - OOB to chair  - patient may bathe if laryngectomy tube covered or from chest down  - please page ENT if any issues or concerns

## 2021-02-17 NOTE — PROVIDER CONTACT NOTE (OTHER) - SITUATION
64 y/o male ENT patient on opioid pain medication  presenting with low blood pressure.
Hypotensive.
Pt BP is 82/52. HR 70. stating 100% on 35% trach collar. complains of slight lightheadedness. denies dizziness, SOB, pain.

## 2021-02-17 NOTE — PROVIDER CONTACT NOTE (OTHER) - ACTION/TREATMENT ORDERED:
As per MD, likely due to pain meds. Continue to monitor at this time.
to hold Methadone PO. Will continue to monitor
will continue to monitor pt, will retake blood pressure after bolus.
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
## 2021-02-17 NOTE — PROVIDER CONTACT NOTE (OTHER) - BACKGROUND
HX laryngeal cancer, total laryngectomy, PEG, Admitted due to SOB, tracheostomy complication.
Pt has history of hypotension

## 2021-02-17 NOTE — PROVIDER CONTACT NOTE (OTHER) - ASSESSMENT
Pt denies lightheadedness, dizziness, etc.
BP 85/52 HR 66 RR 16 O2 96%. Patient is a+ox4 and asymptomatic.
Pt complained of lightheadedness. denies dizziness, pain, SOB.

## 2021-02-21 ENCOUNTER — EMERGENCY (EMERGENCY)
Facility: HOSPITAL | Age: 66
LOS: 1 days | Discharge: HOME | End: 2021-02-21
Attending: EMERGENCY MEDICINE
Payer: MEDICARE

## 2021-02-21 VITALS
SYSTOLIC BLOOD PRESSURE: 106 MMHG | OXYGEN SATURATION: 100 % | TEMPERATURE: 98 F | RESPIRATION RATE: 20 BRPM | DIASTOLIC BLOOD PRESSURE: 69 MMHG | HEART RATE: 71 BPM

## 2021-02-21 VITALS
HEIGHT: 70 IN | SYSTOLIC BLOOD PRESSURE: 98 MMHG | RESPIRATION RATE: 19 BRPM | OXYGEN SATURATION: 100 % | WEIGHT: 149.91 LBS | HEART RATE: 83 BPM | TEMPERATURE: 100 F | DIASTOLIC BLOOD PRESSURE: 63 MMHG

## 2021-02-21 DIAGNOSIS — Z93.1 GASTROSTOMY STATUS: Chronic | ICD-10-CM

## 2021-02-21 DIAGNOSIS — Z98.890 OTHER SPECIFIED POSTPROCEDURAL STATES: Chronic | ICD-10-CM

## 2021-02-21 DIAGNOSIS — Z90.02 ACQUIRED ABSENCE OF LARYNX: Chronic | ICD-10-CM

## 2021-02-21 DIAGNOSIS — Z41.9 ENCOUNTER FOR PROCEDURE FOR PURPOSES OTHER THAN REMEDYING HEALTH STATE, UNSPECIFIED: Chronic | ICD-10-CM

## 2021-02-21 DIAGNOSIS — Z90.09 ACQUIRED ABSENCE OF OTHER PART OF HEAD AND NECK: Chronic | ICD-10-CM

## 2021-02-21 LAB
ALBUMIN SERPL ELPH-MCNC: 2.4 G/DL — LOW (ref 3.5–5)
ALP SERPL-CCNC: 83 U/L — SIGNIFICANT CHANGE UP (ref 40–120)
ALT FLD-CCNC: 20 U/L DA — SIGNIFICANT CHANGE UP (ref 10–60)
ANION GAP SERPL CALC-SCNC: 6 MMOL/L — SIGNIFICANT CHANGE UP (ref 5–17)
APPEARANCE UR: CLEAR — SIGNIFICANT CHANGE UP
APTT BLD: 47.3 SEC — HIGH (ref 27.5–35.5)
AST SERPL-CCNC: 24 U/L — SIGNIFICANT CHANGE UP (ref 10–40)
BASOPHILS # BLD AUTO: 0 K/UL — SIGNIFICANT CHANGE UP (ref 0–0.2)
BASOPHILS NFR BLD AUTO: 0 % — SIGNIFICANT CHANGE UP (ref 0–2)
BILIRUB SERPL-MCNC: 0.2 MG/DL — SIGNIFICANT CHANGE UP (ref 0.2–1.2)
BILIRUB UR-MCNC: NEGATIVE — SIGNIFICANT CHANGE UP
BUN SERPL-MCNC: 12 MG/DL — SIGNIFICANT CHANGE UP (ref 7–18)
CALCIUM SERPL-MCNC: 8 MG/DL — LOW (ref 8.4–10.5)
CHLORIDE SERPL-SCNC: 91 MMOL/L — LOW (ref 96–108)
CO2 SERPL-SCNC: 37 MMOL/L — HIGH (ref 22–31)
COLOR SPEC: YELLOW — SIGNIFICANT CHANGE UP
CREAT SERPL-MCNC: 0.51 MG/DL — SIGNIFICANT CHANGE UP (ref 0.5–1.3)
DIFF PNL FLD: NEGATIVE — SIGNIFICANT CHANGE UP
EOSINOPHIL # BLD AUTO: 0 K/UL — SIGNIFICANT CHANGE UP (ref 0–0.5)
EOSINOPHIL NFR BLD AUTO: 0 % — SIGNIFICANT CHANGE UP (ref 0–6)
GLUCOSE SERPL-MCNC: 95 MG/DL — SIGNIFICANT CHANGE UP (ref 70–99)
GLUCOSE UR QL: NEGATIVE — SIGNIFICANT CHANGE UP
HCT VFR BLD CALC: 28.4 % — LOW (ref 39–50)
HGB BLD-MCNC: 9.1 G/DL — LOW (ref 13–17)
INR BLD: 1.17 RATIO — HIGH (ref 0.88–1.16)
KETONES UR-MCNC: NEGATIVE — SIGNIFICANT CHANGE UP
LACTATE SERPL-SCNC: 1.4 MMOL/L — SIGNIFICANT CHANGE UP (ref 0.7–2)
LEUKOCYTE ESTERASE UR-ACNC: NEGATIVE — SIGNIFICANT CHANGE UP
LYMPHOCYTES # BLD AUTO: 0.38 K/UL — LOW (ref 1–3.3)
LYMPHOCYTES # BLD AUTO: 4 % — LOW (ref 13–44)
MCHC RBC-ENTMCNC: 32 GM/DL — SIGNIFICANT CHANGE UP (ref 32–36)
MCHC RBC-ENTMCNC: 33.8 PG — SIGNIFICANT CHANGE UP (ref 27–34)
MCV RBC AUTO: 105.6 FL — HIGH (ref 80–100)
MONOCYTES # BLD AUTO: 0.66 K/UL — SIGNIFICANT CHANGE UP (ref 0–0.9)
MONOCYTES NFR BLD AUTO: 7 % — SIGNIFICANT CHANGE UP (ref 2–14)
NEUTROPHILS # BLD AUTO: 8.2 K/UL — HIGH (ref 1.8–7.4)
NEUTROPHILS NFR BLD AUTO: 87 % — HIGH (ref 43–77)
NITRITE UR-MCNC: NEGATIVE — SIGNIFICANT CHANGE UP
NT-PROBNP SERPL-SCNC: 106 PG/ML — SIGNIFICANT CHANGE UP (ref 0–125)
PH UR: 7 — SIGNIFICANT CHANGE UP (ref 5–8)
PLATELET # BLD AUTO: 418 K/UL — HIGH (ref 150–400)
POTASSIUM SERPL-MCNC: 3.6 MMOL/L — SIGNIFICANT CHANGE UP (ref 3.5–5.3)
POTASSIUM SERPL-SCNC: 3.6 MMOL/L — SIGNIFICANT CHANGE UP (ref 3.5–5.3)
PROT SERPL-MCNC: 7 G/DL — SIGNIFICANT CHANGE UP (ref 6–8.3)
PROT UR-MCNC: NEGATIVE — SIGNIFICANT CHANGE UP
PROTHROM AB SERPL-ACNC: 13.8 SEC — HIGH (ref 10.6–13.6)
RAPID RVP RESULT: SIGNIFICANT CHANGE UP
RBC # BLD: 2.69 M/UL — LOW (ref 4.2–5.8)
RBC # FLD: 12.2 % — SIGNIFICANT CHANGE UP (ref 10.3–14.5)
SARS-COV-2 RNA SPEC QL NAA+PROBE: SIGNIFICANT CHANGE UP
SODIUM SERPL-SCNC: 134 MMOL/L — LOW (ref 135–145)
SP GR SPEC: 1 — LOW (ref 1.01–1.02)
TROPONIN I SERPL-MCNC: <0.015 NG/ML — SIGNIFICANT CHANGE UP (ref 0–0.04)
UROBILINOGEN FLD QL: NEGATIVE — SIGNIFICANT CHANGE UP
WBC # BLD: 9.43 K/UL — SIGNIFICANT CHANGE UP (ref 3.8–10.5)
WBC # FLD AUTO: 9.43 K/UL — SIGNIFICANT CHANGE UP (ref 3.8–10.5)

## 2021-02-21 PROCEDURE — 70360 X-RAY EXAM OF NECK: CPT | Mod: 26

## 2021-02-21 PROCEDURE — 99285 EMERGENCY DEPT VISIT HI MDM: CPT

## 2021-02-21 PROCEDURE — 71045 X-RAY EXAM CHEST 1 VIEW: CPT | Mod: 26

## 2021-02-21 RX ORDER — SODIUM CHLORIDE 9 MG/ML
1000 INJECTION INTRAMUSCULAR; INTRAVENOUS; SUBCUTANEOUS ONCE
Refills: 0 | Status: COMPLETED | OUTPATIENT
Start: 2021-02-21 | End: 2021-02-21

## 2021-02-21 RX ORDER — ACETAMINOPHEN 500 MG
650 TABLET ORAL ONCE
Refills: 0 | Status: COMPLETED | OUTPATIENT
Start: 2021-02-21 | End: 2021-02-21

## 2021-02-21 RX ADMIN — SODIUM CHLORIDE 1000 MILLILITER(S): 9 INJECTION INTRAMUSCULAR; INTRAVENOUS; SUBCUTANEOUS at 13:12

## 2021-02-21 RX ADMIN — Medication 650 MILLIGRAM(S): at 13:32

## 2021-02-21 NOTE — ED PROVIDER NOTE - PHYSICAL EXAMINATION
Afebrile, hemodynamically stable, saturating well on trach collar  NAD, nontoxic appearing, sitting comfortably in bed  Head NCAT  EOMI grossly, anicteric  MM dry, noted clear mucus secretions  RRR, nml S1/S2, no m/r/g  Lungs CTAB, no w/r/r  Abd soft, NT, ND, nml BS, no rebound or guarding  AAO, CN's 3-12 grossly intact  MELENDEZ spontaneously, no leg cyanosis or edema  Skin warm, dry, no rashes or hives

## 2021-02-21 NOTE — ED PROVIDER NOTE - NSFOLLOWUPINSTRUCTIONS_ED_ALL_ED_FT
The tracheostomy tube was re-seated back in its location.  The patient was suctioned.  The patient had a fever to 100.4F.  He was given Tylenol and 1L normal saline.  Respiratory viral panel was negative. COVID negative.  Please see attached labs.  Please return to the emergency department if he has worsening breathing, lethargy, or any other symptoms.

## 2021-02-21 NOTE — ED PROVIDER NOTE - PATIENT PORTAL LINK FT
You can access the FollowMyHealth Patient Portal offered by [facility] by registering at the following website: [email or web address] By joining [facility]’s FollowMyHealth portal, you will also be able to view your health information using other applications (apps) compatible with our system.

## 2021-02-21 NOTE — ED PROVIDER NOTE - OBJECTIVE STATEMENT
65yoM with h/o laryngeal cancer s/p laryngectomy and trach, presents as trach tube fell out as per NH records. Pt himself indicates that he has been having secretions for weeks now and this has been making him intermittently SOB. Denies fever, CP, leg swelling, and all other symptoms. Review of EMR shows pt discharged from [facility] 3 days with h/o tracheal crusting requiring debridement. Per charge nurse here pt was suctioned on arrival and NAD. 65yoM with h/o laryngeal cancer s/p laryngectomy and trach, presents as trach tube fell out as per NH records. Pt himself indicates that he has been having secretions for weeks now and this has been making him intermittently SOB. Denies fever, CP, leg swelling, and all other symptoms. Review of EMR shows pt discharged from [facility] 3 days with h/o tracheal crusting requiring debridement. Per charge nurse here pt was suctioned on arrival and NAD. D/w nursing supervisor Juliette who states pt was complaining of clogging in trach tube and after attempting to suction was noted to be dislodged and unable to be replaced, and desatted (though stable) until trach collar placed, and was sent here to reseat his tube. D/w Dr. Snyder covering attending, agrees with discharge back to facility if vitals normalize.

## 2021-02-21 NOTE — ED ADULT TRIAGE NOTE - CADM TRG TX PRIOR TO ARRIVAL
Please inform patient, refill/prescriptioni approved. Please drop off prescription at pharmacy.     see ambulance record

## 2021-02-21 NOTE — ED PROVIDER NOTE - CLINICAL SUMMARY MEDICAL DECISION MAKING FREE TEXT BOX
Trach appears dislodged possibly 2/2 trach ties being loose. Trach tube reseated and secured with RT at bedside, suctioned with minimal secretions, placed on humidified oxygen. Character low suspicion for PE and no e/o DVT or tachycardia. No e/o fluid overload. Character low suspicion for ACS and ECG unremarkable. Noted rectal fever. No e/o trach site infection at this time. No e/o meningitis, cellulitis, endocarditis, intraabdominal process. Trach appears dislodged possibly 2/2 trach ties being loose. Trach tube reseated and secured with RT at bedside, suctioned with minimal secretions, placed on humidified oxygen. Character low suspicion for PE and no e/o DVT or tachycardia. No e/o fluid overload. Character low suspicion for ACS and ECG unremarkable. Noted rectal fever, most likely 2/2 URI. COVID negative. No e/o trach site infection at this time. No e/o meningitis, cellulitis, endocarditis, intraabdominal process. D/w attending at NH and agrees with discharge. Patient is well appearing, NAD, afebrile, hemodynamically stable. Any available tests and studies were discussed with patient. Discharged with instructions in further symptomatic care, return precautions. Trach appears dislodged possibly 2/2 trach ties being loose. Trach tube reseated and secured with RT at bedside, suctioned with minimal secretions, placed on humidified oxygen. Character low suspicion for PE and no e/o DVT or tachycardia. No e/o fluid overload. Character low suspicion for ACS and ECG unremarkable. Noted rectal fever, most likely 2/2 URI. COVID negative. No e/o trach site infection at this time. No e/o meningitis, cellulitis, endocarditis, intraabdominal process. D/w attending at NH and agrees with discharge. Patient is well appearing, NAD, afebrile, hemodynamically stable. Suctioned again prior to discharge with large mucus plug removed. Satting well. Any available tests and studies were discussed with patient. Discharged with instructions in further symptomatic care, return precautions.

## 2021-02-22 LAB
CULTURE RESULTS: NO GROWTH — SIGNIFICANT CHANGE UP
SPECIMEN SOURCE: SIGNIFICANT CHANGE UP

## 2021-02-23 ENCOUNTER — INPATIENT (INPATIENT)
Facility: HOSPITAL | Age: 66
LOS: 8 days | Discharge: HOSPICE | DRG: 205 | End: 2021-03-04
Attending: OTOLARYNGOLOGY | Admitting: OTOLARYNGOLOGY
Payer: MEDICARE

## 2021-02-23 ENCOUNTER — TRANSCRIPTION ENCOUNTER (OUTPATIENT)
Age: 66
End: 2021-02-23

## 2021-02-23 ENCOUNTER — INPATIENT (INPATIENT)
Facility: HOSPITAL | Age: 66
LOS: 0 days | Discharge: ANOTHER HOSPITAL | DRG: 204 | End: 2021-02-23
Attending: INTERNAL MEDICINE | Admitting: INTERNAL MEDICINE
Payer: MEDICARE

## 2021-02-23 VITALS
HEIGHT: 70 IN | RESPIRATION RATE: 18 BRPM | HEART RATE: 101 BPM | OXYGEN SATURATION: 96 % | SYSTOLIC BLOOD PRESSURE: 114 MMHG | DIASTOLIC BLOOD PRESSURE: 67 MMHG

## 2021-02-23 VITALS — HEART RATE: 70 BPM | OXYGEN SATURATION: 100 % | RESPIRATION RATE: 14 BRPM

## 2021-02-23 VITALS — HEART RATE: 73 BPM | RESPIRATION RATE: 14 BRPM | OXYGEN SATURATION: 92 %

## 2021-02-23 DIAGNOSIS — Z98.890 OTHER SPECIFIED POSTPROCEDURAL STATES: Chronic | ICD-10-CM

## 2021-02-23 DIAGNOSIS — Z90.02 ACQUIRED ABSENCE OF LARYNX: Chronic | ICD-10-CM

## 2021-02-23 DIAGNOSIS — Z93.1 GASTROSTOMY STATUS: Chronic | ICD-10-CM

## 2021-02-23 DIAGNOSIS — Z90.09 ACQUIRED ABSENCE OF OTHER PART OF HEAD AND NECK: Chronic | ICD-10-CM

## 2021-02-23 DIAGNOSIS — Z41.9 ENCOUNTER FOR PROCEDURE FOR PURPOSES OTHER THAN REMEDYING HEALTH STATE, UNSPECIFIED: Chronic | ICD-10-CM

## 2021-02-23 DIAGNOSIS — C32.9 MALIGNANT NEOPLASM OF LARYNX, UNSPECIFIED: ICD-10-CM

## 2021-02-23 DIAGNOSIS — G89.3 NEOPLASM RELATED PAIN (ACUTE) (CHRONIC): ICD-10-CM

## 2021-02-23 DIAGNOSIS — R04.2 HEMOPTYSIS: ICD-10-CM

## 2021-02-23 LAB
ALBUMIN SERPL ELPH-MCNC: 2.5 G/DL — LOW (ref 3.5–5)
ALBUMIN SERPL ELPH-MCNC: 2.9 G/DL — LOW (ref 3.3–5)
ALP SERPL-CCNC: 67 U/L — SIGNIFICANT CHANGE UP (ref 40–120)
ALP SERPL-CCNC: 80 U/L — SIGNIFICANT CHANGE UP (ref 40–120)
ALT FLD-CCNC: 16 U/L — SIGNIFICANT CHANGE UP (ref 10–45)
ALT FLD-CCNC: 28 U/L DA — SIGNIFICANT CHANGE UP (ref 10–60)
ANION GAP SERPL CALC-SCNC: 12 MMOL/L — SIGNIFICANT CHANGE UP (ref 5–17)
ANION GAP SERPL CALC-SCNC: 6 MMOL/L — SIGNIFICANT CHANGE UP (ref 5–17)
APPEARANCE UR: CLEAR — SIGNIFICANT CHANGE UP
APTT BLD: 32.5 SEC — SIGNIFICANT CHANGE UP (ref 27.5–35.5)
APTT BLD: 41.5 SEC — HIGH (ref 27.5–35.5)
AST SERPL-CCNC: 32 U/L — SIGNIFICANT CHANGE UP (ref 10–40)
AST SERPL-CCNC: 49 U/L — HIGH (ref 10–40)
BASOPHILS # BLD AUTO: 0.04 K/UL — SIGNIFICANT CHANGE UP (ref 0–0.2)
BASOPHILS # BLD AUTO: 0.05 K/UL — SIGNIFICANT CHANGE UP (ref 0–0.2)
BASOPHILS # BLD AUTO: 0.07 K/UL — SIGNIFICANT CHANGE UP (ref 0–0.2)
BASOPHILS NFR BLD AUTO: 0.2 % — SIGNIFICANT CHANGE UP (ref 0–2)
BASOPHILS NFR BLD AUTO: 0.3 % — SIGNIFICANT CHANGE UP (ref 0–2)
BASOPHILS NFR BLD AUTO: 0.5 % — SIGNIFICANT CHANGE UP (ref 0–2)
BILIRUB SERPL-MCNC: 0.3 MG/DL — SIGNIFICANT CHANGE UP (ref 0.2–1.2)
BILIRUB SERPL-MCNC: 0.3 MG/DL — SIGNIFICANT CHANGE UP (ref 0.2–1.2)
BILIRUB UR-MCNC: NEGATIVE — SIGNIFICANT CHANGE UP
BLD GP AB SCN SERPL QL: NEGATIVE — SIGNIFICANT CHANGE UP
BLD GP AB SCN SERPL QL: SIGNIFICANT CHANGE UP
BUN SERPL-MCNC: 10 MG/DL — SIGNIFICANT CHANGE UP (ref 7–23)
BUN SERPL-MCNC: 17 MG/DL — SIGNIFICANT CHANGE UP (ref 7–18)
CALCIUM SERPL-MCNC: 8 MG/DL — LOW (ref 8.4–10.5)
CALCIUM SERPL-MCNC: 8.1 MG/DL — LOW (ref 8.4–10.5)
CHLORIDE SERPL-SCNC: 91 MMOL/L — LOW (ref 96–108)
CHLORIDE SERPL-SCNC: 94 MMOL/L — LOW (ref 96–108)
CO2 SERPL-SCNC: 29 MMOL/L — SIGNIFICANT CHANGE UP (ref 22–31)
CO2 SERPL-SCNC: 34 MMOL/L — HIGH (ref 22–31)
COLOR SPEC: YELLOW — SIGNIFICANT CHANGE UP
CREAT SERPL-MCNC: 0.4 MG/DL — LOW (ref 0.5–1.3)
CREAT SERPL-MCNC: 0.64 MG/DL — SIGNIFICANT CHANGE UP (ref 0.5–1.3)
DIFF PNL FLD: ABNORMAL
EOSINOPHIL # BLD AUTO: 0.01 K/UL — SIGNIFICANT CHANGE UP (ref 0–0.5)
EOSINOPHIL # BLD AUTO: 0.01 K/UL — SIGNIFICANT CHANGE UP (ref 0–0.5)
EOSINOPHIL # BLD AUTO: 0.1 K/UL — SIGNIFICANT CHANGE UP (ref 0–0.5)
EOSINOPHIL NFR BLD AUTO: 0.1 % — SIGNIFICANT CHANGE UP (ref 0–6)
EOSINOPHIL NFR BLD AUTO: 0.1 % — SIGNIFICANT CHANGE UP (ref 0–6)
EOSINOPHIL NFR BLD AUTO: 0.7 % — SIGNIFICANT CHANGE UP (ref 0–6)
GLUCOSE BLDC GLUCOMTR-MCNC: 77 MG/DL — SIGNIFICANT CHANGE UP (ref 70–99)
GLUCOSE SERPL-MCNC: 251 MG/DL — HIGH (ref 70–99)
GLUCOSE SERPL-MCNC: 94 MG/DL — SIGNIFICANT CHANGE UP (ref 70–99)
GLUCOSE UR QL: NEGATIVE — SIGNIFICANT CHANGE UP
HCT VFR BLD CALC: 26.2 % — LOW (ref 39–50)
HCT VFR BLD CALC: 27.9 % — LOW (ref 39–50)
HCT VFR BLD CALC: 30.8 % — LOW (ref 39–50)
HCT VFR BLD CALC: 32.5 % — LOW (ref 39–50)
HGB BLD-MCNC: 10.5 G/DL — LOW (ref 13–17)
HGB BLD-MCNC: 8.5 G/DL — LOW (ref 13–17)
HGB BLD-MCNC: 9 G/DL — LOW (ref 13–17)
HGB BLD-MCNC: 9.7 G/DL — LOW (ref 13–17)
IMM GRANULOCYTES NFR BLD AUTO: 0.6 % — SIGNIFICANT CHANGE UP (ref 0–1.5)
IMM GRANULOCYTES NFR BLD AUTO: 0.7 % — SIGNIFICANT CHANGE UP (ref 0–1.5)
IMM GRANULOCYTES NFR BLD AUTO: 1 % — SIGNIFICANT CHANGE UP (ref 0–1.5)
INR BLD: 1.09 — SIGNIFICANT CHANGE UP (ref 0.88–1.16)
INR BLD: 1.15 RATIO — SIGNIFICANT CHANGE UP (ref 0.88–1.16)
KETONES UR-MCNC: NEGATIVE — SIGNIFICANT CHANGE UP
LACTATE SERPL-SCNC: 1.4 MMOL/L — SIGNIFICANT CHANGE UP (ref 0.7–2)
LACTATE SERPL-SCNC: 2.5 MMOL/L — HIGH (ref 0.7–2)
LEUKOCYTE ESTERASE UR-ACNC: NEGATIVE — SIGNIFICANT CHANGE UP
LYMPHOCYTES # BLD AUTO: 0.28 K/UL — LOW (ref 1–3.3)
LYMPHOCYTES # BLD AUTO: 0.3 K/UL — LOW (ref 1–3.3)
LYMPHOCYTES # BLD AUTO: 0.43 K/UL — LOW (ref 1–3.3)
LYMPHOCYTES # BLD AUTO: 1.7 % — LOW (ref 13–44)
LYMPHOCYTES # BLD AUTO: 1.8 % — LOW (ref 13–44)
LYMPHOCYTES # BLD AUTO: 3.2 % — LOW (ref 13–44)
MAGNESIUM SERPL-MCNC: 1.8 MG/DL — SIGNIFICANT CHANGE UP (ref 1.6–2.6)
MCHC RBC-ENTMCNC: 31.5 GM/DL — LOW (ref 32–36)
MCHC RBC-ENTMCNC: 32.3 GM/DL — SIGNIFICANT CHANGE UP (ref 32–36)
MCHC RBC-ENTMCNC: 32.3 GM/DL — SIGNIFICANT CHANGE UP (ref 32–36)
MCHC RBC-ENTMCNC: 32.4 GM/DL — SIGNIFICANT CHANGE UP (ref 32–36)
MCHC RBC-ENTMCNC: 34.3 PG — HIGH (ref 27–34)
MCHC RBC-ENTMCNC: 34.3 PG — HIGH (ref 27–34)
MCHC RBC-ENTMCNC: 34.4 PG — HIGH (ref 27–34)
MCHC RBC-ENTMCNC: 34.8 PG — HIGH (ref 27–34)
MCV RBC AUTO: 105.6 FL — HIGH (ref 80–100)
MCV RBC AUTO: 106.5 FL — HIGH (ref 80–100)
MCV RBC AUTO: 107.6 FL — HIGH (ref 80–100)
MCV RBC AUTO: 108.8 FL — HIGH (ref 80–100)
MONOCYTES # BLD AUTO: 0.77 K/UL — SIGNIFICANT CHANGE UP (ref 0–0.9)
MONOCYTES # BLD AUTO: 1.02 K/UL — HIGH (ref 0–0.9)
MONOCYTES # BLD AUTO: 1.22 K/UL — HIGH (ref 0–0.9)
MONOCYTES NFR BLD AUTO: 5.7 % — SIGNIFICANT CHANGE UP (ref 2–14)
MONOCYTES NFR BLD AUTO: 6 % — SIGNIFICANT CHANGE UP (ref 2–14)
MONOCYTES NFR BLD AUTO: 7.1 % — SIGNIFICANT CHANGE UP (ref 2–14)
NEUTROPHILS # BLD AUTO: 12.06 K/UL — HIGH (ref 1.8–7.4)
NEUTROPHILS # BLD AUTO: 15.39 K/UL — HIGH (ref 1.8–7.4)
NEUTROPHILS # BLD AUTO: 15.44 K/UL — HIGH (ref 1.8–7.4)
NEUTROPHILS NFR BLD AUTO: 88.9 % — HIGH (ref 43–77)
NEUTROPHILS NFR BLD AUTO: 90.2 % — HIGH (ref 43–77)
NEUTROPHILS NFR BLD AUTO: 91.2 % — HIGH (ref 43–77)
NITRITE UR-MCNC: NEGATIVE — SIGNIFICANT CHANGE UP
NRBC # BLD: 0 /100 WBCS — SIGNIFICANT CHANGE UP (ref 0–0)
PH UR: 7 — SIGNIFICANT CHANGE UP (ref 5–8)
PHOSPHATE SERPL-MCNC: 3.4 MG/DL — SIGNIFICANT CHANGE UP (ref 2.5–4.5)
PLATELET # BLD AUTO: 354 K/UL — SIGNIFICANT CHANGE UP (ref 150–400)
PLATELET # BLD AUTO: 356 K/UL — SIGNIFICANT CHANGE UP (ref 150–400)
PLATELET # BLD AUTO: 396 K/UL — SIGNIFICANT CHANGE UP (ref 150–400)
PLATELET # BLD AUTO: 441 K/UL — HIGH (ref 150–400)
POTASSIUM SERPL-MCNC: 4 MMOL/L — SIGNIFICANT CHANGE UP (ref 3.5–5.3)
POTASSIUM SERPL-MCNC: 4.5 MMOL/L — SIGNIFICANT CHANGE UP (ref 3.5–5.3)
POTASSIUM SERPL-SCNC: 4 MMOL/L — SIGNIFICANT CHANGE UP (ref 3.5–5.3)
POTASSIUM SERPL-SCNC: 4.5 MMOL/L — SIGNIFICANT CHANGE UP (ref 3.5–5.3)
PROT SERPL-MCNC: 6.8 G/DL — SIGNIFICANT CHANGE UP (ref 6–8.3)
PROT SERPL-MCNC: 7.4 G/DL — SIGNIFICANT CHANGE UP (ref 6–8.3)
PROT UR-MCNC: NEGATIVE — SIGNIFICANT CHANGE UP
PROTHROM AB SERPL-ACNC: 13 SEC — SIGNIFICANT CHANGE UP (ref 10.6–13.6)
PROTHROM AB SERPL-ACNC: 13.6 SEC — SIGNIFICANT CHANGE UP (ref 10.6–13.6)
RAPID RVP RESULT: SIGNIFICANT CHANGE UP
RBC # BLD: 2.48 M/UL — LOW (ref 4.2–5.8)
RBC # BLD: 2.62 M/UL — LOW (ref 4.2–5.8)
RBC # BLD: 2.83 M/UL — LOW (ref 4.2–5.8)
RBC # BLD: 3.02 M/UL — LOW (ref 4.2–5.8)
RBC # FLD: 12.4 % — SIGNIFICANT CHANGE UP (ref 10.3–14.5)
RBC # FLD: 12.5 % — SIGNIFICANT CHANGE UP (ref 10.3–14.5)
RBC # FLD: 12.5 % — SIGNIFICANT CHANGE UP (ref 10.3–14.5)
RBC # FLD: 12.6 % — SIGNIFICANT CHANGE UP (ref 10.3–14.5)
RH IG SCN BLD-IMP: POSITIVE — SIGNIFICANT CHANGE UP
SARS-COV-2 RNA SPEC QL NAA+PROBE: SIGNIFICANT CHANGE UP
SARS-COV-2 RNA SPEC QL NAA+PROBE: SIGNIFICANT CHANGE UP
SODIUM SERPL-SCNC: 131 MMOL/L — LOW (ref 135–145)
SODIUM SERPL-SCNC: 135 MMOL/L — SIGNIFICANT CHANGE UP (ref 135–145)
SP GR SPEC: 1.01 — SIGNIFICANT CHANGE UP (ref 1.01–1.02)
UROBILINOGEN FLD QL: NEGATIVE — SIGNIFICANT CHANGE UP
WBC # BLD: 12.51 K/UL — HIGH (ref 3.8–10.5)
WBC # BLD: 13.56 K/UL — HIGH (ref 3.8–10.5)
WBC # BLD: 16.86 K/UL — HIGH (ref 3.8–10.5)
WBC # BLD: 17.11 K/UL — HIGH (ref 3.8–10.5)
WBC # FLD AUTO: 12.51 K/UL — HIGH (ref 3.8–10.5)
WBC # FLD AUTO: 13.56 K/UL — HIGH (ref 3.8–10.5)
WBC # FLD AUTO: 16.86 K/UL — HIGH (ref 3.8–10.5)
WBC # FLD AUTO: 17.11 K/UL — HIGH (ref 3.8–10.5)

## 2021-02-23 PROCEDURE — 71045 X-RAY EXAM CHEST 1 VIEW: CPT | Mod: 26

## 2021-02-23 PROCEDURE — 99222 1ST HOSP IP/OBS MODERATE 55: CPT | Mod: GC

## 2021-02-23 PROCEDURE — 71045 X-RAY EXAM CHEST 1 VIEW: CPT | Mod: 26,77

## 2021-02-23 PROCEDURE — 99285 EMERGENCY DEPT VISIT HI MDM: CPT

## 2021-02-23 PROCEDURE — 99222 1ST HOSP IP/OBS MODERATE 55: CPT

## 2021-02-23 PROCEDURE — 71275 CT ANGIOGRAPHY CHEST: CPT | Mod: 26

## 2021-02-23 RX ORDER — CALCIUM CARBONATE 500(1250)
1 TABLET ORAL DAILY
Refills: 0 | Status: DISCONTINUED | OUTPATIENT
Start: 2021-02-23 | End: 2021-02-23

## 2021-02-23 RX ORDER — ACETYLCYSTEINE 200 MG/ML
5 VIAL (ML) MISCELLANEOUS THREE TIMES A DAY
Refills: 0 | Status: COMPLETED | OUTPATIENT
Start: 2021-02-23 | End: 2021-02-28

## 2021-02-23 RX ORDER — SODIUM CHLORIDE 9 MG/ML
1000 INJECTION, SOLUTION INTRAVENOUS
Refills: 0 | Status: DISCONTINUED | OUTPATIENT
Start: 2021-02-23 | End: 2021-02-23

## 2021-02-23 RX ORDER — MORPHINE SULFATE 50 MG/1
1 CAPSULE, EXTENDED RELEASE ORAL ONCE
Refills: 0 | Status: DISCONTINUED | OUTPATIENT
Start: 2021-02-23 | End: 2021-02-23

## 2021-02-23 RX ORDER — CALCIUM CARBONATE 500(1250)
1 TABLET ORAL
Qty: 0 | Refills: 0 | DISCHARGE

## 2021-02-23 RX ORDER — SODIUM CHLORIDE 9 MG/ML
3 INJECTION INTRAMUSCULAR; INTRAVENOUS; SUBCUTANEOUS EVERY 4 HOURS
Refills: 0 | Status: DISCONTINUED | OUTPATIENT
Start: 2021-02-23 | End: 2021-02-23

## 2021-02-23 RX ORDER — ACETAMINOPHEN 500 MG
650 TABLET ORAL EVERY 6 HOURS
Refills: 0 | Status: DISCONTINUED | OUTPATIENT
Start: 2021-02-23 | End: 2021-02-23

## 2021-02-23 RX ORDER — SODIUM CHLORIDE 9 MG/ML
1000 INJECTION, SOLUTION INTRAVENOUS
Qty: 0 | Refills: 0 | DISCHARGE
Start: 2021-02-23

## 2021-02-23 RX ORDER — DEXTROSE 50 % IN WATER 50 %
15 SYRINGE (ML) INTRAVENOUS ONCE
Refills: 0 | Status: DISCONTINUED | OUTPATIENT
Start: 2021-02-23 | End: 2021-02-23

## 2021-02-23 RX ORDER — MORPHINE SULFATE 50 MG/1
10 CAPSULE, EXTENDED RELEASE ORAL
Qty: 0 | Refills: 0 | DISCHARGE

## 2021-02-23 RX ORDER — MORPHINE SULFATE 50 MG/1
10 CAPSULE, EXTENDED RELEASE ORAL EVERY 4 HOURS
Refills: 0 | Status: DISCONTINUED | OUTPATIENT
Start: 2021-02-23 | End: 2021-02-23

## 2021-02-23 RX ORDER — ALBUTEROL 90 UG/1
2 AEROSOL, METERED ORAL EVERY 6 HOURS
Refills: 0 | Status: DISCONTINUED | OUTPATIENT
Start: 2021-02-23 | End: 2021-02-23

## 2021-02-23 RX ORDER — PANTOPRAZOLE SODIUM 20 MG/1
40 TABLET, DELAYED RELEASE ORAL DAILY
Refills: 0 | Status: DISCONTINUED | OUTPATIENT
Start: 2021-02-23 | End: 2021-02-23

## 2021-02-23 RX ORDER — IPRATROPIUM BROMIDE 0.2 MG/ML
2.5 SOLUTION, NON-ORAL INHALATION
Qty: 0 | Refills: 0 | DISCHARGE

## 2021-02-23 RX ORDER — PANTOPRAZOLE SODIUM 20 MG/1
40 TABLET, DELAYED RELEASE ORAL
Qty: 0 | Refills: 0 | DISCHARGE
Start: 2021-02-23

## 2021-02-23 RX ORDER — ACETAMINOPHEN 500 MG
20.31 TABLET ORAL
Qty: 0 | Refills: 0 | DISCHARGE
Start: 2021-02-23

## 2021-02-23 RX ORDER — SODIUM CHLORIDE 9 MG/ML
10 INJECTION INTRAMUSCULAR; INTRAVENOUS; SUBCUTANEOUS EVERY 6 HOURS
Refills: 0 | Status: DISCONTINUED | OUTPATIENT
Start: 2021-02-23 | End: 2021-03-04

## 2021-02-23 RX ORDER — CHLORHEXIDINE GLUCONATE 213 G/1000ML
1 SOLUTION TOPICAL
Refills: 0 | Status: DISCONTINUED | OUTPATIENT
Start: 2021-02-23 | End: 2021-03-03

## 2021-02-23 RX ORDER — SODIUM CHLORIDE 9 MG/ML
1000 INJECTION INTRAMUSCULAR; INTRAVENOUS; SUBCUTANEOUS
Refills: 0 | Status: DISCONTINUED | OUTPATIENT
Start: 2021-02-23 | End: 2021-03-04

## 2021-02-23 RX ORDER — INSULIN LISPRO 100/ML
VIAL (ML) SUBCUTANEOUS EVERY 6 HOURS
Refills: 0 | Status: DISCONTINUED | OUTPATIENT
Start: 2021-02-23 | End: 2021-02-23

## 2021-02-23 RX ORDER — CALCIUM CARBONATE 500(1250)
5 TABLET ORAL
Qty: 0 | Refills: 0 | DISCHARGE
Start: 2021-02-23

## 2021-02-23 RX ORDER — METRONIDAZOLE 500 MG
500 TABLET ORAL EVERY 8 HOURS
Refills: 0 | Status: DISCONTINUED | OUTPATIENT
Start: 2021-02-23 | End: 2021-03-03

## 2021-02-23 RX ORDER — SODIUM CHLORIDE 9 MG/ML
3 INJECTION INTRAMUSCULAR; INTRAVENOUS; SUBCUTANEOUS
Qty: 0 | Refills: 0 | DISCHARGE

## 2021-02-23 RX ORDER — DEXTROSE 50 % IN WATER 50 %
50 SYRINGE (ML) INTRAVENOUS
Qty: 0 | Refills: 0 | DISCHARGE
Start: 2021-02-23

## 2021-02-23 RX ORDER — LEVOTHYROXINE SODIUM 125 MCG
175 TABLET ORAL DAILY
Refills: 0 | Status: DISCONTINUED | OUTPATIENT
Start: 2021-02-23 | End: 2021-02-23

## 2021-02-23 RX ORDER — METRONIDAZOLE 500 MG
500 TABLET ORAL EVERY 8 HOURS
Refills: 0 | Status: DISCONTINUED | OUTPATIENT
Start: 2021-02-23 | End: 2021-02-23

## 2021-02-23 RX ORDER — LEVOTHYROXINE SODIUM 125 MCG
175 TABLET ORAL DAILY
Refills: 0 | Status: DISCONTINUED | OUTPATIENT
Start: 2021-02-23 | End: 2021-03-04

## 2021-02-23 RX ORDER — MORPHINE SULFATE 50 MG/1
15 CAPSULE, EXTENDED RELEASE ORAL EVERY 6 HOURS
Refills: 0 | Status: DISCONTINUED | OUTPATIENT
Start: 2021-02-23 | End: 2021-02-23

## 2021-02-23 RX ORDER — CIPROFLOXACIN AND DEXAMETHASONE 3; 1 MG/ML; MG/ML
2 SUSPENSION/ DROPS AURICULAR (OTIC)
Refills: 0 | Status: DISCONTINUED | OUTPATIENT
Start: 2021-02-23 | End: 2021-02-24

## 2021-02-23 RX ORDER — DEXTROSE 50 % IN WATER 50 %
25 SYRINGE (ML) INTRAVENOUS ONCE
Refills: 0 | Status: DISCONTINUED | OUTPATIENT
Start: 2021-02-23 | End: 2021-02-23

## 2021-02-23 RX ORDER — NYSTATIN CREAM 100000 [USP'U]/G
1 CREAM TOPICAL
Qty: 0 | Refills: 0 | DISCHARGE
Start: 2021-02-23

## 2021-02-23 RX ORDER — LEVOTHYROXINE SODIUM 125 MCG
1 TABLET ORAL
Qty: 0 | Refills: 0 | DISCHARGE
Start: 2021-02-23

## 2021-02-23 RX ORDER — METHADONE HYDROCHLORIDE 40 MG/1
1 TABLET ORAL
Qty: 0 | Refills: 0 | DISCHARGE
Start: 2021-02-23

## 2021-02-23 RX ORDER — METRONIDAZOLE 500 MG
100 TABLET ORAL
Qty: 0 | Refills: 0 | DISCHARGE
Start: 2021-02-23

## 2021-02-23 RX ORDER — CEFTRIAXONE 500 MG/1
1000 INJECTION, POWDER, FOR SOLUTION INTRAMUSCULAR; INTRAVENOUS EVERY 24 HOURS
Refills: 0 | Status: DISCONTINUED | OUTPATIENT
Start: 2021-02-23 | End: 2021-02-23

## 2021-02-23 RX ORDER — PANTOPRAZOLE SODIUM 20 MG/1
40 TABLET, DELAYED RELEASE ORAL DAILY
Refills: 0 | Status: DISCONTINUED | OUTPATIENT
Start: 2021-02-23 | End: 2021-03-04

## 2021-02-23 RX ORDER — CALCIUM CARBONATE 500(1250)
1250 TABLET ORAL DAILY
Refills: 0 | Status: DISCONTINUED | OUTPATIENT
Start: 2021-02-23 | End: 2021-02-23

## 2021-02-23 RX ORDER — ACETYLCYSTEINE 200 MG/ML
5 VIAL (ML) MISCELLANEOUS THREE TIMES A DAY
Refills: 0 | Status: DISCONTINUED | OUTPATIENT
Start: 2021-02-23 | End: 2021-02-23

## 2021-02-23 RX ORDER — IPRATROPIUM/ALBUTEROL SULFATE 18-103MCG
3 AEROSOL WITH ADAPTER (GRAM) INHALATION EVERY 6 HOURS
Refills: 0 | Status: DISCONTINUED | OUTPATIENT
Start: 2021-02-23 | End: 2021-03-04

## 2021-02-23 RX ORDER — METHADONE HYDROCHLORIDE 40 MG/1
10 TABLET ORAL THREE TIMES A DAY
Refills: 0 | Status: DISCONTINUED | OUTPATIENT
Start: 2021-02-23 | End: 2021-02-23

## 2021-02-23 RX ORDER — AMPICILLIN SODIUM AND SULBACTAM SODIUM 250; 125 MG/ML; MG/ML
INJECTION, POWDER, FOR SUSPENSION INTRAMUSCULAR; INTRAVENOUS
Refills: 0 | Status: DISCONTINUED | OUTPATIENT
Start: 2021-02-23 | End: 2021-02-23

## 2021-02-23 RX ORDER — DEXTROSE 50 % IN WATER 50 %
0 SYRINGE (ML) INTRAVENOUS
Qty: 0 | Refills: 0 | DISCHARGE
Start: 2021-02-23

## 2021-02-23 RX ORDER — METHADONE HYDROCHLORIDE 40 MG/1
10 TABLET ORAL EVERY 8 HOURS
Refills: 0 | Status: DISCONTINUED | OUTPATIENT
Start: 2021-02-23 | End: 2021-03-02

## 2021-02-23 RX ORDER — CEFTRIAXONE 500 MG/1
1000 INJECTION, POWDER, FOR SOLUTION INTRAMUSCULAR; INTRAVENOUS EVERY 24 HOURS
Refills: 0 | Status: COMPLETED | OUTPATIENT
Start: 2021-02-24 | End: 2021-02-28

## 2021-02-23 RX ORDER — SODIUM CHLORIDE 9 MG/ML
1000 INJECTION INTRAMUSCULAR; INTRAVENOUS; SUBCUTANEOUS ONCE
Refills: 0 | Status: COMPLETED | OUTPATIENT
Start: 2021-02-23 | End: 2021-02-23

## 2021-02-23 RX ORDER — METHADONE HYDROCHLORIDE 40 MG/1
10 TABLET ORAL EVERY 8 HOURS
Refills: 0 | Status: DISCONTINUED | OUTPATIENT
Start: 2021-02-23 | End: 2021-02-23

## 2021-02-23 RX ORDER — MAGNESIUM SULFATE 500 MG/ML
1 VIAL (ML) INJECTION ONCE
Refills: 0 | Status: COMPLETED | OUTPATIENT
Start: 2021-02-23 | End: 2021-02-23

## 2021-02-23 RX ORDER — IPRATROPIUM/ALBUTEROL SULFATE 18-103MCG
3 AEROSOL WITH ADAPTER (GRAM) INHALATION EVERY 6 HOURS
Refills: 0 | Status: DISCONTINUED | OUTPATIENT
Start: 2021-02-23 | End: 2021-02-23

## 2021-02-23 RX ORDER — ENOXAPARIN SODIUM 100 MG/ML
40 INJECTION SUBCUTANEOUS DAILY
Refills: 0 | Status: DISCONTINUED | OUTPATIENT
Start: 2021-02-23 | End: 2021-02-23

## 2021-02-23 RX ORDER — ALPRAZOLAM 0.25 MG
0.5 TABLET ORAL ONCE
Refills: 0 | Status: DISCONTINUED | OUTPATIENT
Start: 2021-02-23 | End: 2021-02-23

## 2021-02-23 RX ORDER — AMPICILLIN SODIUM AND SULBACTAM SODIUM 250; 125 MG/ML; MG/ML
3 INJECTION, POWDER, FOR SUSPENSION INTRAMUSCULAR; INTRAVENOUS ONCE
Refills: 0 | Status: DISCONTINUED | OUTPATIENT
Start: 2021-02-23 | End: 2021-02-23

## 2021-02-23 RX ORDER — SODIUM CHLORIDE 9 MG/ML
3 INJECTION INTRAMUSCULAR; INTRAVENOUS; SUBCUTANEOUS
Qty: 0 | Refills: 0 | DISCHARGE
Start: 2021-02-23

## 2021-02-23 RX ORDER — CEFTRIAXONE 500 MG/1
0 INJECTION, POWDER, FOR SOLUTION INTRAMUSCULAR; INTRAVENOUS
Qty: 0 | Refills: 0 | DISCHARGE
Start: 2021-02-23

## 2021-02-23 RX ORDER — CALCIUM CARBONATE 500(1250)
1250 TABLET ORAL DAILY
Refills: 0 | Status: DISCONTINUED | OUTPATIENT
Start: 2021-02-23 | End: 2021-03-04

## 2021-02-23 RX ORDER — NYSTATIN CREAM 100000 [USP'U]/G
1 CREAM TOPICAL
Refills: 0 | Status: DISCONTINUED | OUTPATIENT
Start: 2021-02-23 | End: 2021-02-23

## 2021-02-23 RX ORDER — LISINOPRIL 2.5 MG/1
20 TABLET ORAL DAILY
Refills: 0 | Status: DISCONTINUED | OUTPATIENT
Start: 2021-02-23 | End: 2021-02-23

## 2021-02-23 RX ORDER — ALBUTEROL 90 UG/1
2 AEROSOL, METERED ORAL
Qty: 0 | Refills: 0 | DISCHARGE
Start: 2021-02-23

## 2021-02-23 RX ORDER — MORPHINE SULFATE 50 MG/1
1 CAPSULE, EXTENDED RELEASE ORAL
Qty: 0 | Refills: 0 | DISCHARGE
Start: 2021-02-23

## 2021-02-23 RX ORDER — INSULIN LISPRO 100/ML
0 VIAL (ML) SUBCUTANEOUS
Qty: 0 | Refills: 0 | DISCHARGE
Start: 2021-02-23

## 2021-02-23 RX ORDER — ENOXAPARIN SODIUM 100 MG/ML
40 INJECTION SUBCUTANEOUS EVERY 24 HOURS
Refills: 0 | Status: DISCONTINUED | OUTPATIENT
Start: 2021-02-23 | End: 2021-03-04

## 2021-02-23 RX ADMIN — SODIUM CHLORIDE 1000 MILLILITER(S): 9 INJECTION INTRAMUSCULAR; INTRAVENOUS; SUBCUTANEOUS at 07:01

## 2021-02-23 RX ADMIN — Medication 0.5 MILLIGRAM(S): at 05:52

## 2021-02-23 RX ADMIN — METHADONE HYDROCHLORIDE 10 MILLIGRAM(S): 40 TABLET ORAL at 13:40

## 2021-02-23 RX ADMIN — Medication 3 MILLILITER(S): at 20:18

## 2021-02-23 RX ADMIN — PANTOPRAZOLE SODIUM 40 MILLIGRAM(S): 20 TABLET, DELAYED RELEASE ORAL at 13:12

## 2021-02-23 RX ADMIN — SODIUM CHLORIDE 1000 MILLILITER(S): 9 INJECTION INTRAMUSCULAR; INTRAVENOUS; SUBCUTANEOUS at 02:18

## 2021-02-23 RX ADMIN — Medication 100 MILLIGRAM(S): at 10:40

## 2021-02-23 RX ADMIN — ENOXAPARIN SODIUM 40 MILLIGRAM(S): 100 INJECTION SUBCUTANEOUS at 22:01

## 2021-02-23 RX ADMIN — Medication 5 MILLILITER(S): at 20:05

## 2021-02-23 RX ADMIN — Medication 500 MILLIGRAM(S): at 22:01

## 2021-02-23 RX ADMIN — CEFTRIAXONE 100 MILLIGRAM(S): 500 INJECTION, POWDER, FOR SOLUTION INTRAMUSCULAR; INTRAVENOUS at 10:39

## 2021-02-23 RX ADMIN — Medication 100 GRAM(S): at 22:02

## 2021-02-23 RX ADMIN — ALBUTEROL 2 PUFF(S): 90 AEROSOL, METERED ORAL at 15:06

## 2021-02-23 RX ADMIN — SODIUM CHLORIDE 4 MILLILITER(S): 9 INJECTION INTRAMUSCULAR; INTRAVENOUS; SUBCUTANEOUS at 22:33

## 2021-02-23 RX ADMIN — Medication 1250 MILLIGRAM(S): at 13:12

## 2021-02-23 RX ADMIN — SODIUM CHLORIDE 100 MILLILITER(S): 9 INJECTION INTRAMUSCULAR; INTRAVENOUS; SUBCUTANEOUS at 22:01

## 2021-02-23 RX ADMIN — METHADONE HYDROCHLORIDE 10 MILLIGRAM(S): 40 TABLET ORAL at 22:18

## 2021-02-23 RX ADMIN — SODIUM CHLORIDE 3 MILLILITER(S): 9 INJECTION INTRAMUSCULAR; INTRAVENOUS; SUBCUTANEOUS at 10:19

## 2021-02-23 NOTE — H&P ADULT - NSHPREVIEWOFSYSTEMS_GEN_ALL_CORE
unable to review, pt is alert and awake but lethargic and not answering any questions. denies any acute symptoms

## 2021-02-23 NOTE — H&P ADULT - NSICDXPASTMEDICALHX_GEN_ALL_CORE_FT
PAST MEDICAL HISTORY:  HTN (hypertension)     Hypothyroidism     Laryngeal mass s/ p radiation, trach    Larynx cancer

## 2021-02-23 NOTE — CONSULT NOTE ADULT - SUBJECTIVE AND OBJECTIVE BOX
Source of information: , Chart review, patient  Patient language: English  : n/a    CC: Patient is a 65y old  Male who presents with a chief complaint of bleeding around the trach site (23 Feb 2021 09:51)      HPI:  64 y/o male h/o Laryngeal cancer s/p laryngectomy and trach/PEG, HTN, hypothyroidism, hypocalcemia BIBA from nursing home with profuse bleeding from the mouth and nose, around the trach site and facial swelling. Per EMS, pt had been bleeding for at least an hour when he was found by nursing home staff. Pt is not on A/C.  In Ed,  Pt's tracheostomy was suctioned upon arrival to the ED and large blood clot removed. Bleeding was noted around the trach site. Patient without respiratory distress. patient CTA with mucous plugging. Bleeding around the trach has stopped upon my exam.     Off note,  Pt was seen here after his trach was displaced on 2/21. Reseated and sutured down on 2/21.     Spoke to pt's next of kin, cousin, Ms Martin, who informed me that pt was diagnosed with Largygeal cancer 4 years ago, had trach 2 years ago and PEG a year ago and follow with Dr Landis in [facility] [phone] and was on different treatments in past, presently he is on Cetuxmab(last T/T 2 weeks ago and was scheduled today for next dose) and Proton Laser(due for next T/t next week).   [facility]: full code  (23 Feb 2021 09:51).     65 year old male with history of laryngeal cancer - pt s/p laryngectomy and trach/peg.  Pt currently undergoing chemo treatment - last treatment 2 weeks ago.  Pt is admitted with bleeding from trach site.  + blood blots and copious secretions.  Bleeding now stopped.  Pt with chronic pain due to neoplasm.  Pt is on methadone 10mg po q 8 hours and msir for breakthrough pain.  Pt states that he has pain currently.  Pain - back and throat area.  + copious secretions - clear.       PAIN SCORE:    5/10   SCALE USED: (1-10 VNRS)    PAST MEDICAL & SURGICAL HISTORY:  Hypothyroidism    HTN (hypertension)    Larynx cancer    Laryngeal mass  s/ p radiation, trach    PEG (percutaneous endoscopic gastrostomy) status    H/O laryngectomy    H/O thyroidectomy    H/O tracheostomy    Surgery, elective  direct laryngoscopy with biopsy- 4/2017    Status post surgery  inguinal hernia    Status post surgery  Left ankle surgical repair  x2 (1965)        FAMILY HISTORY:  No pertinent family history in first degree relatives      SOCIAL HISTORY:  + smoking history    Allergies    penicillin (Rash)    Intolerances        MEDICATIONS:    MEDICATIONS  (STANDING):  ALBUTerol    90 MICROgram(s) HFA Inhaler 2 Puff(s) Inhalation every 6 hours  calcium carbonate   Suspension 1250 milliGRAM(s) Enteral Tube daily  cefTRIAXone   IVPB 1000 milliGRAM(s) IV Intermittent every 24 hours  dextrose 40% Gel 15 Gram(s) Oral once  dextrose 5%. 1000 milliLiter(s) (50 mL/Hr) IV Continuous <Continuous>  dextrose 50% Injectable 25 Gram(s) IV Push once  insulin lispro (ADMELOG) corrective regimen sliding scale   SubCutaneous every 6 hours  levothyroxine 175 MICROGram(s) Oral daily  methadone    Tablet 10 milliGRAM(s) Oral three times a day  metroNIDAZOLE  IVPB 500 milliGRAM(s) IV Intermittent every 8 hours  nystatin Powder 1 Application(s) Topical two times a day  pantoprazole  Injectable 40 milliGRAM(s) IV Push daily  sodium chloride 0.9% for Nebulization 3 milliLiter(s) Nebulizer every 4 hours    MEDICATIONS  (PRN):  acetaminophen    Suspension .. 650 milliGRAM(s) Oral every 6 hours PRN Temp greater or equal to 38C (100.4F), Mild Pain (1 - 3)  morphine  IR 15 milliGRAM(s) Oral every 6 hours PRN Moderate Pain (4 - 6)      Vital Signs Last 24 Hrs  T(C): 36.8 (23 Feb 2021 15:23), Max: 36.8 (23 Feb 2021 15:23)  T(F): 98.3 (23 Feb 2021 15:23), Max: 98.3 (23 Feb 2021 15:23)  HR: 65 (23 Feb 2021 15:00) (65 - 101)  BP: 107/75 (23 Feb 2021 15:00) (84/60 - 119/68)  BP(mean): 81 (23 Feb 2021 15:00) (69 - 81)  RR: 20 (23 Feb 2021 15:00) (9 - 20)  SpO2: 100% (23 Feb 2021 15:00) (86% - 100%)    LABS:                          9.0    17.11 )-----------( 354      ( 23 Feb 2021 06:06 )             27.9     02-23    131<L>  |  91<L>  |  17  ----------------------------<  251<H>  4.5   |  34<H>  |  0.64    Ca    8.0<L>      23 Feb 2021 02:17    TPro  7.4  /  Alb  2.5<L>  /  TBili  0.3  /  DBili  x   /  AST  49<H>  /  ALT  28  /  AlkPhos  80  02-23    PT/INR - ( 23 Feb 2021 02:17 )   PT: 13.6 sec;   INR: 1.15 ratio         PTT - ( 23 Feb 2021 02:17 )  PTT:41.5 sec  LIVER FUNCTIONS - ( 23 Feb 2021 02:17 )  Alb: 2.5 g/dL / Pro: 7.4 g/dL / ALK PHOS: 80 U/L / ALT: 28 U/L DA / AST: 49 U/L / GGT: x             CAPILLARY BLOOD GLUCOSE      POCT Blood Glucose.: 77 mg/dL (23 Feb 2021 11:36)  POCT Blood Glucose.: 274 mg/dL (23 Feb 2021 01:48)      REVIEW OF SYSTEMS:  CONSTITUTIONAL: No fever or fatigue  RESPIRATORY: + trach   CARDIOVASCULAR: No chest pain, palpitations, dizziness, or leg swelling  GASTROINTESTINAL:+ peg  GENITOURINARY: No dysuria, frequency, hematuria, retention or incontinence  MUSCULOSKELETAL: + back pain   NEURO: No weakness, no numbness   PSYCHIATRIC: No depression, anxiety, mood swings, or difficulty sleeping    PHYSICAL EXAM:  GENERAL:  Alert & Oriented X3, NAD, Good concentration  CHEST/LUNG: decreased breath sounds + trach + secretions - clear + enlarged tongue  HEART: Regular rate and rhythm; No murmurs, rubs, or gallops  ABDOMEN: Soft, Nontender, Nondistended; Bowel sounds present + peg  : no incontinence, no flank pain  EXTREMITIES:  2+ Peripheral Pulses, No cyanosis, or edema  MUSCULOSKELETAL: Motor Strength 5/5 B/L upper and lower extremities; moves all extremities equally against gravity; ROM intact; negative SRL + decreased rom   NEUROLOGICAL: awake and alert and oriented   SKIN: No rashes or lesions    Radiology:  < from: CT Angio Chest w/ IV Cont (02.23.21 @ 06:40) >  EXAM:  CT ANGIO CHEST (W)AW IC                            PROCEDURE DATE:  02/23/2021          INTERPRETATION:  CLINICAL INFORMATION: Hemoptysis.    COMPARISON: 2/9/2021    PROCEDURE:  CT Angiography of the Chest.  48 ml of Omnipaque 350 was injected intravenously. 52 ml were discarded.  Sagittal and coronal reformats were performed as well as 3D (MIP) reconstructions.    FINDINGS:    LUNGS AND AIRWAYS: Tracheostomy tube cannula within the upper trachea. Mucoid debris in the trachea, right mainstem bronchus, and left lower lobe bronchi with mucous plugging. No lung consolidation, suspicious nodule, or mass. Bibasilar subsegmental atelectasis (left greater than right).  PLEURA: No pleural effusion.  MEDIASTINUM AND ANDREY: No lymphadenopathy.  VESSELS: Adequate pulmonary arterial opacification, however, poor opacification of upper lobe subsegmental branches limiting evaluation. Otherwise, no pulmonary embolism. Main pulmonary artery is not dilated. Thoracic aorta is normal in caliber.  HEART: Heart size is normal. No pericardial effusion.  CHEST WALL AND LOWER NECK: Left chest wall MediPort.  VISUALIZED UPPER ABDOMEN: PEG tube tip in the body of the stomach.  BONES: Degenerative changes of the spine. Old left eighth rib fracture    IMPRESSION:  Poor opacification of upper lobe subsegmental pulmonary arterial branches limiting evaluation. Otherwise, no pulmonary embolism.    Mucoid debris in the trachea, right mainstem bronchus, and left lower lobe bronchi with mucous plugging. Bibasilar subsegmental atelectasis (left greater than right).    < end of copied text >    Drug Screen:    cefTRIAXone   IVPB 1000 milliGRAM(s) IV Intermittent every 24 hours  metroNIDAZOLE  IVPB 500 milliGRAM(s) IV Intermittent every 8 hours        ORT Score   Family Hx of substance abuse	Female	Male  Alcohol 	                                              1              3  Illegal drugs	                                      2              3  Rx drugs                                               4	      4    Personal Hx of substance abuse		  Alcohol 	                                               3	      3  Illegal drugs                                  	       4	      4  Rx drugs                                                5	      5  Age between 16- 45 years	               1             1  hx preadolescent sexual abuse	       3	      0  Psychological disease		  ADD, OCD, bipolar, schizophrenia	2	      2  Depression                                    	1	      1  Score totals 		: 0  		  a score of 3 or lower indicates low risk for opioid abuse		  a score of 4-7 indicates moderate risk for opioid abuse		  a score of 8 or higher indicates high risk for opioid abuse	    Risk factors associated with adverse outcomes related to opioid treatment  [ ]  Concurrent benzodiazepine use  [ ]  History/ Active substance use or alcohol use disorder  [ ] Psychiatric co-morbidity  [ ] Sleep apnea  [ ] COPD  [ ] BMI> 35  [ ] Liver dysfunction  [ ] Renal dysfunction  [ ] CHF  [x ] Smoker  [x ]  Age > 60 years      [ x]  [place]  Reviewed and Copied to Chart. See below.

## 2021-02-23 NOTE — CONSULT NOTE ADULT - ATTENDING COMMENTS
This patient was evaluated at bedside with the resident, management decisions were made, see above for the details, I agree with the A/P.  -acute on chronic respiratory failure with tracheostomy  -tracheostomy status  -possible cellulitis of the neck  -anemia, stable  -leukocytosis  See above for the details.  The tracheostomy tube was exchanged by ENT with a longer one.

## 2021-02-23 NOTE — CONSULT NOTE ADULT - ASSESSMENT
Home    My Content    Search   Help   Log out  Prescription Monitoring Program Registry  Welcome Carla Spencer  ×  Update Personal Info  FAQ  Search Results Help  Help  ×If you receive an error when searching the  Registry clear your cache and log back in.  Using Chrome: Delete your browsing History.  Using Internet Explorer:  Clear your browsing data.   Patient Search   Multi-Patient Search   Reports   Drug Listing   Designation   My JAVID Numbers  Data Detail Level: Printer-Friendly View Extended View  Confidential Drug Utilization Report  Search Terms: arely alfonso, 1955Search Date: 02/23/2021 17:23:25 PM  The Drug Utilization Report below displays all of the controlled substance prescriptions, if any, that your patient has filled in the last twelve months. The information displayed on this report is compiled from pharmacy submissions to the Department, and accurately reflects the information as submitted by the pharmacies.    This report was requested by: Carla Spencer | Reference #: [id number]    Others' Prescriptions  Patient Name: Arely AlfonsoBirth Date: 1955  Address: [address] [place] [id number]: Male  Rx Written	Rx Dispensed	Drug	Quantity	Days Supply	Prescriber Name	Payment Method	Dispenser  01/15/2021	01/15/2021	lorazepam 0.5 mg tablet	5	5	[facility] #[id number]  12/07/2020	12/10/2020	alprazolam 0.5 mg tablet	1	1	[facility] #[id number]  08/06/2020	08/10/2020	oxycodone hcl 5 mg/5 ml soln	150ml	5	MaiteNighat	[facility] #[id number]  06/09/2020	06/17/2020	alprazolam 0.5 mg tablet	2	2	[facility] #[id number]  04/15/2020	04/15/2020	alprazolam 0.5 mg tablet	1	1	[facility] #[id number]    Patient Name: Arely AlfonsoBirth Date: 1955  Address: [address] [place] [id number]: Male  Rx Written	Rx Dispensed	Drug	Quantity	Days Supply	Prescriber Name	Payment Method	Dispenser  02/19/2021	02/19/2021	methadone 10 mg/5 ml solution	450ml	30	Jemal Peña MD	Medicare	[facility]  02/19/2021	02/19/2021	oxycodone hcl 5 mg/5 ml soln	1800ml	30	Jemal Peña MD	Medicare	[facility]  02/05/2021	02/05/2021	oxycodone hcl 5 mg/5 ml soln	900ml	30	Donovan De La Cruz)	Medicare	[facility]  02/05/2021	02/05/2021	methadone 10 mg/5 ml solution	150ml	30	Donovan De La Cruz)	Medicare	[facility]    Patient Name: Arely Torres Date: 1955  Address: [facility] [address] [place] [id number]: Male  Rx Written	Rx Dispensed	Drug	Quantity	Days Supply	Prescriber Name	Payment Method	Dispenser  02/01/2021	02/02/2021	morphine sulf 10 mg/5 ml soln	420ml	7	Maite, Sandy	Medicare	[facility]  01/18/2021	01/19/2021	morphine sulf 10 mg/5 ml soln	420ml	7	Maite, [facility]  01/04/2021	01/05/2021	morphine sulf 10 mg/5 ml soln	315ml	7	Maite, Sandy	Medicare	[facility]  12/28/2020	12/29/2020	oxycodone hcl 5 mg/5 ml soln	420ml	7	Maite, [facility]  12/21/2020	12/22/2020	oxycodone hcl 5 mg/5 ml soln	420ml	7	Maite, [facility]  12/14/2020	12/15/2020	oxycodone hcl 5 mg/5 ml soln	420ml	7	Maite, [facility]  12/07/2020	12/08/2020	oxycodone hcl 5 mg/5 ml soln	420ml	7	Maite, [facility]  12/01/2020	12/02/2020	oxycodone hcl 5 mg/5 ml soln	420ml	7	Maite, [facility]  11/24/2020	11/25/2020	oxycodone hcl 5 mg/5 ml soln	420ml	7	Maite, [facility]  11/11/2020	11/11/2020	oxycodone hcl 5 mg/5 ml soln	420ml	7	Maite, [facility]  11/04/2020	11/05/2020	oxycodone hcl 5 mg/5 ml soln	420ml	7	Maite, [facility]  10/28/2020	10/29/2020	oxycodone hcl 5 mg/5 ml soln	420ml	7	Maite, [facility]  10/19/2020	10/20/2020	fentanyl 50 mcg/hr patch	3	9	Maite, Sandy	Medicare	[facility]  10/07/2020	10/08/2020	oxycodone hcl 5 mg/5 ml soln	420ml	7	Maite, [facility]  09/30/2020	10/01/2020	oxycodone hcl 5 mg/5 ml soln	420ml	7	Maite, [facility]  09/23/2020	09/24/2020	oxycodone hcl 5 mg/5 ml soln	420ml	7	Maite, [facility]  09/15/2020	09/17/2020	oxycodone hcl 5 mg/5 ml soln	420ml	7	Maite, [facility]  09/09/2020	09/10/2020	oxycodone hcl 5 mg/5 ml soln	210ml	7	Maite, [facility]  08/28/2020	09/03/2020	oxycodone hcl 5 mg/5 ml soln	210ml	7	Maite, [facility]  08/27/2020	08/27/2020	oxycodone hcl 5 mg/5 ml soln	210ml	7	Maite, [facility]  08/20/2020	08/20/2020	oxycodone hcl 5 mg/5 ml soln	210ml	7	Maite, [facility]  08/14/2020	08/15/2020	oxycodone hcl 5 mg/5 ml soln	150ml	5	Maite, [facility]  08/01/2020	08/01/2020	oxycodone hcl 5 mg/5 ml soln	120ml	4	[facility]    Patient Name: Arely Torres Date: 1955  Address: [address] [place], [place] [address]Nns: Male  Rx Written	Rx Dispensed	Drug	Quantity	Days Supply	Prescriber Name	Payment Method	Dispenser  02/21/2021	02/21/2021	morphine sulf 10 mg/5 ml soln	280ml	7	Filippo Anton MD	[place]	[facility]  02/17/2021	02/17/2021	morphine sulf 10 mg/5 ml soln	200ml	7	Filippo Anton MD	[place]	[facility]  * - Drugs marked with an asterisk are compound drugs. If the compound drug is made up of more than one controlled substance, then each controlled substance will be a separate row in the

## 2021-02-23 NOTE — CONSULT NOTE ADULT - PROBLEM SELECTOR RECOMMENDATION 9
- pt with history of larynx cancer.  Pt has chronic pain due - neck and back.    Nonopioid Recommendations  - acetaminophen susp prn  - no nsaids - pt with bleeding  Opioid Recommendation   - Restart methadone 10mg po q 8 hours - QT WNL  - Morphine sulfate 10mg sol q 4 hours prn btp via peg.  Pt was on oxycodone sol prior to admission.  However, will keep pt on morphine for now.    Bowel regimen  - senna via peg  Secretions  - suction frequently  - Robinul prn    transfer to [facility] [place], operated by [facility] under ENT

## 2021-02-23 NOTE — ED PROVIDER NOTE - PMH
COPD (chronic obstructive pulmonary disease)    HTN (hypertension)    Hypokalemia    Hypothyroidism    Laryngeal mass  s/ p radiation  Larynx cancer    SOB (shortness of breath)    Substance abuse

## 2021-02-23 NOTE — ED ADULT NURSE NOTE - NSIMPLEMENTINTERV_GEN_ALL_ED
Implemented All Fall Risk Interventions:  [place] to call system. Call bell, personal items and telephone within reach. Instruct patient to call for assistance. Room bathroom lighting operational. Non-slip footwear when patient is off stretcher. Physically safe environment: no spills, clutter or unnecessary equipment. Stretcher in lowest position, wheels locked, appropriate side rails in place. Provide visual cue, wrist band, yellow gown, etc. Monitor gait and stability. Monitor for mental status changes and reorient to person, place, and time. Review medications for side effects contributing to fall risk. Reinforce activity limits and safety measures with patient and family.

## 2021-02-23 NOTE — DISCHARGE NOTE NURSING/CASE MANAGEMENT/SOCIAL WORK - PATIENT PORTAL LINK FT
You can access the FollowMyHealth Patient Portal offered by [facility] by registering at the following website: [email or web address]. By joining [facility]’s FollowMyHealth portal, you will also be able to view your health information using other applications (apps) compatible with our system.

## 2021-02-23 NOTE — H&P ADULT - HISTORY OF PRESENT ILLNESS
66 y/o male h/o Laryngeal cancer s/p laryngectomy and trach, substance abuse. HTN, COPD, hypokalemia, hypothyroidism,  BIBA from nursing home with profuse bleeding from the mouth and nose and facial swelling. Per EMS, pt had been bleeding for at least an hour when he was found by nursing home staff. Pt is not on A/C. Pt was seen here after his trach was displaced on 2/21. Reseated and sutured down on 2/21. 64 y/o male h/o Laryngeal cancer s/p laryngectomy and trach/PEG, HTN, hypothyroidism, hypocalcemia BIBA from nursing home with profuse bleeding from the mouth and nose, around the trach site and facial swelling. Per EMS, pt had been bleeding for at least an hour when he was found by nursing home staff. Pt is not on A/C.  In Ed,  Pt's tracheostomy was suctioned upon arrival to the ED and large blood clot removed. Bleeding was noted around the trach site. Patient without respiratory distress. patient CTA with mucous plugging. Bleeding around the trach has stopped upon my exam.     Off note,  Pt was seen here after his trach was displaced on 2/21. Reseated and sutured down on 2/21.     Spoke to pt's next of kin, cousin, Ms Martin, who informed me that pt was diagnosed with Largygeal cancer 4 years ago, had trach 2 years ago and PEG a year ago and follow with Dr Landis in [facility] [phone] and was on different treatments in past, presently he is on Cetuxmab(last T/T 2 weeks ago and was scheduled today for next dose) and Proton Laser(due for next T/t next week).   [facility]: full code

## 2021-02-23 NOTE — H&P ADULT - ATTENDING COMMENTS
IMP: This is a 65 yr old man with  Laryngeal cancer s/p laryngectomy and trach/PEG, HTN, hypothyroidism, hypocalcemia BIBA from nursing home with profuse bleeding from the mouth and nose, around the trach site and facial swelling. Per EMS, pt had been bleeding for at least an hour when he was found by nursing home staff. Pt is not on A/C.  In Ed,  Pt's tracheostomy was suctioned upon arrival to the ED and large blood clot removed. Bleeding was noted around the trach site. Patient without respiratory distress. patient CTA with mucous plugging. Bleeding around the trach has stopped upon my exam.     Assessment:   - bleeding from trach site   - Aspiration PNA    - laryngeal cancer s/p Laryngectomy and  trach   - Anemia   - Hypocalcemia   - h/o HTN   - hypothyroidism   - pain management   - Elevated Blood glucose       Plan   -admit to icu   -continue O2 via TC  -monitor airway  -no chemical anticoag due to bleeding in trach area  -thoracic eval  -pulmonary hygiene   -pain control  -discused with thoracic surgeon .. suggest ENT eval   -

## 2021-02-23 NOTE — H&P ADULT - NSHPPHYSICALEXAM_GEN_ALL_CORE
ICU Vital Signs Last 24 Hrs  T(C): 36.4 (23 Feb 2021 10:09), Max: 36.7 (23 Feb 2021 09:46)  T(F): 97.6 (23 Feb 2021 10:09), Max: 98 (23 Feb 2021 09:46)  HR: 71 (23 Feb 2021 10:09) (68 - 101)  BP: 108/55 (23 Feb 2021 10:09) (84/60 - 114/67)  BP(mean): --  ABP: --  ABP(mean): --  RR: 17 (23 Feb 2021 10:09) (14 - 20)  SpO2: 100% (23 Feb 2021 10:09) (96% - 100%)      PHYSICAL EXAM:  GENERAL: male in bed, in no acute distress, in bed, comfortable   HEENT: Normocephalic;  conjunctivae and sclerae clear; moist mucous membranes;   NECK :  Trach site non-bleeding, crusting surrounding trach site. trach collar in place   CHEST/LUNG: Clear to auscultation bilaterally with good air entry   HEART: S1 S2  regular; no murmurs, gallops or rubs  ABDOMEN: Soft, Nontender, Nondistended; Bowel sounds present, PEG tube present, crusting around the PEG tube noted.   EXTREMITIES: no cyanosis; no edema; no calf tenderness  SKIN: warm and dry; no rash  Sacral redness noted, no skin breakdown   NERVOUS SYSTEM:  Awake and alert; AAO x2.

## 2021-02-23 NOTE — CONSULT NOTE ADULT - SUBJECTIVE AND OBJECTIVE BOX
SICU CONSULT NOTE    HPI:  66 y/o male h/o Laryngeal cancer s/p laryngectomy and trach/PEG, HTN, hypothyroidism, hypocalcemia BIBA from nursing home with profuse bleeding from the mouth and nose, around the trach site and facial swelling. Per EMS, pt had been bleeding for at least an hour when he was found by nursing home staff. Pt is not on A/C. In Ed,  Pt's tracheostomy was suctioned upon arrival to the ED and large blood clot removed. Bleeding was noted around the trach site. Patient without respiratory distress. patient CTA with mucous plugging. Bleeding around the trach has stopped upon ICU admission. Off note,  Pt was seen here after his trach was displaced on . Reseated and sutured down on . Spoke to pt's next of kin, cousin, Ms Martin, who informed me that pt was diagnosed with Largygeal cancer 4 years ago, had trach 2 years ago and PEG a year ago and follow with Dr Landis in [facility] [phone] and was on different treatments in past, presently he is on Cetuximab (T/T 2 weeks ago and was scheduled today for next dose) and Proton Laser(due for next T/t next week). GOC: full code.    Pt admitted to ICU for bleeding at tracheostomy, aspiration pneumonia.    Pt for transfer to [facility] under care of Dr Corona (ENT) and Dr. Montesinos (ICU). Case discussed with ICU attending, patient is medically stable for transfer.    SICU ADDENDUM:   Patient arrives to SICU hemodynamically stable, no signs of bleeding or respiratory distress. Pt reports that it is difficult for him to breathe, however RR 12 nonlabored with no significant secretions, he is not in acute distress. Otherwise pt has no specific complaints.     PAST MEDICAL & SURGICAL HISTORY:  Hypothyroidism    HTN (hypertension)    Larynx cancer    Laryngeal mass  s/ p radiation, trach    PEG (percutaneous endoscopic gastrostomy) status    H/O laryngectomy    H/O thyroidectomy    H/O tracheostomy    Surgery, elective  direct laryngoscopy with biopsy- 2017    Status post surgery  inguinal hernia    Status post surgery  Left ankle surgical repair  x2 ()        PAST SURGICAL HISTORY:     REVIEW OF SYSTEMS:   Pertinent positives/negatives noted in HPI.     HOME MEDICATIONS:  Home Medications:  acetaminophen 160 mg/5 mL oral suspension: 20.31 milliliter(s) orally every 6 hours, As needed, Temp greater or equal to 38C (100.4F), Mild Pain (1 - 3) (2021 16:38)  albuterol 90 mcg/inh inhalation aerosol: 2 puff(s) inhaled every 6 hours (2021 16:38)  calcium carbonate 1250 mg/5 mL (100 mg/mL elemental calcium) oral suspension: 5 milliliter(s) orally once a day (2021 16:38)  cefTRIAXone:  (2021 16:38)  Dextrose 5% in Water intravenous solution: 1000 milliliter(s) intravenous  (2021 16:38)  glucose 40% oral gel:  orally  (2021 16:38)  glucose 50% intravenous solution: 50 milliliter(s) intravenous once (2021 16:38)  insulin lispro 100 units/mL injectable solution:  injectable  (2021 16:38)  levothyroxine 175 mcg (0.175 mg) oral tablet: 1 tab(s) orally once a day (2021 16:38)  methadone 10 mg oral tablet: 1 tab(s) orally 3 times a day (2021 16:38)  metroNIDAZOLE 500 mg/100 mL intravenous solution: 100 milliliter(s) intravenous every 8 hours (2021 16:38)  morphine 15 mg oral tablet: 1 tab(s) orally every 6 hours, As needed, Moderate Pain (4 - 6) (2021 16:38)  nystatin 100,000 units/g topical powder: 1 application topically 2 times a day (2021 16:38)  pantoprazole 40 mg intravenous injection: 40 milligram(s) intravenous once a day (2021 16:38)  sodium chloride 0.9% inhalation solution: 3 milliliter(s) inhaled every 4 hours (2021 16:38)      ALLERGIES:  Allergies    penicillin (Rash)    Intolerances    Vital Signs Last 24 Hrs  T(C): 36.8 (2021 15:23), Max: 36.8 (2021 15:23)  T(F): 98.3 (2021 15:23), Max: 98.3 (2021 15:23)  HR: 73 (2021 17:00) (65 - 101)  BP: 128/63 (2021 16:00) (84/60 - 128/63)  BP(mean): 77 (2021 16:00) (69 - 81)  RR: 14 (2021 17:00) (9 - 20)  SpO2: 92% (2021 17:00) (86% - 100%)    PHYSICAL EXAM:  T(C): 36.8 (21 @ 15:23), Max: 36.8 (21 @ 15:23)  HR: 73 (21 @ 17:00) (65 - 101)  BP: 128/63 (21 @ 16:00) (84/60 - 128/63)  RR: 14 (21 @ 17:00) (9 - 20)  SpO2: 92% (21 @ 17:00) (86% - 100%)    GENERAL: NAD, Resting comfortably in bed, awake, opens eyes spontaneously  HEENT: NCAT, MMM, Normal conjunctiva, PERRL  RESP: Nonlabored breathing, No respiratory distress, trach in place  CARD: Normal rate, Normal peripheral perfusion  GI: Soft, ND, NT, PEG in place, No guarding, No rebound tenderness  EXTREM: Lower extremities cool bilaterally, No edema, No gross deformity of extremities, 2+ radial pulses b/l  SKIN: No rashes, no lesions  NEURO: AAOx3, No focal motor or sensory deficits  PSYCH: Affect and characteristics of appearance, verbalizations, and behaviors are appropriate    LABS:                        9.0    17.11 )-----------( 354      ( 2021 06:06 )             27.9     02-23    131<L>  |  91<L>  |  17  ----------------------------<  251<H>  4.5   |  34<H>  |  0.64    Ca    8.0<L>      2021 02:17    TPro  7.4  /  Alb  2.5<L>  /  TBili  0.3  /  DBili  x   /  AST  49<H>  /  ALT  28  /  AlkPhos  80  02-    PT/INR - ( 2021 02:17 )   PT: 13.6 sec;   INR: 1.15 ratio         PTT - ( 2021 02:17 )  PTT:41.5 sec  Urinalysis Basic - ( 2021 16:27 )    Color: Yellow / Appearance: Clear / S.010 / pH: x  Gluc: x / Ketone: Negative  / Bili: Negative / Urobili: Negative   Blood: x / Protein: Negative / Nitrite: Negative   Leuk Esterase: Negative / RBC: 2-5 /HPF / WBC 3-5 /HPF   Sq Epi: x / Non Sq Epi: Occasional /HPF / Bacteria: Trace /HPF          NEURO:  CV: goal MAP >65  PULM:  GI/FEN: NPO, LR@  : [place], voids  ENDO: ISS  HEME: Hgb  ID: WBC  PPx:  LINES: PIV  WOUNDS/DRAINS:  PT/OT: Not ordered   SICU CONSULT NOTE    HPI:  66 y/o male h/o Laryngeal cancer s/p laryngectomy and trach/PEG, HTN, hypothyroidism, hypocalcemia BIBA from nursing home with profuse bleeding from the mouth and nose, around the trach site and facial swelling. Per EMS, pt had been bleeding for at least an hour when he was found by nursing home staff. Pt is not on A/C. In Ed,  Pt's tracheostomy was suctioned upon arrival to the ED and large blood clot removed. Bleeding was noted around the trach site. Patient without respiratory distress. patient CTA with mucous plugging. Bleeding around the trach has stopped upon ICU admission. Off note,  Pt was seen here after his trach was displaced on . Reseated and sutured down on . Spoke to pt's next of kin, cousin, Ms Martin, who informed me that pt was diagnosed with Largygeal cancer 4 years ago, had trach 2 years ago and PEG a year ago and follow with Dr Landis in [facility] [phone] and was on different treatments in past, presently he is on Cetuximab (T/T 2 weeks ago and was scheduled today for next dose) and Proton Laser(due for next T/t next week). GOC: full code.    Pt admitted to ICU for bleeding at tracheostomy, aspiration pneumonia.    Pt for transfer to [facility] under care of Dr Corona (ENT) and Dr. Montesinos (ICU). Case discussed with ICU attending, patient is medically stable for transfer.    SICU ADDENDUM:   Patient arrives to SICU hemodynamically stable, no signs of bleeding or respiratory distress. Pt reports that it is difficult for him to breathe, however RR 12 nonlabored with no significant secretions, he is not in acute distress. Otherwise pt has no specific complaints.     PAST MEDICAL & SURGICAL HISTORY:  Hypothyroidism    HTN (hypertension)    Larynx cancer    Laryngeal mass  s/ p radiation, trach    PEG (percutaneous endoscopic gastrostomy) status    H/O laryngectomy    H/O thyroidectomy    H/O tracheostomy    Surgery, elective  direct laryngoscopy with biopsy- 2017    Status post surgery  inguinal hernia    Status post surgery  Left ankle surgical repair  x2 ()        PAST SURGICAL HISTORY:     REVIEW OF SYSTEMS:   Pertinent positives/negatives noted in HPI.     HOME MEDICATIONS:  Home Medications:  acetaminophen 160 mg/5 mL oral suspension: 20.31 milliliter(s) orally every 6 hours, As needed, Temp greater or equal to 38C (100.4F), Mild Pain (1 - 3) (2021 16:38)  albuterol 90 mcg/inh inhalation aerosol: 2 puff(s) inhaled every 6 hours (2021 16:38)  calcium carbonate 1250 mg/5 mL (100 mg/mL elemental calcium) oral suspension: 5 milliliter(s) orally once a day (2021 16:38)  cefTRIAXone:  (2021 16:38)  Dextrose 5% in Water intravenous solution: 1000 milliliter(s) intravenous  (2021 16:38)  glucose 40% oral gel:  orally  (2021 16:38)  glucose 50% intravenous solution: 50 milliliter(s) intravenous once (2021 16:38)  insulin lispro 100 units/mL injectable solution:  injectable  (2021 16:38)  levothyroxine 175 mcg (0.175 mg) oral tablet: 1 tab(s) orally once a day (2021 16:38)  methadone 10 mg oral tablet: 1 tab(s) orally 3 times a day (2021 16:38)  metroNIDAZOLE 500 mg/100 mL intravenous solution: 100 milliliter(s) intravenous every 8 hours (2021 16:38)  morphine 15 mg oral tablet: 1 tab(s) orally every 6 hours, As needed, Moderate Pain (4 - 6) (2021 16:38)  nystatin 100,000 units/g topical powder: 1 application topically 2 times a day (2021 16:38)  pantoprazole 40 mg intravenous injection: 40 milligram(s) intravenous once a day (2021 16:38)  sodium chloride 0.9% inhalation solution: 3 milliliter(s) inhaled every 4 hours (2021 16:38)      ALLERGIES:  Allergies    penicillin (Rash)    Intolerances    Vital Signs Last 24 Hrs  T(C): 36.8 (2021 15:23), Max: 36.8 (2021 15:23)  T(F): 98.3 (2021 15:23), Max: 98.3 (2021 15:23)  HR: 73 (2021 17:00) (65 - 101)  BP: 128/63 (2021 16:00) (84/60 - 128/63)  BP(mean): 77 (2021 16:00) (69 - 81)  RR: 14 (2021 17:00) (9 - 20)  SpO2: 92% (2021 17:00) (86% - 100%)    PHYSICAL EXAM:  T(C): 36.8 (21 @ 15:23), Max: 36.8 (21 @ 15:23)  HR: 73 (21 @ 17:00) (65 - 101)  BP: 128/63 (21 @ 16:00) (84/60 - 128/63)  RR: 14 (21 @ 17:00) (9 - 20)  SpO2: 92% (21 @ 17:00) (86% - 100%)    GENERAL: NAD, Resting comfortably in bed, awake, opens eyes spontaneously  HEENT: NCAT, MMM, Normal conjunctiva, PERRL  RESP: Nonlabored breathing, No respiratory distress, trach in place  CARD: Normal rate, Normal peripheral perfusion  GI: Soft, ND, NT, PEG in place, No guarding, No rebound tenderness  EXTREM: Lower extremities cool bilaterally, No edema, No gross deformity of extremities, 2+ radial pulses b/l  SKIN: No rashes, no lesions  NEURO: AAOx3, No focal motor or sensory deficits  PSYCH: Affect and characteristics of appearance, verbalizations, and behaviors are appropriate    LABS:                        9.0    17.11 )-----------( 354      ( 2021 06:06 )             27.9     02-23    131<L>  |  91<L>  |  17  ----------------------------<  251<H>  4.5   |  34<H>  |  0.64    Ca    8.0<L>      2021 02:17    TPro  7.4  /  Alb  2.5<L>  /  TBili  0.3  /  DBili  x   /  AST  49<H>  /  ALT  28  /  AlkPhos  80  02-    PT/INR - ( 2021 02:17 )   PT: 13.6 sec;   INR: 1.15 ratio         PTT - ( 2021 02:17 )  PTT:41.5 sec  Urinalysis Basic - ( 2021 16:27 )    Color: Yellow / Appearance: Clear / S.010 / pH: x  Gluc: x / Ketone: Negative  / Bili: Negative / Urobili: Negative   Blood: x / Protein: Negative / Nitrite: Negative   Leuk Esterase: Negative / RBC: 2-5 /HPF / WBC 3-5 /HPF   Sq Epi: x / Non Sq Epi: Occasional /HPF / Bacteria: Trace /HPF    65M PMHx HTN, hypothyroidism, laryngeal cancer s/p total laryngectomy () and PEG (2020) for persistence of laryngeal cancer, transferred from OSH ICU for management of bleeding from tracheostomy and possible aspiration PNA. Pt arrives to SICU in HDS condition without signs of bleeding or respiratory compromise.    NEURO: pain/nausea control  CV: goal MAP >65  PULM: 4L via trach, maintain O2>94%, mucomyst, hypertonic nebs, duonebs q6h, f/u CXR  GI/FEN: NPO, NS@100, PPI  : Voids  ENDO: ISS, synthroid  HEME: f/u stat labs, transfuse for Hgb <7  ID: ?aspiration PNA from OSH, s/p CTX/flagyl, WBC 17  PPx: SCDs, holding SQH  LINES: PIVx2  WOUNDS/DRAINS:  PT/OT: Not ordered   SICU CONSULT NOTE    HPI:  66 y/o male h/o Laryngeal cancer s/p laryngectomy and trach/PEG, HTN, hypothyroidism, hypocalcemia BIBA from nursing home with profuse bleeding from the mouth and nose, around the trach site and facial swelling. Per EMS, pt had been bleeding for at least an hour when he was found by nursing home staff. Pt is not on A/C. In Ed,  Pt's tracheostomy was suctioned upon arrival to the ED and large blood clot removed. Bleeding was noted around the trach site. Patient without respiratory distress. patient CTA with mucous plugging. Bleeding around the trach has stopped upon ICU admission. Off note,  Pt was seen here after his trach was displaced on . Reseated and sutured down on . Spoke to pt's next of kin, cousin, Ms Mratin, who informed me that pt was diagnosed with Largygeal cancer 4 years ago, had trach 2 years ago and PEG a year ago and follow with Dr Landis in [facility] [phone] and was on different treatments in past, presently he is on Cetuximab (T/T 2 weeks ago and was scheduled today for next dose) and Proton Laser(due for next T/t next week). GOC: full code.    Pt admitted to ICU for bleeding at tracheostomy, aspiration pneumonia.    Pt for transfer to [facility] under care of Dr Corona (ENT) and Dr. Montesinos (ICU). Case discussed with ICU attending, patient is medically stable for transfer.    SICU ADDENDUM:   Patient arrives to SICU hemodynamically stable, no signs of bleeding or respiratory distress. Pt reports that it is difficult for him to breathe, however RR 12 nonlabored with no significant secretions, he is not in acute distress. Otherwise pt has no specific complaints.     PAST MEDICAL & SURGICAL HISTORY:  Hypothyroidism    HTN (hypertension)    Larynx cancer    Laryngeal mass  s/ p radiation, trach    PEG (percutaneous endoscopic gastrostomy) status    H/O laryngectomy    H/O thyroidectomy    H/O tracheostomy    Surgery, elective  direct laryngoscopy with biopsy- 2017    Status post surgery  inguinal hernia    Status post surgery  Left ankle surgical repair  x2 ()        PAST SURGICAL HISTORY:     REVIEW OF SYSTEMS:   Pertinent positives/negatives noted in HPI.     HOME MEDICATIONS:  Home Medications:  acetaminophen 160 mg/5 mL oral suspension: 20.31 milliliter(s) orally every 6 hours, As needed, Temp greater or equal to 38C (100.4F), Mild Pain (1 - 3) (2021 16:38)  albuterol 90 mcg/inh inhalation aerosol: 2 puff(s) inhaled every 6 hours (2021 16:38)  calcium carbonate 1250 mg/5 mL (100 mg/mL elemental calcium) oral suspension: 5 milliliter(s) orally once a day (2021 16:38)  cefTRIAXone:  (2021 16:38)  Dextrose 5% in Water intravenous solution: 1000 milliliter(s) intravenous  (2021 16:38)  glucose 40% oral gel:  orally  (2021 16:38)  glucose 50% intravenous solution: 50 milliliter(s) intravenous once (2021 16:38)  insulin lispro 100 units/mL injectable solution:  injectable  (2021 16:38)  levothyroxine 175 mcg (0.175 mg) oral tablet: 1 tab(s) orally once a day (2021 16:38)  methadone 10 mg oral tablet: 1 tab(s) orally 3 times a day (2021 16:38)  metroNIDAZOLE 500 mg/100 mL intravenous solution: 100 milliliter(s) intravenous every 8 hours (2021 16:38)  morphine 15 mg oral tablet: 1 tab(s) orally every 6 hours, As needed, Moderate Pain (4 - 6) (2021 16:38)  nystatin 100,000 units/g topical powder: 1 application topically 2 times a day (2021 16:38)  pantoprazole 40 mg intravenous injection: 40 milligram(s) intravenous once a day (2021 16:38)  sodium chloride 0.9% inhalation solution: 3 milliliter(s) inhaled every 4 hours (2021 16:38)      ALLERGIES:  Allergies    penicillin (Rash)    Intolerances    Vital Signs Last 24 Hrs  T(C): 36.8 (2021 15:23), Max: 36.8 (2021 15:23)  T(F): 98.3 (2021 15:23), Max: 98.3 (2021 15:23)  HR: 73 (2021 17:00) (65 - 101)  BP: 128/63 (2021 16:00) (84/60 - 128/63)  BP(mean): 77 (2021 16:00) (69 - 81)  RR: 14 (2021 17:00) (9 - 20)  SpO2: 92% (2021 17:00) (86% - 100%)    PHYSICAL EXAM:  T(C): 36.8 (21 @ 15:23), Max: 36.8 (21 @ 15:23)  HR: 73 (21 @ 17:00) (65 - 101)  BP: 128/63 (21 @ 16:00) (84/60 - 128/63)  RR: 14 (21 @ 17:00) (9 - 20)  SpO2: 92% (21 @ 17:00) (86% - 100%)    GENERAL: NAD, Resting comfortably in bed, awake, opens eyes spontaneously  HEENT: NCAT, MMM, Normal conjunctiva, PERRL  RESP: Nonlabored breathing, No respiratory distress, trach in place  CARD: Normal rate, Normal peripheral perfusion  GI: Soft, ND, NT, PEG in place, No guarding, No rebound tenderness  EXTREM: Lower extremities cool bilaterally, No edema, No gross deformity of extremities, 2+ radial pulses b/l  SKIN: No rashes, no lesions  NEURO: AAOx3, No focal motor or sensory deficits  PSYCH: Affect and characteristics of appearance, verbalizations, and behaviors are appropriate    LABS:                        9.0    17.11 )-----------( 354      ( 2021 06:06 )             27.9     02-23    131<L>  |  91<L>  |  17  ----------------------------<  251<H>  4.5   |  34<H>  |  0.64    Ca    8.0<L>      2021 02:17    TPro  7.4  /  Alb  2.5<L>  /  TBili  0.3  /  DBili  x   /  AST  49<H>  /  ALT  28  /  AlkPhos  80  02-    PT/INR - ( 2021 02:17 )   PT: 13.6 sec;   INR: 1.15 ratio         PTT - ( 2021 02:17 )  PTT:41.5 sec  Urinalysis Basic - ( 2021 16:27 )    Color: Yellow / Appearance: Clear / S.010 / pH: x  Gluc: x / Ketone: Negative  / Bili: Negative / Urobili: Negative   Blood: x / Protein: Negative / Nitrite: Negative   Leuk Esterase: Negative / RBC: 2-5 /HPF / WBC 3-5 /HPF   Sq Epi: x / Non Sq Epi: Occasional /HPF / Bacteria: Trace /HPF    65M PMHx HTN, hypothyroidism, laryngeal cancer s/p total laryngectomy () and PEG (2020) for persistence of laryngeal cancer, transferred from OSH ICU for management of bleeding from tracheostomy and possible aspiration PNA. Pt arrives to SICU in HDS condition without signs of bleeding or respiratory compromise.    NEURO: pain/nausea control, methadone  CV: goal MAP >65  PULM: 4L via trach, maintain O2>94%, mucomyst, hypertonic nebs, duonebs q6h, f/u CXR  GI/FEN: NPO, NS@100, PPI  : Voids  ENDO: ISS, synthroid  HEME: f/u stat labs, transfuse for Hgb <7  ID: ?aspiration PNA from OSH, s/p CTX/flagyl, WBC 17  PPx: SCDs, holding SQH  LINES: PIVx2  WOUNDS/DRAINS:  PT/OT: Not ordered   SICU CONSULT NOTE    HPI:  64 y/o male h/o Laryngeal cancer s/p laryngectomy and trach/PEG, HTN, hypothyroidism, hypocalcemia BIBA from nursing home with profuse bleeding from the mouth and nose, around the trach site and facial swelling. Per EMS, pt had been bleeding for at least an hour when he was found by nursing home staff. Pt is not on A/C. In Ed,  Pt's tracheostomy was suctioned upon arrival to the ED and large blood clot removed. Bleeding was noted around the trach site. Patient without respiratory distress. patient CTA with mucous plugging. Bleeding around the trach has stopped upon ICU admission. Off note,  Pt was seen here after his trach was displaced on . Reseated and sutured down on . Spoke to pt's next of kin, cousin, Ms Martin, who informed me that pt was diagnosed with Largygeal cancer 4 years ago, had trach 2 years ago and PEG a year ago and follow with Dr Landis in [facility] [phone] and was on different treatments in past, presently he is on Cetuximab (T/T 2 weeks ago and was scheduled today for next dose) and Proton Laser(due for next T/t next week). GOC: full code.    Pt admitted to ICU for bleeding at tracheostomy, aspiration pneumonia.    Pt for transfer to [facility] under care of Dr Corona (ENT) and Dr. Montesinos (ICU). Case discussed with ICU attending, patient is medically stable for transfer.    SICU ADDENDUM:   Patient arrives to SICU hemodynamically stable, no signs of bleeding or respiratory distress. Pt reports that it is difficult for him to breathe, however RR 12 nonlabored with no significant secretions, he is not in acute distress. Otherwise pt has no specific complaints.     PAST MEDICAL & SURGICAL HISTORY:  Hypothyroidism    HTN (hypertension)    Larynx cancer    Laryngeal mass  s/ p radiation, trach    PEG (percutaneous endoscopic gastrostomy) status    H/O laryngectomy    H/O thyroidectomy    H/O tracheostomy    Surgery, elective  direct laryngoscopy with biopsy- 2017    Status post surgery  inguinal hernia    Status post surgery  Left ankle surgical repair  x2 ()        PAST SURGICAL HISTORY:     REVIEW OF SYSTEMS:   Pertinent positives/negatives noted in HPI.     HOME MEDICATIONS:  Home Medications:  acetaminophen 160 mg/5 mL oral suspension: 20.31 milliliter(s) orally every 6 hours, As needed, Temp greater or equal to 38C (100.4F), Mild Pain (1 - 3) (2021 16:38)  albuterol 90 mcg/inh inhalation aerosol: 2 puff(s) inhaled every 6 hours (2021 16:38)  calcium carbonate 1250 mg/5 mL (100 mg/mL elemental calcium) oral suspension: 5 milliliter(s) orally once a day (2021 16:38)  cefTRIAXone:  (2021 16:38)  Dextrose 5% in Water intravenous solution: 1000 milliliter(s) intravenous  (2021 16:38)  glucose 40% oral gel:  orally  (2021 16:38)  glucose 50% intravenous solution: 50 milliliter(s) intravenous once (2021 16:38)  insulin lispro 100 units/mL injectable solution:  injectable  (2021 16:38)  levothyroxine 175 mcg (0.175 mg) oral tablet: 1 tab(s) orally once a day (2021 16:38)  methadone 10 mg oral tablet: 1 tab(s) orally 3 times a day (2021 16:38)  metroNIDAZOLE 500 mg/100 mL intravenous solution: 100 milliliter(s) intravenous every 8 hours (2021 16:38)  morphine 15 mg oral tablet: 1 tab(s) orally every 6 hours, As needed, Moderate Pain (4 - 6) (2021 16:38)  nystatin 100,000 units/g topical powder: 1 application topically 2 times a day (2021 16:38)  pantoprazole 40 mg intravenous injection: 40 milligram(s) intravenous once a day (2021 16:38)  sodium chloride 0.9% inhalation solution: 3 milliliter(s) inhaled every 4 hours (2021 16:38)      ALLERGIES:  Allergies    penicillin (Rash)    Intolerances    Vital Signs Last 24 Hrs  T(C): 36.8 (2021 15:23), Max: 36.8 (2021 15:23)  T(F): 98.3 (2021 15:23), Max: 98.3 (2021 15:23)  HR: 73 (2021 17:00) (65 - 101)  BP: 128/63 (2021 16:00) (84/60 - 128/63)  BP(mean): 77 (2021 16:00) (69 - 81)  RR: 14 (2021 17:00) (9 - 20)  SpO2: 92% (2021 17:00) (86% - 100%)    PHYSICAL EXAM:  T(C): 36.8 (21 @ 15:23), Max: 36.8 (21 @ 15:23)  HR: 73 (21 @ 17:00) (65 - 101)  BP: 128/63 (21 @ 16:00) (84/60 - 128/63)  RR: 14 (21 @ 17:00) (9 - 20)  SpO2: 92% (21 @ 17:00) (86% - 100%)    GENERAL: NAD, Resting comfortably in bed, awake, opens eyes spontaneously  HEENT: NCAT, MMM, Normal conjunctiva, PERRL  RESP: Nonlabored breathing, No respiratory distress, trach in place  CARD: Normal rate, Normal peripheral perfusion  GI: Soft, ND, NT, PEG in place, No guarding, No rebound tenderness  EXTREM: Lower extremities cool bilaterally, No edema, No gross deformity of extremities, 2+ radial pulses b/l  SKIN: No rashes, no lesions  NEURO: AAOx3, No focal motor or sensory deficits  PSYCH: Affect and characteristics of appearance, verbalizations, and behaviors are appropriate    LABS:                        9.0    17.11 )-----------( 354      ( 2021 06:06 )             27.9     02-23    131<L>  |  91<L>  |  17  ----------------------------<  251<H>  4.5   |  34<H>  |  0.64    Ca    8.0<L>      2021 02:17    TPro  7.4  /  Alb  2.5<L>  /  TBili  0.3  /  DBili  x   /  AST  49<H>  /  ALT  28  /  AlkPhos  80  02-    PT/INR - ( 2021 02:17 )   PT: 13.6 sec;   INR: 1.15 ratio         PTT - ( 2021 02:17 )  PTT:41.5 sec  Urinalysis Basic - ( 2021 16:27 )    Color: Yellow / Appearance: Clear / S.010 / pH: x  Gluc: x / Ketone: Negative  / Bili: Negative / Urobili: Negative   Blood: x / Protein: Negative / Nitrite: Negative   Leuk Esterase: Negative / RBC: 2-5 /HPF / WBC 3-5 /HPF   Sq Epi: x / Non Sq Epi: Occasional /HPF / Bacteria: Trace /HPF    65M PMHx HTN, hypothyroidism, laryngeal cancer s/p total laryngectomy () and PEG (2020) for persistence of laryngeal cancer, transferred from OSH ICU for management of bleeding from tracheostomy and possible aspiration PNA. Pt arrives to SICU in HDS condition without signs of bleeding or respiratory compromise.    NEURO: pain/nausea control, methadone  CV: goal MAP >65, f/u EKG  HEENT: CT neck in AM, ciprodex via trach  PULM: 4L via trach (6-0), maintain O2>94%, mucomyst, hypertonic nebs, duonebs q6h, f/u CXR, f/u procalcitonin  GI/FEN: NPO, NS@100, PPI  : Voids  ENDO: ISS, synthroid  HEME: f/u stat labs, transfuse for Hgb <7  ID: ?aspiration PNA from OSH, s/p CTX/flagyl, WBC 17->12, empiric unasyn, f/u MRSA swab  PPx: SCDs, SQL  LINES: PIVx2  PT/OT: Not ordered

## 2021-02-23 NOTE — H&P ADULT - ASSESSMENT
ASSESSMENT AND PLAN:      =================== Neuro============================  Alert and oriented x         ================= Cardiovascular==========================    ================- Pulm=================================    ==================ID===================================      ================= Nephro================================    =================GI====================================    ================ Heme==================================  No issues.    =================Endocrine===============================    ================= Skin/Catheters============================  No rashes. Peripheral IV lines.     - =================Prophylaxis =============================  Lovenox  DVT proph  Protonix for  GI proph    ==================GOC==================================    ==================Disposition===============================  Pt accepted to ICU for further care   ASSESSMENT AND PLAN:  64 y/o male h/o Laryngeal cancer s/p laryngectomy and trach/PEG, HTN, hypothyroidism, hypocalcemia BIBA from nursing home with profuse bleeding from the mouth and nose, around the trach site and facial swelling. Per EMS, pt had been bleeding for at least an hour when he was found by nursing home staff. Pt is not on A/C.  In Ed,  Pt's tracheostomy was suctioned upon arrival to the ED and large blood clot removed. Bleeding was noted around the trach site. Patient without respiratory distress. patient CTA with mucous plugging. Bleeding around the trach has stopped upon my exam.     Assessment:   - bleeding from trach site   - Aspiration PNA    - laryngeal cancer s/p Laryngectomy and  trach   - Anemia   - Hypocalcemia   - h/o HTN   - hypothyroidism   - pain management   - Elevated Blood glucose         =================== Neuro============================  Pt is alert and awake but not answering questions.     - pain management for h/o Laryngeal cancer   home med: methadone 10 q8 , Morphine PRN , continued   - Pain management consulted       ================= Cardiovascular==========================  h/o Hypertension   home med: Lisinopril 20mg on hold as pt is noted to be hypotensive     ================- Pulm=================================  - bleeding from trach site   ED course: Pt's tracheostomy was suctioned upon arrival to the ED and large blood clot removed. Bleeding was noted around the trach site. Presently bleeding has stopped.   CTA with mucous plugging.  Hb: 9, at baseline   - Monitor for bleeding   - monitor CBC q24  - chest PT   - frequent trach suctioning   - hypertonic saline inhalation   Will cover for aspiration PNA: Rocephin and Flagyl. follow Procalcitonin and follow blood culture   - follow Coag in AM     - Aspiration PNA: as above   ==================ID===================================  aspiration PNA : as above     ================= Nephro================================  hypocalcemia:  h/o hypocalcemia on Cacarbonate 500 mg od continued   - unknown cause.   - follow total and ionized Ca in AM   - PTH, vitamin D in Am        =================GI====================================  s/p PEG tube   c/w TF   ================ Heme==================================  Anemia:   Hb: 9  anemia Panel in AM     ================Onc===================================  Laryngeal cancer s/p Laryngectomy and trach   -pt was diagnosed with Largygeal cancer 4 years ago, had trach 2 years ago and PEG a year ago  - Oncologist: Dr Landis in [facility] [phone]  - presently he is on Cetuxmab (last T/T 2 weeks ago and was scheduled today for next dose) and Proton Laser(due for next T/t next week).   - OP follow up   =================Endocrine===============================  Hypothyroidism    home dose: Levothyroxine: 175 mcg continued   - follow TSH     elevated blood glucose   SS coverage   - follow A1c      ================= Skin/Catheters============================   Peripheral IV lines.   Sacral redness noted: no skin breakdown     - =================Prophylaxis =============================   DVT proph: on hold given bleeding from trach site, SCD for now   Protonix for  GI proph    ==================GOC==================================  Full code   Next of kin: Ms Martin, cousin. Brother is not involved in the care.     ==================Disposition===============================  Pt accepted to ICU for further care

## 2021-02-23 NOTE — ED PROVIDER NOTE - ENMT, MLM
Huge blood clot in the posterior OP. removed. Trach site non-bleeding. Huge blood clot in the posterior OP. removed. Trach site non-bleeding, crusting surrounding trach site. poor dentition

## 2021-02-23 NOTE — ED PROVIDER NOTE - OBJECTIVE STATEMENT
66 y/o male h/o Laryngeal cancer s/p laryngectomy and trach, substance abuse. HTN, COPD, hypokalemia, hypothyroidism,  BIBA from nursing home with profuse bleeding from the mouth and nose and facial swelling. Per EMS, pt had been bleeding for at least an hour when he was found by nursing home staff. Pt is not on A/C. Pt was seen here after his trach was displaced. 64 y/o male h/o Laryngeal cancer s/p laryngectomy and trach, substance abuse. HTN, COPD, hypokalemia, hypothyroidism,  BIBA from nursing home with profuse bleeding from the mouth and nose and facial swelling. Per EMS, pt had been bleeding for at least an hour when he was found by nursing home staff. Pt is not on A/C. Pt was seen here after his trach was displaced on 2/21. Reseated and sutured down on 2/21.

## 2021-02-23 NOTE — DISCHARGE NOTE PROVIDER - NSDCCPCAREPLAN_GEN_ALL_CORE_FT
PRINCIPAL DISCHARGE DIAGNOSIS  Diagnosis: Hemoptysis  Assessment and Plan of Treatment: You came wwith bleeding at the site of your tracheostomy. You are being transferred to [facility] for further management and care.

## 2021-02-23 NOTE — ED PROVIDER NOTE - PROGRESS NOTE DETAILS
discussed with Dr. SUZANNE Lan, who is covering for [facility] who requests consult from Dr. Nixon discussed with ICU, Dr. Nixon, and will come evaluate patient. Plan of care signed out to Dr. Torres. Pending ICU eval.

## 2021-02-23 NOTE — ED PROVIDER NOTE - UNABLE TO OBTAIN
Urgent need for Intervention HPI limited due to urgent need for intervention. ROS limited due to pt's urgent need for intervention

## 2021-02-23 NOTE — DISCHARGE NOTE PROVIDER - HOSPITAL COURSE
64 y/o male h/o Laryngeal cancer s/p laryngectomy and trach/PEG, HTN, hypothyroidism, hypocalcemia BIBA from nursing home with profuse bleeding from the mouth and nose, around the trach site and facial swelling. Per EMS, pt had been bleeding for at least an hour when he was found by nursing home staff. Pt is not on A/C. In Ed,  Pt's tracheostomy was suctioned upon arrival to the ED and large blood clot removed. Bleeding was noted around the trach site. Patient without respiratory distress. patient CTA with mucous plugging. Bleeding around the trach has stopped upon ICU admission. Off note,  Pt was seen here after his trach was displaced on 2/21. Reseated and sutured down on 2/21. Spoke to pt's next of kin, cousin, Ms Martin, who informed me that pt was diagnosed with Largygeal cancer 4 years ago, had trach 2 years ago and PEG a year ago and follow with Dr Landis in [facility] [phone] and was on different treatments in past, presently he is on Cetuxmab(last T/T 2 weeks ago and was scheduled today for next dose) and Proton Laser(due for next T/t next week). GOC: full code.    Pt admitted to ICU for bleeding at tracheostomy, aspiration pneumonia.    Pt for transfer to [facility] under care of Dr Corona (ENT) and Dr. Montesinos (ICU). Case discussed with ICU attending, patient is medically stable for transfer. 66 y/o male h/o Laryngeal cancer s/p laryngectomy and trach/PEG, HTN, hypothyroidism, hypocalcemia BIBA from nursing home with profuse bleeding from the mouth and nose, around the trach site and facial swelling. Per EMS, pt had been bleeding for at least an hour when he was found by nursing home staff. Pt is not on A/C. In Ed,  Pt's tracheostomy was suctioned upon arrival to the ED and large blood clot removed. Bleeding was noted around the trach site. Patient without respiratory distress. patient CTA with mucous plugging. Bleeding around the trach has stopped upon ICU admission. Off note,  Pt was seen here after his trach was displaced on 2/21. Reseated and sutured down on 2/21. Spoke to pt's next of kin, cousin, Ms Martin, who informed me that pt was diagnosed with Largygeal cancer 4 years ago, had trach 2 years ago and PEG a year ago and follow with Dr Landis in [facility] [phone] and was on different treatments in past, presently he is on Cetuxmab(last T/T 2 weeks ago and was scheduled today for next dose) and Proton Laser(due for next T/t next week). GOC: full code.    Pt admitted to ICU for bleeding at tracheostomy, aspiration pneumonia.    Pt for transfer to [facility] under care of Dr Corona (ENT) and Dr. Montesinos (ICU). Case discussed with ICU attending, patient is medically stable for transfer.  for a full account of hospital course please refer to actual medical records for this is a brief summery

## 2021-02-23 NOTE — ED PROVIDER NOTE - CLINICAL SUMMARY MEDICAL DECISION MAKING FREE TEXT BOX
64 yo M with bleeding from oral pharynx and nose. patient with trach and recent manipulation to the trach site two days ago. Suctioned upon arrival to the ED and large blood clot removed. patient without respiratory distress. patient CTA with mucous plugging. Also with hypotension and with drop in hemoglobin while in the Ed. Discussed with ICU, Dr. Nixon and will eval patient. Plan of care signed out to Dr. plummer.

## 2021-02-23 NOTE — ED ADULT TRIAGE NOTE - CHIEF COMPLAINT QUOTE
Notification.  Patient was sent from nursing home for evaluation of bleeding from nose and mouth x 1 hour. Patient has tracheostomy

## 2021-02-23 NOTE — PHYSICAL THERAPY INITIAL EVALUATION ADULT - GENERAL OBSERVATIONS, REHAB EVAL
Consult received, chart reviewed. Patient received supine in bed, NAD, +trach. to supplemental 02, PEG, SCDs. Patient agreed to EVALUATION from Physical Therapist. primarily communicates with head nods, gestures and writing

## 2021-02-23 NOTE — DISCHARGE NOTE PROVIDER - NSDCMRMEDTOKEN_GEN_ALL_CORE_FT
acetaminophen 160 mg/5 mL oral suspension: 20.31 milliliter(s) orally every 6 hours, As needed, Temp greater or equal to 38C (100.4F), Mild Pain (1 - 3)  albuterol 90 mcg/inh inhalation aerosol: 2 puff(s) inhaled every 6 hours  calcium carbonate 1250 mg/5 mL (100 mg/mL elemental calcium) oral suspension: 5 milliliter(s) orally once a day  cefTRIAXone:   Dextrose 5% in Water intravenous solution: 1000 milliliter(s) intravenous   glucose 40% oral gel:  orally   glucose 50% intravenous solution: 50 milliliter(s) intravenous once  insulin lispro 100 units/mL injectable solution:  injectable   levothyroxine 175 mcg (0.175 mg) oral tablet: 1 tab(s) orally once a day  methadone 10 mg oral tablet: 1 tab(s) orally 3 times a day  metroNIDAZOLE 500 mg/100 mL intravenous solution: 100 milliliter(s) intravenous every 8 hours  morphine 15 mg oral tablet: 1 tab(s) orally every 6 hours, As needed, Moderate Pain (4 - 6)  nystatin 100,000 units/g topical powder: 1 application topically 2 times a day  pantoprazole 40 mg intravenous injection: 40 milligram(s) intravenous once a day  sodium chloride 0.9% inhalation solution: 3 milliliter(s) inhaled every 4 hours  tube feeds: PEG feeds. Jevity 1.2. Give 8 cans per day. 2 cans (474ml) 4 times a day, at 8am, 12pm, 4pm, 8pm. Free water flush 50mL before and after each bolus.

## 2021-02-24 PROBLEM — J38.7 OTHER DISEASES OF LARYNX: Chronic | Status: ACTIVE | Noted: 2017-04-10

## 2021-02-24 PROBLEM — E03.9 HYPOTHYROIDISM, UNSPECIFIED: Chronic | Status: ACTIVE | Noted: 2021-02-23

## 2021-02-24 PROBLEM — I10 ESSENTIAL (PRIMARY) HYPERTENSION: Chronic | Status: ACTIVE | Noted: 2021-02-23

## 2021-02-24 LAB
ANION GAP SERPL CALC-SCNC: 7 MMOL/L — SIGNIFICANT CHANGE UP (ref 5–17)
BUN SERPL-MCNC: 10 MG/DL — SIGNIFICANT CHANGE UP (ref 7–23)
CALCIUM SERPL-MCNC: 8 MG/DL — LOW (ref 8.4–10.5)
CHLORIDE SERPL-SCNC: 94 MMOL/L — LOW (ref 96–108)
CO2 SERPL-SCNC: 37 MMOL/L — HIGH (ref 22–31)
CREAT SERPL-MCNC: 0.48 MG/DL — LOW (ref 0.5–1.3)
GLUCOSE BLDC GLUCOMTR-MCNC: 71 MG/DL — SIGNIFICANT CHANGE UP (ref 70–99)
GLUCOSE SERPL-MCNC: 105 MG/DL — HIGH (ref 70–99)
HCT VFR BLD CALC: 28.1 % — LOW (ref 39–50)
HGB BLD-MCNC: 8.8 G/DL — LOW (ref 13–17)
MAGNESIUM SERPL-MCNC: 1.8 MG/DL — SIGNIFICANT CHANGE UP (ref 1.6–2.6)
MCHC RBC-ENTMCNC: 31.3 GM/DL — LOW (ref 32–36)
MCHC RBC-ENTMCNC: 34 PG — SIGNIFICANT CHANGE UP (ref 27–34)
MCV RBC AUTO: 108.5 FL — HIGH (ref 80–100)
NRBC # BLD: 0 /100 WBCS — SIGNIFICANT CHANGE UP (ref 0–0)
PHOSPHATE SERPL-MCNC: 3.2 MG/DL — SIGNIFICANT CHANGE UP (ref 2.5–4.5)
PLATELET # BLD AUTO: 373 K/UL — SIGNIFICANT CHANGE UP (ref 150–400)
POTASSIUM SERPL-MCNC: 3.8 MMOL/L — SIGNIFICANT CHANGE UP (ref 3.5–5.3)
POTASSIUM SERPL-SCNC: 3.8 MMOL/L — SIGNIFICANT CHANGE UP (ref 3.5–5.3)
PROCALCITONIN SERPL-MCNC: 0.54 NG/ML — HIGH (ref 0.02–0.1)
RBC # BLD: 2.59 M/UL — LOW (ref 4.2–5.8)
RBC # FLD: 12.5 % — SIGNIFICANT CHANGE UP (ref 10.3–14.5)
SODIUM SERPL-SCNC: 138 MMOL/L — SIGNIFICANT CHANGE UP (ref 135–145)
WBC # BLD: 10.11 K/UL — SIGNIFICANT CHANGE UP (ref 3.8–10.5)
WBC # FLD AUTO: 10.11 K/UL — SIGNIFICANT CHANGE UP (ref 3.8–10.5)

## 2021-02-24 PROCEDURE — 31505 DIAGNOSTIC LARYNGOSCOPY: CPT

## 2021-02-24 PROCEDURE — 70498 CT ANGIOGRAPHY NECK: CPT | Mod: 26

## 2021-02-24 PROCEDURE — 99223 1ST HOSP IP/OBS HIGH 75: CPT | Mod: 25

## 2021-02-24 PROCEDURE — 71045 X-RAY EXAM CHEST 1 VIEW: CPT | Mod: 26

## 2021-02-24 PROCEDURE — 99232 SBSQ HOSP IP/OBS MODERATE 35: CPT | Mod: GC

## 2021-02-24 PROCEDURE — 70491 CT SOFT TISSUE NECK W/DYE: CPT | Mod: 26,59

## 2021-02-24 PROCEDURE — 99231 SBSQ HOSP IP/OBS SF/LOW 25: CPT

## 2021-02-24 PROCEDURE — 31615 TRCHEOBRNCHSC EST TRACHS INC: CPT | Mod: 59

## 2021-02-24 RX ORDER — POTASSIUM CHLORIDE 20 MEQ
20 PACKET (EA) ORAL ONCE
Refills: 0 | Status: COMPLETED | OUTPATIENT
Start: 2021-02-24 | End: 2021-02-24

## 2021-02-24 RX ORDER — HYDROCORTISONE 1 %
1 OINTMENT (GRAM) TOPICAL DAILY
Refills: 0 | Status: DISCONTINUED | OUTPATIENT
Start: 2021-02-24 | End: 2021-03-03

## 2021-02-24 RX ORDER — MAGNESIUM SULFATE 500 MG/ML
2 VIAL (ML) INJECTION ONCE
Refills: 0 | Status: COMPLETED | OUTPATIENT
Start: 2021-02-24 | End: 2021-02-24

## 2021-02-24 RX ADMIN — Medication 3 MILLILITER(S): at 09:19

## 2021-02-24 RX ADMIN — Medication 175 MICROGRAM(S): at 07:03

## 2021-02-24 RX ADMIN — Medication 5 MILLILITER(S): at 17:00

## 2021-02-24 RX ADMIN — CIPROFLOXACIN AND DEXAMETHASONE 2 DROP(S): 3; 1 SUSPENSION/ DROPS AURICULAR (OTIC) at 06:57

## 2021-02-24 RX ADMIN — Medication 1 MILLIGRAM(S): at 09:25

## 2021-02-24 RX ADMIN — METHADONE HYDROCHLORIDE 10 MILLIGRAM(S): 40 TABLET ORAL at 22:28

## 2021-02-24 RX ADMIN — METHADONE HYDROCHLORIDE 10 MILLIGRAM(S): 40 TABLET ORAL at 15:08

## 2021-02-24 RX ADMIN — CEFTRIAXONE 100 MILLIGRAM(S): 500 INJECTION, POWDER, FOR SOLUTION INTRAMUSCULAR; INTRAVENOUS at 11:55

## 2021-02-24 RX ADMIN — Medication 500 MILLIGRAM(S): at 14:53

## 2021-02-24 RX ADMIN — Medication 500 MILLIGRAM(S): at 06:57

## 2021-02-24 RX ADMIN — SODIUM CHLORIDE 10 MILLILITER(S): 9 INJECTION INTRAMUSCULAR; INTRAVENOUS; SUBCUTANEOUS at 23:50

## 2021-02-24 RX ADMIN — Medication 1250 MILLIGRAM(S): at 11:55

## 2021-02-24 RX ADMIN — Medication 50 GRAM(S): at 07:54

## 2021-02-24 RX ADMIN — Medication 3 MILLILITER(S): at 05:39

## 2021-02-24 RX ADMIN — SODIUM CHLORIDE 4 MILLILITER(S): 9 INJECTION INTRAMUSCULAR; INTRAVENOUS; SUBCUTANEOUS at 09:21

## 2021-02-24 RX ADMIN — PANTOPRAZOLE SODIUM 40 MILLIGRAM(S): 20 TABLET, DELAYED RELEASE ORAL at 11:56

## 2021-02-24 RX ADMIN — SODIUM CHLORIDE 10 MILLILITER(S): 9 INJECTION INTRAMUSCULAR; INTRAVENOUS; SUBCUTANEOUS at 18:38

## 2021-02-24 RX ADMIN — Medication 5 MILLILITER(S): at 06:02

## 2021-02-24 RX ADMIN — CHLORHEXIDINE GLUCONATE 1 APPLICATION(S): 213 SOLUTION TOPICAL at 06:57

## 2021-02-24 RX ADMIN — Medication 20 MILLIEQUIVALENT(S): at 11:55

## 2021-02-24 RX ADMIN — ENOXAPARIN SODIUM 40 MILLIGRAM(S): 100 INJECTION SUBCUTANEOUS at 21:57

## 2021-02-24 RX ADMIN — METHADONE HYDROCHLORIDE 10 MILLIGRAM(S): 40 TABLET ORAL at 06:57

## 2021-02-24 RX ADMIN — Medication 3 MILLILITER(S): at 18:28

## 2021-02-24 RX ADMIN — Medication 500 MILLIGRAM(S): at 21:56

## 2021-02-24 RX ADMIN — Medication 3 MILLILITER(S): at 23:27

## 2021-02-24 NOTE — PROGRESS NOTE ADULT - SUBJECTIVE AND OBJECTIVE BOX
HPI: 66 y/o male h/o Laryngeal cancer s/p laryngectomy and trach/PEG, HTN, hypothyroidism, currently receiving cetuximab treatment, now with dyspnea 2/2 granulation tissue down trachea. Patient sent to [place] from nursing home and then trasferred to [facility] 2/23 overnight.     Hospital course:  2/24: Patient admitted overnight. ENT called and exchanged trach to 6CFS. Patient is stabilized without dyspnea now and laryngectomy cleared of crusting and secretions. Continued facial lymphedema and new facial rash consistent possibly with cetuximab toxicity rash.     Vital Signs Last 24 Hrs  T(C): 37.2 (24 Feb 2021 06:03), Max: 37.2 (24 Feb 2021 06:03)  T(F): 98.9 (24 Feb 2021 06:03), Max: 98.9 (24 Feb 2021 06:03)  HR: 61 (24 Feb 2021 07:00) (61 - 92)  BP: 114/65 (24 Feb 2021 07:00) (98/64 - 128/63)  BP(mean): 84 (24 Feb 2021 07:00) (69 - 96)  RR: 16 (24 Feb 2021 07:00) (7 - 21)  SpO2: 100% (24 Feb 2021 07:00) (86% - 100%)    Physical Exam:  General- A&O x 2-3,    HPI: 64 y/o male h/o Laryngeal cancer s/p laryngectomy and trach/PEG, HTN, hypothyroidism, currently receiving cetuximab treatment, now with dyspnea 2/2 granulation tissue down trachea. Patient sent to [place] from nursing home and then trasferred to [facility] 2/23 overnight.     Hospital course:  2/24: Patient admitted overnight. ENT called and exchanged trach to 6CFS. Patient is stabilized without dyspnea now and laryngectomy cleared of crusting and secretions. Continued facial lymphedema and new facial rash consistent possibly with cetuximab toxicity rash.     Vital Signs Last 24 Hrs  T(C): 37.2 (24 Feb 2021 06:03), Max: 37.2 (24 Feb 2021 06:03)  T(F): 98.9 (24 Feb 2021 06:03), Max: 98.9 (24 Feb 2021 06:03)  HR: 61 (24 Feb 2021 07:00) (61 - 92)  BP: 114/65 (24 Feb 2021 07:00) (98/64 - 128/63)  BP(mean): 84 (24 Feb 2021 07:00) (69 - 96)  RR: 16 (24 Feb 2021 07:00) (7 - 21)  SpO2: 100% (24 Feb 2021 07:00) (86% - 100%)    Physical Exam:  General- A&O x 2-3, laying comfortably in bed, NAD  Head- atraumatic, lymphedema L>R   ENT- no nasal secretions, no epistaxis, laryngectomy patent with 6CFS in place without bleeding around the laryngectomy, see laryngoscopy exam below   Skin- facial rash erythematous and papular      LABS:  cret                        8.8    10.11 )-----------( 373      ( 24 Feb 2021 06:19 )             28.1     02-24    138  |  94<L>  |  10  ----------------------------<  105<H>  3.8   |  37<H>  |  0.48<L>    Ca    8.0<L>      24 Feb 2021 06:19  Phos  3.2     02-24  Mg     1.8     02-24    TPro  6.8  /  Alb  2.9<L>  /  TBili  0.3  /  DBili  x   /  AST  32  /  ALT  16  /  AlkPhos  67  02-23    PT/INR - ( 23 Feb 2021 20:07 )   PT: 13.0 sec;   INR: 1.09          PTT - ( 23 Feb 2021 20:07 )  PTT:32.5 sec    MEDICATIONS  (STANDING):  acetylcysteine 10%  Inhalation 5 milliLiter(s) Inhalation three times a day  albuterol/ipratropium for Nebulization 3 milliLiter(s) Nebulizer every 6 hours  calcium carbonate   Suspension 1250 milliGRAM(s) Enteral Tube daily  cefTRIAXone   IVPB 1000 milliGRAM(s) IV Intermittent every 24 hours  chlorhexidine 4% Liquid 1 Application(s) Topical <User Schedule>  ciprofloxacin/dexamethasone Suspension Otic 2 Drop(s) IntraTracheal two times a day  enoxaparin Injectable 40 milliGRAM(s) SubCutaneous every 24 hours  levothyroxine 175 MICROGram(s) Enteral Tube daily  methadone  Concentrate 10 milliGRAM(s) Oral every 8 hours  metroNIDAZOLE    Tablet 500 milliGRAM(s) Oral every 8 hours  pantoprazole  Injectable 40 milliGRAM(s) IV Push daily  potassium chloride    Tablet ER 20 milliEquivalent(s) Oral once  sodium chloride 0.9%. 1000 milliLiter(s) (100 mL/Hr) IV Continuous <Continuous>  sodium chloride 3%  Inhalation 10 milliLiter(s) Inhalation every 6 hours    MEDICATIONS  (PRN):     HPI: 64 y/o male h/o Laryngeal cancer s/p laryngectomy and trach/PEG, HTN, hypothyroidism, currently receiving cetuximab treatment, now with dyspnea 2/2 granulation tissue down trachea. Patient sent to [place] from nursing home and then trasferred to [facility] 2/23 overnight.     Hospital course:  2/24: Patient admitted overnight. ENT called and exchanged trach to 6CFS. Patient is stabilized without dyspnea now and laryngectomy cleared of crusting and secretions. Continued facial lymphedema and new facial rash consistent possibly with cetuximab toxicity rash.     Vital Signs Last 24 Hrs  T(C): 37.2 (24 Feb 2021 06:03), Max: 37.2 (24 Feb 2021 06:03)  T(F): 98.9 (24 Feb 2021 06:03), Max: 98.9 (24 Feb 2021 06:03)  HR: 61 (24 Feb 2021 07:00) (61 - 92)  BP: 114/65 (24 Feb 2021 07:00) (98/64 - 128/63)  BP(mean): 84 (24 Feb 2021 07:00) (69 - 96)  RR: 16 (24 Feb 2021 07:00) (7 - 21)  SpO2: 100% (24 Feb 2021 07:00) (86% - 100%)    Physical Exam:  General- A&O x 2-3, laying comfortably in bed, NAD  Head- atraumatic, lymphedema L>R   ENT- no nasal secretions, no epistaxis, laryngectomy patent with 6CFS in place without bleeding around the laryngectomy, see laryngoscopy exam below   Skin- facial rash erythematous and papular      LABS:  cret                        8.8    10.11 )-----------( 373      ( 24 Feb 2021 06:19 )             28.1     02-24    138  |  94<L>  |  10  ----------------------------<  105<H>  3.8   |  37<H>  |  0.48<L>    Ca    8.0<L>      24 Feb 2021 06:19  Phos  3.2     02-24  Mg     1.8     02-24    TPro  6.8  /  Alb  2.9<L>  /  TBili  0.3  /  DBili  x   /  AST  32  /  ALT  16  /  AlkPhos  67  02-23    PT/INR - ( 23 Feb 2021 20:07 )   PT: 13.0 sec;   INR: 1.09          PTT - ( 23 Feb 2021 20:07 )  PTT:32.5 sec    MEDICATIONS  (STANDING):  acetylcysteine 10%  Inhalation 5 milliLiter(s) Inhalation three times a day  albuterol/ipratropium for Nebulization 3 milliLiter(s) Nebulizer every 6 hours  calcium carbonate   Suspension 1250 milliGRAM(s) Enteral Tube daily  cefTRIAXone   IVPB 1000 milliGRAM(s) IV Intermittent every 24 hours  chlorhexidine 4% Liquid 1 Application(s) Topical <User Schedule>  ciprofloxacin/dexamethasone Suspension Otic 2 Drop(s) IntraTracheal two times a day  enoxaparin Injectable 40 milliGRAM(s) SubCutaneous every 24 hours  levothyroxine 175 MICROGram(s) Enteral Tube daily  methadone  Concentrate 10 milliGRAM(s) Oral every 8 hours  metroNIDAZOLE    Tablet 500 milliGRAM(s) Oral every 8 hours  pantoprazole  Injectable 40 milliGRAM(s) IV Push daily  potassium chloride    Tablet ER 20 milliEquivalent(s) Oral once  sodium chloride 0.9%. 1000 milliLiter(s) (100 mL/Hr) IV Continuous <Continuous>  sodium chloride 3%  Inhalation 10 milliLiter(s) Inhalation every 6 hours    MEDICATIONS  (PRN):        LARYNGOSCOPY EXAM:     -Verbal consent was obtained from patient prior to procedure.    Indication:    Anesthesia: None    Flexible laryngoscopy was performed and revealed the following:  laryngoscopy was performed at bedside via the laryngectomy stoma. At the tip of the trach (6CFS), large posterior wall granulation tissue seen causing narrowing of the airway 50%. Able to pass scope into the trachea easily. Trach was then removed and laryngoscopy was performed via the laryngectomy stoma again with mild granulation tissue at the entrance of the laryngectomy stoma and more on the posterior wall approximately 4-5 cm down with patent airway and no active bleeding seen. Trach 6CFS was replaced for now to stent open the airway. Re-evaluated with laryngoscope that the airway was still patent with trach in place.     The patient tolerated the procedure well.

## 2021-02-24 NOTE — H&P ADULT - ATTENDING COMMENTS
I agree with the history and exam as outlined above.    Due to nasal/oral bleeding I am recommending a CTA neck for further evaluation and to rule out a sentinal bleed.  I am also recommending placed cortisone cream on the laryngectomy tube to help with stented and decreased inflammation/narrowing of the stoma.    Otherwise I agree with the plan as outlined above as well.

## 2021-02-24 NOTE — PROGRESS NOTE ADULT - ASSESSMENT
64 y/o male h/o Laryngeal cancer s/p laryngectomy and trach/PEG, HTN, hypothyroidism, currently receiving cetuximab treatment, now with dyspnea 2/2 granulation tissue down trachea.    -Broad spectrum antibiotics  -Ciprodex drops down trachea  -methadone  -pain control  -routine tracheostomy care  -heme/onc consult for continuity (supposed to get cetuximab yesterday)  -derm consult for rash  -SLP for bebeto tube 64 y/o male h/o Laryngeal cancer s/p laryngectomy and trach/PEG, HTN, hypothyroidism, currently receiving cetuximab treatment, now with dyspnea 2/2 granulation tissue down trachea.    -Broad spectrum antibiotics  -Ciprodex nebulized if possible, if not, continued drops via laryngectomy   -methadone  -pain control  -routine tracheostomy care  -heme/onc consult for continuity (supposed to get cetuximab yesterday)  -derm consult for rash  -SLP for bebeto tube

## 2021-02-24 NOTE — PROGRESS NOTE ADULT - ASSESSMENT
65M PMHx HTN, hypothyroidism, laryngeal cancer s/p total laryngectomy (2017) and PEG (July 2020) for persistence of laryngeal cancer, transferred from OSH ICU for management of bleeding from tracheostomy and possible aspiration PNA. Pt arrives to SICU in HDS condition without signs of bleeding or respiratory compromise.    NEURO: pain/nausea control, methadone  CV: goal MAP >65, f/u EKG   HEENT: CT neck in AM, ciprodex via trach  PULM: 4L via trach (6-0), maintain O2>94%, mucomyst, hypertonic nebs, duonebs q6h, f/u CXR, f/u procalcitonin, nebilized cipro  GI/FEN: NPO, NS@100, PPI  : Voids  ENDO: ISS, synthroid  HEME: f/u stat labs, transfuse for Hgb <7  ID: ?aspiration PNA from OSH, s/p CTX/flagyl, WBC 17->12, empiric unasyn, f/u MRSA swab  PPx: SCDs, SQL  LINES: PIVx2  PT/OT: Not ordered

## 2021-02-24 NOTE — CONSULT NOTE ADULT - BODY DESCRIPTIVE FINDINGS
Numerous papules and pustules over the face accentuated on the cheeks. Chest clear. Eric Campos(Attending)

## 2021-02-24 NOTE — H&P ADULT - NSHPPHYSICALEXAM_GEN_ALL_CORE
General: ill appearing in no acute distress, no audible stridor  Face: red papular rash across cheeks and nose  Oral: tongue edema and deviation to the right  Neck: 6CFS in place, right sided nodularitiy nontender, left ulcerated lesion, tender to palpation and erythematous  Resp: mildly labored breathing General: ill appearing in no acute distress, no audible stridor  Face: red papular rash across cheeks and nose  Oral: tongue edema and deviation to the right  Neck: 6CFS in place, right sided nodularity nontender, left ulcerated lesion, tender to palpation and erythematous  Resp: mildly labored breathing      *Attending Note    Flexible nasopharyngoscopy and tracheoscopy through laryngectomy sight.  nasal cavity: septum midline, turbinates normal and healthy appearing, no evidence of epistaxis or source of bleeding.  No tumors, masses or lesion, identified.  Pharynx, unable to fully, visualize, however there appear to be secretions and healthy mucosa.  I was unable to snake the scope more distally and did not encounter tumor directly.  Base of tongue, though difficulty to visualize appears healthy.

## 2021-02-24 NOTE — DIETITIAN INITIAL EVALUATION ADULT. - OTHER CALCULATIONS
ABW used to calculate energy needs due to pt's current body weight <100% IBW in ICU (73%). Needs adjusted for age, malnutrition, underweight. Fluid needs per team.

## 2021-02-24 NOTE — DIETITIAN INITIAL EVALUATION ADULT. - OTHER INFO
65M PMHx HTN, hypothyroidism, laryngeal cancer s/p total laryngectomy (2017) and PEG (July 2020) for persistence of laryngeal cancer, transferred from OSH ICU for management of bleeding from tracheostomy and possible aspiration PNA. Pt arrives to SICU in HDS condition without signs of bleeding or respiratory compromise.    Pt seen in 8[facility], recently received Ativan so is lethargic, unable to obtain information from pt at this time. Tolerating trach collar, mean BP 84. NaCl running at 100mL/hr. No vomiting noted, unable to assess for nausea 2/2 clinical status, no BM noted during admission. Abdomen soft/nontender per flow sheet. Tomi score 15, 1+ generalized edema. NPO at this time, PEG in place. NKFA per chart. Per previous admission, weight hx: 64kg (7/2019), 57kg (7/2020), 55kg (2/15/2021). No weight documented for this admission. See below for results from [facility]. Please see below for full nutritional recommendations- d/w team. RD to monitor and f/u per protocol.

## 2021-02-24 NOTE — H&P ADULT - ASSESSMENT
66 y/o male h/o Laryngeal cancer s/p laryngectomy and trach/PEG, HTN, hypothyroidism, currently receiving cetuximab treatment, now with dyspnea 2/2 granulation tissue down trachea.    -Broad spectrum antibiotics  -Ciprodex drops down trachea  -methadone  -pain control  -routine tracheostomy care  -heme/onc consult for continuity (supposed to get cetuximab yesterday)  -derm consult for rash  -SLP for bebeto tube

## 2021-02-24 NOTE — DIETITIAN INITIAL EVALUATION ADULT. - ADD RECOMMEND
1. As medically appropriate, recommend Jevity 1.2 start at 20mL/hr increase 1-20mL q4h or as tolerated to goal of 63mL/hr x24h with Prostat SF 1x/day via PEG (1512mL TV, 1914 kcal, 98gm protein, 1220mL free water, 151% RDI, 34.8 kcal/kg ABW or 25.5 kcal/kg IBW, 1.78gm protein/kg ABW or 1.32gm protein/kg IBW, 27.7 non protein/kg ABW) 2. Order for volume based feeding protocol, monitor tolerance, maintain aspiration precautions at all times 3. Water flushes per team 4. Recommend add MVI, thiamine and folic acid to prevent refeeding

## 2021-02-24 NOTE — CONSULT NOTE ADULT - ASSESSMENT
66 y/o male h/o Laryngeal cancer s/p laryngectomy 2 yrs prior, and trach/PEG, HTN, hypothyroidism, hypocalcemia BIBA admitted from nursing home with profuse bleeding from the mouth and nose, around the trach site and facial swelling. Follows with Dr Landis at [facility] [phone]. Has been on different treatments in the past, presently on Cetuximab (T/T 2 weeks ago and was scheduled today for next dose) and Proton Laser (due for next T/t next week).   Noticed to have a rash on the face on this admission that was not present last week when he was discharged.     Problem #1/Plan.  Acneiform eruption secondary to cetuximab (EGFR inh)  - has been described to be a positive predictive marker for tx response  - not a contraindication for continuing cetuximab    - grade 1 in severity  - start HC 2.5% cream BID x 4 wks  - start clindamycin 1% gel x 4 wks    - will need re-assessment in 4 wks

## 2021-02-24 NOTE — H&P ADULT - HISTORY OF PRESENT ILLNESS
64 y/o male h/o Laryngeal cancer s/p laryngectomy and trach/PEG, HTN, hypothyroidism, currently receiving cetuximab treatment, now with dyspnea 2/2 granulation tissue down trachea. Patient was recently discharged to nursing home, and went to [place] yesterday for tracheal and nasal bleeding. Had a 4CFS placed at that time and a CT neck. He was subsequently transferred to SICU [facility] for continued management. Patient evaluated at bedside, found to have granulation tissue at distal tip of tracheostomy tube that was 99% occluded. Tracheostomy change at bedside to a 6CFS trach, tracheoscopy still showing persistent granulation at distal tip, but with 95% occlusion. Soft suction catheter passes easily through trach. Patient also presents with ulcerated lesion at left neck and cellulitic changes. Also with new papular rash on face.

## 2021-02-24 NOTE — DIETITIAN INITIAL EVALUATION ADULT. - MALNUTRITION
(severe PCM in the setting of chronic illness) Pt meets criteria for severe PCM in the context of chronic illness AEB muscle/fat loss

## 2021-02-24 NOTE — PROGRESS NOTE ADULT - SUBJECTIVE AND OBJECTIVE BOX
S: Pt reports his breathing still feels not quite normal. No HA/CP/SOB.     No interval events overnight.     Vitals: ICU Vital Signs Last 24 Hrs  T(C): 37.2 (2021 06:03), Max: 37.2 (2021 06:03)  T(F): 98.9 (2021 06:03), Max: 98.9 (2021 06:03)  HR: 61 (2021 08:00) (61 - 92)  BP: 116/60 (2021 08:00) (98/64 - 128/63)  BP(mean): 82 (2021 08:00) (69 - 96)  ABP: --  ABP(mean): --  RR: 13 (2021 08:00) (7 - 21)  SpO2: 99% (2021 08:00) (86% - 100%)            I&O:  I&O's Detail    2021 07:01  -  2021 07:00  --------------------------------------------------------  IN:    IV PiggyBack: 100 mL    sodium chloride 0.9%: 1000 mL  Total IN: 1100 mL    OUT:    Voided (mL): 600 mL  Total OUT: 600 mL    Total NET: 500 mL      2021 07:01  -  2021 08:15  --------------------------------------------------------  IN:    sodium chloride 0.9%: 100 mL  Total IN: 100 mL    OUT:  Total OUT: 0 mL    Total NET: 100 mL            CAPILLARY BLOOD GLUCOSE      POCT Blood Glucose.: 77 mg/dL (2021 11:36)      Labs:                         8.8    10.11 )-----------( 373      ( 2021 06:19 )             28.1   02-24    138  |  94<L>  |  10  ----------------------------<  105<H>  3.8   |  37<H>  |  0.48<L>    Ca    8.0<L>      2021 06:19  Phos  3.2       Mg     1.8         TPro  6.8  /  Alb  2.9<L>  /  TBili  0.3  /  DBili  x   /  AST  32  /  ALT  16  /  AlkPhos  67    PT/INR - ( 2021 20:07 )   PT: 13.0 sec;   INR: 1.09          PTT - ( 2021 20:07 )  PTT:32.5 secUrinalysis Basic - ( 2021 16:27 )    Color: Yellow / Appearance: Clear / S.010 / pH: x  Gluc: x / Ketone: Negative  / Bili: Negative / Urobili: Negative   Blood: x / Protein: Negative / Nitrite: Negative   Leuk Esterase: Negative / RBC: 2-5 /HPF / WBC 3-5 /HPF   Sq Epi: x / Non Sq Epi: Occasional /HPF / Bacteria: Trace /HPF          Electrolytes repleated to goals as follows: Potassium 4, Phosphorus 3 and Magnesium 2 where appropriate and necessary    Exam:  Neuro: A&Ox3, NAD, grossly neuro intact  HEENT: PERRL < EOMI, MMM, pt has papular erythematous rash on his midface across the nose and b/l cheeks.   Neck: Supple, tracheostomy tube intact.   CV: RRR, no MRGs  Pulm: CTAB, no wheezes, rales, or rhonchi  Abd: ND, Soft, NT, no rebound or guarding, peg in place, no surrounding erythema or ecchymosis  : River in place  Ext: No edema peripherally or centrally  Vasc: Extremities warm to palpation, 2+ pulses - radial and PT  MSK: no joint swelling  Skin: no rash  Psych: affect appropriate

## 2021-02-25 LAB
ANION GAP SERPL CALC-SCNC: 8 MMOL/L — SIGNIFICANT CHANGE UP (ref 5–17)
BUN SERPL-MCNC: 13 MG/DL — SIGNIFICANT CHANGE UP (ref 7–23)
CALCIUM SERPL-MCNC: 7.8 MG/DL — LOW (ref 8.4–10.5)
CHLORIDE SERPL-SCNC: 94 MMOL/L — LOW (ref 96–108)
CO2 SERPL-SCNC: 33 MMOL/L — HIGH (ref 22–31)
CREAT SERPL-MCNC: 0.49 MG/DL — LOW (ref 0.5–1.3)
GLUCOSE SERPL-MCNC: 76 MG/DL — SIGNIFICANT CHANGE UP (ref 70–99)
HCT VFR BLD CALC: 25.9 % — LOW (ref 39–50)
HGB BLD-MCNC: 8.1 G/DL — LOW (ref 13–17)
MAGNESIUM SERPL-MCNC: 1.9 MG/DL — SIGNIFICANT CHANGE UP (ref 1.6–2.6)
MCHC RBC-ENTMCNC: 31.3 GM/DL — LOW (ref 32–36)
MCHC RBC-ENTMCNC: 33.8 PG — SIGNIFICANT CHANGE UP (ref 27–34)
MCV RBC AUTO: 107.9 FL — HIGH (ref 80–100)
NRBC # BLD: 0 /100 WBCS — SIGNIFICANT CHANGE UP (ref 0–0)
PHOSPHATE SERPL-MCNC: 3.3 MG/DL — SIGNIFICANT CHANGE UP (ref 2.5–4.5)
PLATELET # BLD AUTO: 363 K/UL — SIGNIFICANT CHANGE UP (ref 150–400)
POTASSIUM SERPL-MCNC: 4.2 MMOL/L — SIGNIFICANT CHANGE UP (ref 3.5–5.3)
POTASSIUM SERPL-SCNC: 4.2 MMOL/L — SIGNIFICANT CHANGE UP (ref 3.5–5.3)
RBC # BLD: 2.4 M/UL — LOW (ref 4.2–5.8)
RBC # FLD: 12.6 % — SIGNIFICANT CHANGE UP (ref 10.3–14.5)
SODIUM SERPL-SCNC: 135 MMOL/L — SIGNIFICANT CHANGE UP (ref 135–145)
WBC # BLD: 8.96 K/UL — SIGNIFICANT CHANGE UP (ref 3.8–10.5)
WBC # FLD AUTO: 8.96 K/UL — SIGNIFICANT CHANGE UP (ref 3.8–10.5)

## 2021-02-25 PROCEDURE — 71045 X-RAY EXAM CHEST 1 VIEW: CPT | Mod: 26

## 2021-02-25 RX ORDER — SODIUM CHLORIDE 9 MG/ML
500 INJECTION, SOLUTION INTRAVENOUS ONCE
Refills: 0 | Status: COMPLETED | OUTPATIENT
Start: 2021-02-25 | End: 2021-02-25

## 2021-02-25 RX ORDER — MUPIROCIN 20 MG/G
1 OINTMENT TOPICAL
Refills: 0 | Status: DISCONTINUED | OUTPATIENT
Start: 2021-02-25 | End: 2021-03-04

## 2021-02-25 RX ORDER — OXYCODONE HYDROCHLORIDE 5 MG/1
10 TABLET ORAL EVERY 8 HOURS
Refills: 0 | Status: DISCONTINUED | OUTPATIENT
Start: 2021-02-25 | End: 2021-03-03

## 2021-02-25 RX ORDER — OXYMETAZOLINE HYDROCHLORIDE 0.5 MG/ML
2 SPRAY NASAL ONCE
Refills: 0 | Status: COMPLETED | OUTPATIENT
Start: 2021-02-25 | End: 2021-03-03

## 2021-02-25 RX ORDER — ACETAMINOPHEN 500 MG
650 TABLET ORAL EVERY 6 HOURS
Refills: 0 | Status: DISCONTINUED | OUTPATIENT
Start: 2021-02-25 | End: 2021-03-03

## 2021-02-25 RX ADMIN — ENOXAPARIN SODIUM 40 MILLIGRAM(S): 100 INJECTION SUBCUTANEOUS at 22:18

## 2021-02-25 RX ADMIN — Medication 175 MICROGRAM(S): at 06:01

## 2021-02-25 RX ADMIN — CHLORHEXIDINE GLUCONATE 1 APPLICATION(S): 213 SOLUTION TOPICAL at 07:04

## 2021-02-25 RX ADMIN — Medication 3 MILLILITER(S): at 22:19

## 2021-02-25 RX ADMIN — SODIUM CHLORIDE 10 MILLILITER(S): 9 INJECTION INTRAMUSCULAR; INTRAVENOUS; SUBCUTANEOUS at 14:16

## 2021-02-25 RX ADMIN — SODIUM CHLORIDE 10 MILLILITER(S): 9 INJECTION INTRAMUSCULAR; INTRAVENOUS; SUBCUTANEOUS at 17:07

## 2021-02-25 RX ADMIN — PANTOPRAZOLE SODIUM 40 MILLIGRAM(S): 20 TABLET, DELAYED RELEASE ORAL at 14:14

## 2021-02-25 RX ADMIN — MUPIROCIN 1 APPLICATION(S): 20 OINTMENT TOPICAL at 17:08

## 2021-02-25 RX ADMIN — Medication 500 MILLIGRAM(S): at 06:00

## 2021-02-25 RX ADMIN — Medication 5 MILLILITER(S): at 17:07

## 2021-02-25 RX ADMIN — Medication 500 MILLIGRAM(S): at 14:49

## 2021-02-25 RX ADMIN — Medication 650 MILLIGRAM(S): at 04:40

## 2021-02-25 RX ADMIN — Medication 5 MILLILITER(S): at 06:51

## 2021-02-25 RX ADMIN — SODIUM CHLORIDE 10 MILLILITER(S): 9 INJECTION INTRAMUSCULAR; INTRAVENOUS; SUBCUTANEOUS at 06:00

## 2021-02-25 RX ADMIN — Medication 5 MILLILITER(S): at 00:06

## 2021-02-25 RX ADMIN — Medication 3 MILLILITER(S): at 17:07

## 2021-02-25 RX ADMIN — Medication 3 MILLILITER(S): at 14:16

## 2021-02-25 RX ADMIN — Medication 500 MILLIGRAM(S): at 22:19

## 2021-02-25 RX ADMIN — Medication 1 APPLICATION(S): at 14:12

## 2021-02-25 RX ADMIN — METHADONE HYDROCHLORIDE 10 MILLIGRAM(S): 40 TABLET ORAL at 22:19

## 2021-02-25 RX ADMIN — OXYCODONE HYDROCHLORIDE 10 MILLIGRAM(S): 5 TABLET ORAL at 19:32

## 2021-02-25 RX ADMIN — Medication 3 MILLILITER(S): at 04:40

## 2021-02-25 RX ADMIN — Medication 1250 MILLIGRAM(S): at 14:16

## 2021-02-25 RX ADMIN — METHADONE HYDROCHLORIDE 10 MILLIGRAM(S): 40 TABLET ORAL at 14:48

## 2021-02-25 RX ADMIN — METHADONE HYDROCHLORIDE 10 MILLIGRAM(S): 40 TABLET ORAL at 06:01

## 2021-02-25 RX ADMIN — CEFTRIAXONE 100 MILLIGRAM(S): 500 INJECTION, POWDER, FOR SOLUTION INTRAMUSCULAR; INTRAVENOUS at 14:48

## 2021-02-25 RX ADMIN — SODIUM CHLORIDE 500 MILLILITER(S): 9 INJECTION, SOLUTION INTRAVENOUS at 00:50

## 2021-02-25 NOTE — PHYSICAL THERAPY INITIAL EVALUATION ADULT - ADDITIONAL COMMENTS
Admitted from [facility]. -- Prior to recent hospital admissions, patient was previously independent with all ADLs/IADLs (use of straight cane for ambulation). Lived with brother in apartment with 4 flight walk-up. Patient reports brother is in decreased functional health ("bad-back") and is unable to physically assist patient if needed.

## 2021-02-25 NOTE — CHART NOTE - NSCHARTNOTEFT_GEN_A_CORE
Pt seen at bedside in coordination with Dr. Corona, ENT. Pt received awake & alert, sitting up in bed. Communicates via mouthing & writing and indicated he wished to be orally suctioned. Using Yankower & soft foam suction, a moderate amount of clear pooled saliva & viscous blood-tinged secretions were retrieved from oral cavity, on left greater than right. Inspection of laryngectomy stoma revealed +bebeto button in situ with decrease in stomal opening at distal end of button. Larybutton was removed and replaced with an 8/55 Larytube coated with steroidal cream by Dr. Corona. Pt tolerate change well. No further f/u warranted from this service at this time. Pls reconsult PRN.

## 2021-02-25 NOTE — PHYSICAL THERAPY INITIAL EVALUATION ADULT - PHYSICAL ASSIST/NONPHYSICAL ASSIST: SIT/STAND, REHAB EVAL
moderately unsteady, increased time required to gain static standing balance/verbal cues/nonverbal cues (demo/gestures)/1 person assist

## 2021-02-25 NOTE — PHYSICAL THERAPY INITIAL EVALUATION ADULT - GENERAL OBSERVATIONS, REHAB EVAL
PT IE completed. Chart reviewed. GaitFIM:1, StairFIM:n/a. Patient without complaints of pain at rest, agreeable to PT. Patient received semi-supine, NAD, +tele, +trach collar (FiO2:35%), +PEG (off feed), +IV, MD Wilcox (ENT) cleared patient for treatment session (aware of +bleeding from nose and mouth)

## 2021-02-25 NOTE — PROGRESS NOTE ADULT - SUBJECTIVE AND OBJECTIVE BOX
HPI: 64 y/o male h/o Laryngeal cancer s/p laryngectomy and trach/PEG, HTN, hypothyroidism, currently receiving cetuximab treatment, now with dyspnea 2/2 granulation tissue down trachea. Patient sent to [place] from nursing home and then trasferred to [facility] 2/23 overnight.     Hospital course:  2/24: Patient admitted overnight. ENT called and exchanged trach to 6CFS. Patient is stabilized without dyspnea now and laryngectomy cleared of crusting and secretions. Continued facial lymphedema and new facial rash consistent possibly with cetuximab toxicity rash.   2/25: No acute events overnight.  Laryngectomy button placed in stoma. Neck cellulitis inproving.     ICU Vital Signs Last 24 Hrs  T(C): 36.7 (25 Feb 2021 04:23), Max: 36.7 (24 Feb 2021 20:53)  T(F): 98 (25 Feb 2021 04:23), Max: 98.1 (24 Feb 2021 20:53)  HR: 66 (25 Feb 2021 04:36) (59 - 78)  BP: 91/54 (25 Feb 2021 04:36) (82/50 - 120/69)  BP(mean): 66 (25 Feb 2021 04:36) (62 - 89)  ABP: --  ABP(mean): --  RR: 17 (25 Feb 2021 04:36) (10 - 18)  SpO2: 100% (25 Feb 2021 04:36) (98% - 100%)      Physical Exam:  General- A&O x 2-3, laying comfortably in bed, NAD  Head- atraumatic, lymphedema L>R   ENT- no nasal secretions, no epistaxis, laryngectomy patent with 6CFS in place without bleeding around the laryngectomy, see laryngoscopy exam below   Skin- facial rash erythematous and papular    MEDICATIONS  (STANDING):  acetylcysteine 10%  Inhalation 5 milliLiter(s) Inhalation three times a day  albuterol/ipratropium for Nebulization 3 milliLiter(s) Nebulizer every 6 hours  calcium carbonate   Suspension 1250 milliGRAM(s) Enteral Tube daily  cefTRIAXone   IVPB 1000 milliGRAM(s) IV Intermittent every 24 hours  chlorhexidine 4% Liquid 1 Application(s) Topical <User Schedule>  ciprofloxacin/dexamethasone Suspension Otic 2 Drop(s) IntraTracheal two times a day  enoxaparin Injectable 40 milliGRAM(s) SubCutaneous every 24 hours  levothyroxine 175 MICROGram(s) Enteral Tube daily  methadone  Concentrate 10 milliGRAM(s) Oral every 8 hours  metroNIDAZOLE    Tablet 500 milliGRAM(s) Oral every 8 hours  pantoprazole  Injectable 40 milliGRAM(s) IV Push daily  potassium chloride    Tablet ER 20 milliEquivalent(s) Oral once  sodium chloride 0.9%. 1000 milliLiter(s) (100 mL/Hr) IV Continuous <Continuous>  sodium chloride 3%  Inhalation 10 milliLiter(s) Inhalation every 6 hours    MEDICATIONS  (PRN):        LARYNGOSCOPY EXAM:     -Verbal consent was obtained from patient prior to procedure.    Indication:    Anesthesia: None    Flexible laryngoscopy was performed and revealed the following:  laryngoscopy was performed at bedside via the laryngectomy stoma. At the tip of the trach (6CFS), large posterior wall granulation tissue seen causing narrowing of the airway 50%. Able to pass scope into the trachea easily. Trach was then removed and laryngoscopy was performed via the laryngectomy stoma again with mild granulation tissue at the entrance of the laryngectomy stoma and more on the posterior wall approximately 4-5 cm down with patent airway and no active bleeding seen. Trach 6CFS was replaced for now to stent open the airway. Re-evaluated with laryngoscope that the airway was still patent with trach in place.     The patient tolerated the procedure well.

## 2021-02-25 NOTE — PHYSICAL THERAPY INITIAL EVALUATION ADULT - GAIT DEVIATIONS NOTED, PT EVAL
moderately unsteady gait with B/L sway resulting in 3-4 LOB benefiting from mod assist for recovery; demo reaching for/cruising on static objects with opposite hand; increased time required with turning/decreased sonido/decreased velocity of limb motion/decreased step length/decreased weight-shifting ability

## 2021-02-25 NOTE — PHYSICAL THERAPY INITIAL EVALUATION ADULT - THERAPY FREQUENCY, PT EVAL
Patient educated on frequency of inpatient therapy at [facility], patient verbalized understanding./2-3x/week

## 2021-02-25 NOTE — PHYSICAL THERAPY INITIAL EVALUATION ADULT - PERTINENT HX OF CURRENT PROBLEM, REHAB EVAL
64 y/o male h/o Laryngeal cancer s/p laryngectomy and trach/PEG, HTN, hypothyroidism, currently receiving cetuximab treatment, now with dyspnea 2/2 granulation tissue down trachea. Patient was recently discharged to nursing home, and went to [place] yesterday for tracheal and nasal bleeding. Please refer to H&P on [place] for remaining.

## 2021-02-25 NOTE — PROGRESS NOTE ADULT - ASSESSMENT
66 y/o male h/o Laryngeal cancer s/p laryngectomy and trach/PEG, HTN, hypothyroidism, currently receiving cetuximab treatment, now with dyspnea 2/2 granulation tissue down trachea.    -Broad spectrum antibiotics  -Ciprodex nebulized if possible, if not, continued drops via laryngectomy   -methadone  -pain control  -routine tracheostomy care  -heme/onc consult for continuity (supposed to get cetuximab yesterday)  -derm consult for rash  -SLP for bebeto tube

## 2021-02-26 LAB
ANION GAP SERPL CALC-SCNC: 5 MMOL/L — SIGNIFICANT CHANGE UP (ref 5–17)
BUN SERPL-MCNC: 10 MG/DL — SIGNIFICANT CHANGE UP (ref 7–23)
CALCIUM SERPL-MCNC: 7.5 MG/DL — LOW (ref 8.4–10.5)
CHLORIDE SERPL-SCNC: 95 MMOL/L — LOW (ref 96–108)
CO2 SERPL-SCNC: 36 MMOL/L — HIGH (ref 22–31)
CREAT SERPL-MCNC: 0.51 MG/DL — SIGNIFICANT CHANGE UP (ref 0.5–1.3)
CULTURE RESULTS: SIGNIFICANT CHANGE UP
CULTURE RESULTS: SIGNIFICANT CHANGE UP
GLUCOSE SERPL-MCNC: 105 MG/DL — HIGH (ref 70–99)
HCT VFR BLD CALC: 25.4 % — LOW (ref 39–50)
HGB BLD-MCNC: 8.1 G/DL — LOW (ref 13–17)
MAGNESIUM SERPL-MCNC: 1.7 MG/DL — SIGNIFICANT CHANGE UP (ref 1.6–2.6)
MCHC RBC-ENTMCNC: 31.9 GM/DL — LOW (ref 32–36)
MCHC RBC-ENTMCNC: 34 PG — SIGNIFICANT CHANGE UP (ref 27–34)
MCV RBC AUTO: 106.7 FL — HIGH (ref 80–100)
NRBC # BLD: 0 /100 WBCS — SIGNIFICANT CHANGE UP (ref 0–0)
PHOSPHATE SERPL-MCNC: 3.4 MG/DL — SIGNIFICANT CHANGE UP (ref 2.5–4.5)
PLATELET # BLD AUTO: 393 K/UL — SIGNIFICANT CHANGE UP (ref 150–400)
POTASSIUM SERPL-MCNC: 3.4 MMOL/L — LOW (ref 3.5–5.3)
POTASSIUM SERPL-SCNC: 3.4 MMOL/L — LOW (ref 3.5–5.3)
RBC # BLD: 2.38 M/UL — LOW (ref 4.2–5.8)
RBC # FLD: 12.5 % — SIGNIFICANT CHANGE UP (ref 10.3–14.5)
SODIUM SERPL-SCNC: 136 MMOL/L — SIGNIFICANT CHANGE UP (ref 135–145)
SPECIMEN SOURCE: SIGNIFICANT CHANGE UP
SPECIMEN SOURCE: SIGNIFICANT CHANGE UP
WBC # BLD: 8.8 K/UL — SIGNIFICANT CHANGE UP (ref 3.8–10.5)
WBC # FLD AUTO: 8.8 K/UL — SIGNIFICANT CHANGE UP (ref 3.8–10.5)

## 2021-02-26 PROCEDURE — 71045 X-RAY EXAM CHEST 1 VIEW: CPT | Mod: 26

## 2021-02-26 PROCEDURE — 99231 SBSQ HOSP IP/OBS SF/LOW 25: CPT

## 2021-02-26 RX ADMIN — SODIUM CHLORIDE 10 MILLILITER(S): 9 INJECTION INTRAMUSCULAR; INTRAVENOUS; SUBCUTANEOUS at 00:23

## 2021-02-26 RX ADMIN — MUPIROCIN 1 APPLICATION(S): 20 OINTMENT TOPICAL at 18:09

## 2021-02-26 RX ADMIN — MUPIROCIN 1 APPLICATION(S): 20 OINTMENT TOPICAL at 06:13

## 2021-02-26 RX ADMIN — Medication 5 MILLILITER(S): at 10:40

## 2021-02-26 RX ADMIN — METHADONE HYDROCHLORIDE 10 MILLIGRAM(S): 40 TABLET ORAL at 22:44

## 2021-02-26 RX ADMIN — OXYCODONE HYDROCHLORIDE 10 MILLIGRAM(S): 5 TABLET ORAL at 19:13

## 2021-02-26 RX ADMIN — Medication 1250 MILLIGRAM(S): at 11:50

## 2021-02-26 RX ADMIN — Medication 175 MICROGRAM(S): at 06:13

## 2021-02-26 RX ADMIN — OXYCODONE HYDROCHLORIDE 10 MILLIGRAM(S): 5 TABLET ORAL at 09:14

## 2021-02-26 RX ADMIN — SODIUM CHLORIDE 10 MILLILITER(S): 9 INJECTION INTRAMUSCULAR; INTRAVENOUS; SUBCUTANEOUS at 23:02

## 2021-02-26 RX ADMIN — Medication 500 MILLIGRAM(S): at 06:13

## 2021-02-26 RX ADMIN — CEFTRIAXONE 100 MILLIGRAM(S): 500 INJECTION, POWDER, FOR SOLUTION INTRAMUSCULAR; INTRAVENOUS at 09:14

## 2021-02-26 RX ADMIN — METHADONE HYDROCHLORIDE 10 MILLIGRAM(S): 40 TABLET ORAL at 06:13

## 2021-02-26 RX ADMIN — SODIUM CHLORIDE 10 MILLILITER(S): 9 INJECTION INTRAMUSCULAR; INTRAVENOUS; SUBCUTANEOUS at 11:54

## 2021-02-26 RX ADMIN — Medication 3 MILLILITER(S): at 04:24

## 2021-02-26 RX ADMIN — SODIUM CHLORIDE 10 MILLILITER(S): 9 INJECTION INTRAMUSCULAR; INTRAVENOUS; SUBCUTANEOUS at 06:14

## 2021-02-26 RX ADMIN — Medication 3 MILLILITER(S): at 10:40

## 2021-02-26 RX ADMIN — SODIUM CHLORIDE 10 MILLILITER(S): 9 INJECTION INTRAMUSCULAR; INTRAVENOUS; SUBCUTANEOUS at 18:30

## 2021-02-26 RX ADMIN — Medication 3 MILLILITER(S): at 22:40

## 2021-02-26 RX ADMIN — Medication 3 MILLILITER(S): at 16:01

## 2021-02-26 RX ADMIN — Medication 5 MILLILITER(S): at 01:41

## 2021-02-26 RX ADMIN — PANTOPRAZOLE SODIUM 40 MILLIGRAM(S): 20 TABLET, DELAYED RELEASE ORAL at 11:54

## 2021-02-26 RX ADMIN — METHADONE HYDROCHLORIDE 10 MILLIGRAM(S): 40 TABLET ORAL at 16:00

## 2021-02-26 RX ADMIN — Medication 1 APPLICATION(S): at 11:54

## 2021-02-26 RX ADMIN — Medication 500 MILLIGRAM(S): at 22:45

## 2021-02-26 RX ADMIN — Medication 5 MILLILITER(S): at 18:09

## 2021-02-26 RX ADMIN — Medication 500 MILLIGRAM(S): at 15:11

## 2021-02-26 RX ADMIN — CHLORHEXIDINE GLUCONATE 1 APPLICATION(S): 213 SOLUTION TOPICAL at 07:53

## 2021-02-26 RX ADMIN — ENOXAPARIN SODIUM 40 MILLIGRAM(S): 100 INJECTION SUBCUTANEOUS at 22:39

## 2021-02-26 NOTE — PROVIDER CONTACT NOTE (OTHER) - ASSESSMENT
Pt alert and oriented x4, awake in bed. No respiratory distress observed during this event. Pt was aware that he was bleeding. O2 Sat maintained above 90%.

## 2021-02-26 NOTE — CHART NOTE - NSCHARTNOTEFT_GEN_A_CORE
Admitting Diagnosis:   Patient is a 65y old  Male who presents with a chief complaint of dyspnea (26 Feb 2021 13:14)      PAST MEDICAL & SURGICAL HISTORY:  Hypothyroidism    HTN (hypertension)    Larynx cancer    Laryngeal mass  s/ p radiation, trach    PEG (percutaneous endoscopic gastrostomy) status    H/O laryngectomy    H/O thyroidectomy    H/O tracheostomy    Surgery, elective  direct laryngoscopy with biopsy- 4/2017    Status post surgery  inguinal hernia    Status post surgery  Left ankle surgical repair  x2 (1965)      Current Nutrition Order:  Glucerna 1.5 @60ml/hr x24hrs via PEG. Provides 1440ml TV, 2160kcal, 118g pro, 1092ml FW.   *infusing at ordered goal   **See EN recs     PO Intake: Good (%) [   ]  Fair (50-75%) [ x  ] Poor (<25%) [   ] N/A    GI Issues:   Denies N/V at present; tolerating feeds well  Reports last BM was 2 days ago 2/24; denies abdominal distension/discomfort w/ feeds  Of note, RN reported pulling residuals on patient however pt stopped her before she could finish.   Ordered for protonix  PEG in place    Pain:  Pain being medically managed.  Ordered for tylenol and oxycodone    Skin Integrity:   Intact pressure wise  Surgical incision      Labs:   02-26    136  |  95<L>  |  10  ----------------------------<  105<H>  3.4<L>   |  36<H>  |  0.51    Ca    7.5<L>      26 Feb 2021 06:30  Phos  3.4     02-26  Mg     1.7     02-26      CAPILLARY BLOOD GLUCOSE          Medications:  MEDICATIONS  (STANDING):  acetylcysteine 10%  Inhalation 5 milliLiter(s) Inhalation three times a day  albuterol/ipratropium for Nebulization 3 milliLiter(s) Nebulizer every 6 hours  calcium carbonate   Suspension 1250 milliGRAM(s) Enteral Tube daily  cefTRIAXone   IVPB 1000 milliGRAM(s) IV Intermittent every 24 hours  chlorhexidine 4% Liquid 1 Application(s) Topical <User Schedule>  Ciprofloxacin 0.3% Ophth Drops 5 Drop(s) 5 Drop(s) Nebulizer two times a day  dexAMETHasone 0.1% Ophth Drops 5 Drop(s) 5 Drop(s) Nebulizer two times a day  enoxaparin Injectable 40 milliGRAM(s) SubCutaneous every 24 hours  hydrocortisone 2.5% Ointment 1 Application(s) Topical daily  levothyroxine 175 MICROGram(s) Enteral Tube daily  methadone  Concentrate 10 milliGRAM(s) Oral every 8 hours  metroNIDAZOLE    Tablet 500 milliGRAM(s) Oral every 8 hours  mupirocin 2% Ointment 1 Application(s) Topical two times a day  oxymetazoline 0.05% Nasal Spray 2 Spray(s) Alternating Nostrils once  pantoprazole  Injectable 40 milliGRAM(s) IV Push daily  sodium chloride 0.9%. 1000 milliLiter(s) (100 mL/Hr) IV Continuous <Continuous>  sodium chloride 3%  Inhalation 10 milliLiter(s) Inhalation every 6 hours    MEDICATIONS  (PRN):  acetaminophen    Suspension .. 650 milliGRAM(s) Oral every 6 hours PRN Mild Pain (1 - 3)  oxyCODONE    Solution 10 milliGRAM(s) Oral every 8 hours PRN Severe Pain (7 - 10)      Admitted Anthropometrics  Height: 5'10" Weight: 121, IBW 166bs+/-10%, %IBW 72%, BMI 17.3    Weight Change:   Per previous admission, weight hx: 64kg (7/2019), 57kg (7/2020), 55kg (2/15/2021).   Suspected severe protein calorie malnutrition  Significant muslce wasting noted in quad/calf regions.     Estimated energy needs:   IBW (75.4kg) used for calculations as pt between <80% of IBW (72%).   Nutrient needs based on [facility] standards of care for maintenance in adults.   Needs adjusted for post-op, cancer, fluids per team  Energy: 1886-2263kcal/day (25-30kcal/kg)  Protein:90-106g pro/day (1.2-1.4g pro/kg)     Subjective:   66 y/o male h/o Laryngeal cancer s/p laryngectomy and trach/PEG, HTN, hypothyroidism, currently receiving cetuximab treatment, now with dyspnea 2/2 granulation tissue down trachea. Laryngectomy button placed in stoma w/ neck celluitis improving. Switched to 8/55 Luke tube 2/26; stoma more patent today.     Pt seen in room, sitting up at bedside, self-suctioning frequently during assessment. Remains NPO w/ primary form of nutrition support via PEG feeds. Of note pt has had a substantial weight loss. Reports UBW was 130lbs in December 2020, current BW is 121lbs (2/2021). This indicates a 9lb wt loss x2mo. Pt appears cachectic w/ muscle wasting most notable in lower extremities. Per visitor at bedside, there is question as to whether patients brother was providing feeds consistently for patient. Unable to state home regimen for tubefeeds but had followed up w/ an RD outpatient prior to weight loss who had told them he was on an adequate regimen. Based on current status including malnutrition and cancer therapy needs will be adjusted accordingly. Denies N/V, no abdominal discomfort/pain with feeds. Unclear why pt is on CHO steady feeds; would switch over to lower fat formula if feasible. See recs below. RD to follow.     Previous Nutrition Diagnosis:   malnutrition (severe PCM) in the setting of chronic illness RT suspected inadequate energy intake AEB weight loss and visible muscle wasting in lower extremities.     Active [  X ]  Resolved [   ]    Goal: Pt to continuously meet >75% EER with good tolerance     Recommendations:  1. Recommend switching over to Jevity 1.5 @48ml/hr x24hrs +3 prostat/day via PEG x22 HOURS (2/2 synthroid). Provides 1056ml TV, 1884kcal, 112g pro, 1.5g pro/kg IBW, 2g pro/kg ABW, 802ml FW.  2. Monitor for s/s intolerance. Maintain aspiration precautions at all times.   3. Recommend additional 1L water per day or 250cc 4x/day)  4. Monitor BG POCT now that pt is being switched from CHO steady formula to generic formula.   5. Recommend chewable MVI daily   6.  Pain and bowel regimens per team   *paged team     Education:   Understanding of meeting needs via EN at this time.     Risk Level: High [ x  ] Moderate [   ] Low [   ].

## 2021-02-26 NOTE — PROVIDER CONTACT NOTE (OTHER) - RECOMMENDATIONS
Frequent gentle suction to keep a clear airway to prevent frequent coughing. Any additional intervention per ENT recommendation.

## 2021-02-26 NOTE — PROGRESS NOTE ADULT - ASSESSMENT
66 y/o male h/o laryngeal cancer s/p laryngectomy 2 yrs prior, with trach/PEG, HTN, hypothyroidism, hypocalcemia BIBA admitted from nursing home with profuse bleeding from the mouth and nose, around the trach site and facial swelling. Follows with Dr Landis at [facility] [phone]. Has been on different treatments in the past, presently on Cetuximab (T/T 2 weeks ago and was scheduled 2/24 for next dose) and Proton Laser (due for next T/t next week).   Noticed to have a rash on the face on this admission that was not present last week when he was discharged.     Problem #1/Plan.  Acneiform eruption secondary to cetuximab (EGFR inh)  - has been described to be a positive predictive marker for tx response  - not a contraindication for continuing cetuximab    - grade 1 in severity  - c/w HC 2.5% cream BID x 4 wks  - c/w mupirocin oint BID x 4 wks    - will need re-assessment in 4 wks

## 2021-02-26 NOTE — PROGRESS NOTE ADULT - ATTENDING COMMENTS
I agree w the assessment and plan as outlined above.    Additionally I had a lengthy discussion with the patient.  CTA was negative for source of bleeding from oral cavity.  Please see the full report in the medical record.  Exam was negative as well.  I suspect that this is from the residual tumor in the pharynx.  At this point there does not appear to be a surgical intervention or a role for interventional radiology.  At this point I would recommend social work consultation as well as Hospice referral.  I discussed this with the patient as well as Dr. Branch (pts head and neck oncologist), and he agrees with this assessment as well.  Pt agreed to start this discussion process.

## 2021-02-26 NOTE — PROGRESS NOTE ADULT - SUBJECTIVE AND OBJECTIVE BOX
HPI: 66 y/o male h/o Laryngeal cancer s/p laryngectomy and trach/PEG, HTN, hypothyroidism, currently receiving cetuximab treatment, now with dyspnea 2/2 granulation tissue down trachea. Patient sent to [place] from nursing home and then trasferred to [facility] 2/23 overnight.     Hospital course:  2/24: Patient admitted overnight. ENT called and exchanged trach to 6CFS. Patient is stabilized without dyspnea now and laryngectomy cleared of crusting and secretions. Continued facial lymphedema and new facial rash consistent possibly with cetuximab toxicity rash.   2/25: No acute events overnight.  Laryngectomy button placed in stoma. Neck cellulitis inproving.   2/26: No acute events overnight. Patient switched to size 8/55 laryngectomy tube coated with hydrocortisone. Stoma more patent today.     ICU Vital Signs Last 24 Hrs  T(C): 36.7 (25 Feb 2021 04:23), Max: 36.7 (24 Feb 2021 20:53)  T(F): 98 (25 Feb 2021 04:23), Max: 98.1 (24 Feb 2021 20:53)  HR: 66 (25 Feb 2021 04:36) (59 - 78)  BP: 91/54 (25 Feb 2021 04:36) (82/50 - 120/69)  BP(mean): 66 (25 Feb 2021 04:36) (62 - 89)  ABP: --  ABP(mean): --  RR: 17 (25 Feb 2021 04:36) (10 - 18)  SpO2: 100% (25 Feb 2021 04:36) (98% - 100%)      Physical Exam:  General- A&O x 2-3, laying comfortably in bed, NAD  Head- atraumatic, lymphedema L>R   ENT- no nasal secretions, no epistaxis, laryngectomy patent with 6CFS in place without bleeding around the laryngectomy, see laryngoscopy exam below   Skin- facial rash erythematous and papular    MEDICATIONS  (STANDING):  acetylcysteine 10%  Inhalation 5 milliLiter(s) Inhalation three times a day  albuterol/ipratropium for Nebulization 3 milliLiter(s) Nebulizer every 6 hours  calcium carbonate   Suspension 1250 milliGRAM(s) Enteral Tube daily  cefTRIAXone   IVPB 1000 milliGRAM(s) IV Intermittent every 24 hours  chlorhexidine 4% Liquid 1 Application(s) Topical <User Schedule>  ciprofloxacin/dexamethasone Suspension Otic 2 Drop(s) IntraTracheal two times a day  enoxaparin Injectable 40 milliGRAM(s) SubCutaneous every 24 hours  levothyroxine 175 MICROGram(s) Enteral Tube daily  methadone  Concentrate 10 milliGRAM(s) Oral every 8 hours  metroNIDAZOLE    Tablet 500 milliGRAM(s) Oral every 8 hours  pantoprazole  Injectable 40 milliGRAM(s) IV Push daily  potassium chloride    Tablet ER 20 milliEquivalent(s) Oral once  sodium chloride 0.9%. 1000 milliLiter(s) (100 mL/Hr) IV Continuous <Continuous>  sodium chloride 3%  Inhalation 10 milliLiter(s) Inhalation every 6 hours    MEDICATIONS  (PRN):        LARYNGOSCOPY EXAM:     -Verbal consent was obtained from patient prior to procedure.    Indication:    Anesthesia: None    Flexible laryngoscopy was performed and revealed the following:  laryngoscopy was performed at bedside via the laryngectomy stoma. At the tip of the trach (6CFS), large posterior wall granulation tissue seen causing narrowing of the airway 50%. Able to pass scope into the trachea easily. Trach was then removed and laryngoscopy was performed via the laryngectomy stoma again with mild granulation tissue at the entrance of the laryngectomy stoma and more on the posterior wall approximately 4-5 cm down with patent airway and no active bleeding seen. Trach 6CFS was replaced for now to stent open the airway. Re-evaluated with laryngoscope that the airway was still patent with trach in place.     The patient tolerated the procedure well.       HPI: 66 y/o male h/o Laryngeal cancer s/p laryngectomy and trach/PEG, HTN, hypothyroidism, currently receiving cetuximab treatment, now with dyspnea 2/2 granulation tissue down trachea. Patient sent to [place] from nursing home and then trasferred to [facility] 2/23 overnight.     Hospital course:  2/24: Patient admitted overnight. ENT called and exchanged trach to 6CFS. Patient is stabilized without dyspnea now and laryngectomy cleared of crusting and secretions. Continued facial lymphedema and new facial rash consistent possibly with cetuximab toxicity rash.   2/25: No acute events overnight.  Laryngectomy button placed in stoma. Neck cellulitis inproving.   2/26: No acute events overnight. Patient switched to size 8/55 laryngectomy tube coated with hydrocortisone. Stoma more patent today.     ICU Vital Signs Last 24 Hrs  T(C): 37.2 (26 Feb 2021 09:41), Max: 37.2 (26 Feb 2021 09:41)  T(F): 98.9 (26 Feb 2021 09:41), Max: 98.9 (26 Feb 2021 09:41)  HR: 68 (26 Feb 2021 12:04) (64 - 68)  BP: 88/54 (26 Feb 2021 12:04) (87/50 - 105/59)  BP(mean): 67 (26 Feb 2021 12:04) (64 - 77)  ABP: --  ABP(mean): --  RR: 18 (26 Feb 2021 12:04) (18 - 20)  SpO2: 98% (26 Feb 2021 12:04) (96% - 99%)    Physical Exam:  General- A&O x 2-3, laying comfortably in bed, NAD  Head- atraumatic, lymphedema L>R   ENT- no nasal secretions, no epistaxis, laryngectomy patent with 6CFS in place without bleeding around the laryngectomy, see laryngoscopy exam below   Skin- facial rash erythematous and papular    MEDICATIONS  (STANDING):  acetylcysteine 10%  Inhalation 5 milliLiter(s) Inhalation three times a day  albuterol/ipratropium for Nebulization 3 milliLiter(s) Nebulizer every 6 hours  calcium carbonate   Suspension 1250 milliGRAM(s) Enteral Tube daily  cefTRIAXone   IVPB 1000 milliGRAM(s) IV Intermittent every 24 hours  chlorhexidine 4% Liquid 1 Application(s) Topical <User Schedule>  ciprofloxacin/dexamethasone Suspension Otic 2 Drop(s) IntraTracheal two times a day  enoxaparin Injectable 40 milliGRAM(s) SubCutaneous every 24 hours  levothyroxine 175 MICROGram(s) Enteral Tube daily  methadone  Concentrate 10 milliGRAM(s) Oral every 8 hours  metroNIDAZOLE    Tablet 500 milliGRAM(s) Oral every 8 hours  pantoprazole  Injectable 40 milliGRAM(s) IV Push daily  potassium chloride    Tablet ER 20 milliEquivalent(s) Oral once  sodium chloride 0.9%. 1000 milliLiter(s) (100 mL/Hr) IV Continuous <Continuous>  sodium chloride 3%  Inhalation 10 milliLiter(s) Inhalation every 6 hours    MEDICATIONS  (PRN):        LARYNGOSCOPY EXAM:     -Verbal consent was obtained from patient prior to procedure.    Indication:    Anesthesia: None    Flexible laryngoscopy was performed and revealed the following:  laryngoscopy was performed at bedside via the laryngectomy stoma. At the tip of the trach (6CFS), large posterior wall granulation tissue seen causing narrowing of the airway 50%. Able to pass scope into the trachea easily. Trach was then removed and laryngoscopy was performed via the laryngectomy stoma again with mild granulation tissue at the entrance of the laryngectomy stoma and more on the posterior wall approximately 4-5 cm down with patent airway and no active bleeding seen. Trach 6CFS was replaced for now to stent open the airway. Re-evaluated with laryngoscope that the airway was still patent with trach in place.     The patient tolerated the procedure well.

## 2021-02-27 LAB
ANION GAP SERPL CALC-SCNC: 5 MMOL/L — SIGNIFICANT CHANGE UP (ref 5–17)
BUN SERPL-MCNC: 7 MG/DL — SIGNIFICANT CHANGE UP (ref 7–23)
CALCIUM SERPL-MCNC: 8 MG/DL — LOW (ref 8.4–10.5)
CHLORIDE SERPL-SCNC: 93 MMOL/L — LOW (ref 96–108)
CO2 SERPL-SCNC: 36 MMOL/L — HIGH (ref 22–31)
CREAT SERPL-MCNC: 0.44 MG/DL — LOW (ref 0.5–1.3)
GLUCOSE SERPL-MCNC: 109 MG/DL — HIGH (ref 70–99)
HCT VFR BLD CALC: 24.6 % — LOW (ref 39–50)
HGB BLD-MCNC: 7.8 G/DL — LOW (ref 13–17)
MAGNESIUM SERPL-MCNC: 1.9 MG/DL — SIGNIFICANT CHANGE UP (ref 1.6–2.6)
MCHC RBC-ENTMCNC: 31.7 GM/DL — LOW (ref 32–36)
MCHC RBC-ENTMCNC: 34.2 PG — HIGH (ref 27–34)
MCV RBC AUTO: 107.9 FL — HIGH (ref 80–100)
MRSA PCR RESULT.: NEGATIVE — SIGNIFICANT CHANGE UP
NRBC # BLD: 0 /100 WBCS — SIGNIFICANT CHANGE UP (ref 0–0)
PHOSPHATE SERPL-MCNC: 2.9 MG/DL — SIGNIFICANT CHANGE UP (ref 2.5–4.5)
PLATELET # BLD AUTO: 355 K/UL — SIGNIFICANT CHANGE UP (ref 150–400)
POTASSIUM SERPL-MCNC: 3.6 MMOL/L — SIGNIFICANT CHANGE UP (ref 3.5–5.3)
POTASSIUM SERPL-SCNC: 3.6 MMOL/L — SIGNIFICANT CHANGE UP (ref 3.5–5.3)
RBC # BLD: 2.28 M/UL — LOW (ref 4.2–5.8)
RBC # FLD: 12.7 % — SIGNIFICANT CHANGE UP (ref 10.3–14.5)
S AUREUS DNA NOSE QL NAA+PROBE: POSITIVE
SODIUM SERPL-SCNC: 134 MMOL/L — LOW (ref 135–145)
WBC # BLD: 7.76 K/UL — SIGNIFICANT CHANGE UP (ref 3.8–10.5)
WBC # FLD AUTO: 7.76 K/UL — SIGNIFICANT CHANGE UP (ref 3.8–10.5)

## 2021-02-27 PROCEDURE — 71045 X-RAY EXAM CHEST 1 VIEW: CPT | Mod: 26

## 2021-02-27 RX ADMIN — Medication 5 MILLILITER(S): at 11:48

## 2021-02-27 RX ADMIN — OXYCODONE HYDROCHLORIDE 10 MILLIGRAM(S): 5 TABLET ORAL at 11:45

## 2021-02-27 RX ADMIN — Medication 3 MILLILITER(S): at 21:24

## 2021-02-27 RX ADMIN — Medication 175 MICROGRAM(S): at 07:25

## 2021-02-27 RX ADMIN — SODIUM CHLORIDE 100 MILLILITER(S): 9 INJECTION INTRAMUSCULAR; INTRAVENOUS; SUBCUTANEOUS at 03:24

## 2021-02-27 RX ADMIN — Medication 3 MILLILITER(S): at 03:16

## 2021-02-27 RX ADMIN — Medication 500 MILLIGRAM(S): at 21:28

## 2021-02-27 RX ADMIN — Medication 1250 MILLIGRAM(S): at 11:44

## 2021-02-27 RX ADMIN — PANTOPRAZOLE SODIUM 40 MILLIGRAM(S): 20 TABLET, DELAYED RELEASE ORAL at 11:46

## 2021-02-27 RX ADMIN — SODIUM CHLORIDE 10 MILLILITER(S): 9 INJECTION INTRAMUSCULAR; INTRAVENOUS; SUBCUTANEOUS at 11:47

## 2021-02-27 RX ADMIN — METHADONE HYDROCHLORIDE 10 MILLIGRAM(S): 40 TABLET ORAL at 16:45

## 2021-02-27 RX ADMIN — Medication 500 MILLIGRAM(S): at 06:57

## 2021-02-27 RX ADMIN — SODIUM CHLORIDE 10 MILLILITER(S): 9 INJECTION INTRAMUSCULAR; INTRAVENOUS; SUBCUTANEOUS at 16:58

## 2021-02-27 RX ADMIN — Medication 1 APPLICATION(S): at 11:46

## 2021-02-27 RX ADMIN — SODIUM CHLORIDE 10 MILLILITER(S): 9 INJECTION INTRAMUSCULAR; INTRAVENOUS; SUBCUTANEOUS at 06:54

## 2021-02-27 RX ADMIN — Medication 500 MILLIGRAM(S): at 16:45

## 2021-02-27 RX ADMIN — MUPIROCIN 1 APPLICATION(S): 20 OINTMENT TOPICAL at 16:47

## 2021-02-27 RX ADMIN — Medication 5 MILLILITER(S): at 17:08

## 2021-02-27 RX ADMIN — Medication 3 MILLILITER(S): at 16:08

## 2021-02-27 RX ADMIN — Medication 3 MILLILITER(S): at 11:43

## 2021-02-27 RX ADMIN — ENOXAPARIN SODIUM 40 MILLIGRAM(S): 100 INJECTION SUBCUTANEOUS at 22:24

## 2021-02-27 RX ADMIN — CEFTRIAXONE 100 MILLIGRAM(S): 500 INJECTION, POWDER, FOR SOLUTION INTRAMUSCULAR; INTRAVENOUS at 11:43

## 2021-02-27 RX ADMIN — Medication 5 MILLILITER(S): at 02:13

## 2021-02-27 RX ADMIN — METHADONE HYDROCHLORIDE 10 MILLIGRAM(S): 40 TABLET ORAL at 21:32

## 2021-02-27 RX ADMIN — MUPIROCIN 1 APPLICATION(S): 20 OINTMENT TOPICAL at 06:56

## 2021-02-27 NOTE — PROGRESS NOTE ADULT - ASSESSMENT
66 y/o male h/o Laryngeal cancer s/p laryngectomy and trach/PEG, HTN, hypothyroidism, currently receiving cetuximab treatment, now with dyspnea 2/2 granulation tissue down trachea.    -Broad spectrum antibiotics  -Ciprodex nebulized if possible, if not, continued drops via laryngectomy   -methadone  -pain control  -routine tracheostomy care  -derm consult for rash  -SLP for bebeto tube

## 2021-02-27 NOTE — PROVIDER CONTACT NOTE (MEDICATION) - ASSESSMENT
Patient stable at baseline, with laryngectomy tube.  No bleeding from laryngectomy tube, only oral cavity.

## 2021-02-27 NOTE — PROVIDER CONTACT NOTE (OTHER) - REASON
Increased left sided facial swelling and lip swelling
Pt leaking blood from the mouth and nose after coughing

## 2021-02-27 NOTE — PROVIDER CONTACT NOTE (OTHER) - SITUATION
Patient has increased asymetrical facial swelling and lip swelling.
Pt leaking blood from the mouth and nose after coughing. Upon suctioning abdirahman tube, patient coughed up thick sputum from the abdirahman tube and then blood started leaking out of the mouth and nose.

## 2021-02-27 NOTE — PROVIDER CONTACT NOTE (OTHER) - ACTION/TREATMENT ORDERED:
Same as above and continue monitoring patient. No intervention beyond suctioning and abdirahman and oral care. As per ENT , as long as patient is not bleeding from laryngeal tube, no major concern.
Continue to monitor.  Per ENT team, this observation is part of patient's disease progression.

## 2021-02-27 NOTE — PROGRESS NOTE ADULT - SUBJECTIVE AND OBJECTIVE BOX
HPI: 64 y/o male h/o Laryngeal cancer s/p laryngectomy and trach/PEG, HTN, hypothyroidism, currently receiving cetuximab treatment, now with dyspnea 2/2 granulation tissue down trachea. Patient sent to [place] from nursing home and then trasferred to [facility] 2/23 overnight.     Hospital course:  2/24: Patient admitted overnight. ENT called and exchanged trach to 6CFS. Patient is stabilized without dyspnea now and laryngectomy cleared of crusting and secretions. Continued facial lymphedema and new facial rash consistent possibly with cetuximab toxicity rash.   2/25: No acute events overnight.  Laryngectomy button placed in stoma. Neck cellulitis inproving.   2/26: No acute events overnight. Patient switched to size 8/55 laryngectomy tube coated with hydrocortisone. Stoma more patent today.   2/27: No acute events overnight. Continues to have mild oropharyngeal bleeding. Facial edema worsening today. Will discuss with palliative for hospice discharge.     ICU Vital Signs Last 24 Hrs  T(C): 36.8 (27 Feb 2021 06:10), Max: 37.2 (26 Feb 2021 09:41)  T(F): 98.3 (27 Feb 2021 06:10), Max: 98.9 (26 Feb 2021 09:41)  HR: 64 (27 Feb 2021 04:40) (64 - 72)  BP: 99/60 (27 Feb 2021 04:40) (88/54 - 118/66)  BP(mean): 74 (27 Feb 2021 04:40) (67 - 87)  ABP: --  ABP(mean): --  RR: 18 (27 Feb 2021 04:40) (17 - 20)  SpO2: 100% (27 Feb 2021 04:40) (97% - 100%)      Physical Exam:  General- A&O x 2-3, laying comfortably in bed, NAD  Head- atraumatic, lymphedema L>R   ENT- no nasal secretions, no epistaxis, laryngectomy patent with 6CFS in place without bleeding around the laryngectomy  Skin- facial rash erythematous and papular    MEDICATIONS  (STANDING):  acetylcysteine 10%  Inhalation 5 milliLiter(s) Inhalation three times a day  albuterol/ipratropium for Nebulization 3 milliLiter(s) Nebulizer every 6 hours  calcium carbonate   Suspension 1250 milliGRAM(s) Enteral Tube daily  cefTRIAXone   IVPB 1000 milliGRAM(s) IV Intermittent every 24 hours  chlorhexidine 4% Liquid 1 Application(s) Topical <User Schedule>  ciprofloxacin/dexamethasone Suspension Otic 2 Drop(s) IntraTracheal two times a day  enoxaparin Injectable 40 milliGRAM(s) SubCutaneous every 24 hours  levothyroxine 175 MICROGram(s) Enteral Tube daily  methadone  Concentrate 10 milliGRAM(s) Oral every 8 hours  metroNIDAZOLE    Tablet 500 milliGRAM(s) Oral every 8 hours  pantoprazole  Injectable 40 milliGRAM(s) IV Push daily  potassium chloride    Tablet ER 20 milliEquivalent(s) Oral once  sodium chloride 0.9%. 1000 milliLiter(s) (100 mL/Hr) IV Continuous <Continuous>  sodium chloride 3%  Inhalation 10 milliLiter(s) Inhalation every 6 hours    MEDICATIONS  (PRN):

## 2021-02-28 RX ADMIN — Medication 3 MILLILITER(S): at 04:45

## 2021-02-28 RX ADMIN — SODIUM CHLORIDE 100 MILLILITER(S): 9 INJECTION INTRAMUSCULAR; INTRAVENOUS; SUBCUTANEOUS at 22:46

## 2021-02-28 RX ADMIN — METHADONE HYDROCHLORIDE 10 MILLIGRAM(S): 40 TABLET ORAL at 22:02

## 2021-02-28 RX ADMIN — SODIUM CHLORIDE 10 MILLILITER(S): 9 INJECTION INTRAMUSCULAR; INTRAVENOUS; SUBCUTANEOUS at 00:13

## 2021-02-28 RX ADMIN — SODIUM CHLORIDE 10 MILLILITER(S): 9 INJECTION INTRAMUSCULAR; INTRAVENOUS; SUBCUTANEOUS at 17:13

## 2021-02-28 RX ADMIN — Medication 1 APPLICATION(S): at 09:44

## 2021-02-28 RX ADMIN — Medication 650 MILLIGRAM(S): at 18:43

## 2021-02-28 RX ADMIN — SODIUM CHLORIDE 10 MILLILITER(S): 9 INJECTION INTRAMUSCULAR; INTRAVENOUS; SUBCUTANEOUS at 09:45

## 2021-02-28 RX ADMIN — Medication 5 MILLILITER(S): at 01:52

## 2021-02-28 RX ADMIN — Medication 500 MILLIGRAM(S): at 13:38

## 2021-02-28 RX ADMIN — Medication 1250 MILLIGRAM(S): at 09:43

## 2021-02-28 RX ADMIN — CHLORHEXIDINE GLUCONATE 1 APPLICATION(S): 213 SOLUTION TOPICAL at 06:05

## 2021-02-28 RX ADMIN — Medication 3 MILLILITER(S): at 15:45

## 2021-02-28 RX ADMIN — Medication 5 MILLILITER(S): at 09:43

## 2021-02-28 RX ADMIN — Medication 500 MILLIGRAM(S): at 05:36

## 2021-02-28 RX ADMIN — Medication 3 MILLILITER(S): at 09:44

## 2021-02-28 RX ADMIN — ENOXAPARIN SODIUM 40 MILLIGRAM(S): 100 INJECTION SUBCUTANEOUS at 22:02

## 2021-02-28 RX ADMIN — Medication 500 MILLIGRAM(S): at 22:02

## 2021-02-28 RX ADMIN — METHADONE HYDROCHLORIDE 10 MILLIGRAM(S): 40 TABLET ORAL at 06:05

## 2021-02-28 RX ADMIN — OXYCODONE HYDROCHLORIDE 10 MILLIGRAM(S): 5 TABLET ORAL at 01:30

## 2021-02-28 RX ADMIN — MUPIROCIN 1 APPLICATION(S): 20 OINTMENT TOPICAL at 05:44

## 2021-02-28 RX ADMIN — OXYCODONE HYDROCHLORIDE 10 MILLIGRAM(S): 5 TABLET ORAL at 09:44

## 2021-02-28 RX ADMIN — Medication 5 MILLILITER(S): at 16:03

## 2021-02-28 RX ADMIN — MUPIROCIN 1 APPLICATION(S): 20 OINTMENT TOPICAL at 16:00

## 2021-02-28 RX ADMIN — Medication 3 MILLILITER(S): at 22:02

## 2021-02-28 RX ADMIN — OXYCODONE HYDROCHLORIDE 10 MILLIGRAM(S): 5 TABLET ORAL at 18:43

## 2021-02-28 RX ADMIN — METHADONE HYDROCHLORIDE 10 MILLIGRAM(S): 40 TABLET ORAL at 16:06

## 2021-02-28 RX ADMIN — PANTOPRAZOLE SODIUM 40 MILLIGRAM(S): 20 TABLET, DELAYED RELEASE ORAL at 09:44

## 2021-02-28 RX ADMIN — SODIUM CHLORIDE 10 MILLILITER(S): 9 INJECTION INTRAMUSCULAR; INTRAVENOUS; SUBCUTANEOUS at 23:52

## 2021-02-28 RX ADMIN — SODIUM CHLORIDE 10 MILLILITER(S): 9 INJECTION INTRAMUSCULAR; INTRAVENOUS; SUBCUTANEOUS at 06:00

## 2021-02-28 RX ADMIN — CEFTRIAXONE 100 MILLIGRAM(S): 500 INJECTION, POWDER, FOR SOLUTION INTRAMUSCULAR; INTRAVENOUS at 09:43

## 2021-02-28 RX ADMIN — Medication 175 MICROGRAM(S): at 06:04

## 2021-02-28 NOTE — PROGRESS NOTE ADULT - ASSESSMENT
66 y/o male h/o Laryngeal cancer s/p laryngectomy and trach/PEG, HTN, hypothyroidism, currently receiving cetuximab treatment, now with dyspnea 2/2 granulation tissue down trachea.    -Broad spectrum antibiotics  -Ciprodex nebulized if possible, if not, continued drops via laryngectomy   -methadone  -pain control  -routine tracheostomy care  -appreciate derm recs  -appreciate SLP rosa maria recs  -appreciate palliative recs

## 2021-02-28 NOTE — PROGRESS NOTE ADULT - SUBJECTIVE AND OBJECTIVE BOX
HPI: 64 y/o male h/o Laryngeal cancer s/p laryngectomy and trach/PEG, HTN, hypothyroidism, currently receiving cetuximab treatment, now with dyspnea 2/2 granulation tissue down trachea. Patient sent to [place] from nursing home and then trasferred to [facility] 2/23 overnight.     Hospital course:  2/24: Patient admitted overnight. ENT called and exchanged trach to 6CFS. Patient is stabilized without dyspnea now and laryngectomy cleared of crusting and secretions. Continued facial lymphedema and new facial rash consistent possibly with cetuximab toxicity rash.   2/25: No acute events overnight.  Laryngectomy button placed in stoma. Neck cellulitis inproving.   2/26: No acute events overnight. Patient switched to size 8/55 laryngectomy tube coated with hydrocortisone. Stoma more patent today.   2/27: No acute events overnight. Continues to have mild oropharyngeal bleeding. Facial edema worsening today. Will discuss with palliative for hospice discharge.   2/28: No acute events overnight, oropharyngeal bleeding decreased. Facial edema worsening. Facial rash improving. Palliative to have family discussion with patients family tomorrow. Per social work, anticipated discharge to hospice starting tomorrow.       ICU Vital Signs Last 24 Hrs  T(C): 36.8 (28 Feb 2021 05:55), Max: 36.9 (27 Feb 2021 14:11)  T(F): 98.2 (28 Feb 2021 05:55), Max: 98.5 (27 Feb 2021 14:11)  HR: 65 (28 Feb 2021 05:55) (62 - 72)  BP: 111/- (28 Feb 2021 05:55) (100/59 - 136/67)  BP(mean): 69 (28 Feb 2021 05:55) (69 - 94)  ABP: --  ABP(mean): --  RR: 18 (28 Feb 2021 05:55) (18 - 20)  SpO2: 100% (28 Feb 2021 05:55) (96% - 100%)      Physical Exam:  General- A&O x 2-3, laying comfortably in bed, NAD  Head- atraumatic, lymphedema increased  ENT- no nasal secretions, no epistaxis, laryngectomy patent with 6CFS in place without bleeding around the laryngectomy  Skin- facial rash erythematous and papular    MEDICATIONS  (STANDING):  acetylcysteine 10%  Inhalation 5 milliLiter(s) Inhalation three times a day  albuterol/ipratropium for Nebulization 3 milliLiter(s) Nebulizer every 6 hours  calcium carbonate   Suspension 1250 milliGRAM(s) Enteral Tube daily  cefTRIAXone   IVPB 1000 milliGRAM(s) IV Intermittent every 24 hours  chlorhexidine 4% Liquid 1 Application(s) Topical <User Schedule>  ciprofloxacin/dexamethasone Suspension Otic 2 Drop(s) IntraTracheal two times a day  enoxaparin Injectable 40 milliGRAM(s) SubCutaneous every 24 hours  levothyroxine 175 MICROGram(s) Enteral Tube daily  methadone  Concentrate 10 milliGRAM(s) Oral every 8 hours  metroNIDAZOLE    Tablet 500 milliGRAM(s) Oral every 8 hours  pantoprazole  Injectable 40 milliGRAM(s) IV Push daily  potassium chloride    Tablet ER 20 milliEquivalent(s) Oral once  sodium chloride 0.9%. 1000 milliLiter(s) (100 mL/Hr) IV Continuous <Continuous>  sodium chloride 3%  Inhalation 10 milliLiter(s) Inhalation every 6 hours

## 2021-03-01 DIAGNOSIS — R13.10 DYSPHAGIA, UNSPECIFIED: ICD-10-CM

## 2021-03-01 DIAGNOSIS — E44.0 MODERATE PROTEIN-CALORIE MALNUTRITION: ICD-10-CM

## 2021-03-01 DIAGNOSIS — C32.9 MALIGNANT NEOPLASM OF LARYNX, UNSPECIFIED: ICD-10-CM

## 2021-03-01 DIAGNOSIS — Z79.899 OTHER LONG TERM (CURRENT) DRUG THERAPY: ICD-10-CM
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
## 2021-03-01 DIAGNOSIS — J38.7 OTHER DISEASES OF LARYNX: ICD-10-CM

## 2021-03-01 DIAGNOSIS — R52 PAIN, UNSPECIFIED: ICD-10-CM

## 2021-03-01 DIAGNOSIS — Z79.891 LONG TERM (CURRENT) USE OF OPIATE ANALGESIC: ICD-10-CM

## 2021-03-01 DIAGNOSIS — J95.03 MALFUNCTION OF TRACHEOSTOMY STOMA: ICD-10-CM

## 2021-03-01 DIAGNOSIS — Z51.5 ENCOUNTER FOR PALLIATIVE CARE: ICD-10-CM
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
## 2021-03-01 DIAGNOSIS — Z88.0 ALLERGY STATUS TO PENICILLIN: ICD-10-CM

## 2021-03-01 DIAGNOSIS — Z71.89 OTHER SPECIFIED COUNSELING: ICD-10-CM

## 2021-03-01 PROCEDURE — 99223 1ST HOSP IP/OBS HIGH 75: CPT

## 2021-03-01 RX ORDER — POLYETHYLENE GLYCOL 3350 17 G/17G
17 POWDER, FOR SOLUTION ORAL DAILY
Refills: 0 | Status: DISCONTINUED | OUTPATIENT
Start: 2021-03-01 | End: 2021-03-04

## 2021-03-01 RX ADMIN — Medication 500 MILLIGRAM(S): at 06:35

## 2021-03-01 RX ADMIN — MUPIROCIN 1 APPLICATION(S): 20 OINTMENT TOPICAL at 18:38

## 2021-03-01 RX ADMIN — Medication 3 MILLILITER(S): at 22:59

## 2021-03-01 RX ADMIN — METHADONE HYDROCHLORIDE 10 MILLIGRAM(S): 40 TABLET ORAL at 06:35

## 2021-03-01 RX ADMIN — METHADONE HYDROCHLORIDE 10 MILLIGRAM(S): 40 TABLET ORAL at 22:59

## 2021-03-01 RX ADMIN — OXYCODONE HYDROCHLORIDE 10 MILLIGRAM(S): 5 TABLET ORAL at 10:29

## 2021-03-01 RX ADMIN — Medication 1 APPLICATION(S): at 13:39

## 2021-03-01 RX ADMIN — SODIUM CHLORIDE 10 MILLILITER(S): 9 INJECTION INTRAMUSCULAR; INTRAVENOUS; SUBCUTANEOUS at 05:27

## 2021-03-01 RX ADMIN — POLYETHYLENE GLYCOL 3350 17 GRAM(S): 17 POWDER, FOR SOLUTION ORAL at 17:58

## 2021-03-01 RX ADMIN — OXYCODONE HYDROCHLORIDE 10 MILLIGRAM(S): 5 TABLET ORAL at 18:38

## 2021-03-01 RX ADMIN — Medication 3 MILLILITER(S): at 05:26

## 2021-03-01 RX ADMIN — SODIUM CHLORIDE 10 MILLILITER(S): 9 INJECTION INTRAMUSCULAR; INTRAVENOUS; SUBCUTANEOUS at 17:59

## 2021-03-01 RX ADMIN — PANTOPRAZOLE SODIUM 40 MILLIGRAM(S): 20 TABLET, DELAYED RELEASE ORAL at 12:50

## 2021-03-01 RX ADMIN — CHLORHEXIDINE GLUCONATE 1 APPLICATION(S): 213 SOLUTION TOPICAL at 06:35

## 2021-03-01 RX ADMIN — METHADONE HYDROCHLORIDE 10 MILLIGRAM(S): 40 TABLET ORAL at 16:26

## 2021-03-01 RX ADMIN — Medication 650 MILLIGRAM(S): at 00:42

## 2021-03-01 RX ADMIN — Medication 1250 MILLIGRAM(S): at 10:29

## 2021-03-01 RX ADMIN — ENOXAPARIN SODIUM 40 MILLIGRAM(S): 100 INJECTION SUBCUTANEOUS at 22:59

## 2021-03-01 RX ADMIN — Medication 500 MILLIGRAM(S): at 16:26

## 2021-03-01 RX ADMIN — Medication 175 MICROGRAM(S): at 06:35

## 2021-03-01 RX ADMIN — SODIUM CHLORIDE 10 MILLILITER(S): 9 INJECTION INTRAMUSCULAR; INTRAVENOUS; SUBCUTANEOUS at 12:50

## 2021-03-01 RX ADMIN — Medication 3 MILLILITER(S): at 10:29

## 2021-03-01 RX ADMIN — Medication 3 MILLILITER(S): at 16:31

## 2021-03-01 RX ADMIN — MUPIROCIN 1 APPLICATION(S): 20 OINTMENT TOPICAL at 06:35

## 2021-03-01 NOTE — CONSULT NOTE ADULT - PROBLEM SELECTOR RECOMMENDATION 3
-family meeting tomorrow 1:30 p with ENT team, pt, cousin Any, palliative NP and SW  -discuss goc, hospice   -pt cannot be discharged back to his apartment with his brother, who is mentally ill   -palliative sw following to assist with dispo to hospice residence   -pt does NOT qualify for inpatient hospice -Contact, cousinAny #[phone]  -family meeting tomorrow 1:30 p with ENT team, pt, cousin Any, palliative NP and SW  -discuss goc, hospice   -pt cannot be discharged with home hospice back to his apartment with his brother who is mentally ill and unable to care for pt   -pt does NOT qualify for inpatient hospice  -palliative sw following to assist with dispo to hospice residence

## 2021-03-01 NOTE — CONSULT NOTE ADULT - PROBLEM SELECTOR RECOMMENDATION 4
66 yo M with laryngeal cancer electing to pursue hospice  -family meeting tomorrow 1:30  -symptom management as above  -rest of care per ENT, primary team  -palliative sw following to assist with dispo   -will continue to follow with you 66 yo M with laryngeal cancer with high symptom burden electing to pursue hospice  -family meeting tomorrow 1:30p  -symptom management as above  -rest of care per ENT, primary team  -palliative sw following to assist with dispo   -will continue to follow with you

## 2021-03-01 NOTE — CONSULT NOTE ADULT - PROBLEM SELECTOR RECOMMENDATION 9
-cw methadone via peg 10 mg q8h ATC  -monitor qtc, 2/23 qtc 443  -cw tylenol 650 mg vie peg q6 PRN  -cw oxycodone IR 10 mg via peg controlled  -cw methadone via peg 10 mg q8h ATC  -monitor qtc, 2/23 qtc 443  -cw tylenol 650 mg vie peg q6 PRN  -cw oxycodone IR 10 mg via peg controlled  -cw methadone via peg 10 mg q8h ATC  -monitor qtc, 2/23 qtc 443  -recommend scheduling tylenol 650 mg via peg q6 PRN  -cw oxycodone IR 10 mg via peg q8 PRN

## 2021-03-01 NOTE — CONSULT NOTE ADULT - ASSESSMENT
incomplete  66 y/o male h/o Laryngeal cancer s/p laryngectomy, trach/PEG, HTN, hypothyroidism, currently recently received cetuximab, admitted 2/23 dyspnea, with granulation tissue down trachea. Pt expressing interest in hospice. Palliative care consulted to assist with goc.  incomplete 66 y/o male h/o Laryngeal cancer s/p laryngectomy, trach/PEG, HTN, hypothyroidism, currently recently received cetuximab, admitted 2/23 dyspnea, with granulation tissue down trachea. Pt expressing interest in hospice. Palliative care consulted to assist with goc.

## 2021-03-01 NOTE — CONSULT NOTE ADULT - SUBJECTIVE AND OBJECTIVE BOX
HPI:  66 y/o male h/o Laryngeal cancer s/p laryngectomy and trach/PEG, HTN, hypothyroidism, currently receiving cetuximab treatment, now with dyspnea 2/2 granulation tissue down trachea. Patient was recently discharged to nursing home, and went to [place] yesterday for tracheal and nasal bleeding. Had a 4CFS placed at that time and a CT neck. He was subsequently transferred to SICU [facility] for continued management. Patient evaluated at bedside, found to have granulation tissue at distal tip of tracheostomy tube that was 99% occluded. Tracheostomy change at bedside to a 6CFS trach, tracheoscopy still showing persistent granulation at distal tip, but with 95% occlusion. Soft suction catheter passes easily through trach. Patient also presents with ulcerated lesion at left neck and cellulitic changes. Also with new papular rash on face.    (24 Feb 2021 06:28)      PAST MEDICAL & SURGICAL HISTORY:  Hypothyroidism    HTN (hypertension)    Larynx cancer    Laryngeal mass  s/ p radiation, trach    PEG (percutaneous endoscopic gastrostomy) status    H/O laryngectomy    H/O thyroidectomy    H/O tracheostomy    Surgery, elective  direct laryngoscopy with biopsy- 4/2017    Status post surgery  inguinal hernia    Status post surgery  Left ankle surgical repair  x2 (1965)        FAMILY HISTORY:  No pertinent family history in first degree relatives     Reviewed and found non contributory in mother or father    SOCIAL HISTORY:     ROS:    Unable to attain due to:                      Dyspnea (Agustin 0-10):                        N/V:                              Secretions:                Agitation:   Constipation:  Pain:       -Location:  -Radiating:  -Quality:  -Duration: and Frequency:  -Provocation  -Palliation:  -24h worst intensity:  -24h least intensity:  -Current intensity:  -Level of pain acceptable:  -Impact on ADLs:    All other ROS negative unless specified above.     Allergies    penicillin (Rash)    Intolerances      Opiate Naive (Y/N):   -iStop reviewed (Y/N): Yes.   No Rx found on iStop review (Ref#:           )    Medications:      MEDICATIONS  (STANDING):  albuterol/ipratropium for Nebulization 3 milliLiter(s) Nebulizer every 6 hours  calcium carbonate   Suspension 1250 milliGRAM(s) Enteral Tube daily  chlorhexidine 4% Liquid 1 Application(s) Topical <User Schedule>  Ciprofloxacin 0.3% Ophth Drops 5 Drop(s) 5 Drop(s) Nebulizer two times a day  dexAMETHasone 0.1% Ophth Drops 5 Drop(s) 5 Drop(s) Nebulizer two times a day  enoxaparin Injectable 40 milliGRAM(s) SubCutaneous every 24 hours  hydrocortisone 2.5% Ointment 1 Application(s) Topical daily  levothyroxine 175 MICROGram(s) Enteral Tube daily  methadone  Concentrate 10 milliGRAM(s) Oral every 8 hours  metroNIDAZOLE    Tablet 500 milliGRAM(s) Oral every 8 hours  mupirocin 2% Ointment 1 Application(s) Topical two times a day  oxymetazoline 0.05% Nasal Spray 2 Spray(s) Alternating Nostrils once  pantoprazole  Injectable 40 milliGRAM(s) IV Push daily  sodium chloride 0.9%. 1000 milliLiter(s) (100 mL/Hr) IV Continuous <Continuous>  sodium chloride 3%  Inhalation 10 milliLiter(s) Inhalation every 6 hours    MEDICATIONS  (PRN):  acetaminophen    Suspension .. 650 milliGRAM(s) Oral every 6 hours PRN Mild Pain (1 - 3)  oxyCODONE    Solution 10 milliGRAM(s) Oral every 8 hours PRN Severe Pain (7 - 10)      Labs:    CBC:    CMP:         Albumin, Serum: 2.9 g/dL (02-23-21 @ 20:07)           Imaging:  Reviewed    PEx:  T(C): 36.2 (03-01-21 @ 09:27), Max: 36.8 (02-28-21 @ 22:20)  HR: 64 (03-01-21 @ 08:26) (62 - 68)  BP: 128/69 (03-01-21 @ 08:26) (93/52 - 128/69)  RR: 18 (03-01-21 @ 08:26) (17 - 18)  SpO2: 87% (03-01-21 @ 08:26) (87% - 100%)  Wt(kg): --    General:   HEENT:  atraumatic, nonicteric sclera  Neck: no central lines, supple  CVS: s1,s2  Resp:   GI:    :    Musc:     Neuro:   Psych:     Skin:  Lymph:  Preadmit Karnofsky:  %             Current Karnofsky:     %  http://www.npcrc.org/files/news/karnofsky_performance_scale.pdf   http://www.npcrc.org/files/news/palliative_performance_scale_PPSv2.pdf      BMI:  Wt:  Cachexia (Y/N):     Decision maker: The patient is able to participate in complex medical decision making conversations.   Legal surrogate:    ADVANCE DIRECTIVES  - Full Code  - DNR/DNI  - MOLST not on file in [facility]    GOALS OF CARE DISCUSSION  - Palliative care info/support provided	      - Documentation of GOC: 	    What is most important to patient:  What worries patient the most:  Is patient at peace?:    REFERRALS: HPI:  66 y/o male h/o Laryngeal cancer s/p laryngectomy and trach/PEG, HTN, hypothyroidism, currently receiving cetuximab treatment, now with dyspnea 2/2 granulation tissue down trachea. Patient was recently discharged to nursing home, and went to [place] yesterday for tracheal and nasal bleeding. Had a 4CFS placed at that time and a CT neck. He was subsequently transferred to SICU [facility] for continued management. Patient evaluated at bedside, found to have granulation tissue at distal tip of tracheostomy tube that was 99% occluded. Tracheostomy change at bedside to a 6CFS trach, tracheoscopy still showing persistent granulation at distal tip, but with 95% occlusion. Soft suction catheter passes easily through trach. Patient also presents with ulcerated lesion at left neck and cellulitic changes. Also with new papular rash on face.    (24 Feb 2021 06:28)    PAST MEDICAL & SURGICAL HISTORY:  Hypothyroidism  HTN (hypertension)  Larynx cancer  Laryngeal mass s/ p radiation, trach  PEG (percutaneous endoscopic gastrostomy) status  H/O laryngectomy  H/O thyroidectomy  H/O tracheostomy  Surgery, elective  direct laryngoscopy with biopsy- 4/2017  Status post surgery  inguinal hernia    Status post surgery  Left ankle surgical repair  x2 (1965)    FAMILY HISTORY:  Reviewed and found non contributory in mother or father    SOCIAL HISTORY: former  at [employer]. lives in an apartment, his brother lives with him. per family, brother with hx of mental illness, active smoker in the apt and unable to care for pt.     ROS:                    Dyspnea (Agustin 0-10):          0 at rest with               N/V:         n                     Secretions:            y    Agitation: n  Constipation: lbm 2/27  Pain:     denies at visit, hx of pain to face, neck, trach site    All other ROS negative unless specified above.   Allergies  penicillin (Rash)  Intolerances    Opiate Naive (Y/N):  N  -iStop reviewed (Y/N): Yes.   ativan, fentanyl oxycodone, methadone, Rx found on iStop review (Ref#:  [id number]    Medications:      MEDICATIONS  (STANDING):  albuterol/ipratropium for Nebulization 3 milliLiter(s) Nebulizer every 6 hours  calcium carbonate   Suspension 1250 milliGRAM(s) Enteral Tube daily  chlorhexidine 4% Liquid 1 Application(s) Topical <User Schedule>  Ciprofloxacin 0.3% Ophth Drops 5 Drop(s) 5 Drop(s) Nebulizer two times a day  dexAMETHasone 0.1% Ophth Drops 5 Drop(s) 5 Drop(s) Nebulizer two times a day  enoxaparin Injectable 40 milliGRAM(s) SubCutaneous every 24 hours  hydrocortisone 2.5% Ointment 1 Application(s) Topical daily  levothyroxine 175 MICROGram(s) Enteral Tube daily  methadone  Concentrate 10 milliGRAM(s) Oral every 8 hours  metroNIDAZOLE    Tablet 500 milliGRAM(s) Oral every 8 hours  mupirocin 2% Ointment 1 Application(s) Topical two times a day  oxymetazoline 0.05% Nasal Spray 2 Spray(s) Alternating Nostrils once  pantoprazole  Injectable 40 milliGRAM(s) IV Push daily  sodium chloride 0.9%. 1000 milliLiter(s) (100 mL/Hr) IV Continuous <Continuous>  sodium chloride 3%  Inhalation 10 milliLiter(s) Inhalation every 6 hours    MEDICATIONS  (PRN):  acetaminophen    Suspension .. 650 milliGRAM(s) Oral every 6 hours PRN Mild Pain (1 - 3)  oxyCODONE    Solution 10 milliGRAM(s) Oral every 8 hours PRN Severe Pain (7 - 10)    Labs:    CBC:    CMP:       Albumin, Serum: 2.9 g/dL (02-23-21 @ 20:07)    Imaging:  Reviewed    PEx:  T(C): 36.2 (03-01-21 @ 09:27), Max: 36.8 (02-28-21 @ 22:20)  HR: 64 (03-01-21 @ 08:26) (62 - 68)  BP: 128/69 (03-01-21 @ 08:26) (93/52 - 128/69)  RR: 18 (03-01-21 @ 08:26) (17 - 18)  SpO2: 87% (03-01-21 @ 08:26) (87% - 100%)  Wt(kg): --    General: ill appearing man, sitting up in bed, nad, fatigued   HEENT:  nonicteric sclera, lymphedema L>R, periorbital edema L>R, + trach  Neck: no central lines, supple  CVS: s1,s2, tele  Resp: unlabored, fair inspiratory effort   GI:  peg  bsx4  Musc:   no cyanosis, generalized muscle wasting   Neuro: alert oriented x3,  Psych:   calm  Skin: erythematous papular facial rash, ulcerated lesion at left neck and cellulitic changes  Lymph: edema as above  Preadmit Karnofsky: 50 %             Current Karnofsky:   40  %  http://www.npcrc.org/files/news/karnofsky_performance_scale.pdf   http://www.npcrc.org/files/news/palliative_performance_scale_PPSv2.pdf    Decision maker: The patient is able to participate in complex medical decision making conversations.   Legal surrogate: his cousin Any Martin #[phone]    ADVANCE DIRECTIVES  - Full Code  GOALS OF CARE DISCUSSION  - Palliative care info/support provided	      -family meeting tomorrow 1:30 pm with ENT, palliative, pt and family     What is most important to patient: discussing home hospice at family meeting tomorrow   What worries patient the most: pt afraid of dying   HPI:  64 y/o male h/o Laryngeal cancer s/p laryngectomy and trach/PEG, HTN, hypothyroidism, currently receiving cetuximab treatment, now with dyspnea 2/2 granulation tissue down trachea. Patient was recently discharged to nursing home, and went to [place] yesterday for tracheal and nasal bleeding. Had a 4CFS placed at that time and a CT neck. He was subsequently transferred to SICU [facility] for continued management. Patient evaluated at bedside, found to have granulation tissue at distal tip of tracheostomy tube that was 99% occluded. Tracheostomy change at bedside to a 6CFS trach, tracheoscopy still showing persistent granulation at distal tip, but with 95% occlusion. Soft suction catheter passes easily through trach. Patient also presents with ulcerated lesion at left neck and cellulitic changes. Also with new papular rash on face.    (24 Feb 2021 06:28)    PAST MEDICAL & SURGICAL HISTORY:  Hypothyroidism  HTN (hypertension)  Larynx cancer  Laryngeal mass s/ p radiation, trach  PEG (percutaneous endoscopic gastrostomy) status  H/O laryngectomy  H/O thyroidectomy  H/O tracheostomy  Surgery, elective  direct laryngoscopy with biopsy- 4/2017  Status post surgery  inguinal hernia    Status post surgery  Left ankle surgical repair  x2 (1965)    FAMILY HISTORY:  Reviewed and found non contributory in mother or father    SOCIAL HISTORY: former  at [employer]. lives in an apartment, his brother lives with him. per family, brother with hx of mental illness, active smoker in the apt and unable to care for pt.     ROS:                    Dyspnea (Agustin 0-10):          0 at rest with supplemental oxygen        N/V:         n                     Secretions:            y    Agitation: n  Constipation: lbm 2/27  Pain:     denies at visit, hx of pain to face, neck, trach site    All other ROS negative unless specified above.   Allergies  penicillin (Rash)  Intolerances    Opiate Naive (Y/N):  N  -iStop reviewed (Y/N): Yes.   ativan, fentanyl oxycodone, methadone, Rx found on iStop review (Ref#:  [id number]    Medications:      MEDICATIONS  (STANDING):  albuterol/ipratropium for Nebulization 3 milliLiter(s) Nebulizer every 6 hours  calcium carbonate   Suspension 1250 milliGRAM(s) Enteral Tube daily  chlorhexidine 4% Liquid 1 Application(s) Topical <User Schedule>  Ciprofloxacin 0.3% Ophth Drops 5 Drop(s) 5 Drop(s) Nebulizer two times a day  dexAMETHasone 0.1% Ophth Drops 5 Drop(s) 5 Drop(s) Nebulizer two times a day  enoxaparin Injectable 40 milliGRAM(s) SubCutaneous every 24 hours  hydrocortisone 2.5% Ointment 1 Application(s) Topical daily  levothyroxine 175 MICROGram(s) Enteral Tube daily  methadone  Concentrate 10 milliGRAM(s) Oral every 8 hours  metroNIDAZOLE    Tablet 500 milliGRAM(s) Oral every 8 hours  mupirocin 2% Ointment 1 Application(s) Topical two times a day  oxymetazoline 0.05% Nasal Spray 2 Spray(s) Alternating Nostrils once  pantoprazole  Injectable 40 milliGRAM(s) IV Push daily  sodium chloride 0.9%. 1000 milliLiter(s) (100 mL/Hr) IV Continuous <Continuous>  sodium chloride 3%  Inhalation 10 milliLiter(s) Inhalation every 6 hours    MEDICATIONS  (PRN):  acetaminophen    Suspension .. 650 milliGRAM(s) Oral every 6 hours PRN Mild Pain (1 - 3)  oxyCODONE    Solution 10 milliGRAM(s) Oral every 8 hours PRN Severe Pain (7 - 10)    Labs:    CBC:    CMP:       Albumin, Serum: 2.9 g/dL (02-23-21 @ 20:07)    Imaging:  Reviewed    PEx:  T(C): 36.2 (03-01-21 @ 09:27), Max: 36.8 (02-28-21 @ 22:20)  HR: 64 (03-01-21 @ 08:26) (62 - 68)  BP: 128/69 (03-01-21 @ 08:26) (93/52 - 128/69)  RR: 18 (03-01-21 @ 08:26) (17 - 18)  SpO2: 87% (03-01-21 @ 08:26) (87% - 100%)  Wt(kg): --    General: ill appearing man, sitting up in bed, nad, fatigued   HEENT:  nonicteric sclera, lymphedema L>R, periorbital edema L>R, + trach  Neck: no central lines, supple  CVS: s1,s2, tele  Resp: unlabored, fair inspiratory effort   GI:  peg  bsx4  Musc:   no cyanosis, generalized muscle wasting   Neuro: alert oriented x3,  Psych:   calm  Skin: erythematous papular facial rash, ulcerated lesion at left neck and cellulitic changes  Lymph: edema as above  Preadmit Karnofsky: 50 %             Current Karnofsky:   40  %  http://www.npcrc.org/files/news/karnofsky_performance_scale.pdf   http://www.npcrc.org/files/news/palliative_performance_scale_PPSv2.pdf    Decision maker: The patient is able to participate in complex medical decision making conversations.   Legal surrogate: his cousin Any Martin #[phone]    ADVANCE DIRECTIVES  - Full Code    GOALS OF CARE DISCUSSION  - Palliative care info/support provided	      - family meeting tomorrow 1:30 pm with ENT, palliative, pt and family     What is most important to patient: discussing home hospice at family meeting tomorrow   What worries patient the most: dying process, symptoms

## 2021-03-01 NOTE — PROGRESS NOTE ADULT - SUBJECTIVE AND OBJECTIVE BOX
HPI: 64 y/o male h/o Laryngeal cancer s/p laryngectomy and trach/PEG, HTN, hypothyroidism, currently receiving cetuximab treatment, now with dyspnea 2/2 granulation tissue down trachea. Patient sent to [place] from nursing home and then trasferred to [facility] 2/23 overnight.     Hospital course:  2/24: Patient admitted overnight. ENT called and exchanged trach to 6CFS. Patient is stabilized without dyspnea now and laryngectomy cleared of crusting and secretions. Continued facial lymphedema and new facial rash consistent possibly with cetuximab toxicity rash.   2/25: No acute events overnight.  Laryngectomy button placed in stoma. Neck cellulitis inproving.   2/26: No acute events overnight. Patient switched to size 8/55 laryngectomy tube coated with hydrocortisone. Stoma more patent today.   2/27: No acute events overnight. Continues to have mild oropharyngeal bleeding. Facial edema worsening today. Will discuss with palliative for hospice discharge.   2/28: No acute events overnight, oropharyngeal bleeding decreased. Facial edema worsening. Facial rash improving. Palliative to have family discussion with patients family tomorrow. Per social work, anticipated discharge to hospice starting tomorrow.   3/1: [place]. still has facial edema. per pall care, for hospice. will be arranged.    Vital Signs Last 24 Hrs  T(C): 36.7 (01 Mar 2021 14:00), Max: 36.8 (28 Feb 2021 22:20)  T(F): 98.1 (01 Mar 2021 14:00), Max: 98.3 (28 Feb 2021 22:20)  HR: 70 (01 Mar 2021 15:08) (62 - 70)  BP: 120/78 (01 Mar 2021 15:08) (93/52 - 128/69)  BP(mean): 94 (01 Mar 2021 15:08) (67 - 94)  RR: 18 (01 Mar 2021 15:08) (17 - 18)  SpO2: 100% (01 Mar 2021 15:08) (87% - 100%)      Physical Exam:  General- A&O x 2-3, laying comfortably in bed, NAD  Head- atraumatic, lymphedema increased  ENT- no nasal secretions, no epistaxis, laryngectomy patent with 6CFS in place without bleeding around the laryngectomy  Skin- facial rash erythematous and papular      64 y/o male h/o Laryngeal cancer s/p laryngectomy and trach/PEG, HTN, hypothyroidism, currently receiving cetuximab treatment, now with dyspnea 2/2 granulation tissue down trachea.    -Broad spectrum antibiotics  -Ciprodex nebulized if possible, if not, continued drops via laryngectomy   -methadone  -pain control  -routine tracheostomy care  -appreciate derm recs  -appreciate SLP larytube recs  -appreciate palliative recs

## 2021-03-01 NOTE — CONSULT NOTE ADULT - PROBLEM SELECTOR RECOMMENDATION 2
hospice appropriate -sp radiation, sp trach/peg, sp laryngectomy, thyroidectomy, tracheostomy  -hospice appropriate - dx 2018   - Dr Landis in [facility] [phone]   - LD Cetuxmab, 2 weeks ago, planned for 2/23   - Proton Laser due for this wk  - sp radiation, sp trach/peg (2019), sp laryngectomy, thyroidectomy, tracheostomy  - hospice appropriate, pt expressing interest to ENT team

## 2021-03-02 DIAGNOSIS — Z71.89 OTHER SPECIFIED COUNSELING: ICD-10-CM

## 2021-03-02 DIAGNOSIS — Z66 DO NOT RESUSCITATE: ICD-10-CM

## 2021-03-02 PROCEDURE — 99497 ADVNCD CARE PLAN 30 MIN: CPT

## 2021-03-02 PROCEDURE — 99233 SBSQ HOSP IP/OBS HIGH 50: CPT

## 2021-03-02 RX ORDER — METHADONE HYDROCHLORIDE 40 MG/1
10 TABLET ORAL EVERY 8 HOURS
Refills: 0 | Status: DISCONTINUED | OUTPATIENT
Start: 2021-03-02 | End: 2021-03-04

## 2021-03-02 RX ADMIN — OXYCODONE HYDROCHLORIDE 10 MILLIGRAM(S): 5 TABLET ORAL at 19:31

## 2021-03-02 RX ADMIN — Medication 1 APPLICATION(S): at 11:42

## 2021-03-02 RX ADMIN — Medication 175 MICROGRAM(S): at 06:42

## 2021-03-02 RX ADMIN — ENOXAPARIN SODIUM 40 MILLIGRAM(S): 100 INJECTION SUBCUTANEOUS at 22:23

## 2021-03-02 RX ADMIN — Medication 500 MILLIGRAM(S): at 06:27

## 2021-03-02 RX ADMIN — METHADONE HYDROCHLORIDE 10 MILLIGRAM(S): 40 TABLET ORAL at 22:43

## 2021-03-02 RX ADMIN — CHLORHEXIDINE GLUCONATE 1 APPLICATION(S): 213 SOLUTION TOPICAL at 06:27

## 2021-03-02 RX ADMIN — MUPIROCIN 1 APPLICATION(S): 20 OINTMENT TOPICAL at 17:28

## 2021-03-02 RX ADMIN — SODIUM CHLORIDE 10 MILLILITER(S): 9 INJECTION INTRAMUSCULAR; INTRAVENOUS; SUBCUTANEOUS at 11:41

## 2021-03-02 RX ADMIN — Medication 500 MILLIGRAM(S): at 16:00

## 2021-03-02 RX ADMIN — Medication 3 MILLILITER(S): at 05:45

## 2021-03-02 RX ADMIN — METHADONE HYDROCHLORIDE 10 MILLIGRAM(S): 40 TABLET ORAL at 16:06

## 2021-03-02 RX ADMIN — POLYETHYLENE GLYCOL 3350 17 GRAM(S): 17 POWDER, FOR SOLUTION ORAL at 11:49

## 2021-03-02 RX ADMIN — OXYCODONE HYDROCHLORIDE 10 MILLIGRAM(S): 5 TABLET ORAL at 09:32

## 2021-03-02 RX ADMIN — Medication 3 MILLILITER(S): at 22:23

## 2021-03-02 RX ADMIN — SODIUM CHLORIDE 10 MILLILITER(S): 9 INJECTION INTRAMUSCULAR; INTRAVENOUS; SUBCUTANEOUS at 17:47

## 2021-03-02 RX ADMIN — Medication 3 MILLILITER(S): at 09:32

## 2021-03-02 RX ADMIN — Medication 500 MILLIGRAM(S): at 00:02

## 2021-03-02 RX ADMIN — METHADONE HYDROCHLORIDE 10 MILLIGRAM(S): 40 TABLET ORAL at 06:42

## 2021-03-02 RX ADMIN — Medication 500 MILLIGRAM(S): at 22:23

## 2021-03-02 RX ADMIN — SODIUM CHLORIDE 100 MILLILITER(S): 9 INJECTION INTRAMUSCULAR; INTRAVENOUS; SUBCUTANEOUS at 09:32

## 2021-03-02 RX ADMIN — MUPIROCIN 1 APPLICATION(S): 20 OINTMENT TOPICAL at 06:27

## 2021-03-02 RX ADMIN — SODIUM CHLORIDE 10 MILLILITER(S): 9 INJECTION INTRAMUSCULAR; INTRAVENOUS; SUBCUTANEOUS at 05:45

## 2021-03-02 RX ADMIN — Medication 3 MILLILITER(S): at 16:00

## 2021-03-02 RX ADMIN — Medication 1250 MILLIGRAM(S): at 11:41

## 2021-03-02 RX ADMIN — PANTOPRAZOLE SODIUM 40 MILLIGRAM(S): 20 TABLET, DELAYED RELEASE ORAL at 11:41

## 2021-03-02 NOTE — PROGRESS NOTE ADULT - SUBJECTIVE AND OBJECTIVE BOX
SUBJECTIVE: pt seen and examined.     OVERNIGHT EVENTS: none    ROS:  DYSPNEA: 0 at rest, 	  NAUS/VOM: 	no  SECRETIONS: 	yes, self suctioning   AGITATION: no  CONSTIPATION: no  Pain: left face, neck, trach site  -Palliation: prn oxycodone with acceptable analgesic relief    OTHER REVIEW OF SYSTEMS: negative unless specified above. persistent fatigue. mood ok. denies fever, chills, CP.    PEx:  T(C): 37.2 (03-02-21 @ 14:02), Max: 37.3 (03-02-21 @ 09:32)  HR: 64 (03-02-21 @ 11:50) (62 - 78)  BP: 112/59 (03-02-21 @ 11:50) (109/62 - 139/71)  RR: 18 (03-02-21 @ 11:50) (17 - 18)  SpO2: 100% (03-02-21 @ 11:50) (96% - 100%)  Wt(kg): --    General: ill appearing man, sitting up in bed, nad, fatigued   HEENT:  nonicteric sclera, lymphedema L>R, periorbital edema L>R, + trach  Neck: no central lines, supple  CVS: s1,s2, tele  Resp: unlabored, fair inspiratory effort   GI:  peg  bsx4  Musc:   no cyanosis, generalized muscle wasting   Neuro: alert oriented x3,  Psych:   calm  Skin: erythematous papular facial rash, ulcerated lesion at left neck and cellulitic changes  Lymph: edema as above	     ALLERGIES: penicillin (Rash)      OPIATE NAÏVE (Y/N): n    MEDICATIONS: REVIEWED  MEDICATIONS  (STANDING):  albuterol/ipratropium for Nebulization 3 milliLiter(s) Nebulizer every 6 hours  calcium carbonate   Suspension 1250 milliGRAM(s) Enteral Tube daily  chlorhexidine 4% Liquid 1 Application(s) Topical <User Schedule>  Ciprofloxacin 0.3% Ophth Drops 5 Drop(s) 5 Drop(s) Nebulizer two times a day  dexAMETHasone 0.1% Ophth Drops 5 Drop(s) 5 Drop(s) Nebulizer two times a day  enoxaparin Injectable 40 milliGRAM(s) SubCutaneous every 24 hours  hydrocortisone 2.5% Ointment 1 Application(s) Topical daily  levothyroxine 175 MICROGram(s) Enteral Tube daily  methadone  Concentrate 10 milliGRAM(s) Oral every 8 hours  metroNIDAZOLE    Tablet 500 milliGRAM(s) Oral every 8 hours  mupirocin 2% Ointment 1 Application(s) Topical two times a day  oxymetazoline 0.05% Nasal Spray 2 Spray(s) Alternating Nostrils once  pantoprazole  Injectable 40 milliGRAM(s) IV Push daily  sodium chloride 0.9%. 1000 milliLiter(s) (100 mL/Hr) IV Continuous <Continuous>  sodium chloride 3%  Inhalation 10 milliLiter(s) Inhalation every 6 hours    MEDICATIONS  (PRN):  acetaminophen    Suspension .. 650 milliGRAM(s) Oral every 6 hours PRN Mild Pain (1 - 3)  oxyCODONE    Solution 10 milliGRAM(s) Oral every 8 hours PRN Severe Pain (7 - 10)  polyethylene glycol 3350 17 Gram(s) Oral daily PRN Constipation    LABS: REVIEWED  CBC:    CMP:      Albumin, Serum: 2.9 g/dL (02-23-21 @ 20:07)    IMAGING: REVIEWED    Decision maker: The patient is able to participate in complex medical decision making conversations.   Legal surrogate: his cousin Any Martin #[phone]    ADVANCE DIRECTIVES  - dw with patient and cousin, Any, today, verbal consent to complete [facility] DNR DNI  - MEWS exempt  - cw VS, IVF  - no further imaging     GOALS OF CARE DISCUSSION  - advance directives as above   - Palliative care info/support provided	      - discussed transfer to inpatient hospice at [facility] and focus on comfort   SUBJECTIVE: pt seen and examined. pt's cousin at bedside.     OVERNIGHT EVENTS: none    ROS:  DYSPNEA: 0 at rest, 	  NAUS/VOM: 	no  SECRETIONS: 	yes, self suctioning   AGITATION: no  CONSTIPATION: no  Pain: left face, neck, trach site  -Palliation: prn oxycodone with acceptable analgesic relief    OTHER REVIEW OF SYSTEMS: negative unless specified above. persistent fatigue. mood ok. denies fever, chills, CP.    PEx:  T(C): 37.2 (03-02-21 @ 14:02), Max: 37.3 (03-02-21 @ 09:32)  HR: 64 (03-02-21 @ 11:50) (62 - 78)  BP: 112/59 (03-02-21 @ 11:50) (109/62 - 139/71)  RR: 18 (03-02-21 @ 11:50) (17 - 18)  SpO2: 100% (03-02-21 @ 11:50) (96% - 100%)  Wt(kg): --    General: ill appearing man, sitting up in bed, nad, fatigued   HEENT:  nonicteric sclera, lymphedema L>R, periorbital edema L>R, + trach  Neck: no central lines, supple  CVS: s1,s2, tele  Resp: unlabored, fair inspiratory effort   GI:  peg  bsx4  Musc:   no cyanosis, generalized muscle wasting   Neuro: alert oriented x3,  Psych:   calm  Skin: erythematous papular facial rash, ulcerated lesion at left neck and cellulitic changes  Lymph: edema as above	     ALLERGIES: penicillin (Rash)      OPIATE NAÏVE (Y/N): n    MEDICATIONS: REVIEWED  MEDICATIONS  (STANDING):  albuterol/ipratropium for Nebulization 3 milliLiter(s) Nebulizer every 6 hours  calcium carbonate   Suspension 1250 milliGRAM(s) Enteral Tube daily  chlorhexidine 4% Liquid 1 Application(s) Topical <User Schedule>  Ciprofloxacin 0.3% Ophth Drops 5 Drop(s) 5 Drop(s) Nebulizer two times a day  dexAMETHasone 0.1% Ophth Drops 5 Drop(s) 5 Drop(s) Nebulizer two times a day  enoxaparin Injectable 40 milliGRAM(s) SubCutaneous every 24 hours  hydrocortisone 2.5% Ointment 1 Application(s) Topical daily  levothyroxine 175 MICROGram(s) Enteral Tube daily  methadone  Concentrate 10 milliGRAM(s) Oral every 8 hours  metroNIDAZOLE    Tablet 500 milliGRAM(s) Oral every 8 hours  mupirocin 2% Ointment 1 Application(s) Topical two times a day  oxymetazoline 0.05% Nasal Spray 2 Spray(s) Alternating Nostrils once  pantoprazole  Injectable 40 milliGRAM(s) IV Push daily  sodium chloride 0.9%. 1000 milliLiter(s) (100 mL/Hr) IV Continuous <Continuous>  sodium chloride 3%  Inhalation 10 milliLiter(s) Inhalation every 6 hours    MEDICATIONS  (PRN):  acetaminophen    Suspension .. 650 milliGRAM(s) Oral every 6 hours PRN Mild Pain (1 - 3)  oxyCODONE    Solution 10 milliGRAM(s) Oral every 8 hours PRN Severe Pain (7 - 10)  polyethylene glycol 3350 17 Gram(s) Oral daily PRN Constipation    LABS: REVIEWED  CBC:    CMP:      Albumin, Serum: 2.9 g/dL (02-23-21 @ 20:07)    IMAGING: REVIEWED    Decision maker: The patient is able to participate in complex medical decision making conversations.   Legal surrogate: his cousin Any Martin #[phone]    ADVANCE DIRECTIVES  - dw with patient and cousin, Any, today, verbal consent to complete [facility] DNR DNI  - MEWS exempt  - cw VS, IVF  - no further imaging     GOALS OF CARE DISCUSSION  - advance directives as above   - Palliative care info/support provided	      - discussed transfer to inpatient hospice at [facility] and focus on comfort

## 2021-03-02 NOTE — PROGRESS NOTE ADULT - PROBLEM SELECTOR PLAN 3
In addition to the EM visit, an advance care planning meeting was performed  Start time:  End time:  Total time:  A face to face meeting to discuss advance care planning was held today regarding: NADEGE MCCURDY  Primary decision maker:   Alternate/surrogate:  Discussed advance directives including, but not limited to, healthcare proxy and code status.  Decision regarding code status:  Documentation completed today: In addition to the EM visit, an advance care planning meeting was performed  Start time: 130p  End time: 200 p  Total time: 30 min  A face to face meeting to discuss advance care planning was held today regarding: NADEGE MCCURDY  Primary decision maker: Pt, Nadege Kaden  Discussed advance directives including, but not limited to, healthcare proxy and code status.  Decision regarding code status: DNR DNI  Documentation completed today: MOLST, placed in physical chart    Pts chart thoroughly reviewed. Meeting held at pt's bedside with pts surrogate FLETCHER Agarwal MD, palliative NILESH Gan, terrence palliative NP at pt's request to discuss hospice and stopping rehospitalizations. Pt has been accepted to inpatient hospice at [facility] in the [place]. Pt amenable, would like to DC Thursday so that his brother can visit him tomorrow.     Code status addressed. Pt understands he has incurable cancer and if resuscitated would not return to preadmission cognitive and functional baselines. Electing DNR DNI, verbal consent obtained to complete MOLST.  -DNR DNI, no escalation of care  -MEWS exempt  -cw VS, IVF  -no further imaging

## 2021-03-02 NOTE — PROGRESS NOTE ADULT - ASSESSMENT
64 yo M PMH Laryngeal cancer sp laryngectomy, trach/PEG, HTN, hypothyroidism, currently recently received cetuximab, admitted 2/23 dyspnea, with granulation tissue down trachea. Palliative following to assist with GOC and discharge to hospice.

## 2021-03-02 NOTE — CHART NOTE - NSCHARTNOTEFT_GEN_A_CORE
Admitting Diagnosis:   Patient is a 65y old  Male who presents with a chief complaint of dyspnea (02 Mar 2021 07:54)      PAST MEDICAL & SURGICAL HISTORY:  Hypothyroidism    HTN (hypertension)    Larynx cancer    Laryngeal mass  s/ p radiation, trach    PEG (percutaneous endoscopic gastrostomy) status    H/O laryngectomy    H/O thyroidectomy    H/O tracheostomy    Surgery, elective  direct laryngoscopy with biopsy- 4/2017    Status post surgery  inguinal hernia    Status post surgery  Left ankle surgical repair  x2 (1965)        Current Nutrition Order: Glucerna 1.5 @60ml/hr x24hrs via PEG. Provides 1440ml TV, 2160kcal, 118g pro, 1092ml FW.   *infusing at ordered goal        PO Intake: Good (%) [   ]  Fair (50-75%) [   ] Poor (<25%) [   ]-n/a     GI Issues:   Tolerating feeds per report no GRV   No n/v   Unsure last BM - none recorded in EMR     Pain:  Pain being medically managed.  Ordered for tylenol and oxycodone    Skin Integrity:  Tomi score 17  Facial edema   Intact pressure wise     Labs:         CAPILLARY BLOOD GLUCOSE          Medications:  MEDICATIONS  (STANDING):  albuterol/ipratropium for Nebulization 3 milliLiter(s) Nebulizer every 6 hours  calcium carbonate   Suspension 1250 milliGRAM(s) Enteral Tube daily  chlorhexidine 4% Liquid 1 Application(s) Topical <User Schedule>  Ciprofloxacin 0.3% Ophth Drops 5 Drop(s) 5 Drop(s) Nebulizer two times a day  dexAMETHasone 0.1% Ophth Drops 5 Drop(s) 5 Drop(s) Nebulizer two times a day  enoxaparin Injectable 40 milliGRAM(s) SubCutaneous every 24 hours  hydrocortisone 2.5% Ointment 1 Application(s) Topical daily  levothyroxine 175 MICROGram(s) Enteral Tube daily  methadone  Concentrate 10 milliGRAM(s) Oral every 8 hours  metroNIDAZOLE    Tablet 500 milliGRAM(s) Oral every 8 hours  mupirocin 2% Ointment 1 Application(s) Topical two times a day  oxymetazoline 0.05% Nasal Spray 2 Spray(s) Alternating Nostrils once  pantoprazole  Injectable 40 milliGRAM(s) IV Push daily  sodium chloride 0.9%. 1000 milliLiter(s) (100 mL/Hr) IV Continuous <Continuous>  sodium chloride 3%  Inhalation 10 milliLiter(s) Inhalation every 6 hours    MEDICATIONS  (PRN):  acetaminophen    Suspension .. 650 milliGRAM(s) Oral every 6 hours PRN Mild Pain (1 - 3)  oxyCODONE    Solution 10 milliGRAM(s) Oral every 8 hours PRN Severe Pain (7 - 10)  polyethylene glycol 3350 17 Gram(s) Oral daily PRN Constipation    Admitted Anthropometrics  Height: 5'10" Weight: 121lbs/55kg, IBW 166bs+/-10%, %IBW 72%, BMI 17.3    Weight: No active wt in chart, please obtain new wt     Weight Change:   Per previous admission, weight hx: 64kg (7/2019), 57kg (7/2020), 55kg (2/15/2021).   Suspected severe protein calorie malnutrition  Significant muscle wasting noted in quad/calf regions.     Nutrition Focused Physical Exam: Completed [ x  ]  Not Pertinent [   ]    Estimated energy needs:   Estimated energy needs:   IBW (75.4kg) used for calculations as pt between <80% of IBW (72%).   Nutrient needs based on [facility] standards of care for maintenance in adults.   Needs adjusted for post-op, cancer, fluids per team  Energy: 1886-2263kcal/day (25-30kcal/kg)  Protein:90-106g pro/day (1.2-1.4g pro/kg)     Subjective:   66 y/o male h/o Laryngeal cancer s/p laryngectomy and trach/PEG, HTN, hypothyroidism, currently receiving cetuximab treatment, now with dyspnea 2/2 granulation tissue down trachea. Laryngectomy button placed in stoma w/ neck cellulitis improving. Switched to 8/55 Luke tube 2/26; stoma more patent today.     Pt seen in room, sitting up at bedside, self-suctioning frequently during assessment. Remains NPO w/ primary form of nutrition support via PEG feeds. Of note pt has had a substantial weight loss. Reports UBW was 130lbs in December 2020, current BW is 121lbs (2/2021). This indicates a 9lb wt loss x2mo. Pt appears cachectic w/ muscle wasting most notable in lower extremities. Per prior RD assessment "visitor at bedside, there is question as to whether patients brother was providing feeds consistently for patient". Unable to state home regimen for tubefeeds but had followed up w/ an RD outpatient prior to weight loss who had told them he was on an adequate regimen. Based on current status including malnutrition and cancer therapy needs will be adjusted accordingly. Unclear why pt is on CHO steady feeds; would switch over to lower fat formula if feasible, per initial report was receiving Glucerna at home but no hx of DM. Pal care meeting 3/1 to discuss GOC, pt electing to pursue hospice, will need facility placement. Plan is for family meeting today 3/2 for dispo/ hospice planning. See recs below. RD to follow.     Previous Nutrition Diagnosis: malnutrition (severe PCM) in the setting of chronic illness RT suspected inadequate energy intake AEB weight loss and visible muscle wasting in lower extremities.      Active [ x  ]  Resolved [   ]    If resolved, new PES:     Goal:  Pt to continuously meet >75% EER with good tolerance     Recommendations:  1. Recommend switching over to Jevity 1.5 @48ml/hr x24hrs +3 prostat/day via PEG x22 HOURS (2/2 synthroid). Provides 1056ml TV, 1884kcal, 112g pro, 1.5g pro/kg IBW, 2g pro/kg ABW, 802ml FW.  2. Monitor for s/s intolerance. Maintain aspiration precautions at all times.   3. Recommend additional 1L water per day or 250cc 4x/day)  4. Monitor BG POCT now that pt is being switched from CHO steady formula to generic formula.   5. Recommend chewable MVI daily   6.  Pain and bowel regimens per team     Education: n/a     Risk Level: High [   ] Moderate [ x  ] Low [   ]

## 2021-03-02 NOTE — PROGRESS NOTE ADULT - SUBJECTIVE AND OBJECTIVE BOX
HPI: 66 y/o male h/o Laryngeal cancer s/p laryngectomy and trach/PEG, HTN, hypothyroidism, currently receiving cetuximab treatment, now with dyspnea 2/2 granulation tissue down trachea. Patient sent to [place] from nursing home and then trasferred to [facility] 2/23 overnight.     Hospital course:  2/24: Patient admitted overnight. ENT called and exchanged trach to 6CFS. Patient is stabilized without dyspnea now and laryngectomy cleared of crusting and secretions. Continued facial lymphedema and new facial rash consistent possibly with cetuximab toxicity rash.   2/25: No acute events overnight.  Laryngectomy button placed in stoma. Neck cellulitis inproving.   2/26: No acute events overnight. Patient switched to size 8/55 laryngectomy tube coated with hydrocortisone. Stoma more patent today.   2/27: No acute events overnight. Continues to have mild oropharyngeal bleeding. Facial edema worsening today. Will discuss with palliative for hospice discharge.   2/28: No acute events overnight, oropharyngeal bleeding decreased. Facial edema worsening. Facial rash improving. Palliative to have family discussion with patients family tomorrow. Per social work, anticipated discharge to hospice starting tomorrow.   3/1: naeon. still has facial edema. per pall care, for hospice. will be arranged.  3/2: naeon. still has facial edema. fmily conference today      Vital Signs Last 24 Hrs  T(C): 36.8 (02 Mar 2021 04:27), Max: 36.8 (02 Mar 2021 04:27)  T(F): 98.2 (02 Mar 2021 04:27), Max: 98.2 (02 Mar 2021 04:27)  HR: 62 (02 Mar 2021 04:52) (62 - 78)  BP: 131/74 (02 Mar 2021 04:52) (109/62 - 139/71)  BP(mean): 97 (02 Mar 2021 04:52) (80 - 99)  RR: 18 (02 Mar 2021 04:52) (17 - 18)  SpO2: 100% (02 Mar 2021 04:52) (87% - 100%)    Physical Exam:  General- A&O x 2-3, laying comfortably in bed, NAD  Head- atraumatic, lymphedema increased  ENT- no nasal secretions, no epistaxis, laryngectomy patent without bleeding around the laryngectomy  Skin- facial rash erythematous and papular      66 y/o male h/o Laryngeal cancer s/p laryngectomy and trach/PEG, HTN, hypothyroidism, currently receiving cetuximab treatment, now with dyspnea 2/2 granulation tissue down trachea.    -Broad spectrum antibiotics  -Ciprodex nebulized if possible, if not, continued drops via laryngectomy   -methadone  -pain control  -routine tracheostomy care  -appreciate derm recs  -appreciate SLP rosa maria recs  -appreciate palliative recs

## 2021-03-02 NOTE — PROGRESS NOTE ADULT - PROBLEM SELECTOR PLAN 1
controlled  -cw methadone via peg 10 mg q8h ATC  -monitor qtc, 2/23 qtc 443  -recommend scheduling tylenol 650 mg via peg q6 PRN  -cw oxycodone IR 10 mg via peg q8 PRN. controlled  -cw methadone via peg 10 mg q8h ATC  -monitor qtc, 2/23 qtc 443  -recommend scheduling tylenol 650 mg via peg q6 PRN  -cw oxycodone IR 10 mg via peg q8 PRN

## 2021-03-02 NOTE — PROGRESS NOTE ADULT - PROBLEM SELECTOR PLAN 2
eICU Physician Virtual/Remote Brief Initial Evaluation Note      [age] year old woman brought in by EMS following cardiac arrest.  She was on the with 911 complained of shortness of breath when she stops responding.  On arrival EMS found her on floor with nebulizer warning, in [place] ER by and with emesis.  To sound to be in asystole and could not be intubated due to vomitus.  In the ED she had to be suctioned multiple times until the epiglottis and cords could be seen and she was intubated.  Prior to that ventilation was by bag mask ventilation  CTA negative for pulmonary embolus but did show known lung mass and consolidations and infiltrate consistent with aspiration.  Glucose was elevated with acidosis a but the ptosis is most likely due to cardiac arrest    WBC 12, hemoglobin 10.2, platelets 204  Sodium 144, potassium 5.2, chloride 111, bicarb 9, anion gap 24, BUN 12, creatinine 1 3, glucose 432, calcium 8 1, albumin 2  EKG wean O2  , ALT 18  Troponin 0.456 increased from 0.064  Beta hydroxybutyrate 0.1, procalcitonin 273  ABG pH 7.22, pCO2 47, PO2 273, O2 sat 100      Evaluation completed by Dr. Dick   dx 2018   - Dr Landis in [facility] #[phone]   - LD Cetuxmab, 2 weeks ago, planned for 2/23   - Proton Laser due for this wk  - sp radiation, sp trach/peg (2019), sp laryngectomy, thyroidectomy  - goc, hospice dx 2018, sp radiation, sp trach/peg (2019), sp laryngectomy, thyroidectomy  - LD Cetuxmab, 2 weeks ago, planned for 2/23   - Proton Laser due for this wk  - Dr Landis in [facility] #[phone]   - ENT dw with [facility] oncologist   - pt initiating goc to discuss hospice

## 2021-03-03 PROCEDURE — 99233 SBSQ HOSP IP/OBS HIGH 50: CPT

## 2021-03-03 PROCEDURE — 93010 ELECTROCARDIOGRAM REPORT: CPT

## 2021-03-03 RX ORDER — ACETAMINOPHEN 500 MG
650 TABLET ORAL EVERY 8 HOURS
Refills: 0 | Status: DISCONTINUED | OUTPATIENT
Start: 2021-03-03 | End: 2021-03-04

## 2021-03-03 RX ORDER — ACETAMINOPHEN 500 MG
650 TABLET ORAL EVERY 12 HOURS
Refills: 0 | Status: DISCONTINUED | OUTPATIENT
Start: 2021-03-03 | End: 2021-03-04

## 2021-03-03 RX ORDER — HYDROCORTISONE 1 %
1 OINTMENT (GRAM) TOPICAL DAILY
Refills: 0 | Status: DISCONTINUED | OUTPATIENT
Start: 2021-03-03 | End: 2021-03-04

## 2021-03-03 RX ORDER — OXYCODONE HYDROCHLORIDE 5 MG/1
10 TABLET ORAL EVERY 8 HOURS
Refills: 0 | Status: DISCONTINUED | OUTPATIENT
Start: 2021-03-03 | End: 2021-03-04

## 2021-03-03 RX ADMIN — Medication 3 MILLILITER(S): at 22:00

## 2021-03-03 RX ADMIN — OXYCODONE HYDROCHLORIDE 10 MILLIGRAM(S): 5 TABLET ORAL at 21:10

## 2021-03-03 RX ADMIN — Medication 3 MILLILITER(S): at 03:25

## 2021-03-03 RX ADMIN — Medication 3 MILLILITER(S): at 17:14

## 2021-03-03 RX ADMIN — OXYCODONE HYDROCHLORIDE 10 MILLIGRAM(S): 5 TABLET ORAL at 11:16

## 2021-03-03 RX ADMIN — Medication 500 MILLIGRAM(S): at 05:39

## 2021-03-03 RX ADMIN — OXYCODONE HYDROCHLORIDE 10 MILLIGRAM(S): 5 TABLET ORAL at 12:16

## 2021-03-03 RX ADMIN — SODIUM CHLORIDE 100 MILLILITER(S): 9 INJECTION INTRAMUSCULAR; INTRAVENOUS; SUBCUTANEOUS at 05:41

## 2021-03-03 RX ADMIN — Medication 650 MILLIGRAM(S): at 18:06

## 2021-03-03 RX ADMIN — MUPIROCIN 1 APPLICATION(S): 20 OINTMENT TOPICAL at 05:40

## 2021-03-03 RX ADMIN — OXYMETAZOLINE HYDROCHLORIDE 2 SPRAY(S): 0.5 SPRAY NASAL at 00:05

## 2021-03-03 RX ADMIN — Medication 1 APPLICATION(S): at 09:30

## 2021-03-03 RX ADMIN — ENOXAPARIN SODIUM 40 MILLIGRAM(S): 100 INJECTION SUBCUTANEOUS at 22:00

## 2021-03-03 RX ADMIN — SODIUM CHLORIDE 10 MILLILITER(S): 9 INJECTION INTRAMUSCULAR; INTRAVENOUS; SUBCUTANEOUS at 05:41

## 2021-03-03 RX ADMIN — OXYCODONE HYDROCHLORIDE 10 MILLIGRAM(S): 5 TABLET ORAL at 03:27

## 2021-03-03 RX ADMIN — Medication 175 MICROGRAM(S): at 05:38

## 2021-03-03 RX ADMIN — SODIUM CHLORIDE 10 MILLILITER(S): 9 INJECTION INTRAMUSCULAR; INTRAVENOUS; SUBCUTANEOUS at 18:05

## 2021-03-03 RX ADMIN — Medication 650 MILLIGRAM(S): at 09:03

## 2021-03-03 RX ADMIN — Medication 650 MILLIGRAM(S): at 10:00

## 2021-03-03 RX ADMIN — Medication 3 MILLILITER(S): at 11:18

## 2021-03-03 RX ADMIN — METHADONE HYDROCHLORIDE 10 MILLIGRAM(S): 40 TABLET ORAL at 22:17

## 2021-03-03 RX ADMIN — METHADONE HYDROCHLORIDE 10 MILLIGRAM(S): 40 TABLET ORAL at 05:42

## 2021-03-03 RX ADMIN — OXYCODONE HYDROCHLORIDE 10 MILLIGRAM(S): 5 TABLET ORAL at 22:10

## 2021-03-03 RX ADMIN — Medication 1250 MILLIGRAM(S): at 12:44

## 2021-03-03 RX ADMIN — SODIUM CHLORIDE 10 MILLILITER(S): 9 INJECTION INTRAMUSCULAR; INTRAVENOUS; SUBCUTANEOUS at 00:05

## 2021-03-03 RX ADMIN — MUPIROCIN 1 APPLICATION(S): 20 OINTMENT TOPICAL at 18:04

## 2021-03-03 RX ADMIN — SODIUM CHLORIDE 10 MILLILITER(S): 9 INJECTION INTRAMUSCULAR; INTRAVENOUS; SUBCUTANEOUS at 12:43

## 2021-03-03 RX ADMIN — Medication 650 MILLIGRAM(S): at 18:04

## 2021-03-03 RX ADMIN — METHADONE HYDROCHLORIDE 10 MILLIGRAM(S): 40 TABLET ORAL at 15:08

## 2021-03-03 RX ADMIN — PANTOPRAZOLE SODIUM 40 MILLIGRAM(S): 20 TABLET, DELAYED RELEASE ORAL at 12:44

## 2021-03-03 NOTE — PROGRESS NOTE ADULT - SUBJECTIVE AND OBJECTIVE BOX
SUBJECTIVE: pt seen and examined. sitting up in bed. reports acute on chronic pain to neck, trach site. RN made aware. oxycodone 10 mg PRN given with acceptable analgesic relief. denies over sedation, acute nausea with prn oxy. mood fair. denies fever, chills, cp, HA.     OVERNIGHT EVENTS: none    DNR in chart: yes    ROS:  DYSPNEA: 	0 at rest  NAUS/VOM: 	n  SECRETIONS: 	y  AGITATION: n  CONSTIPATION: n  Pain: left face, neck, trach site  -Palliation: prn oxycodone with acceptable analgesic relief    OTHER REVIEW OF SYSTEMS: negative unless specified above.  PEx:  T(C): 36.8 (03-03-21 @ 05:51), Max: 37.2 (03-02-21 @ 14:02)  HR: 70 (03-03-21 @ 09:10) (64 - 74)  BP: 96/59 (03-03-21 @ 05:51) (96/59 - 123/61)  RR: 20 (03-03-21 @ 09:10) (18 - 20)  SpO2: 99% (03-03-21 @ 09:10) (99% - 100%)  Wt(kg): --    General: ill appearing man, sitting up in bed, nad, fatigued   HEENT:  nonicteric sclera, lymphedema L>R, periorbital edema L>R, + trach  Neck: no central lines, supple  CVS: s1,s2, tele  Resp: unlabored, fair inspiratory effort   GI:  peg  bsx4  Musc:   no cyanosis, generalized muscle wasting   Neuro: alert oriented x3,  Psych:   calm  Skin: erythematous papular facial rash, ulcerated lesion at left neck and cellulitic changes  Lymph: edema as above	     ALLERGIES: penicillin (Rash)      OPIATE NAÏVE (Y/N): N    MEDICATIONS: REVIEWED  MEDICATIONS  (STANDING):  albuterol/ipratropium for Nebulization 3 milliLiter(s) Nebulizer every 6 hours  calcium carbonate   Suspension 1250 milliGRAM(s) Enteral Tube daily  chlorhexidine 4% Liquid 1 Application(s) Topical <User Schedule>  Ciprofloxacin 0.3% Ophth Drops 5 Drop(s) 5 Drop(s) Nebulizer two times a day  dexAMETHasone 0.1% Ophth Drops 5 Drop(s) 5 Drop(s) Nebulizer two times a day  enoxaparin Injectable 40 milliGRAM(s) SubCutaneous every 24 hours  hydrocortisone 2.5% Ointment 1 Application(s) Topical daily  levothyroxine 175 MICROGram(s) Enteral Tube daily  methadone  Concentrate 10 milliGRAM(s) Oral every 8 hours  mupirocin 2% Ointment 1 Application(s) Topical two times a day  pantoprazole  Injectable 40 milliGRAM(s) IV Push daily  sodium chloride 0.9%. 1000 milliLiter(s) (100 mL/Hr) IV Continuous <Continuous>  sodium chloride 3%  Inhalation 10 milliLiter(s) Inhalation every 6 hours    MEDICATIONS  (PRN):  acetaminophen    Suspension .. 650 milliGRAM(s) Oral every 6 hours PRN Mild Pain (1 - 3)  oxyCODONE    Solution 10 milliGRAM(s) Oral every 8 hours PRN Severe Pain (7 - 10)  polyethylene glycol 3350 17 Gram(s) Oral daily PRN Constipation    LABS: REVIEWED  CBC:    CMP:        Albumin, Serum: 2.9 g/dL (02-23-21 @ 20:07)    IMAGING: REVIEWED    Decision maker: The patient is able to participate in complex medical decision making conversations.   Legal surrogate: his cousin Any Martin #[phone]    ADVANCE DIRECTIVES  -DNR DNI, MEWS exempt  - cw VS, IVF  - no further imaging     GOALS OF CARE DISCUSSION  - advance directives as above   - Palliative care info/support provided	      - hoping to have visit from his brother today  - transfer to inpatient hospice at [facility] tomorrow

## 2021-03-03 NOTE — PROGRESS NOTE ADULT - SUBJECTIVE AND OBJECTIVE BOX
HPI: 64 y/o male h/o Laryngeal cancer s/p laryngectomy and trach/PEG, HTN, hypothyroidism, currently receiving cetuximab treatment, now with dyspnea 2/2 granulation tissue down trachea. Patient sent to [place] from nursing home and then trasferred to [facility] 2/23 overnight.     Hospital course:  2/24: Patient admitted overnight. ENT called and exchanged trach to 6CFS. Patient is stabilized without dyspnea now and laryngectomy cleared of crusting and secretions. Continued facial lymphedema and new facial rash consistent possibly with cetuximab toxicity rash.   2/25: No acute events overnight.  Laryngectomy button placed in stoma. Neck cellulitis inproving.   2/26: No acute events overnight. Patient switched to size 8/55 laryngectomy tube coated with hydrocortisone. Stoma more patent today.   2/27: No acute events overnight. Continues to have mild oropharyngeal bleeding. Facial edema worsening today. Will discuss with palliative for hospice discharge.   2/28: No acute events overnight, oropharyngeal bleeding decreased. Facial edema worsening. Facial rash improving. Palliative to have family discussion with patients family tomorrow. Per social work, anticipated discharge to hospice starting tomorrow.   3/1: naeon. still has facial edema. per pall care, for hospice. will be arranged.  3/2: naeon. still has facial edema. fmily conference today  3/3: naeon. still has facial edema. DNR. to possible inpatient hospice [facility] tomorrow. we will notify [facility] today about plan    Vital Signs Last 24 Hrs  T(C): 36.8 (03 Mar 2021 05:51), Max: 37.3 (02 Mar 2021 09:32)  T(F): 98.3 (03 Mar 2021 05:51), Max: 99.2 (02 Mar 2021 09:32)  HR: 69 (03 Mar 2021 05:51) (64 - 74)  BP: 96/59 (03 Mar 2021 05:51) (96/59 - 125/76)  BP(mean): 71 (03 Mar 2021 05:51) (71 - 94)  RR: 20 (03 Mar 2021 05:51) (17 - 20)  SpO2: 99% (03 Mar 2021 05:51) (99% - 100%)    Physical Exam:  General- A&O x 2-3, laying comfortably in bed, NAD  Head- atraumatic, lymphedema increased  ENT- no nasal secretions, no epistaxis, laryngectomy patent without bleeding around the laryngectomy  Skin- facial rash erythematous and papular      64 y/o male h/o Laryngeal cancer s/p laryngectomy and trach/PEG, HTN, hypothyroidism, currently receiving cetuximab treatment, now with dyspnea 2/2 granulation tissue down trachea.  - stop abx  -Ciprodex nebulized if possible, if not, continued drops via laryngectomy   -methadone  -pain control  -routine tracheostomy care  -appreciate derm recs  -appreciate SLP rosa maria recs  -appreciate palliative recs       HPI: 66 y/o male h/o Laryngeal cancer s/p laryngectomy and trach/PEG, HTN, hypothyroidism, currently receiving cetuximab treatment, now with dyspnea 2/2 granulation tissue down trachea. Patient sent to [place] from nursing home and then trasferred to [facility] 2/23 overnight.     Hospital course:  2/24: Patient admitted overnight. ENT called and exchanged trach to 6CFS. Patient is stabilized without dyspnea now and laryngectomy cleared of crusting and secretions. Continued facial lymphedema and new facial rash consistent possibly with cetuximab toxicity rash.   2/25: No acute events overnight.  Laryngectomy button placed in stoma. Neck cellulitis inproving.   2/26: No acute events overnight. Patient switched to size 8/55 laryngectomy tube coated with hydrocortisone. Stoma more patent today.   2/27: No acute events overnight. Continues to have mild oropharyngeal bleeding. Facial edema worsening today. Will discuss with palliative for hospice discharge.   2/28: No acute events overnight, oropharyngeal bleeding decreased. Facial edema worsening. Facial rash improving. Palliative to have family discussion with patients family tomorrow. Per social work, anticipated discharge to hospice starting tomorrow.   3/1: naeon. still has facial edema. per pall care, for hospice. will be arranged.  3/2: naeon. still has facial edema. fmily conference today  3/3: naeon. still has facial edema. DNR. to possible inpatient hospice [facility] tomorrow. we will notify [facility] today about plan    Vital Signs Last 24 Hrs  T(C): 36.8 (03 Mar 2021 05:51), Max: 37.3 (02 Mar 2021 09:32)  T(F): 98.3 (03 Mar 2021 05:51), Max: 99.2 (02 Mar 2021 09:32)  HR: 69 (03 Mar 2021 05:51) (64 - 74)  BP: 96/59 (03 Mar 2021 05:51) (96/59 - 125/76)  BP(mean): 71 (03 Mar 2021 05:51) (71 - 94)  RR: 20 (03 Mar 2021 05:51) (17 - 20)  SpO2: 99% (03 Mar 2021 05:51) (99% - 100%)    Physical Exam:  General- A&O x 2-3, laying comfortably in bed, NAD  Head- atraumatic, lymphedema increased  ENT- no nasal secretions, no epistaxis, laryngectomy patent without bleeding around the laryngectomy  Skin- facial rash erythematous and papular      66 y/o male h/o Laryngeal cancer s/p laryngectomy and trach/PEG, HTN, hypothyroidism, w unresectable recurrence s/p cetuximab treatment, now with dyspnea 2/2 granulation tissue down trachea which has resolved. for hospice care.   - stop abx  -Ciprodex nebulized if possible, if not, continued drops via laryngectomy   -methadone  -pain control  -routine tracheostomy care  -appreciate derm recs  -appreciate SLP larytube recs  -appreciate palliative recs

## 2021-03-03 NOTE — PROGRESS NOTE ADULT - PROBLEM SELECTOR PLAN 2
dx 2018, sp radiation, sp trach/peg (2019), sp laryngectomy, thyroidectomy. JEAN Riggsmagiles  - Dr Landis in [facility] #[phone]   - ENT dw with [facility] oncologist   - hospice

## 2021-03-03 NOTE — PROGRESS NOTE ADULT - PROBLEM SELECTOR PLAN 1
overall controlled. received PRN oxycodone this morning with acceptable analgesic relief.   -cw methadone via peg 10 mg q8h ATC  -monitor qtc, 2/23 qtc 443  -recommend scheduling tylenol 650 mg via peg BID  -cw tylenol 650 mg via PEG q8p PRN, do not exceed 4000 mg  -cw oxycodone IR 10 mg via peg q8 PRN

## 2021-03-03 NOTE — PROGRESS NOTE ADULT - NUTRITIONAL ASSESSMENT
This patient has been assessed with a concern for Malnutrition and has been determined to have a diagnosis/diagnoses of Severe protein-calorie malnutrition and Underweight (BMI < 19).    This patient is being managed with:   Diet NPO with Tube Feed-  Tube Feeding Modality: Gastrostomy  Jevity 1.5 Slava (JEVITY1.5)  Total Volume for 24 Hours (mL): 1056  Total Number of Cans: 5  Continuous  Starting Tube Feed Rate {mL per Hour}: 20  Increase Tube Feed Rate by (mL): 15     Every 4 hours  Until Goal Tube Feed Rate (mL per Hour): 48  Tube Feed Duration (in Hours): 22  Tube Feed Start Time: 15:30  Supplement Feeding Modality:  Gastrostomy  Prostat Cans or Servings Per Day:  3       Frequency:  Daily  Entered: Feb 26 2021  3:25PM    Diet NPO with Tube Feed-  Tube Feeding Modality: Gastrostomy  Glucerna 1.5 Slava (GLUCERNA1.5)  Total Volume for 24 Hours (mL): 1440  Total Number of Cans: 6  Continuous  Starting Tube Feed Rate {mL per Hour}: 20  Increase Tube Feed Rate by (mL): 10     Every 3 hours  Until Goal Tube Feed Rate (mL per Hour): 60  Tube Feed Duration (in Hours): 24  Tube Feed Start Time: 21:00  Entered: Feb 24 2021  8:27PM    The following pending diet order is being considered for treatment of Severe protein-calorie malnutrition and Underweight (BMI < 19):null
This patient has been assessed with a concern for Malnutrition and has been determined to have a diagnosis/diagnoses of Severe protein-calorie malnutrition and Underweight (BMI < 19).    This patient is being managed with:   Diet NPO with Tube Feed-  Tube Feeding Modality: Gastrostomy  Glucerna 1.5 Slava (GLUCERNA1.5)  Total Volume for 24 Hours (mL): 1440  Total Number of Cans: 6  Continuous  Starting Tube Feed Rate {mL per Hour}: 20  Increase Tube Feed Rate by (mL): 10     Every 3 hours  Until Goal Tube Feed Rate (mL per Hour): 60  Tube Feed Duration (in Hours): 24  Tube Feed Start Time: 21:00  Entered: Feb 24 2021  8:27PM    
This patient has been assessed with a concern for Malnutrition and has been determined to have a diagnosis/diagnoses of Severe protein-calorie malnutrition and Underweight (BMI < 19).    This patient is being managed with:   Diet NPO with Tube Feed-  Tube Feeding Modality: Gastrostomy  Jevity 1.5 Slava (JEVITY1.5)  Total Volume for 24 Hours (mL): 1056  Total Number of Cans: 5  Continuous  Starting Tube Feed Rate {mL per Hour}: 20  Increase Tube Feed Rate by (mL): 15     Every 4 hours  Until Goal Tube Feed Rate (mL per Hour): 48  Tube Feed Duration (in Hours): 22  Tube Feed Start Time: 15:30  Supplement Feeding Modality:  Gastrostomy  Prostat Cans or Servings Per Day:  3       Frequency:  Daily  Entered: Feb 26 2021  3:25PM    Diet NPO with Tube Feed-  Tube Feeding Modality: Gastrostomy  Glucerna 1.5 Slava (GLUCERNA1.5)  Total Volume for 24 Hours (mL): 1440  Total Number of Cans: 6  Continuous  Starting Tube Feed Rate {mL per Hour}: 20  Increase Tube Feed Rate by (mL): 10     Every 3 hours  Until Goal Tube Feed Rate (mL per Hour): 60  Tube Feed Duration (in Hours): 24  Tube Feed Start Time: 21:00  Entered: Feb 24 2021  8:27PM    The following pending diet order is being considered for treatment of Severe protein-calorie malnutrition and Underweight (BMI < 19):null
This patient has been assessed with a concern for Malnutrition and has been determined to have a diagnosis/diagnoses of Severe protein-calorie malnutrition and Underweight (BMI < 19).    This patient is being managed with:   Diet NPO with Tube Feed-  Tube Feeding Modality: Gastrostomy  Jevity 1.5 Slava (JEVITY1.5)  Total Volume for 24 Hours (mL): 1056  Total Number of Cans: 5  Continuous  Starting Tube Feed Rate {mL per Hour}: 20  Increase Tube Feed Rate by (mL): 15     Every 4 hours  Until Goal Tube Feed Rate (mL per Hour): 48  Tube Feed Duration (in Hours): 22  Tube Feed Start Time: 15:30  Supplement Feeding Modality:  Gastrostomy  Prostat Cans or Servings Per Day:  3       Frequency:  Daily  Entered: Feb 26 2021  3:25PM    Diet NPO with Tube Feed-  Tube Feeding Modality: Gastrostomy  Glucerna 1.5 Slava (GLUCERNA1.5)  Total Volume for 24 Hours (mL): 1440  Total Number of Cans: 6  Continuous  Starting Tube Feed Rate {mL per Hour}: 20  Increase Tube Feed Rate by (mL): 10     Every 3 hours  Until Goal Tube Feed Rate (mL per Hour): 60  Tube Feed Duration (in Hours): 24  Tube Feed Start Time: 21:00  Entered: Feb 24 2021  8:27PM    The following pending diet order is being considered for treatment of Severe protein-calorie malnutrition and Underweight (BMI < 19):null
This patient has been assessed with a concern for Malnutrition and has been determined to have a diagnosis/diagnoses of Severe protein-calorie malnutrition and Underweight (BMI < 19).    This patient is being managed with:   Diet NPO with Tube Feed-  Tube Feeding Modality: Gastrostomy  Glucerna 1.5 Slava (GLUCERNA1.5)  Total Volume for 24 Hours (mL): 1440  Total Number of Cans: 6  Continuous  Starting Tube Feed Rate {mL per Hour}: 20  Increase Tube Feed Rate by (mL): 10     Every 3 hours  Until Goal Tube Feed Rate (mL per Hour): 60  Tube Feed Duration (in Hours): 24  Tube Feed Start Time: 21:00  Entered: Feb 24 2021  8:27PM    
This patient has been assessed with a concern for Malnutrition and has been determined to have a diagnosis/diagnoses of Severe protein-calorie malnutrition and Underweight (BMI < 19).    This patient is being managed with:   Diet NPO with Tube Feed-  Tube Feeding Modality: Gastrostomy  Jevity 1.5 Slava (JEVITY1.5)  Total Volume for 24 Hours (mL): 1056  Total Number of Cans: 5  Continuous  Starting Tube Feed Rate {mL per Hour}: 20  Increase Tube Feed Rate by (mL): 15     Every 4 hours  Until Goal Tube Feed Rate (mL per Hour): 48  Tube Feed Duration (in Hours): 22  Tube Feed Start Time: 15:30  Supplement Feeding Modality:  Gastrostomy  Prostat Cans or Servings Per Day:  3       Frequency:  Daily  Entered: Feb 26 2021  3:25PM    Diet NPO with Tube Feed-  Tube Feeding Modality: Gastrostomy  Glucerna 1.5 Slava (GLUCERNA1.5)  Total Volume for 24 Hours (mL): 1440  Total Number of Cans: 6  Continuous  Starting Tube Feed Rate {mL per Hour}: 20  Increase Tube Feed Rate by (mL): 10     Every 3 hours  Until Goal Tube Feed Rate (mL per Hour): 60  Tube Feed Duration (in Hours): 24  Tube Feed Start Time: 21:00  Entered: Feb 24 2021  8:27PM    The following pending diet order is being considered for treatment of Severe protein-calorie malnutrition and Underweight (BMI < 19):null

## 2021-03-04 ENCOUNTER — TRANSCRIPTION ENCOUNTER (OUTPATIENT)
Age: 66
End: 2021-03-04

## 2021-03-04 VITALS — OXYGEN SATURATION: 100 % | RESPIRATION RATE: 18 BRPM | HEART RATE: 73 BPM

## 2021-03-04 PROCEDURE — 99233 SBSQ HOSP IP/OBS HIGH 50: CPT

## 2021-03-04 RX ORDER — HYDROCORTISONE 1 %
1 OINTMENT (GRAM) TOPICAL
Qty: 0 | Refills: 0 | DISCHARGE
Start: 2021-03-04

## 2021-03-04 RX ORDER — ACETAMINOPHEN 500 MG
20.31 TABLET ORAL
Qty: 0 | Refills: 0 | DISCHARGE
Start: 2021-03-04

## 2021-03-04 RX ORDER — MUPIROCIN 20 MG/G
1 OINTMENT TOPICAL
Qty: 0 | Refills: 0 | DISCHARGE
Start: 2021-03-04

## 2021-03-04 RX ORDER — METHADONE HYDROCHLORIDE 40 MG/1
1 TABLET ORAL
Qty: 0 | Refills: 0 | DISCHARGE
Start: 2021-03-04

## 2021-03-04 RX ADMIN — METHADONE HYDROCHLORIDE 10 MILLIGRAM(S): 40 TABLET ORAL at 14:25

## 2021-03-04 RX ADMIN — OXYCODONE HYDROCHLORIDE 10 MILLIGRAM(S): 5 TABLET ORAL at 13:00

## 2021-03-04 RX ADMIN — SODIUM CHLORIDE 10 MILLILITER(S): 9 INJECTION INTRAMUSCULAR; INTRAVENOUS; SUBCUTANEOUS at 00:09

## 2021-03-04 RX ADMIN — SODIUM CHLORIDE 10 MILLILITER(S): 9 INJECTION INTRAMUSCULAR; INTRAVENOUS; SUBCUTANEOUS at 06:10

## 2021-03-04 RX ADMIN — Medication 3 MILLILITER(S): at 10:09

## 2021-03-04 RX ADMIN — Medication 650 MILLIGRAM(S): at 07:09

## 2021-03-04 RX ADMIN — METHADONE HYDROCHLORIDE 10 MILLIGRAM(S): 40 TABLET ORAL at 06:08

## 2021-03-04 RX ADMIN — Medication 3 MILLILITER(S): at 06:08

## 2021-03-04 RX ADMIN — Medication 1250 MILLIGRAM(S): at 12:05

## 2021-03-04 RX ADMIN — OXYCODONE HYDROCHLORIDE 10 MILLIGRAM(S): 5 TABLET ORAL at 12:22

## 2021-03-04 RX ADMIN — Medication 1 APPLICATION(S): at 12:04

## 2021-03-04 RX ADMIN — Medication 175 MICROGRAM(S): at 06:08

## 2021-03-04 RX ADMIN — Medication 650 MILLIGRAM(S): at 06:09

## 2021-03-04 RX ADMIN — SODIUM CHLORIDE 10 MILLILITER(S): 9 INJECTION INTRAMUSCULAR; INTRAVENOUS; SUBCUTANEOUS at 12:27

## 2021-03-04 RX ADMIN — PANTOPRAZOLE SODIUM 40 MILLIGRAM(S): 20 TABLET, DELAYED RELEASE ORAL at 12:05

## 2021-03-04 RX ADMIN — MUPIROCIN 1 APPLICATION(S): 20 OINTMENT TOPICAL at 06:08

## 2021-03-04 NOTE — DISCHARGE NOTE NURSING/CASE MANAGEMENT/SOCIAL WORK - PATIENT PORTAL LINK FT
You can access the FollowMyHealth Patient Portal offered by [facility] by registering at the following website: [email or web address]. By joining [employer]’s FollowMyHealth portal, you will also be able to view your health information using other applications (apps) compatible with our system.
Elie Roe

## 2021-03-04 NOTE — DISCHARGE NOTE PROVIDER - CARE PROVIDERS DIRECT ADDRESSES
,[email or web address]

## 2021-03-04 NOTE — DISCHARGE NOTE PROVIDER - NSDCMRMEDTOKEN_GEN_ALL_CORE_FT
acetaminophen 160 mg/5 mL oral suspension: 20.31 milliliter(s) orally every 8 hours, As needed, Temp greater or equal to 38C (100.4F), Mild Pain (1 - 3)  acetaminophen 160 mg/5 mL oral suspension: 20.31 milliliter(s) orally every 12 hours  albuterol 90 mcg/inh inhalation aerosol: 2 puff(s) inhaled every 6 hours  calcium carbonate 1250 mg/5 mL (100 mg/mL elemental calcium) oral suspension: 5 milliliter(s) orally once a day  Dextrose 5% in Water intravenous solution: 1000 milliliter(s) intravenous   glucose 40% oral gel:  orally   glucose 50% intravenous solution: 50 milliliter(s) intravenous once  hydrocortisone 2.5% topical ointment: 1 application topically once a day  insulin lispro 100 units/mL injectable solution:  injectable   levothyroxine 175 mcg (0.175 mg) oral tablet: 1 tab(s) orally once a day  methadone 10 mg/mL oral concentrate: 1 milliliter(s) orally every 8 hours  morphine 15 mg oral tablet: 1 tab(s) orally every 6 hours, As needed, Moderate Pain (4 - 6)  mupirocin 2% topical ointment: 1 application topically 2 times a day  nystatin 100,000 units/g topical powder: 1 application topically 2 times a day  pantoprazole 40 mg intravenous injection: 40 milligram(s) intravenous once a day  sodium chloride 0.9% inhalation solution: 3 milliliter(s) inhaled every 4 hours  tube feeds: PEG feeds. Jevity 1.2. Give 8 cans per day. 2 cans (474ml) 4 times a day, at 8am, 12pm, 4pm, 8pm. Free water flush 50mL before and after each bolus.

## 2021-03-04 NOTE — PROGRESS NOTE ADULT - PROBLEM SELECTOR PLAN 4
-completed 3/2 placed in physical chart

## 2021-03-04 NOTE — PROGRESS NOTE ADULT - PROBLEM SELECTOR PLAN 5
66 yo M with laryngeal cancer, high symptom burden. goals of care discussed this admission per pt request, electing discharge to inpatient hospice  -palliative NILESH Gan arranging for discharge to [facility] tomorrow  -symptom management as above  -all questions answered. emotional support provided.   -will continue to follow with you
66 yo M with laryngeal cancer, high symptom burden. goals of care discussed this admission per pt request, electing discharge to inpatient hospice  -discharge to [facility] today  -symptom management as above  -all questions answered. emotional support provided.
64 yo M with laryngeal cancer, high symptom burden, hospice appropriate. goals of care discussed this admission per pt request, electing discharge with hospice support  -pt accepted to inpatient hospice at [facility]  -palliative NILESH Gan arranging for discharge, likely Thursday   -symptom management as above  -all questions answered. emotional support provided.   -will continue to follow with you

## 2021-03-04 NOTE — PROGRESS NOTE ADULT - PROBLEM SELECTOR PLAN 1
overall controlled. received PRN oxycodone at follow up visit with acceptable analgesic relief.   -cw methadone via peg 10 mg q8h ATC  -monitor qtc, 2/23 qtc 443  -cw tylenol 650 mg via PEG q8p PRN, do not exceed 4000 mg  -cw oxycodone IR 10 mg via peg q8 PRN

## 2021-03-04 NOTE — PROGRESS NOTE ADULT - PROBLEM SELECTOR PLAN 2
dx 2018, sp radiation, sp trach/peg (2019), sp laryngectomy, thyroidectomy. LD Cetuxmab  - Dr Landis in [facility] #[phone]   - inpatient hospice

## 2021-03-04 NOTE — DISCHARGE NOTE PROVIDER - DETAILS OF MALNUTRITION DIAGNOSIS/DIAGNOSES
This patient has been assessed with a concern for Malnutrition and was treated during this hospitalization for the following Nutrition diagnosis/diagnoses:     -  02/24/2021: Severe protein-calorie malnutrition   -  02/24/2021: Underweight (BMI < 19)

## 2021-03-04 NOTE — DISCHARGE NOTE PROVIDER - NSDCFUADDINST_GEN_ALL_CORE_FT
Pt is DNR/DNI  Pt has a laryngectomy tube. All airway medication/instrumentation must pass through laryngectomy. Pt has no airway through mouth or nose

## 2021-03-04 NOTE — PROGRESS NOTE ADULT - SUBJECTIVE AND OBJECTIVE BOX
SUBJECTIVE: pt seen and examined. reports moderate pain to neck, trach site. [id number] mg solution PRN administered. LBM yesterday. discharge today.     DNR in chart: Yes    ROS:  DYSPNEA: 0 at rest	  NAUS/VOM: 	n  SECRETIONS: 	y  AGITATION: n  CONSTIPATION:  n  Pain: left face, neck, trach site  -Palliation: prn oxycodone with acceptable analgesic relief    OTHER REVIEW OF SYSTEMS: negative unless specified above.    PEx:  T(C): 36.8 (03-04-21 @ 08:52), Max: 37 (03-04-21 @ 04:46)  HR: 67 (03-04-21 @ 08:52) (67 - 76)  BP: 106/63 (03-04-21 @ 08:52) (96/48 - 114/57)  RR: 18 (03-04-21 @ 08:52) (16 - 20)  SpO2: 100% (03-04-21 @ 08:52) (95% - 100%)  Wt(kg): --    General: ill appearing man, sitting up in bed, nad, fatigued   HEENT:  nonicteric sclera, lymphedema L>R, periorbital edema L>R, + trach  Neck: no central lines, supple  CVS: s1,s2, tele  Resp: unlabored, fair inspiratory effort   GI:  peg  bsx4  Musc:   no cyanosis, generalized muscle wasting   Neuro: alert oriented x3,  Psych:   calm  Skin: erythematous papular facial rash, ulcerated lesion at left neck and cellulitic changes  Lymph: edema as above	   	     ALLERGIES: penicillin (Rash)      OPIATE NAÏVE (Y/N): N    MEDICATIONS: REVIEWED  MEDICATIONS  (STANDING):  acetaminophen    Suspension .. 650 milliGRAM(s) Oral every 12 hours  albuterol/ipratropium for Nebulization 3 milliLiter(s) Nebulizer every 6 hours  calcium carbonate   Suspension 1250 milliGRAM(s) Enteral Tube daily  Ciprofloxacin 0.3% Ophth Drops 5 Drop(s) 5 Drop(s) Nebulizer two times a day  dexAMETHasone 0.1% Ophth Drops 5 Drop(s) 5 Drop(s) Nebulizer two times a day  enoxaparin Injectable 40 milliGRAM(s) SubCutaneous every 24 hours  hydrocortisone 2.5% Ointment 1 Application(s) Topical daily  levothyroxine 175 MICROGram(s) Enteral Tube daily  methadone  Concentrate 10 milliGRAM(s) Oral every 8 hours  mupirocin 2% Ointment 1 Application(s) Topical two times a day  pantoprazole  Injectable 40 milliGRAM(s) IV Push daily  sodium chloride 0.9%. 1000 milliLiter(s) (100 mL/Hr) IV Continuous <Continuous>  sodium chloride 3%  Inhalation 10 milliLiter(s) Inhalation every 6 hours    MEDICATIONS  (PRN):  acetaminophen    Suspension .. 650 milliGRAM(s) Enteral Tube every 8 hours PRN Temp greater or equal to 38C (100.4F), Mild Pain (1 - 3)  oxyCODONE    Solution 10 milliGRAM(s) Oral every 8 hours PRN Severe Pain (7 - 10)  polyethylene glycol 3350 17 Gram(s) Oral daily PRN Constipation    LABS: REVIEWED  CBC:    CMP:        Albumin, Serum: 2.9 g/dL (02-23-21 @ 20:07)    IMAGING: REVIEWED    Decision maker: The patient is able to participate in complex medical decision making conversations.   Legal surrogate: his cousin Any Martin #[phone]    ADVANCE DIRECTIVES  -DNR DNI, MEWS exempt  - cw VS, IVF  - no further imaging     GOALS OF CARE DISCUSSION  - advance directives as above   - Palliative care info/support provided	      - transfer to inpatient hospice at [facility] today

## 2021-03-04 NOTE — DISCHARGE NOTE PROVIDER - CARE PROVIDER_API CALL
Lee Branch)  Otolaryngology  [address] [place] [address]  Phone: [phone]  Fax: [phone]  Established Patient  Follow Up Time: 2 weeks    Abner Corona)  Otolaryngology  [address], [place] [address]  Phone: [phone]  Fax: [phone]  Established Patient  Follow Up Time: 2 weeks

## 2021-03-04 NOTE — DISCHARGE NOTE PROVIDER - HOSPITAL COURSE
HPI: 66 y/o male h/o Laryngeal cancer s/p laryngectomy and trach/PEG, HTN, hypothyroidism, currently receiving cetuximab treatment, now with dyspnea 2/2 granulation tissue down trachea. Patient sent to [place] from nursing home and then transferred to [facility] 2/23 overnight.     Hospital course:  2/24: Patient admitted overnight. ENT called and exchanged trach to 6CFS. Patient is stabilized without dyspnea now and laryngectomy cleared of crusting and secretions. Continued facial lymphedema and new facial rash consistent possibly with cetuximab toxicity rash.   2/25: No acute events overnight.  Laryngectomy button placed in stoma. Neck cellulitis inproving.   2/26: No acute events overnight. Patient switched to size 8/55 laryngectomy tube coated with hydrocortisone. Stoma more patent today.   2/27: No acute events overnight. Continues to have mild oropharyngeal bleeding. Facial edema worsening today. Will discuss with palliative for hospice discharge.   2/28: No acute events overnight, oropharyngeal bleeding decreased. Facial edema worsening. Facial rash improving. Palliative to have family discussion with patients family tomorrow. Per social work, anticipated discharge to hospice starting tomorrow.   3/1: naeon. still has facial edema. per pall care, for hospice. will be arranged.  3/2: naeon. still has facial edema. fmily conference today  3/3: naeon. still has facial edema. DNR. to possible inpatient hospice [facility] tomorrow. we will notify [facility] today about plan  3/4: NAEON. Doing well this AM. No complaints    Vital Signs Last 24 Hrs  T(C): 36.8 (03 Mar 2021 05:51), Max: 37.3 (02 Mar 2021 09:32)  T(F): 98.3 (03 Mar 2021 05:51), Max: 99.2 (02 Mar 2021 09:32)  HR: 69 (03 Mar 2021 05:51) (64 - 74)  BP: 96/59 (03 Mar 2021 05:51) (96/59 - 125/76)  BP(mean): 71 (03 Mar 2021 05:51) (71 - 94)  RR: 20 (03 Mar 2021 05:51) (17 - 20)  SpO2: 99% (03 Mar 2021 05:51) (99% - 100%)    Physical Exam:  General- A&O x 2-3, laying comfortably in bed, NAD  Head- atraumatic, lymphedema increased  ENT- no nasal secretions, no epistaxis, laryngectomy patent without bleeding around the laryngectomy, minimal secretions suctioned   Skin- facial rash erythematous and papular      66 y/o male h/o Laryngeal cancer s/p laryngectomy and trach/PEG, HTN, hypothyroidism, w unresectable recurrence s/p cetuximab treatment, now with dyspnea 2/2 granulation tissue down trachea which has resolved. for hospice care.   - No need for further abx   -Ciprodex nebulized if possible, if not, continued drops via laryngectomy   -methadone  -pain control  -routine tracheostomy care

## 2021-03-04 NOTE — DISCHARGE NOTE PROVIDER - NSDCCPCAREPLAN_GEN_ALL_CORE_FT
PRINCIPAL DISCHARGE DIAGNOSIS  Diagnosis: Squamous cell carcinoma of head and neck  Assessment and Plan of Treatment:

## 2021-03-04 NOTE — DISCHARGE NOTE PROVIDER - PROVIDER TOKENS
PROVIDER:[TOKEN:[[id number]],FOLLOWUP:[2 weeks],ESTABLISHEDPATIENT:[T]],PROVIDER:[TOKEN:[[id number]],FOLLOWUP:[2 weeks],ESTABLISHEDPATIENT:[T]]

## 2021-03-12 DIAGNOSIS — Z93.0 TRACHEOSTOMY STATUS: ICD-10-CM

## 2021-03-12 DIAGNOSIS — E03.9 HYPOTHYROIDISM, UNSPECIFIED: ICD-10-CM

## 2021-03-12 DIAGNOSIS — J95.09 OTHER TRACHEOSTOMY COMPLICATION: ICD-10-CM

## 2021-03-12 DIAGNOSIS — L27.0 GENERALIZED SKIN ERUPTION DUE TO DRUGS AND MEDICAMENTS TAKEN INTERNALLY: ICD-10-CM

## 2021-03-12 DIAGNOSIS — R04.1 HEMORRHAGE FROM THROAT: ICD-10-CM

## 2021-03-12 DIAGNOSIS — J96.20 ACUTE AND CHRONIC RESPIRATORY FAILURE, UNSPECIFIED WHETHER WITH HYPOXIA OR HYPERCAPNIA: ICD-10-CM

## 2021-03-12 DIAGNOSIS — J39.2 OTHER DISEASES OF PHARYNX: ICD-10-CM

## 2021-03-12 DIAGNOSIS — I89.0 LYMPHEDEMA, NOT ELSEWHERE CLASSIFIED: ICD-10-CM

## 2021-03-12 DIAGNOSIS — T45.1X5A ADVERSE EFFECT OF ANTINEOPLASTIC AND IMMUNOSUPPRESSIVE DRUGS, INITIAL ENCOUNTER: ICD-10-CM
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
## 2021-03-12 DIAGNOSIS — D72.829 ELEVATED WHITE BLOOD CELL COUNT, UNSPECIFIED: ICD-10-CM

## 2021-03-12 DIAGNOSIS — L03.221 CELLULITIS OF NECK: ICD-10-CM

## 2021-03-12 DIAGNOSIS — I10 ESSENTIAL (PRIMARY) HYPERTENSION: ICD-10-CM

## 2021-03-12 DIAGNOSIS — Z51.5 ENCOUNTER FOR PALLIATIVE CARE: ICD-10-CM

## 2021-03-12 DIAGNOSIS — D64.9 ANEMIA, UNSPECIFIED: ICD-10-CM

## 2021-03-12 DIAGNOSIS — Z66 DO NOT RESUSCITATE: ICD-10-CM

## 2021-03-12 DIAGNOSIS — Z93.1 GASTROSTOMY STATUS: ICD-10-CM

## 2021-03-12 DIAGNOSIS — E43 UNSPECIFIED SEVERE PROTEIN-CALORIE MALNUTRITION: ICD-10-CM

## 2021-03-12 DIAGNOSIS — Z90.02 ACQUIRED ABSENCE OF LARYNX: ICD-10-CM

## 2021-03-16 PROCEDURE — A7520: CPT

## 2021-03-16 PROCEDURE — 84295 ASSAY OF SERUM SODIUM: CPT

## 2021-03-16 PROCEDURE — 83735 ASSAY OF MAGNESIUM: CPT

## 2021-03-16 PROCEDURE — 81003 URINALYSIS AUTO W/O SCOPE: CPT

## 2021-03-16 PROCEDURE — 70491 CT SOFT TISSUE NECK W/DYE: CPT

## 2021-03-16 PROCEDURE — 82330 ASSAY OF CALCIUM: CPT

## 2021-03-16 PROCEDURE — 85384 FIBRINOGEN ACTIVITY: CPT

## 2021-03-16 PROCEDURE — 85610 PROTHROMBIN TIME: CPT

## 2021-03-16 PROCEDURE — 85027 COMPLETE CBC AUTOMATED: CPT

## 2021-03-16 PROCEDURE — 84132 ASSAY OF SERUM POTASSIUM: CPT

## 2021-03-16 PROCEDURE — 99284 EMERGENCY DEPT VISIT MOD MDM: CPT | Mod: 25

## 2021-03-16 PROCEDURE — 0225U NFCT DS DNA&RNA 21 SARSCOV2: CPT

## 2021-03-16 PROCEDURE — 80048 BASIC METABOLIC PNL TOTAL CA: CPT

## 2021-03-16 PROCEDURE — 71260 CT THORAX DX C+: CPT

## 2021-03-16 PROCEDURE — 71045 X-RAY EXAM CHEST 1 VIEW: CPT

## 2021-03-16 PROCEDURE — 82962 GLUCOSE BLOOD TEST: CPT

## 2021-03-16 PROCEDURE — 87635 SARS-COV-2 COVID-19 AMP PRB: CPT

## 2021-03-16 PROCEDURE — 99285 EMERGENCY DEPT VISIT HI MDM: CPT

## 2021-03-16 PROCEDURE — 84145 PROCALCITONIN (PCT): CPT

## 2021-03-16 PROCEDURE — 86901 BLOOD TYPING SEROLOGIC RH(D): CPT

## 2021-03-16 PROCEDURE — 70360 X-RAY EXAM OF NECK: CPT

## 2021-03-16 PROCEDURE — 94640 AIRWAY INHALATION TREATMENT: CPT

## 2021-03-16 PROCEDURE — 82728 ASSAY OF FERRITIN: CPT

## 2021-03-16 PROCEDURE — 85025 COMPLETE CBC W/AUTO DIFF WBC: CPT

## 2021-03-16 PROCEDURE — 87086 URINE CULTURE/COLONY COUNT: CPT

## 2021-03-16 PROCEDURE — 71275 CT ANGIOGRAPHY CHEST: CPT

## 2021-03-16 PROCEDURE — U0005: CPT

## 2021-03-16 PROCEDURE — 82550 ASSAY OF CK (CPK): CPT

## 2021-03-16 PROCEDURE — 86769 SARS-COV-2 COVID-19 ANTIBODY: CPT

## 2021-03-16 PROCEDURE — 87640 STAPH A DNA AMP PROBE: CPT

## 2021-03-16 PROCEDURE — 84100 ASSAY OF PHOSPHORUS: CPT

## 2021-03-16 PROCEDURE — 83880 ASSAY OF NATRIURETIC PEPTIDE: CPT

## 2021-03-16 PROCEDURE — 97162 PT EVAL MOD COMPLEX 30 MIN: CPT

## 2021-03-16 PROCEDURE — 83605 ASSAY OF LACTIC ACID: CPT

## 2021-03-16 PROCEDURE — 82803 BLOOD GASES ANY COMBINATION: CPT

## 2021-03-16 PROCEDURE — 93005 ELECTROCARDIOGRAM TRACING: CPT

## 2021-03-16 PROCEDURE — 80053 COMPREHEN METABOLIC PANEL: CPT

## 2021-03-16 PROCEDURE — 81001 URINALYSIS AUTO W/SCOPE: CPT

## 2021-03-16 PROCEDURE — 86900 BLOOD TYPING SEROLOGIC ABO: CPT

## 2021-03-16 PROCEDURE — 36415 COLL VENOUS BLD VENIPUNCTURE: CPT

## 2021-03-16 PROCEDURE — 70498 CT ANGIOGRAPHY NECK: CPT

## 2021-03-16 PROCEDURE — 85730 THROMBOPLASTIN TIME PARTIAL: CPT

## 2021-03-16 PROCEDURE — 86140 C-REACTIVE PROTEIN: CPT

## 2021-03-16 PROCEDURE — 87040 BLOOD CULTURE FOR BACTERIA: CPT

## 2021-03-16 PROCEDURE — 86850 RBC ANTIBODY SCREEN: CPT

## 2021-03-16 PROCEDURE — 97530 THERAPEUTIC ACTIVITIES: CPT

## 2021-03-16 PROCEDURE — 99285 EMERGENCY DEPT VISIT HI MDM: CPT | Mod: 25

## 2021-03-16 PROCEDURE — U0003: CPT

## 2021-03-16 PROCEDURE — 84484 ASSAY OF TROPONIN QUANT: CPT

## 2021-03-16 PROCEDURE — 83615 LACTATE (LD) (LDH) ENZYME: CPT

## 2021-03-16 PROCEDURE — 97161 PT EVAL LOW COMPLEX 20 MIN: CPT

## 2021-03-16 PROCEDURE — 87641 MR-STAPH DNA AMP PROBE: CPT

## 2021-03-16 PROCEDURE — 85379 FIBRIN DEGRADATION QUANT: CPT

## 2021-05-20 NOTE — PHYSICAL THERAPY INITIAL EVALUATION ADULT - LEVEL OF CONSCIOUSNESS, REHAB EVAL
Left LE Active Assistive ROM was WFL (within functional limits)/Right LE Active ROM was WFL   (within functional limits) alert

## 2021-08-02 NOTE — PATIENT PROFILE ADULT - NSPROHMSYMPCOND_GEN_A_NUR
Occupational Therapy  Visit Type: treatment  Precautions:  Medical precautions:  fall risk; standard precautions.  7/20/21 Pt still awaiting enrollment into [facility]  7/8 awaiting for pt to be enrolled in [facility] in order to discharge to [facility]  7/5/21 per [facility] awaiting placement at this time with anticipated discharge to group home after subacute stay   Therapist wore procedure mask and eye protection throughout session.   Pt admitted from Dr Bates office due to multiple falls, left thumb fracture, medical status low H&H and UTI    Pertinent past medical history includes hypertension, hyperlipidemia, type 2 diabetes, myelodysplastic malignancy (leukocytosis and anemia), cognitive decline due to dementia       Hearing: no hearing deficits  Vision:     Current vision: wears glasses all the time  Safety Measures: chair alarm and bed alarm    SUBJECTIVE  Patient agreed to participate in therapy this date.  Agreeable to session and out of bed activity   Patient / Family Goal: maximize function    OBJECTIVE     Oriented to normal baseline confusion present and person     Disoriented to time    Affect/Behavior: alert, confused, pleasant and distractable  Patient activity tolerance: 1 to 1 activity to rest  Functional Communication/Cognition       Form of communication:  Verbal     Attention span:  Attends with cues to redirect    Attention Span Impairment: distractibility and reduced memory    Commands: follows one step commands with repetition.  Bed mobility:    Rolling left: modified independent    Supine to sit: modified independent  Training completed:      Education details: body mechanics and patient safety  Transfers:    Assistive devices: gait belt and 4-wheeled walker    Sit to stand: supervision    Stand to sit: contact guard/touching/steadying assist and supervision   Bed to chair: contact guard/touching/steadying assist, type:    Training completed:    Tasks: sit to stand, stand to sit  and toilet    Education details: body mechanics and patient safety    Pt requires reinforcement for hand placement, safety and 4ww management as well as locking breaks as well as hands on steadying assist due to several minor LOB backwards  Functional Ambulation:    Assistance:contact guard/touching/steadying assist   Assistive device:gait belt and 4-wheeled walker    Distance (ft): 30    Details/Comments: Pt with several minor LOB backwards requiring hands on steadying assist.   Activities of Daily Living (ADLs):  Grooming/Oral Hygiene:     Grooming assist: set up and supervision    Oral hygiene assist: supervision and contact guard/touching/steadying assist    Position: standing at sink    Assist needed for: verbal cueing, supervision/safety, increased time to complete, steadying and standing with assistive device  Upper Body Dressing:    Assist: minimal assist    Assist needed for: pull down in back and fasteners  Lower Body Dressing:     Assist: contact guard/touching/steadying assist    Footwear assistance: supervision and set up    Assist needed for: steadying  Toilet transfer:     Assist: contact guard/touching/steadying assist    Device: 4-wheeled walker    Equipment: grab bar use  Toileting:     Assist: contact guard/touching/steadying assist    Assist needed for: verbal cueing, supervision/safety, increased time to complete and steadying    Equipment: grab bar use  Bathing:     Assist: contact guard/touching/steadying assist    Position: standing at sink and sitting at sink    Assist needed for: steadying, verbal cueing, supervision/safety and increased time to complete    Equipment: grab bar  Activities of daily living training:   Pt with several minor LOB while in standing for grooming, toileting and ADL's but was able to use grab bar for steadying assist with occasional hands on assist.       Interventions      Shoulder flexion: bilateral, seated, resistive, 10 reps, 2 sets, orange theraband      Shoulder horizontal abduction: bilateral, seated, resistive, 10 reps, 2 sets, orange theraband     Elbow extension: bilateral, seated, resistive, 10 reps, 2 sets, orange theraband     Elbow flexion: bilateral, seated, resistive, 10 reps, 1 sets, orange theraband     D1 pattern: bilateral, seated, resistive, 10 reps, 2 sets, orange theraband   Training provided: ADL training, activity tolerance, balance retraining, compensatory techniques, functional ambulation, positioning, IADL training, safety training and transfer training  Skilled input: verbal instruction/cues and tactile instruction/cues  Verbal Consent: Writer verbally educated and received verbal consent for hand placement, positioning of patient, and techniques to be performed today from patient for clothing adjustments for techniques and therapist position for techniques as described above and how they are pertinent to the patient's plan of care.        ASSESSMENT    Impairments: activity tolerance, strength, safety awareness, cognitive, decreased insight into deficits, desire/motivation, balance and cardiovascular endurance  Functional Limitations: toileting, functional transfers, dressing, showering, functional mobility and participating in meaningful/purposeful activities    Patient seen on [facility] nursing unit. Today's treatment focused on functional mobility and transfers to and from bathroom, toileting, sinkside grooming, ADL's and review of HEP for Therapy Extender program (TEP) with orange theraband for strengthening.  Pt continues to fluctuate between close supervision and hands on minimal assist for all mobility and transfers and require frequent cues for safety and balance, hand placement to lower onto sitting surfaces also required steadying assist for ADL completion. Pt able to tolerate 10x2 repetitions for HEP this date but reports sheis is not yet ready for upgraded resistance level for HEP and will continue with orange theraband at this time.  For safe return to prior living situation the patient needs to be minimal assist  for ADLs, modified independent  for functional transfers and  for toilet transfers. Patient currently lacks assistance to return to their previous living situation and is awaiting insurance services for group home placement. Will continue with therapy extender program on  T/Th/S with at 1x/week OT sessions to address new concerns and evaluate tolerance for HEP.     Discharge Recommendations:  Recommendations for Discharge: OT WI: 24 Hour assist, [facility], SNF      PT/OT Mobility Equipment for Discharge: no needs anticipated; has 4ww     OT Identified Barriers to Discharge: decreased memory, safety awareness, fall risk     Skilled therapy is required to address these limitations in attempt to maximize the patient's independence.  Progress: no functional improvements, slow progress, cognitive deficits and slow progress, decreased activity tolerance    End of Session:   Location: in chair  Safety measures: call light within reach, equipment intact and alarm system in place/re-engaged    PLAN  Suggestions for next session as indicated: Plan: review TEP tolerance and adjust as necessary    OT Frequency: Once a week  Frequency Comments: TEP T/Th/S AP Mon 8/9 [place] speaking      Agreement to plan and goals: patient agrees with goals and treatment plan      GOALS  Review Date: 8/9/2021  Long Term Goals: (to be met by time of discharge from hospital)  Lower body dressing: Patient will complete lower body dressing minimal assist. Status: met   Toileting: Patient will complete toileting supervision.  Status: met   Bathing: Patient will complete bathingminimal assist  Status: met Toilet transfer: Patient will complete toilet transfer with supervision.  Status: partially met   Tub/shower transfer: Patient will complete tub/shower transfer with minimal assist. Status: met   Home setting transfer: Patient will complete home setting transfers with  supervision.  Status: progressing/ongoing  Home program (assist): patient able to complete home program with family/friend/caregiver assist.   Status: progressing/ongoing        Documented in the chart in the following areas: Pain. Assessment. Plan.      Therapy procedure time and total treatment time can be found documented on the Time Entry flowsheet   cancer

## 2022-06-02 NOTE — ED ADULT NURSE NOTE - CAS TRG [facility]AL [employer], MLM
2022        RE: Maggie ARMENDARIZ Feest  : 1982      To Whom It May Concern:    Maggie was in clinic today for follow up appointment. She is to work from home starting 22 through 6/10/2022. Then she is to work 1/2 day at home and 1/2 day at work for the next week.     Sincerely,        SAL Olivia  [address] [id number] [phone]   Partially obstructed

## 2022-06-15 NOTE — ED ADULT NURSE NOTE - NSFALLRSKASSESSDT_ED_ALL_ED
[Gradual] : gradual [7] : 7 [5] : 5 [Dull/Aching] : dull/aching [Intermittent] : intermittent [Rest] : rest [Meds] : meds [Physical therapy] : physical therapy [Stairs] : stairs 21-Feb-2021 13:18 [de-identified] : 06/01/2022: a 44 yo male, presents for initial evaluation of nonradiating right knee pain, no stiffness/swelling.  + PT pre and post surgery, takes Naproxen PRN. had the knee arthoscopy with Rogelio w/o significant amount of relief.\par \par PMH\par right knee cap surgery 2015\par torn right knee ACL surgery in 2019\par torn right knee ACL in 2021. Dr Mercado did a procedure in 01/2022, the ACL has not been repaired -  arthroscopy and bone grafting.\par \par \par  [] : no [FreeTextEntry1] : right knee [FreeTextEntry9] : Naproxen PRN [de-identified] : bending [de-identified] : Dr Mercado [de-identified] : 01/2022 [de-identified] : 06/09/2022

## 2022-07-07 NOTE — ED ADULT NURSE NOTE - NS ED NURSE RECORD ANOTHER HT AND WT
History and Physical      Name:  Josse Pack /Age/Sex: 3/18/1930  (79 y.o. female)   MRN & CSN:  [id number] & [id number] Admission Date/Time: 2022 12:09 PM   Location:  ED[facility]/ED-26 PCP: [address] [place] [address] Day: 1           Assessment and Plan:   # Chest pain with Elevated troponin/Hx CAD/CABG/PCI - LHC/RHC- 2017 showed severe native CAD, occluded LAD, CX (filling from collaterals ), occluded RCA LIMA to LAD and D1 patent stents in LAD patent, SVG to RCA patent, patent stents, moderate pulmonary hypertension. Cardiology consulted in ED. Continue to trend trops, TTE, tsh, asa, statin, beta-blocker BP allows, anticoag as per Cardiology recommendations. Further management per Cardiology eval/recs. # Acute on chronic diastolic heart failure -TTE 3/21/2022 shows EF 50 to 55%, severely dilated right ventricle, severe tricuspid regurgitation. CTA chest noted for mild to moderate pulm edema and trace bilateral pleural effusions, BNP of 1251. Cardiology consulted in ED. F/u TTE, repeat trops, continue IV diuresis as per Cardiology recommendations, resume beta-blocker as BP allows, supplemental O2 as needed, daily wts, strict I/O, cardiac diet. Place on tele. Further management per Cardiology    # Left rib fracture, nontraumatic -  Left posterior lateral sixth rib fracture without pneumothorax on CT imaging. Patient denies acute injury or falls. Suspect contrib to chest pain as well.  Pain management, repeat chest imaging in am.    # Pyuria - f/u cx, pt asymptomatic    # Essential hypertension -we will hold antihypertensives as BP trending lower monitor for resuming    # SAULO on CPAP -resume CPAP per home setting    # s/p pacemaker    Other chronic medical conditions-resume home medications unless contraindicated  # History of COPD -not in exacerbation, resume home inhalers  # History of Pulmonary hypertension - Followed by Pulmonology  # History of paroxysmal atrial fibrillation -no longer on AC, managed on aspirin and beta-blocker  # History of TIA -on antiplatelet statin  # Mixed hyperlipidemia - on statin  # Acquired hypothyroidism -resume levothyroxine replacement, follow-up TSH  # Hx T7 veterbal fx -pain management as needed    Present on Admission:   Chest pain             Diet No diet orders on file   DVT Prophylaxis [x] Lovenox, []  Heparin, [] SCDs, [] Ambulation  [] Long term AC   GI Prophylaxis [] PPI,  [] H2 Blocker,  [] Carafate,  [] Diet/Tube Feeds   Code Status Full code    Disposition Admit to inpt step down. Patient plans to return home upon discharge         Chief Complaint: Chest Pain (L sided, radiates to back )      History obtained from EHR and patient   History of Present Illness:   Lemuel Mancuso is a 79 y.o. female  with history of CAD status post CABG, paroxysmal atrial fibrillation, diastolic heart failure, essential hypertension, Hyperlipidemia, status post pacemaker, SAULO on CPAP, COPD, TIA, hypothyroidism, T7 vertebral fracture among other comorbidities who presents with chest pain. The patient reports left-sided chest pain for the past month or so with worsening the past few days. She reports constant severe left-sided chest pain radiates to her back and down to her waist on the left side. She denies associated nausea vomiting or diaphoresis. She denies palpitations. She reports activity worsens chest pain. She reports chronic shortness of breath, denies worsening or association with chest pain. She denies cough recent infections fever chills. She denies any injuries or falls. She presented for evaluation due to concern of having a heart attack. She was evaluated emergency department. She presented hemodynamically stable afebrile O2 sat 93% on room air. CTA chest showed no evidence of pulmonary embolism, noting that evaluation of the distal pulmonary arteries is somewhat limited due to respiratory motion artifact.  2.  Cardiomegaly including severe right atrial dilation. Mild-to-moderate pulmonary edema and trace bilateral pleural effusions. Dilation of the central pulmonary arteries likely reflects pulmonary hypertension. Acute appearing, displaced left sixth posterolateral rib fracture with trace adjacent blood products. EKG showed ventricularly paced rhythm. Troponin 0.014, BNP 1251. Chemistry panel unremarkable with exception of BUN of 31. Alk phos 220, bili 1.1 otherwise unremarkable LFTs. CBC showed normal WBC and hemoglobin. UA showed 53 WBCs, 2 RBC, small leukoesterase negative nitrates. The patient was dosed with 20 mg IV Lasix, morphine 2 mg and Zofran. She is placed on supplemental O2. Cardiology was consulted. The patient has been admitted for further management. Ten point ROS: reviewed and are negative, unless as noted in above HPI. Objective:   No intake or output data in the 24 hours ending 07/07/22 1520     Vitals:   Vitals:    07/07/22 1400 07/07/22 1415 07/07/22 1430 07/07/22 1445   BP:  134/74 131/76    Pulse: 60 61 61 60   Resp:       Temp:       TempSrc:       SpO2: 100% 97% 100% 99%   Weight:           Physical Exam: 07/07/22     GEN -Awake appearing female, in NAD. Appears given age. EYES -anicteric, conjunctiva pink. HENT -Head is normocephalic, atraumatic. MM are moist. No evidence of thrush. NECK -Supple, no apparent thyromegaly or masses. RESP -decreased bs, no wheezes, resp unlabored. .  Symmetric chest movement   C/V -S1/S2 auscultated. RRR without appreciable M/R/G. No JVD. Cap refill <3 sec. trace bilateral ankle swelling   GI -Abdomen is soft non distended, non tender to palpation. + BS. No masses or guarding.  -No CVA/ flank tenderness. LYMPH- No petechiae or ecchymoses. MS -No gross joint deformities. SKIN -Normal coloration, warm, dry. NEURO [place], otherwise Cranial nerves appear grossly intact, normal speech, no lateralizing weakness. PSYC-Awake, alert, oriented x 4 .  Appropriate affect. Past Medical History:      Past Medical History:   Diagnosis Date    Arthritis     Bradycardia 2001    requiring dual chamber pacemaker at [facility] CAD (coronary artery disease)     Cardiac pacemaker 10/2001    [employer] #[id number]  [facility]- Serial # [id number]- Dr Estuardo Franco CHF (congestive heart failure) ([facility] [address]     COPD, mild ([facility] [address] 2/17/2017    Cor pulmonale (chronic) ([address] 3/15/2022    CVA (cerebrovascular accident) ([facility] [address]     DJD (degenerative joint disease) of cervical spine     C5-C6, C6-C7    Exhaustion of cardiac pacemaker battery 11/21/2011    PPM battery replacement- [employer]    Family history of cardiovascular disease     Glaucoma Dx 2010    H/O 24 hour EKG monitoring 8/6/2000 8/6/2000- Intermittent episonde of a-fib/flutter    H/O cardiac catheterization 4/20/2010, 2/1989 4/20/2010-Severe native vessel disease and has graft disease as well. LAD, CX totally occluded. RCA totally occluded in proximal segment. VG to RCA widely patent. PDA 90% LAD afer LIMA anastomosis 80-90% stenosis. Proceeded with PTCA with stent next day.  H/O cardiovascular stress test 10/14/2011, 6/2/2010,4/8/2010, 5/18/2009,4/6/2009, 10/30/2007, 11/12/2004, 11/6/2003, 8/9/2002, 8/9/2001,6/5/2000,     10/14/2011-Lexiscan-Abnormal Myocardial Perfusion study. Evidence of mild ischemia in the Left CX region. Abnomal study. Rest EF 63%. Global LV systolic function normal. No ECG changes. Unremarkable pharmacological stress test.    H/O cardiovascular stress test 6/10/2013    thallium--mild ischemia left circumflex EF63% no change from 10/2011 study.  H/O cardiovascular stress test 10/16/2014    cardiolite-mild ischemia left circumflex,EF70%    H/O chest x-ray 4/19/2009 4/19/2009-Stable cardiomegaly. No acute cardiopulmonary disease.     H/O Doppler lower venous ultrasound 09/18/2019    Significant reflux noted in RGSV, RGSV is extremely tortuous and small along the thigh and calf and disease) ([facility] [address]     S/P CABG x 3 4/8/2009    LIMA->Diag,  LIMA to LAD;  SVG->RCA going to the PDA. Followed by MAZE procedure by pulmonary vein isolation.-  Dr Ernie Casey S/P PTCA (percutaneous transluminal coronary angioplasty) 11/2012    PTCA with stent to RCA    Severe pulmonary hypertension ([facility] [address] 3/15/2022    Shortness of breath 3/15/2022    Thyroid disease     hypothyroi    Unspecified cerebral artery occlusion with cerebral infarction Unsure When    No Residual       PMH reviewed  Past Surgical  History:    has a past surgical history that includes Appendectomy (1941); Tonsillectomy (1950's); Cardiac surgery (4/09); Hysterectomy, total abdominal (1990's); Colonoscopy (In 2000's); pacemaker placement; Coronary artery bypass graft (4/8/2009); Coronary angioplasty with stent (4/21/2010); other surgical history; Middle ear surgery; and Percutaneous Transluminal Coronary Angio (11/2012). Surgical history reviewed  Family  History:   family history includes Arthritis in her mother; Cancer in her mother and sister; Coronary Art Dis in her father; Depression in her mother and sister; Early Death in her paternal grandfather; Early Death (age of onset: 36) in her father; Hearing Loss in her mother; Heart Disease in her father and sister; High Blood Pressure in her father, mother, sister, son, and son; High Cholesterol in her father and mother; Mental Illness in her mother; Excell Klippel / Suleman Pen in her mother; Other in her daughter. Family history reviewed  Social History:     Social History     Socioeconomic History    Marital status:       Spouse name: None    Number of children: 3    Years of education: None    Highest education level: None   Occupational History    Occupation: RETIRED     Comment: from [employer] Use    Smoking status: Former Smoker     Packs/day: 0.25     Years: 5.00     Pack years: 1.25     Types: Cigarettes     Quit date: 11/29/1970     Years since quittin.6    Smokeless tobacco: Never Used   Substance and Sexual Activity    Alcohol use: Not Currently     Comment: no more than 3 cups of coffee each day    Drug use: No    Sexual activity: Yes     Partners: Male     Comment:    Other Topics Concern    None   Social History Narrative    None     Social Determinants of Health     Financial Resource Strain:     Difficulty of Paying Living Expenses: Not on file   Food Insecurity:     Worried About Running Out of Food in the Last Year: Not on file    Sarah of Food in the Last Year: Not on file   Transportation Needs:     Lack of Transportation (Medical): Not on file    Lack of Transportation (Non-Medical): Not on file   Physical Activity:     Days of Exercise per Week: Not on file    Minutes of Exercise per Session: Not on file   Stress:     Feeling of Stress : Not on file   Social Connections:     Frequency of Communication with Friends and Family: Not on file    Frequency of Social Gatherings with Friends and Family: Not on file    Attends Yazdanism Services: Not on file    Active Member of [employer] or Organizations: Not on file    Attends Club or Organization Meetings: Not on file    Marital Status: Not on file   Intimate Partner Violence:     Fear of Current or Ex-Partner: Not on file    Emotionally Abused: Not on file    Physically Abused: Not on file    Sexually Abused: Not on file   Housing Stability:     Unable to Pay for Housing in the Last Year: Not on file    Number of [place] in the Last Year: Not on file    Unstable Housing in the Last Year: Not on file      reports that she quit smoking about 51 years ago. Her smoking use included cigarettes. She has a 1.25 pack-year smoking history. She has never used smokeless tobacco.   reports previous alcohol use. reports no history of drug use. Social history reviewed  Allergies:      Allergies   Allergen Reactions    Darvocet [Propoxyphene N-Acetaminophen] Reported on 6/28/2022    Historical Provider, MD   lidocaine 4 % external patch Place 1 patch onto the skin daily  Patient not taking: Reported on 4/19/2022 3/25/22   Yordan Torres MD   vitamin D 25 MCG (1000 UT) CAPS Take 5,000 Units by mouth daily    Historical Provider, MD   Probiotic Product (PROBIOTIC-10) CHEW Take by mouth  Patient not taking: Reported on 7/7/2022    Historical Provider, MD   atorvastatin (LIPITOR) 10 MG tablet Take 10 mg by mouth daily  10/20/20   Historical Provider, MD   umeclidinium-vilanterol (ANORO ELLIPTA) 62.5-25 MCG/INH AEPB inhaler Inhale 1 puff into the lungs daily 7/27/20   Junior Hendrickson MD   CPAP Machine MISC by Does not apply route    Historical Provider, MD   Biotin (BIOTIN 5000) 5 MG CAPS Take 1 capsule by mouth daily    Historical Provider, MD   Misc Natural Products (OSTEO BI-FLEX ADV DOUBLE ST PO) Take 1 tablet by mouth daily    Historical Provider, MD   timolol (TIMOPTIC-XE) 0.25 % ophthalmic gel-forming Place 1 drop into both eyes daily     Historical Provider, MD   carvedilol (COREG) 6.25 MG tablet Take 1 tablet by mouth 2 times daily (with meals) 1/31/17   Stehpanie Harvey MD   albuterol (PROVENTIL HFA;VENTOLIN HFA) 108 (90 BASE) MCG/ACT inhaler Inhale 2 puffs into the lungs 2 times daily AND EVERY 4-6 HOURS AS NEEDED    Historical Provider, MD   Multiple Vitamins-Minerals (ICAPS) CAPS Take 1 capsule by mouth 2 times daily     Historical Provider, MD   vitamin B-12 (CYANOCOBALAMIN) 1000 MCG tablet Take 1,000 mcg by mouth daily. Historical Provider, MD   levothyroxine (SYNTHROID) 88 MCG tablet Take 88 mcg by mouth daily. Historical Provider, MD   aspirin 81 MG chewable tablet Take 81 mg by mouth daily.       Historical Provider, MD         Medications:   Medications:    furosemide  20 mg IntraVENous Once      Infusions:   PRN Meds: morphine, 2 mg, Q30 Min PRN  ondansetron, 4 mg, Q30 Min PRN        Data:     Laboratory this visit:  Reviewed  Recent Labs 07/07/22  1226   WBC 8.8   HGB 12.8   HCT 41.1         Recent Labs     07/07/22  1226      K 4.0   CL 99   CO2 28   BUN 31*   CREATININE 0.7     Recent Labs     07/07/22  1226   AST 28   ALT 19   BILITOT 1.1*   ALKPHOS 220*     No results for input(s): INR in the last 72 hours. Radiology this visit:  Reviewed. XR CHEST PORTABLE    Result Date: 7/7/2022  EXAMINATION: ONE XRAY VIEW OF THE CHEST 7/7/2022 12:31 pm COMPARISON: 03/18/2022 HISTORY: ORDERING SYSTEM PROVIDED HISTORY: CP TECHNOLOGIST PROVIDED HISTORY: Reason for exam:->CP Reason for Exam: chest pain FINDINGS: Cardiomediastinal silhouette is stable. Mild pulmonary vascular congestion. Small right pleural effusion. No pneumothorax. No gross bony abnormality. Mild pulmonary vascular congestion. Small right pleural effusion. CTA PULMONARY W CONTRAST    Result Date: 7/7/2022  EXAMINATION: CTA OF THE CHEST 7/7/2022 1:56 pm TECHNIQUE: CTA of the chest was performed after the administration of intravenous contrast.  Multiplanar reformatted images are provided for review. MIP images are provided for review. Automated exposure control, iterative reconstruction, and/or weight based adjustment of the mA/kV was utilized to reduce the radiation dose to as low as reasonably achievable. COMPARISON: Same day chest radiograph HISTORY: ORDERING SYSTEM PROVIDED HISTORY: chest pain/back pain TECHNOLOGIST PROVIDED HISTORY: Reason for exam:->chest pain/back pain Decision Support Exception - unselect if not a suspected or confirmed emergency medical condition->Emergency Medical Condition (MA) Reason for Exam: chest pain/back pain Additional signs and symptoms: none Relevant Medical/Surgical History: 75ml isovue 370/ gfr>60 FINDINGS: Pulmonary Arteries: No evidence of intraluminal filling defect to suggest pulmonary embolism. There is  dilation of the central pulmonary arteries as can be seen in pulmonary hypertension.  Mediastinum: Prior median sternotomy and CABG. Left chest pacemaker lead terminates in the right ventricle. No evidence of mediastinal lymphadenopathy. Partially calcified mediastinal and hilar lymph nodes likely represent sequelae of prior granulomatous disease. Marked cardiomegaly including severe right atrial dilation. .  Moderate atherosclerosis of the thoracic aorta without aneurysm. Lungs/pleura: There is mosaic attenuation of the lungs and smooth interlobular septal thickening compatible with mild-to-moderate pulmonary edema. Scattered linear atelectasis/scarring. Trace bilateral pleural effusions. Upper Abdomen: Marked enlargement of the IVC and reflux of contrast into the hepatic veins, compatible with right heart dysfunction. Calcified hepatic and splenic granulomas. Atherosclerosis of the abdominal aorta and its branches. Small hiatal hernia. Soft Tissues/Bones: There is an acute appearing displaced left sixth posterolateral rib fracture. 1.  No evidence of pulmonary embolism, noting that evaluation of the distal pulmonary arteries is somewhat limited due to respiratory motion artifact. 2.  Cardiomegaly including severe right atrial dilation. Mild-to-moderate pulmonary edema and trace bilateral pleural effusions. Dilation of the central pulmonary arteries likely reflects pulmonary hypertension. 3.  Acute appearing, displaced left sixth posterolateral rib fracture with trace adjacent blood products.            EKG this visit:  personally reviewed         Electronically signed by LISA Alvarez CNP on 7/7/2022 at 3:20 PM Yes

## 2022-08-15 NOTE — ED ADULT NURSE NOTE - COVID-19 RESULT DATE/TIME
pt transferred from [facility]. + croup. no stridor at rest. at other hosp pt desatted and sent here for obs. rac epi and decadron given. 26-Jan-2021 14:48

## 2022-08-30 NOTE — ED ADULT NURSE NOTE - PERIPHERAL PULSES
equal bilaterally [place] Counseling:  I discussed with the patient the risks of golimumab including but not limited to myelosuppression, immunosuppression, autoimmune hepatitis, demyelinating diseases, lymphoma, and serious infections.  The patient understands that monitoring is required including a PPD at baseline and must alert us or the primary physician if symptoms of infection or other concerning signs are noted.

## 2023-03-08 NOTE — ASU PREOP CHECKLIST - BMI (KG/M2)
Alert-The patient is alert, awake and responds to voice. The patient is oriented to time, place, and person. The triage nurse is able to obtain subjective information.
17.8

## 2023-06-24 NOTE — SWALLOW BEDSIDE ASSESSMENT ADULT - CONSISTENCIES ADMINISTERED
HOSPITALIST ADMISSION HISTORY AND PHYSICAL    Patient: Ivania Maharaj Date: 2023   : 1952    70 year old female 2023     Primary Care Physician:  Dolores Francisco DO  Admitting Physician: Seamus Guallpa MD   Consultants:   IP Consult Orders (From admission, onward)     Start     Ordered    23  Inpatient consult to GI  ONE TIME        Provider:  Gigi Bender MD    23                Hospital Medicine Team recommends Ivania Maharaj be admitted as an INPATIENT to the medical unit/ICU. The expected LOS exceeds 2 midnights. Reason(s) for INPATIENT CARE include ongoing monitoring and treatment of pancreatitis . Based on severity of presenting sx, co-morbidities, and lab/imaging, this patient has an increased risk of adverse outcomes.        CC: Abdominal and Back pain     HPI:     Ivania Maharaj is an 70 year old female with PMH papillary villous adenoma of the small intestines and underwent Whipple procedure on 2022.  Past medical history also significant for GERD gastritis hyperlipidemia esophageal ulcer, Hashimoto's thyroiditis, and had cholecystectomy 2021 presenting with abdominal and back pain. Per review of the EMR and in discussion with the patient, she went out to eat at a fish baker with family and later that evening developed abdominal pain that radiated to her back.  Initially the abdominal pain began in the epigastric region and she attributed this to GERD, took TUMS with no relief.  Abdominal pain worsened and developed nausea as well as vomiting. Patient was taken to the hospital by her  for further evaluation and care.  On arrival she reported diffuse abdominal tenderness, sharp in sensation and rated the pain a 10/10.  Pain would radiate to her back bilaterally.  IV toradol and IVF provided with relief.  She denies any associated fevers, chills, no sick contacts. Was recently treated for a sinus infection with oral ABX 4-5 weeks PTA.  Does report  drinking a glass of wine on 6/23, does have 2-3 glasses of wine weekly.  No significant changes in diet reported.     Thorough workup completed in the ED. CMP revealing mild transaminitis as well as elevated lipase of 1473.  CBC with leukocytosis 15.8, lactic acid within normal limits 0.8 and procalcitonin elevated 0.11.  Triglyceride level 69, troponins within normal limits, 4.  CT abdomen and pelvis was obtained revealing subtle soft tissue stranding around the pancreas with fluid in the anterior perirenal space.  Possible pancreatitis.  Fluid distended stomach and hepatobiliary loop.  Concerns for mild colitis, diverticulosis without diverticulitis.  XR chest negative for acute pathology. GI was consulted and admitted for further evaluation and care.  At this time the patient reports improvement in abdominal discomfort with PRN Toradol. Resolution of nausea and vomiting.     Reviewed plan of care with patient and she is agreeable. Discussed code status and confirmed patient is a full code.     PAST MEDICAL HISTORY  Past Medical History:   Diagnosis Date   • Acute cholecystitis 01/12/2021    Lap Joan on 01-13-21   • Anemia     hx anemia   • Difficult intravenous access 01/13/2021    \"Very small veins larger sized needles are very painful\"   • Esophageal ulcer with bleeding 2021    surgical stress ulcer   • Fracture     stress fx in each foot, broken finger   • Gastroesophageal reflux disease    • Gastroesophageal reflux disease    • Glaucoma    • H/O Whipple procedure 5/5/2022    Large ampullary adenoma involving distal bile duct.S/P endoscopic ampullectomy however-> involvement of distal bile duct + ovarian mass 2/24/21 S/P ROBOTIC, DA CHELI MIGUEL ANGEL.SALPINGO-OOPHORECTOMY(benign mesothelial cyst.); OPEN WHIPPLE-Negative for adenomatous change or CA.-Dr. Zacarias Postop GI bleeding 3/2/21 EGD :EGJ ulcer. 6/24/22 MRI: No abnormal intra or extrahepatic biliary ductal dilatation.   • Hashimoto's disease    • Herpes  simplex vulvovaginitis 12/6/2012   • Hiatal hernia    • History of blood transfusion    • History of endometrial hyperplasia 12/04/2019    Treated and resolved   • History of pulmonary embolism 03/08/2021   • Hx Esophageal ulcer with bleeding 2021    surgical stress ulcer.Postop GI bleeding 3/2/21 EGD :EGJ ulcer.    • Hx Papillary villous adenoma of small intestine S/P  Whipple's 12/24/2020    Ampulla of Vater 3-4 cm polyp. Large ampullary adenoma involving distal bile duct.S/P endoscopic ampullectomy however-> involvement of distal bile duct + ovarian mass 2/24/21 S/P ROBOTIC, DA CHELI MIGUEL ANGEL.SALPINGO-OOPHORECTOMY(benign mesothelial cyst.); OPEN WHIPPLE-Negative for adenomatous change or CA.-Dr. Zacarias    • Hyperlipidemia 11/18/2020   • Hypothyroidism (acquired) 6/5/2012   • Osteoporosis, post-menopausal 5/4/2022   • Papillary villous adenoma of small intestine 12/24/2020    Ampulla of Vater 3-4 cm polyp   • Polyp of stomach and duodenum 12/02/2020   • PONV (postoperative nausea and vomiting)        PAST SURGICAL HISTORY  Past Surgical History:   Procedure Laterality Date   • Arthroscopic excision of an acromion bone spur Left 11/03/2016    Left shoulder arthroscopic subacromial decompression and distal clavicle resection   • Ercp  01/26/2021   • Ercp,sphincterotomy  12/24/2020    EMR of Ampullary polyp; Biliary sphincterotomy, PD and CBD stents placed. Dr. Matthew [facility]   • Esophagogastroduodenoscopy  12/02/2020    Duodenal polypoid mass and Hiatal Hernia; Dr. MARITO Garza [facility]   • Esophagogastroduodenoscopy transoral flex w/endo us exam  12/24/2020    3-4 cm Ampulla of Vater polyp; Dr. Matthew [facility]   • Eye surgery Left 06/2020    Cataract Extraction with IOL Implant   • Eye surgery Right 09/28/2020    Cataract Extraction with IOL Implant   • Eye surgery Left 05/17/2019    Punctal Occlusion by Plug, Collagen Upper eyelid   • Hysteroscopy w/ polypectomy  12/30/2015    Diagnostic hysteroscopy with dilatation and  curettage   • Oophorectomy Bilateral    • Removal gallbladder  01/13/2021    Lap Cholecystectomy, Dr. AFSANEH Ferrell    • Uterine fibroid surgery  11/1999    removal of uterine fibroid    • Vaginal delivery  1974    x1       SOCIAL HISTORY  2-3 glasses of wine/week  No smoking  No illicit drug use.   Lives at home with her , does spend 4months in [place] and 8 months here locally.     FAMILY HISTORY  Family History   Problem Relation Age of Onset   • Cancer, Ovarian Mother    • Hypertension Mother    • Hyperlipidemia Mother    • Thyroid Mother    • Dementia/Alzheimers Father    • Psychiatric Sister         bipolar   • Systemic Lupus Erythematosus Sister    • Cancer, Breast Maternal Grandmother 80       MEDICATIONS  (Not in a hospital admission)      ALLERGIES  ALLERGIES:   Allergen Reactions   • Morphine DIZZINESS, ANXIETY and VISUAL DISTURBANCE   • Codeine Other (See Comments)     Numb arms, face, ears   • Hydrocodone Hallucinations       ROS:  General: Denies fever, chills, fatigue or weakness, unintentional weight loss or weight gain and decreased appetite  Skin: Denies rash or unexplained bruising  Eyes: Denies acute visual changes or discharge  HENT:: Denies rhinorrhea, facial pain, sore throat or URI symptoms. + chronic ongoing dysphagia   Respiratory: Denies SOB, orthopnea. Denies cough or sputum production  Cardiovascular: Denies chest pain, palpitations, fast heart rate or acute/worsening lower extremity edema  Gastrointestinal: + abdominal pain, nausea/vomiting.  No constipation, diarrhea, Denies change in the bowel habits.   Genitourinary: Denies urgency, frequency, dysuria or hematuria.   Neurologic: Denies headache, speech disturbance  Musculoskeletal: Denies acute/worsening neck or back pain. Denies any change/acute joint pains  Psychiatric: Denies anxiety. Denies symptoms of depression     PHYSICAL EXAM:    VITAL SIGNS:   Visit Vitals  /67   Pulse 76   Temp 98.5 °F (36.9 °C) (Oral)    Resp 16   Wt 54.5 kg (120 lb 1.6 oz)   SpO2 97%   BMI 19.99 kg/m²   ;  Body mass index is 19.99 kg/m².  GENERAL APPEARANCE: Thin female well developed and nourished. No apparent distress. Alert and orientated x 4  SKIN: Areas of exposed skin are normal color, normal texture, normal turgor, no skin rashes, no ulcers  HEENT: Normocephalic. PERRL. EOMI.  Anicteric sclera. Oropharynx moist without thrush, ulcers or erythema   NECK: Supple and non-tender.   LUNGS: Unlabored breathing. Clear to auscultation with normal inspiratory/expiratory sounds. No rhonchi, wheezing, or crackles. No chest wall tenderness.  HEART: Normal rate and rhythm, no palpable heaves or thrills, S1 and S2 normal, no murmurs  ABDOMEN: Soft, normoactive bowel sounds, non-tender with light palpation.  Not guarding or rebound.   and Rectal: Deferred  EXTREMITIES: No clubbing or cyanosis. No edema. No calf asymmetry, tenderness.  Vascular exam reveals 2+ distal pulses  MUSCULOSKELETAL and NEUROLOGIC: Grossly normal ROM of all 4 extremities.  Muscle tone and motor strength normal/appropriate for age. No tremors noted. Cranial nerves 2 through 12 grossly normal.   PSYCHIATRIC: Cooperative with appropriate affect. No gross deficit of recent or remote memory. Appropriate insight.    LABS AND IMAGING:  Recent Results (from the past 24 hour(s))   Electrocardiogram 12-Lead    Collection Time: 06/24/23  3:51 AM   Result Value Ref Range    Systolic Blood Pressure 130     Diastolic Blood Pressure 80     Ventricular Rate EKG/Min (BPM) 93     Atrial Rate (BPM) 93     WY-Interval (MSEC) 104     QRS-Interval (MSEC) 84     QT-Interval (MSEC) 370     QTc 460     P Axis (Degrees) 66     R Axis (Degrees) 79     T Axis (Degrees) 81     REPORT TEXT       Sinus rhythm  with short WY  Nonspecific ST abnormality  Abnormal ECG  When compared with ECG of  26-OCT-2022 17:20,  No significant change was found     Comprehensive Metabolic Panel    Collection Time: 06/24/23   3:57 AM   Result Value Ref Range    Fasting Status      Sodium 137 135 - 145 mmol/L    Potassium 3.4 3.4 - 5.1 mmol/L    Chloride 102 97 - 110 mmol/L    Carbon Dioxide 27 21 - 32 mmol/L    Anion Gap 11 7 - 19 mmol/L    Glucose 135 (H) 70 - 99 mg/dL    BUN 12 6 - 20 mg/dL    Creatinine 0.68 0.51 - 0.95 mg/dL    Glomerular Filtration Rate >90 >=60    BUN/Cr 18 7 - 25    Calcium 8.4 8.4 - 10.2 mg/dL    Bilirubin, Total 0.5 0.2 - 1.0 mg/dL    GOT/AST 69 (H) <=37 Units/L    GPT/ALT 83 (H) <64 Units/L    Alkaline Phosphatase 329 (H) 45 - 117 Units/L    Albumin 3.6 3.6 - 5.1 g/dL    Protein, Total 8.1 6.4 - 8.2 g/dL    Globulin 4.5 (H) 2.0 - 4.0 g/dL    A/G Ratio 0.8 (L) 1.0 - 2.4   Lactic Acid Venous With Reflex    Collection Time: 06/24/23  3:57 AM   Result Value Ref Range    Lactate, Venous 0.8 0.0 - 2.0 mmol/L   Lipase    Collection Time: 06/24/23  3:57 AM   Result Value Ref Range    Lipase 1,473 (H) 73 - 393 Units/L   Magnesium    Collection Time: 06/24/23  3:57 AM   Result Value Ref Range    Magnesium 1.7 1.7 - 2.4 mg/dL   Procalcitonin    Collection Time: 06/24/23  3:57 AM   Result Value Ref Range    Procalcitonin 0.11 (H) <=0.09 ng/mL   TROPONIN I, HIGH SENSITIVITY    Collection Time: 06/24/23  3:57 AM   Result Value Ref Range    Troponin I, High Sensitivity 4 <52 ng/L   CBC with Automated Differential (performable only)    Collection Time: 06/24/23  3:57 AM   Result Value Ref Range    WBC 15.8 (H) 4.2 - 11.0 K/mcL    RBC 4.06 4.00 - 5.20 mil/mcL    HGB 12.7 12.0 - 15.5 g/dL    HCT 37.0 36.0 - 46.5 %    MCV 91.1 78.0 - 100.0 fl    MCH 31.3 26.0 - 34.0 pg    MCHC 34.3 32.0 - 36.5 g/dL    RDW-CV 14.0 11.0 - 15.0 %    RDW-SD 46.9 39.0 - 50.0 fL     140 - 450 K/mcL    NRBC 0 <=0 /100 WBC    Neutrophil, Percent 85 %    Lymphocytes, Percent 9 %    Mono, Percent 5 %    Eosinophils, Percent 0 %    Basophils, Percent 1 %    Immature Granulocytes 0 %    Absolute Neutrophils 13.5 (H) 1.8 - 7.7 K/mcL    Absolute  Lymphocytes 1.4 1.0 - 4.0 K/mcL    Absolute Monocytes 0.8 0.3 - 0.9 K/mcL    Absolute Eosinophils  0.0 0.0 - 0.5 K/mcL    Absolute Basophils 0.1 0.0 - 0.3 K/mcL    Absolute Immature Granulocytes 0.0 0.0 - 0.2 K/mcL   Urinalysis & Reflex Microscopy With Culture If Indicated    Collection Time: 06/24/23  4:09 AM   Result Value Ref Range    COLOR, URINALYSIS Yellow     APPEARANCE, URINALYSIS Clear     GLUCOSE, URINALYSIS Negative Negative mg/dL    BILIRUBIN, URINALYSIS Negative Negative    KETONES, URINALYSIS Trace (A) Negative mg/dL    SPECIFIC GRAVITY, URINALYSIS 1.020 1.005 - 1.030    OCCULT BLOOD, URINALYSIS Trace (A) Negative    PH, URINALYSIS 7.5 (H) 5.0 - 7.0    PROTEIN, URINALYSIS Trace (A) Negative mg/dL    UROBILINOGEN, URINALYSIS 0.2 0.2, 1.0 mg/dL    NITRITE, URINALYSIS Negative Negative    LEUKOCYTE ESTERASE, URINALYSIS Trace (A) Negative    SQUAMOUS EPITHELIAL, URINALYSIS >100 (A) None Seen, 1 to 5 /hpf    ERYTHROCYTES, URINALYSIS 3 to 5 (A) None Seen, 1 to 2 /hpf    LEUKOCYTES, URINALYSIS 1 to 5 None Seen, 1 to 5 /hpf    BACTERIA, URINALYSIS Moderate (A) None Seen /hpf    HYALINE CASTS, URINALYSIS None Seen None Seen, 1 to 5 /lpf      @cardiaclab@  INR (no units)   Date Value   10/26/2022 1.0       IMAGING:  XR CHEST PA OR AP 1 VIEW    Result Date: 6/24/2023  EXAM: XR CHEST AP OR PA INDICATION: Lower chest and upper abdominal pain with nausea and vomiting. COMPARISON: 10/26/2022 and 7/6/2022 TECHNIQUE: AP view the chest obtained 6/24/2023 at 0435 hours. FINDINGS: Heart size and central vessels are normal. There is no focal consolidation, pleural effusion, or pneumothorax.     IMPRESSION: 1. No acute cardiopulmonary disease. Electronically Signed by: Meghan Walker MD Signed on: 6/24/2023 5:39 AM Workstation ID: [id number]    CT ABDOMEN PELVIS W CONTRAST    Result Date: 6/24/2023  EXAM: CT ABDOMEN PELVIS W CONTRAST INDICATION: Abdominal pain with nausea and vomiting. History of papillary ileus adenoma  of the small intestine. Post Whipple procedure. COMPARISON: 10/26/2022 and 8/11/2022. MRCP study of 10/26/2022. TECHNIQUE: Axial CT images through the abdomen and pelvis were performed following intravenous contrast. 100 mL Omnipaque 300 contrast was given for the study. This CT exam was performed using one or more of the following dose reduction techniques: Automated exposure control, adjustment of the mA and/or KV according to patient size, and/or use of iterative reconstructive technique. FINDINGS: There is bibasilar atelectasis, right greater than left. There is no other focal consolidation, pleural effusion, or pneumothorax in the lung bases. Heart size is normal. Patient's had a cholecystectomy. Pneumobilia is identified and can be seen previously. There is no significant biliary dilatation. Patient's had a previous Whipple procedure. Subtle soft tissue stranding adjacent to the pancreas is identified. Correlation with laboratory tests suggested to evaluate for possible pancreatitis. No focal pancreatic mass or pseudocyst is identified. The adrenal glands are unremarkable. Kidneys enhance symmetrically. There is no nephrolithiasis or hydronephrosis. The mesenteric vasculature remains patent including the portal veins, venous confluence, and superior mesenteric vein. Abdominal aorta is normal in course and caliber. There is no adenopathy within the abdomen or pelvis. There is no significant ascites. Only a small amount of fluid is seen in the upper abdomen adjacent to the anterior pararenal space. Ureters and bladder are unremarkable. Uterus and adnexal regions are unremarkable. The appendix is normal. There is subtle wall thickening of the rectosigmoid colon with adjacent soft tissue stranding raising concern of possible distal colitis. There are a few scattered diverticuli without focal diverticulitis. There is moderate stool in the ascending and transverse colon. There is no bowel obstruction. Mild fluid  distention of the hepatobiliary loop following the Timothy-en-Y bypass is noted with mild fluid distention also of the stomach. The Timothy loop is otherwise unremarkable. Degenerative disc disease is noted L4-L5 and L5-S1. There is no acute fracture or bone destructive process.     IMPRESSION: 1. Post surgical changes are identified from Whipple procedure. There is subtle soft tissue stranding surrounding the pancreas with fluid in the anterior pararenal space. Correlate with laboratory tests for possible pancreatitis is suggested. 2. Previous Timothy-en-Y gastric bypass with fluid distended stomach and hepatobiliary loop. The Timothy loop is otherwise unremarkable. Findings could indicate a possible enteritis or a secondary duodenitis related to pancreatitis. 3. Wall thickening and subtle adjacent soft tissue stranding of the rectosigmoid colon may indicate underlying mild colitis. Correlation with symptoms of diarrhea is suggested. 4. Diverticulosis without diverticulitis. Electronically Signed by: Meghan Walker MD Signed on: 6/24/2023 5:35 AM Workstation ID: [id number]      EKG (MOST RECENT):   Results for orders placed or performed during the hospital encounter of 06/24/23   Electrocardiogram 12-Lead   Result Value Ref Range    Systolic Blood Pressure 130     Diastolic Blood Pressure 80     Ventricular Rate EKG/Min (BPM) 93     Atrial Rate (BPM) 93     CA-Interval (MSEC) 104     QRS-Interval (MSEC) 84     QT-Interval (MSEC) 370     QTc 460     P Axis (Degrees) 66     R Axis (Degrees) 79     T Axis (Degrees) 81     REPORT TEXT       Sinus rhythm  with short CA  Nonspecific ST abnormality  Abnormal ECG  When compared with ECG of  26-OCT-2022 17:20,  No significant change was found         _________________________________________________________________________    A/P:    Ivania Maharaj is a 70 year old female who was admitted on 6/24/2023 presenting with abdominal and back pain     Acute pancreatitis without complication;  thin liquid without necrosis  Transaminitis  Elevated Globulin   Hx Papillary Villous Adenoma s/p Whipple in    Hx Hepatic Abscess/sepsis in 10/22  -lipase 1473  -LR @ 125cc/hr   -pain control- IV fentanyl and Toradol   -AST 69 ALT 83, Alkphos 329  -NPO with exceptions, sips and medications   -CT A/P w/contrast subtle soft tissue stranding around the pancreas with fluid in the anterior perirenal space.  Possible pancreatitis.  Fluid distended stomach and hepatobiliary loop.  Concerns for mild colitis, diverticulosis without diverticulitis.   -GI consulted, Dr. Bender, appreciate recommendations  -RUQ US pending   -Will defer ABXs to GI  -Repeat CMP and Lipase in the morning   -Globulin elevated 4.5, appears has been chronically elevated since 2022    Leukocytosis  Elevated Procalcitonin  WBC (K/mcL)   Date Value   2023 15.8 (H)   -Temp (24hrs), Av.5 °F (36.9 °C), Min:98.5 °F (36.9 °C), Max:98.5 °F (36.9 °C)  -UA w/ moderate bacteria trace leukocyte esterase. UC pending, asymptomatic  -No s/s of infectious etiology, possible reactive due to the above   -CTM     Dysphagia   -patient with complaints of chronic ongoing dysphagia   -SLT consulted for evaluation   -Likely outpatient follow up with GI     Chronic medical problems  Hx Hypothyroid- resume levothyroxine   Hx GERD- PPI     Past Medical History:   Diagnosis Date   • Acute cholecystitis 2021    Lap Joan on 21   • Anemia     hx anemia   • Difficult intravenous access 2021    \"Very small veins larger sized needles are very painful\"   • Esophageal ulcer with bleeding     surgical stress ulcer   • Fracture     stress fx in each foot, broken finger   • Gastroesophageal reflux disease    • Gastroesophageal reflux disease    • Glaucoma    • H/O Whipple procedure 2022    Large ampullary adenoma involving distal bile duct.S/P endoscopic ampullectomy however-> involvement of distal bile duct + ovarian mass 21 S/P ROBOTIC, DA CHELI  MIGUEL ANGEL.SALPINGO-OOPHORECTOMY(benign mesothelial cyst.); OPEN WHIPPLE-Negative for adenomatous change or CA.-Dr. Zacarias Postop GI bleeding 3/2/21 EGD :EGJ ulcer. 6/24/22 MRI: No abnormal intra or extrahepatic biliary ductal dilatation.   • Hashimoto's disease    • Herpes simplex vulvovaginitis 12/6/2012   • Hiatal hernia    • History of blood transfusion    • History of endometrial hyperplasia 12/04/2019    Treated and resolved   • History of pulmonary embolism 03/08/2021   • Hx Esophageal ulcer with bleeding 2021    surgical stress ulcer.Postop GI bleeding 3/2/21 EGD :EGJ ulcer.    • Hx Papillary villous adenoma of small intestine S/P  Whipple's 12/24/2020    Ampulla of Vater 3-4 cm polyp. Large ampullary adenoma involving distal bile duct.S/P endoscopic ampullectomy however-> involvement of distal bile duct + ovarian mass 2/24/21 S/P ROBOTIC, DA CHELI MIGUEL ANGEL.SALPINGO-OOPHORECTOMY(benign mesothelial cyst.); OPEN WHIPPLE-Negative for adenomatous change or CA.-Dr. Zacarias    • Hyperlipidemia 11/18/2020   • Hypothyroidism (acquired) 6/5/2012   • Osteoporosis, post-menopausal 5/4/2022   • Papillary villous adenoma of small intestine 12/24/2020    Ampulla of Vater 3-4 cm polyp   • Polyp of stomach and duodenum 12/02/2020   • PONV (postoperative nausea and vomiting)        _________________________________________________________________________    F/E/N:   LR @ 125cc/hr  Monitor lytes and replace as indicated   NPO    DVT Prophylaxis: SCDs, Lovenox- held    Advanced Directives: Full Resuscitation      Discussed above plan with patient who agreed and verbalized understanding.  This case was discussed with my supervising physician Dr. Seamus Guallpa MD who agrees with the above assessment and course of action.     Signed  Janet Gonzales [facility] Medicine  6/24/2023  7:14 AM     (Contact by secure chat)   Available on Epic secure chat for any questions.   Please call for urgent matters.  After 7pm please page the  Hospitalist on-call.    Patient seen and examined independently of Janet Gonzales NP.  Agree with findings, assessment, and plan as above with exceptions as follows:  Patient developed abdominal pain and distention last night after eating 2 small pieces of fried fish, 2 puppies, and a glass of wine.  She vomited and felt better but the pain persisted and she presented to the emergency room where she meets 3 of 3 criteria for acute pancreatitis (pain, elevated lipase, and CT consistent with pancreatic inflammation).  She will be admitted for bowel rest and IV fluids.  Antiemetics and analgesics as needed.  Appreciate GI consultation and recommendations.    Seamus Guallpa MD  1:15 PM  06/24/23

## 2024-08-20 NOTE — ED ADULT NURSE REASSESSMENT NOTE - NS ED NURSE REASSESS COMMENT FT1
pt denies SOB.   transported to [facility] with RN on monitor. Quality 226: Preventive Care And Screening: Tobacco Use: Screening And Cessation Intervention: Patient screened for tobacco use and is an ex/non-smoker Quality 47: Advance Care Plan: Advance Care Planning discussed and documented in the medical record; patient did not wish or was not able to name a surrogate decision maker or provide an advance care plan. Quality 130: Documentation Of Current Medications In The Medical Record: Current Medications Documented Detail Level: Detailed

## 2024-09-03 NOTE — PROVIDER CONTACT NOTE (OTHER) - NAME OF MD/NP/PA/DO NOTIFIED:
Looks like Wegovy also not covered when I bring it up.    See if he would be interested in paying out of pocket and if he wants to check on price for Wegovy, Ozempic, Zepbound or mounjaro.   
Shruti Castillo MD
Katty Wilcox MD
MD Vang

## 2024-11-05 NOTE — PHYSICAL THERAPY INITIAL EVALUATION ADULT - THERAPY FREQUENCY, PT EVAL

## 2025-01-13 NOTE — PHYSICAL EXAM
[General Appearance - Well Developed] : well developed [General Appearance - Alert] : alert [General Appearance - In No Acute Distress] : in no acute distress [Thin] : thin [Sclera] : the sclera and conjunctiva were normal [PERRL With Normal Accommodation] : pupils were equal in size, round, reactive to light [Extraocular Movements] : extraocular movements were intact [] : no respiratory distress [Respiration, Rhythm And Depth] : normal respiratory rhythm and effort [Auscultation Breath Sounds / Voice Sounds] : lungs were clear to auscultation bilaterally [Heart Sounds] : normal S1 and S2 [Heart Rate And Rhythm] : heart rate and rhythm were normal [Skin Color & Pigmentation] : normal skin color and pigmentation [Mood] : the mood was normal [Normal] : the ears, nose and oropharynx were normal in appearance [de-identified] : ECOG 1 [de-identified] : p [de-identified] : 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

## 2025-04-09 NOTE — ASU PATIENT PROFILE, ADULT - AS SC BRADEN ACTIVITY
(4) walks frequently This was a shared visit with the JACOB. I reviewed and verified the documentation.

## 2025-06-06 NOTE — ED PROVIDER NOTE - PHYSICAL EXAMINATION
CONSTITUTIONAL: Well appearing, well nourished, awake, alert and in no apparent distress.  HEENT: Head is atraumatic. Eyes clear bilaterally, normal EOMI. Airway patent.  CARDIAC: Normal rate, regular rhythm.  Heart sounds S1, S2.   RESPIRATORY: Breath sounds clear and equal bilaterally. no tachypnea, respiratory distress.   GASTROINTESTINAL: Abdomen soft, non-tender, no guarding, distension.  MUSCULOSKELETAL: Spine appears normal, no midline spinal tenderness, range of motion is not limited, no muscle or joint tenderness. no bony tenderness.    NEUROLOGICAL: Alert and oriented, no focal deficits, no motor or sensory deficits.  SKIN: Skin normal color for race, warm, dry and intact. No evidence of rash.  PSYCHIATRIC: Alert and oriented to person, place, time/situation. normal mood and affect. no apparent risk to self or others. [email or web address] CONSTITUTIONAL: Well appearing, well nourished, awake, alert and in no apparent distress.  HEENT: Head is atraumatic. Eyes clear bilaterally, normal EOMI. Airway patent.  CARDIAC: Normal rate, regular rhythm.  Heart sounds S1, S2.   RESPIRATORY: Breath sounds clear and equal bilaterally. no tachypnea, respiratory distress. no OP swelling.  GASTROINTESTINAL: Abdomen soft, non-tender, no guarding, distension.  MUSCULOSKELETAL: Spine appears normal, no midline spinal tenderness, range of motion is not limited, no muscle or joint tenderness. no bony tenderness.    NEUROLOGICAL: Alert and oriented, no focal deficits, no motor or sensory deficits.  SKIN: Skin normal color for race, warm, dry and intact. No evidence of rash.  PSYCHIATRIC: Alert and oriented to person, place, time/situation. normal mood and affect. no apparent risk to self or others.
